# Patient Record
Sex: FEMALE | Race: WHITE | NOT HISPANIC OR LATINO | URBAN - METROPOLITAN AREA
[De-identification: names, ages, dates, MRNs, and addresses within clinical notes are randomized per-mention and may not be internally consistent; named-entity substitution may affect disease eponyms.]

---

## 2019-02-20 ENCOUNTER — INPATIENT (INPATIENT)
Facility: HOSPITAL | Age: 84
LOS: 1 days | Discharge: HOME | End: 2019-02-22
Attending: HOSPITALIST | Admitting: HOSPITALIST

## 2019-02-20 VITALS
SYSTOLIC BLOOD PRESSURE: 174 MMHG | HEART RATE: 87 BPM | RESPIRATION RATE: 18 BRPM | TEMPERATURE: 98 F | OXYGEN SATURATION: 96 % | DIASTOLIC BLOOD PRESSURE: 81 MMHG

## 2019-02-20 DIAGNOSIS — E78.5 HYPERLIPIDEMIA, UNSPECIFIED: ICD-10-CM

## 2019-02-20 DIAGNOSIS — J90 PLEURAL EFFUSION, NOT ELSEWHERE CLASSIFIED: ICD-10-CM

## 2019-02-20 DIAGNOSIS — I10 ESSENTIAL (PRIMARY) HYPERTENSION: ICD-10-CM

## 2019-02-20 DIAGNOSIS — R91.8 OTHER NONSPECIFIC ABNORMAL FINDING OF LUNG FIELD: ICD-10-CM

## 2019-02-20 DIAGNOSIS — R63.4 ABNORMAL WEIGHT LOSS: ICD-10-CM

## 2019-02-20 LAB
ALBUMIN SERPL ELPH-MCNC: 4 G/DL — SIGNIFICANT CHANGE UP (ref 3.5–5.2)
ALP SERPL-CCNC: 95 U/L — SIGNIFICANT CHANGE UP (ref 30–115)
ALT FLD-CCNC: 20 U/L — SIGNIFICANT CHANGE UP (ref 0–41)
ANION GAP SERPL CALC-SCNC: 13 MMOL/L — SIGNIFICANT CHANGE UP (ref 7–14)
APTT BLD: 30.3 SEC — SIGNIFICANT CHANGE UP (ref 27–39.2)
AST SERPL-CCNC: 15 U/L — SIGNIFICANT CHANGE UP (ref 0–41)
BASOPHILS # BLD AUTO: 0.06 K/UL — SIGNIFICANT CHANGE UP (ref 0–0.2)
BASOPHILS NFR BLD AUTO: 0.6 % — SIGNIFICANT CHANGE UP (ref 0–1)
BILIRUB SERPL-MCNC: 0.5 MG/DL — SIGNIFICANT CHANGE UP (ref 0.2–1.2)
BUN SERPL-MCNC: 13 MG/DL — SIGNIFICANT CHANGE UP (ref 10–20)
CALCIUM SERPL-MCNC: 9.7 MG/DL — SIGNIFICANT CHANGE UP (ref 8.5–10.1)
CHLORIDE SERPL-SCNC: 104 MMOL/L — SIGNIFICANT CHANGE UP (ref 98–110)
CO2 SERPL-SCNC: 25 MMOL/L — SIGNIFICANT CHANGE UP (ref 17–32)
CREAT SERPL-MCNC: 0.8 MG/DL — SIGNIFICANT CHANGE UP (ref 0.7–1.5)
EOSINOPHIL # BLD AUTO: 0.35 K/UL — SIGNIFICANT CHANGE UP (ref 0–0.7)
EOSINOPHIL NFR BLD AUTO: 3.6 % — SIGNIFICANT CHANGE UP (ref 0–8)
GLUCOSE SERPL-MCNC: 127 MG/DL — HIGH (ref 70–99)
HCT VFR BLD CALC: 39.5 % — SIGNIFICANT CHANGE UP (ref 37–47)
HGB BLD-MCNC: 13.2 G/DL — SIGNIFICANT CHANGE UP (ref 12–16)
IMM GRANULOCYTES NFR BLD AUTO: 0.3 % — SIGNIFICANT CHANGE UP (ref 0.1–0.3)
INR BLD: 1.05 RATIO — SIGNIFICANT CHANGE UP (ref 0.65–1.3)
LYMPHOCYTES # BLD AUTO: 2.35 K/UL — SIGNIFICANT CHANGE UP (ref 1.2–3.4)
LYMPHOCYTES # BLD AUTO: 24.2 % — SIGNIFICANT CHANGE UP (ref 20.5–51.1)
MAGNESIUM SERPL-MCNC: 2 MG/DL — SIGNIFICANT CHANGE UP (ref 1.8–2.4)
MCHC RBC-ENTMCNC: 27.8 PG — SIGNIFICANT CHANGE UP (ref 27–31)
MCHC RBC-ENTMCNC: 33.4 G/DL — SIGNIFICANT CHANGE UP (ref 32–37)
MCV RBC AUTO: 83.2 FL — SIGNIFICANT CHANGE UP (ref 81–99)
MONOCYTES # BLD AUTO: 0.69 K/UL — HIGH (ref 0.1–0.6)
MONOCYTES NFR BLD AUTO: 7.1 % — SIGNIFICANT CHANGE UP (ref 1.7–9.3)
NEUTROPHILS # BLD AUTO: 6.22 K/UL — SIGNIFICANT CHANGE UP (ref 1.4–6.5)
NEUTROPHILS NFR BLD AUTO: 64.2 % — SIGNIFICANT CHANGE UP (ref 42.2–75.2)
NRBC # BLD: 0 /100 WBCS — SIGNIFICANT CHANGE UP (ref 0–0)
NT-PROBNP SERPL-SCNC: 136 PG/ML — SIGNIFICANT CHANGE UP (ref 0–300)
PHOSPHATE SERPL-MCNC: 3.2 MG/DL — SIGNIFICANT CHANGE UP (ref 2.1–4.9)
PLATELET # BLD AUTO: 297 K/UL — SIGNIFICANT CHANGE UP (ref 130–400)
POTASSIUM SERPL-MCNC: 3.9 MMOL/L — SIGNIFICANT CHANGE UP (ref 3.5–5)
POTASSIUM SERPL-SCNC: 3.9 MMOL/L — SIGNIFICANT CHANGE UP (ref 3.5–5)
PROT SERPL-MCNC: 6.6 G/DL — SIGNIFICANT CHANGE UP (ref 6–8)
PROTHROM AB SERPL-ACNC: 12.1 SEC — SIGNIFICANT CHANGE UP (ref 9.95–12.87)
RBC # BLD: 4.75 M/UL — SIGNIFICANT CHANGE UP (ref 4.2–5.4)
RBC # FLD: 12.9 % — SIGNIFICANT CHANGE UP (ref 11.5–14.5)
SODIUM SERPL-SCNC: 142 MMOL/L — SIGNIFICANT CHANGE UP (ref 135–146)
TROPONIN T SERPL-MCNC: <0.01 NG/ML — SIGNIFICANT CHANGE UP
WBC # BLD: 9.7 K/UL — SIGNIFICANT CHANGE UP (ref 4.8–10.8)
WBC # FLD AUTO: 9.7 K/UL — SIGNIFICANT CHANGE UP (ref 4.8–10.8)

## 2019-02-20 RX ORDER — CEFEPIME 1 G/1
2000 INJECTION, POWDER, FOR SOLUTION INTRAMUSCULAR; INTRAVENOUS ONCE
Qty: 0 | Refills: 0 | Status: COMPLETED | OUTPATIENT
Start: 2019-02-20 | End: 2019-02-20

## 2019-02-20 RX ORDER — ACETAMINOPHEN 500 MG
975 TABLET ORAL ONCE
Qty: 0 | Refills: 0 | Status: COMPLETED | OUTPATIENT
Start: 2019-02-20 | End: 2019-02-20

## 2019-02-20 RX ORDER — VANCOMYCIN HCL 1 G
1000 VIAL (EA) INTRAVENOUS ONCE
Qty: 0 | Refills: 0 | Status: COMPLETED | OUTPATIENT
Start: 2019-02-20 | End: 2019-02-20

## 2019-02-20 RX ORDER — AZITHROMYCIN 500 MG/1
500 TABLET, FILM COATED ORAL ONCE
Qty: 0 | Refills: 0 | Status: COMPLETED | OUTPATIENT
Start: 2019-02-20 | End: 2019-02-20

## 2019-02-20 RX ADMIN — AZITHROMYCIN 255 MILLIGRAM(S): 500 TABLET, FILM COATED ORAL at 18:23

## 2019-02-20 RX ADMIN — Medication 975 MILLIGRAM(S): at 17:34

## 2019-02-20 RX ADMIN — Medication 250 MILLIGRAM(S): at 21:34

## 2019-02-20 RX ADMIN — CEFEPIME 100 MILLIGRAM(S): 1 INJECTION, POWDER, FOR SOLUTION INTRAMUSCULAR; INTRAVENOUS at 19:21

## 2019-02-20 NOTE — ED PROVIDER NOTE - CLINICAL SUMMARY MEDICAL DECISION MAKING FREE TEXT BOX
pt here for 1 month cough, progressive fatigue and abnormal breath sounds. pt cxr showing large R pleural effusion- initially c/f empyema given cough hx and tx w/ abx. CTA showing no pe, effusion more likely releated to malignancy. labs negative, including trop.

## 2019-02-20 NOTE — ED PROVIDER NOTE - OBJECTIVE STATEMENT
84 yo f hx htn, hld here for progressive fatigue, sob, and cough. pt endorsing ~1 month of cough and progressive generalized fatigue. pt went to see pcp who heard decreased breath sounds to RLL and was c/f pna vs pe vs pericard effusion.   no fever. pt endorsing some sternal chest pain rad to back which was worse with cough. no smoking hx.

## 2019-02-20 NOTE — ED PROVIDER NOTE - NS_EDPROVIDERDISPOUSERTYPE_ED_A_ED
I call Brayan back, she received some documentation from her insurance company asking if her tx was the result of an accident. I tell her that it's not unusual for insurance to send these out to ensure they aren't paying for a WC or PI case. She understands.    Attending Attestation (For Attendings USE Only)...

## 2019-02-20 NOTE — ED PROVIDER NOTE - NS ED ROS FT
Constitutional: no fever or rigors  Eyes: no eye redness, acute visual change  ENMT: no ear pain, no throat pain  Card: pos chest pain, no palpitations  Pulm: pos cough, pos shortness of breath  GI: no abdominal pain, nausea or vomiting  : no dysuria or hematuria  MSK: no limitation in range of motion, no neck pain  Skin: no bruises, no abrasion  Neuro: no numbness, no weakness  Heme/Onc: no easy bruising, no bleeding tendency   Allergic: no hives, no throat swelling

## 2019-02-21 DIAGNOSIS — Z90.710 ACQUIRED ABSENCE OF BOTH CERVIX AND UTERUS: Chronic | ICD-10-CM

## 2019-02-21 LAB
APTT BLD: 31.1 SEC — SIGNIFICANT CHANGE UP (ref 27–39.2)
B PERT IGG+IGM PNL SER: ABNORMAL
BASOPHILS # BLD AUTO: 0.06 K/UL — SIGNIFICANT CHANGE UP (ref 0–0.2)
BASOPHILS NFR BLD AUTO: 0.7 % — SIGNIFICANT CHANGE UP (ref 0–1)
COLOR FLD: SIGNIFICANT CHANGE UP
EOSINOPHIL # BLD AUTO: 0.42 K/UL — SIGNIFICANT CHANGE UP (ref 0–0.7)
EOSINOPHIL # FLD: 3 % — SIGNIFICANT CHANGE UP
EOSINOPHIL NFR BLD AUTO: 4.8 % — SIGNIFICANT CHANGE UP (ref 0–8)
FLUID INTAKE SUBSTANCE CLASS: SIGNIFICANT CHANGE UP
FLUID SEGMENTED GRANULOCYTES: 23 % — SIGNIFICANT CHANGE UP
GRAM STN FLD: SIGNIFICANT CHANGE UP
HCT VFR BLD CALC: 40.8 % — SIGNIFICANT CHANGE UP (ref 37–47)
HGB BLD-MCNC: 13.3 G/DL — SIGNIFICANT CHANGE UP (ref 12–16)
IMM GRANULOCYTES NFR BLD AUTO: 0.3 % — SIGNIFICANT CHANGE UP (ref 0.1–0.3)
INR BLD: 1.01 RATIO — SIGNIFICANT CHANGE UP (ref 0.65–1.3)
LDH SERPL L TO P-CCNC: 209 — SIGNIFICANT CHANGE UP (ref 50–242)
LYMPHOCYTES # BLD AUTO: 2.19 K/UL — SIGNIFICANT CHANGE UP (ref 1.2–3.4)
LYMPHOCYTES # BLD AUTO: 24.9 % — SIGNIFICANT CHANGE UP (ref 20.5–51.1)
LYMPHOCYTES # FLD: 39 — SIGNIFICANT CHANGE UP
MCHC RBC-ENTMCNC: 27.3 PG — SIGNIFICANT CHANGE UP (ref 27–31)
MCHC RBC-ENTMCNC: 32.6 G/DL — SIGNIFICANT CHANGE UP (ref 32–37)
MCV RBC AUTO: 83.6 FL — SIGNIFICANT CHANGE UP (ref 81–99)
MESOTHL CELL # FLD: 29 % — SIGNIFICANT CHANGE UP
MONOCYTES # BLD AUTO: 0.53 K/UL — SIGNIFICANT CHANGE UP (ref 0.1–0.6)
MONOCYTES NFR BLD AUTO: 6 % — SIGNIFICANT CHANGE UP (ref 1.7–9.3)
MONOS+MACROS # FLD: 6 % — SIGNIFICANT CHANGE UP
NEUTROPHILS # BLD AUTO: 5.57 K/UL — SIGNIFICANT CHANGE UP (ref 1.4–6.5)
NEUTROPHILS NFR BLD AUTO: 63.3 % — SIGNIFICANT CHANGE UP (ref 42.2–75.2)
NIGHT BLUE STAIN TISS: SIGNIFICANT CHANGE UP
NRBC # BLD: 0 /100 WBCS — SIGNIFICANT CHANGE UP (ref 0–0)
PLATELET # BLD AUTO: 306 K/UL — SIGNIFICANT CHANGE UP (ref 130–400)
PROT SERPL-MCNC: 6.4 G/DL — SIGNIFICANT CHANGE UP (ref 6–8)
PROTHROM AB SERPL-ACNC: 11.6 SEC — SIGNIFICANT CHANGE UP (ref 9.95–12.87)
RBC # BLD: 4.88 M/UL — SIGNIFICANT CHANGE UP (ref 4.2–5.4)
RBC # FLD: 13.1 % — SIGNIFICANT CHANGE UP (ref 11.5–14.5)
RCV VOL RI: 4000 /UL — HIGH (ref 0–5)
SPECIMEN SOURCE: SIGNIFICANT CHANGE UP
SPECIMEN SOURCE: SIGNIFICANT CHANGE UP
TOTAL NUCLEATED CELL COUNT, BODY FLUID: 1869 /UL — HIGH (ref 0–5)
TUBE TYPE: SIGNIFICANT CHANGE UP
WBC # BLD: 8.8 K/UL — SIGNIFICANT CHANGE UP (ref 4.8–10.8)
WBC # FLD AUTO: 8.8 K/UL — SIGNIFICANT CHANGE UP (ref 4.8–10.8)

## 2019-02-21 RX ORDER — ENOXAPARIN SODIUM 100 MG/ML
40 INJECTION SUBCUTANEOUS DAILY
Qty: 0 | Refills: 0 | Status: DISCONTINUED | OUTPATIENT
Start: 2019-02-21 | End: 2019-02-22

## 2019-02-21 RX ORDER — ASPIRIN/CALCIUM CARB/MAGNESIUM 324 MG
81 TABLET ORAL DAILY
Qty: 0 | Refills: 0 | Status: DISCONTINUED | OUTPATIENT
Start: 2019-02-21 | End: 2019-02-22

## 2019-02-21 RX ORDER — CHLORHEXIDINE GLUCONATE 213 G/1000ML
1 SOLUTION TOPICAL
Qty: 0 | Refills: 0 | Status: DISCONTINUED | OUTPATIENT
Start: 2019-02-21 | End: 2019-02-22

## 2019-02-21 RX ORDER — SIMVASTATIN 20 MG/1
10 TABLET, FILM COATED ORAL AT BEDTIME
Qty: 0 | Refills: 0 | Status: DISCONTINUED | OUTPATIENT
Start: 2019-02-21 | End: 2019-02-22

## 2019-02-21 RX ORDER — MORPHINE SULFATE 50 MG/1
2 CAPSULE, EXTENDED RELEASE ORAL EVERY 4 HOURS
Qty: 0 | Refills: 0 | Status: DISCONTINUED | OUTPATIENT
Start: 2019-02-21 | End: 2019-02-22

## 2019-02-21 RX ORDER — ENOXAPARIN SODIUM 100 MG/ML
40 INJECTION SUBCUTANEOUS DAILY
Qty: 0 | Refills: 0 | Status: DISCONTINUED | OUTPATIENT
Start: 2019-02-21 | End: 2019-02-21

## 2019-02-21 RX ORDER — LOSARTAN POTASSIUM 100 MG/1
100 TABLET, FILM COATED ORAL DAILY
Qty: 0 | Refills: 0 | Status: DISCONTINUED | OUTPATIENT
Start: 2019-02-21 | End: 2019-02-22

## 2019-02-21 RX ORDER — AMLODIPINE BESYLATE 2.5 MG/1
5 TABLET ORAL DAILY
Qty: 0 | Refills: 0 | Status: DISCONTINUED | OUTPATIENT
Start: 2019-02-21 | End: 2019-02-22

## 2019-02-21 RX ADMIN — ENOXAPARIN SODIUM 40 MILLIGRAM(S): 100 INJECTION SUBCUTANEOUS at 18:16

## 2019-02-21 RX ADMIN — Medication 81 MILLIGRAM(S): at 18:16

## 2019-02-21 RX ADMIN — AMLODIPINE BESYLATE 5 MILLIGRAM(S): 2.5 TABLET ORAL at 05:38

## 2019-02-21 RX ADMIN — LOSARTAN POTASSIUM 100 MILLIGRAM(S): 100 TABLET, FILM COATED ORAL at 05:38

## 2019-02-21 RX ADMIN — SIMVASTATIN 10 MILLIGRAM(S): 20 TABLET, FILM COATED ORAL at 22:31

## 2019-02-21 RX ADMIN — MORPHINE SULFATE 2 MILLIGRAM(S): 50 CAPSULE, EXTENDED RELEASE ORAL at 01:26

## 2019-02-21 NOTE — CONSULT NOTE ADULT - SUBJECTIVE AND OBJECTIVE BOX
Patient is a 83y old  Female who presents with a chief complaint of Loculated pleural effusion, SOB, fatigue (21 Feb 2019 01:17)      HPI:  83F, fully functional, PMHx of HTN and DLD sent in by PCP for decreased R sided breath sounds and generalized weakness. At baseline patient walks approximately 2 hours a day with her dog. Beginning about 1 month ago she began developing a dry cough, generalized weakness and worsening ABRAHAM. Additionally, she is experiencing progressive back and chest pain. The pain is described as "nerve like", moves with positional change and cough, and has been constant for weeks. She also c/o 3 kg weight loss in 1 month, as well as night sweats (waking up twice a night covered in sweat). Yesterday she was unable to walk her dog at all due to her weakness and SOB, so she came to ED.    In ED, afebrile, VSS, labs wnl, CT PE showed loculated effusions with likely 3cm malignant mass. (21 Feb 2019 01:17)      PAST MEDICAL & SURGICAL HISTORY:  HTN (hypertension)  High cholesterol  H/O abdominal hysterectomy      SOCIAL HX:   Smoking                         ETOH                            Other    FAMILY HISTORY:  Hypertension (Mother)  .  No cardiovascular or pulmonary family history     Review of System:  See HPI    Allergies    Allergy Status Unknown    Intolerances          PHYSICAL EXAM  Vital Signs Last 24 Hrs  T(C): 37.1 (21 Feb 2019 07:42), Max: 37.1 (21 Feb 2019 07:42)  T(F): 98.7 (21 Feb 2019 07:42), Max: 98.7 (21 Feb 2019 07:42)  HR: 75 (21 Feb 2019 07:42) (75 - 87)  BP: 152/66 (21 Feb 2019 07:42) (152/66 - 199/86)  BP(mean): --  RR: 18 (21 Feb 2019 07:42) (18 - 18)  SpO2: 95% (21 Feb 2019 07:42) (95% - 98%)    General: In NAD  HEENT: FERNANDO             Lymphatic system: No cervical LN     Lungs: James BS  Cardiovascular: Regular  Gastrointestinal: Soft.  + BS   Musculoskeletal: No Clubbing.  Full range of motion.. Moves all extremities  Skin: Warm.  Intact  Neurological: No motor or sensory deficit       LABS:                          13.3   8.80  )-----------( 306      ( 21 Feb 2019 08:18 )             40.8                                               02-20    142  |  104  |  13  ----------------------------<  127<H>  3.9   |  25  |  0.8    Ca    9.7      20 Feb 2019 16:58  Phos  3.2     02-20  Mg     2.0     02-20    TPro  6.6  /  Alb  4.0  /  TBili  0.5  /  DBili  x   /  AST  15  /  ALT  20  /  AlkPhos  95  02-20      PT/INR - ( 21 Feb 2019 08:18 )   PT: 11.60 sec;   INR: 1.01 ratio         PTT - ( 21 Feb 2019 08:18 )  PTT:31.1 sec                                           CARDIAC MARKERS ( 20 Feb 2019 16:58 )  x     / <0.01 ng/mL / x     / x     / x                                                LIVER FUNCTIONS - ( 20 Feb 2019 16:58 )  Alb: 4.0 g/dL / Pro: 6.6 g/dL / ALK PHOS: 95 U/L / ALT: 20 U/L / AST: 15 U/L / GGT: x                                                < from: Xray Chest 2 Views PA/Lat (02.20.19 @ 16:25) >  Impression:      Right upper lobe soft tissue density/mass and large right pleural   effusion, better evaluated on reference CT chest.    < end of copied text >        < from: CT Angio Chest w/ IV Cont (02.20.19 @ 20:43) >  IMPRESSION:    Large right loculated pleural effusion with estimated volume of at least   900 cc.    No acute pulmonary embolus.    1 cm right upper lobe fissural nodule. A short interval follow-up chest   CT is recommended in 2-3 months.    Additional Findings/Recommendations After Attending Radiologist Review:    Findings consistent with neoplasm. There is right apical 3.1 x 3.0 cm   mass with irregular right apical pleural thickening (series 7, image 44),   and multiple satellite pleural nodules including a more caudad 2.7 cm   para-mediastinal nodule (series 7, image 175). Tissue sampling   recommended.    Attending impression discussed with JESUS CHARLTON on 2/20/2019 9:52   PM with readback.    < end of copied text >                                                  Serum Pro-Brain Natriuretic Peptide: 136 pg/mL (02.20.19 @ 17:01)          MEDICATIONS  (STANDING):  amLODIPine   Tablet 5 milliGRAM(s) Oral daily  aspirin  chewable 81 milliGRAM(s) Oral daily  chlorhexidine 4% Liquid 1 Application(s) Topical <User Schedule>  enoxaparin Injectable 40 milliGRAM(s) SubCutaneous daily  losartan 100 milliGRAM(s) Oral daily  simvastatin 10 milliGRAM(s) Oral at bedtime    MEDICATIONS  (PRN):  morphine  - Injectable 2 milliGRAM(s) IV Push every 4 hours PRN Severe Pain (7 - 10) Patient is a 83y old  Female who presents with a chief complaint of Loculated pleural effusion, SOB, fatigue (21 Feb 2019 01:17)      HPI:  83F, fully functional, PMHx of HTN and DLD sent in by PCP for decreased R sided breath sounds and generalized weakness. At baseline patient walks approximately 2 hours a day with her dog. Beginning about 1 month ago she began developing a dry cough, generalized weakness and worsening ABRAHAM. Additionally, she is experiencing progressive back and chest pain. The pain is described as "nerve like", moves with positional change and cough, and has been constant for weeks. She also c/o 3 kg weight loss in 1 month, as well as night sweats (waking up twice a night covered in sweat). Yesterday she was unable to walk her dog at all due to her weakness and SOB, so she came to ED.    In ED, afebrile, VSS, labs wnl, CT PE showed loculated effusions with likely 3cm malignant mass. (21 Feb 2019 01:17)      PAST MEDICAL & SURGICAL HISTORY:  HTN (hypertension)  High cholesterol  H/O abdominal hysterectomy      SOCIAL HX:   Smoking   no                      ETOH      no              FAMILY HISTORY:  Hypertension (Mother)  .  No cardiovascular or pulmonary family history     Review of System:  See HPI    Allergies    Allergy Status Unknown    Intolerances    PHYSICAL EXAM  Vital Signs Last 24 Hrs  T(C): 37.1 (21 Feb 2019 07:42), Max: 37.1 (21 Feb 2019 07:42)  T(F): 98.7 (21 Feb 2019 07:42), Max: 98.7 (21 Feb 2019 07:42)  HR: 75 (21 Feb 2019 07:42) (75 - 87)  BP: 152/66 (21 Feb 2019 07:42) (152/66 - 199/86)  RR: 18 (21 Feb 2019 07:42) (18 - 18)  SpO2: 95% (21 Feb 2019 07:42) (95% - 98%) RA    General: In NAD  HEENT: FERNANDO             Lymphatic system: No cervical LN     Lungs: James BS reduced on right no crackles or wheeze  Cardiovascular: Regular  Gastrointestinal: Soft.  + BS   Musculoskeletal: No Clubbing.  Full range of motion.. Moves all extremities no edema  Skin: Warm.  Intact  Neurological: No motor or sensory deficit       LABS:                          13.3   8.80  )-----------( 306      ( 21 Feb 2019 08:18 )             40.8                                               02-20    142  |  104  |  13  ----------------------------<  127<H>  3.9   |  25  |  0.8    Ca    9.7      20 Feb 2019 16:58  Phos  3.2     02-20  Mg     2.0     02-20    TPro  6.6  /  Alb  4.0  /  TBili  0.5  /  DBili  x   /  AST  15  /  ALT  20  /  AlkPhos  95  02-20      PT/INR - ( 21 Feb 2019 08:18 )   PT: 11.60 sec;   INR: 1.01 ratio         PTT - ( 21 Feb 2019 08:18 )  PTT:31.1 sec                                           CARDIAC MARKERS ( 20 Feb 2019 16:58 )  x     / <0.01 ng/mL / x     / x     / x                                                LIVER FUNCTIONS - ( 20 Feb 2019 16:58 )  Alb: 4.0 g/dL / Pro: 6.6 g/dL / ALK PHOS: 95 U/L / ALT: 20 U/L / AST: 15 U/L / GGT: x                                                < from: Xray Chest 2 Views PA/Lat (02.20.19 @ 16:25) >  Impression:      Right upper lobe soft tissue density/mass and large right pleural   effusion, better evaluated on reference CT chest.    < end of copied text >        < from: CT Angio Chest w/ IV Cont (02.20.19 @ 20:43) >  IMPRESSION:    Large right loculated pleural effusion with estimated volume of at least   900 cc.    No acute pulmonary embolus.    1 cm right upper lobe fissural nodule. A short interval follow-up chest   CT is recommended in 2-3 months.    Additional Findings/Recommendations After Attending Radiologist Review:    Findings consistent with neoplasm. There is right apical 3.1 x 3.0 cm   mass with irregular right apical pleural thickening (series 7, image 44),   and multiple satellite pleural nodules including a more caudad 2.7 cm   para-mediastinal nodule (series 7, image 175). Tissue sampling   recommended.    Attending impression discussed with JESUS CHARLTON on 2/20/2019 9:52   PM with readback.    < end of copied text >                                                  Serum Pro-Brain Natriuretic Peptide: 136 pg/mL (02.20.19 @ 17:01)          MEDICATIONS  (STANDING):  amLODIPine   Tablet 5 milliGRAM(s) Oral daily  aspirin  chewable 81 milliGRAM(s) Oral daily  chlorhexidine 4% Liquid 1 Application(s) Topical <User Schedule>  enoxaparin Injectable 40 milliGRAM(s) SubCutaneous daily  losartan 100 milliGRAM(s) Oral daily  simvastatin 10 milliGRAM(s) Oral at bedtime    MEDICATIONS  (PRN):  morphine  - Injectable 2 milliGRAM(s) IV Push every 4 hours PRN Severe Pain (7 - 10) Patient is a 83y old  Female who presents with a chief complaint of Loculated pleural effusion, SOB, fatigue (21 Feb 2019 01:17)      HPI:  83F, fully functional, PMHx of HTN and DLD sent in by PCP for decreased R sided breath sounds and generalized weakness. At baseline patient walks approximately 2 hours a day with her dog. Beginning about 1 month ago she began developing a dry cough, generalized weakness and worsening ABRAHAM. Additionally, she is experiencing progressive back and chest pain. The pain is described as "nerve like", moves with positional change and cough, and has been constant for weeks. She also c/o 3 kg weight loss in 1 month, as well as night sweats (waking up twice a night covered in sweat). Yesterday she was unable to walk her dog at all due to her weakness and SOB, so she came to ED.    In ED, afebrile, VSS, labs wnl, CT PE showed R sided effusion with RUL mass. (21 Feb 2019 01:17) called to evaluate, reports weight loss, non smoker      PAST MEDICAL & SURGICAL HISTORY:  HTN (hypertension)  High cholesterol  H/O abdominal hysterectomy      SOCIAL HX:   Smoking   no                      ETOH      no              FAMILY HISTORY:  Hypertension (Mother)  .  No cardiovascular or pulmonary family history     Review of System:  See HPI    Allergies    Allergy Status Unknown    Intolerances    PHYSICAL EXAM  Vital Signs Last 24 Hrs  T(C): 37.1 (21 Feb 2019 07:42), Max: 37.1 (21 Feb 2019 07:42)  T(F): 98.7 (21 Feb 2019 07:42), Max: 98.7 (21 Feb 2019 07:42)  HR: 75 (21 Feb 2019 07:42) (75 - 87)  BP: 152/66 (21 Feb 2019 07:42) (152/66 - 199/86)  RR: 18 (21 Feb 2019 07:42) (18 - 18)  SpO2: 95% (21 Feb 2019 07:42) (95% - 98%) RA    General: In NAD  HEENT: FERNANDO             Lymphatic system: No cervical LN     Lungs: James BS reduced on right no crackles or wheeze  Cardiovascular: Regular  Gastrointestinal: Soft.  + BS   Musculoskeletal: No Clubbing.  Full range of motion.. Moves all extremities no edema  Skin: Warm.  Intact  Neurological: No motor or sensory deficit       LABS:                          13.3   8.80  )-----------( 306      ( 21 Feb 2019 08:18 )             40.8                                               02-20    142  |  104  |  13  ----------------------------<  127<H>  3.9   |  25  |  0.8    Ca    9.7      20 Feb 2019 16:58  Phos  3.2     02-20  Mg     2.0     02-20    TPro  6.6  /  Alb  4.0  /  TBili  0.5  /  DBili  x   /  AST  15  /  ALT  20  /  AlkPhos  95  02-20      PT/INR - ( 21 Feb 2019 08:18 )   PT: 11.60 sec;   INR: 1.01 ratio         PTT - ( 21 Feb 2019 08:18 )  PTT:31.1 sec                                           CARDIAC MARKERS ( 20 Feb 2019 16:58 )  x     / <0.01 ng/mL / x     / x     / x                                                LIVER FUNCTIONS - ( 20 Feb 2019 16:58 )  Alb: 4.0 g/dL / Pro: 6.6 g/dL / ALK PHOS: 95 U/L / ALT: 20 U/L / AST: 15 U/L / GGT: x                                                < from: Xray Chest 2 Views PA/Lat (02.20.19 @ 16:25) >  Impression:      Right upper lobe soft tissue density/mass and large right pleural   effusion, better evaluated on reference CT chest.    < end of copied text >        < from: CT Angio Chest w/ IV Cont (02.20.19 @ 20:43) >  IMPRESSION:    Large right loculated pleural effusion with estimated volume of at least   900 cc.    No acute pulmonary embolus.    1 cm right upper lobe fissural nodule. A short interval follow-up chest   CT is recommended in 2-3 months.    Additional Findings/Recommendations After Attending Radiologist Review:    Findings consistent with neoplasm. There is right apical 3.1 x 3.0 cm   mass with irregular right apical pleural thickening (series 7, image 44),   and multiple satellite pleural nodules including a more caudad 2.7 cm   para-mediastinal nodule (series 7, image 175). Tissue sampling   recommended.    Attending impression discussed with JESUS CHARLTON on 2/20/2019 9:52   PM with readback.    < end of copied text >                                                  Serum Pro-Brain Natriuretic Peptide: 136 pg/mL (02.20.19 @ 17:01)          MEDICATIONS  (STANDING):  amLODIPine   Tablet 5 milliGRAM(s) Oral daily  aspirin  chewable 81 milliGRAM(s) Oral daily  chlorhexidine 4% Liquid 1 Application(s) Topical <User Schedule>  enoxaparin Injectable 40 milliGRAM(s) SubCutaneous daily  losartan 100 milliGRAM(s) Oral daily  simvastatin 10 milliGRAM(s) Oral at bedtime    MEDICATIONS  (PRN):  morphine  - Injectable 2 milliGRAM(s) IV Push every 4 hours PRN Severe Pain (7 - 10)

## 2019-02-21 NOTE — H&P ADULT - NSHPLABSRESULTS_GEN_ALL_CORE
13.2   9.70  )-----------( 297      ( 20 Feb 2019 16:58 )             39.5   02-20    142  |  104  |  13  ----------------------------<  127<H>  3.9   |  25  |  0.8    Ca    9.7      20 Feb 2019 16:58  Phos  3.2     02-20  Mg     2.0     02-20    TPro  6.6  /  Alb  4.0  /  TBili  0.5  /  DBili  x   /  AST  15  /  ALT  20  /  AlkPhos  95  02-20  CARDIAC MARKERS ( 20 Feb 2019 16:58 )  x     / <0.01 ng/mL / x     / x     / x 13.2   9.70  )-----------( 297      ( 20 Feb 2019 16:58 )             39.5   02-20    142  |  104  |  13  ----------------------------<  127<H>  3.9   |  25  |  0.8    Ca    9.7      20 Feb 2019 16:58  Phos  3.2     02-20  Mg     2.0     02-20    TPro  6.6  /  Alb  4.0  /  TBili  0.5  /  DBili  x   /  AST  15  /  ALT  20  /  AlkPhos  95  02-20  CARDIAC MARKERS ( 20 Feb 2019 16:58 )  x     / <0.01 ng/mL / x     / x     / x    < from: CT Angio Chest w/ IV Cont (02.20.19 @ 20:43) >    Large right loculated pleural effusion with estimated volume of at least   900 cc.    No acute pulmonary embolus.    1 cm right upper lobe fissural nodule. A short interval follow-up chest   CT is recommended in 2-3 months.    Additional Findings/Recommendations After Attending Radiologist Review:    Findings consistent with neoplasm. There is right apical 3.1 x 3.0 cm   mass with irregular right apical pleural thickening (series 7, image 44),   and multiple satellite pleural nodules including a more caudad 2.7 cm   para-mediastinal nodule (series 7, image 175). Tissue sampling   recommended.

## 2019-02-21 NOTE — H&P ADULT - ASSESSMENT
83F, fully functional, PMHx of HTN and DLD sent in by PCP for decreased R sided breath sounds and generalized weakness.     SOB and generalized weakness due to loculated pleural effusion with 3cm R lung mass, likely malignant:  - Pulm consult for thoracentesis  - Pain control  - Lovenox scheduled for 1800    HTN: Possibly related to pain, received morphine  - Recheck VS, will give labetalol if still elevated  - Resume losartan, amlodipine    DLD:  - Simvastatin, ASA 81    DVT ppx: lovenox  Diet: DASH  Dispo: Lives with son, walked 2 hours a day 2 months ago

## 2019-02-21 NOTE — CONSULT NOTE ADULT - ASSESSMENT
IMPRESSION:    Right apical lung mass with multiple satellite nodules  Large right pleural effusion likely malignant    PLAN:    ·	Will do diag/therapuetic thoracentesis  ·	DVT ppx IMPRESSION:    Right apical lung mass with multiple satellite nodules  Large right pleural effusion likely malignant    PLAN:    ·	Will do diag/therapuetic thoracentesis  ·	Will discuss bronch with EBUS of enlarged paratracheal LN  ·	DVT ppx IMPRESSION:    Right apical lung mass with multiple satellite nodules  Large right pleural effusion likely malignant    PLAN:    ·	Will do diag/therapuetic thoracentesis  ·	Will discuss bronch with EBUS of enlarged paratracheal LN vs IR bx if fluid does not yield diagnosis  ·	Can be discharged from pulmonary standpoint  ·	Please make appt to see dr francois as outpatient  ·	DVT ppx IMPRESSION:    Right apical lung mass with multiple satellite nodules  Large right pleural effusion likely malignant    PLAN:    ·	Will do diag/therapuetic thoracentesis  ·	Can be discharged from pulmonary standpoint  ·	Please make appt to see dr francois as outpatient  ·	DVT ppx  ·	spoke at length with son well aware of possibility of malignancy

## 2019-02-21 NOTE — H&P ADULT - HISTORY OF PRESENT ILLNESS
83F, fully functional, PMHx of HTN and DLD sent in by PCP for concern of pleural/pericardial effusion and PE. At baseline patient walks approximately 2 hours a day with her dog. Beginning about 1 month ago she began developing a dry cough, generalized weakness and worsening ABRAHAM. Additionally, she is experiencing progressive back and chest pain. The pain is described as "nerve like", moves with positional change and cough, and has been constant for weeks. She also c/o 3 kg weight loss in 1 month, as well as night sweats (waking up twice a night covered in sweat). Yesterday she was unable to walk her dog at all due to her weakness and SOB, so she came to ED.    In ED, afebrile, VSS, labs wnl, CT PE showed loculated effusions with likely 3cm malignant mass. 83F, fully functional, PMHx of HTN and DLD sent in by PCP for decreased R sided breath sounds and generalized weakness. At baseline patient walks approximately 2 hours a day with her dog. Beginning about 1 month ago she began developing a dry cough, generalized weakness and worsening ABRAHAM. Additionally, she is experiencing progressive back and chest pain. The pain is described as "nerve like", moves with positional change and cough, and has been constant for weeks. She also c/o 3 kg weight loss in 1 month, as well as night sweats (waking up twice a night covered in sweat). Yesterday she was unable to walk her dog at all due to her weakness and SOB, so she came to ED.    In ED, afebrile, VSS, labs wnl, CT PE showed loculated effusions with likely 3cm malignant mass.

## 2019-02-21 NOTE — H&P ADULT - NSHPPHYSICALEXAM_GEN_ALL_CORE
General: NAD  HEENT: NCAT, EOMI  Pulm: Decreased breath sounds RLL  CVS: RRR  GI: Soft, NT, ND  : No suprapubic tenderness  Extremities: No edema  Neuro: AAOx4, no FND

## 2019-02-21 NOTE — H&P ADULT - ATTENDING COMMENTS
Patient seen and examined independently. I agree with the resident's note, physical exam, and plan except as below.  Vital Signs Last 24 Hrs  T(C): 37.1 (21 Feb 2019 07:42), Max: 37.1 (21 Feb 2019 07:42)  T(F): 98.7 (21 Feb 2019 07:42), Max: 98.7 (21 Feb 2019 07:42)  HR: 75 (21 Feb 2019 07:42) (75 - 87)  BP: 152/66 (21 Feb 2019 07:42) (152/66 - 199/86)  BP(mean): --  RR: 18 (21 Feb 2019 07:42) (18 - 18)  SpO2: 95% (21 Feb 2019 07:42) (95% - 98%)  PE  nad  [id number]  ctabl  right post thoracentesis site intact  no cce    #SOB due to loculated Right pleural effusion - sp thoracentesis today - follow up fluid analysis and cytology   #right apical lung mass - likely malignant - follow up fluid cytology - may need EBUS - can be done as outpt  outpt Onc - Dr cross -requested by PMD - spoke over phone - Dr Fernanda Phelps  #per PMD - cardiomyopathy on echo - check echo   discussed with pt and son who translated Vietnamese - would like to be dc asap and follow up as outpt for further workup   anticipate dc tomorrow if stable   pulm follow up

## 2019-02-21 NOTE — PROCEDURE NOTE - NSPROCDETAILS_GEN_ALL_CORE
connection to evacuated glass bottle/connection to syringe/catheter inserted over needle/location identified, draped/prepped, sterile technique used, needle inserted/introduced

## 2019-02-22 ENCOUNTER — TRANSCRIPTION ENCOUNTER (OUTPATIENT)
Age: 84
End: 2019-02-22

## 2019-02-22 VITALS
TEMPERATURE: 99 F | SYSTOLIC BLOOD PRESSURE: 145 MMHG | DIASTOLIC BLOOD PRESSURE: 63 MMHG | HEART RATE: 83 BPM | RESPIRATION RATE: 18 BRPM

## 2019-02-22 LAB
ANION GAP SERPL CALC-SCNC: 17 MMOL/L — HIGH (ref 7–14)
BASOPHILS # BLD AUTO: 0.05 K/UL — SIGNIFICANT CHANGE UP (ref 0–0.2)
BASOPHILS NFR BLD AUTO: 0.7 % — SIGNIFICANT CHANGE UP (ref 0–1)
BUN SERPL-MCNC: 16 MG/DL — SIGNIFICANT CHANGE UP (ref 10–20)
CALCIUM SERPL-MCNC: 9.3 MG/DL — SIGNIFICANT CHANGE UP (ref 8.5–10.1)
CHLORIDE SERPL-SCNC: 103 MMOL/L — SIGNIFICANT CHANGE UP (ref 98–110)
CO2 SERPL-SCNC: 22 MMOL/L — SIGNIFICANT CHANGE UP (ref 17–32)
CREAT SERPL-MCNC: 1 MG/DL — SIGNIFICANT CHANGE UP (ref 0.7–1.5)
EOSINOPHIL # BLD AUTO: 0.33 K/UL — SIGNIFICANT CHANGE UP (ref 0–0.7)
EOSINOPHIL NFR BLD AUTO: 4.3 % — SIGNIFICANT CHANGE UP (ref 0–8)
GLUCOSE SERPL-MCNC: 154 MG/DL — HIGH (ref 70–99)
HCT VFR BLD CALC: 37.2 % — SIGNIFICANT CHANGE UP (ref 37–47)
HGB BLD-MCNC: 12.1 G/DL — SIGNIFICANT CHANGE UP (ref 12–16)
IMM GRANULOCYTES NFR BLD AUTO: 0.3 % — SIGNIFICANT CHANGE UP (ref 0.1–0.3)
LYMPHOCYTES # BLD AUTO: 1.8 K/UL — SIGNIFICANT CHANGE UP (ref 1.2–3.4)
LYMPHOCYTES # BLD AUTO: 23.6 % — SIGNIFICANT CHANGE UP (ref 20.5–51.1)
MCHC RBC-ENTMCNC: 27.5 PG — SIGNIFICANT CHANGE UP (ref 27–31)
MCHC RBC-ENTMCNC: 32.5 G/DL — SIGNIFICANT CHANGE UP (ref 32–37)
MCV RBC AUTO: 84.5 FL — SIGNIFICANT CHANGE UP (ref 81–99)
MONOCYTES # BLD AUTO: 0.41 K/UL — SIGNIFICANT CHANGE UP (ref 0.1–0.6)
MONOCYTES NFR BLD AUTO: 5.4 % — SIGNIFICANT CHANGE UP (ref 1.7–9.3)
NEUTROPHILS # BLD AUTO: 5.02 K/UL — SIGNIFICANT CHANGE UP (ref 1.4–6.5)
NEUTROPHILS NFR BLD AUTO: 65.7 % — SIGNIFICANT CHANGE UP (ref 42.2–75.2)
NRBC # BLD: 0 /100 WBCS — SIGNIFICANT CHANGE UP (ref 0–0)
PLATELET # BLD AUTO: 287 K/UL — SIGNIFICANT CHANGE UP (ref 130–400)
POTASSIUM SERPL-MCNC: 4.2 MMOL/L — SIGNIFICANT CHANGE UP (ref 3.5–5)
POTASSIUM SERPL-SCNC: 4.2 MMOL/L — SIGNIFICANT CHANGE UP (ref 3.5–5)
RBC # BLD: 4.4 M/UL — SIGNIFICANT CHANGE UP (ref 4.2–5.4)
RBC # FLD: 13 % — SIGNIFICANT CHANGE UP (ref 11.5–14.5)
SODIUM SERPL-SCNC: 142 MMOL/L — SIGNIFICANT CHANGE UP (ref 135–146)
WBC # BLD: 7.63 K/UL — SIGNIFICANT CHANGE UP (ref 4.8–10.8)
WBC # FLD AUTO: 7.63 K/UL — SIGNIFICANT CHANGE UP (ref 4.8–10.8)

## 2019-02-22 RX ADMIN — Medication 81 MILLIGRAM(S): at 12:17

## 2019-02-22 RX ADMIN — AMLODIPINE BESYLATE 5 MILLIGRAM(S): 2.5 TABLET ORAL at 05:19

## 2019-02-22 RX ADMIN — ENOXAPARIN SODIUM 40 MILLIGRAM(S): 100 INJECTION SUBCUTANEOUS at 12:18

## 2019-02-22 RX ADMIN — LOSARTAN POTASSIUM 100 MILLIGRAM(S): 100 TABLET, FILM COATED ORAL at 05:19

## 2019-02-22 NOTE — DISCHARGE NOTE ADULT - CARE PLAN
Principal Discharge DX:	Lung mass  Goal:	biopsy and pulm follow up  Assessment and plan of treatment:	You were found to have suspicious mass in right lung. Please follow up with pulmonary and have biopsy.  Secondary Diagnosis:	Pleural effusion  Goal:	prevent recurrence  Assessment and plan of treatment:	Fluid in lung may have been cause of symptoms on presentation. It was drained out and sent for testing. Please follow up with pulmonologist after discharge.

## 2019-02-22 NOTE — DISCHARGE NOTE ADULT - PATIENT PORTAL LINK FT
You can access the [facility] Patient Portal, offered by [facility], by registering with the following website: [email or web address]

## 2019-02-22 NOTE — DISCHARGE NOTE ADULT - CARE PROVIDER_API CALL
Tamir Lanza (MD)  Critical Care Medicine; Geriatric Medicine; Internal Medicine; Pulmonary Disease  [address] [place] [address], [address] [address]  Phone: [phone]  Fax: [phone]  Follow Up Time:     Brenda Phelps (DO)  Internal Medicine  [address] [id number]  Phone: [phone]  Fax: [phone]  Follow Up Time:

## 2019-02-22 NOTE — DISCHARGE NOTE ADULT - MEDICATION SUMMARY - MEDICATIONS TO TAKE
I will START or STAY ON the medications listed below when I get home from the hospital:    aspirin 81 mg oral tablet  -- 1 tab(s) by mouth once a day  -- Indication: For cad prevention    losartan 100 mg oral tablet  -- 1 tab(s) by mouth once a day  -- Indication: For blood pressure    simvastatin 10 mg oral tablet  -- 1 tab(s) by mouth once a day (at bedtime)  -- Indication: For cholesterol    amLODIPine 5 mg oral tablet  -- 1 tab(s) by mouth once a day  -- Indication: For blood pressure

## 2019-02-22 NOTE — PROGRESS NOTE ADULT - ASSESSMENT
IMPRESSION:    Right apical lung mass with multiple satellite nodules  Large right pleural effusion likely malignant    PLAN:          ·	F/UP CYTO  ·	Can be discharged from pulmonary standpoint  ·	outpatient F/UP  ·	DVT ppx  ·	spoke at length with son well aware of possibility of malignancy

## 2019-02-22 NOTE — DISCHARGE NOTE ADULT - PLAN OF CARE
biopsy and pulm follow up You were found to have suspicious mass in right lung. Please follow up with pulmonary and have biopsy. prevent recurrence Fluid in lung may have been cause of symptoms on presentation. It was drained out and sent for testing. Please follow up with pulmonologist after discharge.

## 2019-02-22 NOTE — DISCHARGE NOTE ADULT - HOSPITAL COURSE
83F, fully functional, PMHx of HTN and DLD sent in by PCP for decreased R sided breath sounds and generalized weakness. 1 month of dry cough and worsening ABRAHAM. She also c/o 3 kg weight loss in 1 month, as well as night sweats (waking up twice a night covered in sweat). 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 Pulmonary was consulted, performed diagnostic/therapeutic thoracentesis. She will be discharged with instruction to follow up with pulm for biopsy. 83F, fully functional, PMHx of HTN and DLD sent in by PCP for decreased R sided breath sounds and generalized weakness. 1 month of dry cough and worsening ABRAHAM. She also c/o 3 kg weight loss in 1 month, as well as night sweats (waking up twice a night covered in sweat). 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 Pulmonary was consulted, performed diagnostic/therapeutic thoracentesis. She will be discharged with instruction to follow up with pulm for biopsy.    Attending NOte:     #right apical lung mass - likely malignant  #SOB due to loculated Right pleural effusion - sp thoracentesis - follow up fluid analysis and cytology . cleared for D/c from pulm standpoint. F/u Pulm within a week for further w/u. may need EBUS - can be done as outpt  outpt Onc - Dr tay khan.    D/c to home.

## 2019-02-22 NOTE — DISCHARGE NOTE ADULT - CARE PROVIDERS DIRECT ADDRESSES
[email or web address]

## 2019-02-22 NOTE — CHART NOTE - NSCHARTNOTEFT_GEN_A_CORE
<<<RESIDENT DISCHARGE NOTE>>>     JOANN SCHULTZ  MRN-[id number]    VITAL SIGNS:  T(F): 98.8 (02-22-19 @ 12:10), Max: 98.8 (02-22-19 @ 12:10)  HR: 83 (02-22-19 @ 12:10)  BP: 145/63 (02-22-19 @ 12:10)  SpO2: 97% (02-21-19 @ 16:35)  Weight (kg): 75.4 (02-21-19 @ 17:20)  BMI (kg/m2): 32.5 (02-21-19 @ 17:20)    PHYSICAL EXAMINATION:  General: NAD  Head & Neck: NCAT  Pulmonary: BS slightly decreased on R side  Cardiovascular: rrr, no murmurs  Gastrointestinal/Abdomen & Pelvis: soft, nt/nd  Neurologic/Motor: nonfocal    TEST RESULTS:                        12.1   7.63  )-----------( 287      ( 22 Feb 2019 09:05 )             37.2       02-22    142  |  103  |  16  ----------------------------<  154<H>  4.2   |  22  |  1.0    Ca    9.3      22 Feb 2019 09:05  Phos  3.2     02-20  Mg     2.0     02-20    TPro  6.4  /  Alb  x   /  TBili  x   /  DBili  x   /  AST  x   /  ALT  x   /  AlkPhos  x   02-21      FINAL DISCHARGE INTERVIEW:  Resident(s) Present: (Name:_____Abby________)    DISCHARGE MEDICATION RECONCILIATION  reviewed with Attending (Name:Monique________)    DISPOSITION:   [ x ] Home,    [  ] Home with Visiting Nursing Services,   [    ]  SNF/ NH,    [   ] Acute Rehab (4A),   [   ] Other (Specify:_________)

## 2019-02-23 LAB
ALBUMIN FLD-MCNC: 3 G/DL — SIGNIFICANT CHANGE UP
GLUCOSE FLD-MCNC: 104 MG/DL — SIGNIFICANT CHANGE UP
LDH SERPL L TO P-CCNC: 302 U/L — SIGNIFICANT CHANGE UP
PROT FLD-MCNC: 4.6 G/DL — SIGNIFICANT CHANGE UP

## 2019-02-26 LAB
CULTURE RESULTS: SIGNIFICANT CHANGE UP
SPECIMEN SOURCE: SIGNIFICANT CHANGE UP

## 2019-02-27 PROBLEM — E78.00 PURE HYPERCHOLESTEROLEMIA, UNSPECIFIED: Chronic | Status: ACTIVE | Noted: 2019-02-20

## 2019-02-27 PROBLEM — I10 ESSENTIAL (PRIMARY) HYPERTENSION: Chronic | Status: ACTIVE | Noted: 2019-02-20

## 2019-03-01 ENCOUNTER — OUTPATIENT (OUTPATIENT)
Dept: OUTPATIENT SERVICES | Facility: HOSPITAL | Age: 84
LOS: 1 days | End: 2019-03-01
Payer: MEDICAID

## 2019-03-01 DIAGNOSIS — Z90.710 ACQUIRED ABSENCE OF BOTH CERVIX AND UTERUS: Chronic | ICD-10-CM

## 2019-03-01 PROCEDURE — G9001: CPT

## 2019-03-15 ENCOUNTER — EMERGENCY (EMERGENCY)
Facility: HOSPITAL | Age: 84
LOS: 0 days | Discharge: HOME | End: 2019-03-15
Attending: EMERGENCY MEDICINE | Admitting: EMERGENCY MEDICINE

## 2019-03-15 ENCOUNTER — RESULT REVIEW (OUTPATIENT)
Age: 84
End: 2019-03-15

## 2019-03-15 ENCOUNTER — APPOINTMENT (OUTPATIENT)
Dept: PULMONOLOGY | Facility: CLINIC | Age: 84
End: 2019-03-15

## 2019-03-15 ENCOUNTER — OUTPATIENT (OUTPATIENT)
Dept: OUTPATIENT SERVICES | Facility: HOSPITAL | Age: 84
LOS: 1 days | Discharge: HOME | End: 2019-03-15

## 2019-03-15 VITALS
SYSTOLIC BLOOD PRESSURE: 134 MMHG | HEART RATE: 82 BPM | DIASTOLIC BLOOD PRESSURE: 70 MMHG | OXYGEN SATURATION: 97 % | RESPIRATION RATE: 20 BRPM

## 2019-03-15 VITALS
OXYGEN SATURATION: 96 % | HEIGHT: 62 IN | DIASTOLIC BLOOD PRESSURE: 54 MMHG | BODY MASS INDEX: 30.73 KG/M2 | SYSTOLIC BLOOD PRESSURE: 147 MMHG | WEIGHT: 167 LBS | TEMPERATURE: 98.2 F | HEART RATE: 80 BPM

## 2019-03-15 VITALS
DIASTOLIC BLOOD PRESSURE: 69 MMHG | TEMPERATURE: 98 F | RESPIRATION RATE: 18 BRPM | SYSTOLIC BLOOD PRESSURE: 140 MMHG | HEART RATE: 79 BPM | OXYGEN SATURATION: 98 %

## 2019-03-15 DIAGNOSIS — Z79.899 OTHER LONG TERM (CURRENT) DRUG THERAPY: ICD-10-CM

## 2019-03-15 DIAGNOSIS — E78.00 PURE HYPERCHOLESTEROLEMIA, UNSPECIFIED: ICD-10-CM

## 2019-03-15 DIAGNOSIS — R06.02 SHORTNESS OF BREATH: ICD-10-CM

## 2019-03-15 DIAGNOSIS — Z90.710 ACQUIRED ABSENCE OF BOTH CERVIX AND UTERUS: Chronic | ICD-10-CM

## 2019-03-15 DIAGNOSIS — J90 PLEURAL EFFUSION, NOT ELSEWHERE CLASSIFIED: ICD-10-CM

## 2019-03-15 DIAGNOSIS — I10 ESSENTIAL (PRIMARY) HYPERTENSION: ICD-10-CM

## 2019-03-15 DIAGNOSIS — Z79.811 LONG TERM (CURRENT) USE OF AROMATASE INHIBITORS: ICD-10-CM

## 2019-03-15 DIAGNOSIS — Z79.2 LONG TERM (CURRENT) USE OF ANTIBIOTICS: ICD-10-CM

## 2019-03-15 DIAGNOSIS — Z79.82 LONG TERM (CURRENT) USE OF ASPIRIN: ICD-10-CM

## 2019-03-15 DIAGNOSIS — E78.5 HYPERLIPIDEMIA, UNSPECIFIED: ICD-10-CM

## 2019-03-15 LAB
ALBUMIN SERPL ELPH-MCNC: 4.1 G/DL — SIGNIFICANT CHANGE UP (ref 3.5–5.2)
ALP SERPL-CCNC: 96 U/L — SIGNIFICANT CHANGE UP (ref 30–115)
ALT FLD-CCNC: 16 U/L — SIGNIFICANT CHANGE UP (ref 0–41)
ANION GAP SERPL CALC-SCNC: 16 MMOL/L — HIGH (ref 7–14)
APPEARANCE UR: CLEAR — SIGNIFICANT CHANGE UP
APTT BLD: SIGNIFICANT CHANGE UP SEC (ref 27–39.2)
AST SERPL-CCNC: 23 U/L — SIGNIFICANT CHANGE UP (ref 0–41)
B PERT IGG+IGM PNL SER: ABNORMAL
BASE EXCESS BLDV CALC-SCNC: -0.6 MMOL/L — SIGNIFICANT CHANGE UP (ref -2–2)
BASOPHILS # BLD AUTO: 0.07 K/UL — SIGNIFICANT CHANGE UP (ref 0–0.2)
BASOPHILS NFR BLD AUTO: 0.8 % — SIGNIFICANT CHANGE UP (ref 0–1)
BILIRUB SERPL-MCNC: 0.4 MG/DL — SIGNIFICANT CHANGE UP (ref 0.2–1.2)
BILIRUB UR-MCNC: NEGATIVE — SIGNIFICANT CHANGE UP
BLD GP AB SCN SERPL QL: SIGNIFICANT CHANGE UP
BUN SERPL-MCNC: 13 MG/DL — SIGNIFICANT CHANGE UP (ref 10–20)
CA-I SERPL-SCNC: 1.25 MMOL/L — SIGNIFICANT CHANGE UP (ref 1.12–1.3)
CALCIUM SERPL-MCNC: 9.4 MG/DL — SIGNIFICANT CHANGE UP (ref 8.5–10.1)
CHLORIDE SERPL-SCNC: 107 MMOL/L — SIGNIFICANT CHANGE UP (ref 98–110)
CO2 SERPL-SCNC: 19 MMOL/L — SIGNIFICANT CHANGE UP (ref 17–32)
COLOR FLD: YELLOW — SIGNIFICANT CHANGE UP
COLOR SPEC: YELLOW — SIGNIFICANT CHANGE UP
CREAT SERPL-MCNC: 0.8 MG/DL — SIGNIFICANT CHANGE UP (ref 0.7–1.5)
DIFF PNL FLD: NEGATIVE — SIGNIFICANT CHANGE UP
EOSINOPHIL # BLD AUTO: 0.48 K/UL — SIGNIFICANT CHANGE UP (ref 0–0.7)
EOSINOPHIL NFR BLD AUTO: 5.2 % — SIGNIFICANT CHANGE UP (ref 0–8)
FLUID INTAKE SUBSTANCE CLASS: SIGNIFICANT CHANGE UP
FLUID SEGMENTED GRANULOCYTES: 20 % — SIGNIFICANT CHANGE UP
GAS PNL BLDV: 141 MMOL/L — SIGNIFICANT CHANGE UP (ref 136–145)
GAS PNL BLDV: SIGNIFICANT CHANGE UP
GLUCOSE SERPL-MCNC: 100 MG/DL — HIGH (ref 70–99)
GLUCOSE UR QL: NEGATIVE MG/DL — SIGNIFICANT CHANGE UP
GRAM STN FLD: SIGNIFICANT CHANGE UP
HCO3 BLDV-SCNC: 24 MMOL/L — SIGNIFICANT CHANGE UP (ref 22–29)
HCT VFR BLD CALC: 39.4 % — SIGNIFICANT CHANGE UP (ref 37–47)
HCT VFR BLDA CALC: 52.5 % — HIGH (ref 34–44)
HGB BLD CALC-MCNC: 17.1 G/DL — SIGNIFICANT CHANGE UP (ref 14–18)
HGB BLD-MCNC: 13 G/DL — SIGNIFICANT CHANGE UP (ref 12–16)
IMM GRANULOCYTES NFR BLD AUTO: 0.2 % — SIGNIFICANT CHANGE UP (ref 0.1–0.3)
INR BLD: SIGNIFICANT CHANGE UP RATIO (ref 0.65–1.3)
KETONES UR-MCNC: NEGATIVE — SIGNIFICANT CHANGE UP
LACTATE BLDV-MCNC: 1.3 MMOL/L — SIGNIFICANT CHANGE UP (ref 0.5–1.6)
LACTATE SERPL-SCNC: 1.2 MMOL/L — SIGNIFICANT CHANGE UP (ref 0.5–2.2)
LEUKOCYTE ESTERASE UR-ACNC: NEGATIVE — SIGNIFICANT CHANGE UP
LYMPHOCYTES # BLD AUTO: 2.14 K/UL — SIGNIFICANT CHANGE UP (ref 1.2–3.4)
LYMPHOCYTES # BLD AUTO: 23.1 % — SIGNIFICANT CHANGE UP (ref 20.5–51.1)
LYMPHOCYTES # FLD: 10 — SIGNIFICANT CHANGE UP
MCHC RBC-ENTMCNC: 27.1 PG — SIGNIFICANT CHANGE UP (ref 27–31)
MCHC RBC-ENTMCNC: 33 G/DL — SIGNIFICANT CHANGE UP (ref 32–37)
MCV RBC AUTO: 82.1 FL — SIGNIFICANT CHANGE UP (ref 81–99)
MESOTHL CELL # FLD: 30 % — SIGNIFICANT CHANGE UP
MONOCYTES # BLD AUTO: 0.68 K/UL — HIGH (ref 0.1–0.6)
MONOCYTES NFR BLD AUTO: 7.4 % — SIGNIFICANT CHANGE UP (ref 1.7–9.3)
MONOS+MACROS # FLD: 40 % — SIGNIFICANT CHANGE UP
NEUTROPHILS # BLD AUTO: 5.86 K/UL — SIGNIFICANT CHANGE UP (ref 1.4–6.5)
NEUTROPHILS NFR BLD AUTO: 63.3 % — SIGNIFICANT CHANGE UP (ref 42.2–75.2)
NITRITE UR-MCNC: NEGATIVE — SIGNIFICANT CHANGE UP
NRBC # BLD: 0 /100 WBCS — SIGNIFICANT CHANGE UP (ref 0–0)
PCO2 BLDV: 41 MMHG — SIGNIFICANT CHANGE UP (ref 41–51)
PH BLDV: 7.38 — SIGNIFICANT CHANGE UP (ref 7.26–7.43)
PH UR: 6.5 — SIGNIFICANT CHANGE UP (ref 5–8)
PLATELET # BLD AUTO: 302 K/UL — SIGNIFICANT CHANGE UP (ref 130–400)
PO2 BLDV: 34 MMHG — SIGNIFICANT CHANGE UP (ref 20–40)
POTASSIUM BLDV-SCNC: 3.7 MMOL/L — SIGNIFICANT CHANGE UP (ref 3.3–5.6)
POTASSIUM SERPL-MCNC: 4.1 MMOL/L — SIGNIFICANT CHANGE UP (ref 3.5–5)
POTASSIUM SERPL-SCNC: 4.1 MMOL/L — SIGNIFICANT CHANGE UP (ref 3.5–5)
PROT SERPL-MCNC: 6.6 G/DL — SIGNIFICANT CHANGE UP (ref 6–8)
PROT UR-MCNC: NEGATIVE MG/DL — SIGNIFICANT CHANGE UP
PROTHROM AB SERPL-ACNC: SIGNIFICANT CHANGE UP SEC (ref 9.95–12.87)
RBC # BLD: 4.8 M/UL — SIGNIFICANT CHANGE UP (ref 4.2–5.4)
RBC # FLD: 13.1 % — SIGNIFICANT CHANGE UP (ref 11.5–14.5)
RCV VOL RI: 5000 /UL — HIGH (ref 0–5)
SAO2 % BLDV: 66 % — SIGNIFICANT CHANGE UP
SODIUM SERPL-SCNC: 142 MMOL/L — SIGNIFICANT CHANGE UP (ref 135–146)
SP GR SPEC: 1.01 — SIGNIFICANT CHANGE UP (ref 1.01–1.03)
SPECIMEN SOURCE: SIGNIFICANT CHANGE UP
TOTAL NUCLEATED CELL COUNT, BODY FLUID: 972 /UL — HIGH (ref 0–5)
TUBE TYPE: SIGNIFICANT CHANGE UP
TYPE + AB SCN PNL BLD: SIGNIFICANT CHANGE UP
UROBILINOGEN FLD QL: 0.2 MG/DL — SIGNIFICANT CHANGE UP (ref 0.2–0.2)
WBC # BLD: 9.25 K/UL — SIGNIFICANT CHANGE UP (ref 4.8–10.8)
WBC # FLD AUTO: 9.25 K/UL — SIGNIFICANT CHANGE UP (ref 4.8–10.8)

## 2019-03-15 RX ORDER — SODIUM CHLORIDE 9 MG/ML
1000 INJECTION, SOLUTION INTRAVENOUS
Qty: 0 | Refills: 0 | Status: DISCONTINUED | OUTPATIENT
Start: 2019-03-15 | End: 2019-03-15

## 2019-03-15 RX ADMIN — SODIUM CHLORIDE 100 MILLILITER(S): 9 INJECTION, SOLUTION INTRAVENOUS at 12:24

## 2019-03-15 NOTE — ED PROVIDER NOTE - CARE PROVIDERS DIRECT ADDRESSES
[email or web address]

## 2019-03-15 NOTE — ED PROVIDER NOTE - NS ED ROS FT
Review of Systems:  	•	CONSTITUTIONAL - no fever, no diaphoresis, no weight change  	•	SKIN - no rash  	•	HEMATOLOGIC - no bleeding, no bruising  	•	EYES - no eye pain, no blurred vision  	•	ENT - no change in hearing, no pain  	•	RESPIRATORY - + shortness of breath, no cough  	•	CARDIAC - R chest pain, no palpitations  	•	GI - no abd pain, no nausea, no vomiting, no diarrhea, no constipation, no bleeding  	•	 - no dysuria, no hematuria, no flank pain, no urinary retention  	•	MUSCULOSKELETAL - no joint paint, no swelling, no redness  	•	NEUROLOGIC - no weakness, no headache, no paresthesias, no dizziness  All other systems negative, unless specified in HPI

## 2019-03-15 NOTE — ED PROVIDER NOTE - CLINICAL SUMMARY MEDICAL DECISION MAKING FREE TEXT BOX
Pt with recurrence of right pleural effusion, likely malignant, sent by pulmonologist dr. francois for admission, thoracentesis and biospy.  pt with improved oxygenation with supplemental o2 Pt with recurrence of right pleural effusion, likely malignant, sent by pulmonologist dr. francois for admission, thoracentesis and biospy.  pt with improved oxygenation with supplemental o2.  pt initially admitted, but rolled back and pulm performed thoracentesis in ED and post-tap cxr improved and oxygenation improved.  pt dc with son.  pt to f/u with dr. francois

## 2019-03-15 NOTE — PROCEDURE NOTE - NSPROCDETAILS_GEN_ALL_CORE
location identified, draped/prepped, sterile technique used, needle inserted/introduced/connection to syringe/catheter inserted over needle/connection to evacuated glass bottle

## 2019-03-15 NOTE — PROCEDURE NOTE - NSINFORMCONSENT_GEN_A_CORE
dat escamillaor/Benefits, risks, and possible complications of procedure explained to patient/caregiver who verbalized understanding and gave written consent.

## 2019-03-15 NOTE — ED PROVIDER NOTE - PHYSICAL EXAMINATION
VITAL SIGNS: I have reviewed nursing notes and confirm.  CONSTITUTIONAL: Well-developed; well-nourished; in no acute distress.  SKIN: Skin exam is warm and dry, no acute rash.  HEAD: Normocephalic; atraumatic.  EYES: PERRL, EOM intact; conjunctiva and sclera clear.  ENT: No nasal discharge; airway clear. TMs clear.  NECK: Supple; non tender.  CARD: S1, S2 normal; no murmurs, gallops, or rubs. Regular rate and rhythm.  RESP: dec bs at right base  ABD: Normal bowel sounds; soft; non-distended; non-tender; obese  EXT: Normal ROM. No clubbing, cyanosis or edema.

## 2019-03-15 NOTE — ED PROVIDER NOTE - OBJECTIVE STATEMENT
82 yo f with pmh of htn and hld, sent in by Dr. Francois for recurrence of right pleural effusion.  as per son, dr. francois saw pt for the first time as f/u after pt was admitted last month for new onset of right pleural effusion.  pt had thoracentesis done during admission and was concerned about malignancy.  son says dr. francois told him that the fluid was inconclusive so wants pt to go back to hospital to have effusion drained and evaluated again for cancer and for a biopsy.  as per son, pt has been c/o worsening sob and right sided chest pain.  no abd pain, no n/v/d.  mildly dec appetite

## 2019-03-15 NOTE — ED PROVIDER NOTE - NSFOLLOWUPINSTRUCTIONS_ED_ALL_ED_FT
Pleural Effusion    WHAT YOU NEED TO KNOW:    Pleural effusion is fluid buildup in the space between the layers of the pleura. The pleura is a thin piece of tissue with 2 layers. One layer rests directly on the lungs. The other rests on the chest wall. There is normally a small amount of fluid between these layers. This fluid helps your lungs move easily when you breathe.The Lungs         DISCHARGE INSTRUCTIONS:    Call your doctor if:     You feel faint, or you cannot think clearly.      Your lips or fingernails turn blue.      You find it very hard to breathe.      You have a fever.       Your breathing problems do not go away or get worse.      Your pain does not go away or gets worse.      You cough up yellow, green, gray, or bloody mucus.      You have questions or concerns about your condition or care.    Medicines: You may need any of the following:     Diuretics may help decrease extra fluid caused by heart failure or other problems.      Antibiotics help prevent or treat an infection caused by bacteria.      NSAIDs help decrease swelling and pain or fever. This medicine is available with or without a doctor's order. NSAIDs can cause stomach bleeding or kidney problems in certain people. If you take blood thinner medicine, always ask your healthcare provider if NSAIDs are safe for you. Always read the medicine label and follow directions.      Prescription pain medicine may be given. Ask your healthcare provider how to take this medicine safely. Some prescription pain medicines contain acetaminophen. Do not take other medicines that contain acetaminophen without talking to your healthcare provider. Too much acetaminophen may cause liver damage. Prescription pain medicine may cause constipation. Ask your healthcare provider how to prevent or treat constipation.       Steroids or other types of medicines may be given to decrease swelling.       Take your medicine as directed. Contact your healthcare provider if you think your medicine is not helping or if you have side effects. Tell him or her if you are allergic to any medicine. Keep a list of the medicines, vitamins, and herbs you take. Include the amounts, and when and why you take them. Bring the list or the pill bottles to follow-up visits. Carry your medicine list with you in case of an emergency.    Follow up with your healthcare provider as directed: Write down your questions so you remember to ask them during your visits.    Self-care:     Use pressure to decrease pain. Hold a pillow against your chest when you cough or take a deep breath.      Do not smoke, and do not allow others to smoke around you. If you smoke, it is never too late to quit. Smoking increases your risk for lung infections such as pneumonia. Smoking also makes it harder for you to get better after having a lung problem. Ask your healthcare provider for information if you need help quitting.      Drink liquids as directed and rest as needed. Liquids help to keep your air passages moist and better able to get rid of germs and other irritants. Ask your healthcare provider how much liquid to drink each day and which liquids are best for you. You may feel like resting more. Slowly start to do more each day. Rest when you feel it is needed.    Thoracentesis, Care After  Refer to this sheet in the next few weeks. These instructions provide you with information about caring for yourself after your procedure. Your health care provider may also give you more specific instructions. Your treatment has been planned according to current medical practices, but problems sometimes occur. Call your health care provider if you have any problems or questions after your procedure.    What can I expect after the procedure?  After your procedure, it is common to have pain at the puncture site.    Follow these instructions at home:  Take medicines only as directed by your health care provider.  You may return to your normal diet and normal activities as directed by your health care provider.  Drink enough fluid to keep your urine clear or pale yellow.  Do not take baths, swim, or use a hot tub until your health care provider approves.  Follow your health care provider's instructions about:    Puncture site care.  Bandage (dressing) changes and removal.    Check your puncture site every day for signs of infection. Watch for:    Redness, swelling, or pain.  Fluid, blood, or pus.    Keep all follow-up visits as directed by your health care provider. This is important.  Contact a health care provider if:  You have redness, swelling, or pain at your puncture site.  You have fluid, blood, or pus coming from your puncture site.  You have a fever.  You have chills.  You have nausea or vomiting.  You have trouble breathing.  You develop a worsening cough.  Get help right away if:  You have extreme shortness of breath.  You develop chest pain.  You faint or feel light-headed.  This information is not intended to replace advice given to you by your health care provider. Make sure you discuss any questions you have with your health care provider.

## 2019-03-15 NOTE — ED PROVIDER NOTE - PROGRESS NOTE DETAILS
pt initially admitted to medicine for pleural effusion and thoracentesis and possible biopsy.  pulmonology with dr. francois determined pt can be tapped by pulm in ED and likely be discharged from ED.  requested admission by cancelled.  admission "rolled back".  waiting for pulm.  dat trinh [phone] informed and will return to bedside

## 2019-03-15 NOTE — ED ADULT NURSE NOTE - OBJECTIVE STATEMENT
Came in from pulmonologist office. Patient states "has SOB, pain in right side of back and arm" Patient has dry cough nonproductive. Denies n/v/f/d. Recently here for right sided pleural effusion and drainage

## 2019-03-15 NOTE — ED PROVIDER NOTE - CARE PROVIDER_API CALL
Tamir Lanza)  Critical Care Medicine; Geriatric Medicine; Internal Medicine; Pulmonary Disease  [address] [place] [address]  Phone: [phone]  Fax: [phone]  Follow Up Time: 1-3 Days

## 2019-03-16 LAB
ALBUMIN FLD-MCNC: 2.8 G/DL — SIGNIFICANT CHANGE UP
LDH SERPL L TO P-CCNC: 263 U/L — SIGNIFICANT CHANGE UP
NIGHT BLUE STAIN TISS: SIGNIFICANT CHANGE UP
PROT FLD-MCNC: 4.6 G/DL — SIGNIFICANT CHANGE UP
SPECIMEN SOURCE: SIGNIFICANT CHANGE UP

## 2019-03-18 DIAGNOSIS — Z02.9 ENCOUNTER FOR ADMINISTRATIVE EXAMINATIONS, UNSPECIFIED: ICD-10-CM

## 2019-03-20 LAB
CULTURE RESULTS: SIGNIFICANT CHANGE UP
SPECIMEN SOURCE: SIGNIFICANT CHANGE UP

## 2019-03-22 ENCOUNTER — RESULT REVIEW (OUTPATIENT)
Age: 84
End: 2019-03-22

## 2019-03-22 ENCOUNTER — INPATIENT (INPATIENT)
Facility: HOSPITAL | Age: 84
LOS: 5 days | Discharge: HOME | End: 2019-03-28
Attending: HOSPITALIST | Admitting: HOSPITALIST

## 2019-03-22 ENCOUNTER — OUTPATIENT (OUTPATIENT)
Dept: OUTPATIENT SERVICES | Facility: HOSPITAL | Age: 84
LOS: 1 days | Discharge: HOME | End: 2019-03-22

## 2019-03-22 ENCOUNTER — APPOINTMENT (OUTPATIENT)
Dept: PULMONOLOGY | Facility: CLINIC | Age: 84
End: 2019-03-22

## 2019-03-22 VITALS
HEART RATE: 90 BPM | SYSTOLIC BLOOD PRESSURE: 145 MMHG | BODY MASS INDEX: 30.36 KG/M2 | WEIGHT: 165 LBS | DIASTOLIC BLOOD PRESSURE: 78 MMHG | HEIGHT: 62 IN

## 2019-03-22 VITALS
SYSTOLIC BLOOD PRESSURE: 155 MMHG | TEMPERATURE: 97 F | HEART RATE: 79 BPM | DIASTOLIC BLOOD PRESSURE: 69 MMHG | RESPIRATION RATE: 18 BRPM | OXYGEN SATURATION: 95 %

## 2019-03-22 DIAGNOSIS — Z90.710 ACQUIRED ABSENCE OF BOTH CERVIX AND UTERUS: Chronic | ICD-10-CM

## 2019-03-22 LAB
ALBUMIN SERPL ELPH-MCNC: 3.6 G/DL — SIGNIFICANT CHANGE UP (ref 3.5–5.2)
ALP SERPL-CCNC: 93 U/L — SIGNIFICANT CHANGE UP (ref 30–115)
ALT FLD-CCNC: 17 U/L — SIGNIFICANT CHANGE UP (ref 0–41)
ANION GAP SERPL CALC-SCNC: 13 MMOL/L — SIGNIFICANT CHANGE UP (ref 7–14)
APPEARANCE UR: ABNORMAL
APTT BLD: 30.8 SEC — SIGNIFICANT CHANGE UP (ref 27–39.2)
AST SERPL-CCNC: 17 U/L — SIGNIFICANT CHANGE UP (ref 0–41)
B PERT IGG+IGM PNL SER: ABNORMAL
BASE EXCESS BLDV CALC-SCNC: 1.3 MMOL/L — SIGNIFICANT CHANGE UP (ref -2–2)
BASOPHILS # BLD AUTO: 0.07 K/UL — SIGNIFICANT CHANGE UP (ref 0–0.2)
BASOPHILS NFR BLD AUTO: 0.8 % — SIGNIFICANT CHANGE UP (ref 0–1)
BILIRUB SERPL-MCNC: 0.4 MG/DL — SIGNIFICANT CHANGE UP (ref 0.2–1.2)
BILIRUB UR-MCNC: NEGATIVE — SIGNIFICANT CHANGE UP
BLD GP AB SCN SERPL QL: SIGNIFICANT CHANGE UP
BUN SERPL-MCNC: 14 MG/DL — SIGNIFICANT CHANGE UP (ref 10–20)
CA-I SERPL-SCNC: 1.21 MMOL/L — SIGNIFICANT CHANGE UP (ref 1.12–1.3)
CALCIUM SERPL-MCNC: 9.3 MG/DL — SIGNIFICANT CHANGE UP (ref 8.5–10.1)
CHLORIDE SERPL-SCNC: 104 MMOL/L — SIGNIFICANT CHANGE UP (ref 98–110)
CO2 SERPL-SCNC: 21 MMOL/L — SIGNIFICANT CHANGE UP (ref 17–32)
COLOR FLD: YELLOW — SIGNIFICANT CHANGE UP
COLOR SPEC: YELLOW — SIGNIFICANT CHANGE UP
CREAT SERPL-MCNC: 0.8 MG/DL — SIGNIFICANT CHANGE UP (ref 0.7–1.5)
DIFF PNL FLD: NEGATIVE — SIGNIFICANT CHANGE UP
EOSINOPHIL # BLD AUTO: 0.41 K/UL — SIGNIFICANT CHANGE UP (ref 0–0.7)
EOSINOPHIL NFR BLD AUTO: 4.5 % — SIGNIFICANT CHANGE UP (ref 0–8)
EPI CELLS # UR: ABNORMAL /HPF
FLUID INTAKE SUBSTANCE CLASS: SIGNIFICANT CHANGE UP
FLUID SEGMENTED GRANULOCYTES: 58 % — SIGNIFICANT CHANGE UP
GAS PNL BLDV: 140 MMOL/L — SIGNIFICANT CHANGE UP (ref 136–145)
GAS PNL BLDV: SIGNIFICANT CHANGE UP
GLUCOSE SERPL-MCNC: 103 MG/DL — HIGH (ref 70–99)
GLUCOSE UR QL: NEGATIVE MG/DL — SIGNIFICANT CHANGE UP
HCO3 BLDV-SCNC: 26 MMOL/L — SIGNIFICANT CHANGE UP (ref 22–29)
HCT VFR BLD CALC: 36.6 % — LOW (ref 37–47)
HCT VFR BLDA CALC: 39.7 % — SIGNIFICANT CHANGE UP (ref 34–44)
HGB BLD CALC-MCNC: 13 G/DL — LOW (ref 14–18)
HGB BLD-MCNC: 12.1 G/DL — SIGNIFICANT CHANGE UP (ref 12–16)
IMM GRANULOCYTES NFR BLD AUTO: 0.3 % — SIGNIFICANT CHANGE UP (ref 0.1–0.3)
INR BLD: 1.04 RATIO — SIGNIFICANT CHANGE UP (ref 0.65–1.3)
KETONES UR-MCNC: NEGATIVE — SIGNIFICANT CHANGE UP
LACTATE BLDV-MCNC: 0.9 MMOL/L — SIGNIFICANT CHANGE UP (ref 0.5–1.6)
LEUKOCYTE ESTERASE UR-ACNC: NEGATIVE — SIGNIFICANT CHANGE UP
LYMPHOCYTES # BLD AUTO: 1.99 K/UL — SIGNIFICANT CHANGE UP (ref 1.2–3.4)
LYMPHOCYTES # BLD AUTO: 21.9 % — SIGNIFICANT CHANGE UP (ref 20.5–51.1)
LYMPHOCYTES # FLD: 39 — SIGNIFICANT CHANGE UP
MCHC RBC-ENTMCNC: 26.9 PG — LOW (ref 27–31)
MCHC RBC-ENTMCNC: 33.1 G/DL — SIGNIFICANT CHANGE UP (ref 32–37)
MCV RBC AUTO: 81.5 FL — SIGNIFICANT CHANGE UP (ref 81–99)
MONOCYTES # BLD AUTO: 0.59 K/UL — SIGNIFICANT CHANGE UP (ref 0.1–0.6)
MONOCYTES NFR BLD AUTO: 6.5 % — SIGNIFICANT CHANGE UP (ref 1.7–9.3)
MONOS+MACROS # FLD: 3 % — SIGNIFICANT CHANGE UP
NEUTROPHILS # BLD AUTO: 6.01 K/UL — SIGNIFICANT CHANGE UP (ref 1.4–6.5)
NEUTROPHILS NFR BLD AUTO: 66 % — SIGNIFICANT CHANGE UP (ref 42.2–75.2)
NITRITE UR-MCNC: NEGATIVE — SIGNIFICANT CHANGE UP
NRBC # BLD: 0 /100 WBCS — SIGNIFICANT CHANGE UP (ref 0–0)
NT-PROBNP SERPL-SCNC: 141 PG/ML — SIGNIFICANT CHANGE UP (ref 0–300)
PCO2 BLDV: 42 MMHG — SIGNIFICANT CHANGE UP (ref 41–51)
PH BLDV: 7.4 — SIGNIFICANT CHANGE UP (ref 7.26–7.43)
PH UR: 7 — SIGNIFICANT CHANGE UP (ref 5–8)
PLATELET # BLD AUTO: 316 K/UL — SIGNIFICANT CHANGE UP (ref 130–400)
PO2 BLDV: 22 MMHG — SIGNIFICANT CHANGE UP (ref 20–40)
POTASSIUM BLDV-SCNC: 3.5 MMOL/L — SIGNIFICANT CHANGE UP (ref 3.3–5.6)
POTASSIUM SERPL-MCNC: 4 MMOL/L — SIGNIFICANT CHANGE UP (ref 3.5–5)
POTASSIUM SERPL-SCNC: 4 MMOL/L — SIGNIFICANT CHANGE UP (ref 3.5–5)
PROT SERPL-MCNC: 6.3 G/DL — SIGNIFICANT CHANGE UP (ref 6–8)
PROT UR-MCNC: NEGATIVE MG/DL — SIGNIFICANT CHANGE UP
PROTHROM AB SERPL-ACNC: 11.9 SEC — SIGNIFICANT CHANGE UP (ref 9.95–12.87)
RBC # BLD: 4.49 M/UL — SIGNIFICANT CHANGE UP (ref 4.2–5.4)
RBC # FLD: 12.8 % — SIGNIFICANT CHANGE UP (ref 11.5–14.5)
RCV VOL RI: 9000 /UL — HIGH (ref 0–5)
SAO2 % BLDV: 37 % — SIGNIFICANT CHANGE UP
SODIUM SERPL-SCNC: 138 MMOL/L — SIGNIFICANT CHANGE UP (ref 135–146)
SP GR SPEC: 1.01 — SIGNIFICANT CHANGE UP (ref 1.01–1.03)
TOTAL NUCLEATED CELL COUNT, BODY FLUID: 901 /UL — HIGH (ref 0–5)
TROPONIN T SERPL-MCNC: <0.01 NG/ML — SIGNIFICANT CHANGE UP
TUBE TYPE: SIGNIFICANT CHANGE UP
TYPE + AB SCN PNL BLD: SIGNIFICANT CHANGE UP
UROBILINOGEN FLD QL: 0.2 MG/DL — SIGNIFICANT CHANGE UP (ref 0.2–0.2)
WBC # BLD: 9.1 K/UL — SIGNIFICANT CHANGE UP (ref 4.8–10.8)
WBC # FLD AUTO: 9.1 K/UL — SIGNIFICANT CHANGE UP (ref 4.8–10.8)

## 2019-03-22 RX ORDER — MORPHINE SULFATE 50 MG/1
2 CAPSULE, EXTENDED RELEASE ORAL ONCE
Qty: 0 | Refills: 0 | Status: DISCONTINUED | OUTPATIENT
Start: 2019-03-22 | End: 2019-03-22

## 2019-03-22 RX ORDER — CEFEPIME 1 G/1
2000 INJECTION, POWDER, FOR SOLUTION INTRAMUSCULAR; INTRAVENOUS ONCE
Qty: 0 | Refills: 0 | Status: DISCONTINUED | OUTPATIENT
Start: 2019-03-22 | End: 2019-03-22

## 2019-03-22 RX ORDER — LOSARTAN POTASSIUM 100 MG/1
100 TABLET, FILM COATED ORAL DAILY
Qty: 0 | Refills: 0 | Status: DISCONTINUED | OUTPATIENT
Start: 2019-03-22 | End: 2019-03-23

## 2019-03-22 RX ORDER — AMLODIPINE BESYLATE 2.5 MG/1
5 TABLET ORAL DAILY
Qty: 0 | Refills: 0 | Status: DISCONTINUED | OUTPATIENT
Start: 2019-03-22 | End: 2019-03-28

## 2019-03-22 RX ORDER — ASPIRIN/CALCIUM CARB/MAGNESIUM 324 MG
81 TABLET ORAL DAILY
Qty: 0 | Refills: 0 | Status: DISCONTINUED | OUTPATIENT
Start: 2019-03-22 | End: 2019-03-23

## 2019-03-22 RX ORDER — MORPHINE SULFATE 50 MG/1
4 CAPSULE, EXTENDED RELEASE ORAL ONCE
Qty: 0 | Refills: 0 | Status: DISCONTINUED | OUTPATIENT
Start: 2019-03-22 | End: 2019-03-22

## 2019-03-22 RX ORDER — ENOXAPARIN SODIUM 100 MG/ML
40 INJECTION SUBCUTANEOUS EVERY 24 HOURS
Qty: 0 | Refills: 0 | Status: DISCONTINUED | OUTPATIENT
Start: 2019-03-22 | End: 2019-03-24

## 2019-03-22 RX ORDER — SIMVASTATIN 20 MG/1
10 TABLET, FILM COATED ORAL AT BEDTIME
Qty: 0 | Refills: 0 | Status: DISCONTINUED | OUTPATIENT
Start: 2019-03-22 | End: 2019-03-28

## 2019-03-22 RX ADMIN — MORPHINE SULFATE 2 MILLIGRAM(S): 50 CAPSULE, EXTENDED RELEASE ORAL at 16:35

## 2019-03-22 RX ADMIN — MORPHINE SULFATE 4 MILLIGRAM(S): 50 CAPSULE, EXTENDED RELEASE ORAL at 15:21

## 2019-03-22 RX ADMIN — Medication 81 MILLIGRAM(S): at 21:50

## 2019-03-22 RX ADMIN — AMLODIPINE BESYLATE 5 MILLIGRAM(S): 2.5 TABLET ORAL at 21:50

## 2019-03-22 NOTE — PROCEDURE NOTE - NSPROCDETAILS_GEN_ALL_CORE
connection to evacuated glass bottle/location identified, draped/prepped, sterile technique used, needle inserted/introduced/ultrasound assessment of effusion (localization)/ultrasound assessment of effusion (size)/catheter inserted over needle/Seldinger technique

## 2019-03-22 NOTE — ED PROVIDER NOTE - NS ED ROS FT
Constitutional:  No behavior changes, fevers/chills.    Head: No injury, headache, LOC, dizziness/LH.    Eyes:  No visual changes, eye pain, redness, or discharge; no injury; no foreign body sensation.    ENMT:  No ear pain or discharge, hearing problems; no pain or injuries to the nose, ears, mouth, or throat.    Neck:  No pain, stiffness, injury; no loss of range of motion.    Cardiac: No chest pain, palpitations, diaphoresis.    Chest/respiratory: No chest tightness, or trouble breathing; no injuries. (+) SOB. (+) cough. (+) wheezing.     GI: No nausea, vomiting, diarrhea or abdominal pain; no injuries. No changes in PO intake. No melena/brbpr.    :  No dysuria, hematuria, urgency, or injuries. No changes in urine output. No flanl     MS: No pain, injury, numbness or tingling; no loss of range of motion. No edema. No calf pain/swelling/erythema. (+) weakness.     Back:  No injury. (+) back pain.     Skin:  No rashes or color changes; no lacerations or abrasions.  Social: No sick contacts, recent travel or rash. Constitutional:  No behavior changes, fevers/chills.    Head: No injury, headache, LOC, dizziness/LH.    Eyes:  No visual changes, eye pain, redness, or discharge; no injury; no foreign body sensation.    ENMT:  No ear pain or discharge, hearing problems; no pain or injuries to the nose, ears, mouth, or throat.    Neck:  No pain, stiffness, injury; no loss of range of motion.    Cardiac: No chest pain, palpitations, diaphoresis.    Chest/respiratory: No chest tightness,no injuries. (+) SOB. (+) cough.     GI: No nausea, vomiting, diarrhea or abdominal pain; no injuries. No changes in PO intake. No melena/brbpr.    :  No dysuria, hematuria, urgency, or injuries. No changes in urine output. No flanl     MS: No pain, injury, numbness or tingling; no loss of range of motion. No edema. No calf pain/swelling/erythema. (+) weakness.     Back:  No injury..    Skin:  No rashes or color changes; no lacerations or abrasions.  Social: No sick contacts, recent travel or rash.

## 2019-03-22 NOTE — ED PROVIDER NOTE - CLINICAL SUMMARY MEDICAL DECISION MAKING FREE TEXT BOX
pt and family aware of all labs and imaging, covered with abx due to cough and opacification, medical admitting team aware of pt and admisson as discussed and preferred by pt and family.

## 2019-03-22 NOTE — H&P ADULT - ASSESSMENT
84 y/o Female with PMH of right sided pleural effusions, HTN, and high cholesterol , sent in by pulmonologist Dr. Dickinson for further workup and is found to have a large right sided Pleural Effusion.     1) SOB secondary to large right sided pleural effusion.   Patient had thora performed at bedside in ED   920 cc removed   Follow with Post procedure CXR   Patient not septic does not look infectious   Hold off antibiotics for now   Follow with Rpt CXR in am   Pulmonary following will give further recommendations in am for possible Denver Cath   Pain control with Morphine IV PRN     2) Hypertension   Continue with Losartan and Amlodipine     3) DVT Prophylaxis   Lovenox 40 daily     4) Disposition   Patient to be discharged once solution for Recurrent  pleural effusion found.

## 2019-03-22 NOTE — H&P ADULT - NSHPPHYSICALEXAM_GEN_ALL_CORE
PHYSICAL EXAM:  GENERAL: NAD speaking in full sentences.   HEAD:  Atraumatic, Normocephalic  EYES: EOMI, PERRLA, conjunctiva and sclera clear  NECK: Supple, No JVD  CHEST/LUNG: decreased air entry on right side. positive expiratory wheeze on left.   HEART: Regular rate and rhythm; No murmurs;   ABDOMEN: Soft, Nontender, Nondistended; Bowel sounds present; No guarding  EXTREMITIES:  2+ Peripheral Pulses, No cyanosis or edema  PSYCH: AAOx3  NEUROLOGY: non-focal

## 2019-03-22 NOTE — CONSULT NOTE ADULT - ASSESSMENT
IMPRESSION:    Right apical lung mass with multiple satellite nodules  Recurrent right side exudative pleural effusion    PLAN:    ·	Follow up pleural fluid analysis   ·	Will arrange for Denver catheter next week most likely Monday then she will need either IR guided biopsy versus Bronch with EBUS FNA  ·	DVT ppx  ·	Oxygen to keep pulse ox >92%  ·	F/u cxr after thora

## 2019-03-22 NOTE — ED PROVIDER NOTE - PHYSICAL EXAMINATION
Vital Signs: I have reviewed the initial vital signs.  Constitutional: WDWN in nad. Sitting on stretcher speaking full sentences.   Integumentary: No rash.  ENT: MMM  NECK: Supple, non-tender, no meningeal signs.  Cardiovascular: RRR, radial pulses 2/4 b/l. No JVD.  Respiratory: (+) decreased breath sounds to right lower lung base with expiratory wheezing. No crackles, poor air exchange, poor resp effort and excursion, no accessory muscle use, no stridor.  Gastrointestinal: BS present throughout all 4 quadrants, soft, nd, nt no rebound tenderness or guarding, no cvat.  Musculoskeletal: FROM, no edema, no calf pain/swelling/erythema.  Neurologic: AAOx3, motor 5/5 and sensation intact throughout upper and lower ext, CN II-XII intact, No facial droop or slurring of speech. No focal deficits. Vital Signs: I have reviewed the initial vital signs.  Constitutional: Female sitting on stretcher speaking full sentences.   Integumentary: No rash.  ENT: MMM  NECK: Supple, non-tender, no meningeal signs.  Cardiovascular: RRR, radial pulses 2/4 b/l. No JVD.  Respiratory: (+) decreased breath sounds to right lower lung base with expiratory wheezing. No crackles, poor air exchange, poor resp effort and excursion, no accessory muscle use, no stridor.  Gastrointestinal: BS present throughout all 4 quadrants, soft, nd, nt no rebound tenderness or guarding, no cvat.  Musculoskeletal: FROM, no edema, no calf pain/swelling/erythema.  Neurologic: AAOx3, motor 5/5 and sensation intact throughout upper and lower ext, CN II-XII intact, No facial droop or slurring of speech. No focal deficits.

## 2019-03-22 NOTE — REVIEW OF SYSTEMS
[Fatigue] : fatigue [Poor Appetite] : poor appetite [Recent Wt Loss (___ Lbs)] : recent [unfilled] ~Ulb weight loss [Cough] : cough [Dyspnea] : dyspnea [Chest Tightness] : chest tightness [Pleuritic Pain] : pleuritic pain [Hypertension] : ~T hypertension [Fever] : no fever [Chills] : no chills [Dry Eyes] : no dryness of the eyes [Sputum] : not coughing up ~M sputum [Hemoptysis] : no hemoptysis [Hypotension] : no hypotension [Orthopnea] : no orthopnea [Hay Fever] : no hay fever [Heartburn] : no heartburn [Reflux] : no reflux [Indigestion] : no indigestion [Dysphagia] : no dysphagia [Nocturia] : no nocturia [Frequency] : no change in urinary frequency [Trauma] : no ~T physical trauma [Fracture] : no fracture [Anemia] : no anemia [Blood Transfusion] : no blood transfusion [Headache] : no headache [Dizziness] : no dizziness [Syncope] : no fainting [Focal Weakness] : no focal weakness

## 2019-03-22 NOTE — REASON FOR VISIT
[Follow-Up] : a follow-up visit [Abnormal CT Scan] : abnormal CT Scan [Cough] : cough [Family Member] : family member

## 2019-03-22 NOTE — ED ADULT NURSE NOTE - OBJECTIVE STATEMENT
AS PER PT'S DAUGHTER, PT FEELING SOB SINCE FEBRUARY. PT HAS BEEN TO ED MULTIPLE TIMES FOR SAME COMPLAINT. PT C/O OF SOB AND COUGH. 3L NC APPLIED.

## 2019-03-22 NOTE — CONSULT NOTE ADULT - SUBJECTIVE AND OBJECTIVE BOX
Patient is a 83y old  Female who presents with a chief complaint of SOB (22 Mar 2019 16:34)      HPI:  82 y/o Female with PMH of right sided pleural effusions, HTN, and high cholesterol , sent in by pulmonologist Dr. Dickinson for admission for further evaluation. Pt was admitted on 2019 for a right loculated pleural effusion approx. 900 CCs, no PE and right apical 3.1x3.0 cm mass with pleural nodules. Pt was d/c on  with outpatient follow up with PMD, pulmonary Dr. Dickinson and hemonc Dr. Robertson. Pt returned to ED on 3/15/2019 for right pleural effusion, had a thoracentesis in ED and was d/c after discussion with pulmonary Dr. Cruz. Pt saw Dr. Dickinson today for persistent SOB, dry cough, back pain where she has had the pleural effusion, weakness and night sweats. Pt was noted to have a malignancy in prior visits so was sent in for admission for a biopsy. Daughter in law also notes pt to have lost 8 pounds since the summer (4 pounds in the past 2 months). No CP. Pt did not eat today since her appointment was early this morning. (22 Mar 2019 16:34)    Recurrent right sided pleural effusion, this is the 3rd tap within 1 month. Has relief of symptoms when fluid is removed. Presents now with gradual worsening shortness of breath on exertion and right side pleuritic chest pain rad to back, sharp pain.    S/p thora 920 seroous fluid removed in ED.      PAST MEDICAL & SURGICAL HISTORY:  HTN (hypertension)  High cholesterol  H/O abdominal hysterectomy      SOCIAL HX:   Smoking no                        ETOH   no                   FAMILY HISTORY:  Hypertension (Mother)  .  No cardiovascular or pulmonary family history     Review of System:  See HPI    Allergies    No Known Allergies    Intolerances    PHYSICAL EXAM  Vital Signs Last 24 Hrs  T(C): 36.3 (22 Mar 2019 10:33), Max: 36.3 (22 Mar 2019 10:33)  T(F): 97.3 (22 Mar 2019 10:33), Max: 97.3 (22 Mar 2019 10:33)  HR: 79 (22 Mar 2019 10:33) (79 - 79)  BP: 155/69 (22 Mar 2019 10:33) (155/69 - 155/69)  RR: 18 (22 Mar 2019 10:33) (18 - 18)  SpO2: 95% (22 Mar 2019 10:33) (95% - 95%)    General: In NAD  HEENT: FERNANDO             Lymphatic system: No cervical LN     Lungs: James BS reduced breath sounds on right  Cardiovascular: Regular  Gastrointestinal: Soft.  + BS   Musculoskeletal: No Clubbing.  Full range of motion.. Moves all extremities no edema  Skin: Warm.  Intact  Neurological: No motor or sensory deficit       LABS:                          12.1   9.10  )-----------( 316      ( 22 Mar 2019 12:18 )             36.6                                               03-    138  |  104  |  14  ----------------------------<  103<H>  4.0   |  21  |  0.8    Ca    9.3      22 Mar 2019 12:18    TPro  6.3  /  Alb  3.6  /  TBili  0.4  /  DBili  x   /  AST  17  /  ALT  17  /  AlkPhos  93  -      PT/INR - ( 22 Mar 2019 12:18 )   PT: 11.90 sec;   INR: 1.04 ratio         PTT - ( 22 Mar 2019 12:18 )  PTT:30.8 sec                                       Urinalysis Basic - ( 22 Mar 2019 12:18 )    Color: Yellow / Appearance: Cloudy / S.010 / pH: x  Gluc: x / Ketone: Negative  / Bili: Negative / Urobili: 0.2 mg/dL   Blood: x / Protein: Negative mg/dL / Nitrite: Negative   Leuk Esterase: Negative / RBC: x / WBC x   Sq Epi: x / Non Sq Epi: Few /HPF / Bacteria: x        CARDIAC MARKERS ( 22 Mar 2019 12:18 )  x     / <0.01 ng/mL / x     / x     / x                                                LIVER FUNCTIONS - ( 22 Mar 2019 12:18 )  Alb: 3.6 g/dL / Pro: 6.3 g/dL / ALK PHOS: 93 U/L / ALT: 17 U/L / AST: 17 U/L / GGT: x                                                                                                MEDICATIONS  (STANDING):  amLODIPine   Tablet 5 milliGRAM(s) Oral daily  aspirin  chewable 81 milliGRAM(s) Oral daily  cefepime   IVPB 2000 milliGRAM(s) IV Intermittent once  enoxaparin Injectable 40 milliGRAM(s) SubCutaneous every 24 hours  losartan 100 milliGRAM(s) Oral daily  simvastatin 10 milliGRAM(s) Oral at bedtime    MEDICATIONS  (PRN):

## 2019-03-22 NOTE — ED PROVIDER NOTE - PROGRESS NOTE DETAILS
A/P: Will get labs, EKG, CXR and reassess. pt and family aware of all labs and imaging, due to cough was given abx, unable to assess for underlying PNA on cxr, pt did have recent hospitalization, pleural effusion aganted on cxr, no in resp distress, current pulse ox 97 on RA, no tachycardia, no difficulty speaking, medical admitting team including MAR aware of pt and admisson.

## 2019-03-22 NOTE — DISCUSSION/SUMMARY
[FreeTextEntry1] :  83 y o F  with PMH of HTN , dld , and recently diagnosed Right lung mass and pleural effusion s/p thoracentesis  x 2 presented today for follow up. Pt was last seen in clinic earlier this month and had dimished breath sounds on right side so was referred to ER for further evaluation. Pt had xray in ER that showed reaccumulation of right sided efusion. Pt had repeat thoracentesis , with removal of 1.5 l of pleural fluids , initial fluid studies are comaptible with exudate but cytology is still pending . \par \par \par # RIGHT LUNG MASS WITH RECURRENT PLEURAL EFFUSIONS ON RIGHT SIDE LIKELY MALIGNANT BUT CYTOLOGY AND CELL BLOCK FROM LAST THORACENTESIS :\par \par - spoke with pathologist , states that multiple atypical cells suspicious of malignancy.\par - pt had worsening of symptoms since last thora , with dec air entry on right and dullness to percussion , likely reaccumulation of fluid .\par - will refer pt to ER again with repeat thora and possible [facility] cath , pt will need EBUS as well. \par - SIGN OUT GIVEN TO TAMI ANDERSON IN ER .\par - PT WILL BE SENT TO ER IN WHEELCHAIR.

## 2019-03-22 NOTE — END OF VISIT
[] : Resident [FreeTextEntry3] : concur with the above assessment and plan-s/p tap x2-susp for malig cells-needs tap-place [facility] cath////onc eval////ir re rul density////pt sent to ed [Time Spent: ___ minutes] : I have spent [unfilled] minutes of face to face time with the patient

## 2019-03-22 NOTE — PHYSICAL EXAM
[Normal Appearance] : normal appearance [Normal Conjunctiva] : the conjunctiva exhibited no abnormalities [Normal Oropharynx] : normal oropharynx [Neck Appearance] : the appearance of the neck was normal [Neck Cervical Mass (___cm)] : no neck mass was observed [Heart Rate And Rhythm] : heart rate and rhythm were normal [Murmurs] : no murmurs present [Bowel Sounds] : normal bowel sounds [Abdomen Soft] : soft [Abnormal Walk] : normal gait [Nail Clubbing] : no clubbing of the fingernails [Cyanosis, Localized] : no localized cyanosis [Skin Color & Pigmentation] : normal skin color and pigmentation [Cranial Nerves] : cranial nerves 2-12 were intact [Erythema] : no erythema of the pharynx [FreeTextEntry1] : no tenderness to palpation

## 2019-03-22 NOTE — HISTORY OF PRESENT ILLNESS
[Difficulty Breathing During Exertion] : worsened dyspnea on exertion [Feelings Of Weakness On Exertion] : worsened exercise intolerance [Cough] : worsened coughing [Wheezing] : denies wheezing [Regional Soft Tissue Swelling Both Lower Extremities] : denies lower extremity edema [Chest Pain Or Discomfort] : denies chest pain [Fever] : denies fever [3  -  Moderate] : 3, moderate [Date: ___] : was performed [unfilled] [Never] : was never a smoker [None] : None [Adherent] : the patient is adherent with ~his/her~ medication regimen [Snoring] : Snoring [Oxygen] : the patient uses no supplemental oxygen [FreeTextEntry9] : c/o worsening sob and cough [de-identified] : Right lung mass, rt pleural effusion [FreeTextEntry1] : 83 y o F  with PMH of HTN , dld , and recently diagnosed Right lung mass and pleural effusion s/p thoracentesis  x 2 presented today for follow up. Pt was last seen in clinic earlier this month and had dimished breath sounds on right side so was referred to ER for further evaluation. Pt had xray in ER that showed reaccumulation of right sided efusion. Pt had repeat thoracentesis , with removal of 1.5 l of pleural fluids , initial fluid studies are comaptible with exudate but cytology is still pending . As per son , pt's symptoms have been getting worse since with SOB getting worse to a point that pt is unable to sleep . Pt also had dry cough that is also getting worse comes as bouts and is not responding to OTC regimens.Son denies any fever , OR CHILLS. \par \par

## 2019-03-22 NOTE — ED PROVIDER NOTE - CARE PLAN
Principal Discharge DX:	Pleural effusion  Secondary Diagnosis:	Opacity noted on imaging study  Secondary Diagnosis:	SOB (shortness of breath)  Secondary Diagnosis:	Cough

## 2019-03-22 NOTE — H&P ADULT - NSHPLABSRESULTS_GEN_ALL_CORE
12.1   9.10  )-----------( 316      ( 22 Mar 2019 12:18 )             36.6         138  |  104  |  14  ----------------------------<  103<H>  4.0   |  21  |  0.8    Ca    9.3      22 Mar 2019 12:18    TPro  6.3  /  Alb  3.6  /  TBili  0.4  /  DBili  x   /  AST  17  /  ALT  17  /  AlkPhos  93      Urinalysis Basic - ( 22 Mar 2019 12:18 )    Color: Yellow / Appearance: Cloudy / S.010 / pH: x  Gluc: x / Ketone: Negative  / Bili: Negative / Urobili: 0.2 mg/dL   Blood: x / Protein: Negative mg/dL / Nitrite: Negative   Leuk Esterase: Negative / RBC: x / WBC x   Sq Epi: x / Non Sq Epi: Few /HPF / Bacteria: x        CXR     Impression:      Interval development of large right-sided pleural effusion with   underlying opacification.

## 2019-03-22 NOTE — PROCEDURE NOTE - NSPOSTCAREGUIDE_GEN_A_CORE
Keep the cast/splint/dressing clean and dry/Instructed patient/caregiver regarding signs and symptoms of infection/Verbal/written post procedure instructions were given to patient/caregiver/Instructed patient/caregiver to follow-up with primary care physician

## 2019-03-22 NOTE — ED ADULT NURSE NOTE - NSIMPLEMENTINTERV_GEN_ALL_ED
Implemented All Fall with Harm Risk Interventions:  [place] to call system. Call bell, personal items and telephone within reach. Instruct patient to call for assistance. Room bathroom lighting operational. Non-slip footwear when patient is off stretcher. Physically safe environment: no spills, clutter or unnecessary equipment. Stretcher in lowest position, wheels locked, appropriate side rails in place. Provide visual cue, wrist band, yellow gown, etc. Monitor gait and stability. Monitor for mental status changes and reorient to person, place, and time. Review medications for side effects contributing to fall risk. Reinforce activity limits and safety measures with patient and family. Provide visual clues: red socks.

## 2019-03-22 NOTE — ED PROVIDER NOTE - OBJECTIVE STATEMENT
82 y/o F with PMH of right sided pleural effusions, HTN, on aspirin and high cholesterol , sent in by pulmonologist Dr. Dickinson for admission for biopsy. Pt was admitted on 2/20/2019 for a right loculated pleural effusion approx. 900 CCs, no PE and right apical 3.1x3.0 cm mass with pleural nodules. Pt was d/c on 2/22 with outpatient follow up with PMD, pulmonary Dr. Dickinson and hemong Dr. Robertson. Pt returned to ED on 3/15/2019 for a recurrence of right pleural effusion, had a thoracentesis in ED and was d/c after discussion with pulmonary Dr. Cruz. Pt saw Dr. Dickinson today for persistent SOB, dry cough, back pain where she has had the pleural effusion, weakness and night sweats. Pt was noted to have a malignancy in prior visits so was sent in for admission for a biopsy. Daughter in law also notes pt to have lost 8 pounds since the summer (4 pounds in the past 2 months). No CP. Pt did not eat today since her appointment was early this morning. Pt has never smoked. 82 y/o F with PMH of right sided pleural effusions, HTN, on aspirin and high cholesterol , sent in by pulmonologist Dr. Dickinson for admission for biopsy. Pt was admitted on 2/20/2019 for a right loculated pleural effusion approx. 900 CCs, no PE and right apical 3.1x3.0 cm mass with pleural nodules. Pt was d/c on 2/22 with outpatient follow up with PMD, pulmonary Dr. Dickinson and hemonc Dr. Robertson. Pt returned to ED on 3/15/2019 for a recurrence of right pleural effusion, had a thoracentesis in ED and was d/c after discussion with pulmonary Dr. Cruz. Pt saw Dr. Dickinson today for persistent SOB, dry cough, back pain where she has had the pleural effusion, weakness and night sweats. Pt was noted to have a malignancy in prior visits so was sent in for admission for a biopsy. Daughter in law also notes pt to have lost 8 pounds since the summer (4 pounds in the past 2 months). No CP. Pt did not eat today since her appointment was early this morning. Pt has never smoked. 82 y/o F with PMH of right sided pleural effusions, HTN, on aspirin and high cholesterol , sent in by pulmonologist Dr. Dickinson for admission for further evaluation. Pt was admitted on 2/20/2019 for a right loculated pleural effusion approx. 900 CCs, no PE and right apical 3.1x3.0 cm mass with pleural nodules. Pt was d/c on 2/22 with outpatient follow up with PMD, pulmonary Dr. Dickinson and hemonc Dr. Robertson. Pt returned to ED on 3/15/2019 for right pleural effusion, had a thoracentesis in ED and was d/c after discussion with pulmonary Dr. Cruz. Pt saw Dr. Dickinson today for persistent SOB, dry cough, back pain where she has had the pleural effusion, weakness and night sweats. Pt was noted to have a malignancy in prior visits so was sent in for admission for a biopsy. Daughter in law also notes pt to have lost 8 pounds since the summer (4 pounds in the past 2 months). No CP. Pt did not eat today since her appointment was early this morning. Pt has never smoked.

## 2019-03-22 NOTE — H&P ADULT - HISTORY OF PRESENT ILLNESS
82 y/o Female with PMH of right sided pleural effusions, HTN, and high cholesterol , sent in by pulmonologist Dr. Dickinson for admission for further evaluation. Pt was admitted on 2/20/2019 for a right loculated pleural effusion approx. 900 CCs, no PE and right apical 3.1x3.0 cm mass with pleural nodules. Pt was d/c on 2/22 with outpatient follow up with PMD, pulmonary Dr. Dickinson and hemonc Dr. Robertson. Pt returned to ED on 3/15/2019 for right pleural effusion, had a thoracentesis in ED and was d/c after discussion with pulmonary Dr. Cruz. Pt saw Dr. Dickinson today for persistent SOB, dry cough, back pain where she has had the pleural effusion, weakness and night sweats. Pt was noted to have a malignancy in prior visits so was sent in for admission for a biopsy. Daughter in law also notes pt to have lost 8 pounds since the summer (4 pounds in the past 2 months). No CP. Pt did not eat today since her appointment was early this morning.

## 2019-03-22 NOTE — H&P ADULT - ATTENDING COMMENTS
Patient was evaluated and examined by bedside, independently, c/o right sided back pain( behind scapula) , persistent dry cough, dyspnea.  All labs, radiology studies, VS was reviewed  Vital Signs Last 24 Hrs  T(C): 35.8 (23 Mar 2019 12:48), Max: 36.4 (22 Mar 2019 21:00)  T(F): 96.4 (23 Mar 2019 12:48), Max: 97.6 (22 Mar 2019 21:00)  HR: 75 (23 Mar 2019 12:48) (70 - 78)  BP: 130/61 (23 Mar 2019 12:48) (108/52 - 130/61)  BP(mean): --  RR: 16 (23 Mar 2019 12:48) (16 - 18)  SpO2: 92% (23 Mar 2019 11:13) (92% - 96%)  GENERAL: NAD, AAOX3, patient is laying comfortably in bed  HEENT: AT, NC, PERRLA, SUPPLE, NO JVD, NO CB  LUNG: decreased breath sounds B/L  CVS: normal S1, S2, RRR, NO M/G/R  ABDOMEN: soft, bowel sounds present, normoactive in all 4 quadrants, non-tender, non-distended  EXT: no E/C/C, positive PP b/l extremities  NEURO: no acute focal neurological deficits  SKIN: no rash, no ecchymosis    I agree with medical plan outlined by Medical resident as stated above.  -recurrent right lung effusion with right lung mass- recently completed right lung thoracocentesis did not isolate any malignant cells , will need to have lung mass biopsy.   -f/up pulmonary , IR , heme onc  -oxygen tx.   -cough suppres. tx.  - pain meds.   -will need whole body pet scan     GI and DVT proph.

## 2019-03-23 LAB
ALBUMIN SERPL ELPH-MCNC: 3.3 G/DL — LOW (ref 3.5–5.2)
ALP SERPL-CCNC: 89 U/L — SIGNIFICANT CHANGE UP (ref 30–115)
ALT FLD-CCNC: 19 U/L — SIGNIFICANT CHANGE UP (ref 0–41)
ANION GAP SERPL CALC-SCNC: 14 MMOL/L — SIGNIFICANT CHANGE UP (ref 7–14)
AST SERPL-CCNC: 16 U/L — SIGNIFICANT CHANGE UP (ref 0–41)
BASOPHILS # BLD AUTO: 0.03 K/UL — SIGNIFICANT CHANGE UP (ref 0–0.2)
BASOPHILS NFR BLD AUTO: 0.3 % — SIGNIFICANT CHANGE UP (ref 0–1)
BILIRUB SERPL-MCNC: 0.4 MG/DL — SIGNIFICANT CHANGE UP (ref 0.2–1.2)
BUN SERPL-MCNC: 23 MG/DL — HIGH (ref 10–20)
CALCIUM SERPL-MCNC: 8.9 MG/DL — SIGNIFICANT CHANGE UP (ref 8.5–10.1)
CHLORIDE SERPL-SCNC: 105 MMOL/L — SIGNIFICANT CHANGE UP (ref 98–110)
CO2 SERPL-SCNC: 21 MMOL/L — SIGNIFICANT CHANGE UP (ref 17–32)
CREAT SERPL-MCNC: 1.7 MG/DL — HIGH (ref 0.7–1.5)
CULTURE RESULTS: SIGNIFICANT CHANGE UP
EOSINOPHIL # BLD AUTO: 0.24 K/UL — SIGNIFICANT CHANGE UP (ref 0–0.7)
EOSINOPHIL NFR BLD AUTO: 2.7 % — SIGNIFICANT CHANGE UP (ref 0–8)
GLUCOSE FLD-MCNC: 106 MG/DL — SIGNIFICANT CHANGE UP
GLUCOSE SERPL-MCNC: 91 MG/DL — SIGNIFICANT CHANGE UP (ref 70–99)
GRAM STN FLD: SIGNIFICANT CHANGE UP
HCT VFR BLD CALC: 34.6 % — LOW (ref 37–47)
HGB BLD-MCNC: 11.4 G/DL — LOW (ref 12–16)
IMM GRANULOCYTES NFR BLD AUTO: 0.3 % — SIGNIFICANT CHANGE UP (ref 0.1–0.3)
LDH SERPL L TO P-CCNC: 275 U/L — SIGNIFICANT CHANGE UP
LYMPHOCYTES # BLD AUTO: 1.5 K/UL — SIGNIFICANT CHANGE UP (ref 1.2–3.4)
LYMPHOCYTES # BLD AUTO: 16.8 % — LOW (ref 20.5–51.1)
MCHC RBC-ENTMCNC: 27.2 PG — SIGNIFICANT CHANGE UP (ref 27–31)
MCHC RBC-ENTMCNC: 32.9 G/DL — SIGNIFICANT CHANGE UP (ref 32–37)
MCV RBC AUTO: 82.6 FL — SIGNIFICANT CHANGE UP (ref 81–99)
MONOCYTES # BLD AUTO: 0.76 K/UL — HIGH (ref 0.1–0.6)
MONOCYTES NFR BLD AUTO: 8.5 % — SIGNIFICANT CHANGE UP (ref 1.7–9.3)
NEUTROPHILS # BLD AUTO: 6.36 K/UL — SIGNIFICANT CHANGE UP (ref 1.4–6.5)
NEUTROPHILS NFR BLD AUTO: 71.4 % — SIGNIFICANT CHANGE UP (ref 42.2–75.2)
NIGHT BLUE STAIN TISS: SIGNIFICANT CHANGE UP
NRBC # BLD: 0 /100 WBCS — SIGNIFICANT CHANGE UP (ref 0–0)
PH FLD: 7.6 — SIGNIFICANT CHANGE UP
PLATELET # BLD AUTO: 278 K/UL — SIGNIFICANT CHANGE UP (ref 130–400)
POTASSIUM SERPL-MCNC: 4.3 MMOL/L — SIGNIFICANT CHANGE UP (ref 3.5–5)
POTASSIUM SERPL-SCNC: 4.3 MMOL/L — SIGNIFICANT CHANGE UP (ref 3.5–5)
PROT FLD-MCNC: 4.5 G/DL — SIGNIFICANT CHANGE UP
PROT SERPL-MCNC: 5.4 G/DL — LOW (ref 6–8)
RBC # BLD: 4.19 M/UL — LOW (ref 4.2–5.4)
RBC # FLD: 12.7 % — SIGNIFICANT CHANGE UP (ref 11.5–14.5)
SODIUM SERPL-SCNC: 140 MMOL/L — SIGNIFICANT CHANGE UP (ref 135–146)
SPECIMEN SOURCE: SIGNIFICANT CHANGE UP
WBC # BLD: 8.92 K/UL — SIGNIFICANT CHANGE UP (ref 4.8–10.8)
WBC # FLD AUTO: 8.92 K/UL — SIGNIFICANT CHANGE UP (ref 4.8–10.8)

## 2019-03-23 RX ORDER — TRAMADOL HYDROCHLORIDE 50 MG/1
50 TABLET ORAL EVERY 6 HOURS
Qty: 0 | Refills: 0 | Status: DISCONTINUED | OUTPATIENT
Start: 2019-03-23 | End: 2019-03-28

## 2019-03-23 RX ORDER — SODIUM CHLORIDE 9 MG/ML
1000 INJECTION INTRAMUSCULAR; INTRAVENOUS; SUBCUTANEOUS
Qty: 0 | Refills: 0 | Status: DISCONTINUED | OUTPATIENT
Start: 2019-03-23 | End: 2019-03-26

## 2019-03-23 RX ADMIN — Medication 81 MILLIGRAM(S): at 12:23

## 2019-03-23 RX ADMIN — AMLODIPINE BESYLATE 5 MILLIGRAM(S): 2.5 TABLET ORAL at 05:33

## 2019-03-23 RX ADMIN — SIMVASTATIN 10 MILLIGRAM(S): 20 TABLET, FILM COATED ORAL at 22:17

## 2019-03-23 RX ADMIN — SODIUM CHLORIDE 75 MILLILITER(S): 9 INJECTION INTRAMUSCULAR; INTRAVENOUS; SUBCUTANEOUS at 17:44

## 2019-03-23 RX ADMIN — LOSARTAN POTASSIUM 100 MILLIGRAM(S): 100 TABLET, FILM COATED ORAL at 05:32

## 2019-03-23 NOTE — PROGRESS NOTE ADULT - ASSESSMENT
82 y/o Female with PMH of right sided pleural effusions, HTN, and high cholesterol , sent in by pulmonologist Dr. Dickinson for further workup and is found to have a large right sided Pleural Effusion.     # SOB secondary to large right sided pleural effusion:   - Patient had thora performed at bedside in ED , 920 cc removed   - Pulmonary following plans for on Monday  [facility]   - Pain control with Morphine IV PRN and Oxygen as needed for SaO2>92     # Hypertension : controlled , Continue with Losartan and Amlodipine   # DLD: c/w statin , ASA is home med    DVT Prophylaxis : Lovenox 40 daily   DASH Diet   Disposition: remains acute

## 2019-03-23 NOTE — PROGRESS NOTE ADULT - SUBJECTIVE AND OBJECTIVE BOX
SUBJECTIVE:    Patient is a 83y old Female who presents with a chief complaint of SOB (22 Mar 2019 17:00)  Currently admitted to medicine with the primary diagnosis of Pleural effusion  Today is hospital day 1d. This morning she is resting comfortably in bed and reports no new issues or overnight events.   [place] speaking, RN at bedside translating, complain of mild pain of rt shoulder.      PAST MEDICAL & SURGICAL HISTORY  HTN (hypertension)  High cholesterol  H/O abdominal hysterectomy    SOCIAL HISTORY:  Negative for smoking/alcohol/drug use.     ALLERGIES:  No Known Allergies    MEDICATIONS:  STANDING MEDICATIONS  amLODIPine   Tablet 5 milliGRAM(s) Oral daily  aspirin  chewable 81 milliGRAM(s) Oral daily  enoxaparin Injectable 40 milliGRAM(s) SubCutaneous every 24 hours  losartan 100 milliGRAM(s) Oral daily  simvastatin 10 milliGRAM(s) Oral at bedtime    PRN MEDICATIONS    VITALS:   T(F): 97.3  HR: 70  BP: 129/61  RR: 18  SpO2: 92%    LABS:                        11.4   8.92  )-----------( 278      ( 23 Mar 2019 06:04 )             34.6         140  |  105  |  23<H>  ----------------------------<  91  4.3   |  21  |  1.7<H>    Ca    8.9      23 Mar 2019 06:04    TPro  5.4<L>  /  Alb  3.3<L>  /  TBili  0.4  /  DBili  x   /  AST  16  /  ALT  19  /  AlkPhos  89      PT/INR - ( 22 Mar 2019 12:18 )   PT: 11.90 sec;   INR: 1.04 ratio         PTT - ( 22 Mar 2019 12:18 )  PTT:30.8 sec  Urinalysis Basic - ( 22 Mar 2019 12:18 )    Color: Yellow / Appearance: Cloudy / S.010 / pH: x  Gluc: x / Ketone: Negative  / Bili: Negative / Urobili: 0.2 mg/dL   Blood: x / Protein: Negative mg/dL / Nitrite: Negative   Leuk Esterase: Negative / RBC: x / WBC x   Sq Epi: x / Non Sq Epi: Few /HPF / Bacteria: x        Troponin T, Serum: <0.01 ng/mL (19 @ 12:18)      Culture - Body Fluid with Gram Stain (collected 22 Mar 2019 16:22)  Source: .Body Fluid Pleural Fluid  Gram Stain (23 Mar 2019 05:53):    polymorphonuclear leukocytes seen    No organisms seen    by cytocentrifuge      CARDIAC MARKERS ( 22 Mar 2019 12:18 )  x     / <0.01 ng/mL / x     / x     / x          RADIOLOGY:    < from: Xray Chest 2 Views PA/Lat (19 @ 14:27) >  Impression:      Interval development of large right-sided pleural effusion with   underlying opacification.    < end of copied text >      PHYSICAL EXAM:  GEN: No acute distress  LUNGS: decreased breath sounds bilaterally   HEART: S1/S2 present. RRR.   ABD: Soft, non-tender, non-distended. Bowel sounds present  EXT: No ll edema, intact pulsations   NEURO: AAOX3

## 2019-03-24 LAB
ANION GAP SERPL CALC-SCNC: 14 MMOL/L — SIGNIFICANT CHANGE UP (ref 7–14)
BUN SERPL-MCNC: 33 MG/DL — HIGH (ref 10–20)
CALCIUM SERPL-MCNC: 9.4 MG/DL — SIGNIFICANT CHANGE UP (ref 8.5–10.1)
CHLORIDE SERPL-SCNC: 103 MMOL/L — SIGNIFICANT CHANGE UP (ref 98–110)
CK SERPL-CCNC: 43 U/L — SIGNIFICANT CHANGE UP (ref 0–225)
CO2 SERPL-SCNC: 19 MMOL/L — SIGNIFICANT CHANGE UP (ref 17–32)
CREAT SERPL-MCNC: 3 MG/DL — HIGH (ref 0.7–1.5)
GLUCOSE SERPL-MCNC: 154 MG/DL — HIGH (ref 70–99)
HCT VFR BLD CALC: 38.5 % — SIGNIFICANT CHANGE UP (ref 37–47)
HGB BLD-MCNC: 12.6 G/DL — SIGNIFICANT CHANGE UP (ref 12–16)
MCHC RBC-ENTMCNC: 27.2 PG — SIGNIFICANT CHANGE UP (ref 27–31)
MCHC RBC-ENTMCNC: 32.7 G/DL — SIGNIFICANT CHANGE UP (ref 32–37)
MCV RBC AUTO: 83.2 FL — SIGNIFICANT CHANGE UP (ref 81–99)
NRBC # BLD: 0 /100 WBCS — SIGNIFICANT CHANGE UP (ref 0–0)
PLATELET # BLD AUTO: 329 K/UL — SIGNIFICANT CHANGE UP (ref 130–400)
POTASSIUM SERPL-MCNC: 4.6 MMOL/L — SIGNIFICANT CHANGE UP (ref 3.5–5)
POTASSIUM SERPL-SCNC: 4.6 MMOL/L — SIGNIFICANT CHANGE UP (ref 3.5–5)
RBC # BLD: 4.63 M/UL — SIGNIFICANT CHANGE UP (ref 4.2–5.4)
RBC # FLD: 12.8 % — SIGNIFICANT CHANGE UP (ref 11.5–14.5)
SODIUM SERPL-SCNC: 136 MMOL/L — SIGNIFICANT CHANGE UP (ref 135–146)
WBC # BLD: 10.49 K/UL — SIGNIFICANT CHANGE UP (ref 4.8–10.8)
WBC # FLD AUTO: 10.49 K/UL — SIGNIFICANT CHANGE UP (ref 4.8–10.8)

## 2019-03-24 RX ORDER — HEPARIN SODIUM 5000 [USP'U]/ML
5000 INJECTION INTRAVENOUS; SUBCUTANEOUS EVERY 12 HOURS
Qty: 0 | Refills: 0 | Status: DISCONTINUED | OUTPATIENT
Start: 2019-03-24 | End: 2019-03-28

## 2019-03-24 RX ADMIN — TRAMADOL HYDROCHLORIDE 50 MILLIGRAM(S): 50 TABLET ORAL at 04:50

## 2019-03-24 RX ADMIN — SIMVASTATIN 10 MILLIGRAM(S): 20 TABLET, FILM COATED ORAL at 21:26

## 2019-03-24 RX ADMIN — AMLODIPINE BESYLATE 5 MILLIGRAM(S): 2.5 TABLET ORAL at 06:01

## 2019-03-24 RX ADMIN — TRAMADOL HYDROCHLORIDE 50 MILLIGRAM(S): 50 TABLET ORAL at 05:20

## 2019-03-24 RX ADMIN — HEPARIN SODIUM 5000 UNIT(S): 5000 INJECTION INTRAVENOUS; SUBCUTANEOUS at 18:58

## 2019-03-24 NOTE — PROGRESS NOTE ADULT - SUBJECTIVE AND OBJECTIVE BOX
JOANN SCHULTZ  [facility] T2-3A 025 B ([facility] T2-3A)            Patient was evaluated and examined  by bedside, c/o right sided neck/shoulder/subscapular area pain, less cough today, no fever          REVIEW OF SYSTEMS:  please see pertinent positives mentioned above, all other 12 ROS negative      T(C): , Max: 37 (03-23-19 @ 20:35)  HR: 72 (03-24-19 @ 06:34)  BP: 136/65 (03-24-19 @ 06:34)  RR: 18 (03-24-19 @ 06:34)  SpO2: --  CAPILLARY BLOOD GLUCOSE          PHYSICAL EXAM:  General: NAD, AAOX3, patient is laying comfortably in bed  HEENT: AT, NC, Supple, NO JVD, NO CB  Lungs: decreased breath sounds B/L, no wheezing, no rhonchi  CVS: normal S1, S2, RRR, NO M/G/R  Abdomen: soft, bowel sounds present, non-tender, non-distended  Extremities: no edema, no clubbing, no cyanosis, positive peripheral pulses b/l  Neuro: no acute focal neurological deficits  Skin: no rush, no ecchymosis      LAB  CBC  Date: 03-24-19 @ 08:47  Mean cell Hizlywccrr26.2  Mean cell Hemoglobin Conc32.7  Mean cell Volum 83.2  Platelet count-Automate 329  RBC Count 4.63  Red Cell Distrib Width12.8  WBC Count10.49  % Albumin, Urine--  Hematocrit 38.5  Hemoglobin 12.6  CBC  Date: 03-23-19 @ 06:04  Mean cell Addkxdldrr66.2  Mean cell Hemoglobin Conc32.9  Mean cell Volum 82.6  Platelet count-Automate 278  RBC Count 4.19  Red Cell Distrib Width12.7  WBC Count8.92  % Albumin, Urine--  Hematocrit 34.6  Hemoglobin 11.4  CBC  Date: 03-22-19 @ 12:18  Mean cell Uyoqtgtxtn37.9  Mean cell Hemoglobin Conc33.1  Mean cell Volum 81.5  Platelet count-Automate 316  RBC Count 4.49  Red Cell Distrib Width12.8  WBC Count9.10  % Albumin, Urine--  Hematocrit 36.6  Hemoglobin 12.1    BMP  03-24-19 @ 08:47  Blood Gas Arterial-Calcium,Ionized--  Blood Urea Nitrogen, Serum 33 mg/dL<H> [10 - 20]  Carbon Dioxide, Serum19 mmol/L [17 - 32]  Chloride, Bnabi981 mmol/L [98 - 110]  Creatinie, Serum3.0 mg/dL<H> [0.7 - 1.5]  Glucose, Qwfap541 mg/dL<H> [70 - 99]  Potassium, Serum4.6 mmol/L [3.5 - 5.0]  Sodium, Serum 136 mmol/L [135 - 146]  BMP  03-23-19 @ 06:04  Blood Gas Arterial-Calcium,Ionized--  Blood Urea Nitrogen, Serum 23 mg/dL<H> [10 - 20]  Carbon Dioxide, Serum21 mmol/L [17 - 32]  Chloride, Rkpuf734 mmol/L [98 - 110]  Creatinie, Serum1.7 mg/dL<H> [0.7 - 1.5]  Glucose, Serum91 mg/dL [70 - 99]  Potassium, Serum4.3 mmol/L [3.5 - 5.0]  Sodium, Serum 140 mmol/L [135 - 146]  BMP  03-22-19 @ 12:18  Blood Gas Arterial-Calcium,Ionized--  Blood Urea Nitrogen, Serum 14 mg/dL [10 - 20]  Carbon Dioxide, Serum21 mmol/L [17 - 32]  Chloride, Tknon336 mmol/L [98 - 110]  Creatinie, Serum0.8 mg/dL [0.7 - 1.5]  Glucose, Yfoak315 mg/dL<H> [70 - 99]  Potassium, Serum4.0 mmol/L [3.5 - 5.0]  Sodium, Serum 138 mmol/L [135 - 146]        PT/INR - ( 22 Mar 2019 12:18 )   PT: 11.90 sec;   INR: 1.04 ratio         PTT - ( 22 Mar 2019 12:18 )  PTT:30.8 sec      Microbiology:    Culture - Body Fluid with Gram Stain (collected 03-22-19 @ 16:22)  Source: .Body Fluid Pleural Fluid  Gram Stain (03-23-19 @ 05:53):    polymorphonuclear leukocytes seen    No organisms seen    by cytocentrifuge  Preliminary Report (03-23-19 @ 20:04):    No growth    Culture - Acid Fast - Body Fluid w/Smear (collected 03-22-19 @ 16:22)  Source: .Body Fluid Pleural Fluid    Culture - Body Fluid with Gram Stain (collected 03-15-19 @ 16:23)  Source: .Body Fluid Pleural Fluid  Gram Stain (03-15-19 @ 23:47):    polymorphonuclear leukocytes seen    No organisms seen by cytocentrifuge  Final Report (03-20-19 @ 19:39):    No growth at 5 days    Culture - Acid Fast - Body Fluid w/Smear (collected 03-15-19 @ 16:23)  Source: .Body Fluid Pleural Fluid  Preliminary Report (03-23-19 @ 15:03):    No growth at 1 week.        Medications:  amLODIPine   Tablet 5 milliGRAM(s) Oral daily  guaiFENesin/dextromethorphan  Syrup 10 milliLiter(s) Oral every 6 hours  heparin  Injectable 5000 Unit(s) SubCutaneous every 12 hours  simvastatin 10 milliGRAM(s) Oral at bedtime  sodium chloride 0.9%. 1000 milliLiter(s) IV Continuous <Continuous>  traMADol 50 milliGRAM(s) Oral every 6 hours PRN        Assessment and Plan:  82 y/o Female with PMH of right sided pleural effusions, HTN, and high cholesterol , sent in by pulmonologist Dr. Dickinson for further workup and is found to have a large right sided Pleural Effusion.     # SOB secondary to large right sided pleural effusion/ most likely due to malignant event  - right lung apical mass  - Patient had right sided thoracocentesis  performed at bedside in ED , 920 cc removed   - Pulmonary following plans for on Monday  [place] [facility]   -patient will need right lung mass/nodule diagnostic  biopsy  - Pain control with Morphine IV PRN and Oxygen as needed for SaO2>92    # Right shoulder pain, subscapular pain -obtain right shoulder X ray     # ALEJANDRO - obtain renal US, urine lytes, cr. prot, , CK level, UPEP,SPEP, serum immunofixation  -IVF  -Consult Nephrology specialist  -strict I and O monitoring    # Hypertension : controlled , Continue with Losartan and Amlodipine     # DLD: c/w statin     DVT Prophylaxis : Heparin subc. injections   DASH Diet   Disposition: remains acute     #Progress Note Handoff: Pending Consults___nephrology______,Tests__US renal/ drain placement due to recurrent right lung pl. effusion______,Test Results__BMP_____,Other;  Family discussion: pt. by bedside Disposition: Home_xx__/SNF___/Other________/to be determined

## 2019-03-25 ENCOUNTER — RESULT REVIEW (OUTPATIENT)
Age: 84
End: 2019-03-25

## 2019-03-25 DIAGNOSIS — Z71.89 OTHER SPECIFIED COUNSELING: ICD-10-CM

## 2019-03-25 DIAGNOSIS — Z02.9 ENCOUNTER FOR ADMINISTRATIVE EXAMINATIONS, UNSPECIFIED: ICD-10-CM

## 2019-03-25 LAB
ANION GAP SERPL CALC-SCNC: 13 MMOL/L — SIGNIFICANT CHANGE UP (ref 7–14)
BUN SERPL-MCNC: 32 MG/DL — HIGH (ref 10–20)
CALCIUM SERPL-MCNC: 8.8 MG/DL — SIGNIFICANT CHANGE UP (ref 8.5–10.1)
CHLORIDE SERPL-SCNC: 108 MMOL/L — SIGNIFICANT CHANGE UP (ref 98–110)
CO2 SERPL-SCNC: 19 MMOL/L — SIGNIFICANT CHANGE UP (ref 17–32)
CREAT SERPL-MCNC: 2.5 MG/DL — HIGH (ref 0.7–1.5)
GLUCOSE SERPL-MCNC: 114 MG/DL — HIGH (ref 70–99)
HCT VFR BLD CALC: 36.3 % — LOW (ref 37–47)
HGB BLD-MCNC: 11.7 G/DL — LOW (ref 12–16)
MCHC RBC-ENTMCNC: 26.8 PG — LOW (ref 27–31)
MCHC RBC-ENTMCNC: 32.2 G/DL — SIGNIFICANT CHANGE UP (ref 32–37)
MCV RBC AUTO: 83.3 FL — SIGNIFICANT CHANGE UP (ref 81–99)
NRBC # BLD: 0 /100 WBCS — SIGNIFICANT CHANGE UP (ref 0–0)
PLATELET # BLD AUTO: 307 K/UL — SIGNIFICANT CHANGE UP (ref 130–400)
POTASSIUM SERPL-MCNC: 4.7 MMOL/L — SIGNIFICANT CHANGE UP (ref 3.5–5)
POTASSIUM SERPL-SCNC: 4.7 MMOL/L — SIGNIFICANT CHANGE UP (ref 3.5–5)
PROT SERPL-MCNC: 6.3 G/DL — SIGNIFICANT CHANGE UP (ref 6–8.3)
PROT SERPL-MCNC: 6.3 G/DL — SIGNIFICANT CHANGE UP (ref 6–8.3)
RBC # BLD: 4.36 M/UL — SIGNIFICANT CHANGE UP (ref 4.2–5.4)
RBC # FLD: 12.9 % — SIGNIFICANT CHANGE UP (ref 11.5–14.5)
SODIUM SERPL-SCNC: 140 MMOL/L — SIGNIFICANT CHANGE UP (ref 135–146)
WBC # BLD: 8.86 K/UL — SIGNIFICANT CHANGE UP (ref 4.8–10.8)
WBC # FLD AUTO: 8.86 K/UL — SIGNIFICANT CHANGE UP (ref 4.8–10.8)

## 2019-03-25 RX ORDER — MORPHINE SULFATE 50 MG/1
2 CAPSULE, EXTENDED RELEASE ORAL ONCE
Qty: 0 | Refills: 0 | Status: DISCONTINUED | OUTPATIENT
Start: 2019-03-25 | End: 2019-03-25

## 2019-03-25 RX ORDER — LIDOCAINE 4 G/100G
1 CREAM TOPICAL DAILY
Qty: 0 | Refills: 0 | Status: DISCONTINUED | OUTPATIENT
Start: 2019-03-25 | End: 2019-03-28

## 2019-03-25 RX ORDER — MORPHINE SULFATE 50 MG/1
1 CAPSULE, EXTENDED RELEASE ORAL EVERY 4 HOURS
Qty: 0 | Refills: 0 | Status: DISCONTINUED | OUTPATIENT
Start: 2019-03-25 | End: 2019-03-28

## 2019-03-25 RX ORDER — CHLORHEXIDINE GLUCONATE 213 G/1000ML
1 SOLUTION TOPICAL
Qty: 0 | Refills: 0 | Status: DISCONTINUED | OUTPATIENT
Start: 2019-03-25 | End: 2019-03-28

## 2019-03-25 RX ORDER — PANTOPRAZOLE SODIUM 20 MG/1
40 TABLET, DELAYED RELEASE ORAL
Qty: 0 | Refills: 0 | Status: DISCONTINUED | OUTPATIENT
Start: 2019-03-25 | End: 2019-03-28

## 2019-03-25 RX ADMIN — TRAMADOL HYDROCHLORIDE 50 MILLIGRAM(S): 50 TABLET ORAL at 00:40

## 2019-03-25 RX ADMIN — SIMVASTATIN 10 MILLIGRAM(S): 20 TABLET, FILM COATED ORAL at 23:06

## 2019-03-25 RX ADMIN — HEPARIN SODIUM 5000 UNIT(S): 5000 INJECTION INTRAVENOUS; SUBCUTANEOUS at 05:45

## 2019-03-25 RX ADMIN — HEPARIN SODIUM 5000 UNIT(S): 5000 INJECTION INTRAVENOUS; SUBCUTANEOUS at 18:17

## 2019-03-25 RX ADMIN — MORPHINE SULFATE 1 MILLIGRAM(S): 50 CAPSULE, EXTENDED RELEASE ORAL at 11:58

## 2019-03-25 RX ADMIN — MORPHINE SULFATE 1 MILLIGRAM(S): 50 CAPSULE, EXTENDED RELEASE ORAL at 12:28

## 2019-03-25 RX ADMIN — MORPHINE SULFATE 2 MILLIGRAM(S): 50 CAPSULE, EXTENDED RELEASE ORAL at 15:34

## 2019-03-25 RX ADMIN — LIDOCAINE 1 PATCH: 4 CREAM TOPICAL at 23:26

## 2019-03-25 RX ADMIN — TRAMADOL HYDROCHLORIDE 50 MILLIGRAM(S): 50 TABLET ORAL at 00:06

## 2019-03-25 RX ADMIN — SODIUM CHLORIDE 75 MILLILITER(S): 9 INJECTION INTRAMUSCULAR; INTRAVENOUS; SUBCUTANEOUS at 05:47

## 2019-03-25 RX ADMIN — TRAMADOL HYDROCHLORIDE 50 MILLIGRAM(S): 50 TABLET ORAL at 23:27

## 2019-03-25 RX ADMIN — AMLODIPINE BESYLATE 5 MILLIGRAM(S): 2.5 TABLET ORAL at 05:45

## 2019-03-25 NOTE — CONSULT NOTE ADULT - SUBJECTIVE AND OBJECTIVE BOX
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 History taken from H&P on 3/22/19.     PAST MEDICAL & SURGICAL HISTORY:  HTN (hypertension)  High cholesterol  H/O abdominal hysterectomy    Allergies:  No Known Allergies    Home Medications:  amLODIPine 5 mg oral tablet: 1 tab(s) orally once a day (2019 01:50)  aspirin 81 mg oral tablet: 1 tab(s) orally once a day (2019 01:51)  losartan 100 mg oral tablet: 1 tab(s) orally once a day (2019 01:50)  simvastatin 10 mg oral tablet: 1 tab(s) orally once a day (at bedtime) (2019 01:50)    Home Medications Reviewed  Hospital Medications:   MEDICATIONS  (STANDING):  amLODIPine   Tablet 5 milliGRAM(s) Oral daily  guaiFENesin/dextromethorphan  Syrup 10 milliLiter(s) Oral every 6 hours  heparin  Injectable 5000 Unit(s) SubCutaneous every 12 hours  simvastatin 10 milliGRAM(s) Oral at bedtime  sodium chloride 0.9%. 1000 milliLiter(s) (75 mL/Hr) IV Continuous <Continuous>      SOCIAL HISTORY:  Denies ETOH,Smoking,   FAMILY HISTORY:  Hypertension (Mother)        REVIEW OF SYSTEMS:    All other review of systems is negative unless indicated above.    VITALS:  T(F): 97.4 (19 @ 05:51), Max: 98.3 (19 @ 20:30)  HR: 68 (19 @ 05:51)  BP: 150/69 (19 @ 05:51)  RR: 18 (19 @ 05:51)  SpO2: 95% (19 @ 09:25)      Weight (kg): 75.7 ( @ 09:25)    I&O's Detail    Creatine Kinase, Serum: 43 U/L (19 @ 17:49)      PHYSICAL EXAM:  Constitutional: NAD  HEENT: anicteric sclera, oropharynx clear, MMM  Neck: No JVD  Respiratory: CTAB, no wheezes, rales or rhonchi  Cardiovascular: S1, S2, RRR  Gastrointestinal: BS+, soft, NT/ND  Extremities: No cyanosis or clubbing. No peripheral edema  Skin: No rashes      LABS:        140  |  108  |  32<H>  ----------------------------<  114<H>  4.7   |  19  |  2.5<H>    Ca    8.8      25 Mar 2019 08:29          03    136  |  103  |  33<H>  ----------------------------<  154<H>  4.6   |  19  |  3.0<H>    Ca    9.4      24 Mar 2019 08:47      Creatinine Trend: 2.5 <--, 3.0 <--, 1.7 <--, 0.8 <--, 0.8 <--                        11.7   8.86  )-----------( 307      ( 25 Mar 2019 08:29 )             36.3     Urine Studies:  Urinalysis Basic - ( 22 Mar 2019 12:18 )    Color: Yellow / Appearance: Cloudy / S.010 / pH:   Gluc:  / Ketone: Negative  / Bili: Negative / Urobili: 0.2 mg/dL   Blood:  / Protein: Negative mg/dL / Nitrite: Negative   Leuk Esterase: Negative / RBC:  / WBC    Sq Epi:  / Non Sq Epi: Few /HPF / Bacteria:       Pending urine electrolytes, urine CX          RADIOLOGY & ADDITIONAL STUDIES:        < from: US Renal (19 @ 12:53) >    EXAM:  US KIDNEY(S)            PROCEDURE DATE:  2019            INTERPRETATION:  Clinical History / Reason for exam: Acute kidney injury    Retroperitoneal sonogram.    Comparison: none    Findings:    The kidneys demonstrate normal cortical echogenicity without   hydronephrosis, stone or solid mass. The right kidney measures 11.1 cm in   length. The left kidney measures 10.8 cm in length. Vascular flow is   demonstrated to bilateral renal galen. There is a right renal lower pole   1.4 cm cyst.    The urinary bladder is empty.    Impression:    No hydronephrosis or stone in either kidney.    1.4 cm right renal lower pole cyst.                  HARINDER SALCEDO M.D., ATTENDING RADIOLOGIST  This document has been electronically signed. 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 History taken from H&P on 3/22/19.     PAST MEDICAL & SURGICAL HISTORY:  HTN (hypertension)  High cholesterol  H/O abdominal hysterectomy    Allergies:  No Known Allergies    Home Medications:  amLODIPine 5 mg oral tablet: 1 tab(s) orally once a day (2019 01:50)  aspirin 81 mg oral tablet: 1 tab(s) orally once a day (2019 01:51)  losartan 100 mg oral tablet: 1 tab(s) orally once a day (2019 01:50)  simvastatin 10 mg oral tablet: 1 tab(s) orally once a day (at bedtime) (2019 01:50)    Home Medications Reviewed  Hospital Medications:   MEDICATIONS  (STANDING):  amLODIPine   Tablet 5 milliGRAM(s) Oral daily  guaiFENesin/dextromethorphan  Syrup 10 milliLiter(s) Oral every 6 hours  heparin  Injectable 5000 Unit(s) SubCutaneous every 12 hours  simvastatin 10 milliGRAM(s) Oral at bedtime  sodium chloride 0.9%. 1000 milliLiter(s) (75 mL/Hr) IV Continuous <Continuous>      SOCIAL HISTORY:  Denies ETOH,Smoking,   FAMILY HISTORY:  Hypertension (Mother)        REVIEW OF SYSTEMS:    All other review of systems is negative unless indicated above.    VITALS:  T(F): 97.4 (19 @ 05:51), Max: 98.3 (19 @ 20:30)  HR: 68 (19 @ 05:51)  BP: 150/69 (19 @ 05:51)  RR: 18 (19 @ 05:51)  SpO2: 95% (19 @ 09:25)      Weight (kg): 75.7 ( @ 09:25)    I&O's Detail    Creatine Kinase, Serum: 43 U/L (19 @ 17:49)      PHYSICAL EXAM:  Constitutional: NAD  HEENT: anicteric sclera, oropharynx clear, MMM  Neck: No JVD  Respiratory: CTAB, no wheezes, rales or rhonchi  Cardiovascular: S1, S2, RRR  Gastrointestinal: BS+, soft, NT/ND  Extremities: No cyanosis or clubbing. No peripheral edema  Skin: No rashes      LABS:        140  |  108  |  32<H>  ----------------------------<  114<H>  4.7   |  19  |  2.5<H>    Ca    8.8      25 Mar 2019 08:29          03    136  |  103  |  33<H>  ----------------------------<  154<H>  4.6   |  19  |  3.0<H>    Ca    9.4      24 Mar 2019 08:47      Creatinine Trend: 2.5 <--, 3.0 <--, 1.7 <--, 0.8 <--, 0.8 <--                        11.7   8.86  )-----------( 307      ( 25 Mar 2019 08:29 )             36.3     Urine Studies:  Urinalysis Basic - ( 22 Mar 2019 12:18 )    Color: Yellow / Appearance: Cloudy / S.010 / pH:   Gluc:  / Ketone: Negative  / Bili: Negative / Urobili: 0.2 mg/dL   Blood:  / Protein: Negative mg/dL / Nitrite: Negative   Leuk Esterase: Negative / RBC:  / WBC    Sq Epi:  / Non Sq Epi: Few /HPF / Bacteria:       Pending urine electrolytes, urine CX          RADIOLOGY & ADDITIONAL STUDIES:        < from: US Renal (19 @ 12:53) >    EXAM:  US KIDNEY(S)            PROCEDURE DATE:  2019            INTERPRETATION:  Clinical History / Reason for exam: Acute kidney injury    Retroperitoneal sonogram.    Comparison: none    Findings:    The kidneys demonstrate normal cortical echogenicity without   hydronephrosis, stone or solid mass. The right kidney measures 11.1 cm in   length. The left kidney measures 10.8 cm in length. Vascular flow is   demonstrated to bilateral renal galen. There is a right renal lower pole   1.4 cm cyst.    The urinary bladder is empty.    Impression:    No hydronephrosis or stone in either kidney.    1.4 cm right renal lower pole cyst.                  HARINDER SALCEDO M.D., ATTENDING RADIOLOGIST  This document has been electronically signed. Mar 25 2019  9:15AM                < end of copied text >    Blood Gas Profile - Venous (19 @ 12:44)    pH, Venous: 7.40    pCO2, Venous: 42 mmHg    pO2, Venous: 22 mmHg    HCO3, Venous: 26 mmoL/L    Base Excess, Venous: 1.3 mmoL/L    Oxygen Saturation, Venous: 37 %

## 2019-03-25 NOTE — CONSULT NOTE ADULT - ASSESSMENT
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 Nephrology was consulted for ALEJANDRO.    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 Nephrology was consulted for ALEJANDRO.    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  likely due to prerenal-volume depletion, r/o ischemic ATN  - Baseline from 2/2019: Cr 0.8; on admission from 3/22: Cr 0.8  - Cr trending down today:  2.5 <- 3.0 <-1.7 <-0.8  - Continue IVF, suggest maintenance fluids 1/2NS + bicarb,  monitor urine output  - Hold Losartan, restart when cr is stable  - C/w amlodipine, /69 today, 1 episode of low /52 on 3/22, optimize BP control  - Renal dose tramadol and morphine  - Renal US showed normal cortex echogenicity, negative for hydronephrosis/stones/mass  - Pending urine lytes, urine cx  - Will discuss plan with attending Patient is a 83y Female with PMH HTN, HLD, 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 Nephrology was consulted for ALEJANDRO.    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 Nephrology was consulted for ALEJANDRO.    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  likely due to prerenal-volume depletion, r/o ischemic ATN  - Baseline from 2/2019: Cr 0.8; on admission from 3/22: Cr 0.8  - Cr trending down today:  2.5 <- 3.0 <-1.7 <-0.8  - Continue IVF, monitor urine output  - Repeat UA  - Hold Losartan, restart when cr is stable  - C/w amlodipine, /69 today, 1 episode of low /52 on 3/22, optimize BP control  - Renal dose tramadol and morphine  - Renal US showed normal cortex echogenicity, negative for hydronephrosis/stones/mass  - Pending urine lytes, urine osm, urine cx  - Will discuss plan with attending

## 2019-03-25 NOTE — CONSULT NOTE ADULT - ATTENDING COMMENTS
PT seen and examined above note reviewed  sudden rise in CR on 3/21 no obvious cause, no overt hypotension, contrast or other renal toxins  currently improving cont IVf  repeat UA

## 2019-03-25 NOTE — CHART NOTE - NSCHARTNOTEFT_GEN_A_CORE
DATE OF PROCEDURE: 3/25/19    PREOPERATIVE DIAGNOSIS:  pleural effusion    POSTOPERATIVE DIAGNOSIS: pleural effusion    PROCEDURE PERFORMED:  right side pleurx catheter placement    SURGEON: dr estuardo boo    ANESTHESIA:  local 1% lidocaine    COMPLICATIONS:  None    CONSENT: after obtaining the risk and benefit the consent was obtained from the son Ayan.    DESCRIPTION OF PROCEDURE:   The patient was placed in the left lateral decubitus with the right side facing up. The chest and upper abdomen were prepped and draped in the usual sterile fashion. Lidocaine 1% was used to infiltrate the area that was localized by an ultrasound along the anterior axillary line and the pleural space was accessed by Seldinger technique, incision was made around the guidewire.  Another incision was performed 5cm below the guidewire after infiltrating with 1 % lidocaine, and then the PleurX catheter was tunneled through this incision to exit from the incision around the guidewire.  A sheath introducer was then passed over the wire and then the PleurX catheter was placed through the sheath introducer. There was good return of fluid. The two small incisions was closed with Monocryl stitch. 400cc was drained serous fluid and sent for cytology.  The catheter was capped off, and sterile dressings were applied. The patient tolerated the procedure well without any complications. Chest Xray was ordered.

## 2019-03-25 NOTE — PROGRESS NOTE ADULT - SUBJECTIVE AND OBJECTIVE BOX
JOANN SCHULTZ  [facility] T2-3A 025 B ([facility] T2-3A)            Patient was evaluated and examined  by bedside, c/o dyspnea, right shoulder and right subscapular region pain        REVIEW OF SYSTEMS:  please see pertinent positives mentioned above, all other 12 ROS negative      T(C): , Max: 36.8 (03-24-19 @ 20:30)  HR: 73 (03-25-19 @ 12:20)  BP: 147/66 (03-25-19 @ 12:20)  RR: 18 (03-25-19 @ 12:20)  SpO2: 95% (03-25-19 @ 09:25)  CAPILLARY BLOOD GLUCOSE          PHYSICAL EXAM:  General: NAD, AAOX3, patient is laying comfortably in bed  HEENT: AT, NC, Supple, NO JVD, NO CB  Lungs: decreased breath sounds B/L, no wheezing, no rhonchi  CVS: normal S1, S2, RRR, NO M/G/R  Abdomen: soft, bowel sounds present, non-tender, non-distended  Extremities: no edema, no clubbing, no cyanosis, positive peripheral pulses b/l  Neuro: no acute focal neurological deficits  Skin: no rush, no ecchymosis        LAB  CBC  Date: 03-25-19 @ 08:29  Mean cell Efiantwtor99.8  Mean cell Hemoglobin Conc32.2  Mean cell Volum 83.3  Platelet count-Automate 307  RBC Count 4.36  Red Cell Distrib Width12.9  WBC Count8.86  % Albumin, Urine--  Hematocrit 36.3  Hemoglobin 11.7  CBC  Date: 03-24-19 @ 08:47  Mean cell Kyidazhfet74.2  Mean cell Hemoglobin Conc32.7  Mean cell Volum 83.2  Platelet count-Automate 329  RBC Count 4.63  Red Cell Distrib Width12.8  WBC Count10.49  % Albumin, Urine--  Hematocrit 38.5  Hemoglobin 12.6  CBC  Date: 03-23-19 @ 06:04  Mean cell Ojcrhfgbxe77.2  Mean cell Hemoglobin Conc32.9  Mean cell Volum 82.6  Platelet count-Automate 278  RBC Count 4.19  Red Cell Distrib Width12.7  WBC Count8.92  % Albumin, Urine--  Hematocrit 34.6  Hemoglobin 11.4  CBC  Date: 03-22-19 @ 12:18  Mean cell Abgecfhwnf17.9  Mean cell Hemoglobin Conc33.1  Mean cell Volum 81.5  Platelet count-Automate 316  RBC Count 4.49  Red Cell Distrib Width12.8  WBC Count9.10  % Albumin, Urine--  Hematocrit 36.6  Hemoglobin 12.1    [facility]  03-25-19 @ 08:29  Blood Gas Arterial-Calcium,Ionized--  Blood Urea Nitrogen, Serum 32 mg/dL<H> [10 - 20]  Carbon Dioxide, Serum19 mmol/L [17 - 32]  Chloride, Dqmmw279 mmol/L [98 - 110]  Creatinie, Serum2.5 mg/dL<H> [0.7 - 1.5]  Glucose, Hmvvl841 mg/dL<H> [70 - 99]  Potassium, Serum4.7 mmol/L [3.5 - 5.0] [Slighty Hemolyzed use with Caution]  Sodium, Serum 140 mmol/L [135 - 146]  [facility]  03-24-19 @ 08:47  Blood Gas Arterial-Calcium,Ionized--  Blood Urea Nitrogen, Serum 33 mg/dL<H> [10 - 20]  Carbon Dioxide, Serum19 mmol/L [17 - 32]  Chloride, Jurrj894 mmol/L [98 - 110]  Creatinie, Serum3.0 mg/dL<H> [0.7 - 1.5]  Glucose, Wvhaz396 mg/dL<H> [70 - 99]  Potassium, Serum4.6 mmol/L [3.5 - 5.0]  Sodium, Serum 136 mmol/L [135 - 146]  [facility]  03-23-19 @ 06:04  Blood Gas Arterial-Calcium,Ionized--  Blood Urea Nitrogen, Serum 23 mg/dL<H> [10 - 20]  Carbon Dioxide, Serum21 mmol/L [17 - 32]  Chloride, Bhfgz585 mmol/L [98 - 110]  Creatinie, Serum1.7 mg/dL<H> [0.7 - 1.5]  Glucose, Serum91 mg/dL [70 - 99]  Potassium, Serum4.3 mmol/L [3.5 - 5.0]  Sodium, Serum 140 mmol/L [135 - 146]  [facility]  03-22-19 @ 12:18  Blood Gas Arterial-Calcium,Ionized--  Blood Urea Nitrogen, Serum 14 mg/dL [10 - 20]  Carbon Dioxide, Serum21 mmol/L [17 - 32]  Chloride, Xvarh970 mmol/L [98 - 110]  Creatinie, Serum0.8 mg/dL [0.7 - 1.5]  Glucose, Gsvvv256 mg/dL<H> [70 - 99]  Potassium, Serum4.0 mmol/L [3.5 - 5.0]  Sodium, Serum 138 mmol/L [135 - 146]              Microbiology:    Culture - Fungal, Body Fluid (collected 03-22-19 @ 16:22)  Source: .Body Fluid Pleural Fluid  Preliminary Report (03-25-19 @ 11:08):    Testing in progress    Culture - Body Fluid with Gram Stain (collected 03-22-19 @ 16:22)  Source: .Body Fluid Pleural Fluid  Gram Stain (03-23-19 @ 05:53):    polymorphonuclear leukocytes seen    No organisms seen    by cytocentrifuge  Preliminary Report (03-23-19 @ 20:04):    No growth    Culture - Acid Fast - Body Fluid w/Smear (collected 03-22-19 @ 16:22)  Source: .Body Fluid Pleural Fluid    Culture - Body Fluid with Gram Stain (collected 03-15-19 @ 16:23)  Source: .Body Fluid Pleural Fluid  Gram Stain (03-15-19 @ 23:47):    polymorphonuclear leukocytes seen    No organisms seen by cytocentrifuge  Final Report (03-20-19 @ 19:39):    No growth at 5 days    Culture - Acid Fast - Body Fluid w/Smear (collected 03-15-19 @ 16:23)  Source: .Body Fluid Pleural Fluid  Preliminary Report (03-23-19 @ 15:03):    No growth at 1 week.          Medications:  amLODIPine   Tablet 5 milliGRAM(s) Oral daily  chlorhexidine 4% Liquid 1 Application(s) Topical <User Schedule>  guaiFENesin/dextromethorphan  Syrup 10 milliLiter(s) Oral every 6 hours  heparin  Injectable 5000 Unit(s) SubCutaneous every 12 hours  morphine  - Injectable 1 milliGRAM(s) IV Push every 4 hours PRN  simvastatin 10 milliGRAM(s) Oral at bedtime  sodium chloride 0.9%. 1000 milliLiter(s) IV Continuous <Continuous>  traMADol 50 milliGRAM(s) Oral every 6 hours PRN        Assessment and Plan:  84 y/o Female with PMH of right sided pleural effusions, HTN, and high cholesterol , sent in by pulmonologist Dr. Dickinson for further workup and is found to have a large right sided Pleural Effusion.     # SOB secondary to large right sided pleural effusion/ most likely due to malignant event  - right lung apical mass  - Patient had right sided thoracocentesis  performed at bedside in ED , 920 cc removed   - Pulmonary following plans for  [place] Cath   -patient will need right lung mass/nodule diagnostic  biopsy  - Pain control with Morphine IV PRN and Oxygen as needed for SaO2>92    # Right shoulder pain, subscapular pain -obtained right shoulder X ray - no acute fractures    # ALEJANDRO -  renal US- no acute obstructive uropathy  -renal function slightly better today  , urine lytes, cr. prot, ,   CK level- normal   UPEP,SPEP, serum immunofixation  -IVF  -f/up Nephrology specialist  -strict I and O monitoring    # Hypertension : controlled , Continue with Losartan and Amlodipine     # DLD: c/w statin     DVT Prophylaxis : Heparin subc. injections   DASH Diet   Disposition: remains acute     #Progress Note Handoff: Pending Consults___pulmonary______,Tests_  drain placement due to recurrent right lung pl. effusion______,Test Results__BMP_____,Other;  Family discussion: pt. by bedside Disposition: Home_xx__/SNF___/Other________/to be determined

## 2019-03-25 NOTE — PROGRESS NOTE ADULT - SUBJECTIVE AND OBJECTIVE BOX
JOANN SCHULTZ 83y Female  MRN#: [id number]   CODE STATUS:________      SUBJECTIVE  Patient is a 83y old Female who presents with a chief complaint of SOB (25 Mar 2019 13:49)  Currently admitted to medicine with the primary diagnosis of Pleural effusion  Hospital course has been complicated by _______.   Today is hospital day 3d, and this morning she is _________ and reports ________ overnight events.     Present Today:           [place] Catheter ()No/ ()Yes? Indication:          Central Line ()No/ ()Yes? Indication:          IV Fluids ()No/ ()Yes? Type:  Rate:  Indication:      OBJECTIVE  PAST MEDICAL & SURGICAL HISTORY  HTN (hypertension)  High cholesterol  H/O abdominal hysterectomy    ALLERGIES:  No Known Allergies    MEDICATIONS:  STANDING MEDICATIONS  amLODIPine   Tablet 5 milliGRAM(s) Oral daily  chlorhexidine 4% Liquid 1 Application(s) Topical <User Schedule>  guaiFENesin/dextromethorphan  Syrup 10 milliLiter(s) Oral every 6 hours  heparin  Injectable 5000 Unit(s) SubCutaneous every 12 hours  simvastatin 10 milliGRAM(s) Oral at bedtime  sodium chloride 0.9%. 1000 milliLiter(s) IV Continuous <Continuous>    PRN MEDICATIONS  morphine  - Injectable 1 milliGRAM(s) IV Push every 4 hours PRN  traMADol 50 milliGRAM(s) Oral every 6 hours PRN      VITAL SIGNS: Last 24 Hours  T(C): 35.7 (25 Mar 2019 12:20), Max: 36.8 (24 Mar 2019 20:30)  T(F): 96.2 (25 Mar 2019 12:20), Max: 98.3 (24 Mar 2019 20:30)  HR: 73 (25 Mar 2019 12:20) (62 - 73)  BP: 147/66 (25 Mar 2019 12:20) (140/67 - 150/69)  BP(mean): --  RR: 18 (25 Mar 2019 12:20) (18 - 18)  SpO2: 95% (25 Mar 2019 09:25) (95% - 95%)    LABS:                        11.7   8.86  )-----------( 307      ( 25 Mar 2019 08:29 )             36.3     03-25    140  |  108  |  32<H>  ----------------------------<  114<H>  4.7   |  19  |  2.5<H>    Ca    8.8      25 Mar 2019 08:29            Creatine Kinase, Serum: 43 U/L (03-24-19 @ 17:49)      Culture - Fungal, Body Fluid (collected 22 Mar 2019 16:22)  Source: .Body Fluid Pleural Fluid  Preliminary Report (25 Mar 2019 11:08):    Testing in progress    Culture - Body Fluid with Gram Stain (collected 22 Mar 2019 16:22)  Source: .Body Fluid Pleural Fluid  Gram Stain (23 Mar 2019 05:53):    polymorphonuclear leukocytes seen    No organisms seen    by cytocentrifuge  Preliminary Report (23 Mar 2019 20:04):    No growth    Culture - Acid Fast - Body Fluid w/Smear (collected 22 Mar 2019 16:22)  Source: .Body Fluid Pleural Fluid      CARDIAC MARKERS ( 24 Mar 2019 17:49 )  x     / x     / 43 U/L / x     / x          RADIOLOGY:      PHYSICAL EXAM:    GENERAL: NAD, well-developed, AAOx3  HEENT:  Atraumatic, Normocephalic. EOMI, PERRLA, conjunctiva and sclera clear, No JVD  PULMONARY: Clear to auscultation bilaterally; No wheeze  CARDIOVASCULAR: Regular rate and rhythm; No murmurs, rubs, or gallops  GASTROINTESTINAL: Soft, Nontender, Nondistended; Bowel sounds present  MUSCULOSKELETAL:  2+ Peripheral Pulses, No clubbing, cyanosis, or edema  NEUROLOGY: non-focal  SKIN: No rashes or lesions      ADMISSION SUMMARY  Patient is a 83y old Female who presents with a chief complaint of SOB (25 Mar 2019 13:49)  Currently admitted to medicine with the primary diagnosis of Pleural effusion  Hospital course has been complicated by _______.       ASSESSMENT & PLAN    1. PLEURAL EFFUSION      2.    3. HTN (hypertension)  High cholesterol        Present today:  ( ) Congestive Heart Failure, Yes? ( )Acute / ( )Acute on Chronic / ( )Chronic  :  ( )Systolic / ( )Diastolic               Plan:  ( ) Complicated Pneumonia, Type?  ( )Parapneumonic effusion / ( )Abscess / ( ) Multilobar / ( )Other               Plan:  ( ) Morbid Obesity, Yes? BMI:               Plan:  ( ) Functional Quadriplegia               Plan:  ( ) Encephalopathy               Plan:    ( ) Discussion with patient and/or family regarding goals of care  ( ) Discussed Case and Plan with Medical Attending, Name: JOANN SCHULTZ 83y Female  MRN#: [id number]   CODE STATUS:___Full_____      SUBJECTIVE  Patient is a 83y old Female who presents with a chief complaint of SOB (25 Mar 2019 13:49)  Currently admitted to medicine with the primary diagnosis of Pleural effusion  Hospital course has been complicated by R shoulder pain, ALEJANDRO.  Scottish SPEAKING (PCA and brother translating at bedside)  Today is hospital day 3d, and this morning she is still feeling somewhat SOB and having R shoulder pain. Otherwise no events overnight.    Present Today:           IV Fluids ()No/ (x)Yes? Type: NS  Rate: 75 Indication: ?dehydration, ALEJANDRO      OBJECTIVE  PAST MEDICAL & SURGICAL HISTORY  HTN (hypertension)  High cholesterol  H/O abdominal hysterectomy    ALLERGIES:  No Known Allergies    MEDICATIONS:  STANDING MEDICATIONS  amLODIPine   Tablet 5 milliGRAM(s) Oral daily  chlorhexidine 4% Liquid 1 Application(s) Topical <User Schedule>  guaiFENesin/dextromethorphan  Syrup 10 milliLiter(s) Oral every 6 hours  heparin  Injectable 5000 Unit(s) SubCutaneous every 12 hours  simvastatin 10 milliGRAM(s) Oral at bedtime  sodium chloride 0.9%. 1000 milliLiter(s) IV Continuous <Continuous>    PRN MEDICATIONS  morphine  - Injectable 1 milliGRAM(s) IV Push every 4 hours PRN  traMADol 50 milliGRAM(s) Oral every 6 hours PRN      VITAL SIGNS: Last 24 Hours  T(C): 35.7 (25 Mar 2019 12:20), Max: 36.8 (24 Mar 2019 20:30)  T(F): 96.2 (25 Mar 2019 12:20), Max: 98.3 (24 Mar 2019 20:30)  HR: 73 (25 Mar 2019 12:20) (62 - 73)  BP: 147/66 (25 Mar 2019 12:20) (140/67 - 150/69)  BP(mean): --  RR: 18 (25 Mar 2019 12:20) (18 - 18)  SpO2: 95% (25 Mar 2019 09:25) (95% - 95%)    LABS:                        11.7   8.86  )-----------( 307      ( 25 Mar 2019 08:29 )             36.3     03-25    140  |  108  |  32<H>  ----------------------------<  114<H>  4.7   |  19  |  2.5<H>    Ca    8.8      25 Mar 2019 08:29            Creatine Kinase, Serum: 43 U/L (03-24-19 @ 17:49)      Culture - Fungal, Body Fluid (collected 22 Mar 2019 16:22)  Source: .Body Fluid Pleural Fluid  Preliminary Report (25 Mar 2019 11:08):    Testing in progress    Culture - Body Fluid with Gram Stain (collected 22 Mar 2019 16:22)  Source: .Body Fluid Pleural Fluid  Gram Stain (23 Mar 2019 05:53):    polymorphonuclear leukocytes seen    No organisms seen    by cytocentrifuge  Preliminary Report (23 Mar 2019 20:04):    No growth    Culture - Acid Fast - Body Fluid w/Smear (collected 22 Mar 2019 16:22)  Source: .Body Fluid Pleural Fluid      CARDIAC MARKERS ( 24 Mar 2019 17:49 )  x     / x     / 43 U/L / x     / x          RADIOLOGY:  < from: CT Angio Chest w/ IV Cont (02.20.19 @ 20:43) >  IMPRESSION:  Large right loculated pleural effusion with estimated volume of at least 900 cc.  No acute pulmonary embolus.  1 cm right upper lobe fissural nodule. A short interval follow-up chest CT is recommended in 2-3 months.    Additional Findings/Recommendations After Attending Radiologist Review:  Findings consistent with neoplasm. There is right apical 3.1 x 3.0 cm mass with irregular right apical pleural thickening (series 7, image 44), and multiple satellite pleural nodules including a more caudad 2.7 cm para-mediastinal nodule (series 7, image 175). Tissue sampling recommended.    < end of copied text >      PHYSICAL EXAM:    GENERAL: NAD, well-developed Nigerian lady lying in bed on her L side, mildly uncomfortable on 3L NC, AAOx3  HEENT:  Atraumatic, Normocephalic. EOMI, PERRLA, conjunctiva clear and sclera white, No JVD  PULMONARY: decreased breath sounds in R base but otherwise clear; No wheeze  CARDIOVASCULAR: Regular rate and rhythm; No murmurs  GASTROINTESTINAL: Soft, Nontender, Nondistended; Bowel sounds present  EXTREMITY:  2+ Peripheral Pulses, No edema  NEUROLOGY: non-focal  SKIN: No rashes      ADMISSION SUMMARY  Patient is a 83y old Female who presents with a chief complaint of SOB (25 Mar 2019 13:49)  Currently admitted to medicine with the primary diagnosis of Pleural effusion  Hospital course has been complicated by ALEJANDRO, R shoulder pain    84 y/o Female with PMH of right sided pleural effusions, HTN, and high cholesterol , sent in by pulmonologist Dr. Dickinson for admission for further evaluation. Pt was admitted on 2/20/2019 for a right loculated pleural effusion approx. 900 CCs drained, no PE and right apical 3.1x3.0 cm mass with pleural nodules. Pt was d/c on 2/22 with outpatient follow up with PMD, pulmonary Dr. Dickinson and hemonc Dr. Robertson. Pt returned to ED on 3/15/2019 for right pleural effusion, had a thoracentesis in ED and was d/c after discussion with pulmonary Dr. Cruz. Pt saw Dr. Dickinson today for persistent SOB, dry cough, back pain where she has had the pleural effusion, weakness and night sweats. Pt was noted to have a malignancy in prior visits so was sent in for admission for a biopsy. Daughter in law also notes pt to have lost 8 pounds since the summer (4 pounds in the past 2 months). No CP.     Since then patient had 900CC drained again and will go for [place] catheter 3/25. Patient will likely need an EBUS vs IR biopsy of the lung mass for assessment of underlying malignancy.       ASSESSMENT & PLAN    # Right sided pleural effusion, recurrent  - Thoracentesis in  cc removed   - Pulmonary recs appreciated  - [place] Cath TODAY  - possible EBUS vs IR (as per pulm follow-up)  - c/w Pain control with Morphine IV PRN and Oxygen as needed for SaO2>92     # ALEJANDRO   - Cr inc to 3 > 2.5 w/ fluids  - Renal US neg  - pending urine lytes/studies, UPEP  - f/u SPEP, serum immunofixation results  - c/w gentle IVF NS 75  - Nephro recs appreciated - consider 1/2 NS + bicarb?  - strict I and O monitoring    # Right shoulder pain, subscapular pain likely referred from effusion  - right shoulder X ray neg    # Hypertension : controlled   - c/w Amlodipine , hold losartan  # DLD  - c/w statin , ASA is home med    #MISC  - DVT Prophylaxis : Lovenox 40 daily   - GI ppx: PPI  - DASH Diet , gentle ambulation given SOB   - from home, full code  - Son Ayan  [phone]  - Disposition: TBD    (x) Discussion with patient and/or family regarding goals of care  (x) Discussed Case and Plan with Medical Attending, Name: Liliane

## 2019-03-26 ENCOUNTER — TRANSCRIPTION ENCOUNTER (OUTPATIENT)
Age: 84
End: 2019-03-26

## 2019-03-26 LAB
ALBUMIN SERPL ELPH-MCNC: 3.5 G/DL — SIGNIFICANT CHANGE UP (ref 3.5–5.2)
ALP SERPL-CCNC: 98 U/L — SIGNIFICANT CHANGE UP (ref 30–115)
ALT FLD-CCNC: 11 U/L — SIGNIFICANT CHANGE UP (ref 0–41)
ANION GAP SERPL CALC-SCNC: 17 MMOL/L — HIGH (ref 7–14)
AST SERPL-CCNC: 11 U/L — SIGNIFICANT CHANGE UP (ref 0–41)
BASOPHILS # BLD AUTO: 0.07 K/UL — SIGNIFICANT CHANGE UP (ref 0–0.2)
BASOPHILS NFR BLD AUTO: 0.8 % — SIGNIFICANT CHANGE UP (ref 0–1)
BILIRUB SERPL-MCNC: 0.4 MG/DL — SIGNIFICANT CHANGE UP (ref 0.2–1.2)
BUN SERPL-MCNC: 24 MG/DL — HIGH (ref 10–20)
CALCIUM SERPL-MCNC: 9.4 MG/DL — SIGNIFICANT CHANGE UP (ref 8.5–10.1)
CHLORIDE SERPL-SCNC: 108 MMOL/L — SIGNIFICANT CHANGE UP (ref 98–110)
CO2 SERPL-SCNC: 18 MMOL/L — SIGNIFICANT CHANGE UP (ref 17–32)
CREAT SERPL-MCNC: 1.8 MG/DL — HIGH (ref 0.7–1.5)
EOSINOPHIL # BLD AUTO: 0.45 K/UL — SIGNIFICANT CHANGE UP (ref 0–0.7)
EOSINOPHIL NFR BLD AUTO: 5.1 % — SIGNIFICANT CHANGE UP (ref 0–8)
GLUCOSE SERPL-MCNC: 110 MG/DL — HIGH (ref 70–99)
HCT VFR BLD CALC: 37.8 % — SIGNIFICANT CHANGE UP (ref 37–47)
HGB BLD-MCNC: 12.6 G/DL — SIGNIFICANT CHANGE UP (ref 12–16)
IMM GRANULOCYTES NFR BLD AUTO: 0.3 % — SIGNIFICANT CHANGE UP (ref 0.1–0.3)
LYMPHOCYTES # BLD AUTO: 1.39 K/UL — SIGNIFICANT CHANGE UP (ref 1.2–3.4)
LYMPHOCYTES # BLD AUTO: 15.7 % — LOW (ref 20.5–51.1)
MAGNESIUM SERPL-MCNC: 1.8 MG/DL — SIGNIFICANT CHANGE UP (ref 1.8–2.4)
MCHC RBC-ENTMCNC: 27.1 PG — SIGNIFICANT CHANGE UP (ref 27–31)
MCHC RBC-ENTMCNC: 33.3 G/DL — SIGNIFICANT CHANGE UP (ref 32–37)
MCV RBC AUTO: 81.3 FL — SIGNIFICANT CHANGE UP (ref 81–99)
MONOCYTES # BLD AUTO: 0.56 K/UL — SIGNIFICANT CHANGE UP (ref 0.1–0.6)
MONOCYTES NFR BLD AUTO: 6.3 % — SIGNIFICANT CHANGE UP (ref 1.7–9.3)
NEUTROPHILS # BLD AUTO: 6.38 K/UL — SIGNIFICANT CHANGE UP (ref 1.4–6.5)
NEUTROPHILS NFR BLD AUTO: 71.8 % — SIGNIFICANT CHANGE UP (ref 42.2–75.2)
NON-GYNECOLOGICAL CYTOLOGY STUDY: SIGNIFICANT CHANGE UP
NRBC # BLD: 0 /100 WBCS — SIGNIFICANT CHANGE UP (ref 0–0)
PLATELET # BLD AUTO: 339 K/UL — SIGNIFICANT CHANGE UP (ref 130–400)
POTASSIUM SERPL-MCNC: 4.1 MMOL/L — SIGNIFICANT CHANGE UP (ref 3.5–5)
POTASSIUM SERPL-SCNC: 4.1 MMOL/L — SIGNIFICANT CHANGE UP (ref 3.5–5)
PROT SERPL-MCNC: 6.2 G/DL — SIGNIFICANT CHANGE UP (ref 6–8)
RBC # BLD: 4.65 M/UL — SIGNIFICANT CHANGE UP (ref 4.2–5.4)
RBC # FLD: 12.8 % — SIGNIFICANT CHANGE UP (ref 11.5–14.5)
SODIUM SERPL-SCNC: 143 MMOL/L — SIGNIFICANT CHANGE UP (ref 135–146)
WBC # BLD: 8.88 K/UL — SIGNIFICANT CHANGE UP (ref 4.8–10.8)
WBC # FLD AUTO: 8.88 K/UL — SIGNIFICANT CHANGE UP (ref 4.8–10.8)

## 2019-03-26 RX ORDER — MEGESTROL ACETATE 40 MG/ML
400 SUSPENSION ORAL DAILY
Qty: 0 | Refills: 0 | Status: DISCONTINUED | OUTPATIENT
Start: 2019-03-26 | End: 2019-03-28

## 2019-03-26 RX ORDER — MEGESTROL ACETATE 40 MG/ML
400 SUSPENSION ORAL DAILY
Qty: 0 | Refills: 0 | Status: DISCONTINUED | OUTPATIENT
Start: 2019-03-26 | End: 2019-03-26

## 2019-03-26 RX ADMIN — MORPHINE SULFATE 1 MILLIGRAM(S): 50 CAPSULE, EXTENDED RELEASE ORAL at 10:18

## 2019-03-26 RX ADMIN — LIDOCAINE 1 PATCH: 4 CREAM TOPICAL at 19:00

## 2019-03-26 RX ADMIN — MEGESTROL ACETATE 400 MILLIGRAM(S): 40 SUSPENSION ORAL at 12:04

## 2019-03-26 RX ADMIN — MORPHINE SULFATE 1 MILLIGRAM(S): 50 CAPSULE, EXTENDED RELEASE ORAL at 09:48

## 2019-03-26 RX ADMIN — PANTOPRAZOLE SODIUM 40 MILLIGRAM(S): 20 TABLET, DELAYED RELEASE ORAL at 05:54

## 2019-03-26 RX ADMIN — HEPARIN SODIUM 5000 UNIT(S): 5000 INJECTION INTRAVENOUS; SUBCUTANEOUS at 05:54

## 2019-03-26 RX ADMIN — CHLORHEXIDINE GLUCONATE 1 APPLICATION(S): 213 SOLUTION TOPICAL at 05:54

## 2019-03-26 RX ADMIN — LIDOCAINE 1 PATCH: 4 CREAM TOPICAL at 23:30

## 2019-03-26 RX ADMIN — TRAMADOL HYDROCHLORIDE 50 MILLIGRAM(S): 50 TABLET ORAL at 09:18

## 2019-03-26 RX ADMIN — TRAMADOL HYDROCHLORIDE 50 MILLIGRAM(S): 50 TABLET ORAL at 08:48

## 2019-03-26 RX ADMIN — HEPARIN SODIUM 5000 UNIT(S): 5000 INJECTION INTRAVENOUS; SUBCUTANEOUS at 17:05

## 2019-03-26 RX ADMIN — LIDOCAINE 1 PATCH: 4 CREAM TOPICAL at 06:01

## 2019-03-26 RX ADMIN — AMLODIPINE BESYLATE 5 MILLIGRAM(S): 2.5 TABLET ORAL at 05:54

## 2019-03-26 RX ADMIN — LIDOCAINE 1 PATCH: 4 CREAM TOPICAL at 11:32

## 2019-03-26 RX ADMIN — MORPHINE SULFATE 1 MILLIGRAM(S): 50 CAPSULE, EXTENDED RELEASE ORAL at 05:57

## 2019-03-26 RX ADMIN — TRAMADOL HYDROCHLORIDE 50 MILLIGRAM(S): 50 TABLET ORAL at 22:11

## 2019-03-26 RX ADMIN — SODIUM CHLORIDE 75 MILLILITER(S): 9 INJECTION INTRAMUSCULAR; INTRAVENOUS; SUBCUTANEOUS at 05:54

## 2019-03-26 RX ADMIN — SIMVASTATIN 10 MILLIGRAM(S): 20 TABLET, FILM COATED ORAL at 22:12

## 2019-03-26 RX ADMIN — TRAMADOL HYDROCHLORIDE 50 MILLIGRAM(S): 50 TABLET ORAL at 00:00

## 2019-03-26 RX ADMIN — TRAMADOL HYDROCHLORIDE 50 MILLIGRAM(S): 50 TABLET ORAL at 23:00

## 2019-03-26 RX ADMIN — MORPHINE SULFATE 1 MILLIGRAM(S): 50 CAPSULE, EXTENDED RELEASE ORAL at 06:30

## 2019-03-26 NOTE — PROGRESS NOTE ADULT - ASSESSMENT
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 Nephrology was consulted for ALEJANDRO.    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  likely due to prerenal-volume depletion, r/o ischemic ATN  - Baseline from 2/2019: Cr 0.8; on admission from 3/22: Cr 0.8  - Cr trend:  2.5 <- 3.0 <-1.7 <-0.8  - pending cr levels today  - IVF was discontinued this morning, continue to monitor urine output  - Hold Losartan, restart when cr is stable  - Renal US showed normal cortex echogenicity, negative for hydronephrosis/stones/mass  - Pending urine lytes, urine osm, urine cx  - Will discuss plan with attending Patient is a 83y Female with PMH HTN, HLD, 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 Nephrology was consulted for ALEJANDRO.    ALEJANDRO   likely due to prerenal-volume depletion, r/o ischemic ATN  - Baseline from 2/2019: Cr 0.8; on admission from 3/22: Cr 0.8  - Cr better  - pending cr levels today  - IVF was discontinued this morning, continue to monitor urine output/ encourage po intake   - Hold Losartan, restart when cr is stable in AM   - Renal US showed normal cortex echogenicity, negative for hydronephrosis/stones/mass

## 2019-03-26 NOTE — PROGRESS NOTE ADULT - SUBJECTIVE AND OBJECTIVE BOX
Nephrology progress note    Patient is seen and examined, events over the last 24 h noted .    Allergies:  No Known Allergies    Hospital Medications:   MEDICATIONS  (STANDING):  amLODIPine   Tablet 5 milliGRAM(s) Oral daily  chlorhexidine 4% Liquid 1 Application(s) Topical <User Schedule>  guaiFENesin/dextromethorphan  Syrup 10 milliLiter(s) Oral every 6 hours  heparin  Injectable 5000 Unit(s) SubCutaneous every 12 hours  lidocaine   Patch 1 Patch Transdermal daily  megestrol 400 milliGRAM(s) Oral daily  pantoprazole    Tablet 40 milliGRAM(s) Oral before breakfast  simvastatin 10 milliGRAM(s) Oral at bedtime        VITALS:  T(F): 97.6 (19 @ 05:24), Max: 98.5 (19 @ 14:36)  HR: 70 (19 @ 05:24)  BP: 167/74 (19 @ 05:24)  RR: 18 (19 @ 05:24)  SpO2: 96% (19 @ 08:51)  Wt(kg): --     @ 07:01  -   @ 10:51  --------------------------------------------------------  IN: 0 mL / OUT: 900 mL / NET: -900 mL      Height (cm): 153 ( @ 14:36)  Weight (kg): 74 ( @ 14:36)  BMI (kg/m2): 31.6 ( @ 14:36)  BSA (m2): 1.72 ( @ 14:36)    PHYSICAL EXAM:  Constitutional: NAD  HEENT: anicteric sclera, oropharynx clear, MMM  Neck: No JVD  Respiratory: CTAB, no wheezes, rales or rhonchi  Cardiovascular: S1, S2, RRR  Gastrointestinal: BS+, soft, NT/ND  Extremities: No cyanosis or clubbing. No peripheral edema  :  No thomas.   Skin: No rashes    LABS:      140  |  108  |  32<H>  ----------------------------<  114<H>  4.7   |  19  |  2.5<H>    Ca    8.8      25 Mar 2019 08:29    TPro  6.3  /  Alb      /  TBili      /  DBili      /  AST      /  ALT      /  AlkPhos                                12.6   8.88  )-----------( 339      ( 26 Mar 2019 09:00 )             37.8       Urine Studies:  Urinalysis Basic - ( 22 Mar 2019 12:18 )    Color: Yellow / Appearance: Cloudy / S.010 / pH:   Gluc:  / Ketone: Negative  / Bili: Negative / Urobili: 0.2 mg/dL   Blood:  / Protein: Negative mg/dL / Nitrite: Negative   Leuk Esterase: Negative / RBC:  / WBC    Sq Epi:  / Non Sq Epi: Few /HPF / Bacteria:         RADIOLOGY & ADDITIONAL STUDIES: Nephrology progress note    Patient is seen and examined, events over the last 24 h noted .  no complaints     Allergies:  No Known Allergies    Hospital Medications:   MEDICATIONS  (STANDING):  amLODIPine   Tablet 5 milliGRAM(s) Oral daily  chlorhexidine 4% Liquid 1 Application(s) Topical <User Schedule>  guaiFENesin/dextromethorphan  Syrup 10 milliLiter(s) Oral every 6 hours  heparin  Injectable 5000 Unit(s) SubCutaneous every 12 hours  lidocaine   Patch 1 Patch Transdermal daily  megestrol 400 milliGRAM(s) Oral daily  pantoprazole    Tablet 40 milliGRAM(s) Oral before breakfast  simvastatin 10 milliGRAM(s) Oral at bedtime        VITALS:  T(F): 97.6 (19 @ 05:24), Max: 98.5 (19 @ 14:36)  HR: 70 (19 @ 05:24)  BP: 167/74 (19 @ 05:24)  RR: 18 (19 @ 05:24)  SpO2: 96% (19 @ 08:51)  Wt(kg): --     @ 07:01  -   @ 10:51  --------------------------------------------------------  IN: 0 mL / OUT: 900 mL / NET: -900 mL      Height (cm): 153 ( @ 14:36)  Weight (kg): 74 ( @ 14:36)  BMI (kg/m2): 31.6 ( @ 14:36)  BSA (m2): 1.72 ( @ 14:36)    PHYSICAL EXAM:  Constitutional: NAD  HEENT: anicteric sclera, oropharynx clear, MMM  Neck: No JVD  Respiratory: CTAB, no wheezes, rales or rhonchi  Cardiovascular: S1, S2, RRR  Gastrointestinal: BS+, soft, NT/ND  Extremities: No cyanosis or clubbing. No peripheral edema  :  No thomas.   Skin: No rashes    LABS:      143  |  108  |  24<H>  ----------------------------<  110<H>  4.1   |  18  |  1.8<H>    Ca    9.4      26 Mar 2019 09:00  Mg     1.8         TPro  6.2  /  Alb  3.5  /  TBili  0.4  /  DBili  x   /  AST  11  /  ALT  11  /  AlkPhos  98      140  |  108  |  32<H>  ----------------------------<  114<H>  4.7   |  19  |  2.5<H>    Ca    8.8      25 Mar 2019 08:29    TPro  6.3  /  Alb      /  TBili      /  DBili      /  AST      /  ALT      /  AlkPhos                                12.6   8.88  )-----------( 339      ( 26 Mar 2019 09:00 )             37.8       Urine Studies:  Urinalysis Basic - ( 22 Mar 2019 12:18 )    Color: Yellow / Appearance: Cloudy / S.010 / pH:   Gluc:  / Ketone: Negative  / Bili: Negative / Urobili: 0.2 mg/dL   Blood:  / Protein: Negative mg/dL / Nitrite: Negative   Leuk Esterase: Negative / RBC:  / WBC    Sq Epi:  / Non Sq Epi: Few /HPF / Bacteria:         RADIOLOGY & ADDITIONAL STUDIES:

## 2019-03-26 NOTE — PROGRESS NOTE ADULT - SUBJECTIVE AND OBJECTIVE BOX
JOANN SCHULTZ  [facility] 025 B ([facility])            Patient was evaluated and examined  by bedside, c/o persistent right sided chest pain, today - right drain- 900ml out. CXR- today, decreased effusion size  patient c/o sensation that bolus stacked  in her throat, unable to swallow solid food ,feels thirsty all the time, has poor appetite, no dyspnea at rest.           REVIEW OF SYSTEMS:  please see pertinent positives mentioned above, all other 12 ROS negative      T(C): , Max: 36.9 (03-25-19 @ 14:36)  HR: 70 (03-26-19 @ 05:24)  BP: 167/74 (03-26-19 @ 05:24)  RR: 18 (03-26-19 @ 05:24)  SpO2: 96% (03-26-19 @ 08:51)  CAPILLARY BLOOD GLUCOSE          PHYSICAL EXAM:  General: NAD, AAOX3, patient is laying comfortably in bed  HEENT: AT, NC, Supple, NO JVD, NO CB  Lungs: decreased breath sounds B/L, crackles present over right lung area, no wheezing, no rhonchi  CVS: normal S1, S2, RRR, NO M/G/R  Abdomen: soft, bowel sounds present, non-tender, non-distended  Extremities: no edema, no clubbing, no cyanosis, positive peripheral pulses b/l  Neuro: no acute focal neurological deficits  Skin: no rush, no ecchymosis      LAB  CBC  Date: 03-26-19 @ 09:00  Mean cell Lmcbsgvbcx28.1  Mean cell Hemoglobin Conc33.3  Mean cell Volum 81.3  Platelet count-Automate 339  RBC Count 4.65  Red Cell Distrib Width12.8  WBC Count8.88  % Albumin, Urine--  Hematocrit 37.8  Hemoglobin 12.6  CBC  Date: 03-25-19 @ 08:29  Mean cell Kjqdlctxua79.8  Mean cell Hemoglobin Conc32.2  Mean cell Volum 83.3  Platelet count-Automate 307  RBC Count 4.36  Red Cell Distrib Width12.9  WBC Count8.86  % Albumin, Urine--  Hematocrit 36.3  Hemoglobin 11.7  CBC  Date: 03-24-19 @ 08:47  Mean cell Bxpmrdsrlo13.2  Mean cell Hemoglobin Conc32.7  Mean cell Volum 83.2  Platelet count-Automate 329  RBC Count 4.63  Red Cell Distrib Width12.8  WBC Count10.49  % Albumin, Urine--  Hematocrit 38.5  Hemoglobin 12.6  CBC  Date: 03-23-19 @ 06:04  Mean cell Mqlzplblgi25.2  Mean cell Hemoglobin Conc32.9  Mean cell Volum 82.6  Platelet count-Automate 278  RBC Count 4.19  Red Cell Distrib Width12.7  WBC Count8.92  % Albumin, Urine--  Hematocrit 34.6  Hemoglobin 11.4  CBC  Date: 03-22-19 @ 12:18  Mean cell Txoangcekx86.9  Mean cell Hemoglobin Conc33.1  Mean cell Volum 81.5  Platelet count-Automate 316  RBC Count 4.49  Red Cell Distrib Width12.8  WBC Count9.10  % Albumin, Urine--  Hematocrit 36.6  Hemoglobin 12.1    [facility]  03-26-19 @ 09:00  Blood Gas Arterial-Calcium,Ionized--  Blood Urea Nitrogen, Serum 24 mg/dL<H> [10 - 20]  Carbon Dioxide, Serum18 mmol/L [17 - 32]  Chloride, Pyndu835 mmol/L [98 - 110]  Creatinie, Serum1.8 mg/dL<H> [0.7 - 1.5]  Glucose, Mjzpw429 mg/dL<H> [70 - 99]  Potassium, Serum4.1 mmol/L [3.5 - 5.0]  Sodium, Serum 143 mmol/L [135 - 146]  [facility]  03-25-19 @ 08:29  Blood Gas Arterial-Calcium,Ionized--  Blood Urea Nitrogen, Serum 32 mg/dL<H> [10 - 20]  Carbon Dioxide, Serum19 mmol/L [17 - 32]  Chloride, Nkpbh996 mmol/L [98 - 110]  Creatinie, Serum2.5 mg/dL<H> [0.7 - 1.5]  Glucose, Ppxba048 mg/dL<H> [70 - 99]  Potassium, Serum4.7 mmol/L [3.5 - 5.0] [Slighty Hemolyzed use with Caution]  Sodium, Serum 140 mmol/L [135 - 146]  [facility]  03-24-19 @ 08:47  Blood Gas Arterial-Calcium,Ionized--  Blood Urea Nitrogen, Serum 33 mg/dL<H> [10 - 20]  Carbon Dioxide, Serum19 mmol/L [17 - 32]  Chloride, Mebkv105 mmol/L [98 - 110]  Creatinie, Serum3.0 mg/dL<H> [0.7 - 1.5]  Glucose, Vqkzl466 mg/dL<H> [70 - 99]  Potassium, Serum4.6 mmol/L [3.5 - 5.0]  Sodium, Serum 136 mmol/L [135 - 146]  BMP  03-23-19 @ 06:04  Blood Gas Arterial-Calcium,Ionized--  Blood Urea Nitrogen, Serum 23 mg/dL<H> [10 - 20]  Carbon Dioxide, Serum21 mmol/L [17 - 32]  Chloride, Ihopm938 mmol/L [98 - 110]  Creatinie, Serum1.7 mg/dL<H> [0.7 - 1.5]  Glucose, Serum91 mg/dL [70 - 99]  Potassium, Serum4.3 mmol/L [3.5 - 5.0]  Sodium, Serum 140 mmol/L [135 - 146]  BMP  03-22-19 @ 12:18  Blood Gas Arterial-Calcium,Ionized--  Blood Urea Nitrogen, Serum 14 mg/dL [10 - 20]  Carbon Dioxide, Serum21 mmol/L [17 - 32]  Chloride, Swppp368 mmol/L [98 - 110]  Creatinie, Serum0.8 mg/dL [0.7 - 1.5]  Glucose, Vhvbl815 mg/dL<H> [70 - 99]  Potassium, Serum4.0 mmol/L [3.5 - 5.0]  Sodium, Serum 138 mmol/L [135 - 146]              Microbiology:    Culture - Fungal, Body Fluid (collected 03-22-19 @ 16:22)  Source: .Body Fluid Pleural Fluid  Preliminary Report (03-25-19 @ 11:08):    Testing in progress    Culture - Body Fluid with Gram Stain (collected 03-22-19 @ 16:22)  Source: .Body Fluid Pleural Fluid  Gram Stain (03-23-19 @ 05:53):    polymorphonuclear leukocytes seen    No organisms seen    by cytocentrifuge  Preliminary Report (03-23-19 @ 20:04):    No growth    Culture - Acid Fast - Body Fluid w/Smear (collected 03-22-19 @ 16:22)  Source: .Body Fluid Pleural Fluid    Culture - Body Fluid with Gram Stain (collected 03-15-19 @ 16:23)  Source: .Body Fluid Pleural Fluid  Gram Stain (03-15-19 @ 23:47):    polymorphonuclear leukocytes seen    No organisms seen by cytocentrifuge  Final Report (03-20-19 @ 19:39):    No growth at 5 days    Culture - Acid Fast - Body Fluid w/Smear (collected 03-15-19 @ 16:23)  Source: .Body Fluid Pleural Fluid  Preliminary Report (03-23-19 @ 15:03):    No growth at 1 week.        Medications:  aluminum hydroxide/magnesium hydroxide/simethicone Suspension 30 milliLiter(s) Oral every 4 hours PRN  amLODIPine   Tablet 5 milliGRAM(s) Oral daily  chlorhexidine 4% Liquid 1 Application(s) Topical <User Schedule>  guaiFENesin/dextromethorphan  Syrup 10 milliLiter(s) Oral every 6 hours  heparin  Injectable 5000 Unit(s) SubCutaneous every 12 hours  lidocaine   Patch 1 Patch Transdermal daily  megestrol 400 milliGRAM(s) Oral daily  morphine  - Injectable 1 milliGRAM(s) IV Push every 4 hours PRN  pantoprazole    Tablet 40 milliGRAM(s) Oral before breakfast  simvastatin 10 milliGRAM(s) Oral at bedtime  traMADol 50 milliGRAM(s) Oral every 6 hours PRN        Assessment and Plan:  82 y/o Female with PMH of right sided pleural effusions, HTN, and high cholesterol , sent in by pulmonologist Dr. Dickinson for further workup and is found to have a large right sided Pleural Effusion.     # SOB secondary to large right sided pleural effusion/ most likely due to malignant event  - right lung apical mass  - Patient had right sided thoracocentesis  performed at bedside in ED , 920 cc removed   - as per Pulmonary - patient had  Denver Cath placed to right lung effusion area on 3/25/19- today 900ml was drained  -patient will need right lung mass/nodule diagnostic  biopsy  - Pain control with Morphine IV PRN and Oxygen as needed for SaO2>92    # Right shoulder pain, subscapular pain -obtained right shoulder X ray - no acute fractures    # ALEJANDRO -  renal US- no acute obstructive uropathy  -renal function slightly better today  , urine lytes, cr. prot, ,   CK level- normal   UPEP,SPEP, serum immunofixation  -strict I and O monitoring    # Dysphagia to solid food, with poor appetite  - changed to humidified oxygen tx  -added protonix, maalox tx.  -added on daily megace- to stimulate appetite     # Hypertension : controlled , Continue with Losartan and Amlodipine     # DLD: c/w statin     DVT Prophylaxis : Heparin subc. injections   DASH Diet   Disposition: remains acute     #Progress Note Handoff: Pending Consults___pulmonary______,Tests_  diagnostic lung mass biopsy_____,Test Results__BMP_____,Other;  Family discussion: pt. by bedside Disposition: Home_xx__/SNF___/Other________/to be determined

## 2019-03-26 NOTE — DISCHARGE NOTE NURSING/CASE MANAGEMENT/SOCIAL WORK - NSDCDPATPORTLINK_GEN_ALL_CORE
You can access the [facility] Patient Portal, offered by [facility], by registering with the following website: [email or web address] [email or web address]

## 2019-03-26 NOTE — CHART NOTE - NSCHARTNOTEFT_GEN_A_CORE
I drained 800cc serosanguinous fluid from pleurx catheter. Complained of pain during fluid removal. Will check CXR.

## 2019-03-26 NOTE — PROGRESS NOTE ADULT - SUBJECTIVE AND OBJECTIVE BOX
JOANN SCHULTZ 83y Female  MRN#: [id number]   CODE STATUS:________      SUBJECTIVE  Patient is a 83y old Female who presents with a chief complaint of SOB (25 Mar 2019 13:49)  Currently admitted to medicine with the primary diagnosis of Pleural effusion  Hospital course has been complicated by _______.   Today is hospital day 4d, and this morning she is _________ and reports ________ overnight events.     Present Today:           [place] Catheter ()No/ ()Yes? Indication:          Central Line ()No/ ()Yes? Indication:          IV Fluids ()No/ ()Yes? Type:  Rate:  Indication:      OBJECTIVE  PAST MEDICAL & SURGICAL HISTORY  HTN (hypertension)  High cholesterol  H/O abdominal hysterectomy    ALLERGIES:  No Known Allergies    MEDICATIONS:  STANDING MEDICATIONS  amLODIPine   Tablet 5 milliGRAM(s) Oral daily  chlorhexidine 4% Liquid 1 Application(s) Topical <User Schedule>  guaiFENesin/dextromethorphan  Syrup 10 milliLiter(s) Oral every 6 hours  heparin  Injectable 5000 Unit(s) SubCutaneous every 12 hours  lidocaine   Patch 1 Patch Transdermal daily  megestrol 400 milliGRAM(s) Oral daily  pantoprazole    Tablet 40 milliGRAM(s) Oral before breakfast  simvastatin 10 milliGRAM(s) Oral at bedtime    PRN MEDICATIONS  aluminum hydroxide/magnesium hydroxide/simethicone Suspension 30 milliLiter(s) Oral every 4 hours PRN  morphine  - Injectable 1 milliGRAM(s) IV Push every 4 hours PRN  traMADol 50 milliGRAM(s) Oral every 6 hours PRN      VITAL SIGNS: Last 24 Hours  T(C): 36.4 (26 Mar 2019 05:24), Max: 36.9 (25 Mar 2019 14:36)  T(F): 97.6 (26 Mar 2019 05:24), Max: 98.5 (25 Mar 2019 14:36)  HR: 70 (26 Mar 2019 05:24) (70 - 84)  BP: 167/74 (26 Mar 2019 05:24) (120/82 - 167/74)  BP(mean): --  RR: 18 (26 Mar 2019 05:24) (18 - 18)  SpO2: 96% (26 Mar 2019 08:51) (95% - 96%)    LABS:                        12.6   8.88  )-----------( 339      ( 26 Mar 2019 09:00 )             37.8     03-25    140  |  108  |  32<H>  ----------------------------<  114<H>  4.7   |  19  |  2.5<H>    Ca    8.8      25 Mar 2019 08:29    TPro  6.3  /  Alb  x   /  TBili  x   /  DBili  x   /  AST  x   /  ALT  x   /  AlkPhos  x   03-24              CARDIAC MARKERS ( 24 Mar 2019 17:49 )  x     / x     / 43 U/L / x     / x          RADIOLOGY:      PHYSICAL EXAM:    GENERAL: NAD, well-developed, AAOx3  HEENT:  Atraumatic, Normocephalic. EOMI, PERRLA, conjunctiva and sclera clear, No JVD  PULMONARY: Clear to auscultation bilaterally; No wheeze  CARDIOVASCULAR: Regular rate and rhythm; No murmurs, rubs, or gallops  GASTROINTESTINAL: Soft, Nontender, Nondistended; Bowel sounds present  MUSCULOSKELETAL:  2+ Peripheral Pulses, No clubbing, cyanosis, or edema  NEUROLOGY: non-focal  SKIN: No rashes or lesions      ADMISSION SUMMARY  Patient is a 83y old Female who presents with a chief complaint of SOB (25 Mar 2019 13:49)  Currently admitted to medicine with the primary diagnosis of Pleural effusion  Hospital course has been complicated by _______.       ASSESSMENT & PLAN    1. PLEURAL EFFUSION      2.    3. HTN (hypertension)  High cholesterol        Present today:  ( ) Congestive Heart Failure, Yes? ( )Acute / ( )Acute on Chronic / ( )Chronic  :  ( )Systolic / ( )Diastolic               Plan:  ( ) Complicated Pneumonia, Type?  ( )Parapneumonic effusion / ( )Abscess / ( ) Multilobar / ( )Other               Plan:  ( ) Morbid Obesity, Yes? BMI:               Plan:  ( ) Functional Quadriplegia               Plan:  ( ) Encephalopathy               Plan:    ( ) Discussion with patient and/or family regarding goals of care  ( ) Discussed Case and Plan with Medical Attending, Name: JOANN SCHULTZ 83y Female  MRN#: [id number]   CODE STATUS:___Full_____      SUBJECTIVE  Patient is a 83y old Female who presents with a chief complaint of SOB (25 Mar 2019 13:49)  Currently admitted to medicine with the primary diagnosis of Pleural effusion  Hospital course has been complicated by R shoulder pain, ALEJANDRO.  [place] SPEAKING (seen with Dr. Momin)  Today is hospital day 4d, and this morning she is having R shoulder/lower back pain over catheter site. Otherwise no events overnight.  Denver placed yesterday with 400cc at that time and an additional 800cc drained this AM.      OBJECTIVE  PAST MEDICAL & SURGICAL HISTORY  HTN (hypertension)  High cholesterol  H/O abdominal hysterectomy    ALLERGIES:  No Known Allergies    MEDICATIONS:  STANDING MEDICATIONS  amLODIPine   Tablet 5 milliGRAM(s) Oral daily  chlorhexidine 4% Liquid 1 Application(s) Topical <User Schedule>  guaiFENesin/dextromethorphan  Syrup 10 milliLiter(s) Oral every 6 hours  heparin  Injectable 5000 Unit(s) SubCutaneous every 12 hours  lidocaine   Patch 1 Patch Transdermal daily  megestrol 400 milliGRAM(s) Oral daily  pantoprazole    Tablet 40 milliGRAM(s) Oral before breakfast  simvastatin 10 milliGRAM(s) Oral at bedtime    PRN MEDICATIONS  aluminum hydroxide/magnesium hydroxide/simethicone Suspension 30 milliLiter(s) Oral every 4 hours PRN  morphine  - Injectable 1 milliGRAM(s) IV Push every 4 hours PRN  traMADol 50 milliGRAM(s) Oral every 6 hours PRN      VITAL SIGNS: Last 24 Hours  T(C): 36.4 (26 Mar 2019 05:24), Max: 36.9 (25 Mar 2019 14:36)  T(F): 97.6 (26 Mar 2019 05:24), Max: 98.5 (25 Mar 2019 14:36)  HR: 70 (26 Mar 2019 05:24) (70 - 84)  BP: 167/74 (26 Mar 2019 05:24) (120/82 - 167/74)  BP(mean): --  RR: 18 (26 Mar 2019 05:24) (18 - 18)  SpO2: 96% (26 Mar 2019 08:51) (95% - 96%)    LABS:                        12.6   8.88  )-----------( 339      ( 26 Mar 2019 09:00 )             37.8     03-25    140  |  108  |  32<H>  ----------------------------<  114<H>  4.7   |  19  |  2.5<H>    Ca    8.8      25 Mar 2019 08:29    TPro  6.3  /  Alb  x   /  TBili  x   /  DBili  x   /  AST  x   /  ALT  x   /  AlkPhos  x   03-24              CARDIAC MARKERS ( 24 Mar 2019 17:49 )  x     / x     / 43 U/L / x     / x          RADIOLOGY:      PHYSICAL EXAM:    GENERAL: NAD, well-developed [place] lady lying in bed on her L side, mildly uncomfortable on 3L NC, AAOx3  HEENT:  Atraumatic, Normocephalic. EOMI, PERRLA, conjunctiva clear and sclera white, No JVD  PULMONARY: decreased breath sounds in R base but otherwise clear; No wheeze  CARDIOVASCULAR: Regular rate and rhythm; No murmurs  GASTROINTESTINAL: Soft, Nontender, Nondistended; Bowel sounds present  EXTREMITY:  2+ Peripheral Pulses, No edema  NEUROLOGY: non-focal  SKIN: No rashes      ADMISSION SUMMARY  Patient is a 83y old Female who presents with a chief complaint of SOB (25 Mar 2019 13:49)  Currently admitted to medicine with the primary diagnosis of Pleural effusion  Hospital course has been complicated by ALEJANDRO, R shoulder pain    82 y/o Female with PMH of right sided pleural effusions, HTN, and high cholesterol , sent in by pulmonologist Dr. Dickinson for admission for further evaluation. Pt was admitted on 2/20/2019 for a right loculated pleural effusion approx. 900 CCs drained, no PE and right apical 3.1x3.0 cm mass with pleural nodules. Pt was d/c on 2/22 with outpatient follow up with PMD, pulmonary Dr. Dickinson and hemonc Dr. Robertson. Pt returned to ED on 3/15/2019 for right pleural effusion, had a thoracentesis in ED and was d/c after discussion with pulmonary Dr. Cruz. Pt saw Dr. Dickinson today for persistent SOB, dry cough, back pain where she has had the pleural effusion, weakness and night sweats. Pt was noted to have a malignancy in prior visits so was sent in for admission for a biopsy. Daughter in law also notes pt to have lost 8 pounds since the summer (4 pounds in the past 2 months). No CP.     Since then patient had 900CC drained again and will go for Denver catheter 3/25. Patient will likely need an EBUS vs IR biopsy of the lung mass for assessment of underlying malignancy.       ASSESSMENT & PLAN    # Right sided pleural effusion, recurrent  - Thoracentesis in  cc removed initially   - [place] Cath 3/25, 800cc this AM  - f/u fluid studies  - Pulm - no EBUS/IR biopsy at this time, out-pt PET first, drain cath 500cc every other day  - c/w Pain control with Morphine IV PRN and Oxygen as needed for SaO2>92     # ALEJANDRO, improving   - Cr improved 3 > 2.8 > 1.8 w/ fluids  - Renal US neg  - pending urine lytes/studies, UPEP  - f/u SPEP, serum immunofixation results  - c/w gentle IVF NS 75  - Nephro recs appreciated - consider 1/2 NS + bicarb?  - strict I and O monitoring    # Right shoulder pain, subscapular pain likely referred from effusion  - right shoulder X ray neg    # Hypertension : controlled   - c/w Amlodipine , hold losartan  # DLD  - c/w statin , ASA is home med    #MISC  - DVT Prophylaxis : Lovenox 40 daily   - GI ppx: PPI  - DASH Diet , gentle ambulation given SOB   - from home, full code  - Andrea Botello  [phone]  - Disposition: likely home in 24-48hr    (x) Discussion with patient and/or family regarding goals of care  (x) Discussed Case and Plan with Medical Attending, Name: Liliane JOANN SCHULTZ 83y Female  MRN#: [id number]   CODE STATUS:___Full_____      SUBJECTIVE  Patient is a 83y old Female who presents with a chief complaint of SOB (25 Mar 2019 13:49)  Currently admitted to medicine with the primary diagnosis of Pleural effusion  Hospital course has been complicated by R shoulder pain, ALEJANDRO.  [place] SPEAKING (seen with Dr. Momin)  Today is hospital day 4d, and this morning she is having R shoulder/lower back pain over catheter site. Otherwise no events overnight.  Denver placed yesterday with 400cc at that time and an additional 800cc drained this AM.      OBJECTIVE  PAST MEDICAL & SURGICAL HISTORY  HTN (hypertension)  High cholesterol  H/O abdominal hysterectomy    ALLERGIES:  No Known Allergies    MEDICATIONS:  STANDING MEDICATIONS  amLODIPine   Tablet 5 milliGRAM(s) Oral daily  chlorhexidine 4% Liquid 1 Application(s) Topical <User Schedule>  guaiFENesin/dextromethorphan  Syrup 10 milliLiter(s) Oral every 6 hours  heparin  Injectable 5000 Unit(s) SubCutaneous every 12 hours  lidocaine   Patch 1 Patch Transdermal daily  megestrol 400 milliGRAM(s) Oral daily  pantoprazole    Tablet 40 milliGRAM(s) Oral before breakfast  simvastatin 10 milliGRAM(s) Oral at bedtime    PRN MEDICATIONS  aluminum hydroxide/magnesium hydroxide/simethicone Suspension 30 milliLiter(s) Oral every 4 hours PRN  morphine  - Injectable 1 milliGRAM(s) IV Push every 4 hours PRN  traMADol 50 milliGRAM(s) Oral every 6 hours PRN      VITAL SIGNS: Last 24 Hours  T(C): 36.4 (26 Mar 2019 05:24), Max: 36.9 (25 Mar 2019 14:36)  T(F): 97.6 (26 Mar 2019 05:24), Max: 98.5 (25 Mar 2019 14:36)  HR: 70 (26 Mar 2019 05:24) (70 - 84)  BP: 167/74 (26 Mar 2019 05:24) (120/82 - 167/74)  BP(mean): --  RR: 18 (26 Mar 2019 05:24) (18 - 18)  SpO2: 96% (26 Mar 2019 08:51) (95% - 96%)    LABS:                        12.6   8.88  )-----------( 339      ( 26 Mar 2019 09:00 )             37.8     03-25    140  |  108  |  32<H>  ----------------------------<  114<H>  4.7   |  19  |  2.5<H>    Ca    8.8      25 Mar 2019 08:29    TPro  6.3  /  Alb  x   /  TBili  x   /  DBili  x   /  AST  x   /  ALT  x   /  AlkPhos  x   03-24              CARDIAC MARKERS ( 24 Mar 2019 17:49 )  x     / x     / 43 U/L / x     / x          RADIOLOGY:      PHYSICAL EXAM:    GENERAL: NAD, well-developed [place] lady lying in bed on her L side, mildly uncomfortable on 3L NC, AAOx3  HEENT:  Atraumatic, Normocephalic. EOMI, PERRLA, conjunctiva clear and sclera white, No JVD  PULMONARY: decreased breath sounds in R base but otherwise clear; No wheeze  CARDIOVASCULAR: Regular rate and rhythm; No murmurs  GASTROINTESTINAL: Soft, Nontender, Nondistended; Bowel sounds present  EXTREMITY:  2+ Peripheral Pulses, No edema  NEUROLOGY: non-focal  SKIN: No rashes      ADMISSION SUMMARY  Patient is a 83y old Female who presents with a chief complaint of SOB (25 Mar 2019 13:49)  Currently admitted to medicine with the primary diagnosis of Pleural effusion  Hospital course has been complicated by ALEJANDRO, R shoulder pain    84 y/o Female with PMH of right sided pleural effusions, HTN, and high cholesterol , sent in by pulmonologist Dr. Dickinson for admission for further evaluation. Pt was admitted on 2/20/2019 for a right loculated pleural effusion approx. 900 CCs drained, no PE and right apical 3.1x3.0 cm mass with pleural nodules. Pt was d/c on 2/22 with outpatient follow up with PMD, pulmonary Dr. Dickinson and hemonc Dr. Robertson. Pt returned to ED on 3/15/2019 for right pleural effusion, had a thoracentesis in ED and was d/c after discussion with pulmonary Dr. Cruz. Pt saw Dr. Dickinson today for persistent SOB, dry cough, back pain where she has had the pleural effusion, weakness and night sweats. Pt was noted to have a malignancy in prior visits so was sent in for admission for a biopsy. Daughter in law also notes pt to have lost 8 pounds since the summer (4 pounds in the past 2 months). No CP.     Since then patient had 900CC drained again and will go for Denver catheter 3/25. Patient will likely need an EBUS vs IR biopsy of the lung mass for assessment of underlying malignancy.       ASSESSMENT & PLAN    # Right sided pleural effusion, recurrent  - Thoracentesis in  cc removed initially   - [place] Cath 3/25, 800cc this AM  - f/u fluid studies  - Pulm - no EBUS/IR biopsy at this time, out-pt PET first, drain cath 500cc every other day  - c/w Pain control with Morphine IV PRN and Oxygen as needed for SaO2>92     # ALEJANDRO, improving   - Cr improved 3 > 2.8 > 1.8 w/ fluids  - Renal US neg  - pending urine lytes/studies, UPEP  - f/u SPEP, serum immunofixation results  - d/c IVF for now  - Nephro recs appreciated - consider 1/2 NS + bicarb?  - strict I and O monitoring    # Right shoulder pain, subscapular pain likely referred from effusion  - right shoulder X ray neg    # Hypertension : controlled   - c/w Amlodipine , hold losartan  # DLD  - c/w statin , ASA is home med    #MISC  - DVT Prophylaxis : Lovenox 40 daily   - GI ppx: PPI  - DASH Diet , gentle ambulation given SOB   - from home, full code  - Andrea Botello  [phone]  - Disposition: likely home in 24-48hr    (x) Discussion with patient and/or family regarding goals of care  (x) Discussed Case and Plan with Medical Attending, Name: Liliane

## 2019-03-26 NOTE — CONSULT NOTE ADULT - ASSESSMENT

## 2019-03-26 NOTE — CONSULT NOTE ADULT - SUBJECTIVE AND OBJECTIVE BOX
HPI:  82 y/o Female with PMH of right sided pleural effusions, HTN, and high cholesterol , sent in by pulmonologist Dr. Dickinson for admission for further evaluation. Pt was admitted on 2/20/2019 for a right loculated pleural effusion approx. 900 CCs, no PE and right apical 3.1x3.0 cm mass with pleural nodules. Pt was d/c on 2/22 with outpatient follow up with PMD, pulmonary Dr. Dickinson and hemonc Dr. Robertson. Pt returned to ED on 3/15/2019 for right pleural effusion, had a thoracentesis in ED and was d/c after discussion with pulmonary Dr. Cruz. Pt saw Dr. Dickinson today for persistent SOB, dry cough, back pain where she has had the pleural effusion, weakness and night sweats. Pt was noted to have a malignancy in prior visits so was sent in for admission for a biopsy. Daughter in law also notes pt to have lost 8 pounds since the summer (4 pounds in the past 2 months). No CP. Pt did not eat today since her appointment was early this morning. (22 Mar 2019 16:34). + lung mass / biopsy pending      PAST MEDICAL & SURGICAL HISTORY:  HTN (hypertension)  High cholesterol  H/O abdominal hysterectomy      Hospital Course:    TODAY'S SUBJECTIVE & REVIEW OF SYMPTOMS:      MEDICATIONS  (STANDING):  amLODIPine   Tablet 5 milliGRAM(s) Oral daily  chlorhexidine 4% Liquid 1 Application(s) Topical <User Schedule>  guaiFENesin/dextromethorphan  Syrup 10 milliLiter(s) Oral every 6 hours  heparin  Injectable 5000 Unit(s) SubCutaneous every 12 hours  lidocaine   Patch 1 Patch Transdermal daily  megestrol Suspension 400 milliGRAM(s) Oral daily  pantoprazole    Tablet 40 milliGRAM(s) Oral before breakfast  simvastatin 10 milliGRAM(s) Oral at bedtime    MEDICATIONS  (PRN):  aluminum hydroxide/magnesium hydroxide/simethicone Suspension 30 milliLiter(s) Oral every 4 hours PRN Dyspepsia  morphine  - Injectable 1 milliGRAM(s) IV Push every 4 hours PRN Severe Pain (7 - 10)  traMADol 50 milliGRAM(s) Oral every 6 hours PRN Moderate Pain (4 - 6)      FAMILY HISTORY:  Hypertension (Mother)      Allergies    No Known Allergies    Intolerances        SOCIAL HISTORY:    [  ] Etoh  [  ] Smoking  [  ] Substance abuse     Home Environment:  [  ] Home Alone  [x  ] Lives with Family  [  ] Home Health Aid    Dwelling:  [  ] Apartment  [x  ] Private House  [  ] Adult Home  [  ] Skilled Nursing Facility      [  ] Short Term  [  ] Long Term  [  ] Stairs       Elevator [  ]    FUNCTIONAL STATUS PTA: (Check all that apply)  Ambulation: [ x  ]Independent    [  ] Dependent     [  ] Non-Ambulatory  Assistive Device: [  ] SA Cane  [  ]  Q Cane  [  ] Walker  [  ]  Wheelchair  ADL : [  ] Independent  [  ]  Dependent       Vital Signs Last 24 Hrs  T(C): 36.6 (26 Mar 2019 12:15), Max: 36.9 (25 Mar 2019 22:00)  T(F): 97.9 (26 Mar 2019 12:15), Max: 98.4 (25 Mar 2019 22:00)  HR: 73 (26 Mar 2019 12:15) (70 - 74)  BP: 136/63 (26 Mar 2019 12:15) (120/82 - 167/74)  BP(mean): --  RR: 18 (26 Mar 2019 12:15) (18 - 18)  SpO2: 96% (26 Mar 2019 08:51) (96% - 96%)      PHYSICAL EXAM: Alert & awake  GENERAL: NAD, well-groomed, well-developed  HEAD:  Atraumatic, Normocephalic  CHEST/LUNG: Clear   HEART: S1S2+  ABDOMEN: Soft, Nontender  EXTREMITIES:  no calf tenderness    NERVOUS SYSTEM:  Cranial Nerves 2-12 intact [  ] Abnormal  [  ]  ROM: WFL all extremities [x  ]  Abnormal [  ]  Motor Strength: WFL all extremities  [ x ]  Abnormal [  ]  Sensation: intact to light touch [  ] Abnormal [  ]  Reflexes: Symmetric [  ]  Abnormal [  ]    FUNCTIONAL STATUS:  Bed Mobility: Independent [  ]  Supervision [  ]  Needs Assistance [ x ]  N/A [  ]  Transfers: Independent [  ]  Supervision [  ]  Needs Assistance [x  ]  N/A [  ]   Ambulation: Independent [  ]  Supervision [  ]  Needs Assistance [  ]  N/A [  ]  ADL: Independent [  ] Requires Assistance [  ] N/A [  ]      LABS:                        12.6   8.88  )-----------( 339      ( 26 Mar 2019 09:00 )             37.8     03-26    143  |  108  |  24<H>  ----------------------------<  110<H>  4.1   |  18  |  1.8<H>    Ca    9.4      26 Mar 2019 09:00  Mg     1.8     03-26    TPro  6.2  /  Alb  3.5  /  TBili  0.4  /  DBili  x   /  AST  11  /  ALT  11  /  AlkPhos  98  03-26          RADIOLOGY & ADDITIONAL STUDIES:    Assesment:

## 2019-03-27 LAB
% ALBUMIN: 52.4 % — SIGNIFICANT CHANGE UP
% ALPHA 1: 8 % — SIGNIFICANT CHANGE UP
% ALPHA 2: 13.3 % — SIGNIFICANT CHANGE UP
% BETA: 13.5 % — SIGNIFICANT CHANGE UP
% GAMMA: 12.8 % — SIGNIFICANT CHANGE UP
ALBUMIN SERPL ELPH-MCNC: 3.3 G/DL — LOW (ref 3.6–5.5)
ALBUMIN/GLOB SERPL ELPH: 1.1 RATIO — SIGNIFICANT CHANGE UP
ALPHA1 GLOB SERPL ELPH-MCNC: 0.5 G/DL — HIGH (ref 0.1–0.4)
ALPHA2 GLOB SERPL ELPH-MCNC: 0.8 G/DL — SIGNIFICANT CHANGE UP (ref 0.5–1)
ANION GAP SERPL CALC-SCNC: 13 MMOL/L — SIGNIFICANT CHANGE UP (ref 7–14)
B-GLOBULIN SERPL ELPH-MCNC: 0.9 G/DL — SIGNIFICANT CHANGE UP (ref 0.5–1)
BUN SERPL-MCNC: 21 MG/DL — HIGH (ref 10–20)
CALCIUM SERPL-MCNC: 8.8 MG/DL — SIGNIFICANT CHANGE UP (ref 8.5–10.1)
CHLORIDE SERPL-SCNC: 107 MMOL/L — SIGNIFICANT CHANGE UP (ref 98–110)
CO2 SERPL-SCNC: 18 MMOL/L — SIGNIFICANT CHANGE UP (ref 17–32)
CREAT SERPL-MCNC: 1.5 MG/DL — SIGNIFICANT CHANGE UP (ref 0.7–1.5)
CULTURE RESULTS: SIGNIFICANT CHANGE UP
GAMMA GLOBULIN: 0.8 G/DL — SIGNIFICANT CHANGE UP (ref 0.6–1.6)
GLUCOSE SERPL-MCNC: 119 MG/DL — HIGH (ref 70–99)
INTERPRETATION SERPL IFE-IMP: SIGNIFICANT CHANGE UP
POTASSIUM SERPL-MCNC: 3.6 MMOL/L — SIGNIFICANT CHANGE UP (ref 3.5–5)
POTASSIUM SERPL-SCNC: 3.6 MMOL/L — SIGNIFICANT CHANGE UP (ref 3.5–5)
PROT PATTERN SERPL ELPH-IMP: SIGNIFICANT CHANGE UP
SODIUM SERPL-SCNC: 138 MMOL/L — SIGNIFICANT CHANGE UP (ref 135–146)
SPECIMEN SOURCE: SIGNIFICANT CHANGE UP

## 2019-03-27 RX ADMIN — LIDOCAINE 1 PATCH: 4 CREAM TOPICAL at 11:39

## 2019-03-27 RX ADMIN — HEPARIN SODIUM 5000 UNIT(S): 5000 INJECTION INTRAVENOUS; SUBCUTANEOUS at 05:49

## 2019-03-27 RX ADMIN — AMLODIPINE BESYLATE 5 MILLIGRAM(S): 2.5 TABLET ORAL at 05:49

## 2019-03-27 RX ADMIN — MEGESTROL ACETATE 400 MILLIGRAM(S): 40 SUSPENSION ORAL at 11:35

## 2019-03-27 RX ADMIN — LIDOCAINE 1 PATCH: 4 CREAM TOPICAL at 19:00

## 2019-03-27 RX ADMIN — CHLORHEXIDINE GLUCONATE 1 APPLICATION(S): 213 SOLUTION TOPICAL at 05:49

## 2019-03-27 RX ADMIN — SIMVASTATIN 10 MILLIGRAM(S): 20 TABLET, FILM COATED ORAL at 21:33

## 2019-03-27 RX ADMIN — PANTOPRAZOLE SODIUM 40 MILLIGRAM(S): 20 TABLET, DELAYED RELEASE ORAL at 05:49

## 2019-03-27 RX ADMIN — LIDOCAINE 1 PATCH: 4 CREAM TOPICAL at 23:39

## 2019-03-27 RX ADMIN — HEPARIN SODIUM 5000 UNIT(S): 5000 INJECTION INTRAVENOUS; SUBCUTANEOUS at 17:12

## 2019-03-27 NOTE — PHYSICAL THERAPY INITIAL EVALUATION ADULT - PERTINENT HX OF CURRENT PROBLEM, REHAB EVAL
84 y/o Female with PMH of right sided pleural effusions, HTN, and high cholesterol , sent in by pulmonologist Dr. Dickinson for admission for further evaluation.

## 2019-03-27 NOTE — PHYSICAL THERAPY INITIAL EVALUATION ADULT - GENERAL OBSERVATIONS, REHAB EVAL
Pt encountered sitting in chair in NAD, + O2 2 lpm via NC, reported R UE/chest region pain, however agreeable to PT.

## 2019-03-27 NOTE — PHYSICAL THERAPY INITIAL EVALUATION ADULT - CRITERIA FOR SKILLED THERAPEUTIC INTERVENTIONS
impairments found/functional limitations in following categories/risk reduction/prevention/rehab potential/therapy frequency/predicted duration of therapy intervention/anticipated equipment needs at discharge/RW

## 2019-03-27 NOTE — PROGRESS NOTE ADULT - SUBJECTIVE AND OBJECTIVE BOX
JOANN SCHULTZ 83y Female  MRN#: [id number]   CODE STATUS:___Full_____      SUBJECTIVE  Patient is a 83y old Female who presents with a chief complaint of SOB (25 Mar 2019 13:49)  Currently admitted to medicine with the primary diagnosis of Pleural effusion  Hospital course has been complicated by R shoulder pain, ALEJANDRO.  Citizen of [place] SPEAKING   Today is hospital day 5d, and this morning   [place] drained 1200cc in last 48hr.       OBJECTIVE  PAST MEDICAL & SURGICAL HISTORY  HTN (hypertension)  High cholesterol  H/O abdominal hysterectomy    ALLERGIES:  No Known Allergies    MEDICATIONS:  STANDING MEDICATIONS  amLODIPine   Tablet 5 milliGRAM(s) Oral daily  chlorhexidine 4% Liquid 1 Application(s) Topical <User Schedule>  guaiFENesin/dextromethorphan  Syrup 10 milliLiter(s) Oral every 6 hours  heparin  Injectable 5000 Unit(s) SubCutaneous every 12 hours  lidocaine   Patch 1 Patch Transdermal daily  megestrol Suspension 400 milliGRAM(s) Oral daily  pantoprazole    Tablet 40 milliGRAM(s) Oral before breakfast  simvastatin 10 milliGRAM(s) Oral at bedtime    PRN MEDICATIONS  aluminum hydroxide/magnesium hydroxide/simethicone Suspension 30 milliLiter(s) Oral every 4 hours PRN  morphine  - Injectable 1 milliGRAM(s) IV Push every 4 hours PRN  traMADol 50 milliGRAM(s) Oral every 6 hours PRN      VITAL SIGNS: Last 24 Hours  T(C): 35.7 (27 Mar 2019 04:38), Max: 36.6 (26 Mar 2019 12:15)  T(F): 96.2 (27 Mar 2019 04:38), Max: 97.9 (26 Mar 2019 12:15)  HR: 68 (27 Mar 2019 04:38) (67 - 73)  BP: 140/65 (27 Mar 2019 04:38) (136/63 - 158/67)  BP(mean): --  RR: 18 (27 Mar 2019 04:38) (18 - 18)  SpO2: 95% (27 Mar 2019 05:00) (93% - 96%)    LABS:                        12.6   8.88  )-----------( 339      ( 26 Mar 2019 09:00 )             37.8     03-26    143  |  108  |  24<H>  ----------------------------<  110<H>  4.1   |  18  |  1.8<H>    Ca    9.4      26 Mar 2019 09:00  Mg     1.8     03-26    TPro  6.2  /  Alb  3.5  /  TBili  0.4  /  DBili  x   /  AST  11  /  ALT  11  /  AlkPhos  98  03-26                  RADIOLOGY:      PHYSICAL EXAM:    GENERAL: NAD, well-developed [place] lady lying in bed on her L side, mildly uncomfortable on 3L NC, AAOx3  HEENT:  Atraumatic, Normocephalic. EOMI, PERRLA, conjunctiva clear and sclera white, No JVD  PULMONARY: decreased breath sounds in R base but otherwise clear; No wheeze  CARDIOVASCULAR: Regular rate and rhythm; No murmurs  GASTROINTESTINAL: Soft, Nontender, Nondistended; Bowel sounds present  EXTREMITY:  2+ Peripheral Pulses, No edema  NEUROLOGY: non-focal  SKIN: No rashes      ADMISSION SUMMARY  Patient is a 83y old Female who presents with a chief complaint of SOB (25 Mar 2019 13:49)  Currently admitted to medicine with the primary diagnosis of Pleural effusion  Hospital course has been complicated by ALEJANDRO, R shoulder pain    82 y/o Female with PMH of right sided pleural effusions, HTN, and high cholesterol , sent in by pulmonologist Dr. Dickinson for admission for further evaluation. Pt was admitted on 2/20/2019 for a right loculated pleural effusion approx. 900 CCs drained, no PE and right apical 3.1x3.0 cm mass with pleural nodules. Pt was d/c on 2/22 with outpatient follow up with PMD, pulmonary Dr. Dickinson and hemonc Dr. Robertson. Pt returned to ED on 3/15/2019 for right pleural effusion, had a thoracentesis in ED and was d/c after discussion with pulmonary Dr. Cruz. Pt saw Dr. Dickinson today for persistent SOB, dry cough, back pain where she has had the pleural effusion, weakness and night sweats. Pt was noted to have a malignancy in prior visits so was sent in for admission for a biopsy. Daughter in law also notes pt to have lost 8 pounds since the summer (4 pounds in the past 2 months). No CP.     Since then patient had 900CC drained again and will go for [place] catheter 3/25. Patient will likely need an EBUS vs IR biopsy now OUTPATIENT of the lung mass for assessment of underlying malignancy.     ASSESSMENT & PLAN    # Right sided pleural effusion, recurrent  - Thoracentesis in  cc removed initially   - [place] Cath placed 3/25  - f/u fluid studies  - Pulm - no EBUS/IR biopsy at this time, out-pt PET first, drain cath 500cc every other day  - c/w Pain control with Morphine IV PRN and Oxygen as needed for SaO2>92     # ALEJANDRO, improving   - Cr improved 3 > 2.8 > 1.8 w/ fluids  - Renal US neg  - pending urine lytes/studies, UPEP  - f/u SPEP, serum immunofixation results  - d/c IVF for now  - Nephro recs appreciated - consider restarting losartan?  - strict I and O monitoring    # Right shoulder pain, subscapular pain likely referred from effusion  - right shoulder X ray neg    # Hypertension : controlled   - c/w Amlodipine , hold losartan  # DLD  - c/w statin , ASA is home med    #MISC  - DVT Prophylaxis : Lovenox 40 daily   - GI ppx: PPI  - DASH Diet , gentle ambulation given SOB   - from home, full code  - Son Ayan  [phone]  - Disposition: when ready    ( ) Discussion with patient and/or family regarding goals of care  ( ) Discussed Case and Plan with Medical Attending, Name: JOANN SCHULTZ 83y Female  MRN#: [id number]   CODE STATUS:___Full_____      SUBJECTIVE  Patient is a 83y old Female who presents with a chief complaint of SOB (25 Mar 2019 13:49)  Currently admitted to medicine with the primary diagnosis of Pleural effusion  Hospital course has been complicated by R shoulder pain, ALEJANDRO.  [place] SPEAKING   Today is hospital day 5d, and this morning called son Ayan to translate as per pt request. Endorsed that pt feeling much better and closer to baseline, says appetite improved and feels like she has a little more strength. Still has a "pulling" R shoulder pain but less than before. Breathing feels improved.   [place] drained 1200cc in last 48hr.       OBJECTIVE  PAST MEDICAL & SURGICAL HISTORY  HTN (hypertension)  High cholesterol  H/O abdominal hysterectomy    ALLERGIES:  No Known Allergies    MEDICATIONS:  STANDING MEDICATIONS  amLODIPine   Tablet 5 milliGRAM(s) Oral daily  chlorhexidine 4% Liquid 1 Application(s) Topical <User Schedule>  guaiFENesin/dextromethorphan  Syrup 10 milliLiter(s) Oral every 6 hours  heparin  Injectable 5000 Unit(s) SubCutaneous every 12 hours  lidocaine   Patch 1 Patch Transdermal daily  megestrol Suspension 400 milliGRAM(s) Oral daily  pantoprazole    Tablet 40 milliGRAM(s) Oral before breakfast  simvastatin 10 milliGRAM(s) Oral at bedtime    PRN MEDICATIONS  aluminum hydroxide/magnesium hydroxide/simethicone Suspension 30 milliLiter(s) Oral every 4 hours PRN  morphine  - Injectable 1 milliGRAM(s) IV Push every 4 hours PRN  traMADol 50 milliGRAM(s) Oral every 6 hours PRN      VITAL SIGNS: Last 24 Hours  T(C): 35.7 (27 Mar 2019 04:38), Max: 36.6 (26 Mar 2019 12:15)  T(F): 96.2 (27 Mar 2019 04:38), Max: 97.9 (26 Mar 2019 12:15)  HR: 68 (27 Mar 2019 04:38) (67 - 73)  BP: 140/65 (27 Mar 2019 04:38) (136/63 - 158/67)  BP(mean): --  RR: 18 (27 Mar 2019 04:38) (18 - 18)  SpO2: 95% (27 Mar 2019 05:00) (93% - 96%)    LABS:                        12.6   8.88  )-----------( 339      ( 26 Mar 2019 09:00 )             37.8     03-26    143  |  108  |  24<H>  ----------------------------<  110<H>  4.1   |  18  |  1.8<H>    Ca    9.4      26 Mar 2019 09:00  Mg     1.8     03-26    TPro  6.2  /  Alb  3.5  /  TBili  0.4  /  DBili  x   /  AST  11  /  ALT  11  /  AlkPhos  98  03-26                  RADIOLOGY:      PHYSICAL EXAM:    GENERAL: NAD, well-developed [place] lady sitting up eating breakfast, on 3L NC, AAOx3  HEENT:  Atraumatic, Normocephalic. EOMI, PERRLA  PULMONARY: decreased breath sounds in R base but otherwise clear; No wheeze  CARDIOVASCULAR: Regular rate and rhythm; No murmurs  GASTROINTESTINAL: Soft, Nontender, Nondistended; Bowel sounds present  EXTREMITY:  2+ Peripheral Pulses, No edema  NEUROLOGY: non-focal  SKIN: No rashes      ADMISSION SUMMARY  Patient is a 83y old Female who presents with a chief complaint of SOB (25 Mar 2019 13:49)  Currently admitted to medicine with the primary diagnosis of Pleural effusion  Hospital course has been complicated by ALEJANDRO, R shoulder pain    82 y/o Female with PMH of right sided pleural effusions, HTN, and high cholesterol , sent in by pulmonologist Dr. Dickinson for admission for further evaluation. Pt was admitted on 2/20/2019 for a right loculated pleural effusion approx. 900 CCs drained, no PE and right apical 3.1x3.0 cm mass with pleural nodules. Pt was d/c on 2/22 with outpatient follow up with PMD, pulmonary Dr. Dickinson and hemonc Dr. Robertson. Pt returned to ED on 3/15/2019 for right pleural effusion, had a thoracentesis in ED and was d/c after discussion with pulmonary Dr. Cruz. Pt saw Dr. Dickinson today for persistent SOB, dry cough, back pain where she has had the pleural effusion, weakness and night sweats. Pt was noted to have a malignancy in prior visits so was sent in for admission for a biopsy. Daughter in law also notes pt to have lost 8 pounds since the summer (4 pounds in the past 2 months). No CP.     Since then patient had 900CC drained again and will go for [place] catheter 3/25. Patient will likely need an EBUS vs IR biopsy now OUTPATIENT of the lung mass for assessment of underlying malignancy.     ASSESSMENT & PLAN    # Right sided pleural effusion, recurrent  - Thoracentesis in  cc removed initially   - [place] Cath placed 3/25  - fluid studies EXUDATIVE (prt ratio 0.7, /209)  - CYTOLOGY POS MALIG cells   - Pulm - no EBUS/IR biopsy at this time, out-pt PET first, drain cath 500cc every other day  - c/w Pain control with Morphine IV PRN and Oxygen as needed for SaO2>92     # ALEJANDRO, improving   - Cr improved 3 > 2.8 > 1.8 w/ fluids  - Renal US neg  - pending urine lytes/studies, UPEP  - f/u SPEP, serum immunofixation results  - d/c IVF for now  - Nephro recs appreciated - consider restarting losartan?  - strict I and O monitoring    # Right shoulder pain, subscapular pain likely referred from effusion  - right shoulder X ray neg    # Hypertension : controlled   - c/w Amlodipine , hold losartan  # DLD  - c/w statin , ASA is home med    #MISC  - DVT Prophylaxis : Lovenox 40 daily   - GI ppx: PPI  - DASH Diet , gentle ambulation given SOB   - from home, full code  - Son Ayan  [phone]  - Disposition: when ready    (x) Discussion with patient and/or family regarding goals of care  (x) Discussed Case and Plan with Medical Attending, Name: Sebastian

## 2019-03-27 NOTE — PROGRESS NOTE ADULT - ATTENDING COMMENTS
84 y/o Female with PMH of right sided pleural effusions, HTN, and high cholesterol , sent in by pulmonologist Dr. Dickinson for admission for further evaluation. Pt was admitted on 2/20/2019 for a right loculated pleural effusion approx. 900 CCs drained, no PE and right apical 3.1x3.0 cm mass with pleural nodules. Pt was d/c on 2/22 with outpatient follow up with PMD, pulmonary Dr. Dickinson and hemonc Dr. Robertson. Pt returned to ED on 3/15/2019 for right pleural effusion, had a thoracentesis in ED and was d/c after discussion with pulmonary Dr. Cruz. Pt saw Dr. Dickinson today for persistent SOB, dry cough, back pain where she has had the pleural effusion, weakness and night sweats. Pt was noted to have a malignancy in prior visits so was sent in for admission for a biopsy. Daughter in law also notes pt to have lost 8 pounds since the summer (4 pounds in the past 2 months). No CP.     Since then patient had 900CC drained again - got [place] catheter 3/25. Patient will likely need an EBUS vs IR biopsy now OUTPATIENT of the lung mass for assessment of underlying malignancy.     ASSESSMENT & PLAN    #Apical Lung Mass - likely a malignancy   # Right sided pleural effusion, - malignant effusion  -recurrent  - [place] Cath placed 3/25  - fluid studies EXUDATIVE (prt ratio 0.7, /209)  - CYTOLOGY POS MALIG cells   - Pulm - no EBUS/IR biopsy at this time, out-pt PET first, drain cath 500cc every other day  - c/w Pain control with Morphine IV PRN and Oxygen as needed for SaO2>92   Complained of ABRAHAM today - CXR looks filled up. will drain tomorrow.   also c/o shoulder pain 2/2 the apical mass. pain meds PRN.     # ALEJANDRO, improving   - Cr improved 3 > 2.8 > 1.8 w/ fluids  - Renal US neg  - pending urine lytes/studies, UPEP  - f/u SPEP, serum immunofixation results  - d/c IVF for now  - Nephro recs appreciated - consider restarting losartan?  - strict I and O monitoring    # Right shoulder pain, subscapular pain likely referred from effusion  - right shoulder X ray neg    # Hypertension : controlled   - c/w Amlodipine , hold losartan  # DLD  - c/w statin , ASA is home med    #MISC  - DVT Prophylaxis : Lovenox 40 daily   - GI ppx: PPI  - DASH Diet , gentle ambulation given SOB   - from home, full code  - Andrea Botello  [phone]  - Disposition: in 24-48 hours. home w/ [facility] after PT eval since the SNF option might delay the PET scan.
I was Physically Present for the key portions of the evaluation   I agree with the above History  , Physical examination Assessment and plan   I have Reviewed , Modified or appended where appropriate.  Please check A and P as above   1- ALEJANDRO seems prerenal improving  Off IVF now   encourage po intake  can resume ARB in AM   avoid nephrotoxins/ hypotension   will sign off recall PRN

## 2019-03-28 ENCOUNTER — TRANSCRIPTION ENCOUNTER (OUTPATIENT)
Age: 84
End: 2019-03-28

## 2019-03-28 VITALS — OXYGEN SATURATION: 94 %

## 2019-03-28 LAB
-  AMPICILLIN: SIGNIFICANT CHANGE UP
-  CIPROFLOXACIN: SIGNIFICANT CHANGE UP
-  LEVOFLOXACIN: SIGNIFICANT CHANGE UP
-  NITROFURANTOIN: SIGNIFICANT CHANGE UP
-  TETRACYCLINE: SIGNIFICANT CHANGE UP
-  VANCOMYCIN: SIGNIFICANT CHANGE UP
ANION GAP SERPL CALC-SCNC: 14 MMOL/L — SIGNIFICANT CHANGE UP (ref 7–14)
BUN SERPL-MCNC: 23 MG/DL — HIGH (ref 10–20)
CALCIUM SERPL-MCNC: 8.9 MG/DL — SIGNIFICANT CHANGE UP (ref 8.5–10.1)
CHLORIDE SERPL-SCNC: 105 MMOL/L — SIGNIFICANT CHANGE UP (ref 98–110)
CO2 SERPL-SCNC: 18 MMOL/L — SIGNIFICANT CHANGE UP (ref 17–32)
CREAT SERPL-MCNC: 1.3 MG/DL — SIGNIFICANT CHANGE UP (ref 0.7–1.5)
CULTURE RESULTS: SIGNIFICANT CHANGE UP
GLUCOSE SERPL-MCNC: 169 MG/DL — HIGH (ref 70–99)
MAGNESIUM SERPL-MCNC: 1.5 MG/DL — LOW (ref 1.8–2.4)
METHOD TYPE: SIGNIFICANT CHANGE UP
ORGANISM # SPEC MICROSCOPIC CNT: SIGNIFICANT CHANGE UP
ORGANISM # SPEC MICROSCOPIC CNT: SIGNIFICANT CHANGE UP
POTASSIUM SERPL-MCNC: 3.4 MMOL/L — LOW (ref 3.5–5)
POTASSIUM SERPL-SCNC: 3.4 MMOL/L — LOW (ref 3.5–5)
SODIUM SERPL-SCNC: 137 MMOL/L — SIGNIFICANT CHANGE UP (ref 135–146)
SPECIMEN SOURCE: SIGNIFICANT CHANGE UP

## 2019-03-28 RX ORDER — POTASSIUM CHLORIDE 20 MEQ
20 PACKET (EA) ORAL ONCE
Qty: 0 | Refills: 0 | Status: COMPLETED | OUTPATIENT
Start: 2019-03-28 | End: 2019-03-28

## 2019-03-28 RX ORDER — MEGESTROL ACETATE 40 MG/ML
10 SUSPENSION ORAL
Qty: 300 | Refills: 0 | OUTPATIENT
Start: 2019-03-28

## 2019-03-28 RX ORDER — MAGNESIUM SULFATE 500 MG/ML
2 VIAL (ML) INJECTION
Qty: 0 | Refills: 0 | Status: COMPLETED | OUTPATIENT
Start: 2019-03-28 | End: 2019-03-28

## 2019-03-28 RX ORDER — TRAMADOL HYDROCHLORIDE 50 MG/1
1 TABLET ORAL
Qty: 9 | Refills: 0
Start: 2019-03-28 | End: 2019-03-30

## 2019-03-28 RX ORDER — LOSARTAN POTASSIUM 100 MG/1
100 TABLET, FILM COATED ORAL DAILY
Qty: 0 | Refills: 0 | Status: DISCONTINUED | OUTPATIENT
Start: 2019-03-28 | End: 2019-03-28

## 2019-03-28 RX ORDER — LOSARTAN POTASSIUM 100 MG/1
1 TABLET, FILM COATED ORAL
Qty: 0 | Refills: 0 | COMMUNITY

## 2019-03-28 RX ADMIN — Medication 50 GRAM(S): at 12:46

## 2019-03-28 RX ADMIN — HEPARIN SODIUM 5000 UNIT(S): 5000 INJECTION INTRAVENOUS; SUBCUTANEOUS at 17:37

## 2019-03-28 RX ADMIN — TRAMADOL HYDROCHLORIDE 50 MILLIGRAM(S): 50 TABLET ORAL at 01:07

## 2019-03-28 RX ADMIN — Medication 50 GRAM(S): at 13:42

## 2019-03-28 RX ADMIN — LIDOCAINE 1 PATCH: 4 CREAM TOPICAL at 11:16

## 2019-03-28 RX ADMIN — AMLODIPINE BESYLATE 5 MILLIGRAM(S): 2.5 TABLET ORAL at 06:05

## 2019-03-28 RX ADMIN — Medication 20 MILLIEQUIVALENT(S): at 12:46

## 2019-03-28 RX ADMIN — TRAMADOL HYDROCHLORIDE 50 MILLIGRAM(S): 50 TABLET ORAL at 01:37

## 2019-03-28 RX ADMIN — LIDOCAINE 1 PATCH: 4 CREAM TOPICAL at 18:36

## 2019-03-28 RX ADMIN — MEGESTROL ACETATE 400 MILLIGRAM(S): 40 SUSPENSION ORAL at 11:16

## 2019-03-28 RX ADMIN — CHLORHEXIDINE GLUCONATE 1 APPLICATION(S): 213 SOLUTION TOPICAL at 06:05

## 2019-03-28 RX ADMIN — PANTOPRAZOLE SODIUM 40 MILLIGRAM(S): 20 TABLET, DELAYED RELEASE ORAL at 06:05

## 2019-03-28 RX ADMIN — HEPARIN SODIUM 5000 UNIT(S): 5000 INJECTION INTRAVENOUS; SUBCUTANEOUS at 06:05

## 2019-03-28 NOTE — DISCHARGE NOTE PROVIDER - CARE PROVIDER_API CALL
Mahamed Patel)  Hematology; Internal Medicine; Medical Oncology  [address] [address]  Phone: [phone]  Fax: [phone]  Follow Up Time:     Tamir Lanza)  Critical Care Medicine; Geriatric Medicine; Internal Medicine; Pulmonary Disease  [address] [facility], [facility] [address]  [place] [address]  Phone: [phone]  Fax: [phone]  Follow Up Time:     Brenda Phelps (DO)  Internal Medicine  [address] [place] [address]  Phone: [phone]  Fax: [phone]  Follow Up Time:

## 2019-03-28 NOTE — DISCHARGE NOTE PROVIDER - NSDCFUADDINST_GEN_ALL_CORE_FT
Please get PET scan  Follow up with Heme/Onc (cancer) doctor  Follow up with a Pulmonology (lung) doctor  Please see Dr. Phelps and bring your discharge papers with you so she can review. Please get PET scan (can try [facility], [phone]) - if have difficulty, can call Cancer doctor below and see if he has any recommendations.   Follow up with Heme/Onc (cancer) Dr. Patel on April 8th at 12:15pm at [facility] at [address], [phone].

## 2019-03-28 NOTE — PROGRESS NOTE ADULT - PROVIDER SPECIALTY LIST ADULT
Internal Medicine
Nephrology
Internal Medicine

## 2019-03-28 NOTE — DISCHARGE NOTE PROVIDER - HOSPITAL COURSE
82 y/o Female with PMH of right sided pleural effusions, HTN, and high cholesterol , sent in by pulmonologist Dr. Dickinson for admission for further evaluation. Pt was admitted on 2/20/2019 for a right loculated pleural effusion approx. 900 CCs drained, no PE and right apical 3.1x3.0 cm mass with pleural nodules. Pt was d/c on 2/22 with outpatient follow up with PMD, pulmonary Dr. Dickinson and hemonc Dr. Robertson. Pt returned to ED on 3/15/2019 for right pleural effusion, had a thoracentesis in ED and was d/c after discussion with pulmonary Dr. Cruz. Pt saw Dr. Dickinson today for persistent SOB, dry cough, back pain where she has had the pleural effusion, weakness and night sweats. Pt was noted to have a malignancy in prior visits so was sent in for admission for a biopsy. Daughter in law also notes pt to have lost 8 pounds since the summer (4 pounds in the past 2 months). No CP.         Since then patient had 900CC drained again and will go for Denver catheter 3/25. Patient will likely need an EBUS vs IR biopsy now OUTPATIENT of the lung mass for assessment of underlying malignancy. 84 y/o Female with PMH of right sided pleural effusions, HTN, and high cholesterol , sent in by pulmonologist Dr. Dickinson for admission for further evaluation. Pt was admitted on 2/20/2019 for a right loculated pleural effusion approx. 900 CCs drained, no PE and right apical 3.1x3.0 cm mass with pleural nodules. Pt was d/c on 2/22 with outpatient follow up with PMD, pulmonary Dr. Dickinson and hemonc Dr. Robertson. Pt returned to ED on 3/15/2019 for right pleural effusion, had a thoracentesis in ED and was d/c after discussion with pulmonary Dr. Cruz. Pt saw Dr. Dickinson today for persistent SOB, dry cough, back pain where she has had the pleural effusion, weakness and night sweats. Pt was noted to have a malignancy in prior visits so was sent in for admission for a biopsy. Daughter in law also notes pt to have lost 8 pounds since the summer (4 pounds in the past 2 months). No CP.         Since then patient had 900CC drained again and will go for Denver catheter 3/25. Cytology shows malignant cells, adenocarcinoma. Patient will follow up with PET and Heme/Onc. 84 y/o Female with PMH of right sided pleural effusions, HTN, and high cholesterol , sent in by pulmonologist Dr. Dickinson for admission for further evaluation. Pt was admitted on 2/20/2019 for a right loculated pleural effusion approx. 900 CCs drained, no PE and right apical 3.1x3.0 cm mass with pleural nodules. Pt was d/c on 2/22 with outpatient follow up with PMD, pulmonary Dr. Dickinson and hemonc Dr. Robertson. Pt returned to ED on 3/15/2019 for right pleural effusion, had a thoracentesis in ED and was d/c after discussion with pulmonary Dr. Cruz. Pt saw Dr. Dickinson today for persistent SOB, dry cough, back pain where she has had the pleural effusion, weakness and night sweats. Pt was noted to have a malignancy in prior visits so was sent in for admission for a biopsy. Daughter in law also notes pt to have lost 8 pounds since the summer (4 pounds in the past 2 months). No CP.         Since then patient had 900CC drained again and will go for Denver catheter 3/25. Cytology shows malignant cells, adenocarcinoma. Patient will follow up with PET and Heme/Onc.         Attending NOte:         #Apical Lung Mass - likely a malignancy     # Right sided pleural effusion, - malignant effusion    -recurrent    - Denver Cath placed 3/25    - fluid studies EXUDATIVE (prt ratio 0.7, /209)    - CYTOLOGY POS MALIG cells - Lung AdenoCA    - Pulm - no EBUS/IR biopsy at this time, out-pt PET first, drain cath 500cc every other day. Now that we see adenocarcinoma cells in pleural fluid we may be able to avoid the biopsy.    Patient will f/u with Dr. Robertson at [facility] for further testings.     - c/w Pain control with Morphine  PRN and Oxygen as needed for SaO2>92     Whenever she complaints of ABRAHAM - she may get the fluid drained from [place].     also c/o shoulder pain 2/2 the apical mass. pain meds PRN.         # ALEJANDRO, improving     - Cr improved 3 > 2.8 > 1.8 w/ fluids    - Renal US neg    - SPEP, UPEP also sent.         # Right shoulder pain, subscapular pain likely referred from effusion    - right shoulder X ray neg        # Hypertension : controlled     - c/w Amlodipine , hold losartan    # DLD    - c/w statin , [facility]SA is home med        #d/c home w/ [facility] since the SNF option might delay the PET scan.

## 2019-03-28 NOTE — DISCHARGE NOTE PROVIDER - CARE PROVIDERS DIRECT ADDRESSES
[email or web address]

## 2019-03-28 NOTE — CONSULT NOTE ADULT - ASSESSMENT
82 y/o Female with PMH of right sided pleural effusions, HTN, and high cholesterol , sent in by pulmonologist Dr. Dickinson for admission for further evaluation. Pt was admitted on 2/20/2019 for a right loculated pleural effusion approx. 900 CCs, no PE and right apical 3.1x3.0 cm mass with pleural nodules. Pt was d/c on 2/22 with outpatient follow up with PMD, pulmonary Dr. Dickinson and hemonc Dr. Robertson. Pt returned to ED on 3/15/2019 for right pleural effusion, had a thoracentesis in ED and was d/c after discussion with pulmonary Dr. Cruz. Pt saw Dr. Dickinson today for persistent SOB, dry cough, back pain where she has had the pleural effusion, weakness and night sweats. Pt was noted to have a malignancy in prior visits so was sent in for admission for a biopsy. Daughter in law also notes pt to have lost 8 pounds since the summer (4 pounds in the past 2 months).  In ED, patient underwent diagnostic and therapeutic thoracentesis of 920 cc.  Cytology positive for malignant cells, favoring metastatic adenocarcinoma.  R-sided pleurx catheter was placed on 3/25/2019.  Patient is planned for discharge. 84 y/o Female with PMH of right sided pleural effusions, HTN, and high cholesterol , sent in by pulmonologist Dr. Dickinson for admission for further evaluation. Pt was admitted on 2/20/2019 for a right loculated pleural effusion approx. 900 CCs, no PE and right apical 3.1x3.0 cm mass with pleural nodules. Pt was d/c on 2/22 with outpatient follow up with PMD, pulmonary Dr. Dickinson and hemon Dr. Robertson. Pt returned to ED on 3/15/2019 for right pleural effusion, had a thoracentesis in ED and was d/c after discussion with pulmonary Dr. Cruz. Pt saw Dr. Dickinson today for persistent SOB, dry cough, back pain where she has had the pleural effusion, weakness and night sweats. Pt was noted to have a malignancy in prior visits so was sent in for admission for a biopsy. Daughter in law also notes pt to have lost 8 pounds since the summer (4 pounds in the past 2 months).  In ED, patient underwent diagnostic and therapeutic thoracentesis of 920 cc.  Cytology positive for malignant cells, favoring metastatic adenocarcinoma.  R-sided pleurx catheter was placed on 3/25/2019.  Patient is planned for discharge.    1. 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 Patel at [facility] ([address] [place] [address], [phone]) on 4/8/2019 at 12:15 PM.  Patient wishes to defer all treatment-related decisions to her son Ayan ([phone]). 84 y/o Female with PMH of right sided pleural effusions, HTN, and high cholesterol , sent in by pulmonologist Dr. Dickinson for admission for further evaluation. Pt was admitted on 2/20/2019 for a right loculated pleural effusion approx. 900 CCs, no PE and right apical 3.1x3.0 cm mass with pleural nodules. Pt was d/c on 2/22 with outpatient follow up with PMD, pulmonary Dr. Dickinson and hemonc Dr. Robertson. Pt returned to ED on 3/15/2019 for right pleural effusion, had a thoracentesis in ED and was d/c after discussion with pulmonary Dr. Cruz. Pt saw Dr. Dickinson today for persistent SOB, dry cough, back pain where she has had the pleural effusion, weakness and night sweats. Pt was noted to have a malignancy in prior visits so was sent in for admission for a biopsy. Daughter in law also notes pt to have lost 8 pounds since the summer (4 pounds in the past 2 months).  In ED, patient underwent diagnostic and therapeutic thoracentesis of 920 cc.  Cytology positive for malignant cells, favoring metastatic adenocarcinoma.  R-sided pleurx catheter was placed on 3/25/2019.  Patient is planned for discharge.    1. 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 Patel at [facility] ([address] [place] [address], [phone]) on 4/8/2019 at 12:15 PM.  Patient wishes to defer all treatment-related decisions to her son Ayan ([phone]).  Will need outpatient contrast MRI brain and PET scan/contrast CT abdomen/pelvis.  PDL1, KRAS, ALK, ROS, EGFR testing to be added to pleural fluid (to arrange with pathologist). 82 y/o Female with PMH of right sided pleural effusions, HTN, and high cholesterol , sent in by pulmonologist Dr. Dickinson for admission for further evaluation. Pt was admitted on 2/20/2019 for a right loculated pleural effusion approx. 900 CCs, no PE and right apical 3.1x3.0 cm mass with pleural nodules. Pt was d/c on 2/22 with outpatient follow up with PMD, pulmonary Dr. Dickinson and hemon Dr. Robertson. Pt returned to ED on 3/15/2019 for right pleural effusion, had a thoracentesis in ED and was d/c after discussion with pulmonary Dr. Cruz. Pt saw Dr. Dickinson today for persistent SOB, dry cough, back pain where she has had the pleural effusion, weakness and night sweats. Pt was noted to have a malignancy in prior visits so was sent in for admission for a biopsy. Daughter in law also notes pt to have lost 8 pounds since the summer (4 pounds in the past 2 months).  In ED, patient underwent diagnostic and therapeutic thoracentesis of 920 cc.  Cytology positive for malignant cells, favoring metastatic adenocarcinoma.  R-sided pleurx catheter was placed on 3/25/2019.  Patient is planned for discharge.    1. 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 Patel at [facility] ([address] [place] [address], [phone]) on 4/8/2019 at 12:15 PM.  Patient wishes to defer all treatment-related decisions to her son Ayan ([phone]).  Will need outpatient contrast MRI brain and PET scan/contrast\s.  PDL1, KRAS, ALK, ROS, EGFR testing to be added to pleural fluid (to arrange with pathologist).

## 2019-03-28 NOTE — DISCHARGE NOTE PROVIDER - PROVIDER TOKENS
PROVIDER:[TOKEN:[[id number]]],PROVIDER:[TOKEN:[[id number]]],PROVIDER:[TOKEN:[[id number]]]

## 2019-03-28 NOTE — CONSULT NOTE ADULT - SUBJECTIVE AND OBJECTIVE BOX
Patient is a 83y old  Female who presents with a chief complaint of SOB (27 Mar 2019 07:25)      HPI:  82 y/o Female with PMH of right sided pleural effusions, HTN, and high cholesterol , sent in by pulmonologist Dr. Dickinson for admission for further evaluation. Pt was admitted on 2/20/2019 for a right loculated pleural effusion approx. 900 CCs, no PE and right apical 3.1x3.0 cm mass with pleural nodules. Pt was d/c on 2/22 with outpatient follow up with PMD, pulmonary Dr. Dickinson and hemonc Dr. Robertson. Pt returned to ED on 3/15/2019 for right pleural effusion, had a thoracentesis in ED and was d/c after discussion with pulmonary Dr. Cruz. Pt saw Dr. Dickinson today for persistent SOB, dry cough, back pain where she has had the pleural effusion, weakness and night sweats. Pt was noted to have a malignancy in prior visits so was sent in for admission for a biopsy. Daughter in law also notes pt to have lost 8 pounds since the summer (4 pounds in the past 2 months).    In ED, patient underwent diagnostic and therapeutic thoracentesis of 920 cc.  Cytology positive for malignant cells, favoring metastatic adenocarcinoma.  R-sided pleurx catheter was placed on 3/25/2019.  Patient is planned for discharge.       ROS:  Negative except for:    PAST MEDICAL & SURGICAL HISTORY:  HTN (hypertension)  High cholesterol  H/O abdominal hysterectomy      SOCIAL HISTORY: Negative x 3    FAMILY HISTORY:  Hypertension (Mother)      MEDICATIONS  (STANDING):  amLODIPine   Tablet 5 milliGRAM(s) Oral daily  chlorhexidine 4% Liquid 1 Application(s) Topical <User Schedule>  guaiFENesin/dextromethorphan  Syrup 10 milliLiter(s) Oral every 6 hours  heparin  Injectable 5000 Unit(s) SubCutaneous every 12 hours  lidocaine   Patch 1 Patch Transdermal daily  megestrol Suspension 400 milliGRAM(s) Oral daily  pantoprazole    Tablet 40 milliGRAM(s) Oral before breakfast  simvastatin 10 milliGRAM(s) Oral at bedtime    MEDICATIONS  (PRN):  aluminum hydroxide/magnesium hydroxide/simethicone Suspension 30 milliLiter(s) Oral every 4 hours PRN Dyspepsia  morphine  - Injectable 1 milliGRAM(s) IV Push every 4 hours PRN Severe Pain (7 - 10)  traMADol 50 milliGRAM(s) Oral every 6 hours PRN Moderate Pain (4 - 6)      Allergies    No Known Allergies    Intolerances        Vital Signs Last 24 Hrs  T(C): 36.2 (28 Mar 2019 05:00), Max: 37.6 (27 Mar 2019 12:36)  T(F): 97.1 (28 Mar 2019 05:00), Max: 99.6 (27 Mar 2019 12:36)  HR: 76 (28 Mar 2019 05:00) (76 - 84)  BP: 145/67 (28 Mar 2019 05:00) (132/63 - 145/67)  BP(mean): --  RR: 19 (28 Mar 2019 05:00) (16 - 19)  SpO2: 95% (28 Mar 2019 08:18) (93% - 95%)    PHYSICAL EXAM  General: adult in NAD  HEENT: clear oropharynx, anicteric sclera, pink conjunctiva  Neck: supple  CV: normal S1/S2 with no murmur rubs or gallops  Lungs: positive air movement b/l ant lungs,clear to auscultation, no wheezes, no rales  Abdomen: soft non-tender non-distended, no hepatosplenomegaly  Ext: no clubbing cyanosis or edema  Skin: no rashes and no petechiae  Neuro: alert and oriented X 4, no focal deficits      LABS:    Serial CBC's  03-26 @ 09:00  Hct-37.8 / Hgb-12.6 / Plat-339 / RBC-4.65 / WBC-8.88  Serial CBC's  03-25 @ 08:29  Hct-36.3 / Hgb-11.7 / Plat-307 / RBC-4.36 / WBC-8.86      03-27    138  |  107  |  21<H>  ----------------------------<  119<H>  3.6   |  18  |  1.5    Ca    8.8      27 Mar 2019 09:12    Serum Protein Electrophoresis Interp: Normal Electrophoresis Pattern (03-24 @ 17:49)  Immunofixation, Serum:   No Monoclonal Band Identified    Reference Range: None Detected (03-24 @ 17:49) Patient is a 83y old  Female who presents with a chief complaint of SOB (27 Mar 2019 07:25)      HPI:  84 y/o Female with PMH of right sided pleural effusions, HTN, and high cholesterol , sent in by pulmonologist Dr. Dickinson for admission for further evaluation. Pt was admitted on 2/20/2019 for a right loculated pleural effusion approx. 900 CCs, no PE and right apical 3.1x3.0 cm mass with pleural nodules. Pt was d/c on 2/22 with outpatient follow up with PMD, pulmonary Dr. Dickinson and hemonc Dr. Robertson. Pt returned to ED on 3/15/2019 for right pleural effusion, had a thoracentesis in ED and was d/c after discussion with pulmonary Dr. Cruz. Pt saw Dr. Dickinson today for persistent SOB, dry cough, back pain where she has had the pleural effusion, weakness and night sweats. Pt was noted to have a malignancy in prior visits so was sent in for admission for a biopsy. Daughter in law also notes pt to have lost 8 pounds since the summer (4 pounds in the past 2 months).    In ED, patient underwent diagnostic and therapeutic thoracentesis of 920 cc.  Cytology positive for malignant cells, favoring metastatic adenocarcinoma.  R-sided pleurx catheter was placed on 3/25/2019.  Patient is planned for discharge.    Used [place]  [id number] to discuss plan of care with patient.  Patient indicates that she wishes to defer all decision making to her son Ayan.       ROS:  Negative except for: Dyspnea on exertion, R shoulder pain    PAST MEDICAL & SURGICAL HISTORY:  HTN (hypertension)  High cholesterol  H/O abdominal hysterectomy      SOCIAL HISTORY: Negative x 3    FAMILY HISTORY:  Hypertension (Mother)      MEDICATIONS  (STANDING):  amLODIPine   Tablet 5 milliGRAM(s) Oral daily  chlorhexidine 4% Liquid 1 Application(s) Topical <User Schedule>  guaiFENesin/dextromethorphan  Syrup 10 milliLiter(s) Oral every 6 hours  heparin  Injectable 5000 Unit(s) SubCutaneous every 12 hours  lidocaine   Patch 1 Patch Transdermal daily  megestrol Suspension 400 milliGRAM(s) Oral daily  pantoprazole    Tablet 40 milliGRAM(s) Oral before breakfast  simvastatin 10 milliGRAM(s) Oral at bedtime    MEDICATIONS  (PRN):  aluminum hydroxide/magnesium hydroxide/simethicone Suspension 30 milliLiter(s) Oral every 4 hours PRN Dyspepsia  morphine  - Injectable 1 milliGRAM(s) IV Push every 4 hours PRN Severe Pain (7 - 10)  traMADol 50 milliGRAM(s) Oral every 6 hours PRN Moderate Pain (4 - 6)      Allergies    No Known Allergies    Intolerances        Vital Signs Last 24 Hrs  T(C): 36.2 (28 Mar 2019 05:00), Max: 37.6 (27 Mar 2019 12:36)  T(F): 97.1 (28 Mar 2019 05:00), Max: 99.6 (27 Mar 2019 12:36)  HR: 76 (28 Mar 2019 05:00) (76 - 84)  BP: 145/67 (28 Mar 2019 05:00) (132/63 - 145/67)  BP(mean): --  RR: 19 (28 Mar 2019 05:00) (16 - 19)  SpO2: 95% (28 Mar 2019 08:18) (93% - 95%)    PHYSICAL EXAM  General: adult in NAD, sitting comfortably in chair  HEENT: NC/AT  Neck: supple  CV: +S1, +S2  Lungs: decreased air entry R lungs, R-sided catheter in place, covered with clean dressing  Ext: no edema  Skin: no rashes and no petechiae  Neuro: alert and oriented X 4, no focal deficits      LABS:    Serial CBC's  03-26 @ 09:00  Hct-37.8 / Hgb-12.6 / Plat-339 / RBC-4.65 / WBC-8.88  Serial CBC's  03-25 @ 08:29  Hct-36.3 / Hgb-11.7 / Plat-307 / RBC-4.36 / WBC-8.86      03-27    138  |  107  |  21<H>  ----------------------------<  119<H>  3.6   |  18  |  1.5    Ca    8.8      27 Mar 2019 09:12    Serum Protein Electrophoresis Interp: Normal Electrophoresis Pattern (03-24 @ 17:49)  Immunofixation, Serum:   No Monoclonal Band Identified    Reference Range: None Detected (03-24 @ 17:49)

## 2019-03-28 NOTE — CONSULT NOTE ADULT - ATTENDING COMMENTS
She was seen and examined. I had a long discussion with her and than spoke to her son Ayan over the phone.    In [place]    She lives with her son. ECOG is about 1-2. She does appear on the frail side.    I explained based on pathology she has Stage IV adenocarcinoma of the lung given she had malignant effusion. She now has a Pleurx and spoke to son how to manage it and he is eager to learn from the visiting nurse.    I went over the treatment option and she is a non smoker. We will need PD-l1, EGFR/ALK/ROS and BRAF, if not possible on pleural fluid may get EGFR from blood but if negative may need a biopsy.    We ill get MR brain and PET/CT as outpatient.    They are aware her lung cancer is not curable. But treatable.  Final treatment plan once all testing is available.    Follow up arranged. She is going home later today.

## 2019-03-28 NOTE — DISCHARGE NOTE PROVIDER - NSDCCPCAREPLAN_GEN_ALL_CORE_FT
PRINCIPAL DISCHARGE DIAGNOSIS  Diagnosis: Pleural effusion  Assessment and Plan of Treatment: You have a pleural effusion (fluid) in the Right lung.   - A plastic catheter was placed to help drain fluid out to help with breathing. It will be drained every other day, about 500cc of fluid by visiting nurse services.  - Please keep dressing clean and dry when not in use, you can clean yourself gently in shower with it in place. Can tape plastic bag over tube when showering.   - Lab work showed cells that were cancerous in nature on exam, this needs to be followed with Hematology/Oncology.  - You should also follow up with a pulmonary doctor upon discharge.  - You will be given a script for a PET scan.      SECONDARY DISCHARGE DIAGNOSES  Diagnosis: Hypertension  Assessment and Plan of Treatment: You were on losartan and amlodipine. Please hold your losartan as your blood pressure has been well controlled here. Follow up with your primary provider upon discharge for monitoring of your BP.    Diagnosis: Opacity noted on imaging study  Assessment and Plan of Treatment: The mass in your lung is likely cancerous - you will need some form of workup. Please complete PET scan and then follow up with results to Heme/Onc and Pulmonary doctors. PRINCIPAL DISCHARGE DIAGNOSIS  Diagnosis: Pleural effusion  Assessment and Plan of Treatment: You have a pleural effusion (fluid) in the Right lung.   - A plastic catheter was placed to help drain fluid out to help with breathing. It will be drained every other day, about 500cc of fluid by visiting nurse services.  - Please keep dressing clean and dry when not in use, you can clean yourself gently in shower with it in place. Can tape plastic bag over tube when showering.   - Follow up with Dr. Patel (Cancer doctor) on April 8th at 12:15pm at [facility] [address] ([phone])  - PET scan at [facility] [address] [place] [address] ([phone])      SECONDARY DISCHARGE DIAGNOSES  Diagnosis: Hypertension  Assessment and Plan of Treatment: You were on losartan and amlodipine. Please hold your losartan as your blood pressure has been well controlled here. Follow up with your primary provider upon discharge for monitoring of your BP.    Diagnosis: Opacity noted on imaging study  Assessment and Plan of Treatment: The mass in your lung is likely cancerous - you will need some form of workup. Please complete PET scan and then follow up with results to Heme/Onc and Pulmonary doctors.

## 2019-03-28 NOTE — PROGRESS NOTE ADULT - SUBJECTIVE AND OBJECTIVE BOX
JOANN SCHULTZ 83y Female  MRN#: [id number]   CODE STATUS:___Full_____      SUBJECTIVE  Patient is a 83y old Female who presents with a chief complaint of SOB (25 Mar 2019 13:49)  Currently admitted to medicine with the primary diagnosis of Pleural effusion  Hospital course has been complicated by R shoulder pain, ALEJANDRO.  Thai SPEAKING   Today is hospital day 6d, and this morning pt is stable, able to ambulate off O2.   [place] ot be drained today. Possible dispo today after Heme/Onc sees.      OBJECTIVE  PAST MEDICAL & SURGICAL HISTORY  HTN (hypertension)  High cholesterol  H/O abdominal hysterectomy    ALLERGIES:  No Known Allergies    MEDICATIONS:  STANDING MEDICATIONS  amLODIPine   Tablet 5 milliGRAM(s) Oral daily  chlorhexidine 4% Liquid 1 Application(s) Topical <User Schedule>  heparin  Injectable 5000 Unit(s) SubCutaneous every 12 hours  lidocaine   Patch 1 Patch Transdermal daily  magnesium sulfate  IVPB 2 Gram(s) IV Intermittent every 1 hour  megestrol Suspension 400 milliGRAM(s) Oral daily  pantoprazole    Tablet 40 milliGRAM(s) Oral before breakfast  potassium chloride    Tablet ER 20 milliEquivalent(s) Oral once  simvastatin 10 milliGRAM(s) Oral at bedtime    PRN MEDICATIONS  aluminum hydroxide/magnesium hydroxide/simethicone Suspension 30 milliLiter(s) Oral every 4 hours PRN  morphine  - Injectable 1 milliGRAM(s) IV Push every 4 hours PRN  traMADol 50 milliGRAM(s) Oral every 6 hours PRN      VITAL SIGNS: Last 24 Hours  T(C): 36.2 (28 Mar 2019 05:00), Max: 37.6 (27 Mar 2019 12:36)  T(F): 97.1 (28 Mar 2019 05:00), Max: 99.6 (27 Mar 2019 12:36)  HR: 76 (28 Mar 2019 05:00) (76 - 84)  BP: 145/67 (28 Mar 2019 05:00) (132/63 - 145/67)  BP(mean): --  RR: 19 (28 Mar 2019 05:00) (16 - 19)  SpO2: 95% (28 Mar 2019 08:18) (93% - 95%)    LABS:    03-28    137  |  105  |  23<H>  ----------------------------<  169<H>  3.4<L>   |  18  |  1.3    Ca    8.9      28 Mar 2019 08:57  Mg     1.5     03-28        RADIOLOGY:      PHYSICAL EXAM:    GENERAL: NAD, well-developed Algerian lady AAOX3, off NC  HEENT:  Atraumatic, Normocephalic. EOMI, PERRLA  PULMONARY: lungs clear to auscultation but R base decreased, No wheeze/crackles  CARDIOVASCULAR: Regular rate and rhythm; No murmurs  GASTROINTESTINAL: Soft, Nontender, Nondistended; Bowel sounds present  EXTREMITY:  2+ Peripheral Pulses, No edema      ADMISSION SUMMARY  Patient is a 83y old Female who presents with a chief complaint of SOB (25 Mar 2019 13:49)  Currently admitted to medicine with the primary diagnosis of Pleural effusion  Hospital course has been complicated by ALEJANDRO, R shoulder pain    84 y/o Female with PMH of right sided pleural effusions, HTN, and high cholesterol , sent in by pulmonologist Dr. Dickinson for admission for further evaluation. Pt was admitted on 2/20/2019 for a right loculated pleural effusion approx. 900 CCs drained, no PE and right apical 3.1x3.0 cm mass with pleural nodules. Pt was d/c on 2/22 with outpatient follow up with PMD, pulmonary Dr. Dickinson and hemonc Dr. Robertson. Pt returned to ED on 3/15/2019 for right pleural effusion, had a thoracentesis in ED and was d/c after discussion with pulmonary Dr. Cruz. Pt saw Dr. Dickinson today for persistent SOB, dry cough, back pain where she has had the pleural effusion, weakness and night sweats. Pt was noted to have a malignancy in prior visits so was sent in for admission for a biopsy. Daughter in law also notes pt to have lost 8 pounds since the summer (4 pounds in the past 2 months). No CP.     Since then patient had 900CC drained again and will go for [place] catheter 3/25. Patient will likely need an EBUS vs IR biopsy now OUTPATIENT of the lung mass for assessment of underlying malignancy.     ASSESSMENT & PLAN    # Right sided pleural effusion, recurrent  - Thoracentesis in  cc removed initially   - [place] Cath placed 3/25  - fluid studies EXUDATIVE (prt ratio 0.7, /209)  - CYTOLOGY POS MALIG cells   - Pulm - no EBUS/IR biopsy at this time, out-pt PET , heme/onc, drain [place] 500cc q48hr  - DRAIN DEN[place] TODAY  - f/u Heme/onc recs  - c/w Pain control with Morphine IV PRN and Oxygen as needed for SaO2>92     # ALEJANDRO, resolved  - Cr improved 3 > 2.8 > 1.8 > 1.3 w/ fluids  - Renal US neg  - pending urine lytes/studies, UPEP  - f/u SPEP, serum immunofixation results  - Nephro recs appreciated  - strict I and O monitoring    # Right shoulder pain, subscapular pain likely referred from effusion  - right shoulder X ray neg    # Hypertension : controlled   - c/w Amlodipine , hold losartan  # DLD  - c/w statin , ASA is home med    #MISC  - DVT Prophylaxis : Lovenox 40 daily   - GI ppx: PPI  - DASH Diet , gentle ambulation given SOB   - from home, full code  - Son Ayan  [phone]  - Disposition: when ready    (x) Discussion with patient and/or family regarding goals of care  (x) Discussed Case and Plan with Medical Attending, Name: Sebastian

## 2019-03-29 ENCOUNTER — INBOUND DOCUMENT (OUTPATIENT)
Age: 84
End: 2019-03-29

## 2019-04-03 DIAGNOSIS — N17.9 ACUTE KIDNEY FAILURE, UNSPECIFIED: ICD-10-CM

## 2019-04-03 DIAGNOSIS — J91.0 MALIGNANT PLEURAL EFFUSION: ICD-10-CM

## 2019-04-03 DIAGNOSIS — I12.9 HYPERTENSIVE CHRONIC KIDNEY DISEASE WITH STAGE 1 THROUGH STAGE 4 CHRONIC KIDNEY DISEASE, OR UNSPECIFIED CHRONIC KIDNEY DISEASE: ICD-10-CM

## 2019-04-03 DIAGNOSIS — Z79.82 LONG TERM (CURRENT) USE OF ASPIRIN: ICD-10-CM

## 2019-04-03 DIAGNOSIS — C34.91 MALIGNANT NEOPLASM OF UNSPECIFIED PART OF RIGHT BRONCHUS OR LUNG: ICD-10-CM

## 2019-04-03 DIAGNOSIS — Z79.899 OTHER LONG TERM (CURRENT) DRUG THERAPY: ICD-10-CM

## 2019-04-03 DIAGNOSIS — R13.10 DYSPHAGIA, UNSPECIFIED: ICD-10-CM

## 2019-04-03 DIAGNOSIS — R91.8 OTHER NONSPECIFIC ABNORMAL FINDING OF LUNG FIELD: ICD-10-CM

## 2019-04-03 DIAGNOSIS — N18.2 CHRONIC KIDNEY DISEASE, STAGE 2 (MILD): ICD-10-CM

## 2019-04-03 DIAGNOSIS — Z90.710 ACQUIRED ABSENCE OF BOTH CERVIX AND UTERUS: ICD-10-CM

## 2019-04-03 DIAGNOSIS — J90 PLEURAL EFFUSION, NOT ELSEWHERE CLASSIFIED: ICD-10-CM

## 2019-04-03 DIAGNOSIS — E78.00 PURE HYPERCHOLESTEROLEMIA, UNSPECIFIED: ICD-10-CM

## 2019-04-08 ENCOUNTER — LABORATORY RESULT (OUTPATIENT)
Age: 84
End: 2019-04-08

## 2019-04-08 ENCOUNTER — RX RENEWAL (OUTPATIENT)
Age: 84
End: 2019-04-08

## 2019-04-08 ENCOUNTER — APPOINTMENT (OUTPATIENT)
Dept: HEMATOLOGY ONCOLOGY | Facility: CLINIC | Age: 84
End: 2019-04-08

## 2019-04-08 VITALS
SYSTOLIC BLOOD PRESSURE: 159 MMHG | DIASTOLIC BLOOD PRESSURE: 82 MMHG | WEIGHT: 159 LBS | HEART RATE: 70 BPM | RESPIRATION RATE: 16 BRPM | TEMPERATURE: 97.2 F | BODY MASS INDEX: 29.26 KG/M2 | HEIGHT: 62 IN

## 2019-04-08 DIAGNOSIS — Z78.9 OTHER SPECIFIED HEALTH STATUS: ICD-10-CM

## 2019-04-08 DIAGNOSIS — Z86.018 PERSONAL HISTORY OF OTHER BENIGN NEOPLASM: ICD-10-CM

## 2019-04-08 DIAGNOSIS — Z86.39 PERSONAL HISTORY OF OTHER ENDOCRINE, NUTRITIONAL AND METABOLIC DISEASE: ICD-10-CM

## 2019-04-08 RX ORDER — OXYCODONE HYDROCHLORIDE 5 MG/1
5 CAPSULE ORAL
Qty: 60 | Refills: 0 | Status: DISCONTINUED | COMMUNITY
Start: 2019-04-08 | End: 2019-04-08

## 2019-04-08 RX ORDER — OXYCODONE 5 MG/1
5 TABLET ORAL
Qty: 180 | Refills: 0 | Status: DISCONTINUED | COMMUNITY
Start: 2019-04-08 | End: 2019-04-08

## 2019-04-09 LAB
ALBUMIN SERPL ELPH-MCNC: 3.6 G/DL
ALP BLD-CCNC: 81 U/L
ALT SERPL-CCNC: 18 U/L
ANION GAP SERPL CALC-SCNC: 15 MMOL/L
AST SERPL-CCNC: 12 U/L
BILIRUB SERPL-MCNC: 0.3 MG/DL
BUN SERPL-MCNC: 16 MG/DL
CALCIUM SERPL-MCNC: 9.3 MG/DL
CANCER AG125 SERPL-ACNC: 8 U/ML
CANCER AG19-9 SERPL-ACNC: 16 U/ML
CEA SERPL-MCNC: 1.8 NG/ML
CHLORIDE SERPL-SCNC: 107 MMOL/L
CO2 SERPL-SCNC: 20 MMOL/L
CREAT SERPL-MCNC: 0.9 MG/DL
GLUCOSE SERPL-MCNC: 98 MG/DL
HCT VFR BLD CALC: 37.5 %
HGB BLD-MCNC: 12.3 G/DL
MCHC RBC-ENTMCNC: 26.6 PG
MCHC RBC-ENTMCNC: 32.8 G/DL
MCV RBC AUTO: 81.2 FL
PLATELET # BLD AUTO: 354 K/UL
PMV BLD: 9.2 FL
POTASSIUM SERPL-SCNC: 4.4 MMOL/L
PROT SERPL-MCNC: 6.1 G/DL
RBC # BLD: 4.62 M/UL
RBC # FLD: 13.3 %
SODIUM SERPL-SCNC: 142 MMOL/L
WBC # FLD AUTO: 11.01 K/UL

## 2019-04-12 NOTE — REVIEW OF SYSTEMS
[Fatigue] : fatigue [Recent Change In Weight] : ~T recent weight change [SOB on Exertion] : shortness of breath during exertion [Constipation] : constipation [Negative] : Allergic/Immunologic

## 2019-04-12 NOTE — PHYSICAL EXAM
[Ambulatory and capable of all self care but unable to carry out any work activities] : Status 2- Ambulatory and capable of all self care but unable to carry out any work activities. Up and about more than 50% of waking hours [Normal] : affect appropriate [de-identified] : She has absent  breath sounds in right lung filed on posterior exam especially near the Pleurx

## 2019-04-12 NOTE — ASSESSMENT
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 She has never smoked and would like to avoid cytotoxic chemotherapy in her if possible, she did looks a bit on the frail side.  She is Stage IV.\par -recommended we obtain a PET/CT and MR brain  to complete staging and to make sure these findigs in the lung are not metastatic \par -will arrange for biopsy, I spoke to IR and they  can FNA the right apical mass if PET/CT  does not show any other sites\par -will check  EGFR/ALK/ROS/BRAF PD-l1 if lung is confirmed and will discuss treatment options once above is available\par -oxycodone was provided for pain and bowel regiment was also discussed\par -she is an ASA class II. She is a low to moderate risk patient for a low risk procedure\par -she is on Asprin and may need to hold it prior IR will let them know\par -She will RTC post Biopsy and Scans [Palliative] : Goals of care discussed with patient: Palliative

## 2019-04-12 NOTE — HISTORY OF PRESENT ILLNESS
[de-identified] : \par This is a 83 year old female who I initially met in [facility] on 3/28/19. She was initially admitted on 2/20/2019 for a right loculated pleural effusion\par approx. 900 CCs, no PE and right apical 3.1x3.0 cm mass with pleural nodules on CTA of chest.  . Pt returned to ED on 3/15/2019 for right pleural\par effusion, had a thoracentesis in ED and was sent home. She than came back for SOB  due to recurrent effusion and a Pleurx was placed. Patholgoy showed adenocarcinoma. \par \par She ha lost a few pounds about  4. She never smoked. She does have weakness.  From her Pleurx she  has 500 cc removed every other day. She has pain after.\par \par Work up:\par 2/20/19 CTA chest:  Large right pleural effusion, maximal axial width of 7 cm correlating with an estimated volume of 900 cc. Associated \par near complete compressive atelectasis of the right lower lobe. Probable loculations of the effusion seen at the apex and at the right heart \par border (for example series 5 images 176, 52; series 9 image 69). 1 cm right upper lobe fissural nodule (series 5 image 139). Patent central \par airways. There is right apical 3.1 x 3.0 cm mass with irregular right apical pleural thickening (series 7, image 44), \par and multiple satellite pleural nodules including a more caudad 2.7 cm para-mediastinal nodule (series 7, image 175). Tissue sampling \par recommended.\par \par Path: \par Pleural fluid: 3/25:  Cell block material is hypocellular and shows a single minutecluster of malignant cells.Immunohistochemistry studies were performed at [facility] and the\par results are noncontributory\par 3/22: Addendum\par Cell block material shows macrophages (positive ),mesothelial cells (positive calretinin) and a single very minute cluster of atypical suspicious cells is negative for Fidel-Ep4.Material is insufficient for further diagnsotic studies (ie,mmunohistochemistry).\par 3/15/19: Pleural fluid: Addendum\par Immunohistochemical studies were performed on the cell block at\par [facility], [address] [place] [id number] (see NOTE). The results\par are as follows:\par \par CK7-                      Rare mesothelial cells positive\par CK20-                    Negative\par CK 5/6-                  Rare mesothelial cells positive\par Calretinin-             Scattered mesothelial cells positive\par CD68-                   Numerous histiocytes positive\par BerEP-4-               Negative\par Napsin-A-             Negative\par TTF-1-                  Negative\par \par These results are non-specific, suggestive of mesothelial\par hyperplasia. The few markedly atypical cells seen in the smear\par are not evident in the cell block.\par \par The atypical cells seen in the current smear are not seen in the\par prior pleural fluid Thinprep smear or cell block (56-ZW-).\par \par \par Few marked atypical cells, suspicious for mesothelioma versus\par adenocarcinoma,\par in a background of numerous mesothelial cells, histiocytes and\par small lymphocytes.\par \par \par 3/25/19: Final DiagnosisPOSITIVE FOR MALIGNANT CELLS.Favor metastatic adenocarcinoma. Pending cell block.\par \par  I spke to Dr. Allison  and there are only a few cells thus furhter studies such as IHC, molecular PD-l1 is not possible\par

## 2019-04-12 NOTE — REASON FOR VISIT
[Initial Consultation] : an initial consultation [Patient Declined  Services] : - None: Patient declined  services [FreeTextEntry2] : Stage IV lung cancer  [FreeTextEntry3] : I speak fluent Barbadian

## 2019-04-12 NOTE — CONSULT LETTER
[Dear  ___] : Dear  [unfilled], [Courtesy Letter:] : I had the pleasure of seeing your patient, [unfilled], in my office today. [Please see my note below.] : Please see my note below. [Consult Closing:] : Thank you very much for allowing me to participate in the care of this patient.  If you have any questions, please do not hesitate to contact me. [Sincerely,] : Sincerely, [FreeTextEntry3] : Bill

## 2019-04-13 LAB
CULTURE RESULTS: SIGNIFICANT CHANGE UP
SPECIMEN SOURCE: SIGNIFICANT CHANGE UP

## 2019-04-15 ENCOUNTER — FORM ENCOUNTER (OUTPATIENT)
Age: 84
End: 2019-04-15

## 2019-04-16 ENCOUNTER — RX RENEWAL (OUTPATIENT)
Age: 84
End: 2019-04-16

## 2019-04-16 ENCOUNTER — FORM ENCOUNTER (OUTPATIENT)
Age: 84
End: 2019-04-16

## 2019-04-16 ENCOUNTER — OUTPATIENT (OUTPATIENT)
Dept: OUTPATIENT SERVICES | Facility: HOSPITAL | Age: 84
LOS: 1 days | Discharge: HOME | End: 2019-04-16
Payer: MEDICAID

## 2019-04-16 DIAGNOSIS — C34.11 MALIGNANT NEOPLASM OF UPPER LOBE, RIGHT BRONCHUS OR LUNG: ICD-10-CM

## 2019-04-16 DIAGNOSIS — Z90.710 ACQUIRED ABSENCE OF BOTH CERVIX AND UTERUS: Chronic | ICD-10-CM

## 2019-04-16 DIAGNOSIS — J91.0 MALIGNANT PLEURAL EFFUSION: ICD-10-CM

## 2019-04-16 PROCEDURE — 70553 MRI BRAIN STEM W/O & W/DYE: CPT | Mod: 26

## 2019-04-17 ENCOUNTER — OUTPATIENT (OUTPATIENT)
Dept: OUTPATIENT SERVICES | Facility: HOSPITAL | Age: 84
LOS: 1 days | Discharge: HOME | End: 2019-04-17
Payer: MEDICAID

## 2019-04-17 DIAGNOSIS — C34.11 MALIGNANT NEOPLASM OF UPPER LOBE, RIGHT BRONCHUS OR LUNG: ICD-10-CM

## 2019-04-17 DIAGNOSIS — Z90.710 ACQUIRED ABSENCE OF BOTH CERVIX AND UTERUS: Chronic | ICD-10-CM

## 2019-04-17 DIAGNOSIS — J91.0 MALIGNANT PLEURAL EFFUSION: ICD-10-CM

## 2019-04-17 LAB — GLUCOSE BLDC GLUCOMTR-MCNC: 98 MG/DL — SIGNIFICANT CHANGE UP (ref 70–99)

## 2019-04-17 PROCEDURE — 78815 PET IMAGE W/CT SKULL-THIGH: CPT | Mod: 26,PI

## 2019-04-20 LAB
CULTURE RESULTS: SIGNIFICANT CHANGE UP
SPECIMEN SOURCE: SIGNIFICANT CHANGE UP

## 2019-04-24 ENCOUNTER — FORM ENCOUNTER (OUTPATIENT)
Age: 84
End: 2019-04-24

## 2019-04-25 ENCOUNTER — OUTPATIENT (OUTPATIENT)
Dept: OUTPATIENT SERVICES | Facility: HOSPITAL | Age: 84
LOS: 1 days | Discharge: HOME | End: 2019-04-25
Payer: MEDICAID

## 2019-04-25 ENCOUNTER — RESULT REVIEW (OUTPATIENT)
Age: 84
End: 2019-04-25

## 2019-04-25 DIAGNOSIS — Z90.710 ACQUIRED ABSENCE OF BOTH CERVIX AND UTERUS: Chronic | ICD-10-CM

## 2019-04-25 PROCEDURE — 88363 XM ARCHIVE TISSUE MOLEC ANAL: CPT | Mod: 26

## 2019-04-25 PROCEDURE — 88305 TISSUE EXAM BY PATHOLOGIST: CPT | Mod: 26

## 2019-04-25 PROCEDURE — 32400 NEEDLE BIOPSY CHEST LINING: CPT | Mod: RT

## 2019-04-25 PROCEDURE — 88344 IMHCHEM/IMCYTCHM EA MLT ANTB: CPT | Mod: 26,59

## 2019-04-25 PROCEDURE — 99152 MOD SED SAME PHYS/QHP 5/>YRS: CPT

## 2019-04-25 PROCEDURE — 77012 CT SCAN FOR NEEDLE BIOPSY: CPT | Mod: 26

## 2019-04-25 PROCEDURE — 88333 PATH CONSLTJ SURG CYTO XM 1: CPT | Mod: 26

## 2019-04-25 PROCEDURE — 88341 IMHCHEM/IMCYTCHM EA ADD ANTB: CPT | Mod: 26,59

## 2019-04-25 PROCEDURE — 88342 IMHCHEM/IMCYTCHM 1ST ANTB: CPT | Mod: 26,59

## 2019-04-25 PROCEDURE — 88360 TUMOR IMMUNOHISTOCHEM/MANUAL: CPT | Mod: 26

## 2019-04-25 NOTE — PROGRESS NOTE ADULT - SUBJECTIVE AND OBJECTIVE BOX
PREOPERATIVE DAY OF PROCEDURE EVALUATION:     I have personally seen and examined this patient. I agree with the history and physical which I have reviewed and noted any changes below:     Plan is for CT guided right lung mass biopsy with intravenous conscious sedation    Procedure/ risks/ benefits/ goals/ alternatives were explained. All questions answered. Informed content obtained from patient. Consent placed in chart.

## 2019-04-25 NOTE — PROGRESS NOTE ADULT - SUBJECTIVE AND OBJECTIVE BOX
INTERVENTIONAL RADIOLOGY BRIEF-OPERATIVE NOTE    Procedure: CT guided biopsy right pleural based nodularity    Pre-Op Diagnosis: Lung Cancer    Post-Op Diagnosis: Same    Attending: Elliot Landau MD  Resident: None    Anesthesia (type):  [ ] General Anesthesia  [x] Sedation  [ ] Spinal Anesthesia  [x] Local/Regional    Contrast: 0     Estimated Blood Loss: < 5 cc    Condition:   [ ] Critical  [ ] Serious  [x] Fair   [ ] Good    Findings/Follow up Plan of Care: CT guided biopsy right upper lobe pleural nodularity with 20 gauge needle biopsy. Patient tolerated procedure well. Multiple specimens obtained. Post procedure CT demonstrated no abnormality.    Specimens Removed: Surgical Pathology    Implants: None    Complications: None    Disposition: Recovery room pending D/C      Please call Interventional Radiology [phone] with any questions, concerns, or issues.

## 2019-04-29 LAB — NON-GYNECOLOGICAL CYTOLOGY STUDY: SIGNIFICANT CHANGE UP

## 2019-05-02 DIAGNOSIS — I10 ESSENTIAL (PRIMARY) HYPERTENSION: ICD-10-CM

## 2019-05-02 DIAGNOSIS — C34.11 MALIGNANT NEOPLASM OF UPPER LOBE, RIGHT BRONCHUS OR LUNG: ICD-10-CM

## 2019-05-02 DIAGNOSIS — J91.0 MALIGNANT PLEURAL EFFUSION: ICD-10-CM

## 2019-05-02 DIAGNOSIS — R91.1 SOLITARY PULMONARY NODULE: ICD-10-CM

## 2019-05-02 DIAGNOSIS — E78.00 PURE HYPERCHOLESTEROLEMIA, UNSPECIFIED: ICD-10-CM

## 2019-05-04 LAB
CULTURE RESULTS: SIGNIFICANT CHANGE UP
SPECIMEN SOURCE: SIGNIFICANT CHANGE UP

## 2019-05-11 LAB
CULTURE RESULTS: SIGNIFICANT CHANGE UP
SPECIMEN SOURCE: SIGNIFICANT CHANGE UP

## 2019-05-14 ENCOUNTER — APPOINTMENT (OUTPATIENT)
Dept: HEMATOLOGY ONCOLOGY | Facility: CLINIC | Age: 84
End: 2019-05-14

## 2019-05-17 ENCOUNTER — LABORATORY RESULT (OUTPATIENT)
Age: 84
End: 2019-05-17

## 2019-05-17 ENCOUNTER — RX RENEWAL (OUTPATIENT)
Age: 84
End: 2019-05-17

## 2019-05-17 ENCOUNTER — APPOINTMENT (OUTPATIENT)
Dept: HEMATOLOGY ONCOLOGY | Facility: CLINIC | Age: 84
End: 2019-05-17

## 2019-05-17 ENCOUNTER — APPOINTMENT (OUTPATIENT)
Dept: INFUSION THERAPY | Facility: CLINIC | Age: 84
End: 2019-05-17

## 2019-05-17 LAB
ALBUMIN SERPL ELPH-MCNC: 3.6 G/DL
ALP BLD-CCNC: 84 U/L
ALT SERPL-CCNC: 9 U/L
ANION GAP SERPL CALC-SCNC: 15 MMOL/L
AST SERPL-CCNC: 12 U/L
BILIRUB SERPL-MCNC: 0.4 MG/DL
BUN SERPL-MCNC: 21 MG/DL
CALCIUM SERPL-MCNC: 9.6 MG/DL
CHLORIDE SERPL-SCNC: 97 MMOL/L
CO2 SERPL-SCNC: 23 MMOL/L
CREAT SERPL-MCNC: 0.9 MG/DL
GLUCOSE SERPL-MCNC: 134 MG/DL
HCT VFR BLD CALC: 35.4 %
HGB BLD-MCNC: 11.7 G/DL
MCHC RBC-ENTMCNC: 25.8 PG
MCHC RBC-ENTMCNC: 33.1 G/DL
MCV RBC AUTO: 78 FL
PLATELET # BLD AUTO: 287 K/UL
PMV BLD: 10.2 FL
POTASSIUM SERPL-SCNC: 4.4 MMOL/L
PROT SERPL-MCNC: 6.3 G/DL
RBC # BLD: 4.54 M/UL
RBC # FLD: 13.6 %
SODIUM SERPL-SCNC: 135 MMOL/L
WBC # FLD AUTO: 8.87 K/UL

## 2019-05-17 RX ORDER — ONDANSETRON 8 MG/1
8 TABLET, FILM COATED ORAL ONCE
Refills: 0 | Status: COMPLETED | OUTPATIENT
Start: 2019-05-17 | End: 2019-05-17

## 2019-05-17 RX ORDER — PEMBROLIZUMAB 25 MG/ML
200 INJECTION, SOLUTION INTRAVENOUS ONCE
Refills: 0 | Status: COMPLETED | OUTPATIENT
Start: 2019-05-17 | End: 2019-05-17

## 2019-05-17 RX ORDER — MORPHINE SULFATE 50 MG/1
2 CAPSULE, EXTENDED RELEASE ORAL ONCE
Refills: 0 | Status: DISCONTINUED | OUTPATIENT
Start: 2019-05-17 | End: 2019-05-17

## 2019-05-17 RX ADMIN — ONDANSETRON 8 MILLIGRAM(S): 8 TABLET, FILM COATED ORAL at 12:34

## 2019-05-17 RX ADMIN — PEMBROLIZUMAB 216 MILLIGRAM(S): 25 INJECTION, SOLUTION INTRAVENOUS at 12:26

## 2019-05-17 RX ADMIN — PEMBROLIZUMAB 200 MILLIGRAM(S): 25 INJECTION, SOLUTION INTRAVENOUS at 12:57

## 2019-05-17 RX ADMIN — MORPHINE SULFATE 2 MILLIGRAM(S): 50 CAPSULE, EXTENDED RELEASE ORAL at 11:55

## 2019-05-17 RX ADMIN — ONDANSETRON 108 MILLIGRAM(S): 8 TABLET, FILM COATED ORAL at 12:14

## 2019-05-17 RX ADMIN — MORPHINE SULFATE 2 MILLIGRAM(S): 50 CAPSULE, EXTENDED RELEASE ORAL at 12:27

## 2019-05-20 LAB
T3 SERPL-MCNC: 115 NG/DL
T4 SERPL-MCNC: 8 UG/DL
TSH SERPL-ACNC: 0.65 UIU/ML

## 2019-05-29 ENCOUNTER — RX RENEWAL (OUTPATIENT)
Age: 84
End: 2019-05-29

## 2019-05-29 ENCOUNTER — OTHER (OUTPATIENT)
Age: 84
End: 2019-05-29

## 2019-06-05 ENCOUNTER — APPOINTMENT (OUTPATIENT)
Dept: HEMATOLOGY ONCOLOGY | Facility: CLINIC | Age: 84
End: 2019-06-05
Payer: MEDICAID

## 2019-06-05 ENCOUNTER — LABORATORY RESULT (OUTPATIENT)
Age: 84
End: 2019-06-05

## 2019-06-05 VITALS
RESPIRATION RATE: 16 BRPM | HEIGHT: 62 IN | HEART RATE: 102 BPM | TEMPERATURE: 97.4 F | SYSTOLIC BLOOD PRESSURE: 136 MMHG | DIASTOLIC BLOOD PRESSURE: 79 MMHG

## 2019-06-05 LAB
HCT VFR BLD CALC: 35.1 %
HGB BLD-MCNC: 11.5 G/DL
MCHC RBC-ENTMCNC: 25.4 PG
MCHC RBC-ENTMCNC: 32.8 G/DL
MCV RBC AUTO: 77.5 FL
PLATELET # BLD AUTO: 302 K/UL
PMV BLD: 9.5 FL
RBC # BLD: 4.53 M/UL
RBC # FLD: 14.4 %
WBC # FLD AUTO: 7.83 K/UL

## 2019-06-05 PROCEDURE — 99214 OFFICE O/P EST MOD 30 MIN: CPT

## 2019-06-05 RX ORDER — OXYCODONE AND ACETAMINOPHEN 5; 325 MG/1; MG/1
5-325 TABLET ORAL EVERY 8 HOURS
Qty: 60 | Refills: 0 | Status: COMPLETED | COMMUNITY
Start: 2019-04-08 | End: 2019-06-05

## 2019-06-05 RX ORDER — OXYCODONE 5 MG/1
5 TABLET ORAL
Qty: 60 | Refills: 0 | Status: COMPLETED | COMMUNITY
Start: 2019-04-08 | End: 2019-06-05

## 2019-06-05 RX ORDER — TRAMADOL HYDROCHLORIDE 50 MG/1
50 TABLET, COATED ORAL
Refills: 0 | Status: COMPLETED | COMMUNITY
End: 2019-06-05

## 2019-06-05 NOTE — REVIEW OF SYSTEMS
[SOB on Exertion] : shortness of breath during exertion [Fatigue] : fatigue [Recent Change In Weight] : ~T recent weight change [Constipation] : constipation [Negative] : Heme/Lymph

## 2019-06-06 ENCOUNTER — APPOINTMENT (OUTPATIENT)
Dept: INFUSION THERAPY | Facility: CLINIC | Age: 84
End: 2019-06-06

## 2019-06-06 RX ORDER — PEMBROLIZUMAB 25 MG/ML
200 INJECTION, SOLUTION INTRAVENOUS ONCE
Refills: 0 | Status: COMPLETED | OUTPATIENT
Start: 2019-06-06 | End: 2019-06-06

## 2019-06-06 RX ORDER — ONDANSETRON 8 MG/1
8 TABLET, FILM COATED ORAL ONCE
Refills: 0 | Status: COMPLETED | OUTPATIENT
Start: 2019-06-06 | End: 2019-06-06

## 2019-06-06 RX ADMIN — PEMBROLIZUMAB 216 MILLIGRAM(S): 25 INJECTION, SOLUTION INTRAVENOUS at 13:06

## 2019-06-06 RX ADMIN — ONDANSETRON 162 MILLIGRAM(S): 8 TABLET, FILM COATED ORAL at 14:30

## 2019-06-07 LAB
ALBUMIN SERPL ELPH-MCNC: 3.3 G/DL
ALP BLD-CCNC: 102 U/L
ALT SERPL-CCNC: 14 U/L
ANION GAP SERPL CALC-SCNC: 18 MMOL/L
AST SERPL-CCNC: 27 U/L
BILIRUB SERPL-MCNC: 0.5 MG/DL
BUN SERPL-MCNC: 40 MG/DL
CALCIUM SERPL-MCNC: 9.3 MG/DL
CHLORIDE SERPL-SCNC: 88 MMOL/L
CO2 SERPL-SCNC: 23 MMOL/L
CREAT SERPL-MCNC: 1.4 MG/DL
GLUCOSE SERPL-MCNC: 120 MG/DL
POTASSIUM SERPL-SCNC: 5.7 MMOL/L
PROT SERPL-MCNC: 6.4 G/DL
SODIUM SERPL-SCNC: 129 MMOL/L
TSH SERPL-ACNC: 0.77 UIU/ML

## 2019-06-09 NOTE — ASSESSMENT
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

## 2019-06-09 NOTE — PHYSICAL EXAM
[Ambulatory and capable of all self care but unable to carry out any work activities] : Status 2- Ambulatory and capable of all self care but unable to carry out any work activities. Up and about more than 50% of waking hours [Normal] : grossly intact [de-identified] : Has absent breath sound around right  Pleurx.  But more air movement than last time

## 2019-06-09 NOTE — HISTORY OF PRESENT ILLNESS
[Cycle: ___] : Cycle: [unfilled] [Therapy: ___] : Therapy: [unfilled] [FreeTextEntry1] : Started Keytruda on 5/17/19 [de-identified] : \par This is a 83 year old female who I initially met in [facility] on 3/28/19. She was initially admitted on 2/20/2019 for a right loculated pleural effusion\par approx. 900 CCs, no PE and right apical 3.1x3.0 cm mass with pleural nodules on CTA of chest.  . Pt returned to ED on 3/15/2019 for right pleural\par effusion, had a thoracentesis in ED and was sent home. She than came back for SOB  due to recurrent effusion and a Pleurx was placed. Patholgoy showed adenocarcinoma. \par \par She ha lost a few pounds about  4. She never smoked. She does have weakness.  From her Pleurx she  has 500 cc removed every other day. She has pain after.\par \par Work up:\par 2/20/19 CTA chest:  Large right pleural effusion, maximal axial width of 7 cm correlating with an estimated volume of 900 cc. Associated \par near complete compressive atelectasis of the right lower lobe. Probable loculations of the effusion seen at the apex and at the right heart \par border (for example series 5 images 176, 52; series 9 image 69). 1 cm right upper lobe fissural nodule (series 5 image 139). Patent central \par airways. There is right apical 3.1 x 3.0 cm mass with irregular right apical pleural thickening (series 7, image 44), \par and multiple satellite pleural nodules including a more caudad 2.7 cm para-mediastinal nodule (series 7, image 175). Tissue sampling \par recommended.\par \par Path: \par Pleural fluid: 3/25:  Cell block material is hypocellular and shows a single minutecluster of malignant cells.Immunohistochemistry studies were performed at [facility] and the\par results are noncontributory\par 3/22: Addendum\par Cell block material shows macrophages (positive ),mesothelial cells (positive calretinin) and a single very minute cluster of atypical suspicious cells is negative for Fidel-Ep4.Material is insufficient for further diagnsotic studies (ie,mmunohistochemistry).\par 3/15/19: Pleural fluid: Addendum\par Immunohistochemical studies were performed on the cell block at\par [facility], [address] [place] [id number] (see NOTE). The results\par are as follows:\par \par CK7-                      Rare mesothelial cells positive\par CK20-                    Negative\par CK 5/6-                  Rare mesothelial cells positive\par Calretinin-             Scattered mesothelial cells positive\par CD68-                   Numerous histiocytes positive\par BerEP-4-               Negative\par Napsin-A-             Negative\par TTF-1-                  Negative\par \par These results are non-specific, suggestive of mesothelial\par hyperplasia. The few markedly atypical cells seen in the smear\par are not evident in the cell block.\par \par The atypical cells seen in the current smear are not seen in the\par prior pleural fluid Thinprep smear or cell block (08-YK-).\par \par \par Few marked atypical cells, suspicious for mesothelioma versus\par adenocarcinoma,\par in a background of numerous mesothelial cells, histiocytes and\par small lymphocytes.\par \par \par 3/25/19: Final DiagnosisPOSITIVE FOR MALIGNANT CELLS.Favor metastatic adenocarcinoma. Pending cell block.\par \par  I spke to Dr. Allison  and there are only a few cells thus furhter studies such as IHC, molecular PD-l1 is not possible\par  [de-identified] : 6/5/19\par She is here for ofllow up. Since last visit she started Keytruda. She had a PET/CT on April 17 that showed  Extensive FDG avid right-sided pleural mass/soft tissue thickening, \par with max SUV 21.5 within a right apical pleural soft tissue mass.\par \par 2.  Multiple FDG avid mediastinal lymph nodes, with max SUV 16.5 within a \par 2.6 x 2.1 cm subcarinal node.\par \par 3.  FDG avid lytic lesion within the left superior acetabulum (max SUV \par 14.8), suspicious for osseous metastasis.\par \par 4.  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 Used to be  500 ml  u 3 times a week. Takes 60 mg of oral moprphine a day 20 mg 3 times a day but pain is still severe. Has not had a bowel movement in unknown amount of days does not take lexatives although she has lexatives.

## 2019-06-09 NOTE — REASON FOR VISIT
[Initial Consultation] : an initial consultation [Patient Declined  Services] : - None: Patient declined  services [FreeTextEntry3] : I speak fluent Tuvaluan [FreeTextEntry2] : Stage IV lung cancer

## 2019-06-09 NOTE — CONSULT LETTER
[Courtesy Letter:] : I had the pleasure of seeing your patient, [unfilled], in my office today. [Please see my note below.] : Please see my note below. [Dear  ___] : Dear  [unfilled], [Sincerely,] : Sincerely, [Consult Closing:] : Thank you very much for allowing me to participate in the care of this patient.  If you have any questions, please do not hesitate to contact me. [FreeTextEntry3] : Bill

## 2019-06-26 ENCOUNTER — APPOINTMENT (OUTPATIENT)
Dept: INFUSION THERAPY | Facility: CLINIC | Age: 84
End: 2019-06-26
Payer: MEDICAID

## 2019-06-26 ENCOUNTER — LABORATORY RESULT (OUTPATIENT)
Age: 84
End: 2019-06-26

## 2019-06-26 ENCOUNTER — APPOINTMENT (OUTPATIENT)
Dept: HEMATOLOGY ONCOLOGY | Facility: CLINIC | Age: 84
End: 2019-06-26
Payer: MEDICAID

## 2019-06-26 VITALS — HEIGHT: 62 IN | BODY MASS INDEX: 24.48 KG/M2 | WEIGHT: 133 LBS

## 2019-06-26 VITALS
HEIGHT: 62 IN | SYSTOLIC BLOOD PRESSURE: 102 MMHG | HEART RATE: 88 BPM | DIASTOLIC BLOOD PRESSURE: 59 MMHG | TEMPERATURE: 98.4 F | RESPIRATION RATE: 16 BRPM

## 2019-06-26 LAB
ALBUMIN SERPL ELPH-MCNC: 2.9 G/DL
ALP BLD-CCNC: 102 U/L
ALT SERPL-CCNC: 11 U/L
ANION GAP SERPL CALC-SCNC: 15 MMOL/L
AST SERPL-CCNC: 14 U/L
BILIRUB SERPL-MCNC: 0.5 MG/DL
BUN SERPL-MCNC: 26 MG/DL
CALCIUM SERPL-MCNC: 8.7 MG/DL
CHLORIDE SERPL-SCNC: 88 MMOL/L
CO2 SERPL-SCNC: 26 MMOL/L
CREAT SERPL-MCNC: 1.2 MG/DL
GLUCOSE SERPL-MCNC: 123 MG/DL
HCT VFR BLD CALC: 34.1 %
HGB BLD-MCNC: 11.3 G/DL
MCHC RBC-ENTMCNC: 25.2 PG
MCHC RBC-ENTMCNC: 33.1 G/DL
MCV RBC AUTO: 75.9 FL
PLATELET # BLD AUTO: 287 K/UL
PMV BLD: 9.5 FL
POTASSIUM SERPL-SCNC: 4.1 MMOL/L
PROT SERPL-MCNC: 5.6 G/DL
RBC # BLD: 4.49 M/UL
RBC # FLD: 14.6 %
SODIUM SERPL-SCNC: 129 MMOL/L
WBC # FLD AUTO: 7.18 K/UL

## 2019-06-26 PROCEDURE — 99214 OFFICE O/P EST MOD 30 MIN: CPT

## 2019-06-26 RX ORDER — FENTANYL 25 UG/H
25 PATCH, EXTENDED RELEASE TRANSDERMAL
Qty: 10 | Refills: 0 | Status: COMPLETED | COMMUNITY
Start: 2019-06-05 | End: 2019-06-26

## 2019-06-26 RX ORDER — PEMBROLIZUMAB 25 MG/ML
200 INJECTION, SOLUTION INTRAVENOUS ONCE
Refills: 0 | Status: COMPLETED | OUTPATIENT
Start: 2019-06-26 | End: 2019-06-26

## 2019-06-26 RX ADMIN — PEMBROLIZUMAB 216 MILLIGRAM(S): 25 INJECTION, SOLUTION INTRAVENOUS at 12:56

## 2019-06-26 RX ADMIN — PEMBROLIZUMAB 200 MILLIGRAM(S): 25 INJECTION, SOLUTION INTRAVENOUS at 13:30

## 2019-06-27 LAB — TSH SERPL-ACNC: 0.87 UIU/ML

## 2019-06-27 NOTE — REVIEW OF SYSTEMS
[Fatigue] : fatigue [Recent Change In Weight] : ~T recent weight change [SOB on Exertion] : shortness of breath during exertion [Constipation] : constipation [Negative] : Heme/Lymph [FreeTextEntry6] : MOst likely has pain in Pleura

## 2019-06-27 NOTE — ASSESSMENT
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

## 2019-06-27 NOTE — HISTORY OF PRESENT ILLNESS
[Therapy: ___] : Therapy: [unfilled] [Cycle: ___] : Cycle: [unfilled] [de-identified] : \par This is a 83 year old female who I initially met in [facility] on 3/28/19. She was initially admitted on 2/20/2019 for a right loculated pleural effusion\par approx. 900 CCs, no PE and right apical 3.1x3.0 cm mass with pleural nodules on CTA of chest.  . Pt returned to ED on 3/15/2019 for right pleural\par effusion, had a thoracentesis in ED and was sent home. She than came back for SOB  due to recurrent effusion and a Pleurx was placed. Patholgoy showed adenocarcinoma. \par \par She ha lost a few pounds about  4. She never smoked. She does have weakness.  From her Pleurx she  has 500 cc removed every other day. She has pain after.\par \par Work up:\par 2/20/19 CTA chest:  Large right pleural effusion, maximal axial width of 7 cm correlating with an estimated volume of 900 cc. Associated \par near complete compressive atelectasis of the right lower lobe. Probable loculations of the effusion seen at the apex and at the right heart \par border (for example series 5 images 176, 52; series 9 image 69). 1 cm right upper lobe fissural nodule (series 5 image 139). Patent central \par airways. There is right apical 3.1 x 3.0 cm mass with irregular right apical pleural thickening (series 7, image 44), \par and multiple satellite pleural nodules including a more caudad 2.7 cm para-mediastinal nodule (series 7, image 175). Tissue sampling \par recommended.\par \par Path: \par Pleural fluid: 3/25:  Cell block material is hypocellular and shows a single minutecluster of malignant cells.Immunohistochemistry studies were performed at [facility] and the\par results are noncontributory\par 3/22: Addendum\par Cell block material shows macrophages (positive ),mesothelial cells (positive calretinin) and a single very minute cluster of atypical suspicious cells is negative for Fidel-Ep4.Material is insufficient for further diagnsotic studies (ie,mmunohistochemistry).\par 3/15/19: Pleural fluid: Addendum\par Immunohistochemical studies were performed on the cell block at\par [facility], [address] [place] [id number] (see NOTE). The results\par are as follows:\par \par CK7-                      Rare mesothelial cells positive\par CK20-                    Negative\par CK 5/6-                  Rare mesothelial cells positive\par Calretinin-             Scattered mesothelial cells positive\par CD68-                   Numerous histiocytes positive\par BerEP-4-               Negative\par Napsin-A-             Negative\par TTF-1-                  Negative\par \par These results are non-specific, suggestive of mesothelial\par hyperplasia. The few markedly atypical cells seen in the smear\par are not evident in the cell block.\par \par The atypical cells seen in the current smear are not seen in the\par prior pleural fluid Thinprep smear or cell block (31-BX-).\par \par \par Few marked atypical cells, suspicious for mesothelioma versus\par adenocarcinoma,\par in a background of numerous mesothelial cells, histiocytes and\par small lymphocytes.\par \par \par 3/25/19: Final DiagnosisPOSITIVE FOR MALIGNANT CELLS.Favor metastatic adenocarcinoma. Pending cell block.\par \par  I spke to Dr. Allison  and there are only a few cells thus furhter studies such as IHC, molecular PD-l1 is not possible\par  [FreeTextEntry1] : Started Keytruda on 5/17/19 [de-identified] : 6/5/19\par She is here for ofllow up. Since last visit she started Keytruda. She had a PET/CT on April 17 that showed  Extensive FDG avid right-sided pleural mass/soft tissue thickening, \par with max SUV 21.5 within a right apical pleural soft tissue mass.\par \par 2.  Multiple FDG avid mediastinal lymph nodes, with max SUV 16.5 within a \par 2.6 x 2.1 cm subcarinal node.\par \par 3.  FDG avid lytic lesion within the left superior acetabulum (max SUV \par 14.8), suspicious for osseous metastasis.\par \par 4.  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 Used to be  500 ml  u 3 times a week. Takes 60 mg of oral moprphine a day 20 mg 3 times a day but pain is still severe. Has not had a bowel movement in unknown amount of days does not take lexatives although she has lexatives. \par \par 6/26/19 She is here for follow up. She is due for cycle 3 of Keytruda.  She has about 300 mg drained from her pleurax twice a week. Has SOB but saturated 94% on room air and 94% on ambualtion. Did not torate the fentanyl path had nausea.  On Morphine 10 mg BID doing well.  Constipation  is better.

## 2019-06-27 NOTE — PHYSICAL EXAM
[Ambulatory and capable of all self care but unable to carry out any work activities] : Status 2- Ambulatory and capable of all self care but unable to carry out any work activities. Up and about more than 50% of waking hours [Normal] : affect appropriate [de-identified] : Has absent breath sound around right  Pleurx.  About the same as last time [de-identified] : Looks frail

## 2019-06-27 NOTE — REASON FOR VISIT
[Initial Consultation] : an initial consultation [Patient Declined  Services] : - None: Patient declined  services [FreeTextEntry3] : I speak fluent Japanese [FreeTextEntry2] : Stage IV lung cancer

## 2019-06-27 NOTE — CONSULT LETTER
[Dear  ___] : Dear  [unfilled], [Courtesy Letter:] : I had the pleasure of seeing your patient, [unfilled], in my office today. [Consult Closing:] : Thank you very much for allowing me to participate in the care of this patient.  If you have any questions, please do not hesitate to contact me. [Please see my note below.] : Please see my note below. [Sincerely,] : Sincerely, [FreeTextEntry3] : Bill

## 2019-07-05 ENCOUNTER — FORM ENCOUNTER (OUTPATIENT)
Age: 84
End: 2019-07-05

## 2019-07-06 ENCOUNTER — OUTPATIENT (OUTPATIENT)
Dept: OUTPATIENT SERVICES | Facility: HOSPITAL | Age: 84
LOS: 1 days | Discharge: HOME | End: 2019-07-06
Payer: MEDICAID

## 2019-07-06 DIAGNOSIS — Z90.710 ACQUIRED ABSENCE OF BOTH CERVIX AND UTERUS: Chronic | ICD-10-CM

## 2019-07-06 DIAGNOSIS — J94.9 PLEURAL CONDITION, UNSPECIFIED: ICD-10-CM

## 2019-07-06 PROCEDURE — 71260 CT THORAX DX C+: CPT | Mod: 26

## 2019-07-06 PROCEDURE — 74177 CT ABD & PELVIS W/CONTRAST: CPT | Mod: 26

## 2019-07-17 ENCOUNTER — APPOINTMENT (OUTPATIENT)
Dept: INFUSION THERAPY | Facility: CLINIC | Age: 84
End: 2019-07-17
Payer: MEDICAID

## 2019-07-17 ENCOUNTER — LABORATORY RESULT (OUTPATIENT)
Age: 84
End: 2019-07-17

## 2019-07-17 ENCOUNTER — APPOINTMENT (OUTPATIENT)
Dept: HEMATOLOGY ONCOLOGY | Facility: CLINIC | Age: 84
End: 2019-07-17
Payer: MEDICAID

## 2019-07-17 VITALS
BODY MASS INDEX: 23.12 KG/M2 | RESPIRATION RATE: 16 BRPM | SYSTOLIC BLOOD PRESSURE: 115 MMHG | DIASTOLIC BLOOD PRESSURE: 60 MMHG | WEIGHT: 125.66 LBS | HEART RATE: 80 BPM | HEIGHT: 62 IN | OXYGEN SATURATION: 98 % | TEMPERATURE: 97.1 F

## 2019-07-17 LAB
ALBUMIN SERPL ELPH-MCNC: 2.8 G/DL
ALP BLD-CCNC: 95 U/L
ALT SERPL-CCNC: 12 U/L
ANION GAP SERPL CALC-SCNC: 14 MMOL/L
AST SERPL-CCNC: 16 U/L
BILIRUB SERPL-MCNC: 0.5 MG/DL
BUN SERPL-MCNC: 27 MG/DL
CALCIUM SERPL-MCNC: 8.5 MG/DL
CHLORIDE SERPL-SCNC: 95 MMOL/L
CO2 SERPL-SCNC: 25 MMOL/L
CREAT SERPL-MCNC: 1.4 MG/DL
GLUCOSE SERPL-MCNC: 103 MG/DL
HCT VFR BLD CALC: 33.8 %
HGB BLD-MCNC: 11 G/DL
MCHC RBC-ENTMCNC: 24.7 PG
MCHC RBC-ENTMCNC: 32.5 G/DL
MCV RBC AUTO: 76 FL
PLATELET # BLD AUTO: 253 K/UL
PMV BLD: 9.5 FL
POTASSIUM SERPL-SCNC: 2.9 MMOL/L
PROT SERPL-MCNC: 5.7 G/DL
RBC # BLD: 4.45 M/UL
RBC # FLD: 15.7 %
SODIUM SERPL-SCNC: 134 MMOL/L
WBC # FLD AUTO: 7.22 K/UL

## 2019-07-17 PROCEDURE — 99215 OFFICE O/P EST HI 40 MIN: CPT

## 2019-07-17 RX ORDER — SODIUM CHLORIDE 9 MG/ML
500 INJECTION INTRAMUSCULAR; INTRAVENOUS; SUBCUTANEOUS
Refills: 0 | Status: COMPLETED | OUTPATIENT
Start: 2019-07-17 | End: 2019-07-17

## 2019-07-17 RX ORDER — PEMBROLIZUMAB 25 MG/ML
200 INJECTION, SOLUTION INTRAVENOUS ONCE
Refills: 0 | Status: COMPLETED | OUTPATIENT
Start: 2019-07-17 | End: 2019-07-17

## 2019-07-17 RX ADMIN — SODIUM CHLORIDE 500 MILLILITER(S): 9 INJECTION INTRAMUSCULAR; INTRAVENOUS; SUBCUTANEOUS at 15:52

## 2019-07-17 RX ADMIN — PEMBROLIZUMAB 216 MILLIGRAM(S): 25 INJECTION, SOLUTION INTRAVENOUS at 13:42

## 2019-07-17 RX ADMIN — SODIUM CHLORIDE 500 MILLILITER(S): 9 INJECTION INTRAMUSCULAR; INTRAVENOUS; SUBCUTANEOUS at 15:53

## 2019-07-17 RX ADMIN — PEMBROLIZUMAB 200 MILLIGRAM(S): 25 INJECTION, SOLUTION INTRAVENOUS at 14:13

## 2019-07-18 LAB — TSH SERPL-ACNC: 2.47 UIU/ML

## 2019-07-19 NOTE — REVIEW OF SYSTEMS
[Fatigue] : fatigue [Recent Change In Weight] : ~T recent weight change [SOB on Exertion] : shortness of breath during exertion [Constipation] : constipation [Negative] : Allergic/Immunologic [Shortness Of Breath] : shortness of breath [Dysphagia] : dysphagia [FreeTextEntry2] : seated in wheelchair [FreeTextEntry6] : Most likely has pain in Pleura, improving

## 2019-07-19 NOTE — ASSESSMENT
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 \par -CT after cycle 3 with mild improvement/stable findings and less pleural fluid and pain\par - c/w Keytruda every 3 weeks, possibly responding since less fluid coming out of Pleurx, will see about removing if she continues to do well\par - did not tolerate  Fentanyl patch 25 mcg/hr but doing well on morphine only and once at HS now\par - doing well on Lactulose \par - will hold off Zometa  or Xgeva for now\par \par #Dysphagia and decrease  appetite \par -she follows  with [facility] has supplements\par -we don’t want to put her though a work up such as EGD, maybe there is extrinsic compression of esophagus due to adenopathy\par -son will c/w ensures and liquid diet\par -the appetite hopefully should improve, maybe she has depression, will consider  anti depression with appetite stimulant properties in future  Mirtazapine \par \par RTC in 3 weeks, Next scan after cycle 6

## 2019-07-19 NOTE — HISTORY OF PRESENT ILLNESS
[Therapy: ___] : Therapy: [unfilled] [Cycle: ___] : Cycle: [unfilled] [de-identified] : \par This is a 83 year old female who I initially met in [facility] on 3/28/19. She was initially admitted on 2/20/2019 for a right loculated pleural effusion\par approx. 900 CCs, no PE and right apical 3.1x3.0 cm mass with pleural nodules on CTA of chest.  . Pt returned to ED on 3/15/2019 for right pleural\par effusion, had a thoracentesis in ED and was sent home. She than came back for SOB  due to recurrent effusion and a Pleurx was placed. Patholgoy showed adenocarcinoma. \par \par She ha lost a few pounds about  4. She never smoked. She does have weakness.  From her Pleurx she  has 500 cc removed every other day. She has pain after.\par \par Work up:\par 2/20/19 CTA chest:  Large right pleural effusion, maximal axial width of 7 cm correlating with an estimated volume of 900 cc. Associated \par near complete compressive atelectasis of the right lower lobe. Probable loculations of the effusion seen at the apex and at the right heart \par border (for example series 5 images 176, 52; series 9 image 69). 1 cm right upper lobe fissural nodule (series 5 image 139). Patent central \par airways. There is right apical 3.1 x 3.0 cm mass with irregular right apical pleural thickening (series 7, image 44), \par and multiple satellite pleural nodules including a more caudad 2.7 cm para-mediastinal nodule (series 7, image 175). Tissue sampling \par recommended.\par \par Path: \par Pleural fluid: 3/25:  Cell block material is hypocellular and shows a single minutecluster of malignant cells.Immunohistochemistry studies were performed at [facility] and the\par results are noncontributory\par 3/22: Addendum\par Cell block material shows macrophages (positive ),mesothelial cells (positive calretinin) and a single very minute cluster of atypical suspicious cells is negative for Fidel-Ep4.Material is insufficient for further diagnsotic studies (ie,mmunohistochemistry).\par 3/15/19: Pleural fluid: Addendum\par Immunohistochemical studies were performed on the cell block at\par [facility], [address] [place] [id number] (see NOTE). The results\par are as follows:\par \par CK7-                      Rare mesothelial cells positive\par CK20-                    Negative\par CK 5/6-                  Rare mesothelial cells positive\par Calretinin-             Scattered mesothelial cells positive\par CD68-                   Numerous histiocytes positive\par BerEP-4-               Negative\par Napsin-A-             Negative\par TTF-1-                  Negative\par \par These results are non-specific, suggestive of mesothelial\par hyperplasia. The few markedly atypical cells seen in the smear\par are not evident in the cell block.\par \par The atypical cells seen in the current smear are not seen in the\par prior pleural fluid Thinprep smear or cell block (91-JJ-).\par \par \par Few marked atypical cells, suspicious for mesothelioma versus\par adenocarcinoma,\par in a background of numerous mesothelial cells, histiocytes and\par small lymphocytes.\par \par \par 3/25/19: Final DiagnosisPOSITIVE FOR MALIGNANT CELLS.Favor metastatic adenocarcinoma. Pending cell block.\par \par  I spke to Dr. Allison  and there are only a few cells thus furhter studies such as IHC, molecular PD-l1 is not possible\par  [FreeTextEntry1] : Started Keytruda on 5/17/19 [de-identified] : 6/5/19\par She is here for ofllow up. Since last visit she started Keytruda. She had a PET/CT on April 17 that showed  Extensive FDG avid right-sided pleural mass/soft tissue thickening, \par with max SUV 21.5 within a right apical pleural soft tissue mass.\par \par 2.  Multiple FDG avid mediastinal lymph nodes, with max SUV 16.5 within a \par 2.6 x 2.1 cm subcarinal node.\par \par 3.  FDG avid lytic lesion within the left superior acetabulum (max SUV \par 14.8), suspicious for osseous metastasis.\par \par 4.  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 Used to be  500 ml  u 3 times a week. Takes 60 mg of oral moprphine a day 20 mg 3 times a day but pain is still severe. Has not had a bowel movement in unknown amount of days does not take lexatives although she has lexatives. \par \par 6/26/19 She is here for follow up. She is due for cycle 3 of Keytruda.  She has about 300 mg drained from her pleurax twice a week. Has SOB but saturated 94% on room air and 94% on ambualtion. Did not torate the fentanyl path had nausea.  On Morphine 10 mg BID doing well.  Constipation  is better. \par \par 7/17/19\par She is here for follow-up of Stage IV lung cancer with son.  She is due for cycle 3 of Keytruda.  CBC is stable from today.  She only takes the liquid morphine at night now.  She started eating slighlty more and has been feeling slightly better ,as per son.  He states that the pleurex drainage is less, it is checked every Friday + Monday.  On Friday there was about 225cc drained, but yesterday no drainage.  We will arrange for hydration today since she has had poor oral intake lately and small appetite.  Son reports she can tolerate fluids but she has early satiety with food.  Patient also reports feeling short of breath at rest, but her O2 sat is 98% on R/a.  Right sided pain is better. \par CT C/A/P (7/6/19) IMPRESSION:Since April 17, 2019:1. No definite new sites of metastatic disease.2. Right-sided pleural soft tissue mass/thickening, overall appearing decreased since April 17, 2019 with primarily residual right apical tumor. Tumor appears to infiltrate the mediastinum.3. No significant change in mediastinal lymphadenopathy including largest 2.6 x 2.1 cm subcarinal lymph node which remains unchanged. Retrocrural 2.5 x 2.3 cm metastasis or additional site of pleural tumor, unchanged.4. Irregularity of right anterior first rib, probably invaded by tumor, stable in retrospect. Left superior acetabular lucent lesion corresponding to FDG avid bone metastasis.5. Right pelvic indeterminate 7 cm mass; possible retroverted uterus with degenerating fibroid or less likely ovarian mass; previously non-FDG avid and probably benign. Further evaluation with pelvic ultrasound or MRI pelvis with and without IV contrast may be helpful.6. Status post right chest tube placement with decreased small loculated right pleural effusion with fluid within the major fissure. 7. New mild to moderate intrahepatic biliary dilation. Correlation with LFTs recommended. Consider further evaluation with ERCP or MRCP with and without IV contrast.

## 2019-07-19 NOTE — PHYSICAL EXAM
[Ambulatory and capable of all self care but unable to carry out any work activities] : Status 2- Ambulatory and capable of all self care but unable to carry out any work activities. Up and about more than 50% of waking hours [Normal] : affect appropriate [de-identified] : Looks frail [de-identified] : Has decreased breath sound around right Pleurx.  About the same as last time

## 2019-07-19 NOTE — REASON FOR VISIT
[Patient Declined  Services] : - None: Patient declined  services [Follow-Up Visit] : a follow-up [FreeTextEntry2] : Stage IV lung cancer  [FreeTextEntry3] : I speak fluent Indian

## 2019-08-07 ENCOUNTER — LABORATORY RESULT (OUTPATIENT)
Age: 84
End: 2019-08-07

## 2019-08-07 ENCOUNTER — APPOINTMENT (OUTPATIENT)
Dept: HEMATOLOGY ONCOLOGY | Facility: CLINIC | Age: 84
End: 2019-08-07
Payer: MEDICAID

## 2019-08-07 ENCOUNTER — APPOINTMENT (OUTPATIENT)
Dept: INFUSION THERAPY | Facility: CLINIC | Age: 84
End: 2019-08-07
Payer: MEDICAID

## 2019-08-07 VITALS
DIASTOLIC BLOOD PRESSURE: 78 MMHG | RESPIRATION RATE: 16 BRPM | TEMPERATURE: 97.8 F | HEIGHT: 62 IN | HEART RATE: 76 BPM | SYSTOLIC BLOOD PRESSURE: 151 MMHG

## 2019-08-07 LAB
HCT VFR BLD CALC: 32.7 %
HGB BLD-MCNC: 10.8 G/DL
MCHC RBC-ENTMCNC: 25.1 PG
MCHC RBC-ENTMCNC: 33 G/DL
MCV RBC AUTO: 75.9 FL
PLATELET # BLD AUTO: 253 K/UL
PMV BLD: 10.3 FL
RBC # BLD: 4.31 M/UL
RBC # FLD: 17.1 %
WBC # FLD AUTO: 5.92 K/UL

## 2019-08-07 PROCEDURE — 99214 OFFICE O/P EST MOD 30 MIN: CPT

## 2019-08-07 RX ORDER — PEMBROLIZUMAB 25 MG/ML
200 INJECTION, SOLUTION INTRAVENOUS ONCE
Refills: 0 | Status: COMPLETED | OUTPATIENT
Start: 2019-08-07 | End: 2019-08-07

## 2019-08-07 RX ADMIN — PEMBROLIZUMAB 216 MILLIGRAM(S): 25 INJECTION, SOLUTION INTRAVENOUS at 12:19

## 2019-08-07 RX ADMIN — PEMBROLIZUMAB 200 MILLIGRAM(S): 25 INJECTION, SOLUTION INTRAVENOUS at 12:55

## 2019-08-08 LAB
ALBUMIN SERPL ELPH-MCNC: 3.1 G/DL
ALP BLD-CCNC: 72 U/L
ALT SERPL-CCNC: 13 U/L
ANION GAP SERPL CALC-SCNC: 10 MMOL/L
AST SERPL-CCNC: 15 U/L
BILIRUB SERPL-MCNC: 0.3 MG/DL
BUN SERPL-MCNC: 13 MG/DL
CALCIUM SERPL-MCNC: 8.8 MG/DL
CHLORIDE SERPL-SCNC: 105 MMOL/L
CO2 SERPL-SCNC: 24 MMOL/L
CREAT SERPL-MCNC: 0.9 MG/DL
GLUCOSE SERPL-MCNC: 103 MG/DL
POTASSIUM SERPL-SCNC: 2.7 MMOL/L
PROT SERPL-MCNC: 5.7 G/DL
SODIUM SERPL-SCNC: 139 MMOL/L
TSH SERPL-ACNC: 0.24 UIU/ML

## 2019-08-08 NOTE — PHYSICAL EXAM
[Ambulatory and capable of all self care but unable to carry out any work activities] : Status 2- Ambulatory and capable of all self care but unable to carry out any work activities. Up and about more than 50% of waking hours [Normal] : affect appropriate [de-identified] : Looks frail [de-identified] : Has decreased breath sound around right Pleurx.  About the same as last time

## 2019-08-08 NOTE — REVIEW OF SYSTEMS
[Fatigue] : fatigue [Shortness Of Breath] : shortness of breath [SOB on Exertion] : shortness of breath during exertion [Constipation] : constipation [Negative] : Allergic/Immunologic [Recent Change In Weight] : ~T no recent weight change [Dysphagia] : no dysphagia [FreeTextEntry6] : Most likely has pain in Pleura, improving [FreeTextEntry2] : seated in wheelchair

## 2019-08-08 NOTE — HISTORY OF PRESENT ILLNESS
[Therapy: ___] : Therapy: [unfilled] [Cycle: ___] : Cycle: [unfilled] [de-identified] : \par This is a 83 year old female who I initially met in [facility] on 3/28/19. She was initially admitted on 2/20/2019 for a right loculated pleural effusion\par approx. 900 CCs, no PE and right apical 3.1x3.0 cm mass with pleural nodules on CTA of chest.  . Pt returned to ED on 3/15/2019 for right pleural\par effusion, had a thoracentesis in ED and was sent home. She than came back for SOB  due to recurrent effusion and a Pleurx was placed. Patholgoy showed adenocarcinoma. \par \par She ha lost a few pounds about  4. She never smoked. She does have weakness.  From her Pleurx she  has 500 cc removed every other day. She has pain after.\par \par Work up:\par 2/20/19 CTA chest:  Large right pleural effusion, maximal axial width of 7 cm correlating with an estimated volume of 900 cc. Associated \par near complete compressive atelectasis of the right lower lobe. Probable loculations of the effusion seen at the apex and at the right heart \par border (for example series 5 images 176, 52; series 9 image 69). 1 cm right upper lobe fissural nodule (series 5 image 139). Patent central \par airways. There is right apical 3.1 x 3.0 cm mass with irregular right apical pleural thickening (series 7, image 44), \par and multiple satellite pleural nodules including a more caudad 2.7 cm para-mediastinal nodule (series 7, image 175). Tissue sampling \par recommended.\par \par Path: \par Pleural fluid: 3/25:  Cell block material is hypocellular and shows a single minutecluster of malignant cells.Immunohistochemistry studies were performed at [facility] and the\par results are noncontributory\par 3/22: Addendum\par Cell block material shows macrophages (positive ),mesothelial cells (positive calretinin) and a single very minute cluster of atypical suspicious cells is negative for Fidel-Ep4.Material is insufficient for further diagnsotic studies (ie,mmunohistochemistry).\par 3/15/19: Pleural fluid: Addendum\par Immunohistochemical studies were performed on the cell block at\par [facility], [address] [place] [phone] (see NOTE). The results\par are as follows:\par \par CK7-                      Rare mesothelial cells positive\par CK20-                    Negative\par CK 5/6-                  Rare mesothelial cells positive\par Calretinin-             Scattered mesothelial cells positive\par CD68-                   Numerous histiocytes positive\par BerEP-4-               Negative\par Napsin-A-             Negative\par TTF-1-                  Negative\par \par These results are non-specific, suggestive of mesothelial\par hyperplasia. The few markedly atypical cells seen in the smear\par are not evident in the cell block.\par \par The atypical cells seen in the current smear are not seen in the\par prior pleural fluid Thinprep smear or cell block (35-YP-).\par \par \par Few marked atypical cells, suspicious for mesothelioma versus\par adenocarcinoma,\par in a background of numerous mesothelial cells, histiocytes and\par small lymphocytes.\par \par \par 3/25/19: Final DiagnosisPOSITIVE FOR MALIGNANT CELLS.Favor metastatic adenocarcinoma. Pending cell block.\par \par  I spke to Dr. Allison  and there are only a few cells thus furhter studies such as IHC, molecular PD-l1 is not possible\par  [FreeTextEntry1] : Started Keytruda on 5/17/19 [de-identified] : 6/5/19\par She is here for ofllow up. Since last visit she started Keytruda. She had a PET/CT on April 17 that showed  Extensive FDG avid right-sided pleural mass/soft tissue thickening, \par with max SUV 21.5 within a right apical pleural soft tissue mass.\par \par 2.  Multiple FDG avid mediastinal lymph nodes, with max SUV 16.5 within a \par 2.6 x 2.1 cm subcarinal node.\par \par 3.  FDG avid lytic lesion within the left superior acetabulum (max SUV \par 14.8), suspicious for osseous metastasis.\par \par 4.  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 Used to be  500 ml  u 3 times a week. Takes 60 mg of oral moprphine a day 20 mg 3 times a day but pain is still severe. Has not had a bowel movement in unknown amount of days does not take lexatives although she has lexatives. \par \par 6/26/19 She is here for follow up. She is due for cycle 3 of Keytruda.  She has about 300 mg drained from her pleurax twice a week. Has SOB but saturated 94% on room air and 94% on ambualtion. Did not torate the fentanyl path had nausea.  On Morphine 10 mg BID doing well.  Constipation  is better. \par \par 7/17/19\par She is here for follow-up of Stage IV lung cancer with son.  She is due for cycle 3 of Keytruda.  CBC is stable from today.  She only takes the liquid morphine at night now.  She started eating slighlty more and has been feeling slightly better ,as per son.  He states that the pleurex drainage is less, it is checked every Friday + Monday.  On Friday there was about 225cc drained, but yesterday no drainage.  We will arrange for hydration today since she has had poor oral intake lately and small appetite.  Son reports she can tolerate fluids but she has early satiety with food.  Patient also reports feeling short of breath at rest, but her O2 sat is 98% on R/a.  Right sided pain is better. \par CT C/A/P (7/6/19) IMPRESSION:Since April 17, 2019:1. No definite new sites of metastatic disease.2. Right-sided pleural soft tissue mass/thickening, overall appearing decreased since April 17, 2019 with primarily residual right apical tumor. Tumor appears to infiltrate the mediastinum.3. No significant change in mediastinal lymphadenopathy including largest 2.6 x 2.1 cm subcarinal lymph node which remains unchanged. Retrocrural 2.5 x 2.3 cm metastasis or additional site of pleural tumor, unchanged.4. Irregularity of right anterior first rib, probably invaded by tumor, stable in retrospect. Left superior acetabular lucent lesion corresponding to FDG avid bone metastasis.5. Right pelvic indeterminate 7 cm mass; possible retroverted uterus with degenerating fibroid or less likely ovarian mass; previously non-FDG avid and probably benign. Further evaluation with pelvic ultrasound or MRI pelvis with and without IV contrast may be helpful.6. Status post right chest tube placement with decreased small loculated right pleural effusion with fluid within the major fissure. 7. New mild to moderate intrahepatic biliary dilation. Correlation with LFTs recommended. Consider further evaluation with ERCP or MRCP with and without IV contrast.\par \par 8/7/19\par She is here with her son. Overall she is doing much better. She is in wheelchair but now less pain, eating better and Pleurax is only draining about 25 cc twice a week, She does complain of SOB still. I explained this will improve as well but asked her to follow up with Dr. Lanza of pulm.

## 2019-08-08 NOTE — REASON FOR VISIT
[Follow-Up Visit] : a follow-up [Patient Declined  Services] : - None: Patient declined  services [FreeTextEntry2] : Stage IV lung cancer  [FreeTextEntry3] : I speak fluent [place]

## 2019-08-08 NOTE — ASSESSMENT
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 \par -CT after cycle 3 with mild improvement/stable findings and less pleural fluid and pain\par - c/w Keytruda every 3 weeks, responding since less fluid coming out of Pleurx, will have her see Dr. Lanza of Pul to see if we can remove the Pleurx it maybe the cause of her pain\par - she only takes an occasional oxycodone now   if at all\par - doing well on Lactulose \par - will hold off Zometa  or Xgeva for now\par \par #Dysphagia and decrease  appetite \par -this is much better and eating well\par \par #SOB mainly when she tries to sleep\par -It should improve, she asked for oxygen but on last exam she is saturating well  and even when we walked her sat was stable. \par -I asked her to follow up with Dr. Lanza to see if he has any suggestions.\par \par #Hypokalemia\par -I replated her in past\par -if low again will start her on daily doses and will recheck in 3 weeks\par RTC in 3 weeks, Next scan after cycle 6

## 2019-08-28 ENCOUNTER — APPOINTMENT (OUTPATIENT)
Dept: INFUSION THERAPY | Facility: CLINIC | Age: 84
End: 2019-08-28
Payer: MEDICAID

## 2019-08-28 ENCOUNTER — LABORATORY RESULT (OUTPATIENT)
Age: 84
End: 2019-08-28

## 2019-08-28 ENCOUNTER — APPOINTMENT (OUTPATIENT)
Dept: HEMATOLOGY ONCOLOGY | Facility: CLINIC | Age: 84
End: 2019-08-28
Payer: MEDICAID

## 2019-08-28 ENCOUNTER — OUTPATIENT (OUTPATIENT)
Dept: OUTPATIENT SERVICES | Facility: HOSPITAL | Age: 84
LOS: 1 days | Discharge: HOME | End: 2019-08-28

## 2019-08-28 VITALS
OXYGEN SATURATION: 97 % | DIASTOLIC BLOOD PRESSURE: 63 MMHG | HEART RATE: 80 BPM | TEMPERATURE: 98.2 F | SYSTOLIC BLOOD PRESSURE: 132 MMHG

## 2019-08-28 DIAGNOSIS — Z90.710 ACQUIRED ABSENCE OF BOTH CERVIX AND UTERUS: Chronic | ICD-10-CM

## 2019-08-28 DIAGNOSIS — R09.81 NASAL CONGESTION: ICD-10-CM

## 2019-08-28 DIAGNOSIS — J91.0 MALIGNANT PLEURAL EFFUSION: ICD-10-CM

## 2019-08-28 DIAGNOSIS — C34.90 MALIGNANT NEOPLASM OF UNSPECIFIED PART OF UNSPECIFIED BRONCHUS OR LUNG: ICD-10-CM

## 2019-08-28 LAB
HCT VFR BLD CALC: 32.1 %
HGB BLD-MCNC: 10.7 G/DL
IRON SATN MFR SERPL: 18 %
IRON SERPL-MCNC: 49 UG/DL
MCHC RBC-ENTMCNC: 26.3 PG
MCHC RBC-ENTMCNC: 33.3 G/DL
MCV RBC AUTO: 78.9 FL
PLATELET # BLD AUTO: 251 K/UL
PMV BLD: 10.4 FL
RBC # BLD: 4.07 M/UL
RBC # FLD: 19.9 %
TIBC SERPL-MCNC: 272 UG/DL
UIBC SERPL-MCNC: 223 UG/DL
WBC # FLD AUTO: 7.35 K/UL

## 2019-08-28 PROCEDURE — 99214 OFFICE O/P EST MOD 30 MIN: CPT

## 2019-08-28 RX ORDER — PEMBROLIZUMAB 25 MG/ML
200 INJECTION, SOLUTION INTRAVENOUS ONCE
Refills: 0 | Status: COMPLETED | OUTPATIENT
Start: 2019-08-28 | End: 2019-08-28

## 2019-08-28 RX ADMIN — PEMBROLIZUMAB 200 MILLIGRAM(S): 25 INJECTION, SOLUTION INTRAVENOUS at 14:40

## 2019-08-28 RX ADMIN — PEMBROLIZUMAB 216 MILLIGRAM(S): 25 INJECTION, SOLUTION INTRAVENOUS at 13:43

## 2019-08-29 LAB — FERRITIN SERPL-MCNC: 211 NG/ML

## 2019-09-05 NOTE — PHYSICAL EXAM
[Ambulatory and capable of all self care but unable to carry out any work activities] : Status 2- Ambulatory and capable of all self care but unable to carry out any work activities. Up and about more than 50% of waking hours [Normal] : affect appropriate [de-identified] : Looks frail but better now. In wheelchair  [de-identified] : Has decreased breath sound around right Pleurx.  Maybe a bit better

## 2019-09-05 NOTE — REASON FOR VISIT
[Follow-Up Visit] : a follow-up [Patient Declined  Services] : - None: Patient declined  services [FreeTextEntry2] : Stage IV lung cancer  [FreeTextEntry3] : I speak fluent Algerian

## 2019-09-05 NOTE — REVIEW OF SYSTEMS
[Fatigue] : fatigue [Recent Change In Weight] : ~T no recent weight change [Dysphagia] : no dysphagia [Shortness Of Breath] : no shortness of breath [SOB on Exertion] : shortness of breath during exertion [Constipation] : constipation [Negative] : Heme/Lymph [FreeTextEntry2] : seated in wheelchair

## 2019-09-05 NOTE — ASSESSMENT
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 \par -CT after cycle 3 with mild improvement/stable findings and less pleural fluid and pain\par - c/w Keytruda every 3 weeks, responding since less fluid coming out of Pleurx,  to Dr. Lanza of Pulm to see if we can remove the Pleurx, has minimal drainage now I favor removing\par - doing well on Lactulose \par - will hold off Zometa  or Xgeva for now\par -will reorder CT C/A/P since due \par \par #Dysphagia and decrease  appetite \par -this is much better and eating well\par \par #SOB mainly when she tries to sleep and now due to right nasal caongestion\par -It should improve, she asked for oxygen but on last exam she is saturating well  and even when we walked her sat was stable. \par \par #Nasal congestion\par -she will see ENT\par \par #Has some insomnia \par -will try Melatonin 5 mg at bedtime \par \par #Hypokalemia\par -I replated her in past\par -if low again will start her on daily doses and will recheck in 3 weeks\par \par RTC in 3 weeks, scans prior to next cycle ordered [Palliative] : Goals of care discussed with patient: Palliative

## 2019-09-05 NOTE — HISTORY OF PRESENT ILLNESS
[de-identified] : \par This is a 83 year old female who I initially met in [facility] on 3/28/19. She was initially admitted on 2/20/2019 for a right loculated pleural effusion\par approx. 900 CCs, no PE and right apical 3.1x3.0 cm mass with pleural nodules on CTA of chest.  . Pt returned to ED on 3/15/2019 for right pleural\par effusion, had a thoracentesis in ED and was sent home. She than came back for SOB  due to recurrent effusion and a Pleurx was placed. Patholgoy showed adenocarcinoma. \par \par She ha lost a few pounds about  4. She never smoked. She does have weakness.  From her Pleurx she  has 500 cc removed every other day. She has pain after.\par \par Work up:\par 2/20/19 CTA chest:  Large right pleural effusion, maximal axial width of 7 cm correlating with an estimated volume of 900 cc. Associated \par near complete compressive atelectasis of the right lower lobe. Probable loculations of the effusion seen at the apex and at the right heart \par border (for example series 5 images 176, 52; series 9 image 69). 1 cm right upper lobe fissural nodule (series 5 image 139). Patent central \par airways. There is right apical 3.1 x 3.0 cm mass with irregular right apical pleural thickening (series 7, image 44), \par and multiple satellite pleural nodules including a more caudad 2.7 cm para-mediastinal nodule (series 7, image 175). Tissue sampling \par recommended.\par \par Path: \par Pleural fluid: 3/25:  Cell block material is hypocellular and shows a single minutecluster of malignant cells.Immunohistochemistry studies were performed at [facility] and the\par results are noncontributory\par 3/22: Addendum\par Cell block material shows macrophages (positive ),mesothelial cells (positive calretinin) and a single very minute cluster of atypical suspicious cells is negative for Fidel-Ep4.Material is insufficient for further diagnsotic studies (ie,mmunohistochemistry).\par 3/15/19: Pleural fluid: Addendum\par Immunohistochemical studies were performed on the cell block at\par [facility], [address] [place] [id number] (see NOTE). The results\par are as follows:\par \par CK7-                      Rare mesothelial cells positive\par CK20-                    Negative\par CK 5/6-                  Rare mesothelial cells positive\par Calretinin-             Scattered mesothelial cells positive\par CD68-                   Numerous histiocytes positive\par BerEP-4-               Negative\par Napsin-A-             Negative\par TTF-1-                  Negative\par \par These results are non-specific, suggestive of mesothelial\par hyperplasia. The few markedly atypical cells seen in the smear\par are not evident in the cell block.\par \par The atypical cells seen in the current smear are not seen in the\par prior pleural fluid Thinprep smear or cell block (30-DA-).\par \par \par Few marked atypical cells, suspicious for mesothelioma versus\par adenocarcinoma,\par in a background of numerous mesothelial cells, histiocytes and\par small lymphocytes.\par \par \par 3/25/19: Final DiagnosisPOSITIVE FOR MALIGNANT CELLS.Favor metastatic adenocarcinoma. Pending cell block.\par \par  I spke to Dr. Allison  and there are only a few cells thus furhter studies such as IHC, molecular PD-l1 is not possible\par  [Therapy: ___] : Therapy: [unfilled] [Cycle: ___] : Cycle: [unfilled] [FreeTextEntry1] : Started Keytruda on 5/17/19 [de-identified] : 6/5/19\par She is here for ofllow up. Since last visit she started Keytruda. She had a PET/CT on April 17 that showed  Extensive FDG avid right-sided pleural mass/soft tissue thickening, \par with max SUV 21.5 within a right apical pleural soft tissue mass.\par \par 2.  Multiple FDG avid mediastinal lymph nodes, with max SUV 16.5 within a \par 2.6 x 2.1 cm subcarinal node.\par \par 3.  FDG avid lytic lesion within the left superior acetabulum (max SUV \par 14.8), suspicious for osseous metastasis.\par \par 4.  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 Used to be  500 ml  u 3 times a week. Takes 60 mg of oral moprphine a day 20 mg 3 times a day but pain is still severe. Has not had a bowel movement in unknown amount of days does not take lexatives although she has lexatives. \par \par 6/26/19 She is here for follow up. She is due for cycle 3 of Keytruda.  She has about 300 mg drained from her pleurax twice a week. Has SOB but saturated 94% on room air and 94% on ambualtion. Did not torate the fentanyl path had nausea.  On Morphine 10 mg BID doing well.  Constipation  is better. \par \par 7/17/19\par She is here for follow-up of Stage IV lung cancer with son.  She is due for cycle 3 of Keytruda.  CBC is stable from today.  She only takes the liquid morphine at night now.  She started eating slighlty more and has been feeling slightly better ,as per son.  He states that the pleurex drainage is less, it is checked every Friday + Monday.  On Friday there was about 225cc drained, but yesterday no drainage.  We will arrange for hydration today since she has had poor oral intake lately and small appetite.  Son reports she can tolerate fluids but she has early satiety with food.  Patient also reports feeling short of breath at rest, but her O2 sat is 98% on R/a.  Right sided pain is better. \par CT C/A/P (7/6/19) IMPRESSION:Since April 17, 2019:1. No definite new sites of metastatic disease.2. Right-sided pleural soft tissue mass/thickening, overall appearing decreased since April 17, 2019 with primarily residual right apical tumor. Tumor appears to infiltrate the mediastinum.3. No significant change in mediastinal lymphadenopathy including largest 2.6 x 2.1 cm subcarinal lymph node which remains unchanged. Retrocrural 2.5 x 2.3 cm metastasis or additional site of pleural tumor, unchanged.4. Irregularity of right anterior first rib, probably invaded by tumor, stable in retrospect. Left superior acetabular lucent lesion corresponding to FDG avid bone metastasis.5. Right pelvic indeterminate 7 cm mass; possible retroverted uterus with degenerating fibroid or less likely ovarian mass; previously non-FDG avid and probably benign. Further evaluation with pelvic ultrasound or MRI pelvis with and without IV contrast may be helpful.6. Status post right chest tube placement with decreased small loculated right pleural effusion with fluid within the major fissure. 7. New mild to moderate intrahepatic biliary dilation. Correlation with LFTs recommended. Consider further evaluation with ERCP or MRCP with and without IV contrast.\par \par 8/7/19\par She is here with her son. Overall she is doing much better. She is in wheelchair but now less pain, eating better and Pleurax is only draining about 25 cc twice a week, She does complain of SOB still. I explained this will improve as well but asked her to follow up with Dr. Lanza of pul. \par \par 8/28/19\par She is here for follow up. They are to see Dr. Lanza in Early september. There is minimal output from Pleurax.  Pain is much better and does not use narcotics anymore.  Overall better.   She has right sidede nasal congestion and states makes it hard to breath.

## 2019-09-06 ENCOUNTER — APPOINTMENT (OUTPATIENT)
Dept: HEMATOLOGY ONCOLOGY | Facility: CLINIC | Age: 84
End: 2019-09-06

## 2019-09-06 LAB
ALBUMIN SERPL ELPH-MCNC: 3.6 G/DL
ALP BLD-CCNC: 74 U/L
ALT SERPL-CCNC: 16 U/L
ANION GAP SERPL CALC-SCNC: 9 MMOL/L
AST SERPL-CCNC: 15 U/L
BILIRUB SERPL-MCNC: 0.4 MG/DL
BUN SERPL-MCNC: 15 MG/DL
CALCIUM SERPL-MCNC: 9.4 MG/DL
CHLORIDE SERPL-SCNC: 105 MMOL/L
CO2 SERPL-SCNC: 29 MMOL/L
CREAT SERPL-MCNC: 1 MG/DL
GLUCOSE SERPL-MCNC: 104 MG/DL
POTASSIUM SERPL-SCNC: 4.3 MMOL/L
PROT SERPL-MCNC: 6.1 G/DL
SODIUM SERPL-SCNC: 143 MMOL/L

## 2019-09-13 ENCOUNTER — OUTPATIENT (OUTPATIENT)
Dept: OUTPATIENT SERVICES | Facility: HOSPITAL | Age: 84
LOS: 1 days | Discharge: HOME | End: 2019-09-13

## 2019-09-13 ENCOUNTER — APPOINTMENT (OUTPATIENT)
Dept: PULMONOLOGY | Facility: CLINIC | Age: 84
End: 2019-09-13
Payer: MEDICAID

## 2019-09-13 VITALS
OXYGEN SATURATION: 98 % | BODY MASS INDEX: 23.74 KG/M2 | HEART RATE: 86 BPM | SYSTOLIC BLOOD PRESSURE: 154 MMHG | WEIGHT: 129 LBS | HEIGHT: 62 IN | DIASTOLIC BLOOD PRESSURE: 79 MMHG

## 2019-09-13 DIAGNOSIS — Z90.710 ACQUIRED ABSENCE OF BOTH CERVIX AND UTERUS: Chronic | ICD-10-CM

## 2019-09-13 PROCEDURE — 99213 OFFICE O/P EST LOW 20 MIN: CPT | Mod: GC

## 2019-09-13 NOTE — PHYSICAL EXAM
[Normal Appearance] : normal appearance [General Appearance - Well Developed] : well developed [Normal Oropharynx] : normal oropharynx [Neck Appearance] : the appearance of the neck was normal [Thyroid Diffuse Enlargement] : the thyroid was not enlarged [Jugular Venous Distention Increased] : there was no jugular-venous distention [Neck Cervical Mass (___cm)] : no neck mass was observed [Thyroid Nodule] : there were no palpable thyroid nodules [Heart Sounds] : normal S1 and S2 [Heart Rate And Rhythm] : heart rate and rhythm were normal [] : no respiratory distress [Murmurs] : no murmurs present [Bowel Sounds] : normal bowel sounds [Abdomen Soft] : soft [Abdomen Tenderness] : non-tender [Nail Clubbing] : no clubbing of the fingernails [Oriented To Time, Place, And Person] : oriented to person, place, and time [Cyanosis, Localized] : no localized cyanosis [FreeTextEntry1] : R sided Pleurx present , dressing clean dry and intact

## 2019-09-13 NOTE — HISTORY OF PRESENT ILLNESS
[FreeTextEntry1] : 82 yo F with PMH of HTN, malignant pleural effusion and right lung adenocarcinoma (diagnosed in 2019) with mets to bone s/p 6 cycles of Keytruda (started on 05/2019) presents for follow up from Dr. Patel for possible Pleurx removal. The patient had Pleurx placed upon evaluation for neoplasm in March 2019. The patient reports draining 400 cc on 9/9/2019. She also notes that she has nasal congestion which is worse when she goes to sleep at night and has been using Afrin for the last 4 days. \par

## 2019-09-13 NOTE — END OF VISIT
[] : Resident [FreeTextEntry3] : still needs [place] cath -drains approx  400 cc/week [Time Spent: ___ minutes] : I have spent [unfilled] minutes of face to face time with the patient

## 2019-09-13 NOTE — ASSESSMENT
[FreeTextEntry1] : 82 yo F with with HTN and metastatic lung adenocarcinoma s/p 6 cycles of Keytruda presents for follow up for possible Pleurx removal \par \par \par # Metastatic Adenocarcinoma of the lung\par - s/p 6 cycles of Keytruda (started on 5/2019)\par - Follows Dr. Patel\par - f/u repeat CTA chest (referred by Dr. Patel)\par - 400 cc drained on 9/9/2019\par - Refer for Chest XRAY and f/u in 1 month\par \par # Nasal congestion\par - d/c Afrin\par - start Flonase \par - f/u with ENT (patient has appt scheduled this month)\par - f/u in 1 month \par \par

## 2019-09-18 ENCOUNTER — APPOINTMENT (OUTPATIENT)
Dept: INFUSION THERAPY | Facility: CLINIC | Age: 84
End: 2019-09-18
Payer: MEDICAID

## 2019-09-18 ENCOUNTER — LABORATORY RESULT (OUTPATIENT)
Age: 84
End: 2019-09-18

## 2019-09-18 ENCOUNTER — APPOINTMENT (OUTPATIENT)
Dept: HEMATOLOGY ONCOLOGY | Facility: CLINIC | Age: 84
End: 2019-09-18
Payer: MEDICAID

## 2019-09-18 VITALS
HEART RATE: 88 BPM | DIASTOLIC BLOOD PRESSURE: 68 MMHG | HEIGHT: 62 IN | RESPIRATION RATE: 16 BRPM | OXYGEN SATURATION: 98 % | TEMPERATURE: 98.6 F | SYSTOLIC BLOOD PRESSURE: 140 MMHG

## 2019-09-18 PROCEDURE — 99213 OFFICE O/P EST LOW 20 MIN: CPT

## 2019-09-18 RX ORDER — PEMBROLIZUMAB 25 MG/ML
200 INJECTION, SOLUTION INTRAVENOUS ONCE
Refills: 0 | Status: COMPLETED | OUTPATIENT
Start: 2019-09-18 | End: 2019-09-18

## 2019-09-18 RX ADMIN — PEMBROLIZUMAB 216 MILLIGRAM(S): 25 INJECTION, SOLUTION INTRAVENOUS at 16:10

## 2019-09-18 RX ADMIN — PEMBROLIZUMAB 200 MILLIGRAM(S): 25 INJECTION, SOLUTION INTRAVENOUS at 16:40

## 2019-09-19 LAB
ALBUMIN SERPL ELPH-MCNC: 3.7 G/DL
ALP BLD-CCNC: 67 U/L
ALT SERPL-CCNC: 8 U/L
ANION GAP SERPL CALC-SCNC: 11 MMOL/L
AST SERPL-CCNC: 11 U/L
BILIRUB SERPL-MCNC: 0.5 MG/DL
BUN SERPL-MCNC: 18 MG/DL
CALCIUM SERPL-MCNC: 9.3 MG/DL
CHLORIDE SERPL-SCNC: 107 MMOL/L
CO2 SERPL-SCNC: 24 MMOL/L
CREAT SERPL-MCNC: 1 MG/DL
GLUCOSE SERPL-MCNC: 108 MG/DL
HCT VFR BLD CALC: 33.4 %
HGB BLD-MCNC: 10.8 G/DL
MCHC RBC-ENTMCNC: 27 PG
MCHC RBC-ENTMCNC: 32.3 G/DL
MCV RBC AUTO: 83.5 FL
PLATELET # BLD AUTO: 240 K/UL
PMV BLD: 10.6 FL
POTASSIUM SERPL-SCNC: 3.6 MMOL/L
PROT SERPL-MCNC: 6.2 G/DL
RBC # BLD: 4 M/UL
RBC # FLD: 19 %
SODIUM SERPL-SCNC: 142 MMOL/L
TSH SERPL-ACNC: 0.85 UIU/ML
WBC # FLD AUTO: 7.11 K/UL

## 2019-09-20 ENCOUNTER — APPOINTMENT (OUTPATIENT)
Dept: OTOLARYNGOLOGY | Facility: CLINIC | Age: 84
End: 2019-09-20
Payer: MEDICAID

## 2019-09-20 DIAGNOSIS — R51 HEADACHE: ICD-10-CM

## 2019-09-20 DIAGNOSIS — H92.03 OTALGIA, BILATERAL: ICD-10-CM

## 2019-09-20 PROCEDURE — 99204 OFFICE O/P NEW MOD 45 MIN: CPT | Mod: 25

## 2019-09-20 PROCEDURE — 31231 NASAL ENDOSCOPY DX: CPT

## 2019-09-20 NOTE — HISTORY OF PRESENT ILLNESS
[de-identified] : \par This is a 83 year old female who I initially met in [facility] on 3/28/19. She was initially admitted on 2/20/2019 for a right loculated pleural effusion\par approx. 900 CCs, no PE and right apical 3.1x3.0 cm mass with pleural nodules on CTA of chest.  . Pt returned to ED on 3/15/2019 for right pleural\par effusion, had a thoracentesis in ED and was sent home. She than came back for SOB  due to recurrent effusion and a Pleurx was placed. Patholgoy showed adenocarcinoma. \par \par She ha lost a few pounds about  4. She never smoked. She does have weakness.  From her Pleurx she  has 500 cc removed every other day. She has pain after.\par \par Work up:\par 2/20/19 CTA chest:  Large right pleural effusion, maximal axial width of 7 cm correlating with an estimated volume of 900 cc. Associated \par near complete compressive atelectasis of the right lower lobe. Probable loculations of the effusion seen at the apex and at the right heart \par border (for example series 5 images 176, 52; series 9 image 69). 1 cm right upper lobe fissural nodule (series 5 image 139). Patent central \par airways. There is right apical 3.1 x 3.0 cm mass with irregular right apical pleural thickening (series 7, image 44), \par and multiple satellite pleural nodules including a more caudad 2.7 cm para-mediastinal nodule (series 7, image 175). Tissue sampling \par recommended.\par \par Path: \par Pleural fluid: 3/25:  Cell block material is hypocellular and shows a single minutecluster of malignant cells.Immunohistochemistry studies were performed at [facility] and the\par results are noncontributory\par 3/22: Addendum\par Cell block material shows macrophages (positive ),mesothelial cells (positive calretinin) and a single very minute cluster of atypical suspicious cells is negative for Fidel-Ep4.Material is insufficient for further diagnsotic studies (ie,mmunohistochemistry).\par 3/15/19: Pleural fluid: Addendum\par Immunohistochemical studies were performed on the cell block at\par [facility], [address] [place] [id number] (see NOTE). The results\par are as follows:\par \par CK7-                      Rare mesothelial cells positive\par CK20-                    Negative\par CK 5/6-                  Rare mesothelial cells positive\par Calretinin-             Scattered mesothelial cells positive\par CD68-                   Numerous histiocytes positive\par BerEP-4-               Negative\par Napsin-A-             Negative\par TTF-1-                  Negative\par \par These results are non-specific, suggestive of mesothelial\par hyperplasia. The few markedly atypical cells seen in the smear\par are not evident in the cell block.\par \par The atypical cells seen in the current smear are not seen in the\par prior pleural fluid Thinprep smear or cell block (01-FT-).\par \par \par Few marked atypical cells, suspicious for mesothelioma versus\par adenocarcinoma,\par in a background of numerous mesothelial cells, histiocytes and\par small lymphocytes.\par \par \par 3/25/19: Final DiagnosisPOSITIVE FOR MALIGNANT CELLS.Favor metastatic adenocarcinoma. Pending cell block.\par \par  I spke to Dr. Allison  and there are only a few cells thus furhter studies such as IHC, molecular PD-l1 is not possible\par  [Therapy: ___] : Therapy: [unfilled] [Cycle: ___] : Cycle: [unfilled] [FreeTextEntry1] : Started Keytruda on 5/17/19 [de-identified] : 6/5/19\par She is here for ofllow up. Since last visit she started Keytruda. She had a PET/CT on April 17 that showed  Extensive FDG avid right-sided pleural mass/soft tissue thickening, \par with max SUV 21.5 within a right apical pleural soft tissue mass.\par \par 2.  Multiple FDG avid mediastinal lymph nodes, with max SUV 16.5 within a \par 2.6 x 2.1 cm subcarinal node.\par \par 3.  FDG avid lytic lesion within the left superior acetabulum (max SUV \par 14.8), suspicious for osseous metastasis.\par \par 4.  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 Used to be  500 ml  u 3 times a week. Takes 60 mg of oral moprphine a day 20 mg 3 times a day but pain is still severe. Has not had a bowel movement in unknown amount of days does not take lexatives although she has lexatives. \par \par 6/26/19 She is here for follow up. She is due for cycle 3 of Keytruda.  She has about 300 mg drained from her pleurax twice a week. Has SOB but saturated 94% on room air and 94% on ambualtion. Did not torate the fentanyl path had nausea.  On Morphine 10 mg BID doing well.  Constipation  is better. \par \par 7/17/19\par She is here for follow-up of Stage IV lung cancer with son.  She is due for cycle 3 of Keytruda.  CBC is stable from today.  She only takes the liquid morphine at night now.  She started eating slighlty more and has been feeling slightly better ,as per son.  He states that the pleurex drainage is less, it is checked every Friday + Monday.  On Friday there was about 225cc drained, but yesterday no drainage.  We will arrange for hydration today since she has had poor oral intake lately and small appetite.  Son reports she can tolerate fluids but she has early satiety with food.  Patient also reports feeling short of breath at rest, but her O2 sat is 98% on R/a.  Right sided pain is better. \par CT C/A/P (7/6/19) IMPRESSION:Since April 17, 2019:1. No definite new sites of metastatic disease.2. Right-sided pleural soft tissue mass/thickening, overall appearing decreased since April 17, 2019 with primarily residual right apical tumor. Tumor appears to infiltrate the mediastinum.3. No significant change in mediastinal lymphadenopathy including largest 2.6 x 2.1 cm subcarinal lymph node which remains unchanged. Retrocrural 2.5 x 2.3 cm metastasis or additional site of pleural tumor, unchanged.4. Irregularity of right anterior first rib, probably invaded by tumor, stable in retrospect. Left superior acetabular lucent lesion corresponding to FDG avid bone metastasis.5. Right pelvic indeterminate 7 cm mass; possible retroverted uterus with degenerating fibroid or less likely ovarian mass; previously non-FDG avid and probably benign. Further evaluation with pelvic ultrasound or MRI pelvis with and without IV contrast may be helpful.6. Status post right chest tube placement with decreased small loculated right pleural effusion with fluid within the major fissure. 7. New mild to moderate intrahepatic biliary dilation. Correlation with LFTs recommended. Consider further evaluation with ERCP or MRCP with and without IV contrast.\par \par 8/7/19\par She is here with her son. Overall she is doing much better. She is in wheelchair but now less pain, eating better and Pleurax is only draining about 25 cc twice a week, She does complain of SOB still. I explained this will improve as well but asked her to follow up with Dr. Lanza of pul. \par \par 8/28/19\par She is here for follow up. They are to see Dr. Lanza in Early september. There is minimal output from Pleurax.  Pain is much better and does not use narcotics anymore.  Overall better.   She has right sidede nasal congestion and states makes it hard to breath.\par \par 9/18/19\par She is doing well. She is due for cycle 7. Son was not eliana to schedule CTs priro to the visit but she looks better and better every visit and thus most likley still responding. She saw Dr. Lanza who started her on Flonase. She is due to see ENT Dr. Adams.  Appetie is better. No pains. Dr. Lanza is considering to remover the Pleurx possibly in November.

## 2019-09-20 NOTE — HISTORY OF PRESENT ILLNESS
[de-identified] : [place]  id#  [id number]\par 84 year old patient is having nasal obstruction since February 2019, at that she was sick, found to have lung cancer. Symptoms of nasal obstruction worse at night when patient is in laying down. Her symptoms improve when she is upright. It is affecting her sleep  She has a h/o malignant pleural effusion and right lung adenocarcinoma ( diagnosed in 2019) She is using Flonase for a week with minimal improvement.

## 2019-09-20 NOTE — PHYSICAL EXAM
[Ambulatory and capable of all self care but unable to carry out any work activities] : Status 2- Ambulatory and capable of all self care but unable to carry out any work activities. Up and about more than 50% of waking hours [Normal] : affect appropriate [de-identified] :  In wheelchair  as always but is better and better [de-identified] : Has decreased breath sound around right Pleurx.

## 2019-09-20 NOTE — REASON FOR VISIT
[Follow-Up Visit] : a follow-up [Patient Declined  Services] : - None: Patient declined  services [FreeTextEntry2] : Stage IV lung cancer  [FreeTextEntry3] : I speak fluent [place]

## 2019-09-20 NOTE — ASSESSMENT
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 \par -CT after cycle 3 with mild improvement/stable findings and less pleural fluid and pain\par - c/w Keytruda every 3 weeks, responding since less fluid coming out of Pleurx,  t\par - doing well on Lactulose \par - will hold off Zometa  or Xgeva for now\par - CT C/A/P pending\par -to see follow up with Dr. Lanza about removing the Pleurx in November.\par \par #Dysphagia and decrease  appetite \par -this is much better and eating well\par \par #SOB mainly when she tries to sleep and now due to right nasal congestion\par -It should improve, \par \par #Nasal congestion\par -she will see ENT\par -on flonase \par \par #Has some insomnia \par -won  Melatonin 5 mg at bedtime \par \par #Hypokalemia\par -I replated her in past and is doing well on Potasium tabs\par \par RTC in 3 weeks, scans are pending.  [Palliative] : Goals of care discussed with patient: Palliative

## 2019-09-20 NOTE — REVIEW OF SYSTEMS
[Fatigue] : fatigue [Recent Change In Weight] : ~T no recent weight change [Shortness Of Breath] : no shortness of breath [Dysphagia] : no dysphagia [SOB on Exertion] : shortness of breath during exertion [Constipation] : constipation [Negative] : Allergic/Immunologic [FreeTextEntry2] : seated in wheelchair

## 2019-09-20 NOTE — CONSULT LETTER
[Dear  ___] : Dear  [unfilled], [Please see my note below.] : Please see my note below. [Courtesy Letter:] : I had the pleasure of seeing your patient, [unfilled], in my office today. [Sincerely,] : Sincerely, [Consult Closing:] : Thank you very much for allowing me to participate in the care of this patient.  If you have any questions, please do not hesitate to contact me. [FreeTextEntry3] : Bill

## 2019-09-20 NOTE — REASON FOR VISIT
[Initial Evaluation] : an initial evaluation for [FreeTextEntry2] : problems breathing through their nose

## 2019-09-20 NOTE — CONSULT LETTER
[Dear  ___] : Dear ~DEANNA, [Consult Letter:] : I had the pleasure of evaluating your patient, [unfilled]. [Please see my note below.] : Please see my note below. [Consult Closing:] : Thank you very much for allowing me to participate in the care of this patient.  If you have any questions, please do not hesitate to contact me. [Sincerely,] : Sincerely, [FreeTextEntry2] : Mahamed Patel MD [FreeTextEntry3] : Ana Adams MD\par Otolaryngology - Head & Neck Surgery\par

## 2019-09-20 NOTE — ASSESSMENT
[FreeTextEntry1] : - continue Flonase QHS 2 sprays bilaterally\par - recommend using humidifier at night while sleeping\par - f/up in 3 months

## 2019-09-21 ENCOUNTER — FORM ENCOUNTER (OUTPATIENT)
Age: 84
End: 2019-09-21

## 2019-09-22 ENCOUNTER — OUTPATIENT (OUTPATIENT)
Dept: OUTPATIENT SERVICES | Facility: HOSPITAL | Age: 84
LOS: 1 days | Discharge: HOME | End: 2019-09-22
Payer: MEDICAID

## 2019-09-22 DIAGNOSIS — C34.11 MALIGNANT NEOPLASM OF UPPER LOBE, RIGHT BRONCHUS OR LUNG: ICD-10-CM

## 2019-09-22 DIAGNOSIS — Z90.710 ACQUIRED ABSENCE OF BOTH CERVIX AND UTERUS: Chronic | ICD-10-CM

## 2019-09-22 DIAGNOSIS — C34.91 MALIGNANT NEOPLASM OF UNSPECIFIED PART OF RIGHT BRONCHUS OR LUNG: ICD-10-CM

## 2019-09-22 PROCEDURE — 71260 CT THORAX DX C+: CPT | Mod: 26

## 2019-09-22 PROCEDURE — 74177 CT ABD & PELVIS W/CONTRAST: CPT | Mod: 26

## 2019-10-09 ENCOUNTER — APPOINTMENT (OUTPATIENT)
Dept: HEMATOLOGY ONCOLOGY | Facility: CLINIC | Age: 84
End: 2019-10-09
Payer: MEDICAID

## 2019-10-09 ENCOUNTER — LABORATORY RESULT (OUTPATIENT)
Age: 84
End: 2019-10-09

## 2019-10-09 ENCOUNTER — APPOINTMENT (OUTPATIENT)
Dept: INFUSION THERAPY | Facility: CLINIC | Age: 84
End: 2019-10-09
Payer: MEDICAID

## 2019-10-09 PROCEDURE — 99214 OFFICE O/P EST MOD 30 MIN: CPT

## 2019-10-09 RX ORDER — PEMBROLIZUMAB 25 MG/ML
200 INJECTION, SOLUTION INTRAVENOUS ONCE
Refills: 0 | Status: COMPLETED | OUTPATIENT
Start: 2019-10-09 | End: 2019-10-09

## 2019-10-09 RX ADMIN — PEMBROLIZUMAB 216 MILLIGRAM(S): 25 INJECTION, SOLUTION INTRAVENOUS at 12:30

## 2019-10-09 RX ADMIN — PEMBROLIZUMAB 200 MILLIGRAM(S): 25 INJECTION, SOLUTION INTRAVENOUS at 13:00

## 2019-10-10 LAB
ALBUMIN SERPL ELPH-MCNC: 3.8 G/DL
ALP BLD-CCNC: 67 U/L
ALT SERPL-CCNC: 7 U/L
ANION GAP SERPL CALC-SCNC: 11 MMOL/L
AST SERPL-CCNC: 11 U/L
BILIRUB SERPL-MCNC: 0.3 MG/DL
BUN SERPL-MCNC: 18 MG/DL
CALCIUM SERPL-MCNC: 9.3 MG/DL
CHLORIDE SERPL-SCNC: 108 MMOL/L
CO2 SERPL-SCNC: 25 MMOL/L
CREAT SERPL-MCNC: 0.9 MG/DL
GLUCOSE SERPL-MCNC: 102 MG/DL
HCT VFR BLD CALC: 33.8 %
HGB BLD-MCNC: 11.3 G/DL
MCHC RBC-ENTMCNC: 28.3 PG
MCHC RBC-ENTMCNC: 33.4 G/DL
MCV RBC AUTO: 84.5 FL
PLATELET # BLD AUTO: 257 K/UL
PMV BLD: 10.3 FL
POTASSIUM SERPL-SCNC: 3.6 MMOL/L
PROT SERPL-MCNC: 6.1 G/DL
RBC # BLD: 4 M/UL
RBC # FLD: 16.3 %
SODIUM SERPL-SCNC: 144 MMOL/L
TSH SERPL-ACNC: 1.21 UIU/ML
WBC # FLD AUTO: 5.94 K/UL

## 2019-10-15 ENCOUNTER — FORM ENCOUNTER (OUTPATIENT)
Age: 84
End: 2019-10-15

## 2019-10-15 NOTE — REVIEW OF SYSTEMS
[Fatigue] : fatigue [Constipation] : constipation [SOB on Exertion] : shortness of breath during exertion [Negative] : Heme/Lymph [Recent Change In Weight] : ~T no recent weight change [Dysphagia] : no dysphagia [Shortness Of Breath] : no shortness of breath [FreeTextEntry2] : seated in wheelchair

## 2019-10-15 NOTE — REASON FOR VISIT
[Follow-Up Visit] : a follow-up [Patient Declined  Services] : - None: Patient declined  services [FreeTextEntry2] : Stage IV lung cancer  [FreeTextEntry3] : I speak fluent [place]

## 2019-10-15 NOTE — HISTORY OF PRESENT ILLNESS
[Therapy: ___] : Therapy: [unfilled] [Cycle: ___] : Cycle: [unfilled] [de-identified] : \par This is a 83 year old female who I initially met in [facility] on 3/28/19. She was initially admitted on 2/20/2019 for a right loculated pleural effusion\par approx. 900 CCs, no PE and right apical 3.1x3.0 cm mass with pleural nodules on CTA of chest.  . Pt returned to ED on 3/15/2019 for right pleural\par effusion, had a thoracentesis in ED and was sent home. She than came back for SOB  due to recurrent effusion and a Pleurx was placed. Patholgoy showed adenocarcinoma. \par \par She ha lost a few pounds about  4. She never smoked. She does have weakness.  From her Pleurx she  has 500 cc removed every other day. She has pain after.\par \par Work up:\par 2/20/19 CTA chest:  Large right pleural effusion, maximal axial width of 7 cm correlating with an estimated volume of 900 cc. Associated \par near complete compressive atelectasis of the right lower lobe. Probable loculations of the effusion seen at the apex and at the right heart \par border (for example series 5 images 176, 52; series 9 image 69). 1 cm right upper lobe fissural nodule (series 5 image 139). Patent central \par airways. There is right apical 3.1 x 3.0 cm mass with irregular right apical pleural thickening (series 7, image 44), \par and multiple satellite pleural nodules including a more caudad 2.7 cm para-mediastinal nodule (series 7, image 175). Tissue sampling \par recommended.\par \par Path: \par Pleural fluid: 3/25:  Cell block material is hypocellular and shows a single minutecluster of malignant cells.Immunohistochemistry studies were performed at [facility] and the\par results are noncontributory\par 3/22: Addendum\par Cell block material shows macrophages (positive ),mesothelial cells (positive calretinin) and a single very minute cluster of atypical suspicious cells is negative for Fidel-Ep4.Material is insufficient for further diagnsotic studies (ie,mmunohistochemistry).\par 3/15/19: Pleural fluid: Addendum\par Immunohistochemical studies were performed on the cell block at\par [facility], [address] [place] [id number] (see NOTE). The results\par are as follows:\par \par CK7-                      Rare mesothelial cells positive\par CK20-                    Negative\par CK 5/6-                  Rare mesothelial cells positive\par Calretinin-             Scattered mesothelial cells positive\par CD68-                   Numerous histiocytes positive\par BerEP-4-               Negative\par Napsin-A-             Negative\par TTF-1-                  Negative\par \par These results are non-specific, suggestive of mesothelial\par hyperplasia. The few markedly atypical cells seen in the smear\par are not evident in the cell block.\par \par The atypical cells seen in the current smear are not seen in the\par prior pleural fluid Thinprep smear or cell block (48-VG-).\par \par \par Few marked atypical cells, suspicious for mesothelioma versus\par adenocarcinoma,\par in a background of numerous mesothelial cells, histiocytes and\par small lymphocytes.\par \par \par 3/25/19: Final DiagnosisPOSITIVE FOR MALIGNANT CELLS.Favor metastatic adenocarcinoma. Pending cell block.\par \par  I spke to Dr. Allison  and there are only a few cells thus furhter studies such as IHC, molecular PD-l1 is not possible\par  [FreeTextEntry1] : Started Keytruda on 5/17/19 [de-identified] : 6/5/19\par She is here for ofllow up. Since last visit she started Keytruda. She had a PET/CT on April 17 that showed  Extensive FDG avid right-sided pleural mass/soft tissue thickening, \par with max SUV 21.5 within a right apical pleural soft tissue mass.\par \par 2.  Multiple FDG avid mediastinal lymph nodes, with max SUV 16.5 within a \par 2.6 x 2.1 cm subcarinal node.\par \par 3.  FDG avid lytic lesion within the left superior acetabulum (max SUV \par 14.8), suspicious for osseous metastasis.\par \par 4.  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 Used to be  500 ml  u 3 times a week. Takes 60 mg of oral moprphine a day 20 mg 3 times a day but pain is still severe. Has not had a bowel movement in unknown amount of days does not take lexatives although she has lexatives. \par \par 6/26/19 She is here for follow up. She is due for cycle 3 of Keytruda.  She has about 300 mg drained from her pleurax twice a week. Has SOB but saturated 94% on room air and 94% on ambualtion. Did not torate the fentanyl path had nausea.  On Morphine 10 mg BID doing well.  Constipation  is better. \par \par 7/17/19\par She is here for follow-up of Stage IV lung cancer with son.  She is due for cycle 3 of Keytruda.  CBC is stable from today.  She only takes the liquid morphine at night now.  She started eating slighlty more and has been feeling slightly better ,as per son.  He states that the pleurex drainage is less, it is checked every Friday + Monday.  On Friday there was about 225cc drained, but yesterday no drainage.  We will arrange for hydration today since she has had poor oral intake lately and small appetite.  Son reports she can tolerate fluids but she has early satiety with food.  Patient also reports feeling short of breath at rest, but her O2 sat is 98% on R/a.  Right sided pain is better. \par CT C/A/P (7/6/19) IMPRESSION:Since April 17, 2019:1. No definite new sites of metastatic disease.2. Right-sided pleural soft tissue mass/thickening, overall appearing decreased since April 17, 2019 with primarily residual right apical tumor. Tumor appears to infiltrate the mediastinum.3. No significant change in mediastinal lymphadenopathy including largest 2.6 x 2.1 cm subcarinal lymph node which remains unchanged. Retrocrural 2.5 x 2.3 cm metastasis or additional site of pleural tumor, unchanged.4. Irregularity of right anterior first rib, probably invaded by tumor, stable in retrospect. Left superior acetabular lucent lesion corresponding to FDG avid bone metastasis.5. Right pelvic indeterminate 7 cm mass; possible retroverted uterus with degenerating fibroid or less likely ovarian mass; previously non-FDG avid and probably benign. Further evaluation with pelvic ultrasound or MRI pelvis with and without IV contrast may be helpful.6. Status post right chest tube placement with decreased small loculated right pleural effusion with fluid within the major fissure. 7. New mild to moderate intrahepatic biliary dilation. Correlation with LFTs recommended. Consider further evaluation with ERCP or MRCP with and without IV contrast.\par \par 8/7/19\par She is here with her son. Overall she is doing much better. She is in wheelchair but now less pain, eating better and Pleurax is only draining about 25 cc twice a week, She does complain of SOB still. I explained this will improve as well but asked her to follow up with Dr. Lanza of pul. \par \par 8/28/19\par She is here for follow up. They are to see Dr. Lanza in Early september. There is minimal output from Pleurax.  Pain is much better and does not use narcotics anymore.  Overall better.   She has right sidede nasal congestion and states makes it hard to breath.\par \par 9/18/19\par She is doing well. She is due for cycle 7. Son was not eliana to schedule CTs priro to the visit but she looks better and better every visit and thus most likley still responding. She saw Dr. Lanza who started her on Flonase. She is due to see ENT Dr. Adams.  Appetie is better. No pains. Dr. Lanza is considering to remover the Pleurx possibly in November. \par \par 10/09/2019\par Patient is here for a follow up visit. Due for cycle 8 today. CT chest/abd/pelvis 09/2019 showed improvement in disease. Also has seen Dr. Adams and agreed to continue witj flonase as she has hypertrophy of turbinates. \par She is tolerating Ketryuda well. Her only complaint at this time is trouble in falling asleep.

## 2019-10-15 NOTE — ASSESSMENT
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 Lanza about removing the Pleurx in November.\par \par #Dysphagia and decrease  appetite \par -this is much better and eating well\par \par #SOB mainly when she tries to sleep and now due to right nasal congestion\par - Slowly improving on flonase\par \par #Has some insomnia \par -will start  Melatonin 5 mg at bedtime \par \par \par RTC in 3 weeks\par \par Patient seen and examined with Dr. Craig

## 2019-10-15 NOTE — HISTORY OF PRESENT ILLNESS
[Therapy: ___] : Therapy: [unfilled] [Cycle: ___] : Cycle: [unfilled] [de-identified] : \par This is a 83 year old female who I initially met in [facility] on 3/28/19. She was initially admitted on 2/20/2019 for a right loculated pleural effusion\par approx. 900 CCs, no PE and right apical 3.1x3.0 cm mass with pleural nodules on CTA of chest.  . Pt returned to ED on 3/15/2019 for right pleural\par effusion, had a thoracentesis in ED and was sent home. She than came back for SOB  due to recurrent effusion and a Pleurx was placed. Patholgoy showed adenocarcinoma. \par \par She ha lost a few pounds about  4. She never smoked. She does have weakness.  From her Pleurx she  has 500 cc removed every other day. She has pain after.\par \par Work up:\par 2/20/19 CTA chest:  Large right pleural effusion, maximal axial width of 7 cm correlating with an estimated volume of 900 cc. Associated \par near complete compressive atelectasis of the right lower lobe. Probable loculations of the effusion seen at the apex and at the right heart \par border (for example series 5 images 176, 52; series 9 image 69). 1 cm right upper lobe fissural nodule (series 5 image 139). Patent central \par airways. There is right apical 3.1 x 3.0 cm mass with irregular right apical pleural thickening (series 7, image 44), \par and multiple satellite pleural nodules including a more caudad 2.7 cm para-mediastinal nodule (series 7, image 175). Tissue sampling \par recommended.\par \par Path: \par Pleural fluid: 3/25:  Cell block material is hypocellular and shows a single minutecluster of malignant cells.Immunohistochemistry studies were performed at [facility] and the\par results are noncontributory\par 3/22: Addendum\par Cell block material shows macrophages (positive ),mesothelial cells (positive calretinin) and a single very minute cluster of atypical suspicious cells is negative for Fidel-Ep4.Material is insufficient for further diagnsotic studies (ie,mmunohistochemistry).\par 3/15/19: Pleural fluid: Addendum\par Immunohistochemical studies were performed on the cell block at\par [facility], [address] [place] [id number] (see NOTE). The results\par are as follows:\par \par CK7-                      Rare mesothelial cells positive\par CK20-                    Negative\par CK 5/6-                  Rare mesothelial cells positive\par Calretinin-             Scattered mesothelial cells positive\par CD68-                   Numerous histiocytes positive\par BerEP-4-               Negative\par Napsin-A-             Negative\par TTF-1-                  Negative\par \par These results are non-specific, suggestive of mesothelial\par hyperplasia. The few markedly atypical cells seen in the smear\par are not evident in the cell block.\par \par The atypical cells seen in the current smear are not seen in the\par prior pleural fluid Thinprep smear or cell block (40-UM-).\par \par \par Few marked atypical cells, suspicious for mesothelioma versus\par adenocarcinoma,\par in a background of numerous mesothelial cells, histiocytes and\par small lymphocytes.\par \par \par 3/25/19: Final DiagnosisPOSITIVE FOR MALIGNANT CELLS.Favor metastatic adenocarcinoma. Pending cell block.\par \par  I spke to Dr. Allison  and there are only a few cells thus furhter studies such as IHC, molecular PD-l1 is not possible\par  [FreeTextEntry1] : Started Keytruda on 5/17/19 [de-identified] : 6/5/19\par She is here for ofllow up. Since last visit she started Keytruda. She had a PET/CT on April 17 that showed  Extensive FDG avid right-sided pleural mass/soft tissue thickening, \par with max SUV 21.5 within a right apical pleural soft tissue mass.\par \par 2.  Multiple FDG avid mediastinal lymph nodes, with max SUV 16.5 within a \par 2.6 x 2.1 cm subcarinal node.\par \par 3.  FDG avid lytic lesion within the left superior acetabulum (max SUV \par 14.8), suspicious for osseous metastasis.\par \par 4.  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 Used to be  500 ml  u 3 times a week. Takes 60 mg of oral moprphine a day 20 mg 3 times a day but pain is still severe. Has not had a bowel movement in unknown amount of days does not take lexatives although she has lexatives. \par \par 6/26/19 She is here for follow up. She is due for cycle 3 of Keytruda.  She has about 300 mg drained from her pleurax twice a week. Has SOB but saturated 94% on room air and 94% on ambualtion. Did not torate the fentanyl path had nausea.  On Morphine 10 mg BID doing well.  Constipation  is better. \par \par 7/17/19\par She is here for follow-up of Stage IV lung cancer with son.  She is due for cycle 3 of Keytruda.  CBC is stable from today.  She only takes the liquid morphine at night now.  She started eating slighlty more and has been feeling slightly better ,as per son.  He states that the pleurex drainage is less, it is checked every Friday + Monday.  On Friday there was about 225cc drained, but yesterday no drainage.  We will arrange for hydration today since she has had poor oral intake lately and small appetite.  Son reports she can tolerate fluids but she has early satiety with food.  Patient also reports feeling short of breath at rest, but her O2 sat is 98% on R/a.  Right sided pain is better. \par CT C/A/P (7/6/19) IMPRESSION:Since April 17, 2019:1. No definite new sites of metastatic disease.2. Right-sided pleural soft tissue mass/thickening, overall appearing decreased since April 17, 2019 with primarily residual right apical tumor. Tumor appears to infiltrate the mediastinum.3. No significant change in mediastinal lymphadenopathy including largest 2.6 x 2.1 cm subcarinal lymph node which remains unchanged. Retrocrural 2.5 x 2.3 cm metastasis or additional site of pleural tumor, unchanged.4. Irregularity of right anterior first rib, probably invaded by tumor, stable in retrospect. Left superior acetabular lucent lesion corresponding to FDG avid bone metastasis.5. Right pelvic indeterminate 7 cm mass; possible retroverted uterus with degenerating fibroid or less likely ovarian mass; previously non-FDG avid and probably benign. Further evaluation with pelvic ultrasound or MRI pelvis with and without IV contrast may be helpful.6. Status post right chest tube placement with decreased small loculated right pleural effusion with fluid within the major fissure. 7. New mild to moderate intrahepatic biliary dilation. Correlation with LFTs recommended. Consider further evaluation with ERCP or MRCP with and without IV contrast.\par \par 8/7/19\par She is here with her son. Overall she is doing much better. She is in wheelchair but now less pain, eating better and Pleurax is only draining about 25 cc twice a week, She does complain of SOB still. I explained this will improve as well but asked her to follow up with Dr. Lanza of pul. \par \par 8/28/19\par She is here for follow up. They are to see Dr. Lanza in Early september. There is minimal output from Pleurax.  Pain is much better and does not use narcotics anymore.  Overall better.   She has right sidede nasal congestion and states makes it hard to breath.\par \par 9/18/19\par She is doing well. She is due for cycle 7. Son was not eliana to schedule CTs priro to the visit but she looks better and better every visit and thus most likley still responding. She saw Dr. Lanza who started her on Flonase. She is due to see ENT Dr. Adams.  Appetie is better. No pains. Dr. Lanza is considering to remover the Pleurx possibly in November. \par \par 10/09/2019\par Patient is here for a follow up visit. Due for cycle 8 today. CT chest/abd/pelvis 09/2019 showed improvement in disease. Also has seen Dr. Adams and agreed to continue witj flonase as she has hypertrophy of turbinates. \par She is tolerating Ketryuda well. Her only complaint at this time is trouble in falling asleep.

## 2019-10-15 NOTE — PHYSICAL EXAM
[Ambulatory and capable of all self care but unable to carry out any work activities] : Status 2- Ambulatory and capable of all self care but unable to carry out any work activities. Up and about more than 50% of waking hours [Normal] : affect appropriate [de-identified] :  In wheelchair  as always but is better and better [de-identified] : Has decreased breath sound around right Pleurx.

## 2019-10-15 NOTE — PHYSICAL EXAM
[Ambulatory and capable of all self care but unable to carry out any work activities] : Status 2- Ambulatory and capable of all self care but unable to carry out any work activities. Up and about more than 50% of waking hours [Normal] : affect appropriate [de-identified] :  In wheelchair  as always but is better and better [de-identified] : Has decreased breath sound around right Pleurx.

## 2019-10-15 NOTE — REASON FOR VISIT
[Follow-Up Visit] : a follow-up [Patient Declined  Services] : - None: Patient declined  services [FreeTextEntry2] : Stage IV lung cancer  [FreeTextEntry3] : I speak fluent [place] citizen

## 2019-10-16 ENCOUNTER — OUTPATIENT (OUTPATIENT)
Dept: OUTPATIENT SERVICES | Facility: HOSPITAL | Age: 84
LOS: 1 days | Discharge: HOME | End: 2019-10-16
Payer: MEDICAID

## 2019-10-16 DIAGNOSIS — Z90.710 ACQUIRED ABSENCE OF BOTH CERVIX AND UTERUS: Chronic | ICD-10-CM

## 2019-10-16 DIAGNOSIS — R05 COUGH: ICD-10-CM

## 2019-10-16 DIAGNOSIS — C34.91 MALIGNANT NEOPLASM OF UNSPECIFIED PART OF RIGHT BRONCHUS OR LUNG: ICD-10-CM

## 2019-10-16 DIAGNOSIS — R06.02 SHORTNESS OF BREATH: ICD-10-CM

## 2019-10-16 PROCEDURE — 71046 X-RAY EXAM CHEST 2 VIEWS: CPT | Mod: 26

## 2019-10-30 ENCOUNTER — APPOINTMENT (OUTPATIENT)
Dept: INFUSION THERAPY | Facility: CLINIC | Age: 84
End: 2019-10-30
Payer: MEDICAID

## 2019-10-30 ENCOUNTER — LABORATORY RESULT (OUTPATIENT)
Age: 84
End: 2019-10-30

## 2019-10-30 ENCOUNTER — APPOINTMENT (OUTPATIENT)
Dept: HEMATOLOGY ONCOLOGY | Facility: CLINIC | Age: 84
End: 2019-10-30
Payer: MEDICAID

## 2019-10-30 VITALS
RESPIRATION RATE: 16 BRPM | SYSTOLIC BLOOD PRESSURE: 128 MMHG | HEART RATE: 73 BPM | WEIGHT: 136 LBS | HEIGHT: 62 IN | DIASTOLIC BLOOD PRESSURE: 60 MMHG | TEMPERATURE: 97.8 F | BODY MASS INDEX: 25.03 KG/M2

## 2019-10-30 PROCEDURE — 99214 OFFICE O/P EST MOD 30 MIN: CPT

## 2019-10-30 RX ORDER — PEMBROLIZUMAB 25 MG/ML
200 INJECTION, SOLUTION INTRAVENOUS ONCE
Refills: 0 | Status: COMPLETED | OUTPATIENT
Start: 2019-10-30 | End: 2019-10-30

## 2019-10-30 RX ADMIN — PEMBROLIZUMAB 216 MILLIGRAM(S): 25 INJECTION, SOLUTION INTRAVENOUS at 13:27

## 2019-10-30 RX ADMIN — PEMBROLIZUMAB 200 MILLIGRAM(S): 25 INJECTION, SOLUTION INTRAVENOUS at 14:00

## 2019-10-31 ENCOUNTER — OTHER (OUTPATIENT)
Age: 84
End: 2019-10-31

## 2019-10-31 LAB
ALBUMIN SERPL ELPH-MCNC: 3.9 G/DL
ALP BLD-CCNC: 66 U/L
ALT SERPL-CCNC: 18 U/L
ANION GAP SERPL CALC-SCNC: 13 MMOL/L
AST SERPL-CCNC: 13 U/L
BILIRUB SERPL-MCNC: 0.4 MG/DL
BUN SERPL-MCNC: 23 MG/DL
CALCIUM SERPL-MCNC: 9.4 MG/DL
CHLORIDE SERPL-SCNC: 103 MMOL/L
CO2 SERPL-SCNC: 27 MMOL/L
CREAT SERPL-MCNC: 0.9 MG/DL
GLUCOSE SERPL-MCNC: 94 MG/DL
HCT VFR BLD CALC: 33.4 %
HGB BLD-MCNC: 11.1 G/DL
MCHC RBC-ENTMCNC: 27.9 PG
MCHC RBC-ENTMCNC: 33.2 G/DL
MCV RBC AUTO: 83.9 FL
PLATELET # BLD AUTO: 229 K/UL
PMV BLD: 9.6 FL
POTASSIUM SERPL-SCNC: 3.7 MMOL/L
PROT SERPL-MCNC: 6.4 G/DL
RBC # BLD: 3.98 M/UL
RBC # FLD: 13.8 %
SODIUM SERPL-SCNC: 143 MMOL/L
TSH SERPL-ACNC: 0.75 UIU/ML
WBC # FLD AUTO: 6.24 K/UL

## 2019-11-01 NOTE — REASON FOR VISIT
[Follow-Up Visit] : a follow-up [Patient Declined  Services] : - None: Patient declined  services [FreeTextEntry2] : Stage IV lung cancer  [FreeTextEntry3] : I speak fluent [place]

## 2019-11-01 NOTE — REVIEW OF SYSTEMS
[Fatigue] : fatigue [SOB on Exertion] : shortness of breath during exertion [Constipation] : constipation [Negative] : Allergic/Immunologic [Recent Change In Weight] : ~T no recent weight change [Dysphagia] : no dysphagia [Shortness Of Breath] : no shortness of breath [FreeTextEntry2] : seated in wheelchair

## 2019-11-01 NOTE — ASSESSMENT
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 \par - c/w Keytruda every 3 weeks, responding since less fluid coming out of Pleurx. she only drained  150 cc  after not draining it for 9 days\par - to see follow up with Dr. Lanza about removing the Pleurx in November.\par \par #Dysphagia and decrease appetite \par - this is much better and eating well\par - Jevity 1.2 hector ordered to be consumed BiD\par \par #SOB mainly when she tries to sleep and now due to right nasal congestion\par - Slowly improving on Flonase\par \par #Has some insomnia \par - will start  Melatonin 5 mg at bedtime \par \par RTC in 3 weeks, will plan to scan her around sometime in December.

## 2019-11-01 NOTE — PHYSICAL EXAM
[Ambulatory and capable of all self care but unable to carry out any work activities] : Status 2- Ambulatory and capable of all self care but unable to carry out any work activities. Up and about more than 50% of waking hours [Normal] : affect appropriate [de-identified] :  In wheelchair  as always [de-identified] : Has decreased breath sound around right Pleurx.

## 2019-11-01 NOTE — HISTORY OF PRESENT ILLNESS
[Therapy: ___] : Therapy: [unfilled] [Cycle: ___] : Cycle: [unfilled] [de-identified] : \par This is a 83 year old female who I initially met in [facility] on 3/28/19. She was initially admitted on 2/20/2019 for a right loculated pleural effusion\par approx. 900 CCs, no PE and right apical 3.1x3.0 cm mass with pleural nodules on CTA of chest.  . Pt returned to ED on 3/15/2019 for right pleural\par effusion, had a thoracentesis in ED and was sent home. She than came back for SOB  due to recurrent effusion and a Pleurx was placed. Patholgoy showed adenocarcinoma. \par \par She ha lost a few pounds about  4. She never smoked. She does have weakness.  From her Pleurx she  has 500 cc removed every other day. She has pain after.\par \par Work up:\par 2/20/19 CTA chest:  Large right pleural effusion, maximal axial width of 7 cm correlating with an estimated volume of 900 cc. Associated \par near complete compressive atelectasis of the right lower lobe. Probable loculations of the effusion seen at the apex and at the right heart \par border (for example series 5 images 176, 52; series 9 image 69). 1 cm right upper lobe fissural nodule (series 5 image 139). Patent central \par airways. There is right apical 3.1 x 3.0 cm mass with irregular right apical pleural thickening (series 7, image 44), \par and multiple satellite pleural nodules including a more caudad 2.7 cm para-mediastinal nodule (series 7, image 175). Tissue sampling \par recommended.\par \par Path: \par Pleural fluid: 3/25:  Cell block material is hypocellular and shows a single minutecluster of malignant cells.Immunohistochemistry studies were performed at [facility] and the\par results are noncontributory\par 3/22: Addendum\par Cell block material shows macrophages (positive ),mesothelial cells (positive calretinin) and a single very minute cluster of atypical suspicious cells is negative for Fidel-Ep4.Material is insufficient for further diagnsotic studies (ie,mmunohistochemistry).\par 3/15/19: Pleural fluid: Addendum\par Immunohistochemical studies were performed on the cell block at\par [facility], [address] [place] [id number] (see NOTE). The results\par are as follows:\par \par CK7-                      Rare mesothelial cells positive\par CK20-                    Negative\par CK 5/6-                  Rare mesothelial cells positive\par Calretinin-             Scattered mesothelial cells positive\par CD68-                   Numerous histiocytes positive\par BerEP-4-               Negative\par Napsin-A-             Negative\par TTF-1-                  Negative\par \par These results are non-specific, suggestive of mesothelial\par hyperplasia. The few markedly atypical cells seen in the smear\par are not evident in the cell block.\par \par The atypical cells seen in the current smear are not seen in the\par prior pleural fluid Thinprep smear or cell block (69-JQ-).\par \par \par Few marked atypical cells, suspicious for mesothelioma versus\par adenocarcinoma,\par in a background of numerous mesothelial cells, histiocytes and\par small lymphocytes.\par \par \par 3/25/19: Final DiagnosisPOSITIVE FOR MALIGNANT CELLS.Favor metastatic adenocarcinoma. Pending cell block.\par \par  I spke to Dr. Allison  and there are only a few cells thus furhter studies such as IHC, molecular PD-l1 is not possible\par  [FreeTextEntry1] : Started Keytruda on 5/17/19 [de-identified] : 6/5/19\par She is here for ofllow up. Since last visit she started Keytruda. She had a PET/CT on April 17 that showed  Extensive FDG avid right-sided pleural mass/soft tissue thickening, \par with max SUV 21.5 within a right apical pleural soft tissue mass.\par \par 2.  Multiple FDG avid mediastinal lymph nodes, with max SUV 16.5 within a \par 2.6 x 2.1 cm subcarinal node.\par \par 3.  FDG avid lytic lesion within the left superior acetabulum (max SUV \par 14.8), suspicious for osseous metastasis.\par \par 4.  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 Used to be  500 ml  u 3 times a week. Takes 60 mg of oral moprphine a day 20 mg 3 times a day but pain is still severe. Has not had a bowel movement in unknown amount of days does not take lexatives although she has lexatives. \par \par 6/26/19 She is here for follow up. She is due for cycle 3 of Keytruda.  She has about 300 mg drained from her pleurax twice a week. Has SOB but saturated 94% on room air and 94% on ambualtion. Did not torate the fentanyl path had nausea.  On Morphine 10 mg BID doing well.  Constipation  is better. \par \par 7/17/19\par She is here for follow-up of Stage IV lung cancer with son.  She is due for cycle 3 of Keytruda.  CBC is stable from today.  She only takes the liquid morphine at night now.  She started eating slighlty more and has been feeling slightly better ,as per son.  He states that the pleurex drainage is less, it is checked every Friday + Monday.  On Friday there was about 225cc drained, but yesterday no drainage.  We will arrange for hydration today since she has had poor oral intake lately and small appetite.  Son reports she can tolerate fluids but she has early satiety with food.  Patient also reports feeling short of breath at rest, but her O2 sat is 98% on R/a.  Right sided pain is better. \par CT C/A/P (7/6/19) IMPRESSION:Since April 17, 2019:1. No definite new sites of metastatic disease.2. Right-sided pleural soft tissue mass/thickening, overall appearing decreased since April 17, 2019 with primarily residual right apical tumor. Tumor appears to infiltrate the mediastinum.3. No significant change in mediastinal lymphadenopathy including largest 2.6 x 2.1 cm subcarinal lymph node which remains unchanged. Retrocrural 2.5 x 2.3 cm metastasis or additional site of pleural tumor, unchanged.4. Irregularity of right anterior first rib, probably invaded by tumor, stable in retrospect. Left superior acetabular lucent lesion corresponding to FDG avid bone metastasis.5. Right pelvic indeterminate 7 cm mass; possible retroverted uterus with degenerating fibroid or less likely ovarian mass; previously non-FDG avid and probably benign. Further evaluation with pelvic ultrasound or MRI pelvis with and without IV contrast may be helpful.6. Status post right chest tube placement with decreased small loculated right pleural effusion with fluid within the major fissure. 7. New mild to moderate intrahepatic biliary dilation. Correlation with LFTs recommended. Consider further evaluation with ERCP or MRCP with and without IV contrast.\par \par 8/7/19\par She is here with her son. Overall she is doing much better. She is in wheelchair but now less pain, eating better and Pleurax is only draining about 25 cc twice a week, She does complain of SOB still. I explained this will improve as well but asked her to follow up with Dr. Lanza of pul. \par \par 8/28/19\par She is here for follow up. They are to see Dr. Lanza in Early september. There is minimal output from Pleurax.  Pain is much better and does not use narcotics anymore.  Overall better.   She has right sidede nasal congestion and states makes it hard to breath.\par \par 9/18/19\par She is doing well. She is due for cycle 7. Son was not eliana to schedule CTs priro to the visit but she looks better and better every visit and thus most likley still responding. She saw Dr. Lanza who started her on Flonase. She is due to see ENT Dr. Adams.  Appetie is better. No pains. Dr. Lanza is considering to remover the Pleurx possibly in November. \par \par 10/09/2019\par Patient is here for a follow up visit. Due for cycle 8 today. CT chest/abd/pelvis 09/2019 showed improvement in disease. Also has seen Dr. Adams and agreed to continue witj flonase as she has hypertrophy of turbinates. \par She is tolerating Ketryuda well. Her only complaint at this time is trouble in falling asleep. \par \par 10/30/19\par Patient is here for a follow up visit of Stage IV lung cancer with family member.  She is feeling well with no new complaints.   She feels dyspneic when taking a deep breath but her pain has improved.  Most recent CBC is stable. CT C/A/P on September 22nd showed improvement in right apical lung mass and pleural effusion and drained 150cc.  Due for cycle 9 today.

## 2019-11-08 ENCOUNTER — OUTPATIENT (OUTPATIENT)
Dept: OUTPATIENT SERVICES | Facility: HOSPITAL | Age: 84
LOS: 1 days | Discharge: HOME | End: 2019-11-08

## 2019-11-08 ENCOUNTER — APPOINTMENT (OUTPATIENT)
Dept: PULMONOLOGY | Facility: CLINIC | Age: 84
End: 2019-11-08
Payer: MEDICAID

## 2019-11-08 VITALS
OXYGEN SATURATION: 96 % | BODY MASS INDEX: 25.4 KG/M2 | DIASTOLIC BLOOD PRESSURE: 77 MMHG | HEIGHT: 62 IN | HEART RATE: 84 BPM | WEIGHT: 138 LBS | SYSTOLIC BLOOD PRESSURE: 163 MMHG

## 2019-11-08 DIAGNOSIS — Z00.00 ENCOUNTER FOR GENERAL ADULT MEDICAL EXAMINATION W/OUT ABNORMAL FINDINGS: ICD-10-CM

## 2019-11-08 DIAGNOSIS — Z90.710 ACQUIRED ABSENCE OF BOTH CERVIX AND UTERUS: Chronic | ICD-10-CM

## 2019-11-08 PROCEDURE — 99213 OFFICE O/P EST LOW 20 MIN: CPT | Mod: GC

## 2019-11-08 RX ORDER — AMLODIPINE BESYLATE 10 MG/1
10 TABLET ORAL DAILY
Qty: 30 | Refills: 0 | Status: COMPLETED | COMMUNITY

## 2019-11-08 NOTE — ASSESSMENT
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 Patel\par - patient has right sided pleurx catheter which draining about 300-400 cc per week\par f/u repeat CTA chest (referred by Dr. Patel)\par - Refer for Chest XRAY and f/u in 2 months\par \par #difficulty breathing\par - dyspnea on walking\par - uses fluticasone inhaler\par \par Follow up in 2 months

## 2019-11-16 DIAGNOSIS — J91.0 MALIGNANT PLEURAL EFFUSION: ICD-10-CM

## 2019-11-16 DIAGNOSIS — Z00.00 ENCOUNTER FOR GENERAL ADULT MEDICAL EXAMINATION WITHOUT ABNORMAL FINDINGS: ICD-10-CM

## 2019-11-16 DIAGNOSIS — C34.91 MALIGNANT NEOPLASM OF UNSPECIFIED PART OF RIGHT BRONCHUS OR LUNG: ICD-10-CM

## 2019-11-20 ENCOUNTER — LABORATORY RESULT (OUTPATIENT)
Age: 84
End: 2019-11-20

## 2019-11-20 ENCOUNTER — APPOINTMENT (OUTPATIENT)
Dept: INFUSION THERAPY | Facility: CLINIC | Age: 84
End: 2019-11-20

## 2019-11-20 LAB
HCT VFR BLD CALC: 32.7 %
HGB BLD-MCNC: 10.8 G/DL
MCHC RBC-ENTMCNC: 27.9 PG
MCHC RBC-ENTMCNC: 33 G/DL
MCV RBC AUTO: 84.5 FL
PLATELET # BLD AUTO: 245 K/UL
PMV BLD: 9.6 FL
RBC # BLD: 3.87 M/UL
RBC # FLD: 13 %
WBC # FLD AUTO: 6.06 K/UL

## 2019-11-20 RX ORDER — PEMBROLIZUMAB 25 MG/ML
200 INJECTION, SOLUTION INTRAVENOUS ONCE
Refills: 0 | Status: COMPLETED | OUTPATIENT
Start: 2019-11-20 | End: 2019-11-20

## 2019-11-20 RX ADMIN — PEMBROLIZUMAB 216 MILLIGRAM(S): 25 INJECTION, SOLUTION INTRAVENOUS at 13:18

## 2019-11-20 RX ADMIN — PEMBROLIZUMAB 200 MILLIGRAM(S): 25 INJECTION, SOLUTION INTRAVENOUS at 13:50

## 2019-11-21 LAB
ALBUMIN SERPL ELPH-MCNC: 4.1 G/DL
ALP BLD-CCNC: 69 U/L
ALT SERPL-CCNC: 10 U/L
ANION GAP SERPL CALC-SCNC: 15 MMOL/L
AST SERPL-CCNC: 14 U/L
BILIRUB SERPL-MCNC: 0.4 MG/DL
BUN SERPL-MCNC: 26 MG/DL
CALCIUM SERPL-MCNC: 9.5 MG/DL
CHLORIDE SERPL-SCNC: 104 MMOL/L
CO2 SERPL-SCNC: 24 MMOL/L
CREAT SERPL-MCNC: 1 MG/DL
GLUCOSE SERPL-MCNC: 111 MG/DL
POTASSIUM SERPL-SCNC: 4.2 MMOL/L
PROT SERPL-MCNC: 6.6 G/DL
SODIUM SERPL-SCNC: 143 MMOL/L
TSH SERPL-ACNC: 1.07 UIU/ML

## 2019-12-11 ENCOUNTER — OUTPATIENT (OUTPATIENT)
Dept: OUTPATIENT SERVICES | Facility: HOSPITAL | Age: 84
LOS: 1 days | Discharge: HOME | End: 2019-12-11

## 2019-12-11 ENCOUNTER — APPOINTMENT (OUTPATIENT)
Dept: HEMATOLOGY ONCOLOGY | Facility: CLINIC | Age: 84
End: 2019-12-11
Payer: MEDICAID

## 2019-12-11 ENCOUNTER — LABORATORY RESULT (OUTPATIENT)
Age: 84
End: 2019-12-11

## 2019-12-11 ENCOUNTER — APPOINTMENT (OUTPATIENT)
Dept: INFUSION THERAPY | Facility: CLINIC | Age: 84
End: 2019-12-11
Payer: MEDICAID

## 2019-12-11 VITALS
SYSTOLIC BLOOD PRESSURE: 148 MMHG | DIASTOLIC BLOOD PRESSURE: 67 MMHG | HEIGHT: 62 IN | TEMPERATURE: 96.7 F | WEIGHT: 144 LBS | RESPIRATION RATE: 14 BRPM | HEART RATE: 77 BPM | BODY MASS INDEX: 26.5 KG/M2

## 2019-12-11 DIAGNOSIS — Z90.710 ACQUIRED ABSENCE OF BOTH CERVIX AND UTERUS: Chronic | ICD-10-CM

## 2019-12-11 DIAGNOSIS — C34.90 MALIGNANT NEOPLASM OF UNSPECIFIED PART OF UNSPECIFIED BRONCHUS OR LUNG: ICD-10-CM

## 2019-12-11 PROCEDURE — 99214 OFFICE O/P EST MOD 30 MIN: CPT

## 2019-12-11 RX ORDER — PEMBROLIZUMAB 25 MG/ML
200 INJECTION, SOLUTION INTRAVENOUS ONCE
Refills: 0 | Status: COMPLETED | OUTPATIENT
Start: 2019-12-11 | End: 2019-12-11

## 2019-12-11 RX ADMIN — PEMBROLIZUMAB 200 MILLIGRAM(S): 25 INJECTION, SOLUTION INTRAVENOUS at 13:02

## 2019-12-11 RX ADMIN — PEMBROLIZUMAB 216 MILLIGRAM(S): 25 INJECTION, SOLUTION INTRAVENOUS at 12:31

## 2019-12-14 NOTE — REVIEW OF SYSTEMS
[Fatigue] : fatigue [SOB on Exertion] : shortness of breath during exertion [Negative] : Allergic/Immunologic [Recent Change In Weight] : ~T no recent weight change [Dysphagia] : no dysphagia [Shortness Of Breath] : no shortness of breath [FreeTextEntry2] : seated in wheelchair

## 2019-12-14 NOTE — REASON FOR VISIT
[Follow-Up Visit] : a follow-up [FreeTextEntry2] : Stage IV lung cancer  [FreeTextEntry3] : family member

## 2019-12-14 NOTE — PHYSICAL EXAM
[Ambulatory and capable of all self care but unable to carry out any work activities] : Status 2- Ambulatory and capable of all self care but unable to carry out any work activities. Up and about more than 50% of waking hours [Normal] : grossly intact [de-identified] :  In wheelchair  as always [de-identified] : Has decreased breath sound around right Pleurx.

## 2019-12-14 NOTE — ASSESSMENT
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 \par - c/w Keytruda every 3 weeks, responding since less fluid coming out of Pleurx. she only drained ~200cc  after not draining it for 12 days\par - to see follow up with Dr. Lanza about removing the Pleurx once drainage < 50cc as per son.  They know to contact Dr. Lanza regarding drainage if it continues.\par - CBC, CMP, TSH today\par - CT C/A/P ordered to be done prior to next visit\par \par #Dysphagia and decrease appetite \par - this is much better and eating well, gaining weight\par - c/w Jevity 1.2 hector ordered to be consumed BiD\par \par #SOB mainly when she tries to sleep and now due to right nasal congestion\par - c/w Flonase\par \par #Has some insomnia \par - will start Melatonin 5 mg at bedtime \par \par RTC in 3 weeks\par \par Case reviewed with Dr. Craig, who agrees with plan of care.

## 2019-12-14 NOTE — HISTORY OF PRESENT ILLNESS
[Therapy: ___] : Therapy: [unfilled] [Cycle: ___] : Cycle: [unfilled] [de-identified] : \par This is a 83 year old female who I initially met in [facility] on 3/28/19. She was initially admitted on 2/20/2019 for a right loculated pleural effusion\par approx. 900 CCs, no PE and right apical 3.1x3.0 cm mass with pleural nodules on CTA of chest.  . Pt returned to ED on 3/15/2019 for right pleural\par effusion, had a thoracentesis in ED and was sent home. She than came back for SOB  due to recurrent effusion and a Pleurx was placed. Patholgoy showed adenocarcinoma. \par \par She ha lost a few pounds about  4. She never smoked. She does have weakness.  From her Pleurx she  has 500 cc removed every other day. She has pain after.\par \par Work up:\par 2/20/19 CTA chest:  Large right pleural effusion, maximal axial width of 7 cm correlating with an estimated volume of 900 cc. Associated \par near complete compressive atelectasis of the right lower lobe. Probable loculations of the effusion seen at the apex and at the right heart \par border (for example series 5 images 176, 52; series 9 image 69). 1 cm right upper lobe fissural nodule (series 5 image 139). Patent central \par airways. There is right apical 3.1 x 3.0 cm mass with irregular right apical pleural thickening (series 7, image 44), \par and multiple satellite pleural nodules including a more caudad 2.7 cm para-mediastinal nodule (series 7, image 175). Tissue sampling \par recommended.\par \par Path: \par Pleural fluid: 3/25:  Cell block material is hypocellular and shows a single minutecluster of malignant cells.Immunohistochemistry studies were performed at [facility] and the\par results are noncontributory\par 3/22: Addendum\par Cell block material shows macrophages (positive ),mesothelial cells (positive calretinin) and a single very minute cluster of atypical suspicious cells is negative for Fidel-Ep4.Material is insufficient for further diagnsotic studies (ie,mmunohistochemistry).\par 3/15/19: Pleural fluid: Addendum\par Immunohistochemical studies were performed on the cell block at\par [facility], [address] [place] [id number] (see NOTE). The results\par are as follows:\par \par CK7-                      Rare mesothelial cells positive\par CK20-                    Negative\par CK 5/6-                  Rare mesothelial cells positive\par Calretinin-             Scattered mesothelial cells positive\par CD68-                   Numerous histiocytes positive\par BerEP-4-               Negative\par Napsin-A-             Negative\par TTF-1-                  Negative\par \par These results are non-specific, suggestive of mesothelial\par hyperplasia. The few markedly atypical cells seen in the smear\par are not evident in the cell block.\par \par The atypical cells seen in the current smear are not seen in the\par prior pleural fluid Thinprep smear or cell block (72-EU-).\par \par \par Few marked atypical cells, suspicious for mesothelioma versus\par adenocarcinoma,\par in a background of numerous mesothelial cells, histiocytes and\par small lymphocytes.\par \par \par 3/25/19: Final DiagnosisPOSITIVE FOR MALIGNANT CELLS.Favor metastatic adenocarcinoma. Pending cell block.\par \par  I spke to Dr. Allison  and there are only a few cells thus furhter studies such as IHC, molecular PD-l1 is not possible\par  [de-identified] : 6/5/19\par She is here for ofllow up. Since last visit she started Keytruda. She had a PET/CT on April 17 that showed  Extensive FDG avid right-sided pleural mass/soft tissue thickening, \par with max SUV 21.5 within a right apical pleural soft tissue mass.\par \par 2.  Multiple FDG avid mediastinal lymph nodes, with max SUV 16.5 within a \par 2.6 x 2.1 cm subcarinal node.\par \par 3.  FDG avid lytic lesion within the left superior acetabulum (max SUV \par 14.8), suspicious for osseous metastasis.\par \par 4.  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 Used to be  500 ml  u 3 times a week. Takes 60 mg of oral moprphine a day 20 mg 3 times a day but pain is still severe. Has not had a bowel movement in unknown amount of days does not take lexatives although she has lexatives. \par \par 6/26/19 She is here for follow up. She is due for cycle 3 of Keytruda.  She has about 300 mg drained from her pleurax twice a week. Has SOB but saturated 94% on room air and 94% on ambualtion. Did not torate the fentanyl path had nausea.  On Morphine 10 mg BID doing well.  Constipation  is better. \par \par 7/17/19\par She is here for follow-up of Stage IV lung cancer with son.  She is due for cycle 3 of Keytruda.  CBC is stable from today.  She only takes the liquid morphine at night now.  She started eating slighlty more and has been feeling slightly better ,as per son.  He states that the pleurex drainage is less, it is checked every Friday + Monday.  On Friday there was about 225cc drained, but yesterday no drainage.  We will arrange for hydration today since she has had poor oral intake lately and small appetite.  Son reports she can tolerate fluids but she has early satiety with food.  Patient also reports feeling short of breath at rest, but her O2 sat is 98% on R/a.  Right sided pain is better. \par CT C/A/P (7/6/19) IMPRESSION:Since April 17, 2019:1. No definite new sites of metastatic disease.2. Right-sided pleural soft tissue mass/thickening, overall appearing decreased since April 17, 2019 with primarily residual right apical tumor. Tumor appears to infiltrate the mediastinum.3. No significant change in mediastinal lymphadenopathy including largest 2.6 x 2.1 cm subcarinal lymph node which remains unchanged. Retrocrural 2.5 x 2.3 cm metastasis or additional site of pleural tumor, unchanged.4. Irregularity of right anterior first rib, probably invaded by tumor, stable in retrospect. Left superior acetabular lucent lesion corresponding to FDG avid bone metastasis.5. Right pelvic indeterminate 7 cm mass; possible retroverted uterus with degenerating fibroid or less likely ovarian mass; previously non-FDG avid and probably benign. Further evaluation with pelvic ultrasound or MRI pelvis with and without IV contrast may be helpful.6. Status post right chest tube placement with decreased small loculated right pleural effusion with fluid within the major fissure. 7. New mild to moderate intrahepatic biliary dilation. Correlation with LFTs recommended. Consider further evaluation with ERCP or MRCP with and without IV contrast.\par \par 8/7/19\par She is here with her son. Overall she is doing much better. She is in wheelchair but now less pain, eating better and Pleurax is only draining about 25 cc twice a week, She does complain of SOB still. I explained this will improve as well but asked her to follow up with Dr. Lanza of pul. \par \par 8/28/19\par She is here for follow up. They are to see Dr. Lanza in Early september. There is minimal output from Pleurax.  Pain is much better and does not use narcotics anymore.  Overall better.   She has right sidede nasal congestion and states makes it hard to breath.\par \par 9/18/19\par She is doing well. She is due for cycle 7. Son was not eliana to schedule CTs priro to the visit but she looks better and better every visit and thus most likley still responding. She saw Dr. Lanza who started her on Flonase. She is due to see ENT Dr. Adams.  Appetie is better. No pains. Dr. Lanza is considering to remover the Pleurx possibly in November. \par \par 10/09/2019\par Patient is here for a follow up visit. Due for cycle 8 today. CT chest/abd/pelvis 09/2019 showed improvement in disease. Also has seen Dr. Adams and agreed to continue witj flonase as she has hypertrophy of turbinates. \par She is tolerating Ketryuda well. Her only complaint at this time is trouble in falling asleep. \par \par 10/30/19\par Patient is here for a follow up visit of Stage IV lung cancer with family member.  She is feeling well with no new complaints.   She feels dyspneic when taking a deep breath but her pain has improved.  Most recent CBC is stable. CT C/A/P on September 22nd showed improvement in right apical lung mass and pleural effusion and drained 150cc.  Due for cycle 9 today. \par \par 12/11/19\par Patient is here for a follow up visit of Stage IV lung cancer with family member.  She is feeling well.  They report her intake is good.  She still reports the same issue of dyspnea/sleeping issues despite being seen by ENT (who they report also recommended a nasal spray).  SInce last visit, they report the drainage of the Pleurex has remained consistent amount, however as of yesterday the color changed from yellow to dark red/lizette.  She denies any new SOB, dizziness, lightheadedness, new rashes, diarrhea or other complaints at this time.  She is otherwise tolerating Keytruda. [FreeTextEntry1] : Started Keytruda on 5/17/19

## 2019-12-16 LAB
ALBUMIN SERPL ELPH-MCNC: 3.9 G/DL
ALP BLD-CCNC: 76 U/L
ALT SERPL-CCNC: 38 U/L
ANION GAP SERPL CALC-SCNC: 14 MMOL/L
AST SERPL-CCNC: 25 U/L
BILIRUB SERPL-MCNC: 0.4 MG/DL
BUN SERPL-MCNC: 22 MG/DL
CALCIUM SERPL-MCNC: 9.2 MG/DL
CHLORIDE SERPL-SCNC: 103 MMOL/L
CO2 SERPL-SCNC: 25 MMOL/L
CREAT SERPL-MCNC: 1 MG/DL
GLUCOSE SERPL-MCNC: 85 MG/DL
HCT VFR BLD CALC: 33.6 %
HGB BLD-MCNC: 11.2 G/DL
MCHC RBC-ENTMCNC: 28.1 PG
MCHC RBC-ENTMCNC: 33.3 G/DL
MCV RBC AUTO: 84.2 FL
PLATELET # BLD AUTO: 221 K/UL
PMV BLD: 10.4 FL
POTASSIUM SERPL-SCNC: 3.6 MMOL/L
PROT SERPL-MCNC: 6.5 G/DL
RBC # BLD: 3.99 M/UL
RBC # FLD: 13.1 %
SODIUM SERPL-SCNC: 142 MMOL/L
TSH SERPL-ACNC: 0.71 UIU/ML
WBC # FLD AUTO: 5.31 K/UL

## 2019-12-19 ENCOUNTER — APPOINTMENT (OUTPATIENT)
Dept: OTOLARYNGOLOGY | Facility: CLINIC | Age: 84
End: 2019-12-19

## 2019-12-26 ENCOUNTER — FORM ENCOUNTER (OUTPATIENT)
Age: 84
End: 2019-12-26

## 2019-12-27 ENCOUNTER — OUTPATIENT (OUTPATIENT)
Dept: OUTPATIENT SERVICES | Facility: HOSPITAL | Age: 84
LOS: 1 days | Discharge: HOME | End: 2019-12-27
Payer: MEDICAID

## 2019-12-27 DIAGNOSIS — C34.91 MALIGNANT NEOPLASM OF UNSPECIFIED PART OF RIGHT BRONCHUS OR LUNG: ICD-10-CM

## 2019-12-27 DIAGNOSIS — Z90.710 ACQUIRED ABSENCE OF BOTH CERVIX AND UTERUS: Chronic | ICD-10-CM

## 2019-12-27 PROCEDURE — 74178 CT ABD&PLV WO CNTR FLWD CNTR: CPT | Mod: 26

## 2019-12-27 PROCEDURE — 71260 CT THORAX DX C+: CPT | Mod: 26

## 2019-12-30 ENCOUNTER — APPOINTMENT (OUTPATIENT)
Dept: HEMATOLOGY ONCOLOGY | Facility: CLINIC | Age: 84
End: 2019-12-30
Payer: MEDICAID

## 2019-12-30 VITALS
SYSTOLIC BLOOD PRESSURE: 119 MMHG | TEMPERATURE: 97.4 F | HEIGHT: 62 IN | HEART RATE: 69 BPM | DIASTOLIC BLOOD PRESSURE: 62 MMHG | RESPIRATION RATE: 14 BRPM

## 2019-12-30 PROCEDURE — 99214 OFFICE O/P EST MOD 30 MIN: CPT

## 2019-12-31 ENCOUNTER — LABORATORY RESULT (OUTPATIENT)
Age: 84
End: 2019-12-31

## 2019-12-31 ENCOUNTER — APPOINTMENT (OUTPATIENT)
Dept: INFUSION THERAPY | Facility: CLINIC | Age: 84
End: 2019-12-31

## 2019-12-31 RX ORDER — PEMBROLIZUMAB 25 MG/ML
200 INJECTION, SOLUTION INTRAVENOUS ONCE
Refills: 0 | Status: COMPLETED | OUTPATIENT
Start: 2019-12-31 | End: 2019-12-31

## 2019-12-31 RX ADMIN — PEMBROLIZUMAB 216 MILLIGRAM(S): 25 INJECTION, SOLUTION INTRAVENOUS at 12:14

## 2019-12-31 RX ADMIN — PEMBROLIZUMAB 200 MILLIGRAM(S): 25 INJECTION, SOLUTION INTRAVENOUS at 12:44

## 2020-01-03 ENCOUNTER — APPOINTMENT (OUTPATIENT)
Dept: CARDIOLOGY | Facility: CLINIC | Age: 85
End: 2020-01-03

## 2020-01-03 LAB
ALBUMIN SERPL ELPH-MCNC: 3.7 G/DL
ALP BLD-CCNC: 70 U/L
ALT SERPL-CCNC: 9 U/L
ANION GAP SERPL CALC-SCNC: 14 MMOL/L
AST SERPL-CCNC: 12 U/L
BILIRUB SERPL-MCNC: 0.3 MG/DL
BUN SERPL-MCNC: 27 MG/DL
CALCIUM SERPL-MCNC: 9 MG/DL
CHLORIDE SERPL-SCNC: 106 MMOL/L
CO2 SERPL-SCNC: 24 MMOL/L
CREAT SERPL-MCNC: 1 MG/DL
GLUCOSE SERPL-MCNC: 109 MG/DL
HCT VFR BLD CALC: 34 %
HGB BLD-MCNC: 11.3 G/DL
MCHC RBC-ENTMCNC: 27.7 PG
MCHC RBC-ENTMCNC: 33.2 G/DL
MCV RBC AUTO: 83.3 FL
PLATELET # BLD AUTO: 255 K/UL
PMV BLD: 9.9 FL
POTASSIUM SERPL-SCNC: 3.7 MMOL/L
PROT SERPL-MCNC: 6.2 G/DL
RBC # BLD: 4.08 M/UL
RBC # FLD: 13.2 %
SODIUM SERPL-SCNC: 144 MMOL/L
TSH SERPL-ACNC: 1.01 UIU/ML
WBC # FLD AUTO: 5.86 K/UL

## 2020-01-05 NOTE — HISTORY OF PRESENT ILLNESS
[Therapy: ___] : Therapy: [unfilled] [Cycle: ___] : Cycle: [unfilled] [de-identified] : \par This is a 83 year old female who I initially met in [facility] on 3/28/19. She was initially admitted on 2/20/2019 for a right loculated pleural effusion\par approx. 900 CCs, no PE and right apical 3.1x3.0 cm mass with pleural nodules on CTA of chest.  . Pt returned to ED on 3/15/2019 for right pleural\par effusion, had a thoracentesis in ED and was sent home. She than came back for SOB  due to recurrent effusion and a Pleurx was placed. Patholgoy showed adenocarcinoma. \par \par She ha lost a few pounds about  4. She never smoked. She does have weakness.  From her Pleurx she  has 500 cc removed every other day. She has pain after.\par \par Work up:\par 2/20/19 CTA chest:  Large right pleural effusion, maximal axial width of 7 cm correlating with an estimated volume of 900 cc. Associated \par near complete compressive atelectasis of the right lower lobe. Probable loculations of the effusion seen at the apex and at the right heart \par border (for example series 5 images 176, 52; series 9 image 69). 1 cm right upper lobe fissural nodule (series 5 image 139). Patent central \par airways. There is right apical 3.1 x 3.0 cm mass with irregular right apical pleural thickening (series 7, image 44), \par and multiple satellite pleural nodules including a more caudad 2.7 cm para-mediastinal nodule (series 7, image 175). Tissue sampling \par recommended.\par \par Path: \par Pleural fluid: 3/25:  Cell block material is hypocellular and shows a single minutecluster of malignant cells.Immunohistochemistry studies were performed at [facility] and the\par results are noncontributory\par 3/22: Addendum\par Cell block material shows macrophages (positive ),mesothelial cells (positive calretinin) and a single very minute cluster of atypical suspicious cells is negative for Fidel-Ep4.Material is insufficient for further diagnsotic studies (ie,mmunohistochemistry).\par 3/15/19: Pleural fluid: Addendum\par Immunohistochemical studies were performed on the cell block at\par [facility], [address] [place] [id number] (see NOTE). The results\par are as follows:\par \par CK7-                      Rare mesothelial cells positive\par CK20-                    Negative\par CK 5/6-                  Rare mesothelial cells positive\par Calretinin-             Scattered mesothelial cells positive\par CD68-                   Numerous histiocytes positive\par BerEP-4-               Negative\par Napsin-A-             Negative\par TTF-1-                  Negative\par \par These results are non-specific, suggestive of mesothelial\par hyperplasia. The few markedly atypical cells seen in the smear\par are not evident in the cell block.\par \par The atypical cells seen in the current smear are not seen in the\par prior pleural fluid Thinprep smear or cell block (91-GJ-).\par \par \par Few marked atypical cells, suspicious for mesothelioma versus\par adenocarcinoma,\par in a background of numerous mesothelial cells, histiocytes and\par small lymphocytes.\par \par \par 3/25/19: Final DiagnosisPOSITIVE FOR MALIGNANT CELLS.Favor metastatic adenocarcinoma. Pending cell block.\par \par  I spke to Dr. Allison  and there are only a few cells thus furhter studies such as IHC, molecular PD-l1 is not possible\par  [FreeTextEntry1] : Started Keytruda on 5/17/19 [de-identified] : 6/5/19\par She is here for ofllow up. Since last visit she started Keytruda. She had a PET/CT on April 17 that showed  Extensive FDG avid right-sided pleural mass/soft tissue thickening, \par with max SUV 21.5 within a right apical pleural soft tissue mass.\par \par 2.  Multiple FDG avid mediastinal lymph nodes, with max SUV 16.5 within a \par 2.6 x 2.1 cm subcarinal node.\par \par 3.  FDG avid lytic lesion within the left superior acetabulum (max SUV \par 14.8), suspicious for osseous metastasis.\par \par 4.  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 Used to be  500 ml  u 3 times a week. Takes 60 mg of oral moprphine a day 20 mg 3 times a day but pain is still severe. Has not had a bowel movement in unknown amount of days does not take lexatives although she has lexatives. \par \par 6/26/19 She is here for follow up. She is due for cycle 3 of Keytruda.  She has about 300 mg drained from her pleurax twice a week. Has SOB but saturated 94% on room air and 94% on ambualtion. Did not torate the fentanyl path had nausea.  On Morphine 10 mg BID doing well.  Constipation  is better. \par \par 7/17/19\par She is here for follow-up of Stage IV lung cancer with son.  She is due for cycle 3 of Keytruda.  CBC is stable from today.  She only takes the liquid morphine at night now.  She started eating slighlty more and has been feeling slightly better ,as per son.  He states that the pleurex drainage is less, it is checked every Friday + Monday.  On Friday there was about 225cc drained, but yesterday no drainage.  We will arrange for hydration today since she has had poor oral intake lately and small appetite.  Son reports she can tolerate fluids but she has early satiety with food.  Patient also reports feeling short of breath at rest, but her O2 sat is 98% on R/a.  Right sided pain is better. \par CT C/A/P (7/6/19) IMPRESSION:Since April 17, 2019:1. No definite new sites of metastatic disease.2. Right-sided pleural soft tissue mass/thickening, overall appearing decreased since April 17, 2019 with primarily residual right apical tumor. Tumor appears to infiltrate the mediastinum.3. No significant change in mediastinal lymphadenopathy including largest 2.6 x 2.1 cm subcarinal lymph node which remains unchanged. Retrocrural 2.5 x 2.3 cm metastasis or additional site of pleural tumor, unchanged.4. Irregularity of right anterior first rib, probably invaded by tumor, stable in retrospect. Left superior acetabular lucent lesion corresponding to FDG avid bone metastasis.5. Right pelvic indeterminate 7 cm mass; possible retroverted uterus with degenerating fibroid or less likely ovarian mass; previously non-FDG avid and probably benign. Further evaluation with pelvic ultrasound or MRI pelvis with and without IV contrast may be helpful.6. Status post right chest tube placement with decreased small loculated right pleural effusion with fluid within the major fissure. 7. New mild to moderate intrahepatic biliary dilation. Correlation with LFTs recommended. Consider further evaluation with ERCP or MRCP with and without IV contrast.\par \par 8/7/19\par She is here with her son. Overall she is doing much better. She is in wheelchair but now less pain, eating better and Pleurax is only draining about 25 cc twice a week, She does complain of SOB still. I explained this will improve as well but asked her to follow up with Dr. Lanza of pul. \par \par 8/28/19\par She is here for follow up. They are to see Dr. Lanza in Early september. There is minimal output from Pleurax.  Pain is much better and does not use narcotics anymore.  Overall better.   She has right sidede nasal congestion and states makes it hard to breath.\par \par 9/18/19\par She is doing well. She is due for cycle 7. Son was not eliana to schedule CTs priro to the visit but she looks better and better every visit and thus most likley still responding. She saw Dr. Lanza who started her on Flonase. She is due to see ENT Dr. Adams.  Appetie is better. No pains. Dr. Lanza is considering to remover the Pleurx possibly in November. \par \par 10/09/2019\par Patient is here for a follow up visit. Due for cycle 8 today. CT chest/abd/pelvis 09/2019 showed improvement in disease. Also has seen Dr. Adams and agreed to continue witj flonase as she has hypertrophy of turbinates. \par She is tolerating Ketryuda well. Her only complaint at this time is trouble in falling asleep. \par \par 10/30/19\par Patient is here for a follow up visit of Stage IV lung cancer with family member.  She is feeling well with no new complaints.   She feels dyspneic when taking a deep breath but her pain has improved.  Most recent CBC is stable. CT C/A/P on September 22nd showed improvement in right apical lung mass and pleural effusion and drained 150cc.  Due for cycle 9 today. \par \par 12/30/19\par Patient is here for a follow-up visit for  lung cancer with son.  Reviewed imaging results with patient and her family, which suggests worsening effusion but otherwise stable.  They drained 500cc from pleurx yesterday, reportedly sanguinous drainage.  She is agreeable to continue with cycle 12 of Keytruda tomorrow. She reports persistent dyspnea.  We discussed that this tx regimen may be losing its responsiveness.\par CT C/A/P (12.27.19) IMPRESSION: Worsening right-sided pleural effusion. Right apical spiculated nodule without difference. Unchanged appearance of the mediastinum. Unchanged appearance of the third thoracic vertebral body and right second rib. No interval change since prior examination. No change in hepatic hypodensity (likely fatty infiltration), splenic hypodensity (too small to characterize), left adrenal nodule or 1 cm retroperitoneal nodule on the right. No change in fat-containing mass in the anterior left thigh.

## 2020-01-05 NOTE — ASSESSMENT
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 Lanza, since increase in drainage from Pleurx  will hold off removal\par \par #Dysphagia and decrease appetite \par - this is much better and eating well\par - Jevity 1.2 hector ordered to be consumed BiD\par \par #SOB mainly when she tries to sleep and now due to right nasal congestion and now with worsening effusion\par - c/w Flonase\par -son knows how to drain the fluid but there is an issues with  obtaining vacuum bottles, multiple letters and requests have been made to the insurance company an suppliers \par \par #Has some insomnia \par - c/w Melatonin 5 mg at bedtime \par \par RTC in 3 weeks

## 2020-01-05 NOTE — REASON FOR VISIT
[Follow-Up Visit] : a follow-up [Patient Declined  Services] : - None: Patient declined  services [FreeTextEntry2] : Stage IV lung cancer  [FreeTextEntry3] : I speak fluent Finnish

## 2020-01-05 NOTE — REVIEW OF SYSTEMS
[Fatigue] : fatigue [SOB on Exertion] : shortness of breath during exertion [Constipation] : constipation [Negative] : Allergic/Immunologic [Shortness Of Breath] : no shortness of breath [Dysphagia] : no dysphagia [Recent Change In Weight] : ~T no recent weight change [FreeTextEntry2] : seated in wheelchair

## 2020-01-05 NOTE — PHYSICAL EXAM
[Ambulatory and capable of all self care but unable to carry out any work activities] : Status 2- Ambulatory and capable of all self care but unable to carry out any work activities. Up and about more than 50% of waking hours [Normal] : affect appropriate [de-identified] : Has decreased breath sound/ slight crackles around right Pleurx.   [de-identified] :  In wheelchair

## 2020-01-09 ENCOUNTER — INPATIENT (INPATIENT)
Facility: HOSPITAL | Age: 85
LOS: 4 days | Discharge: HOME | End: 2020-01-14
Attending: HOSPITALIST | Admitting: HOSPITALIST
Payer: MEDICAID

## 2020-01-09 VITALS
SYSTOLIC BLOOD PRESSURE: 163 MMHG | DIASTOLIC BLOOD PRESSURE: 75 MMHG | OXYGEN SATURATION: 96 % | TEMPERATURE: 99 F | RESPIRATION RATE: 22 BRPM | HEART RATE: 97 BPM

## 2020-01-09 DIAGNOSIS — Z90.710 ACQUIRED ABSENCE OF BOTH CERVIX AND UTERUS: Chronic | ICD-10-CM

## 2020-01-09 LAB
ALBUMIN SERPL ELPH-MCNC: 3.6 G/DL — SIGNIFICANT CHANGE UP (ref 3.5–5.2)
ALP SERPL-CCNC: 71 U/L — SIGNIFICANT CHANGE UP (ref 30–115)
ALT FLD-CCNC: 14 U/L — SIGNIFICANT CHANGE UP (ref 0–41)
ANION GAP SERPL CALC-SCNC: 13 MMOL/L — SIGNIFICANT CHANGE UP (ref 7–14)
APTT BLD: 29 SEC — SIGNIFICANT CHANGE UP (ref 27–39.2)
AST SERPL-CCNC: 12 U/L — SIGNIFICANT CHANGE UP (ref 0–41)
BASOPHILS # BLD AUTO: 0.01 K/UL — SIGNIFICANT CHANGE UP (ref 0–0.2)
BASOPHILS NFR BLD AUTO: 0.1 % — SIGNIFICANT CHANGE UP (ref 0–1)
BILIRUB SERPL-MCNC: 0.4 MG/DL — SIGNIFICANT CHANGE UP (ref 0.2–1.2)
BLD GP AB SCN SERPL QL: SIGNIFICANT CHANGE UP
BUN SERPL-MCNC: 19 MG/DL — SIGNIFICANT CHANGE UP (ref 10–20)
CALCIUM SERPL-MCNC: 8.9 MG/DL — SIGNIFICANT CHANGE UP (ref 8.5–10.1)
CHLORIDE SERPL-SCNC: 101 MMOL/L — SIGNIFICANT CHANGE UP (ref 98–110)
CO2 SERPL-SCNC: 22 MMOL/L — SIGNIFICANT CHANGE UP (ref 17–32)
CREAT SERPL-MCNC: 0.9 MG/DL — SIGNIFICANT CHANGE UP (ref 0.7–1.5)
EOSINOPHIL # BLD AUTO: 0.11 K/UL — SIGNIFICANT CHANGE UP (ref 0–0.7)
EOSINOPHIL NFR BLD AUTO: 1.1 % — SIGNIFICANT CHANGE UP (ref 0–8)
GLUCOSE SERPL-MCNC: 127 MG/DL — HIGH (ref 70–99)
HCT VFR BLD CALC: 31.4 % — LOW (ref 37–47)
HGB BLD-MCNC: 10.4 G/DL — LOW (ref 12–16)
IMM GRANULOCYTES NFR BLD AUTO: 0.3 % — SIGNIFICANT CHANGE UP (ref 0.1–0.3)
INR BLD: 1.09 RATIO — SIGNIFICANT CHANGE UP (ref 0.65–1.3)
LYMPHOCYTES # BLD AUTO: 1.33 K/UL — SIGNIFICANT CHANGE UP (ref 1.2–3.4)
LYMPHOCYTES # BLD AUTO: 13.7 % — LOW (ref 20.5–51.1)
MCHC RBC-ENTMCNC: 27.5 PG — SIGNIFICANT CHANGE UP (ref 27–31)
MCHC RBC-ENTMCNC: 33.1 G/DL — SIGNIFICANT CHANGE UP (ref 32–37)
MCV RBC AUTO: 83.1 FL — SIGNIFICANT CHANGE UP (ref 81–99)
MONOCYTES # BLD AUTO: 0.9 K/UL — HIGH (ref 0.1–0.6)
MONOCYTES NFR BLD AUTO: 9.3 % — SIGNIFICANT CHANGE UP (ref 1.7–9.3)
NEUTROPHILS # BLD AUTO: 7.34 K/UL — HIGH (ref 1.4–6.5)
NEUTROPHILS NFR BLD AUTO: 75.5 % — HIGH (ref 42.2–75.2)
NRBC # BLD: 0 /100 WBCS — SIGNIFICANT CHANGE UP (ref 0–0)
PLATELET # BLD AUTO: 308 K/UL — SIGNIFICANT CHANGE UP (ref 130–400)
POTASSIUM SERPL-MCNC: 3.4 MMOL/L — LOW (ref 3.5–5)
POTASSIUM SERPL-SCNC: 3.4 MMOL/L — LOW (ref 3.5–5)
PROT SERPL-MCNC: 6.4 G/DL — SIGNIFICANT CHANGE UP (ref 6–8)
PROTHROM AB SERPL-ACNC: 12.5 SEC — SIGNIFICANT CHANGE UP (ref 9.95–12.87)
RBC # BLD: 3.78 M/UL — LOW (ref 4.2–5.4)
RBC # FLD: 13.2 % — SIGNIFICANT CHANGE UP (ref 11.5–14.5)
SODIUM SERPL-SCNC: 136 MMOL/L — SIGNIFICANT CHANGE UP (ref 135–146)
TROPONIN T SERPL-MCNC: <0.01 NG/ML — SIGNIFICANT CHANGE UP
WBC # BLD: 9.72 K/UL — SIGNIFICANT CHANGE UP (ref 4.8–10.8)
WBC # FLD AUTO: 9.72 K/UL — SIGNIFICANT CHANGE UP (ref 4.8–10.8)

## 2020-01-09 PROCEDURE — 71045 X-RAY EXAM CHEST 1 VIEW: CPT | Mod: 26

## 2020-01-09 PROCEDURE — 93010 ELECTROCARDIOGRAM REPORT: CPT

## 2020-01-09 PROCEDURE — 99285 EMERGENCY DEPT VISIT HI MDM: CPT

## 2020-01-09 RX ORDER — AMLODIPINE BESYLATE 2.5 MG/1
1 TABLET ORAL
Qty: 0 | Refills: 0 | DISCHARGE

## 2020-01-09 RX ORDER — SIMVASTATIN 20 MG/1
1 TABLET, FILM COATED ORAL
Qty: 0 | Refills: 0 | DISCHARGE

## 2020-01-09 RX ORDER — INFLUENZA VIRUS VACCINE 15; 15; 15; 15 UG/.5ML; UG/.5ML; UG/.5ML; UG/.5ML
0.5 SUSPENSION INTRAMUSCULAR ONCE
Refills: 0 | Status: DISCONTINUED | OUTPATIENT
Start: 2020-01-09 | End: 2020-01-14

## 2020-01-09 RX ORDER — MORPHINE SULFATE 50 MG/1
4 CAPSULE, EXTENDED RELEASE ORAL ONCE
Refills: 0 | Status: DISCONTINUED | OUTPATIENT
Start: 2020-01-09 | End: 2020-01-09

## 2020-01-09 RX ORDER — ASPIRIN/CALCIUM CARB/MAGNESIUM 324 MG
1 TABLET ORAL
Qty: 0 | Refills: 0 | DISCHARGE

## 2020-01-09 RX ADMIN — MORPHINE SULFATE 4 MILLIGRAM(S): 50 CAPSULE, EXTENDED RELEASE ORAL at 21:56

## 2020-01-09 NOTE — H&P ADULT - ATTENDING COMMENTS
Pt was seen and examined at bedside independently, pt is c/o severe right sided chest and back pain, feels discomfort around Denver cath. She is very anxious and feels that she is SOB at all times, more comfortable on oxygen. Pt has h/o stage IV lung CA, improving on immunotherapy, consulted by oncology. Denver cath was put in by pulmonary, it is flushing but not draining fluids. Case d/w pulmonary fellow today, CT chest was recently done in December ( no need for repeat CT now), he thinks that pt likely developed loculated pleural effusion and extensive fibrotic changes in pleural cavity, will consult thoracic surgery and start pain medications ( pt is very uncomfortable, in mild distress).   I agree with resident's findings, assessment and plan above with few corrections in my note.     Vital Signs Last 24 Hrs  T(C): 37.1 (10 Arcadio 2020 12:12), Max: 37.5 (10 Arcadio 2020 05:30)  T(F): 98.8 (10 Arcadio 2020 12:12), Max: 99.5 (10 Arcadio 2020 05:30)  HR: 82 (10 Arcadio 2020 12:12) (82 - 91)  BP: 117/56 (10 Arcadio 2020 12:12) (117/56 - 148/68)  BP(mean): --  RR: 18 (10 Arcadio 2020 12:12) (18 - 22)  SpO2: 94% (10 Arcadio 2020 08:12) (90% - 94%)    PHYSICAL EXAM:  GENERAL: in mild distress secondary to pain  Neck: supple, no JVD  Chest: scars noted on right chest, Denver cath in right lower chest wall, skin intact, tenderness on palpation around the cath   Lungs: no breast sounds appreciated below right scapula, decreased BS at left base   CV: S1,S2  Abdomen/GI: soft, NT,ND, BS present  Extremities: no edema on LE   Neuro: AAOx3, no focal neuro deficit noted    LABs:                        10.7   8.49  )-----------( 286      ( 10 Arcadio 2020 07:48 )             32.9   01-10    143  |  106  |  26<H>  ----------------------------<  117<H>  4.7   |  23  |  1.0    Ca    9.1      10 Arcadio 2020 07:48  Mg     2.1     01-10    TPro  6.3  /  Alb  3.5  /  TBili  0.6  /  DBili  x   /  AST  12  /  ALT  14  /  AlkPhos  80  01-10  RADIOLOGY:  < from: Xray Shoulder 2 Views, Right (01.10.20 @ 14:51) >    impression:    No acute displaced fracture or dislocation.    Moderate right acromioclavicular and glenohumeral joint degenerative changes, stable.    Partially imaged is small/moderate right pleural effusion post chest tube placement.  < from: VA Duplex Lower Ext Vein Scan, Bilat (01.10.20 @ 11:33) >    Impression:    No evidence of deep venous thrombosis or superficial thrombophlebitis in bilateral lower extremities.  < from: Xray Chest 1 View AP/PA (01.09.20 @ 20:41) >    Impression:      Worsening bibasilar opacities/effusions, right greater than left.  < from: CT Chest w/ IV Cont (12.27.19 @ 10:08) >    Impression:    Worsening right-sided pleural effusion.    Right apical spiculated nodule without difference.    Unchanged appearance of the mediastinum.    Unchanged appearance of the third thoracic vertebral body and right second rib.    Thank you for the courtesy of this consult.    A/P  # Pleuritic CP/ right sided malignant pleural effusion, s/p Denver cath  - started on Percocet, Lidoderm patch   - will give Robaxin 750 mg Q 6 hours for sever mauscle spasm  - pt was consulted by pulmonary, cath is flushing w/o any output, likely pt developed a lot of scar tissue and loculated pleural effusion   - consulted thoracic surgery to consider pleurodesis of WATS   # Stage IV lung CA/ SOB   - consulted by oncology today  - pt responded to immunotherapy   - c/w Kerdura    - Nebs Q 4 hours   -supplement oxygen, monitor pulse Ox   - overall prognosis guarded   # c/w current medical management, DVT prophylaxis   #Progress Note Handoff  Pending (specify):  thoracic surgery, control pain, start Nebs   Family discussion: n/a, I spoke with pt, she agreed with a plan of care   Disposition: Home__x_/SNF___/Other________/Unknown at this time________

## 2020-01-09 NOTE — ED ADULT NURSE REASSESSMENT NOTE - NS ED NURSE REASSESS COMMENT FT1
Patient currently refusing BIPAP. States "I feel I am suffocating with it on." MD [id number] was made aware. Currently 96 on room air and 97% on 2lnc. Respiratory rate 22-24. MD Stated "give patient 1 hour and retry placing the bipap. " DARLENE walden was made aware.

## 2020-01-09 NOTE — H&P ADULT - HISTORY OF PRESENT ILLNESS
84 years old female known to have malignant right sided pleural effusions s/p Denver cath, Metastatic adenocarcinoma of lung (based on pleural fluid cytology) on ketruyda every 3 weeks last one was on 12/31, HTN, and Dyslipidemia, s/p GINA PAULA was brought to the ED for chest pain and shortness of breath x 1 day.  As per son at bedside, until summer last year catheter was draining 300-400 ml every other day but since then frequency had decreased to 400-500 ml 2 times / week and for past couple of months it has been 500 ml once / week.  Catheter was last drained on Jan 3rd and 500 ml came out. Yesterday nothing came out. But pt has been having right shoulder blade, axilla and right sided chest pain. Pain is constant, worse with deep breaths and change in position, relieved slightly with morphine, 10/10. also endorsed shortness breathing since then, mild but even at rest as well and also dry cough. Son also states that pt has profuse night sweats where she wakes up in the morning with drenching wet clothes x 1 month.  Pt denies any fever, nausea, vomiting, abdominal pain, lower extremity swelling, change in weight and appetite, recent travel, sick contact.       Pt was admitted on 2/20/2019 for a right loculated pleural effusion approx. 900 CCs, no PE and right apical 3.1x3.0 cm mass with pleural nodules. Pt was d/c on 2/22 with outpatient follow up with PMD, [facility]. Pt returned to ED on 3/15/2019 for right pleural effusion, had a thoracentesis in ED and was d/c after discussion with pulmonary Dr. Cruz. Pt saw Dr. Dickinson today for persistent SOB, dry cough, back pain where she has had the pleural effusion, weakness and night sweats. Pt was noted to have a malignancy in prior visits so was sent in for admission for a biopsy. Daughter in law also notes pt to have lost 8 pounds since the summer (4 pounds in the past 2 months). No CP. Pt did not eat today since her appointment was early this morning 84 years old female known to have malignant right sided pleural effusions s/p Denver cath, Metastatic adenocarcinoma of lung (based on pleural fluid cytology) on ketruyda every 3 weeks last one was on 12/31, HTN, and Dyslipidemia, s/p GINA CASASO was brought to the ED for chest pain and shortness of breath, inability to drain Denver catheter x 1 day.  As per son at bedside, until summer last year catheter was draining 300-400 ml every other day but since then frequency had decreased to 400-500 ml 2 times / week and for past couple of months it has been 500 ml once / week.  Catheter was last drained on Jan 3rd and 500 ml came out. Yesterday nothing came out. But pt has been having right shoulder blade, axilla and right sided chest pain. Pain is constant, worse with deep breaths and change in position, relieved slightly with morphine, 10/10. also endorsed shortness breathing since then, mild but even at rest as well and also dry cough. Son also states that pt has been experiencing profuse night sweats where she wakes up in the morning with drenching wet clothes x 1 month.  Son states that fluid color changed from clear to serosanguinous few months ago but for past 2 months it has been just blood coming out from catheter.  Pt denies any fever, nausea, vomiting, abdominal pain, lower extremity swelling, change in weight and appetite, recent travel, sick contact.    As per ED RN, pt pulse [id number] to 80s on RA so pt was placed on supplemental O2 but then remained hemodynamically stable. I explained to the pt that she might require BIPAP and pt has been refusing.

## 2020-01-09 NOTE — H&P ADULT - NSICDXPASTMEDICALHX_GEN_ALL_CORE_FT
PAST MEDICAL HISTORY:  High cholesterol     HTN (hypertension)     Pleural effusion     Stage IV adenocarcinoma of lung right

## 2020-01-09 NOTE — H&P ADULT - ASSESSMENT
84 years old female known to have malignant right sided pleural effusions s/p Denver cath, Metastatic adenocarcinoma of lung (based on pleural fluid cytology) on ketruyda every 3 weeks last one was on 12/31, HTN, and Dyslipidemia, s/p GINA BSO was brought to the ED for chest pain and shortness of breath, inability to drain Denver catheter x 1 day.    As per ED RN, pt pulse [id number] to 80s on RA so pt was placed on supplemental O2 but then remained hemodynamically stable. I explained to the pt that she might require BIPAP and pt has been refusing.    Assessment and Plan:       # Blocked Denver Catheter  # Recurrent malignant right sided pleural effusion  # Stage 4 adenocarcinoma lung right side on 3 weekly Keytruda therapy (last on 12/31)   Pulm was called by ED, rec they would place TPA in cath in the AM  But Son states bloody discharge from cath  fu with pulm if cath can be replaced  CXR shows bilateral pleural effusion R > L    Plan:  fu cbc, keep active type and screen, transfuse to keep Hb > 7  hold AC / ASA  fu VA doppler lower extremities, if negative start SCD until cleared by Pulm  fu heme onc and pulm recs  Pain control  cw supplemental O2, If Saturation drops will place onBIPAP but pt refusing for now  fu official read cxr      # Right shoulder pain, subscapular pain likely referred from effusion  fu right shoulder X ray     # Normocytic anemia  son states bloody discharge from catheter x 2 months  fu repeat cbc, keep active type and screen  transfuse to Hb >7    # Hypokalemia  repleted  fu am labs    # Hypertension : controlled   c/w Amlodipine    # DLD  Pt not on any home med      DVT Prophylaxis : holding anticoagulation  GI ppx: PPI  DASH Diet , gentle ambulation given SOB   from home, full code  Son Ayan  [phone]

## 2020-01-09 NOTE — ED PROVIDER NOTE - PROGRESS NOTE DETAILS
D/w Dr. Oneil. Said to admit patient to telemetry and pulmonology team would assess the pleurx in the morning. Recommending BIPAP for comfort.

## 2020-01-09 NOTE — ED PROVIDER NOTE - CLINICAL SUMMARY MEDICAL DECISION MAKING FREE TEXT BOX
83yo F with right malignant effusions (lung CA) s/p Denver cath p/w SOB and right chest pain. reports catheter is not draining like normal. CXR showing right effusion. Contacted pulmonology, plan for admission for eval/further treatment. Currently stable on bipap (for WOB).

## 2020-01-09 NOTE — ED PROVIDER NOTE - NS ED ROS FT
Eyes:  No visual changes, eye pain or discharge.  ENMT:  No hearing changes, pain, discharge or infections. No neck pain or stiffness.  Cardiac: +cp, sob.  No edema. No chest pain with exertion.  Respiratory: SOB. No cough.   GI:  No nausea, vomiting, diarrhea or abdominal pain.  :  No dysuria, frequency or burning.  MS:  No myalgia, muscle weakness, joint pain or back pain.  Neuro:  No headache or weakness.  No LOC.  Skin:  No skin rash.   Endocrine: No history of thyroid disease or diabetes.

## 2020-01-09 NOTE — ED PROVIDER NOTE - PHYSICAL EXAMINATION
CONSTITUTIONAL: Well-developed; well-nourished; in no acute distress.   SKIN: warm, dry  HEAD: Normocephalic; atraumatic.  EYES: PERRL, EOMI, normal sclera and conjunctiva   ENT: No nasal discharge; airway clear.  NECK: Supple; non tender.  CARD: S1, S2 normal; no murmurs, gallops, or rubs. Regular rate and rhythm.   RESP: Decreased breath sounds R base. Mild tachypnea. No wheezing, rales, rhonchi. Speaking in full sentences.   ABD: soft ntnd  EXT: Normal ROM.  No clubbing, cyanosis or edema.   LYMPH: No acute cervical adenopathy.  NEURO: Alert, oriented, grossly unremarkable  PSYCH: Cooperative, appropriate.

## 2020-01-09 NOTE — ED PROVIDER NOTE - ATTENDING CONTRIBUTION TO CARE
83yo F with PMHx right-sided malignant effusion s/p pleurx placement, HTN, HLD, presents with SOB and right sided chest pain. Son normally helps pt drain catheter, normally 300-500cc fluid every week but this week drained very little. Denies fever, chills, headache, lightheadedness, nausea, vomiting, abd pain, dysuria, leg swelling.     Vital signs reviewed  GENERAL: Mild respiratory distress  HEAD: NCAT  EYES: Anicteric  ENT: MMM  RESPIRATORY: Slightly increased respiratory effort. Tachypnea. Right base decreased breath sounds. No wheezing, rales.  CARDIOVASCULAR: Regular rate and rhythm  ABDOMEN: Soft. Nondistended. Nontender. No guarding or rebound.   MUSCULOSKELETAL/EXTREMITIES: Brisk cap refill. Equal radial pulses. No leg edema. No chest wall tenderness.  SKIN:  Warm and dry  NEURO: AAOx3. No gross FND.

## 2020-01-09 NOTE — ED ADULT NURSE NOTE - CHPI ED NUR SYMPTOMS POS
pt has drain from right lung that only drained 5ml yesterday/SHORTNESS OF BREATH SHORTNESS OF BREATH/pt has Denver drain from right lung that only drained 5ml yesterday

## 2020-01-09 NOTE — ED PROVIDER NOTE - OBJECTIVE STATEMENT
84 y f pmh R sided malignant effusion s/p pleurx placement in April 2019, htn, hld pw sob. SOB and R sided chest pain. Per son, usually drains 300-500 cc of fluid each week, but yesterday was unable to drain any fluid. Denies fever, chills, n/v, back pain, abd pain, diarrhea, constipation.

## 2020-01-09 NOTE — ED ADULT TRIAGE NOTE - CHIEF COMPLAINT QUOTE
Patient presents with rigth sided lung drain. As per son over the last 5 days little to no drainage. Usually drains about 300-500ml. Yesterday drained only about 5 ml and now having shortness of breath

## 2020-01-09 NOTE — ED ADULT NURSE NOTE - NSIMPLEMENTINTERV_GEN_ALL_ED
Implemented All Fall Risk Interventions:  [place] to call system. Call bell, personal items and telephone within reach. Instruct patient to call for assistance. Room bathroom lighting operational. Non-slip footwear when patient is off stretcher. Physically safe environment: no spills, clutter or unnecessary equipment. Stretcher in lowest position, wheels locked, appropriate side rails in place. Provide visual cue, wrist band, yellow gown, etc. Monitor gait and stability. Monitor for mental status changes and reorient to person, place, and time. Review medications for side effects contributing to fall risk. Reinforce activity limits and safety measures with patient and family.

## 2020-01-10 ENCOUNTER — APPOINTMENT (OUTPATIENT)
Dept: CARDIOLOGY | Facility: CLINIC | Age: 85
End: 2020-01-10

## 2020-01-10 LAB
ALBUMIN SERPL ELPH-MCNC: 3.5 G/DL — SIGNIFICANT CHANGE UP (ref 3.5–5.2)
ALP SERPL-CCNC: 80 U/L — SIGNIFICANT CHANGE UP (ref 30–115)
ALT FLD-CCNC: 14 U/L — SIGNIFICANT CHANGE UP (ref 0–41)
ANION GAP SERPL CALC-SCNC: 14 MMOL/L — SIGNIFICANT CHANGE UP (ref 7–14)
AST SERPL-CCNC: 12 U/L — SIGNIFICANT CHANGE UP (ref 0–41)
BASOPHILS # BLD AUTO: 0.01 K/UL — SIGNIFICANT CHANGE UP (ref 0–0.2)
BASOPHILS NFR BLD AUTO: 0.1 % — SIGNIFICANT CHANGE UP (ref 0–1)
BILIRUB SERPL-MCNC: 0.6 MG/DL — SIGNIFICANT CHANGE UP (ref 0.2–1.2)
BLD GP AB SCN SERPL QL: SIGNIFICANT CHANGE UP
BUN SERPL-MCNC: 26 MG/DL — HIGH (ref 10–20)
CALCIUM SERPL-MCNC: 9.1 MG/DL — SIGNIFICANT CHANGE UP (ref 8.5–10.1)
CHLORIDE SERPL-SCNC: 106 MMOL/L — SIGNIFICANT CHANGE UP (ref 98–110)
CHOLEST SERPL-MCNC: 139 MG/DL — SIGNIFICANT CHANGE UP (ref 100–200)
CO2 SERPL-SCNC: 23 MMOL/L — SIGNIFICANT CHANGE UP (ref 17–32)
CREAT SERPL-MCNC: 1 MG/DL — SIGNIFICANT CHANGE UP (ref 0.7–1.5)
EOSINOPHIL # BLD AUTO: 0.12 K/UL — SIGNIFICANT CHANGE UP (ref 0–0.7)
EOSINOPHIL NFR BLD AUTO: 1.4 % — SIGNIFICANT CHANGE UP (ref 0–8)
ESTIMATED AVERAGE GLUCOSE: 128 MG/DL — HIGH (ref 68–114)
GLUCOSE BLDC GLUCOMTR-MCNC: 130 MG/DL — HIGH (ref 70–99)
GLUCOSE BLDC GLUCOMTR-MCNC: 146 MG/DL — HIGH (ref 70–99)
GLUCOSE SERPL-MCNC: 117 MG/DL — HIGH (ref 70–99)
HBA1C BLD-MCNC: 6.1 % — HIGH (ref 4–5.6)
HCT VFR BLD CALC: 32.9 % — LOW (ref 37–47)
HDLC SERPL-MCNC: 51 MG/DL — SIGNIFICANT CHANGE UP
HGB BLD-MCNC: 10.7 G/DL — LOW (ref 12–16)
IMM GRANULOCYTES NFR BLD AUTO: 0.2 % — SIGNIFICANT CHANGE UP (ref 0.1–0.3)
LIPID PNL WITH DIRECT LDL SERPL: 79 MG/DL — SIGNIFICANT CHANGE UP (ref 4–129)
LYMPHOCYTES # BLD AUTO: 1.45 K/UL — SIGNIFICANT CHANGE UP (ref 1.2–3.4)
LYMPHOCYTES # BLD AUTO: 17.1 % — LOW (ref 20.5–51.1)
MAGNESIUM SERPL-MCNC: 2.1 MG/DL — SIGNIFICANT CHANGE UP (ref 1.8–2.4)
MCHC RBC-ENTMCNC: 27.5 PG — SIGNIFICANT CHANGE UP (ref 27–31)
MCHC RBC-ENTMCNC: 32.5 G/DL — SIGNIFICANT CHANGE UP (ref 32–37)
MCV RBC AUTO: 84.6 FL — SIGNIFICANT CHANGE UP (ref 81–99)
MONOCYTES # BLD AUTO: 0.45 K/UL — SIGNIFICANT CHANGE UP (ref 0.1–0.6)
MONOCYTES NFR BLD AUTO: 5.3 % — SIGNIFICANT CHANGE UP (ref 1.7–9.3)
NEUTROPHILS # BLD AUTO: 6.44 K/UL — SIGNIFICANT CHANGE UP (ref 1.4–6.5)
NEUTROPHILS NFR BLD AUTO: 75.9 % — HIGH (ref 42.2–75.2)
NRBC # BLD: 0 /100 WBCS — SIGNIFICANT CHANGE UP (ref 0–0)
PLATELET # BLD AUTO: 286 K/UL — SIGNIFICANT CHANGE UP (ref 130–400)
POTASSIUM SERPL-MCNC: 4.7 MMOL/L — SIGNIFICANT CHANGE UP (ref 3.5–5)
POTASSIUM SERPL-SCNC: 4.7 MMOL/L — SIGNIFICANT CHANGE UP (ref 3.5–5)
PROT SERPL-MCNC: 6.3 G/DL — SIGNIFICANT CHANGE UP (ref 6–8)
RBC # BLD: 3.89 M/UL — LOW (ref 4.2–5.4)
RBC # FLD: 13.5 % — SIGNIFICANT CHANGE UP (ref 11.5–14.5)
SODIUM SERPL-SCNC: 143 MMOL/L — SIGNIFICANT CHANGE UP (ref 135–146)
TOTAL CHOLESTEROL/HDL RATIO MEASUREMENT: 2.7 RATIO — LOW (ref 4–5.5)
TRIGL SERPL-MCNC: 94 MG/DL — SIGNIFICANT CHANGE UP (ref 10–149)
WBC # BLD: 8.49 K/UL — SIGNIFICANT CHANGE UP (ref 4.8–10.8)
WBC # FLD AUTO: 8.49 K/UL — SIGNIFICANT CHANGE UP (ref 4.8–10.8)

## 2020-01-10 PROCEDURE — 99232 SBSQ HOSP IP/OBS MODERATE 35: CPT

## 2020-01-10 PROCEDURE — 99222 1ST HOSP IP/OBS MODERATE 55: CPT

## 2020-01-10 PROCEDURE — 99253 IP/OBS CNSLTJ NEW/EST LOW 45: CPT

## 2020-01-10 PROCEDURE — 93970 EXTREMITY STUDY: CPT | Mod: 26

## 2020-01-10 PROCEDURE — 73030 X-RAY EXAM OF SHOULDER: CPT | Mod: 26,RT

## 2020-01-10 RX ORDER — MORPHINE SULFATE 50 MG/1
2 CAPSULE, EXTENDED RELEASE ORAL EVERY 6 HOURS
Refills: 0 | Status: DISCONTINUED | OUTPATIENT
Start: 2020-01-10 | End: 2020-01-13

## 2020-01-10 RX ORDER — ENOXAPARIN SODIUM 100 MG/ML
40 INJECTION SUBCUTANEOUS DAILY
Refills: 0 | Status: DISCONTINUED | OUTPATIENT
Start: 2020-01-10 | End: 2020-01-13

## 2020-01-10 RX ORDER — LIDOCAINE 4 G/100G
1 CREAM TOPICAL DAILY
Refills: 0 | Status: DISCONTINUED | OUTPATIENT
Start: 2020-01-10 | End: 2020-01-13

## 2020-01-10 RX ORDER — CHLORHEXIDINE GLUCONATE 213 G/1000ML
1 SOLUTION TOPICAL
Refills: 0 | Status: DISCONTINUED | OUTPATIENT
Start: 2020-01-10 | End: 2020-01-13

## 2020-01-10 RX ORDER — POTASSIUM CHLORIDE 20 MEQ
40 PACKET (EA) ORAL ONCE
Refills: 0 | Status: COMPLETED | OUTPATIENT
Start: 2020-01-10 | End: 2020-01-10

## 2020-01-10 RX ORDER — IPRATROPIUM/ALBUTEROL SULFATE 18-103MCG
3 AEROSOL WITH ADAPTER (GRAM) INHALATION EVERY 4 HOURS
Refills: 0 | Status: DISCONTINUED | OUTPATIENT
Start: 2020-01-10 | End: 2020-01-13

## 2020-01-10 RX ORDER — AMLODIPINE BESYLATE 2.5 MG/1
5 TABLET ORAL DAILY
Refills: 0 | Status: DISCONTINUED | OUTPATIENT
Start: 2020-01-10 | End: 2020-01-13

## 2020-01-10 RX ORDER — MORPHINE SULFATE 50 MG/1
5 CAPSULE, EXTENDED RELEASE ORAL EVERY 4 HOURS
Refills: 0 | Status: DISCONTINUED | OUTPATIENT
Start: 2020-01-10 | End: 2020-01-13

## 2020-01-10 RX ORDER — OXYCODONE AND ACETAMINOPHEN 5; 325 MG/1; MG/1
1 TABLET ORAL EVERY 4 HOURS
Refills: 0 | Status: DISCONTINUED | OUTPATIENT
Start: 2020-01-10 | End: 2020-01-13

## 2020-01-10 RX ORDER — LANOLIN ALCOHOL/MO/W.PET/CERES
5 CREAM (GRAM) TOPICAL AT BEDTIME
Refills: 0 | Status: DISCONTINUED | OUTPATIENT
Start: 2020-01-10 | End: 2020-01-13

## 2020-01-10 RX ORDER — METHOCARBAMOL 500 MG/1
750 TABLET, FILM COATED ORAL EVERY 6 HOURS
Refills: 0 | Status: DISCONTINUED | OUTPATIENT
Start: 2020-01-10 | End: 2020-01-13

## 2020-01-10 RX ORDER — FLUTICASONE PROPIONATE 50 MCG
1 SPRAY, SUSPENSION NASAL DAILY
Refills: 0 | Status: DISCONTINUED | OUTPATIENT
Start: 2020-01-10 | End: 2020-01-13

## 2020-01-10 RX ADMIN — AMLODIPINE BESYLATE 5 MILLIGRAM(S): 2.5 TABLET ORAL at 06:10

## 2020-01-10 RX ADMIN — Medication 3 MILLILITER(S): at 20:40

## 2020-01-10 RX ADMIN — Medication 40 MILLIEQUIVALENT(S): at 03:09

## 2020-01-10 RX ADMIN — LIDOCAINE 1 PATCH: 4 CREAM TOPICAL at 17:33

## 2020-01-10 RX ADMIN — Medication 5 MILLIGRAM(S): at 21:09

## 2020-01-10 RX ADMIN — ENOXAPARIN SODIUM 40 MILLIGRAM(S): 100 INJECTION SUBCUTANEOUS at 21:10

## 2020-01-10 RX ADMIN — OXYCODONE AND ACETAMINOPHEN 1 TABLET(S): 5; 325 TABLET ORAL at 17:32

## 2020-01-10 RX ADMIN — METHOCARBAMOL 750 MILLIGRAM(S): 500 TABLET, FILM COATED ORAL at 20:41

## 2020-01-10 RX ADMIN — MORPHINE SULFATE 2 MILLIGRAM(S): 50 CAPSULE, EXTENDED RELEASE ORAL at 06:35

## 2020-01-10 NOTE — PROGRESS NOTE ADULT - SUBJECTIVE AND OBJECTIVE BOX
Subjective:  The patient today admits that the nasal cannula is making her dyspnea better, still has dyspnea on minimal exertion and orthopnea. Still complains of right chest discomfort.     Objective:      amLODIPine   Tablet 5 milliGRAM(s) Oral daily  chlorhexidine 4% Liquid 1 Application(s) Topical <User Schedule>  influenza   Vaccine 0.5 milliLiter(s) IntraMuscular once  morphine  - Injectable 2 milliGRAM(s) IV Push every 6 hours PRN      PHYSICAL EXAM:  General: A/ox 3, No acute Distress  Neck: Supple, NO JVD  Cardiac: S1 S2 heard regular, No audible murmurs  Pulmonary: Decreased breath sounds over right base of lung, patient not tachypneic, no wheezing, no rhonchi  Abdomen: Soft, Non -tender, +BS   Extremities: No Rashes, No edema  Neuro: A/o x 3, No focal deficits  Psch: normal mood , normal affect    T(C): 37.1 (01-10-20 @ 12:12), Max: 37.5 (01-10-20 @ 05:30)  HR: 82 (01-10-20 @ 12:12) (82 - 97)  BP: 117/56 (01-10-20 @ 12:12) (117/56 - 163/75)  RR: 18 (01-10-20 @ 12:12) (18 - 22)  SpO2: 94% (01-10-20 @ 08:12) (90% - 96%)    LABS:                        10.7   8.49  )-----------( 286      ( 10 Arcadio 2020 07:48 )             32.9     01-10    143  |  106  |  26<H>  ----------------------------<  117<H>  4.7   |  23  |  1.0    Ca    9.1      10 Arcadio 2020 07:48  Mg     2.1     01-10    TPro  6.3  /  Alb  3.5  /  TBili  0.6  /  DBili  x   /  AST  12  /  ALT  14  /  AlkPhos  80  01-10    PT/INR - ( 09 Jan 2020 18:07 )   PT: 12.50 sec;   INR: 1.09 ratio         PTT - ( 09 Jan 2020 18:07 )  PTT:29.0 sec  Alanine Aminotransferase (ALT/SGPT): 14 U/L (01-10-20 @ 07:48)  Aspartate Aminotransferase (AST/SGOT): 12 U/L (01-10-20 @ 07:48)  Alanine Aminotransferase (ALT/SGPT): 14 U/L (01-09-20 @ 18:07)  Aspartate Aminotransferase (AST/SGOT): 12 U/L (01-09-20 @ 18:07)

## 2020-01-10 NOTE — CONSULT NOTE ADULT - SUBJECTIVE AND OBJECTIVE BOX
Patient is a 84y old  Female who presents with a chief complaint of     HPI:  84 years old female known to have malignant right sided pleural effusions s/p Denver cath, Metastatic adenocarcinoma of lung (based on pleural fluid cytology) on ketruyda every 3 weeks last one was on 12/31, HTN, and Dyslipidemia, s/p GINA CASASO was brought to the ED for chest pain and shortness of breath, inability to drain Denver catheter x 1 day.  As per son at bedside, until summer last year catheter was draining 300-400 ml every other day but since then frequency had decreased to 400-500 ml 2 times / week and for past couple of months it has been 500 ml once / week.  Catheter was last drained on Jan 3rd and 500 ml came out. Yesterday nothing came out. But pt has been having right shoulder blade, axilla and right sided chest pain. Pain is constant, worse with deep breaths and change in position, relieved slightly with morphine, 10/10. also endorsed shortness breathing since then, mild but even at rest as well and also dry cough. Son also states that pt has been experiencing profuse night sweats where she wakes up in the morning with drenching wet clothes x 1 month.  Son states that fluid color changed from clear to serosanguinous few months ago but for past 2 months it has been just blood coming out from catheter.  Pt denies any fever, nausea, vomiting, abdominal pain, lower extremity swelling, change in weight and appetite, recent travel, sick contact.    As per ED RN, pt pulse [id number] to 80s on RA so pt was placed on supplemental O2 but then remained hemodynamically stable. I explained to the pt that she might require BIPAP and pt has been refusing. (09 Jan 2020 23:20)      PAST MEDICAL & SURGICAL HISTORY:  Stage IV adenocarcinoma of lung: right  Pleural effusion  HTN (hypertension)  High cholesterol  H/O abdominal hysterectomy      SOCIAL HX:  nonsmoker, no etoh    FAMILY HISTORY:  Hypertension (Mother)  .  No cardiovascular or pulmonary family history     REVIEW OF SYSTEMS:    CONSTITUTIONAL:   + nightsweats  no fever   no chills.  no weight gain   + weight loss    EYES:   no discharge,   no pain  no redness,   no visual changes.    ENT:   Ears: no ear pain and no hearing problems.  Nose: no nasal congestion and no nasal drainage.  Mouth/Throat: no dysphagia,  no hoarseness and no throat pain.  Neck: no lumps, no pain, no stiffness and no swollen glands.     CARDIOVASCULAR:   no chest pain,   no swelling  no palpitations  no syncope    RESPIRATORY:  see HPI    GASTROINTESTINAL:   no abdominal pain,   no constipation,   no diarrhea,   no vomiting.    GENITOURINARY:  no dysuria,   no frequency,   no urgency  no hematuria.    MUSCULOSKELETAL:   no back pain,   no musculoskeletal pain,  no weakness.    SKIN:   no jaundice,   no lesions,   no pruritis,   no rashes.    NEURO:   no loss of consciousness,   no gait abnormality,   no headache,   no sensory deficits,   no weakness.    PSYCHIATRIC:   no known mental health issues  no anxiety  no depression    ALLERGIC/IMMUNOLOGIC:   No active allergic or immunologic issues      Allergies    No Known Allergies          PHYSICAL EXAM  Vital Signs Last 24 Hrs  T(C): 37.5 (10 Arcadio 2020 05:30), Max: 37.5 (10 Arcadio 2020 05:30)  T(F): 99.5 (10 Arcadio 2020 05:30), Max: 99.5 (10 Arcadio 2020 05:30)  HR: 91 (10 Arcadio 2020 05:30) (91 - 97)  BP: 133/63 (10 Arcadio 2020 05:30) (133/63 - 163/75)  BP(mean): --  RR: 20 (10 Arcadio 2020 08:12) (20 - 22)  SpO2: 94% (10 Arcadio 2020 08:12) (90% - 96%)    CONSTITUTIONAL:   Ill appearing.  NAD    ENT:   Airway patent,   Nasal mucosa clear.  Mouth with normal mucosa.   Throat has no vesicles,  no oropharyngeal exudates and uvula is midline.  No thrush    EYES:   Clear bilaterally,   pupils equal,   round and reactive to light.    CARDIAC:   Normal rate,   regular rhythm   Heart sounds S1, S2.   normal  cardiac impulse  no edema      CAROTID:   normal systolic impulse  no bruits    RESPIRATORY:  decreased breath sounds over right base  normal chest expansion  not tachypneic,  no use of accessory muscles    GASTROINTESTINAL:  Abdomen soft,   non-tender,   no guarding,   + BS    MUSCULOSKELETAL:   range of motion is not limited,  no muscle or joint tenderness  no clubbing, cyanosis              LABS:                          10.7   8.49  )-----------( 286      ( 10 Arcadio 2020 07:48 )             32.9                                               01-10    143  |  106  |  26<H>  ----------------------------<  117<H>  4.7   |  23  |  1.0    Ca    9.1      10 Arcadio 2020 07:48  Mg     2.1     01-10    TPro  6.3  /  Alb  3.5  /  TBili  0.6  /  DBili  x   /  AST  12  /  ALT  14  /  AlkPhos  80  01-10      PT/INR - ( 09 Jan 2020 18:07 )   PT: 12.50 sec;   INR: 1.09 ratio         PTT - ( 09 Jan 2020 18:07 )  PTT:29.0 sec                                           CARDIAC MARKERS ( 09 Jan 2020 18:07 )  x     / <0.01 ng/mL / x     / x     / x                                                LIVER FUNCTIONS - ( 10 Arcadio 2020 07:48 )  Alb: 3.5 g/dL / Pro: 6.3 g/dL / ALK PHOS: 80 U/L / ALT: 14 U/L / AST: 12 U/L / GGT: x                                                                                                MEDICATIONS  (STANDING):  amLODIPine   Tablet 5 milliGRAM(s) Oral daily  chlorhexidine 4% Liquid 1 Application(s) Topical <User Schedule>  influenza   Vaccine 0.5 milliLiter(s) IntraMuscular once    MEDICATIONS  (PRN):  morphine  - Injectable 2 milliGRAM(s) IV Push every 6 hours PRN Moderate Pain (4 - 6)

## 2020-01-10 NOTE — CONSULT NOTE ADULT - ASSESSMENT
IMPRESSION:    Recurrent right malignant effusion s/p pleurX in 3/2019  Bedside Chest US showing very trace pleural fluid  PleurX catheter tested with saline flush and is patent  HO metastatic adenoca on chemotherapy    RECOMMEND:    CT surgery eval for possible re-expansion?  O2 to maintain pulse ox >90%  HOB >45  Repeat CBC  Non-invasive ventilation prn    Attending attestation to follow IMPRESSION:    Recurrent right malignant effusion s/p pleurX in 3/2019  Bedside Chest US showing very trace pleural fluid  PleurX catheter tested with saline flush and is patent  HO metastatic adenoca on chemotherapy    RECOMMEND:    CT surgery eval if any further surgical intervention needed  O2 to maintain pulse ox >90%  HOB >45  Repeat CBC  Non-invasive ventilation prn    Attending attestation to follow IMPRESSION:    Recurrent right malignant effusion s/p pleurX in 3/2019  Bedside Chest US showing very trace pleural fluid  PleurX catheter tested with saline flush and is patent  HO metastatic adenoca on chemotherapy    RECOMMEND:    CT surgery eval if any further surgical intervention needed  Check venous duplex LE  O2 to maintain pulse ox >90%  HOB >45  Repeat CBC  Non-invasive ventilation prn    Attending attestation to follow

## 2020-01-10 NOTE — CONSULT NOTE ADULT - ATTENDING COMMENTS
General Thoracic Surgery Attestation    I have seen and examined the patient.  Where appropriate I have updated, edited, or corrected the resident's or PA's note with regard to findings, values, and plan.    patient appears to have two problems:  1) pleurex no longer draining.  I hooked it to a bottle myself today (friday) and scant fluid came out.  I generally remove pleurexes when they stop draining, and do not attempt flushing, tpa, etc.  Further catheters may be inserted if needed in a fresh pocket.  2) pain.  the patient has pain at the pleurex insertion site.  this can be addressed by removing the pleurex.  the patient, of note, also has pain in her chest diffusely that does not get worse or better with drainage.  this may or may not be related to pleurex and may or may not get better with removal.  this was discussed explicitly with the patient's son at bedside.    previous imaging was prior to the cessation of drainage.  would repeat ct chest, especially in light of the non typical pain that the patient is experiencing.  may remove pleurex +/- reinsertion if enough fluid is seen lateral, on monday after review of imaging.
She was seen and examined. She has more SOB. She has severe pain in Pleurx site that is worsened by inspiration. She states pain is not the same that she had when she had cancer.    She has been doing well on immunotherapy and most recent CTs do not show progression form December. There was more effusion though.    She was seen by Pulm.    I suspect that her pain is due to her Pleurx maybe with some kind of fibrosis and scaring causing her pain. She is reponding to treatment. I spoke with Dr. Emeka Hirsch and he will see her an will consider removing the Pleurx and we can see how she does. We have also been having issues with getting her with Pleurx kits to drain son was paying out of pocket.    She will continue with keytruda as outpatient.    She was on NC when I Saw her.      Plan:  #Pain is most likely due to Pleurx catheter, likely to get removed, to bee seen by Dr. Hirsch  -pain control please   -I don't believe pain is from progression at this point.    #Metastatic Lung cancer  -on immunotherapy, would continue as outpatient, recent CTs did not show  progression but the effusion was on concern but maybe not related to progression

## 2020-01-10 NOTE — CONSULT NOTE ADULT - SUBJECTIVE AND OBJECTIVE BOX
Patient is a 84y old  Female who presents with a chief complaint of SOB (10 Arcadio 2020 10:23)      HPI:  84 years old female known to have malignant right sided pleural effusions s/p Denver cath, Metastatic adenocarcinoma of lung (based on pleural fluid cytology) on ketruyda every 3 weeks last one was on 12/31, HTN, and Dyslipidemia, s/p GINA CASASO was brought to the ED for chest pain and shortness of breath, inability to drain Denver catheter x 1 day.  As per son at bedside, until summer last year catheter was draining 300-400 ml every other day but since then frequency had decreased to 400-500 ml 2 times / week and for past couple of months it has been 500 ml once / week.  Catheter was last drained on Jan 3rd and 500 ml came out. Yesterday nothing came out. But pt has been having right shoulder blade, axilla and right sided chest pain. Pain is constant, worse with deep breaths and change in position, relieved slightly with morphine, 10/10. also endorsed shortness breathing since then, mild but even at rest as well and also dry cough. Son also states that pt has been experiencing profuse night sweats where she wakes up in the morning with drenching wet clothes x 1 month.  Son states that fluid color changed from clear to serosanguinous few months ago but for past 2 months it has been just blood coming out from catheter.  Pt denies any fever, nausea, vomiting, abdominal pain, lower extremity swelling, change in weight and appetite, recent travel, sick contact.    As per ED RN, pt pulse [id number] to 80s on RA so pt was placed on supplemental O2 but then remained hemodynamically stable. I explained to the pt that she might require BIPAP and pt has been refusing. (09 Jan 2020 23:20)         PAST MEDICAL & SURGICAL HISTORY:  Stage IV adenocarcinoma of lung: right  Pleural effusion  HTN (hypertension)  High cholesterol  H/O abdominal hysterectomy      SOCIAL HISTORY: NO h/o smoking or alcohol use     FAMILY HISTORY:  Hypertension (Mother)    Allergies    No Known Allergies      Height (cm): 154.9 (01-09-20 @ 23:10)  Weight (kg): 64 (01-09-20 @ 23:10)  BMI (kg/m2): 26.7 (01-09-20 @ 23:10)  BSA (m2): 1.63 (01-09-20 @ 23:10)      HOME MEDICATIONS:  amLODIPine 5 mg oral tablet: 1 tab(s) orally once a day (09 Jan 2020 23:38)  aspirin 81 mg oral tablet: 1 tab(s) orally once a day (09 Jan 2020 23:38)      Vital Signs Last 24 Hrs  T(C): 37.1 (10 Arcadio 2020 12:12), Max: 37.5 (10 Arcadio 2020 05:30)  T(F): 98.8 (10 Arcadio 2020 12:12), Max: 99.5 (10 Arcadio 2020 05:30)  HR: 82 (10 Arcadio 2020 12:12) (82 - 97)  BP: 117/56 (10 Arcadio 2020 12:12) (117/56 - 163/75)  BP(mean): --  RR: 18 (10 Arcadio 2020 12:12) (18 - 22)  SpO2: 94% (10 Arcadio 2020 08:12) (90% - 96%)    PHYSICAL EXAM  General: lying on bed c/o pain in chest at the site of catheter  HEENT: clear oropharynx, anicteric sclera  CV: normal S1/S2 with no murmur rubs or gallops  Lungs: right chest wall tender to palpation breath sounds decreased on right side   Abdomen: soft non-tender non-distended, no hepatosplenomegaly  Ext: no clubbing cyanosis or edema  Skin: no rashes and no petechiae  Neuro: alert and oriented X 4, no focal deficits    MEDICATIONS  (STANDING):  amLODIPine   Tablet 5 milliGRAM(s) Oral daily  chlorhexidine 4% Liquid 1 Application(s) Topical <User Schedule>  fluticasone propionate 50 MICROgram(s)/spray Nasal Spray 1 Spray(s) Both Nostrils daily  influenza   Vaccine 0.5 milliLiter(s) IntraMuscular once  lidocaine   Patch 1 Patch Transdermal daily  melatonin 5 milliGRAM(s) Oral at bedtime    MEDICATIONS  (PRN):  morphine  - Injectable 2 milliGRAM(s) IV Push every 6 hours PRN Moderate Pain (4 - 6)  morphine Concentrate 5 milliGRAM(s) Oral every 4 hours PRN Moderate Pain (4 - 6)  oxycodone    5 mG/acetaminophen 325 mG 1 Tablet(s) Oral every 4 hours PRN Moderate Pain (4 - 6)      LABS:                          10.7   8.49  )-----------( 286      ( 10 Arcadio 2020 07:48 )             32.9         Mean Cell Volume : 84.6 fL  Mean Cell Hemoglobin : 27.5 pg  Mean Cell Hemoglobin Concentration : 32.5 g/dL  Auto Neutrophil # : 6.44 K/uL  Auto Lymphocyte # : 1.45 K/uL  Auto Monocyte # : 0.45 K/uL  Auto Eosinophil # : 0.12 K/uL  Auto Basophil # : 0.01 K/uL  Auto Neutrophil % : 75.9 %  Auto Lymphocyte % : 17.1 %  Auto Monocyte % : 5.3 %  Auto Eosinophil % : 1.4 %  Auto Basophil % : 0.1 %      Serial CBC's  01-10 @ 07:48  Hct-32.9 / Hgb-10.7 / Plat-286 / RBC-3.89 / WBC-8.49  Serial CBC's  01-09 @ 18:07  Hct-31.4 / Hgb-10.4 / Plat-308 / RBC-3.78 / WBC-9.72      01-10    143  |  106  |  26<H>  ----------------------------<  117<H>  4.7   |  23  |  1.0    Ca    9.1      10 Arcadio 2020 07:48  Mg     2.1     01-10    TPro  6.3  /  Alb  3.5  /  TBili  0.6  /  DBili  x   /  AST  12  /  ALT  14  /  AlkPhos  80  01-10      PT/INR - ( 09 Jan 2020 18:07 )   PT: 12.50 sec;   INR: 1.09 ratio         PTT - ( 09 Jan 2020 18:07 )  PTT:29.0 sec                            BLOOD SMEAR INTERPRETATION:       RADIOLOGY & ADDITIONAL STUDIES:    < from: CT Chest w/ IV Cont (12.27.19 @ 10:08) >    Impression:    Worsening right-sided pleural effusion.    Right apical spiculated nodule without difference.    Unchanged appearance of the mediastinum.    Unchanged appearance of the third thoracic vertebral body and right second rib.    Thank you for the courtesy of this consult.    Sincerely,      < end of copied text >

## 2020-01-10 NOTE — PROGRESS NOTE ADULT - ASSESSMENT
84 years old female known to have malignant right sided pleural effusions s/p Denver cath, Metastatic adenocarcinoma of lung (based on pleural fluid cytology) on ketruyda every 3 weeks last one was on 12/31, HTN, and Dyslipidemia, s/p GINA BSO was brought to the ED for chest pain and shortness of breath, inability to drain Denver catheter x 1 day.  As per ED RN, pt pulse [id number] to 80s on RA so pt was placed on supplemental O2 but then remained hemodynamically stable. I explained to the pt that she might require BIPAP and pt has been refusing.    # Dyspnea, chest discomfort  - Last CT-scan done in december showing Worsening right-sided pleural effusion. Right apical spiculated nodule without difference  - CXR on admission showed worsening bibasilar opacities/effusions, right greater than left.  - Denver Catheter not draining well  - Recurrent malignant right sided pleural effusion, Stage 4 adenocarcinoma lung right side on 3 weekly Keytruda therapy (last on 12/31)   - Pulmonary assessed catheter > could flush in and out of it but no return, not draining at all. Consider assessment by CT-surgery.  - Pending CT-surgery recommendations  - Currently SaO2 90% on room air, feels better with nasal cannula 2L O2 and SaO2 on 2L 98%     > Might discharge on home O2    # Normocytic anemia  - son states bloody discharge from catheter x 2 months  - CBCs stable    # Hypertension : controlled   - Continue Amlodipine    # DLD  - Pt not on any home med      DVT Prophylaxis : holding anticoagulation  GI ppx: PPI  DASH Diet , gentle ambulation given SOB   from home, full code  Son Ayan  [phone]

## 2020-01-10 NOTE — CONSULT NOTE ADULT - ASSESSMENT
84 years old female known to have malignant right sided pleural effusions s/p Denver cath, Metastatic adenocarcinoma of lung (based on pleural fluid cytology) on ketruyda every 3 weeks last one was on 12/31, HTN, and Dyslipidemia, s/p GINA BSO was brought to the ED for chest pain and shortness of breath, inability to drain Denver catheter x 1 day.    Oncology history :  Pt was diagnosed with adenocarcinoma of lung in 2019 after she was found to have loculated pleural effusion and Right apical mass 3.1 x 3 cm . Pt was started on keytruda s/p 12 cycles with last dose given on 12/30 . Now admitted for evaluation of pain at the site pleural catheter and decreased outpt.     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  - Catheter is flushing but with no return could be loculated effusion .   - Will recommend to have adequate pain control.  -Pulm recs appreciated .  - Will request CTS eval to see if pleurx could be removed or if pleurodesis could be performed at this time .  -Will do out pt PET scan , pt was notified on last visit that worsening dyspnea could be from progression of disease and failure of immunotherapy .  - Will discuss future treatment options once pt is more stable and in out pt settings .     # Normocytic anemia :  - Chronic likely from chronic disease . Will monitor .    will closely f/u with pt.

## 2020-01-10 NOTE — CONSULT NOTE ADULT - ASSESSMENT
84 years old female known to have malignant right sided pleural effusions s/p Denver cath, Metastatic adenocarcinoma of lung (based on pleural fluid cytology) on ketruyda every 3 weeks last one was on 12/31, HTN, and Dyslipidemia, s/p GINA BSO was brought to the ED for chest pain and shortness of breath, inability to drain [place] catheter x 1 day with consult for nondraining pleurx catheter    Plan:  - recommend CT Chest w/ IV contrast  - continue nasal cannula  - possible change of pleurx catheter on Monday

## 2020-01-10 NOTE — CONSULT NOTE ADULT - SUBJECTIVE AND OBJECTIVE BOX
MARIONJOANN [id number]  84y Female    HPI:  84 years old female known to have malignant right sided pleural effusions s/p Denver cath, Metastatic adenocarcinoma of lung (based on pleural fluid cytology) on ketruyda every 3 weeks last one was on 12/31, HTN, and Dyslipidemia, s/p GINA CASASO was brought to the ED for chest pain and shortness of breath, inability to drain Denver catheter x 1 day.  As per son at bedside, until summer last year catheter was draining 300-400 ml every other day but since then frequency had decreased to 400-500 ml 2 times / week and for past couple of months it has been 500 ml once / week.  Catheter was last drained on Jan 3rd and 500 ml came out. Yesterday nothing came out. But pt has been having right shoulder blade, axilla and right sided chest pain. Pain is constant, worse with deep breaths and change in position, relieved slightly with morphine, 10/10. also endorsed shortness breathing since then, mild but even at rest as well and also dry cough. Son also states that pt has been experiencing profuse night sweats where she wakes up in the morning with drenching wet clothes x 1 month.  Son states that fluid color changed from clear to serosanguinous few months ago but for past 2 months it has been just blood coming out from catheter.  Pt denies any fever, nausea, vomiting, abdominal pain, lower extremity swelling, change in weight and appetite, recent travel, sick contact.    As per ED RN, pt pulse [id number] to 80s on RA so pt was placed on supplemental O2 but then remained hemodynamically stable. I explained to the pt that she might require BIPAP and pt has been refusing. (09 Jan 2020 23:20)      Per discussion with the patient ([id number]), she has been having worsening chest pain, shoulder pain with inspiration since the 8th, with pain specifically at the pleurx catheter site. She had been having drainage of 500cc once a week until the 8th when insurance stopped covering the drainage bottles she usually uses. She reports that with the new bottles she has not had any drainage. She reports worsening shortness of breath with improvement of nasal cannula.     PAST MEDICAL & SURGICAL HISTORY:  Stage IV adenocarcinoma of lung: right  Pleural effusion  HTN (hypertension)  High cholesterol  H/O abdominal hysterectomy        MEDICATIONS  (STANDING):  albuterol/ipratropium for Nebulization 3 milliLiter(s) Nebulizer every 4 hours  amLODIPine   Tablet 5 milliGRAM(s) Oral daily  chlorhexidine 4% Liquid 1 Application(s) Topical <User Schedule>  enoxaparin Injectable 40 milliGRAM(s) SubCutaneous daily  fluticasone propionate 50 MICROgram(s)/spray Nasal Spray 1 Spray(s) Both Nostrils daily  influenza   Vaccine 0.5 milliLiter(s) IntraMuscular once  lidocaine   Patch 1 Patch Transdermal daily  melatonin 5 milliGRAM(s) Oral at bedtime  methocarbamol 750 milliGRAM(s) Oral every 6 hours    MEDICATIONS  (PRN):  morphine  - Injectable 2 milliGRAM(s) IV Push every 6 hours PRN Moderate Pain (4 - 6)  morphine Concentrate 5 milliGRAM(s) Oral every 4 hours PRN Moderate Pain (4 - 6)  oxycodone    5 mG/acetaminophen 325 mG 1 Tablet(s) Oral every 4 hours PRN Moderate Pain (4 - 6)      Allergies    No Known Allergies    Intolerances        REVIEW OF SYSTEMS    [x ] A ten-point review of systems was otherwise negative except as noted.  [ ] Due to altered mental status/intubation, subjective information were not able to be obtained from the patient. History was obtained, to the extent possible, from review of the chart and collateral sources of information.      Vital Signs Last 24 Hrs  T(C): 37.1 (10 Arcadio 2020 12:12), Max: 37.5 (10 Arcadio 2020 05:30)  T(F): 98.8 (10 Arcadio 2020 12:12), Max: 99.5 (10 Arcadio 2020 05:30)  HR: 82 (10 Arcadio 2020 12:12) (82 - 91)  BP: 117/56 (10 Arcadio 2020 12:12) (117/56 - 148/68)  BP(mean): --  RR: 18 (10 Arcadio 2020 12:12) (18 - 22)  SpO2: 94% (10 Arcadio 2020 20:30) (90% - 94%)    PHYSICAL EXAM:  GENERAL: NAD, short of breath, taking multiple pauses for breath while speaking, on nasal cannula, sweaty  CHEST/LUNG: Coarse to auscultation bilaterally, lidocaine patches on back, pleurx catheter in place  HEART: Regular rate and rhythm  ABDOMEN: Soft, Nontender, Nondistended;         LABS:  Labs:  CAPILLARY BLOOD GLUCOSE      POCT Blood Glucose.: 130 mg/dL (10 Arcadio 2020 16:19)                          10.7   8.49  )-----------( 286      ( 10 Arcadio 2020 07:48 )             32.9       Auto Neutrophil %: 75.9 % (01-10-20 @ 07:48)  Auto Immature Granulocyte %: 0.2 % (01-10-20 @ 07:48)    01-10    143  |  106  |  26<H>  ----------------------------<  117<H>  4.7   |  23  |  1.0      Calcium, Total Serum: 9.1 mg/dL (01-10-20 @ 07:48)      LFTs:             6.3  | 0.6  | 12       ------------------[80      ( 10 Arcadio 2020 07:48 )  3.5  | x    | 14               Coags:     12.50  ----< 1.09    ( 09 Jan 2020 18:07 )     29.0        CARDIAC MARKERS ( 09 Jan 2020 18:07 )  x     / <0.01 ng/mL / x     / x     / x            RADIOLOGY & ADDITIONAL STUDIES:  < from: Xray Chest 1 View AP/PA (01.09.20 @ 20:41) >  Findings:    Support devices: Right Pleurx catheter    Cardiac/mediastinum/hilum: Unchanged    Lung parenchyma/Pleura: Worsening bibasilar opacity/effusions, right greater than left. No pneumothorax.    Skeleton/soft tissues: Unchanged.    Impression:      Worsening bibasilar opacities/effusions, right greater than left.    < end of copied text >

## 2020-01-11 LAB
ALBUMIN SERPL ELPH-MCNC: 3.4 G/DL — LOW (ref 3.5–5.2)
ALP SERPL-CCNC: 77 U/L — SIGNIFICANT CHANGE UP (ref 30–115)
ALT FLD-CCNC: 12 U/L — SIGNIFICANT CHANGE UP (ref 0–41)
ANION GAP SERPL CALC-SCNC: 12 MMOL/L — SIGNIFICANT CHANGE UP (ref 7–14)
AST SERPL-CCNC: 11 U/L — SIGNIFICANT CHANGE UP (ref 0–41)
BASOPHILS # BLD AUTO: 0 K/UL — SIGNIFICANT CHANGE UP (ref 0–0.2)
BASOPHILS NFR BLD AUTO: 0 % — SIGNIFICANT CHANGE UP (ref 0–1)
BILIRUB SERPL-MCNC: 0.4 MG/DL — SIGNIFICANT CHANGE UP (ref 0.2–1.2)
BUN SERPL-MCNC: 26 MG/DL — HIGH (ref 10–20)
CALCIUM SERPL-MCNC: 9.3 MG/DL — SIGNIFICANT CHANGE UP (ref 8.5–10.1)
CHLORIDE SERPL-SCNC: 105 MMOL/L — SIGNIFICANT CHANGE UP (ref 98–110)
CO2 SERPL-SCNC: 23 MMOL/L — SIGNIFICANT CHANGE UP (ref 17–32)
CREAT SERPL-MCNC: 1.1 MG/DL — SIGNIFICANT CHANGE UP (ref 0.7–1.5)
EOSINOPHIL # BLD AUTO: 0.21 K/UL — SIGNIFICANT CHANGE UP (ref 0–0.7)
EOSINOPHIL NFR BLD AUTO: 2.2 % — SIGNIFICANT CHANGE UP (ref 0–8)
GLUCOSE SERPL-MCNC: 134 MG/DL — HIGH (ref 70–99)
HCT VFR BLD CALC: 34.3 % — LOW (ref 37–47)
HGB BLD-MCNC: 10.8 G/DL — LOW (ref 12–16)
IMM GRANULOCYTES NFR BLD AUTO: 0.4 % — HIGH (ref 0.1–0.3)
LYMPHOCYTES # BLD AUTO: 1.33 K/UL — SIGNIFICANT CHANGE UP (ref 1.2–3.4)
LYMPHOCYTES # BLD AUTO: 13.7 % — LOW (ref 20.5–51.1)
MCHC RBC-ENTMCNC: 26.8 PG — LOW (ref 27–31)
MCHC RBC-ENTMCNC: 31.5 G/DL — LOW (ref 32–37)
MCV RBC AUTO: 85.1 FL — SIGNIFICANT CHANGE UP (ref 81–99)
MONOCYTES # BLD AUTO: 0.73 K/UL — HIGH (ref 0.1–0.6)
MONOCYTES NFR BLD AUTO: 7.5 % — SIGNIFICANT CHANGE UP (ref 1.7–9.3)
NEUTROPHILS # BLD AUTO: 7.4 K/UL — HIGH (ref 1.4–6.5)
NEUTROPHILS NFR BLD AUTO: 76.2 % — HIGH (ref 42.2–75.2)
NRBC # BLD: 0 /100 WBCS — SIGNIFICANT CHANGE UP (ref 0–0)
PLATELET # BLD AUTO: 334 K/UL — SIGNIFICANT CHANGE UP (ref 130–400)
POTASSIUM SERPL-MCNC: 4.4 MMOL/L — SIGNIFICANT CHANGE UP (ref 3.5–5)
POTASSIUM SERPL-SCNC: 4.4 MMOL/L — SIGNIFICANT CHANGE UP (ref 3.5–5)
PROT SERPL-MCNC: 6.4 G/DL — SIGNIFICANT CHANGE UP (ref 6–8)
RBC # BLD: 4.03 M/UL — LOW (ref 4.2–5.4)
RBC # FLD: 13.2 % — SIGNIFICANT CHANGE UP (ref 11.5–14.5)
SODIUM SERPL-SCNC: 140 MMOL/L — SIGNIFICANT CHANGE UP (ref 135–146)
TSH SERPL-MCNC: 1.91 UIU/ML — SIGNIFICANT CHANGE UP (ref 0.27–4.2)
WBC # BLD: 9.71 K/UL — SIGNIFICANT CHANGE UP (ref 4.8–10.8)
WBC # FLD AUTO: 9.71 K/UL — SIGNIFICANT CHANGE UP (ref 4.8–10.8)

## 2020-01-11 PROCEDURE — 99233 SBSQ HOSP IP/OBS HIGH 50: CPT

## 2020-01-11 PROCEDURE — 71260 CT THORAX DX C+: CPT | Mod: 26

## 2020-01-11 RX ORDER — GABAPENTIN 400 MG/1
100 CAPSULE ORAL EVERY 8 HOURS
Refills: 0 | Status: DISCONTINUED | OUTPATIENT
Start: 2020-01-11 | End: 2020-01-11

## 2020-01-11 RX ORDER — GABAPENTIN 400 MG/1
100 CAPSULE ORAL EVERY 12 HOURS
Refills: 0 | Status: DISCONTINUED | OUTPATIENT
Start: 2020-01-11 | End: 2020-01-13

## 2020-01-11 RX ADMIN — AMLODIPINE BESYLATE 5 MILLIGRAM(S): 2.5 TABLET ORAL at 06:12

## 2020-01-11 RX ADMIN — MORPHINE SULFATE 2 MILLIGRAM(S): 50 CAPSULE, EXTENDED RELEASE ORAL at 12:00

## 2020-01-11 RX ADMIN — MORPHINE SULFATE 2 MILLIGRAM(S): 50 CAPSULE, EXTENDED RELEASE ORAL at 11:19

## 2020-01-11 RX ADMIN — METHOCARBAMOL 750 MILLIGRAM(S): 500 TABLET, FILM COATED ORAL at 01:10

## 2020-01-11 RX ADMIN — LIDOCAINE 1 PATCH: 4 CREAM TOPICAL at 23:01

## 2020-01-11 RX ADMIN — GABAPENTIN 100 MILLIGRAM(S): 400 CAPSULE ORAL at 08:48

## 2020-01-11 RX ADMIN — Medication 1 SPRAY(S): at 11:11

## 2020-01-11 RX ADMIN — MORPHINE SULFATE 5 MILLIGRAM(S): 50 CAPSULE, EXTENDED RELEASE ORAL at 09:51

## 2020-01-11 RX ADMIN — Medication 5 MILLIGRAM(S): at 21:56

## 2020-01-11 RX ADMIN — LIDOCAINE 1 PATCH: 4 CREAM TOPICAL at 17:45

## 2020-01-11 RX ADMIN — METHOCARBAMOL 750 MILLIGRAM(S): 500 TABLET, FILM COATED ORAL at 23:00

## 2020-01-11 RX ADMIN — LIDOCAINE 1 PATCH: 4 CREAM TOPICAL at 06:14

## 2020-01-11 RX ADMIN — METHOCARBAMOL 750 MILLIGRAM(S): 500 TABLET, FILM COATED ORAL at 11:12

## 2020-01-11 RX ADMIN — OXYCODONE AND ACETAMINOPHEN 1 TABLET(S): 5; 325 TABLET ORAL at 19:36

## 2020-01-11 RX ADMIN — LIDOCAINE 1 PATCH: 4 CREAM TOPICAL at 00:37

## 2020-01-11 RX ADMIN — GABAPENTIN 100 MILLIGRAM(S): 400 CAPSULE ORAL at 17:44

## 2020-01-11 RX ADMIN — LIDOCAINE 1 PATCH: 4 CREAM TOPICAL at 11:12

## 2020-01-11 RX ADMIN — OXYCODONE AND ACETAMINOPHEN 1 TABLET(S): 5; 325 TABLET ORAL at 21:56

## 2020-01-11 RX ADMIN — METHOCARBAMOL 750 MILLIGRAM(S): 500 TABLET, FILM COATED ORAL at 17:44

## 2020-01-11 RX ADMIN — METHOCARBAMOL 750 MILLIGRAM(S): 500 TABLET, FILM COATED ORAL at 06:12

## 2020-01-11 RX ADMIN — ENOXAPARIN SODIUM 40 MILLIGRAM(S): 100 INJECTION SUBCUTANEOUS at 11:11

## 2020-01-11 RX ADMIN — MORPHINE SULFATE 5 MILLIGRAM(S): 50 CAPSULE, EXTENDED RELEASE ORAL at 10:00

## 2020-01-11 NOTE — PROGRESS NOTE ADULT - SUBJECTIVE AND OBJECTIVE BOX
84 years old female known to have malignant right sided pleural effusions s/p Denver cath, Metastatic adenocarcinoma of lung (based on pleural fluid cytology) on Keytruda every 3 weeks last one was on 12/31, HTN, and Dyslipidemia, s/p GINA CASASO was brought to the ED for chest pain and shortness of breath, inability to drain Denver catheter. Pt was admitted to medical floor, consulted by pulmonary, cath is flushing, no output likely due to extensive fibrosis ( pt has a cath for one year now) and loculated effusion. Thoracic surgery consulted, CT chest with IV contrast recommended.  Today pt is still c/o shooting pain from her back to the front under her breast, can not find comfortable position.     Vital Signs Last 24 Hrs  T(C): 37.7 (11 Jan 2020 05:32), Max: 37.7 (11 Jan 2020 05:32)  T(F): 99.8 (11 Jan 2020 05:32), Max: 99.8 (11 Jan 2020 05:32)  HR: 72 (11 Jan 2020 05:32) (72 - 96)  BP: 128/58 (11 Jan 2020 05:32) (117/56 - 172/77)  BP(mean): --  RR: 19 (11 Jan 2020 05:32) (18 - 20)  SpO2: 93% (10 Arcadio 2020 22:11) (93% - 94%)    PHYSICAL EXAM:  General: in mild distress due to pain at rest, pleasant   Neck: Supple, NO JVD  Cardiac: S1 S2 heard regular, No audible murmurs  Pulmonary: no BS up to the scapula on the right, decreased BS on left   Abdomen: Soft, Non -tender, +BS   Extremities: No Rashes, No edema  Neuro: AAOx3  No focal deficits      LABS:                        10.8   9.71  )-----------( 334      ( 11 Jan 2020 05:43 )             34.3     01-11    140  |  105  |  26<H>  ----------------------------<  134<H>  4.4   |  23  |  1.1    Ca    9.3      11 Jan 2020 05:43  Mg     2.1     01-10    TPro  6.4  /  Alb  3.4<L>  /  TBili  0.4  /  DBili  x   /  AST  11  /  ALT  12  /  AlkPhos  77  01-11    PT/INR - ( 09 Jan 2020 18:07 )   PT: 12.50 sec;   INR: 1.09 ratio         PTT - ( 09 Jan 2020 18:07 )  PTT:29.0 sec  Aspartate Aminotransferase (AST/SGOT): 11 U/L (01-11-20 @ 05:43)  Alanine Aminotransferase (ALT/SGPT): 12 U/L (01-11-20 @ 05:43)  RADIOLOGY:    < from: CT Chest w/ IV Cont (01.11.20 @ 07:56) >  IMPRESSION:    Since CT scan performed on December 27, 2019;    1.  Right thoracostomy tube in stable position with slight interval decrease of right pleuraleffusion.  2.   Interval increase in right middle lobe consolidation/atelectasis.  3.  Stable spiculated nodule in the right lung apex, consistent with patient's known malignancy.    < from: Xray Shoulder 2 Views, Right (01.10.20 @ 14:51) >  Findings/  impression:    No acute displaced fracture or dislocation.    Moderate right acromioclavicular and glenohumeral joint degenerative changes, stable.    Partially imaged is small/moderate right pleural effusion post chest tube placement.    < end of copied text >  < from: [facility] Duplex Lower Ext Vein Scan, Bilat (01.10.20 @ 11:33) >  Impression:    No evidence of deep venous thrombosis or superficial thrombophlebitis in bilateral lower extremities.    < end of copied text >  < from: Transthoracic Echocardiogram (02.22.19 @ 07:12) >    Summary:   1. LV Ejection Fraction by Lopez's Method with a biplane EF of 56 %.   2. Normal left ventricular internal cavity size.   3. Normal left atrial size.   4. Normal right atrial size.   5. There is no evidence of pericardial effusion.   6. Thickening of the anterior and posterior mitral valve leaflets.   7. Trace pulmonic valve regurgitation.    < end of copied text >  MEDICATIONS  (STANDING):  albuterol/ipratropium for Nebulization 3 milliLiter(s) Nebulizer every 4 hours  amLODIPine   Tablet 5 milliGRAM(s) Oral daily  chlorhexidine 4% Liquid 1 Application(s) Topical <User Schedule>  enoxaparin Injectable 40 milliGRAM(s) SubCutaneous daily  fluticasone propionate 50 MICROgram(s)/spray Nasal Spray 1 Spray(s) Both Nostrils daily  gabapentin 100 milliGRAM(s) Oral every 12 hours  influenza   Vaccine 0.5 milliLiter(s) IntraMuscular once  lidocaine   Patch 1 Patch Transdermal daily  melatonin 5 milliGRAM(s) Oral at bedtime  methocarbamol 750 milliGRAM(s) Oral every 6 hours    MEDICATIONS  (PRN):  morphine  - Injectable 2 milliGRAM(s) IV Push every 6 hours PRN Moderate Pain (4 - 6)  morphine Concentrate 5 milliGRAM(s) Oral every 4 hours PRN Moderate Pain (4 - 6)  oxycodone    5 mG/acetaminophen 325 mG 1 Tablet(s) Oral every 4 hours PRN Moderate Pain (4 - 6)

## 2020-01-11 NOTE — PROGRESS NOTE ADULT - ASSESSMENT
84 years old female known to have malignant right sided pleural effusions s/p Denver cath, Metastatic adenocarcinoma of lung (based on pleural fluid cytology) on ketruyda every 3 weeks last one was on 12/31, HTN, and Dyslipidemia, s/p GINA BSO was brought to the ED for chest pain and shortness of breath, inability to drain Denver catheter x 1 day.  As per ED RN, pt pulse [id number] to 80s on RA so pt was placed on supplemental O2 but then remained hemodynamically stable. I explained to the pt that she might require BIPAP and pt has been refusing.    # Dyspnea, chest discomfort  - Last CT-scan done in december showing Worsening right-sided pleural effusion. Right apical spiculated nodule without difference  - CXR on admission showed worsening bibasilar opacities/effusions, right greater than left.  - Denver Catheter not draining well  - Recurrent malignant right sided pleural effusion, Stage 4 adenocarcinoma lung right side on 3 weekly Keytruda therapy (last on 12/31)   - Pulmonary assessed catheter > could flush in and out of it but no return, not draining at all. Consider assessment by CT-surgery.  - CT-chest done showed interval decrease of right pleural effusion. and Interval increase in right middle lobe consolidation/atelectasis.  - CT-surgery recommended possible change of Pleur-X catheter on 1/13      # Normocytic anemia  - son states bloody discharge from catheter x 2 months  - CBCs stable    # Hypertension : controlled   - Continue Amlodipine    # DLD  - Pt not on any home med      DVT Prophylaxis : holding anticoagulation  GI ppx: PPI  DASH Diet , gentle ambulation given SOB   from home, full code  Son Ayan  [phone] 84 years old female known to have malignant right sided pleural effusions s/p Denver cath, Metastatic adenocarcinoma of lung (based on pleural fluid cytology) on ketruyda every 3 weeks last one was on 12/31, HTN, and Dyslipidemia, s/p GINA BSO was brought to the ED for chest pain and shortness of breath, inability to drain Denver catheter x 1 day.  As per ED RN, pt pulse [id number] to 80s on RA so pt was placed on supplemental O2 but then remained hemodynamically stable. I explained to the pt that she might require BIPAP and pt has been refusing.    # Dyspnea, chest discomfort  - Last CT-scan done in december showing Worsening right-sided pleural effusion. Right apical spiculated nodule without difference  - CXR on admission showed worsening bibasilar opacities/effusions, right greater than left. Denver Catheter not draining well  - Recurrent malignant right sided pleural effusion, Stage 4 adenocarcinoma lung right side on 3 weekly Keytruda therapy (last on 12/31)   - Pulmonary assessed catheter > could flush in and out of it but no return, not draining at all.   - CT-chest done showed interval decrease of right pleural effusion. and Interval increase in right middle lobe consolidation/atelectasis.  - CT-surgery recommended possible change of Pleur-X catheter on 1/13  - Pain control with Roxanol and percocet    # Normocytic anemia  - son states bloody discharge from catheter x 2 months  - CBCs stable    # Hypertension : controlled   - Continue Amlodipine    # DLD  - Pt not on any home med    DVT Prophylaxis : holding anticoagulation  GI ppx: PPI  DASH Diet , gentle ambulation given SOB   from home, full code  Son Ayan  [phone]

## 2020-01-11 NOTE — PROGRESS NOTE ADULT - SUBJECTIVE AND OBJECTIVE BOX
Subjective:  The patient still experiences right sided back and thoracic pain, complains of mild dyspnea at rest.   No other significant complaints.     Objective:      albuterol/ipratropium for Nebulization 3 milliLiter(s) Nebulizer every 4 hours  amLODIPine   Tablet 5 milliGRAM(s) Oral daily  chlorhexidine 4% Liquid 1 Application(s) Topical <User Schedule>  enoxaparin Injectable 40 milliGRAM(s) SubCutaneous daily  fluticasone propionate 50 MICROgram(s)/spray Nasal Spray 1 Spray(s) Both Nostrils daily  gabapentin 100 milliGRAM(s) Oral every 12 hours  influenza   Vaccine 0.5 milliLiter(s) IntraMuscular once  lidocaine   Patch 1 Patch Transdermal daily  melatonin 5 milliGRAM(s) Oral at bedtime  methocarbamol 750 milliGRAM(s) Oral every 6 hours  morphine  - Injectable 2 milliGRAM(s) IV Push every 6 hours PRN  morphine Concentrate 5 milliGRAM(s) Oral every 4 hours PRN  oxycodone    5 mG/acetaminophen 325 mG 1 Tablet(s) Oral every 4 hours PRN      PHYSICAL EXAM:  General: A/ox 3, No acute Distress  Neck: Supple, NO JVD  Cardiac: S1 S2 heard regular, No audible murmurs  Pulmonary: Decreased breath sounds over right base of lung, patient not tachypneic, no wheezing, no rhonchi  Abdomen: Soft, Non -tender, +BS   Extremities: No Rashes, No edema  Neuro: A/o x 3, No focal deficits  Psch: normal mood , normal     T(C): 37.7 (01-11-20 @ 05:32), Max: 37.7 (01-11-20 @ 05:32)  HR: 72 (01-11-20 @ 05:32) (72 - 96)  BP: 128/58 (01-11-20 @ 05:32) (117/56 - 172/77)  RR: 19 (01-11-20 @ 05:32) (18 - 20)  SpO2: 93% (01-10-20 @ 22:11) (93% - 94%)    LABS:                        10.8   9.71  )-----------( 334      ( 11 Jan 2020 05:43 )             34.3     01-11    140  |  105  |  26<H>  ----------------------------<  134<H>  4.4   |  23  |  1.1    Ca    9.3      11 Jan 2020 05:43  Mg     2.1     01-10    TPro  6.4  /  Alb  3.4<L>  /  TBili  0.4  /  DBili  x   /  AST  11  /  ALT  12  /  AlkPhos  77  01-11    PT/INR - ( 09 Jan 2020 18:07 )   PT: 12.50 sec;   INR: 1.09 ratio         PTT - ( 09 Jan 2020 18:07 )  PTT:29.0 sec  Aspartate Aminotransferase (AST/SGOT): 11 U/L (01-11-20 @ 05:43)  Alanine Aminotransferase (ALT/SGPT): 12 U/L (01-11-20 @ 05:43)

## 2020-01-11 NOTE — PROGRESS NOTE ADULT - SUBJECTIVE AND OBJECTIVE BOX
GENERAL SURGERY PROGRESS NOTE     JOANN SCHULTZ  [address] [place] [address] day :2d  POD:  Procedure:   Surgical Attending: Deepak Avelar  Overnight events: No acute events     T(F): 98.9 (01-10-20 @ 22:11), Max: 99.5 (01-10-20 @ 05:30)  HR: 86 (01-10-20 @ 22:44) (82 - 96)  BP: 118/66 (01-10-20 @ 22:44) (117/56 - 172/77)  ABP: --  ABP(mean): --  RR: 20 (01-10-20 @ 22:11) (18 - 20)  SpO2: 93% (01-10-20 @ 22:11) (90% - 94%)      01-10-20 @ 07:01  -  01-11-20 @ 02:15  --------------------------------------------------------  IN:  Total IN: 0 mL    OUT:    Voided: 600 mL  Total OUT: 600 mL    Total NET: -600 mL        DIET/FLUIDS:   NG:                                                                                DRAINS:     BM:     EMESIS:     URINE:      GI proph:    AC/ proph:   ABx:     PHYSICAL EXAM:  GENERAL: NAD, well-appearing  CHEST/LUNG: Clear to auscultation bilaterally, Pleurx cath   HEART: Regular rate and rhythm  ABDOMEN: Soft, Nontender, Nondistended;   EXTREMITIES:  No clubbing, cyanosis, or edema      LABS  Labs:  CAPILLARY BLOOD GLUCOSE      POCT Blood Glucose.: 146 mg/dL (10 Arcadio 2020 21:34)  POCT Blood Glucose.: 130 mg/dL (10 Arcadio 2020 16:19)                          10.7   8.49  )-----------( 286      ( 10 Arcadio 2020 07:48 )             32.9       Auto Neutrophil %: 75.9 % (01-10-20 @ 07:48)  Auto Immature Granulocyte %: 0.2 % (01-10-20 @ 07:48)    01-10    143  |  106  |  26<H>  ----------------------------<  117<H>  4.7   |  23  |  1.0      Calcium, Total Serum: 9.1 mg/dL (01-10-20 @ 07:48)      LFTs:             6.3  | 0.6  | 12       ------------------[80      ( 10 Arcadio 2020 07:48 )  3.5  | x    | 14          Lipase:x      Amylase:x             Coags:     12.50  ----< 1.09    ( 09 Jan 2020 18:07 )     29.0        CARDIAC MARKERS ( 09 Jan 2020 18:07 )  x     / <0.01 ng/mL / x     / x     / x

## 2020-01-12 PROCEDURE — 93010 ELECTROCARDIOGRAM REPORT: CPT

## 2020-01-12 PROCEDURE — 99232 SBSQ HOSP IP/OBS MODERATE 35: CPT

## 2020-01-12 RX ORDER — SENNA PLUS 8.6 MG/1
2 TABLET ORAL AT BEDTIME
Refills: 0 | Status: DISCONTINUED | OUTPATIENT
Start: 2020-01-12 | End: 2020-01-13

## 2020-01-12 RX ADMIN — METHOCARBAMOL 750 MILLIGRAM(S): 500 TABLET, FILM COATED ORAL at 05:39

## 2020-01-12 RX ADMIN — Medication 1 SPRAY(S): at 12:57

## 2020-01-12 RX ADMIN — Medication 5 MILLIGRAM(S): at 21:27

## 2020-01-12 RX ADMIN — GABAPENTIN 100 MILLIGRAM(S): 400 CAPSULE ORAL at 17:02

## 2020-01-12 RX ADMIN — SENNA PLUS 2 TABLET(S): 8.6 TABLET ORAL at 23:12

## 2020-01-12 RX ADMIN — GABAPENTIN 100 MILLIGRAM(S): 400 CAPSULE ORAL at 05:39

## 2020-01-12 RX ADMIN — METHOCARBAMOL 750 MILLIGRAM(S): 500 TABLET, FILM COATED ORAL at 23:12

## 2020-01-12 RX ADMIN — METHOCARBAMOL 750 MILLIGRAM(S): 500 TABLET, FILM COATED ORAL at 12:57

## 2020-01-12 RX ADMIN — METHOCARBAMOL 750 MILLIGRAM(S): 500 TABLET, FILM COATED ORAL at 17:02

## 2020-01-12 RX ADMIN — LIDOCAINE 1 PATCH: 4 CREAM TOPICAL at 20:00

## 2020-01-12 RX ADMIN — LIDOCAINE 1 PATCH: 4 CREAM TOPICAL at 12:55

## 2020-01-12 RX ADMIN — AMLODIPINE BESYLATE 5 MILLIGRAM(S): 2.5 TABLET ORAL at 05:39

## 2020-01-12 RX ADMIN — ENOXAPARIN SODIUM 40 MILLIGRAM(S): 100 INJECTION SUBCUTANEOUS at 12:57

## 2020-01-12 RX ADMIN — CHLORHEXIDINE GLUCONATE 1 APPLICATION(S): 213 SOLUTION TOPICAL at 21:30

## 2020-01-12 NOTE — PROGRESS NOTE ADULT - ASSESSMENT
84 years old female known to have malignant right sided pleural effusions s/p Denver cath, Metastatic adenocarcinoma of lung (based on pleural fluid cytology) on ketruyda every 3 weeks last one was on 12/31, HTN, and Dyslipidemia, s/p GINA BSO was brought to the ED for chest pain and shortness of breath, inability to drain Denver catheter x 1 day with consult for nondraining pleurx catheter    Plan:  - continue nasal cannula  - Pre op today for pleurx catheter exchange on Monday 1/13  - CXR, EKG, CBC, BMP, PTT, INR, T&S, NPO after midnight, IVF as needed while NPO

## 2020-01-12 NOTE — PROGRESS NOTE ADULT - SUBJECTIVE AND OBJECTIVE BOX
84 years old female known to have malignant right sided pleural effusions s/p Denver cath, Metastatic adenocarcinoma of lung (based on pleural fluid cytology) on Keytruda every 3 weeks last one was on 12/31, HTN, and Dyslipidemia, s/p GINA CASASO was brought to the ED for chest pain and shortness of breath, inability to drain Denver catheter. Pt was admitted to medical floor, consulted by pulmonary, cath is flushing, no output likely due to extensive fibrosis ( pt has a cath for one year now) and loculated effusion. Thoracic surgery consulted, CT chest with IV contrast recommended.  Today pt is much more comfortable today, slept last night.     Vital Signs Last 24 Hrs  T(C): 37.1 (12 Jan 2020 05:19), Max: 37.5 (11 Jan 2020 19:42)  T(F): 98.8 (12 Jan 2020 05:19), Max: 99.5 (11 Jan 2020 19:42)  HR: 78 (12 Jan 2020 05:19) (78 - 86)  BP: 127/61 (12 Jan 2020 05:19) (127/61 - 162/71)  BP(mean): --  RR: 18 (12 Jan 2020 05:19) (18 - 19)  SpO2: 94% (11 Jan 2020 20:00) (94% - 94%)    PHYSICAL EXAM:  General: in mild distress due to pain at rest, pleasant   Neck: Supple, NO JVD  Cardiac: S1 S2 heard regular, No audible murmurs  Pulmonary: no BS up to the scapula on the right, decreased BS on left   Abdomen: Soft, Non -tender, +BS   Extremities: No Rashes, No edema  Neuro: AAOx3  No focal deficits      LABS:                     no new labs today                 10.8   9.71  )-----------( 334      ( 11 Jan 2020 05:43 )             34.3     01-11    140  |  105  |  26<H>  ----------------------------<  134<H>  4.4   |  23  |  1.1    Ca    9.3      11 Jan 2020 05:43  Mg     2.1     01-10    TPro  6.4  /  Alb  3.4<L>  /  TBili  0.4  /  DBili  x   /  AST  11  /  ALT  12  /  AlkPhos  77  01-11    PT/INR - ( 09 Jan 2020 18:07 )   PT: 12.50 sec;   INR: 1.09 ratio         PTT - ( 09 Jan 2020 18:07 )  PTT:29.0 sec  Aspartate Aminotransferase (AST/SGOT): 11 U/L (01-11-20 @ 05:43)  Alanine Aminotransferase (ALT/SGPT): 12 U/L (01-11-20 @ 05:43)  RADIOLOGY:    < from: CT Chest w/ IV Cont (01.11.20 @ 07:56) >  IMPRESSION:    Since CT scan performed on December 27, 2019;    1.  Right thoracostomy tube in stable position with slight interval decrease of right pleuraleffusion.  2.   Interval increase in right middle lobe consolidation/atelectasis.  3.  Stable spiculated nodule in the right lung apex, consistent with patient's known malignancy.    < from: Xray Shoulder 2 Views, Right (01.10.20 @ 14:51) >  Findings/  impression:    No acute displaced fracture or dislocation.    Moderate right acromioclavicular and glenohumeral joint degenerative changes, stable.    Partially imaged is small/moderate right pleural effusion post chest tube placement.    < end of copied text >  < from: VA Duplex Lower Ext Vein Scan, Bilat (01.10.20 @ 11:33) >  Impression:    No evidence of deep venous thrombosis or superficial thrombophlebitis in bilateral lower extremities.    < end of copied text >  < from: Transthoracic Echocardiogram (02.22.19 @ 07:12) >    Summary:   1. LV Ejection Fraction by Lopez's Method with a biplane EF of 56 %.   2. Normal left ventricular internal cavity size.   3. Normal left atrial size.   4. Normal right atrial size.   5. There is no evidence of pericardial effusion.   6. Thickening of the anterior and posterior mitral valve leaflets.   7. Trace pulmonic valve regurgitation.    < end of copied text >  MEDICATIONS  (STANDING):  albuterol/ipratropium for Nebulization 3 milliLiter(s) Nebulizer every 4 hours  amLODIPine   Tablet 5 milliGRAM(s) Oral daily  chlorhexidine 4% Liquid 1 Application(s) Topical <User Schedule>  enoxaparin Injectable 40 milliGRAM(s) SubCutaneous daily  fluticasone propionate 50 MICROgram(s)/spray Nasal Spray 1 Spray(s) Both Nostrils daily  gabapentin 100 milliGRAM(s) Oral every 12 hours  influenza   Vaccine 0.5 milliLiter(s) IntraMuscular once  lidocaine   Patch 1 Patch Transdermal daily  melatonin 5 milliGRAM(s) Oral at bedtime  methocarbamol 750 milliGRAM(s) Oral every 6 hours    MEDICATIONS  (PRN):  morphine  - Injectable 2 milliGRAM(s) IV Push every 6 hours PRN Moderate Pain (4 - 6)  morphine Concentrate 5 milliGRAM(s) Oral every 4 hours PRN Moderate Pain (4 - 6)  oxycodone    5 mG/acetaminophen 325 mG 1 Tablet(s) Oral every 4 hours PRN Moderate Pain (4 - 6)

## 2020-01-12 NOTE — PROGRESS NOTE ADULT - SUBJECTIVE AND OBJECTIVE BOX
GENERAL SURGERY PROGRESS NOTE     JOANN SCHULTZ  84y  Female  Hospital day :3d  POD:  Procedure:   OVERNIGHT EVENTS: Uneventful    T(F): 99.5 (01-11-20 @ 19:42), Max: 99.8 (01-11-20 @ 05:32)  HR: 79 (01-11-20 @ 20:00) (72 - 86)  BP: 156/65 (01-11-20 @ 20:00) (128/58 - 162/71)  RR: 18 (01-11-20 @ 19:42) (18 - 19)  SpO2: 94% (01-11-20 @ 20:00) (94% - 94%)      PHYSICAL EXAM:  GENERAL: NAD  CHEST/LUNG: Clear to auscultation bilaterally  HEART: Regular rate and rhythm  ABDOMEN: Soft, Nontender, Nondistended;   EXTREMITIES:  No clubbing, cyanosis, or edema      LABS  Labs:  CAPILLARY BLOOD GLUCOSE                              10.8   9.71  )-----------( 334      ( 11 Jan 2020 05:43 )             34.3       Auto Neutrophil %: 76.2 % (01-11-20 @ 05:43)  Auto Immature Granulocyte %: 0.4 % (01-11-20 @ 05:43)    01-11    140  |  105  |  26<H>  ----------------------------<  134<H>  4.4   |  23  |  1.1      Calcium, Total Serum: 9.3 mg/dL (01-11-20 @ 05:43)      LFTs:             6.4  | 0.4  | 11       ------------------[77      ( 11 Jan 2020 05:43 )  3.4  | x    | 12          Lipase:x      Amylase:x             Coags:                    RADIOLOGY & ADDITIONAL TESTS:    < from: CT Chest w/ IV Cont (01.11.20 @ 07:56) >  IMPRESSION:    Since CT scan performed on December 27, 2019;    1.  Right thoracostomy tube in stable position with slight interval decrease of right pleuraleffusion.  2.   Interval increase in right middle lobe consolidation/atelectasis.  3.  Stable spiculated nodule in the right lung apex, consistent with patient's known malignancy.    < end of copied text >

## 2020-01-12 NOTE — PROGRESS NOTE ADULT - ASSESSMENT
84 years old female known to have malignant right sided pleural effusions s/p Denver cath, Metastatic adenocarcinoma of lung (based on pleural fluid cytology) on ketruyda every 3 weeks last one was on 12/31, HTN, and Dyslipidemia, s/p GINA BSO was brought to the ED for chest pain and shortness of breath, inability to drain Denver catheter.    A/P     # Dyspnia/ pleuritic chest pain/ h/o Denver cath/ h/o stage IV lung CA   - pt has h/o malignant pleural effusion, loculated now with extensive fibrosis in pleural cavity   - consulted by pulmonary and thoracic surgery, CT chest was done today   - CT-surgery recommended  change of Pleur-X catheter on 1/13, keep NPO after midnight   - Pain control with Roxanol and percocet, Lidoderm topical   - pt was consulted by oncology c/w immunotherapy ( tumor regression was noted by oncology)   - pt was started on Robaxin and Neurontin  for better pain control   - on Nebs Q 4 hours   - comfortable on NC, monitor pulse Ox       # Normocytic anemia  - monitor H/H, keep Hb above 7.5  - stable for now     # Hypertension   -  DASH diet   - Continue Amlodipine    DVT Prophylaxis  # Overall prognosis is guarded   Andrea Botello  [phone]  #Progress Note Handoff  Pending (specify):   change of Pleur-X catheter on 1/13, NPO after midnight   Family discussion: n/a, I spoke with pt, she agreed with a plan of care   Disposition: Home__x_/SNF___/Other________/Unknown at this time________

## 2020-01-13 LAB
ALBUMIN SERPL ELPH-MCNC: 3.3 G/DL — LOW (ref 3.5–5.2)
ALP SERPL-CCNC: 71 U/L — SIGNIFICANT CHANGE UP (ref 30–115)
ALT FLD-CCNC: 11 U/L — SIGNIFICANT CHANGE UP (ref 0–41)
ANION GAP SERPL CALC-SCNC: 10 MMOL/L — SIGNIFICANT CHANGE UP (ref 7–14)
AST SERPL-CCNC: 9 U/L — SIGNIFICANT CHANGE UP (ref 0–41)
BASOPHILS # BLD AUTO: 0.01 K/UL — SIGNIFICANT CHANGE UP (ref 0–0.2)
BASOPHILS NFR BLD AUTO: 0.1 % — SIGNIFICANT CHANGE UP (ref 0–1)
BILIRUB SERPL-MCNC: 0.3 MG/DL — SIGNIFICANT CHANGE UP (ref 0.2–1.2)
BLD GP AB SCN SERPL QL: SIGNIFICANT CHANGE UP
BUN SERPL-MCNC: 18 MG/DL — SIGNIFICANT CHANGE UP (ref 10–20)
CALCIUM SERPL-MCNC: 9.1 MG/DL — SIGNIFICANT CHANGE UP (ref 8.5–10.1)
CHLORIDE SERPL-SCNC: 106 MMOL/L — SIGNIFICANT CHANGE UP (ref 98–110)
CO2 SERPL-SCNC: 24 MMOL/L — SIGNIFICANT CHANGE UP (ref 17–32)
CREAT SERPL-MCNC: 1.1 MG/DL — SIGNIFICANT CHANGE UP (ref 0.7–1.5)
EOSINOPHIL # BLD AUTO: 0.22 K/UL — SIGNIFICANT CHANGE UP (ref 0–0.7)
EOSINOPHIL NFR BLD AUTO: 2.6 % — SIGNIFICANT CHANGE UP (ref 0–8)
GLUCOSE SERPL-MCNC: 115 MG/DL — HIGH (ref 70–99)
GRAM STN FLD: SIGNIFICANT CHANGE UP
HCT VFR BLD CALC: 30.8 % — LOW (ref 37–47)
HGB BLD-MCNC: 10.1 G/DL — LOW (ref 12–16)
IMM GRANULOCYTES NFR BLD AUTO: 0.4 % — HIGH (ref 0.1–0.3)
LYMPHOCYTES # BLD AUTO: 1.18 K/UL — LOW (ref 1.2–3.4)
LYMPHOCYTES # BLD AUTO: 14.2 % — LOW (ref 20.5–51.1)
MAGNESIUM SERPL-MCNC: 2.2 MG/DL — SIGNIFICANT CHANGE UP (ref 1.8–2.4)
MCHC RBC-ENTMCNC: 27 PG — SIGNIFICANT CHANGE UP (ref 27–31)
MCHC RBC-ENTMCNC: 32.8 G/DL — SIGNIFICANT CHANGE UP (ref 32–37)
MCV RBC AUTO: 82.4 FL — SIGNIFICANT CHANGE UP (ref 81–99)
MONOCYTES # BLD AUTO: 0.66 K/UL — HIGH (ref 0.1–0.6)
MONOCYTES NFR BLD AUTO: 7.9 % — SIGNIFICANT CHANGE UP (ref 1.7–9.3)
NEUTROPHILS # BLD AUTO: 6.22 K/UL — SIGNIFICANT CHANGE UP (ref 1.4–6.5)
NEUTROPHILS NFR BLD AUTO: 74.8 % — SIGNIFICANT CHANGE UP (ref 42.2–75.2)
NRBC # BLD: 0 /100 WBCS — SIGNIFICANT CHANGE UP (ref 0–0)
PLATELET # BLD AUTO: 344 K/UL — SIGNIFICANT CHANGE UP (ref 130–400)
POTASSIUM SERPL-MCNC: 3.5 MMOL/L — SIGNIFICANT CHANGE UP (ref 3.5–5)
POTASSIUM SERPL-SCNC: 3.5 MMOL/L — SIGNIFICANT CHANGE UP (ref 3.5–5)
PROT SERPL-MCNC: 6.2 G/DL — SIGNIFICANT CHANGE UP (ref 6–8)
RBC # BLD: 3.74 M/UL — LOW (ref 4.2–5.4)
RBC # FLD: 13 % — SIGNIFICANT CHANGE UP (ref 11.5–14.5)
SODIUM SERPL-SCNC: 140 MMOL/L — SIGNIFICANT CHANGE UP (ref 135–146)
SPECIMEN SOURCE: SIGNIFICANT CHANGE UP
WBC # BLD: 8.32 K/UL — SIGNIFICANT CHANGE UP (ref 4.8–10.8)
WBC # FLD AUTO: 8.32 K/UL — SIGNIFICANT CHANGE UP (ref 4.8–10.8)

## 2020-01-13 PROCEDURE — 99232 SBSQ HOSP IP/OBS MODERATE 35: CPT

## 2020-01-13 PROCEDURE — 32552 REMOVE LUNG CATHETER: CPT

## 2020-01-13 PROCEDURE — 71045 X-RAY EXAM CHEST 1 VIEW: CPT | Mod: 26

## 2020-01-13 RX ORDER — DIPHENHYDRAMINE HCL 50 MG
25 CAPSULE ORAL ONCE
Refills: 0 | Status: COMPLETED | OUTPATIENT
Start: 2020-01-13 | End: 2020-01-13

## 2020-01-13 RX ORDER — METHOCARBAMOL 500 MG/1
750 TABLET, FILM COATED ORAL EVERY 6 HOURS
Refills: 0 | Status: DISCONTINUED | OUTPATIENT
Start: 2020-01-13 | End: 2020-01-14

## 2020-01-13 RX ORDER — AMLODIPINE BESYLATE 2.5 MG/1
5 TABLET ORAL DAILY
Refills: 0 | Status: DISCONTINUED | OUTPATIENT
Start: 2020-01-13 | End: 2020-01-14

## 2020-01-13 RX ORDER — IBUPROFEN 200 MG
400 TABLET ORAL EVERY 8 HOURS
Refills: 0 | Status: DISCONTINUED | OUTPATIENT
Start: 2020-01-13 | End: 2020-01-14

## 2020-01-13 RX ORDER — SENNA PLUS 8.6 MG/1
2 TABLET ORAL AT BEDTIME
Refills: 0 | Status: DISCONTINUED | OUTPATIENT
Start: 2020-01-13 | End: 2020-01-14

## 2020-01-13 RX ORDER — ONDANSETRON 8 MG/1
4 TABLET, FILM COATED ORAL EVERY 6 HOURS
Refills: 0 | Status: DISCONTINUED | OUTPATIENT
Start: 2020-01-13 | End: 2020-01-14

## 2020-01-13 RX ORDER — SODIUM CHLORIDE 9 MG/ML
1000 INJECTION, SOLUTION INTRAVENOUS
Refills: 0 | Status: DISCONTINUED | OUTPATIENT
Start: 2020-01-13 | End: 2020-01-13

## 2020-01-13 RX ORDER — DIPHENHYDRAMINE HCL 50 MG
25 CAPSULE ORAL ONCE
Refills: 0 | Status: DISCONTINUED | OUTPATIENT
Start: 2020-01-13 | End: 2020-01-13

## 2020-01-13 RX ORDER — LIDOCAINE 4 G/100G
1 CREAM TOPICAL DAILY
Refills: 0 | Status: DISCONTINUED | OUTPATIENT
Start: 2020-01-13 | End: 2020-01-14

## 2020-01-13 RX ORDER — ENOXAPARIN SODIUM 100 MG/ML
40 INJECTION SUBCUTANEOUS DAILY
Refills: 0 | Status: DISCONTINUED | OUTPATIENT
Start: 2020-01-13 | End: 2020-01-14

## 2020-01-13 RX ORDER — FLUTICASONE PROPIONATE 50 MCG
1 SPRAY, SUSPENSION NASAL DAILY
Refills: 0 | Status: DISCONTINUED | OUTPATIENT
Start: 2020-01-13 | End: 2020-01-14

## 2020-01-13 RX ORDER — ACETAMINOPHEN 500 MG
650 TABLET ORAL EVERY 6 HOURS
Refills: 0 | Status: DISCONTINUED | OUTPATIENT
Start: 2020-01-13 | End: 2020-01-14

## 2020-01-13 RX ORDER — CHLORHEXIDINE GLUCONATE 213 G/1000ML
1 SOLUTION TOPICAL
Refills: 0 | Status: DISCONTINUED | OUTPATIENT
Start: 2020-01-13 | End: 2020-01-14

## 2020-01-13 RX ORDER — LANOLIN ALCOHOL/MO/W.PET/CERES
5 CREAM (GRAM) TOPICAL AT BEDTIME
Refills: 0 | Status: DISCONTINUED | OUTPATIENT
Start: 2020-01-13 | End: 2020-01-14

## 2020-01-13 RX ORDER — HYDROCORTISONE 1 %
1 OINTMENT (GRAM) TOPICAL
Refills: 0 | Status: DISCONTINUED | OUTPATIENT
Start: 2020-01-13 | End: 2020-01-14

## 2020-01-13 RX ORDER — GABAPENTIN 400 MG/1
100 CAPSULE ORAL EVERY 12 HOURS
Refills: 0 | Status: DISCONTINUED | OUTPATIENT
Start: 2020-01-13 | End: 2020-01-14

## 2020-01-13 RX ADMIN — METHOCARBAMOL 750 MILLIGRAM(S): 500 TABLET, FILM COATED ORAL at 18:32

## 2020-01-13 RX ADMIN — METHOCARBAMOL 750 MILLIGRAM(S): 500 TABLET, FILM COATED ORAL at 05:16

## 2020-01-13 RX ADMIN — METHOCARBAMOL 750 MILLIGRAM(S): 500 TABLET, FILM COATED ORAL at 23:06

## 2020-01-13 RX ADMIN — LIDOCAINE 1 PATCH: 4 CREAM TOPICAL at 00:00

## 2020-01-13 RX ADMIN — LIDOCAINE 1 PATCH: 4 CREAM TOPICAL at 20:15

## 2020-01-13 RX ADMIN — LIDOCAINE 1 PATCH: 4 CREAM TOPICAL at 18:41

## 2020-01-13 RX ADMIN — SENNA PLUS 2 TABLET(S): 8.6 TABLET ORAL at 22:49

## 2020-01-13 RX ADMIN — GABAPENTIN 100 MILLIGRAM(S): 400 CAPSULE ORAL at 18:34

## 2020-01-13 RX ADMIN — Medication 25 MILLIGRAM(S): at 15:17

## 2020-01-13 RX ADMIN — MORPHINE SULFATE 2 MILLIGRAM(S): 50 CAPSULE, EXTENDED RELEASE ORAL at 08:50

## 2020-01-13 RX ADMIN — SODIUM CHLORIDE 75 MILLILITER(S): 9 INJECTION, SOLUTION INTRAVENOUS at 10:26

## 2020-01-13 RX ADMIN — MORPHINE SULFATE 2 MILLIGRAM(S): 50 CAPSULE, EXTENDED RELEASE ORAL at 09:26

## 2020-01-13 RX ADMIN — AMLODIPINE BESYLATE 5 MILLIGRAM(S): 2.5 TABLET ORAL at 05:16

## 2020-01-13 RX ADMIN — Medication 1 APPLICATION(S): at 18:31

## 2020-01-13 RX ADMIN — Medication 5 MILLIGRAM(S): at 22:49

## 2020-01-13 RX ADMIN — GABAPENTIN 100 MILLIGRAM(S): 400 CAPSULE ORAL at 05:16

## 2020-01-13 NOTE — PROGRESS NOTE ADULT - ASSESSMENT
84 years old female known to have malignant right sided pleural effusions s/p Denver cath, Metastatic adenocarcinoma of lung (based on pleural fluid cytology) on ketruyda every 3 weeks last one was on 12/31, HTN, and Dyslipidemia, s/p GINA BSO was brought to the ED for chest pain and shortness of breath, inability to drain Denver catheter.    A/P     # Dyspnia/ pleuritic chest pain/ h/o Denver cath/ h/o stage IV lung CA   - pt has h/o malignant pleural effusion, loculated now with extensive fibrosis in pleural cavity   - consulted by pulmonary and thoracic surgery, CT chest recommended and performed   - CT-surgery recommended  change of Pleur-X catheter today at noon time, case d/w CT surgery attending today   - Pain control with Roxanol and percocet, Lidoderm topical   - pt was consulted by oncology c/w immunotherapy ( tumor regression was noted by oncology)   -  on Robaxin and Neurontin  for better pain control   - on Nebs Q 4 hours   - comfortable on NC, monitor pulse Ox       # Normocytic anemia  - monitor H/H, keep Hb above 7.5  - stable for now     # Hypertension   -  DASH diet   - Continue Amlodipine    DVT Prophylaxis  # Overall prognosis is guarded   Andrea Botello  [phone]  #Progress Note Handoff  Pending (specify):   change of Pleur-X catheter vs removal today   Family discussion: n/a, I spoke with pt, she agreed with a plan of care   Disposition: Home__x_/SNF___/Other________/Unknown at this time________

## 2020-01-13 NOTE — PROGRESS NOTE ADULT - SUBJECTIVE AND OBJECTIVE BOX
SUBJECTIVE:    Patient is a 84y old Female who presents with a chief complaint of Pleural effusion (10 Arcadio 2020 16:36)    Currently admitted to medicine with the primary diagnosis of Pleural effusion     Today is hospital day 4d. This morning she is resting comfortably in bed and reports no new issues or overnight events. Patient denies fevers, chills, SOB, and chest pain. States she still has pain around her site of pleur-x catheter, worsened by movement.    PAST MEDICAL & SURGICAL HISTORY  Stage IV adenocarcinoma of lung: right  Pleural effusion  HTN (hypertension)  High cholesterol  H/O abdominal hysterectomy      ALLERGIES:  No Known Allergies    MEDICATIONS:  STANDING MEDICATIONS  albuterol/ipratropium for Nebulization 3 milliLiter(s) Nebulizer every 4 hours  amLODIPine   Tablet 5 milliGRAM(s) Oral daily  chlorhexidine 4% Liquid 1 Application(s) Topical <User Schedule>  enoxaparin Injectable 40 milliGRAM(s) SubCutaneous daily  fluticasone propionate 50 MICROgram(s)/spray Nasal Spray 1 Spray(s) Both Nostrils daily  gabapentin 100 milliGRAM(s) Oral every 12 hours  influenza   Vaccine 0.5 milliLiter(s) IntraMuscular once  lidocaine   Patch 1 Patch Transdermal daily  melatonin 5 milliGRAM(s) Oral at bedtime  methocarbamol 750 milliGRAM(s) Oral every 6 hours  senna 2 Tablet(s) Oral at bedtime    PRN MEDICATIONS  morphine  - Injectable 2 milliGRAM(s) IV Push every 6 hours PRN  morphine Concentrate 5 milliGRAM(s) Oral every 4 hours PRN  oxycodone    5 mG/acetaminophen 325 mG 1 Tablet(s) Oral every 4 hours PRN    VITALS:   T(F): 97.6  HR: 74  BP: 147/67  RR: 18  SpO2: 93%    LABS:                        RADIOLOGY:  < from: CT Chest w/ IV Cont (01.11.20 @ 07:56) >  IMPRESSION:    Since CT scan performed on December 27, 2019;    1.  Right thoracostomy tube in stable position with slight interval decrease of right pleuraleffusion.  2.   Interval increase in right middle lobe consolidation/atelectasis.  3.  Stable spiculated nodule in the right lung apex, consistent with patient's known malignancy.    < end of copied text >      PHYSICAL EXAM:  GEN: No acute distress. Lying comfortably on the bed.  PULM/CHEST: Decreased BS on R side, most especially at the bases; on O2 NC; has pleur-x catheter on R side of chest  CVS: Regular rate and rhythm, S1-S2, no murmurs  ABD: Soft, non-tender, non-distended, normoactive BS  EXT: No edema  NEURO: AAOx3

## 2020-01-13 NOTE — CHART NOTE - NSCHARTNOTEFT_GEN_A_CORE
Preop Dx: malfunctioning PleurX catheter  Surgeon: Dr. Hirsch  Procedure: Removal and insertion of PleurX catheter    Vital Signs Last 24 Hrs  T(C): 36.4 (13 Jan 2020 13:11), Max: 37.1 (12 Jan 2020 19:18)  T(F): 97.6 (13 Jan 2020 09:16), Max: 98.8 (12 Jan 2020 19:18)  HR: 74 (13 Jan 2020 13:11) (74 - 75)  BP: 147/67 (13 Jan 2020 13:11) (132/63 - 147/67)  BP(mean): --  RR: 18 (13 Jan 2020 13:11) (18 - 18)  SpO2: 91% (13 Jan 2020 13:11) (91% - 93%)                        10.1   8.32  )-----------( 344      ( 13 Jan 2020 07:39 )             30.8     01-13    140  |  106  |  18  ----------------------------<  115<H>  3.5   |  24  |  1.1    Ca    9.1      13 Jan 2020 07:39  Mg     2.2     01-13    TPro  6.2  /  Alb  3.3<L>  /  TBili  0.3  /  DBili  x   /  AST  9   /  ALT  11  /  AlkPhos  71  01-13      Daily Height in cm: 154.94 (13 Jan 2020 13:11)    Daily     EKG: Complete  CXR: Complete  Type and Screen: Active     A/P: 84y Female     - OR 1/13/20 for Removal and insertion of PleurX catheter with Dr. Hirsch  - NPO past midnight, except medications  - IVF while NPO  - Consent to be signed

## 2020-01-13 NOTE — PRE-ANESTHESIA EVALUATION ADULT - NSANTHOSAYNRD_GEN_A_CORE
No. SRINIVAS screening performed.  STOP BANG Legend: 0-2 = LOW Risk; 3-4 = INTERMEDIATE Risk; 5-8 = HIGH Risk

## 2020-01-13 NOTE — PROGRESS NOTE ADULT - SUBJECTIVE AND OBJECTIVE BOX
84 years old female known to have malignant right sided pleural effusions s/p Denver cath, Metastatic adenocarcinoma of lung (based on pleural fluid cytology) on Keytruda every 3 weeks last one was on 12/31, HTN, and Dyslipidemia, s/p GINA BSO was brought to the ED for chest pain and shortness of breath, inability to drain Denver catheter. Pt was admitted to medical floor, consulted by pulmonary, cath is flushing, no output likely due to extensive fibrosis ( pt has a cath for one year ) and loculated effusion. Thoracic surgery consulted, CT chest with IV contrast recommended. Pt was scheduled for OR today ( for Cath exchange vs removal).   Today pt is c/o right sided chest pain while moving from side to side.     Vital Signs Last 24 Hrs  T(C): 36.4 (13 Jan 2020 09:16), Max: 37.1 (12 Jan 2020 19:18)  T(F): 97.6 (13 Jan 2020 09:16), Max: 98.8 (12 Jan 2020 19:18)  HR: 74 (13 Jan 2020 09:16) (74 - 83)  BP: 147/67 (13 Jan 2020 09:16) (126/60 - 147/67)  BP(mean): --  RR: 18 (13 Jan 2020 09:16) (18 - 18)  SpO2: 91% (13 Jan 2020 09:16) (91% - 93%)    PHYSICAL EXAM:  General: in mild distress due to pain at rest, pleasant   Neck: Supple, NO JVD  Cardiac: S1 S2 heard regular, No audible murmurs  Pulmonary: no BS up to the scapula on the right, decreased BS on left , pleural cath noted on right lower chest with few surgical scars around   Abdomen: Soft, Non -tender, +BS   Extremities: No Rashes, No edema  Neuro: AAOx3  No focal deficits      LABS:                                           10.1   8.32  )-----------( 344      ( 13 Jan 2020 07:39 )             30.8   01-13    140  |  106  |  18  ----------------------------<  115<H>  3.5   |  24  |  1.1    Ca    9.1      13 Jan 2020 07:39  Mg     2.2     01-13    TPro  6.2  /  Alb  3.3<L>  /  TBili  0.3  /  DBili  x   /  AST  9   /  ALT  11  /  AlkPhos  71  01-13         PTT - ( 09 Jan 2020 18:07 )  PTT:29.0 sec  Aspartate Aminotransferase (AST/SGOT): 11 U/L (01-11-20 @ 05:43)  Alanine Aminotransferase (ALT/SGPT): 12 U/L (01-11-20 @ 05:43)  RADIOLOGY:    < from: CT Chest w/ IV Cont (01.11.20 @ 07:56) >  IMPRESSION:    Since CT scan performed on December 27, 2019;    1.  Right thoracostomy tube in stable position with slight interval decrease of right pleuraleffusion.  2.   Interval increase in right middle lobe consolidation/atelectasis.  3.  Stable spiculated nodule in the right lung apex, consistent with patient's known malignancy.    < from: Xray Shoulder 2 Views, Right (01.10.20 @ 14:51) >  Findings/  impression:    No acute displaced fracture or dislocation.    Moderate right acromioclavicular and glenohumeral joint degenerative changes, stable.    Partially imaged is small/moderate right pleural effusion post chest tube placement.    < end of copied text >  < from: VA Duplex Lower Ext Vein Scan, Bilat (01.10.20 @ 11:33) >  Impression:    No evidence of deep venous thrombosis or superficial thrombophlebitis in bilateral lower extremities.    < end of copied text >  < from: Transthoracic Echocardiogram (02.22.19 @ 07:12) >    Summary:   1. LV Ejection Fraction by Lopez's Method with a biplane EF of 56 %.   2. Normal left ventricular internal cavity size.   3. Normal left atrial size.   4. Normal right atrial size.   5. There is no evidence of pericardial effusion.   6. Thickening of the anterior and posterior mitral valve leaflets.   7. Trace pulmonic valve regurgitation.    < end of copied text >  MEDICATIONS  (STANDING):  albuterol/ipratropium for Nebulization 3 milliLiter(s) Nebulizer every 4 hours  amLODIPine   Tablet 5 milliGRAM(s) Oral daily  chlorhexidine 4% Liquid 1 Application(s) Topical <User Schedule>  dextrose 5% + sodium chloride 0.9%. 1000 milliLiter(s) (75 mL/Hr) IV Continuous <Continuous>  enoxaparin Injectable 40 milliGRAM(s) SubCutaneous daily  fluticasone propionate 50 MICROgram(s)/spray Nasal Spray 1 Spray(s) Both Nostrils daily  gabapentin 100 milliGRAM(s) Oral every 12 hours  influenza   Vaccine 0.5 milliLiter(s) IntraMuscular once  lidocaine   Patch 1 Patch Transdermal daily  melatonin 5 milliGRAM(s) Oral at bedtime  methocarbamol 750 milliGRAM(s) Oral every 6 hours  senna 2 Tablet(s) Oral at bedtime    MEDICATIONS  (PRN):  morphine  - Injectable 2 milliGRAM(s) IV Push every 6 hours PRN Moderate Pain (4 - 6)  morphine Concentrate 5 milliGRAM(s) Oral every 4 hours PRN Moderate Pain (4 - 6)  oxycodone    5 mG/acetaminophen 325 mG 1 Tablet(s) Oral every 4 hours PRN Moderate Pain (4 - 6)

## 2020-01-13 NOTE — PROGRESS NOTE ADULT - ASSESSMENT
84 years old female known to have malignant right sided pleural effusions s/p Denver cath, Metastatic adenocarcinoma of lung (based on pleural fluid cytology) on ketruyda every 3 weeks last one was on 12/31, HTN, and Dyslipidemia, s/p GINA BSO was brought to the ED for chest pain and shortness of breath, inability to drain Denver catheter.    # Dyspnia/ pleuritic chest pain/ h/o Denver cath/ h/o stage IV lung CA   - pt has h/o malignant pleural effusion, loculated now with extensive fibrosis in pleural cavity   - consulted by pulmonary and thoracic surgery  - CT-surgery recommended  change of Pleur-X catheter today; chronic pain deeper in chest may or may not respond to intervention  - Pain control with Roxanol and percocet, Lidoderm topical   - pt was consulted by oncology c/w immunotherapy ( tumor regression was noted by oncology); outpatient PET scan and to monitor progression of disease and/or failure of therapy  - pt was started on Robaxin and Neurontin  for better pain control   - on Nebs Q 4 hours   - comfortable on NC, monitor pulse ox    # Normocytic anemia  - monitor H/H, keep Hb above 7.5  - stable for now     # Hypertension   -  DASH diet   - Continue Amlodipine    DVT Prophylaxis: lovenox SQ  Son Ayan  [phone]  Pending (specify):   change of Pleur-X catheter today  Disposition: Home__x_/SNF___/Other________/Unknown at this time________ 84 years old female known to have malignant right sided pleural effusions s/p Denver cath, Metastatic adenocarcinoma of lung (based on pleural fluid cytology) on ketruyda every 3 weeks last one was on 12/31, HTN, and Dyslipidemia, s/p GINA BSO was brought to the ED for chest pain and shortness of breath, inability to drain Denver catheter.    # Dyspnea/ pleuritic chest pain/ h/o Denver cath/ h/o stage IV lung CA   - pt has h/o malignant pleural effusion, loculated now with extensive fibrosis in pleural cavity   - consulted by pulmonary and thoracic surgery  - CT-surgery recommended  change of Pleur-X catheter today; chronic pain deeper in chest may or may not respond to intervention  - Pain control with Roxanol and percocet, Lidoderm topical   - pt was consulted by oncology c/w immunotherapy ( tumor regression was noted by oncology); outpatient PET scan and to monitor progression of disease and/or failure of therapy  - pt was started on Robaxin and Neurontin  for better pain control   - on Nebs Q 4 hours   - comfortable on NC, monitor pulse ox  - Will start IVF D5 NS @ 75cc/hr for now    # Normocytic anemia  - monitor H/H, keep Hb above 7.5  - stable for now     # Hypertension   -  DASH diet   - Continue Amlodipine    DVT Prophylaxis: lovenox SQ  Son Ayan  [phone]  Pending (specify):   change of Pleur-X catheter today  Disposition: Home__x_/SNF___/Other________/Unknown at this time________

## 2020-01-13 NOTE — PROGRESS NOTE ADULT - SUBJECTIVE AND OBJECTIVE BOX
GENERAL SURGERY PROGRESS NOTE     JOANN SCHULTZ  [address] [place] [address] day :4d  POD:  Procedure:   Surgical Attending: Deepak Avelar  Overnight events: No acute events overnight, ct with minimal output.     T(F): 97.6 (01-13-20 @ 05:03), Max: 98.8 (01-12-20 @ 19:18)  HR: 74 (01-13-20 @ 05:03) (74 - 83)  BP: 147/67 (01-13-20 @ 05:03) (126/60 - 147/67)  ABP: --  ABP(mean): --  RR: 18 (01-13-20 @ 05:03) (18 - 18)  SpO2: 93% (01-12-20 @ 21:30) (93% - 93%)      PHYSICAL EXAM:  GENERAL: NAD, well-appearing  CHEST/LUNG: Clear to auscultation bilaterally  HEART: Regular rate and rhythm  ABDOMEN: Soft, Nontender, Nondistended;   EXTREMITIES:  No clubbing, cyanosis, or edema    < from: CT Chest w/ IV Cont (01.11.20 @ 07:56) >  Since CT scan performed on December 27, 2019;    1.  Right thoracostomy tube in stable position with slight interval decrease of right pleuraleffusion.  2.   Interval increase in right middle lobe consolidation/atelectasis.  3.  Stable spiculated nodule in the right lung apex, consistent with patient's known malignancy.      < end of copied text >

## 2020-01-13 NOTE — CHART NOTE - NSCHARTNOTEFT_GEN_A_CORE
Post Operative Note  Patient: JOANN SCHULTZ 84y (1935) Female   MRN: [id number]  Location: [address]  Visit: 01-09-20 Inpatient  Date: 01-13-20 @ 18:49    Procedure: S/P removal of PleurX  Subjective:   Nausea: no, Vomiting: no  Pain Assessment: no    Objective:  Vitals: T(F): 96.9 (01-13-20 @ 14:22), Max: 98.8 (01-12-20 @ 19:18)  HR: 87 (01-13-20 @ 14:22)  BP: 149/65 (01-13-20 @ 14:22) (132/63 - 149/65)  RR: 18 (01-13-20 @ 14:22)  SpO2: 95% (01-13-20 @ 14:22)  Vent Settings:     In:   IV Fluids:     Out:   EBL:   Voided Urine:   [place] Catheter: yes no   Drains:   OTILIA:  ,   Chest Tube:    NG Tube:     Physical Examination:  General Appearance: NAD  Chest: no bleeding, erythema, oozing, or hematoma at chest tube site  Incisions/Wounds: Dressings in place, clean, dry and intact, no signs of infection/active bleeding/drainage    Medications: [Standing]  acetaminophen   Tablet .. 650 milliGRAM(s) Oral every 6 hours PRN  amLODIPine   Tablet 5 milliGRAM(s) Oral daily  chlorhexidine 4% Liquid 1 Application(s) Topical <User Schedule>  enoxaparin Injectable 40 milliGRAM(s) SubCutaneous daily  fluticasone propionate 50 MICROgram(s)/spray Nasal Spray 1 Spray(s) Both Nostrils daily  gabapentin 100 milliGRAM(s) Oral every 12 hours  hydrocortisone 1% Cream 1 Application(s) Topical two times a day  ibuprofen  Tablet. 400 milliGRAM(s) Oral every 8 hours PRN  influenza   Vaccine 0.5 milliLiter(s) IntraMuscular once  lidocaine   Patch 1 Patch Transdermal daily  melatonin 5 milliGRAM(s) Oral at bedtime  methocarbamol 750 milliGRAM(s) Oral every 6 hours  ondansetron Injectable 4 milliGRAM(s) IV Push every 6 hours PRN  senna 2 Tablet(s) Oral at bedtime    Medications: [PRN]  acetaminophen   Tablet .. 650 milliGRAM(s) Oral every 6 hours PRN  amLODIPine   Tablet 5 milliGRAM(s) Oral daily  chlorhexidine 4% Liquid 1 Application(s) Topical <User Schedule>  enoxaparin Injectable 40 milliGRAM(s) SubCutaneous daily  fluticasone propionate 50 MICROgram(s)/spray Nasal Spray 1 Spray(s) Both Nostrils daily  gabapentin 100 milliGRAM(s) Oral every 12 hours  hydrocortisone 1% Cream 1 Application(s) Topical two times a day  ibuprofen  Tablet. 400 milliGRAM(s) Oral every 8 hours PRN  influenza   Vaccine 0.5 milliLiter(s) IntraMuscular once  lidocaine   Patch 1 Patch Transdermal daily  melatonin 5 milliGRAM(s) Oral at bedtime  methocarbamol 750 milliGRAM(s) Oral every 6 hours  ondansetron Injectable 4 milliGRAM(s) IV Push every 6 hours PRN  senna 2 Tablet(s) Oral at bedtime    Labs:                        10.1   8.32  )-----------( 344      ( 13 Jan 2020 07:39 )             30.8     01-13    140  |  106  |  18  ----------------------------<  115<H>  3.5   |  24  |  1.1    Ca    9.1      13 Jan 2020 07:39  Mg     2.2     01-13    TPro  6.2  /  Alb  3.3<L>  /  TBili  0.3  /  DBili  x   /  AST  9   /  ALT  11  /  AlkPhos  71  01-13          Imaging:  No post-op imaging studies    Assessment:  84yFemale patient S/P removal of PleurX    Plan:  -F/U AM CXR    Date/Time: 01-13-20 @ 18:49

## 2020-01-13 NOTE — PROGRESS NOTE ADULT - ASSESSMENT
84 years old female known to have malignant right sided pleural effusions s/p Denver cath, Metastatic adenocarcinoma of lung (based on pleural fluid cytology) on ketruyda every 3 weeks last one was on 12/31, HTN, and Dyslipidemia, s/p GINA BSO was brought to the ED for chest pain and shortness of breath, inability to drain Denver catheter x 1 day with consult for nondraining pleurx catheter    Plan:  - continue nasal cannula  -cath exchange today   - CXR, EKG, CBC, BMP, PTT, INR, T&S, NPO after midnight, IVF as needed while NPO

## 2020-01-13 NOTE — PROGRESS NOTE ADULT - ATTENDING COMMENTS
General Thoracic Surgery Attestation    I have seen and examined the patient.  Where appropriate I have updated, edited, or corrected the resident's or PA's note with regard to findings, values, and plan.    patient's imaging reviewed.  less fluid overall.  ? if pain is from pleural inflammation from tumor response.  will plan to remove pleurex as it is clearly no longer draining and there is pain at insertion site.  chronic pain deeper in chest may or may not respond to this intervention and this has been discussed with the patient's son at length.

## 2020-01-13 NOTE — PROGRESS NOTE ADULT - ASSESSMENT
#Pain is most likely due to Pleurx catheter was removed today 1/13/2020  - feels better  -I don't believe pain is from progression at this point.    #Metastatic Lung cancer  -on immunotherapy, would continue as outpatient, recent CTs did not show  progression.      #Rash maybe due to irritation from dressing from old Pleurx  -on Hydrocortisone cream      #Mild anemia probably from cancer      Can go home once ready

## 2020-01-13 NOTE — PROGRESS NOTE ADULT - SUBJECTIVE AND OBJECTIVE BOX
Her had her Pleurx removed today. There was not enough fluid to place another once back. She states pain is better now.  She complaints of itchiness in the area where the Pleurx was.      Vital Signs Last 24 Hrs  T(C): 36.1 (13 Jan 2020 14:22), Max: 37.1 (12 Jan 2020 19:18)  T(F): 96.9 (13 Jan 2020 14:22), Max: 98.8 (12 Jan 2020 19:18)  HR: 87 (13 Jan 2020 14:22) (74 - 87)  BP: 149/65 (13 Jan 2020 14:22) (132/63 - 149/65)  BP(mean): --  RR: 18 (13 Jan 2020 14:22) (18 - 18)  SpO2: 95% (13 Jan 2020 14:22) (91% - 95%)    Allergies:No Known Allergies  Intolerances  MEDICATIONS  (STANDING):  amLODIPine   Tablet 5 milliGRAM(s) Oral daily  chlorhexidine 4% Liquid 1 Application(s) Topical <User Schedule>  enoxaparin Injectable 40 milliGRAM(s) SubCutaneous daily  fluticasone propionate 50 MICROgram(s)/spray Nasal Spray 1 Spray(s) Both Nostrils daily  gabapentin 100 milliGRAM(s) Oral every 12 hours  hydrocortisone 1% Cream 1 Application(s) Topical two times a day  influenza   Vaccine 0.5 milliLiter(s) IntraMuscular once  lidocaine   Patch 1 Patch Transdermal daily  melatonin 5 milliGRAM(s) Oral at bedtime  methocarbamol 750 milliGRAM(s) Oral every 6 hours  senna 2 Tablet(s) Oral at bedtime    MEDICATIONS  (PRN):  acetaminophen   Tablet .. 650 milliGRAM(s) Oral every 6 hours PRN Mild Pain (1 - 3)  ibuprofen  Tablet. 400 milliGRAM(s) Oral every 8 hours PRN Mild Pain (1 - 3)  ondansetron Injectable 4 milliGRAM(s) IV Push every 6 hours PRN Nausea          CBC Full  -  ( 13 Jan 2020 07:39 )  WBC Count : 8.32 K/uL  Hemoglobin : 10.1 g/dL  Hematocrit : 30.8 %  Platelet Count - Automated : 344 K/uL  Mean Cell Volume : 82.4 fL  Mean Cell Hemoglobin : 27.0 pg  Mean Cell Hemoglobin Concentration : 32.8 g/dL  Auto Neutrophil # : 6.22 K/uL  Auto Lymphocyte # : 1.18 K/uL  Auto Monocyte # : 0.66 K/uL  Auto Eosinophil # : 0.22 K/uL  Auto Basophil # : 0.01 K/uL  Auto Neutrophil % : 74.8 %  Auto Lymphocyte % : 14.2 %  Auto Monocyte % : 7.9 %  Auto Eosinophil % : 2.6 %  Auto Basophil % : 0.1 %               10.1   8.32  )-----------( 344      ( 01-13 @ 07:39 )             30.8                10.8   9.71  )-----------( 334      ( 01-11 @ 05:43 )             34.3                10.7   8.49  )-----------( 286      ( 01-10 @ 07:48 )             32.9                10.4   9.72  )-----------( 308      ( 01-09 @ 18:07 )             31.4       01-13    140  |  106  |  18  ----------------------------<  115<H>  3.5   |  24  |  1.1    Ca    9.1      13 Jan 2020 07:39  Mg     2.2     01-13    TPro  6.2  /  Alb  3.3<L>  /  TBili  0.3  /  DBili  x   /  AST  9   /  ALT  11  /  AlkPhos  71  01-13  LIVER FUNCTIONS - ( 13 Jan 2020 07:39 )  Alb: 3.3 g/dL / Pro: 6.2 g/dL / ALK PHOS: 71 U/L / ALT: 11 U/L / AST: 9 U/L / GGT: x               CXR:  CXR:  Image(s) Available    EXAM:  XR CHEST FRONTAL 1V            PROCEDURE DATE:  01/13/2020            INTERPRETATION:  CLINICAL INDICATION:  removal of pleurex catheter, evaluate for hemo/pneumothorax    COMPARISON: Chest radiograph dated 1/9/2020    TECHNIQUE: Frontal radiograph the chest.    FINDINGS:    Support devices: Interval removal of right pleural catheter.     Cardiac/mediastinum/hilum: Stable.    Lung parenchyma/Pleura: Unchanged right pleural effusion/opacity. There is no pneumothorax.    Skeleton/soft tissues: Stable.    IMPRESSION:     Unchanged right pleural effusion/opacity.     No pneumothorax.                  UMESH DEAN M.D., ATTENDING RADIOLOGIST  This document has been electronically signed. Jan 13 2020  2:41PM              --  --  --  --      Path:    REVIEW OF SYSTEMS:    CONSTITUTIONAL: Has  weakness, fevers or chills  EYES/ENT: No visual changes;  No vertigo or throat pain   NECK: No pain or stiffness  RESPIRATORY: No cough, wheezing, hemoptysis; Has  shortness of breath  CARDIOVASCULAR: No chest pain or palpitations  GASTROINTESTINAL: No abdominal or epigastric pain. No nausea, vomiting, or hematemesis; No diarrhea or constipation. No melena or hematochezia.  GENITOURINARY: No dysuria, frequency or hematuria  NEUROLOGICAL: No numbness or weakness  SKIN: Has  itching,  and rash at the old catheter site      PHYSICAL EXAM:  GENERAL: NAD, on NC  HEAD:  Atraumatic, Normocephalic  EYES: EOMI, PERRLA, conjunctiva and sclera clear  CHEST/LUNG: No wheeze, decrease sound in posterior right lung field.  HEART: Regular rate and rhythm; No murmurs, rubs, or gallops  ABDOMEN: Soft, Nontender, Nondistended; Bowel sounds present    < from: CT Chest w/ IV Cont (01.11.20 @ 07:56) >  IMPRESSION:    Since CT scan performed on December 27, 2019;    1.  Right thoracostomy tube in stable position with slight interval decrease of right pleuraleffusion.  2.   Interval increase in right middle lobe consolidation/atelectasis.  3.  Stable spiculated nodule in the right lung apex, consistent with patient's known malignancy.    < end of copied text >    EXTREMITIES:, No clubbing, cyanosis, or edema  PSYCH: AAOx3  NEUROLOGY: non-focal  SKIN: There is a rash wehre she had her old Plueurx  it maybe irritation less likely fungal

## 2020-01-13 NOTE — PRE-ANESTHESIA EVALUATION ADULT - NS MD HP INPLANTS MED DEV
PT DAILY TREATMENT NOTE 10-18 Patient Name: Deanna Sewell Date:2018 : 1960 [x]  Patient  Verified Payor: [employer] / Plan: [address] [place] [address] / Product Type: Managed Care Medicaid / In time: 3:40  Out time: 4:20 Total Treatment Time (min): 30 Visit #: 3 of 4-8 Medicare/BCBS Only Total Timed Codes (min):  30 1:1 Treatment Time:  30 Treatment Area: Lower extremity dysfunction [R29.898] SUBJECTIVE Pain Level (0-10 scale): 0/10 Any medication changes, allergies to medications, adverse drug reactions, diagnosis change, or new procedure performed?: [x] No    [] Yes (see summary sheet for update) Subjective functional status/changes:   [] No changes reported Pt. Reports he has been doing ok. He reports he is still concerned about his knee and is afraid to move it around a lot. He reports leaning to side has been getting easier. OBJECTIVE 30 min Therapeutic Exercise:  [x] See flow sheet :  
Rationale: increase ROM, increase strength and improve balance to improve the patients ability to increase ease of ADLs With 
 [x] TE 
 [] TA 
 [] neuro 
 [] other: Patient Education: [x] Review HEP [] Progressed/Changed HEP based on:  
[] positioning   [] body mechanics   [] transfers   [] heat/ice application   
[] other:   
 
Other Objective/Functional Measures:  
Pt. Was apprehensive with LE motion Patella appeared to be tracking well during passive right knee extension/flexion. He was educated on following up with an orthopedic doctor if he still has concerns about his right knee He was challenged with mike pushouts with vertical movements Educated pt. On D/C from PT in the next couple visits unless he has any more functional mobility concerns that need to be addressed. Pain Level (0-10 scale) post treatment: 0/10 ASSESSMENT/Changes in Function:  Pt. Is progressing with physical therapy. He demonstrates improving balance with leaning activities and demonstrates improving trunk control. He has been having less back pain since [facility]. Patient will continue to benefit from skilled PT services to modify and progress therapeutic interventions, address functional mobility deficits, address ROM deficits, address strength deficits, analyze and address soft tissue restrictions, analyze and cue movement patterns, analyze and modify body mechanics/ergonomics and assess and modify postural abnormalities to attain remaining goals. Progress towards goals / Updated goals: 1. Patient will improve B hip flexor strength to 2/5 in order to increase ease of transfers at home. Not met (11/2/18) 2. Patient will demonstrate understanding of updated HEP in order to continue to improve following D/C. PLAN 
[]  Upgrade activities as tolerated     [x]  Continue plan of care 
[]  Update interventions per flow sheet      
[]  Discharge due to:_ 
[]  Other:_ Brandy Kelly, PT 11/2/2018  3:48 PM 
 
Future Appointments Date Time Provider Brittany Brown 11/2/2018  4:00 PM Tung Yusuf, PT [facility]  
11/5/2018  5:30 PM Tung Yusuf PT [id number]  
11/13/2018  2:00 PM [facility] [address] [facility]  
12/4/2018  3:45 PM Dilma Power MD [place]  
 
 None

## 2020-01-13 NOTE — PROCEDURE NOTE - GENERAL PROCEDURE DETAILS
area prepped and draped, patient on her left side.  skin around pleurex entry site was spread and slightly incised for 3-4mm to relax the opening.  with a clamp we spread circumferentially around the pleurex until the cuff was exposed.  the cuff was incised and the pleurex was withdrawn as pressure was held on the site.  pleurex tip was intact.  this was cultured.  single figure of 8 0 prolene suture placed and then gauze and tegaderm.

## 2020-01-14 ENCOUNTER — TRANSCRIPTION ENCOUNTER (OUTPATIENT)
Age: 85
End: 2020-01-14

## 2020-01-14 VITALS
HEART RATE: 81 BPM | DIASTOLIC BLOOD PRESSURE: 59 MMHG | TEMPERATURE: 98 F | RESPIRATION RATE: 18 BRPM | SYSTOLIC BLOOD PRESSURE: 125 MMHG

## 2020-01-14 LAB
ALBUMIN SERPL ELPH-MCNC: 3.2 G/DL — LOW (ref 3.5–5.2)
ALP SERPL-CCNC: 70 U/L — SIGNIFICANT CHANGE UP (ref 30–115)
ALT FLD-CCNC: 11 U/L — SIGNIFICANT CHANGE UP (ref 0–41)
ANION GAP SERPL CALC-SCNC: 13 MMOL/L — SIGNIFICANT CHANGE UP (ref 7–14)
AST SERPL-CCNC: 10 U/L — SIGNIFICANT CHANGE UP (ref 0–41)
BASOPHILS # BLD AUTO: 0 K/UL — SIGNIFICANT CHANGE UP (ref 0–0.2)
BASOPHILS NFR BLD AUTO: 0 % — SIGNIFICANT CHANGE UP (ref 0–1)
BILIRUB SERPL-MCNC: 0.2 MG/DL — SIGNIFICANT CHANGE UP (ref 0.2–1.2)
BUN SERPL-MCNC: 19 MG/DL — SIGNIFICANT CHANGE UP (ref 10–20)
CALCIUM SERPL-MCNC: 9.5 MG/DL — SIGNIFICANT CHANGE UP (ref 8.5–10.1)
CHLORIDE SERPL-SCNC: 108 MMOL/L — SIGNIFICANT CHANGE UP (ref 98–110)
CO2 SERPL-SCNC: 22 MMOL/L — SIGNIFICANT CHANGE UP (ref 17–32)
CREAT SERPL-MCNC: 1 MG/DL — SIGNIFICANT CHANGE UP (ref 0.7–1.5)
EOSINOPHIL # BLD AUTO: 0.16 K/UL — SIGNIFICANT CHANGE UP (ref 0–0.7)
EOSINOPHIL NFR BLD AUTO: 2.1 % — SIGNIFICANT CHANGE UP (ref 0–8)
GLUCOSE SERPL-MCNC: 112 MG/DL — HIGH (ref 70–99)
HCT VFR BLD CALC: 31.1 % — LOW (ref 37–47)
HGB BLD-MCNC: 10 G/DL — LOW (ref 12–16)
IMM GRANULOCYTES NFR BLD AUTO: 0.4 % — HIGH (ref 0.1–0.3)
LYMPHOCYTES # BLD AUTO: 1.14 K/UL — LOW (ref 1.2–3.4)
LYMPHOCYTES # BLD AUTO: 15 % — LOW (ref 20.5–51.1)
MCHC RBC-ENTMCNC: 26.7 PG — LOW (ref 27–31)
MCHC RBC-ENTMCNC: 32.2 G/DL — SIGNIFICANT CHANGE UP (ref 32–37)
MCV RBC AUTO: 83.2 FL — SIGNIFICANT CHANGE UP (ref 81–99)
MONOCYTES # BLD AUTO: 0.58 K/UL — SIGNIFICANT CHANGE UP (ref 0.1–0.6)
MONOCYTES NFR BLD AUTO: 7.6 % — SIGNIFICANT CHANGE UP (ref 1.7–9.3)
NEUTROPHILS # BLD AUTO: 5.71 K/UL — SIGNIFICANT CHANGE UP (ref 1.4–6.5)
NEUTROPHILS NFR BLD AUTO: 74.9 % — SIGNIFICANT CHANGE UP (ref 42.2–75.2)
NIGHT BLUE STAIN TISS: SIGNIFICANT CHANGE UP
NRBC # BLD: 0 /100 WBCS — SIGNIFICANT CHANGE UP (ref 0–0)
PLATELET # BLD AUTO: 322 K/UL — SIGNIFICANT CHANGE UP (ref 130–400)
POTASSIUM SERPL-MCNC: 4.4 MMOL/L — SIGNIFICANT CHANGE UP (ref 3.5–5)
POTASSIUM SERPL-SCNC: 4.4 MMOL/L — SIGNIFICANT CHANGE UP (ref 3.5–5)
PROT SERPL-MCNC: 6 G/DL — SIGNIFICANT CHANGE UP (ref 6–8)
RBC # BLD: 3.74 M/UL — LOW (ref 4.2–5.4)
RBC # FLD: 13 % — SIGNIFICANT CHANGE UP (ref 11.5–14.5)
SODIUM SERPL-SCNC: 143 MMOL/L — SIGNIFICANT CHANGE UP (ref 135–146)
SPECIMEN SOURCE: SIGNIFICANT CHANGE UP
WBC # BLD: 7.62 K/UL — SIGNIFICANT CHANGE UP (ref 4.8–10.8)
WBC # FLD AUTO: 7.62 K/UL — SIGNIFICANT CHANGE UP (ref 4.8–10.8)

## 2020-01-14 PROCEDURE — 99233 SBSQ HOSP IP/OBS HIGH 50: CPT

## 2020-01-14 PROCEDURE — 71045 X-RAY EXAM CHEST 1 VIEW: CPT | Mod: 26

## 2020-01-14 RX ORDER — SENNA PLUS 8.6 MG/1
2 TABLET ORAL
Qty: 60 | Refills: 0
Start: 2020-01-14 | End: 2020-02-12

## 2020-01-14 RX ORDER — FLUTICASONE PROPIONATE 50 MCG
1 SPRAY, SUSPENSION NASAL
Qty: 1 | Refills: 0
Start: 2020-01-14 | End: 2020-02-12

## 2020-01-14 RX ORDER — METHOCARBAMOL 500 MG/1
1 TABLET, FILM COATED ORAL
Qty: 56 | Refills: 0
Start: 2020-01-14 | End: 2020-01-27

## 2020-01-14 RX ORDER — ACETAMINOPHEN 500 MG
2 TABLET ORAL
Qty: 0 | Refills: 0 | DISCHARGE
Start: 2020-01-14

## 2020-01-14 RX ORDER — HYDROCORTISONE 1 %
1 OINTMENT (GRAM) TOPICAL
Qty: 1 | Refills: 0
Start: 2020-01-14 | End: 2020-01-20

## 2020-01-14 RX ORDER — GABAPENTIN 400 MG/1
1 CAPSULE ORAL
Qty: 60 | Refills: 0
Start: 2020-01-14 | End: 2020-02-12

## 2020-01-14 RX ORDER — LANOLIN ALCOHOL/MO/W.PET/CERES
1 CREAM (GRAM) TOPICAL
Qty: 30 | Refills: 0
Start: 2020-01-14 | End: 2020-02-12

## 2020-01-14 RX ADMIN — GABAPENTIN 100 MILLIGRAM(S): 400 CAPSULE ORAL at 05:50

## 2020-01-14 RX ADMIN — LIDOCAINE 1 PATCH: 4 CREAM TOPICAL at 05:11

## 2020-01-14 RX ADMIN — ENOXAPARIN SODIUM 40 MILLIGRAM(S): 100 INJECTION SUBCUTANEOUS at 12:09

## 2020-01-14 RX ADMIN — LIDOCAINE 1 PATCH: 4 CREAM TOPICAL at 12:09

## 2020-01-14 RX ADMIN — AMLODIPINE BESYLATE 5 MILLIGRAM(S): 2.5 TABLET ORAL at 05:50

## 2020-01-14 RX ADMIN — Medication 1 SPRAY(S): at 12:09

## 2020-01-14 RX ADMIN — METHOCARBAMOL 750 MILLIGRAM(S): 500 TABLET, FILM COATED ORAL at 12:09

## 2020-01-14 RX ADMIN — METHOCARBAMOL 750 MILLIGRAM(S): 500 TABLET, FILM COATED ORAL at 05:50

## 2020-01-14 NOTE — DISCHARGE NOTE PROVIDER - NSDCFUADDAPPT_GEN_ALL_CORE_FT
Please follow up with your hematologist / oncologist within one week upon discharge.    Follow up with cardiothoracic surgery as outpatient on discharge. Please follow up with your hematologist / oncologist within one week upon discharge.    Follow up with cardiothoracic surgery as outpatient on discharge.    Please follow up with geriatrics as outpatient at [address] [place] [address] ([phone]) at 2pm on January 27, 2019

## 2020-01-14 NOTE — DISCHARGE NOTE PROVIDER - NSDCFUSCHEDAPPT_GEN_ALL_CORE_FT
JOANN SCHULTZ ; 01/20/2020 ; [facility] Hem[facility]c [facility] JOANN Powers ; 01/21/2020 ; NPP Chemo [facility] Infus [facility] JOANN Bustos ; 02/21/2020 ; NPP PulmMed [address]

## 2020-01-14 NOTE — PROGRESS NOTE ADULT - ASSESSMENT
84 years old female known to have malignant right sided pleural effusions s/p Denver cath, Metastatic adenocarcinoma of lung (based on pleural fluid cytology) on ketruyda every 3 weeks last one was on 12/31, HTN, and Dyslipidemia, s/p GINA BSO was brought to the ED for chest pain and shortness of breath, inability to drain Denver catheter.    A/P     # Dyspnia/ pleuritic chest pain/ h/o Denver cath/ h/o stage IV lung CA   - pt has h/o malignant pleural effusion, loculated now with extensive fibrosis in pleural cavity   - consulted by pulmonary and thoracic surgery, CT chest recommended and performed   - CT-surgery removed  Pleur-X catheter on 1/13  - Pain control with Roxanol and percocet, Lidoderm topical   - pt was consulted by oncology c/w immunotherapy   -  on Robaxin and Neurontin  for better pain control   - on Nebs Q 4 hours   - comfortable on NC, monitor pulse Ox       # Normocytic anemia  - monitor H/H, keep Hb above 7.5  - stable for now     # Hypertension   -  DASH diet   - Continue Amlodipine    DVT Prophylaxis  # Overall prognosis is guarded   Andrea Botello  [phone]  #Progress Note Handoff  Pending (specify):   control pain   Family discussion: n/a, I spoke with pt, she agreed with a plan of care   Disposition: pt is stable for discharge home, f/u with PMD, oncology after discharge 84 years old female known to have malignant right sided pleural effusions s/p Denver cath, Metastatic adenocarcinoma of lung (based on pleural fluid cytology) on ketruyda every 3 weeks last one was on 12/31, HTN, and Dyslipidemia, s/p GINA BSO was brought to the ED for chest pain and shortness of breath, inability to drain Denver catheter.    A/P     # Dyspnia ( no clinical evidence of respiratory failure) / pleuritic chest pain/ h/o Denver cath/ h/o stage IV lung CA   - pt was anxious, hyperventilating, asking for oxygen but  pulse Ox was above 90 % on RA  - pt has h/o malignant pleural effusion, loculated now with extensive fibrosis in pleural cavity   - consulted by pulmonary and thoracic surgery, CT chest recommended and performed   - CT-surgery removed  Pleur-X catheter on 1/13  - Pain control with Roxanol and percocet, Lidoderm topical   - pt was consulted by oncology c/w immunotherapy   -  on Robaxin and Neurontin  for better pain control   - on Nebs Q 4 hours   - comfortable on NC, monitor pulse Ox       # Normocytic anemia  - monitor H/H, keep Hb above 7.5  - stable for now     # Hypertension   -  DASH diet   - Continue Amlodipine    DVT Prophylaxis  # Overall prognosis is guarded   Son Ayan  [phone]  #Progress Note Handoff  Pending (specify):   control pain   Family discussion: n/a, I spoke with pt, she agreed with a plan of care   Disposition: pt is stable for discharge home, f/u with PMD, oncology after discharge

## 2020-01-14 NOTE — DISCHARGE NOTE NURSING/CASE MANAGEMENT/SOCIAL WORK - NSDCFUADDAPPT_GEN_ALL_CORE_FT
Please follow up with your hematologist / oncologist within one week upon discharge.    Follow up with cardiothoracic surgery as outpatient on discharge.

## 2020-01-14 NOTE — DISCHARGE NOTE PROVIDER - CARE PROVIDER_API CALL
Mahamed Patel)  Hematology; Internal Medicine; Medical Oncology  [address] [place] [address]  Phone: [phone]  Fax: [phone]  Follow Up Time: 1 week    Emeka Hirsch)  Surgery; Thoracic Surgery  [address] [place] [address], [place] [address]  Phone: [phone]  Fax: [phone]  Follow Up Time: 1 week    Tamir Lanza)  Critical Care Medicine; Geriatric Medicine; Internal Medicine; Pulmonary Disease  [address] [place] [address], [place] [address]  Phone: [phone]  Fax: [phone]  Follow Up Time: 1 week

## 2020-01-14 NOTE — DISCHARGE NOTE PROVIDER - PROVIDER TOKENS
PROVIDER:[TOKEN:[[id number]],FOLLOWUP:[1 week]],PROVIDER:[TOKEN:[[id number]],FOLLOWUP:[1 week]],PROVIDER:[TOKEN:[[id number]],FOLLOWUP:[1 week]]

## 2020-01-14 NOTE — DISCHARGE NOTE NURSING/CASE MANAGEMENT/SOCIAL WORK - PATIENT PORTAL LINK FT
You can access the FollowMyHealth Patient Portal offered by [facility] by registering at the following website: [email or web address]. By joining [employer]’s FollowMyHealth portal, you will also be able to view your health information using other applications (apps) compatible with our system.

## 2020-01-14 NOTE — DISCHARGE NOTE PROVIDER - NSDCCPCAREPLAN_GEN_ALL_CORE_FT
PRINCIPAL DISCHARGE DIAGNOSIS  Diagnosis: Pleural effusion  Assessment and Plan of Treatment: Your catheter was removed as there was no indication that there was further fluid that could be drained. It is possible that the decrease in drainage is due to responsiveness of your cancer to immunotherapy. Follow up with hematology / oncology as outpatient upon discharge. Follow up with cardiothoracic surgery as outpatient on discharge as well. Let your doctor know should you develop and signs of severe worsening shortness of breath, wheezing, or other signs with difficulty breathing.

## 2020-01-14 NOTE — DISCHARGE NOTE PROVIDER - HOSPITAL COURSE
HPI:    84 years old female known to have malignant right sided pleural effusions s/p Denver cath, Metastatic adenocarcinoma of lung (based on pleural fluid cytology) on ketruyda every 3 weeks last one was on 12/31, HTN, and Dyslipidemia, s/p GINA CASASO was brought to the ED for chest pain and shortness of breath, inability to drain Denver catheter x 1 day.    As per son at bedside, until summer last year catheter was draining 300-400 ml every other day but since then frequency had decreased to 400-500 ml 2 times / week and for past couple of months it has been 500 ml once / week.    Catheter was last drained on Jan 3rd and 500 ml came out. Yesterday nothing came out. But pt has been having right shoulder blade, axilla and right sided chest pain. Pain is constant, worse with deep breaths and change in position, relieved slightly with morphine, 10/10. also endorsed shortness breathing since then, mild but even at rest as well and also dry cough. Son also states that pt has been experiencing profuse night sweats where she wakes up in the morning with drenching wet clothes x 1 month.    Son states that fluid color changed from clear to serosanguinous few months ago but for past 2 months it has been just blood coming out from catheter.    Pt denies any fever, nausea, vomiting, abdominal pain, lower extremity swelling, change in weight and appetite, recent travel, sick contact.        As per ED RN, pt pulse [id number] to 80s on RA so pt was placed on supplemental O2 but then remained hemodynamically stable. I explained to the pt that she might require BIPAP and pt has been refusing. (09 Jan 2020 23:20)            Patient was seen by CT surgery and recommended removal of the pleurx catheter as it was no longer draining and there was pain at the insertion site.     CT scan was reviewed and there was generally less fluid overall.    LE duplex was negative for DVT.    Patient also seen by heme/onc who stated her most recent CTs do not show progression from December.    On 1/13/20, patient underwent removal of her pleurx catheter.    A new pleurx catheter was not placed as her effusion is loculated and no further progression of effusion.    Patient noted improvement of her pain after removal of catheter.    Patient was placed on hydrocortisone cream for possible irritation around dressing of her pleurx catheter.    As per heme/onc, patient will continue immunotherapy as outpatient and can go home once ready.    She is stable for discharge and can follow up with heme/onc, pulm, and CT surgery as outpatient.

## 2020-01-14 NOTE — DISCHARGE NOTE PROVIDER - CARE PROVIDERS DIRECT ADDRESSES
[email or web address]

## 2020-01-14 NOTE — PROGRESS NOTE ADULT - SUBJECTIVE AND OBJECTIVE BOX
Hospital Day: 5  Post Operative Day:  Procedure:S/P removal of PleurX    Patient is a 84y old  Female who presents with a chief complaint of Pleural effusion (10 Arcadio 2020 16:36)    PAST MEDICAL & SURGICAL HISTORY:  Stage IV adenocarcinoma of lung: right  Pleural effusion  HTN (hypertension)  High cholesterol  H/O abdominal hysterectomy      Events of the Last 24h:  Vital Signs Last 24 Hrs  T(C): 36.2 (2020 19:11), Max: 36.4 (2020 05:03)  T(F): 97.2 (2020 19:11), Max: 97.6 (2020 05:03)  HR: 86 (2020 19:11) (74 - 87)  BP: 140/69 (2020 19:11) (140/69 - 149/65)  BP(mean): --  RR: 18 (2020 19:11) (18 - 18)  SpO2: 96% (2020 22:52) (91% - 96%)        Diet, DASH/TLC:   Sodium & Cholesterol Restricted (20 @ 15:02)      I&O's Summary   I&O's Detail      MEDICATIONS  (STANDING):  amLODIPine   Tablet 5 milliGRAM(s) Oral daily  chlorhexidine 4% Liquid 1 Application(s) Topical <User Schedule>  enoxaparin Injectable 40 milliGRAM(s) SubCutaneous daily  fluticasone propionate 50 MICROgram(s)/spray Nasal Spray 1 Spray(s) Both Nostrils daily  gabapentin 100 milliGRAM(s) Oral every 12 hours  hydrocortisone 1% Cream 1 Application(s) Topical two times a day  influenza   Vaccine 0.5 milliLiter(s) IntraMuscular once  lidocaine   Patch 1 Patch Transdermal daily  melatonin 5 milliGRAM(s) Oral at bedtime  methocarbamol 750 milliGRAM(s) Oral every 6 hours  senna 2 Tablet(s) Oral at bedtime    MEDICATIONS  (PRN):  acetaminophen   Tablet .. 650 milliGRAM(s) Oral every 6 hours PRN Mild Pain (1 - 3)  ibuprofen  Tablet. 400 milliGRAM(s) Oral every 8 hours PRN Mild Pain (1 - 3)  ondansetron Injectable 4 milliGRAM(s) IV Push every 6 hours PRN Nausea      PHYSICAL EXAM:    GENERAL: NAD    HEENT: NCAT    CHEST/LUNGS: CTAB, pleurx incision clean dry and intact     HEART: RRR,  No murmurs, rubs, or gallops    ABDOMEN: SNTND +BS    EXTREMITIES:  FROM, No clubbing, cyanosis, or edema, palpable pulse    NEURO: No focal neurological deficits    SKIN: No rashes or lesions    INCISION/WOUNDS:                          10.1   8.32  )-----------( 344      ( 2020 07:39 )             30.8        CBC Full  -  ( 2020 07:39 )  WBC Count : 8.32 K/uL  RBC Count : 3.74 M/uL  Hemoglobin : 10.1 g/dL  Hematocrit : 30.8 %  Platelet Count - Automated : 344 K/uL  Mean Cell Volume : 82.4 fL  Mean Cell Hemoglobin : 27.0 pg  Mean Cell Hemoglobin Concentration : 32.8 g/dL  Auto Neutrophil # : 6.22 K/uL  Auto Lymphocyte # : 1.18 K/uL  Auto Monocyte # : 0.66 K/uL  Auto Eosinophil # : 0.22 K/uL  Auto Basophil # : 0.01 K/uL  Auto Neutrophil % : 74.8 %  Auto Lymphocyte % : 14.2 %  Auto Monocyte % : 7.9 %  Auto Eosinophil % : 2.6 %  Auto Basophil % : 0.1 %               140   |  106   |  18                 Ca: 9.1    BMP:   ----------------------------< 115    M.2   (20 @ 07:39)             3.5    |  24    | 1.1                Ph: x        LFT:     TPro: 6.2 / Alb: 3.3 / TBili: 0.3 / DBili: x / AST: 9 / ALT: 11 / AlkPhos: 71   (20 @ 07:39)    LIVER FUNCTIONS - ( 2020 07:39 )  Alb: 3.3 g/dL / Pro: 6.2 g/dL / ALK PHOS: 71 U/L / ALT: 11 U/L / AST: 9 U/L / GGT: x                     Culture - Tissue with Gram Stain (collected 2020 13:53)  Source: .Tissue None  Gram Stain (2020 23:45):    No polymorphonuclear leukocytes per low power field    No organisms seen per oil power field    < from: Xray Chest 1 View AP/PA (20 @ 14:30) >  IMPRESSION:     Unchanged right pleural effusion/opacity.     No pneumothorax.      < end of copied text >

## 2020-01-14 NOTE — PROGRESS NOTE ADULT - SUBJECTIVE AND OBJECTIVE BOX
84 years old female known to have malignant right sided pleural effusions s/p Denver cath, Metastatic adenocarcinoma of lung (based on pleural fluid cytology) on Keytruda every 3 weeks last one was on 12/31, HTN, and Dyslipidemia, s/p GINA BSO was brought to the ED for chest pain and shortness of breath, inability to drain Denver catheter. Pt was admitted to medical floor, consulted by pulmonary, cath is flushing, no output likely due to extensive fibrosis ( pt has a cath for one year ) and loculated effusion. Thoracic surgery consulted, Pleurex cath was removed on 1/13  Today pt is c/o pain on movement, has less discomfort at rest.     Vital Signs Last 24 Hrs  T(C): 37 (14 Jan 2020 05:34), Max: 37 (14 Jan 2020 05:34)  T(F): 98.6 (14 Jan 2020 05:34), Max: 98.6 (14 Jan 2020 05:34)  HR: 78 (14 Jan 2020 05:34) (74 - 87)  BP: 131/59 (14 Jan 2020 05:34) (131/59 - 149/65)  BP(mean): --  RR: 18 (14 Jan 2020 05:34) (18 - 18)  SpO2: 92% (14 Jan 2020 11:24) (91% - 96%)    PHYSICAL EXAM:  General: in mild distress due to pain at rest, pleasant   Neck: Supple, NO JVD  Cardiac: S1 S2 heard regular, No audible murmurs  Pulmonary: no BS up to the scapula on the right, decreased BS on left , dressing noted on right lower chest   Abdomen: Soft, Non -tender, +BS   Extremities: No Rashes, No edema  Neuro: AAOx3  No focal deficits      LABS:                                                      10.0   7.62  )-----------( 322      ( 14 Jan 2020 06:08 )             31.1   01-14    143  |  108  |  19  ----------------------------<  112<H>  4.4   |  22  |  1.0    Ca    9.5      14 Jan 2020 06:08  Mg     2.2     01-13    TPro  6.0  /  Alb  3.2<L>  /  TBili  0.2  /  DBili  x   /  AST  10  /  ALT  11  /  AlkPhos  70  01-14         PTT - ( 09 Jan 2020 18:07 )  PTT:29.0 sec  Aspartate Aminotransferase (AST/SGOT): 11 U/L (01-11-20 @ 05:43)  Alanine Aminotransferase (ALT/SGPT): 12 U/L (01-11-20 @ 05:43)  RADIOLOGY:    < from: CT Chest w/ IV Cont (01.11.20 @ 07:56) >  IMPRESSION:    Since CT scan performed on December 27, 2019;    1.  Right thoracostomy tube in stable position with slight interval decrease of right pleuraleffusion.  2.   Interval increase in right middle lobe consolidation/atelectasis.  3.  Stable spiculated nodule in the right lung apex, consistent with patient's known malignancy.    < from: Xray Shoulder 2 Views, Right (01.10.20 @ 14:51) >  Findings/  impression:    No acute displaced fracture or dislocation.    Moderate right acromioclavicular and glenohumeral joint degenerative changes, stable.    Partially imaged is small/moderate right pleural effusion post chest tube placement.    < end of copied text >  < from: VA Duplex Lower Ext Vein Scan, Bilat (01.10.20 @ 11:33) >  Impression:    No evidence of deep venous thrombosis or superficial thrombophlebitis in bilateral lower extremities.    < end of copied text >  < from: Transthoracic Echocardiogram (02.22.19 @ 07:12) >    Summary:   1. LV Ejection Fraction by Lopez's Method with a biplane EF of 56 %.   2. Normal left ventricular internal cavity size.   3. Normal left atrial size.   4. Normal right atrial size.   5. There is no evidence of pericardial effusion.   6. Thickening of the anterior and posterior mitral valve leaflets.   7. Trace pulmonic valve regurgitation.    < from: Xray Chest 1 View- PORTABLE-Routine (01.14.20 @ 06:47) >  Impression:    Stable elevation right hemidiaphragm with residual loculated fluid right pleural space. No pneumothorax.    < end of copied text >        MEDICATIONS  (STANDING):  amLODIPine   Tablet 5 milliGRAM(s) Oral daily  chlorhexidine 4% Liquid 1 Application(s) Topical <User Schedule>  enoxaparin Injectable 40 milliGRAM(s) SubCutaneous daily  fluticasone propionate 50 MICROgram(s)/spray Nasal Spray 1 Spray(s) Both Nostrils daily  gabapentin 100 milliGRAM(s) Oral every 12 hours  hydrocortisone 1% Cream 1 Application(s) Topical two times a day  influenza   Vaccine 0.5 milliLiter(s) IntraMuscular once  lidocaine   Patch 1 Patch Transdermal daily  melatonin 5 milliGRAM(s) Oral at bedtime  methocarbamol 750 milliGRAM(s) Oral every 6 hours  senna 2 Tablet(s) Oral at bedtime    MEDICATIONS  (PRN):  acetaminophen   Tablet .. 650 milliGRAM(s) Oral every 6 hours PRN Mild Pain (1 - 3)  ibuprofen  Tablet. 400 milliGRAM(s) Oral every 8 hours PRN Mild Pain (1 - 3)  ondansetron Injectable 4 milliGRAM(s) IV Push every 6 hours PRN Nausea

## 2020-01-14 NOTE — DISCHARGE NOTE PROVIDER - NSDCMRMEDTOKEN_GEN_ALL_CORE_FT
amLODIPine 5 mg oral tablet: 1 tab(s) orally once a day  aspirin 81 mg oral tablet: 1 tab(s) orally once a day acetaminophen 325 mg oral tablet: 2 tab(s) orally every 6 hours, As needed, Mild Pain (1 - 3)  amLODIPine 5 mg oral tablet: 1 tab(s) orally once a day  aspirin 81 mg oral tablet: 1 tab(s) orally once a day  fluticasone 50 mcg/inh nasal spray: 1 spray(s) nasal once a day  gabapentin 100 mg oral capsule: 1 cap(s) orally every 12 hours  hydrocortisone 1% topical cream: 1 application topically 2 times a day  melatonin 5 mg oral tablet: 1 tab(s) orally once a day (at bedtime)  methocarbamol 750 mg oral tablet: 1 tab(s) orally every 6 hours  senna oral tablet: 2 tab(s) orally once a day (at bedtime)

## 2020-01-15 PROBLEM — C34.90 MALIGNANT NEOPLASM OF UNSPECIFIED PART OF UNSPECIFIED BRONCHUS OR LUNG: Chronic | Status: ACTIVE | Noted: 2020-01-09

## 2020-01-15 PROBLEM — J90 PLEURAL EFFUSION, NOT ELSEWHERE CLASSIFIED: Chronic | Status: ACTIVE | Noted: 2020-01-09

## 2020-01-18 DIAGNOSIS — J90 PLEURAL EFFUSION, NOT ELSEWHERE CLASSIFIED: ICD-10-CM

## 2020-01-18 DIAGNOSIS — E78.5 HYPERLIPIDEMIA, UNSPECIFIED: ICD-10-CM

## 2020-01-18 DIAGNOSIS — J91.0 MALIGNANT PLEURAL EFFUSION: ICD-10-CM

## 2020-01-18 DIAGNOSIS — R21 RASH AND OTHER NONSPECIFIC SKIN ERUPTION: ICD-10-CM

## 2020-01-18 DIAGNOSIS — D64.9 ANEMIA, UNSPECIFIED: ICD-10-CM

## 2020-01-18 DIAGNOSIS — M62.838 OTHER MUSCLE SPASM: ICD-10-CM

## 2020-01-18 DIAGNOSIS — M25.511 PAIN IN RIGHT SHOULDER: ICD-10-CM

## 2020-01-18 DIAGNOSIS — I10 ESSENTIAL (PRIMARY) HYPERTENSION: ICD-10-CM

## 2020-01-18 DIAGNOSIS — D63.0 ANEMIA IN NEOPLASTIC DISEASE: ICD-10-CM

## 2020-01-18 DIAGNOSIS — C34.90 MALIGNANT NEOPLASM OF UNSPECIFIED PART OF UNSPECIFIED BRONCHUS OR LUNG: ICD-10-CM

## 2020-01-18 DIAGNOSIS — Z53.29 PROCEDURE AND TREATMENT NOT CARRIED OUT BECAUSE OF PATIENT'S DECISION FOR OTHER REASONS: ICD-10-CM

## 2020-01-18 DIAGNOSIS — E87.6 HYPOKALEMIA: ICD-10-CM

## 2020-01-18 LAB
CULTURE RESULTS: SIGNIFICANT CHANGE UP
SPECIMEN SOURCE: SIGNIFICANT CHANGE UP

## 2020-01-20 ENCOUNTER — LABORATORY RESULT (OUTPATIENT)
Age: 85
End: 2020-01-20

## 2020-01-20 ENCOUNTER — APPOINTMENT (OUTPATIENT)
Dept: HEMATOLOGY ONCOLOGY | Facility: CLINIC | Age: 85
End: 2020-01-20
Payer: MEDICAID

## 2020-01-20 VITALS
RESPIRATION RATE: 14 BRPM | DIASTOLIC BLOOD PRESSURE: 68 MMHG | HEART RATE: 70 BPM | HEIGHT: 62 IN | SYSTOLIC BLOOD PRESSURE: 135 MMHG | TEMPERATURE: 98.5 F

## 2020-01-20 LAB
HCT VFR BLD CALC: 28.9 %
HGB BLD-MCNC: 9.7 G/DL
MCHC RBC-ENTMCNC: 26.8 PG
MCHC RBC-ENTMCNC: 33.6 G/DL
MCV RBC AUTO: 79.8 FL
PLATELET # BLD AUTO: 299 K/UL
PMV BLD: 9 FL
RBC # BLD: 3.62 M/UL
RBC # FLD: 13.3 %
WBC # FLD AUTO: 7.37 K/UL

## 2020-01-20 PROCEDURE — 99213 OFFICE O/P EST LOW 20 MIN: CPT

## 2020-01-20 NOTE — HISTORY OF PRESENT ILLNESS
[Therapy: ___] : Therapy: [unfilled] [Cycle: ___] : Cycle: [unfilled] [de-identified] : \par This is a 83 year old female who I initially met in [facility] on 3/28/19. She was initially admitted on 2/20/2019 for a right loculated pleural effusion\par approx. 900 CCs, no PE and right apical 3.1x3.0 cm mass with pleural nodules on CTA of chest.  . Pt returned to ED on 3/15/2019 for right pleural\par effusion, had a thoracentesis in ED and was sent home. She than came back for SOB  due to recurrent effusion and a Pleurx was placed. Patholgoy showed adenocarcinoma. \par \par She ha lost a few pounds about  4. She never smoked. She does have weakness.  From her Pleurx she  has 500 cc removed every other day. She has pain after.\par \par Work up:\par 2/20/19 CTA chest:  Large right pleural effusion, maximal axial width of 7 cm correlating with an estimated volume of 900 cc. Associated \par near complete compressive atelectasis of the right lower lobe. Probable loculations of the effusion seen at the apex and at the right heart \par border (for example series 5 images 176, 52; series 9 image 69). 1 cm right upper lobe fissural nodule (series 5 image 139). Patent central \par airways. There is right apical 3.1 x 3.0 cm mass with irregular right apical pleural thickening (series 7, image 44), \par and multiple satellite pleural nodules including a more caudad 2.7 cm para-mediastinal nodule (series 7, image 175). Tissue sampling \par recommended.\par \par Path: \par Pleural fluid: 3/25:  Cell block material is hypocellular and shows a single minutecluster of malignant cells.Immunohistochemistry studies were performed at [facility] and the\par results are noncontributory\par 3/22: Addendum\par Cell block material shows macrophages (positive ),mesothelial cells (positive calretinin) and a single very minute cluster of atypical suspicious cells is negative for Fidel-Ep4.Material is insufficient for further diagnsotic studies (ie,mmunohistochemistry).\par 3/15/19: Pleural fluid: Addendum\par Immunohistochemical studies were performed on the cell block at\par [facility], [address] [place] [id number] (see NOTE). The results\par are as follows:\par \par CK7-                      Rare mesothelial cells positive\par CK20-                    Negative\par CK 5/6-                  Rare mesothelial cells positive\par Calretinin-             Scattered mesothelial cells positive\par CD68-                   Numerous histiocytes positive\par BerEP-4-               Negative\par Napsin-A-             Negative\par TTF-1-                  Negative\par \par These results are non-specific, suggestive of mesothelial\par hyperplasia. The few markedly atypical cells seen in the smear\par are not evident in the cell block.\par \par The atypical cells seen in the current smear are not seen in the\par prior pleural fluid Thinprep smear or cell block (88-EZ-).\par \par \par Few marked atypical cells, suspicious for mesothelioma versus\par adenocarcinoma,\par in a background of numerous mesothelial cells, histiocytes and\par small lymphocytes.\par \par \par 3/25/19: Final DiagnosisPOSITIVE FOR MALIGNANT CELLS.Favor metastatic adenocarcinoma. Pending cell block.\par \par  I spke to Dr. Allison  and there are only a few cells thus furhter studies such as IHC, molecular PD-l1 is not possible\par  [FreeTextEntry1] : Started Keytruda on 5/17/19 [de-identified] : 6/5/19\par She is here for ofllow up. Since last visit she started Keytruda. She had a PET/CT on April 17 that showed  Extensive FDG avid right-sided pleural mass/soft tissue thickening, \par with max SUV 21.5 within a right apical pleural soft tissue mass.\par \par 2.  Multiple FDG avid mediastinal lymph nodes, with max SUV 16.5 within a \par 2.6 x 2.1 cm subcarinal node.\par \par 3.  FDG avid lytic lesion within the left superior acetabulum (max SUV \par 14.8), suspicious for osseous metastasis.\par \par 4.  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 Used to be  500 ml  u 3 times a week. Takes 60 mg of oral moprphine a day 20 mg 3 times a day but pain is still severe. Has not had a bowel movement in unknown amount of days does not take lexatives although she has lexatives. \par \par 6/26/19 She is here for follow up. She is due for cycle 3 of Keytruda.  She has about 300 mg drained from her pleurax twice a week. Has SOB but saturated 94% on room air and 94% on ambualtion. Did not torate the fentanyl path had nausea.  On Morphine 10 mg BID doing well.  Constipation  is better. \par \par 7/17/19\par She is here for follow-up of Stage IV lung cancer with son.  She is due for cycle 3 of Keytruda.  CBC is stable from today.  She only takes the liquid morphine at night now.  She started eating slighlty more and has been feeling slightly better ,as per son.  He states that the pleurex drainage is less, it is checked every Friday + Monday.  On Friday there was about 225cc drained, but yesterday no drainage.  We will arrange for hydration today since she has had poor oral intake lately and small appetite.  Son reports she can tolerate fluids but she has early satiety with food.  Patient also reports feeling short of breath at rest, but her O2 sat is 98% on R/a.  Right sided pain is better. \par CT C/A/P (7/6/19) IMPRESSION:Since April 17, 2019:1. No definite new sites of metastatic disease.2. Right-sided pleural soft tissue mass/thickening, overall appearing decreased since April 17, 2019 with primarily residual right apical tumor. Tumor appears to infiltrate the mediastinum.3. No significant change in mediastinal lymphadenopathy including largest 2.6 x 2.1 cm subcarinal lymph node which remains unchanged. Retrocrural 2.5 x 2.3 cm metastasis or additional site of pleural tumor, unchanged.4. Irregularity of right anterior first rib, probably invaded by tumor, stable in retrospect. Left superior acetabular lucent lesion corresponding to FDG avid bone metastasis.5. Right pelvic indeterminate 7 cm mass; possible retroverted uterus with degenerating fibroid or less likely ovarian mass; previously non-FDG avid and probably benign. Further evaluation with pelvic ultrasound or MRI pelvis with and without IV contrast may be helpful.6. Status post right chest tube placement with decreased small loculated right pleural effusion with fluid within the major fissure. 7. New mild to moderate intrahepatic biliary dilation. Correlation with LFTs recommended. Consider further evaluation with ERCP or MRCP with and without IV contrast.\par \par 8/7/19\par She is here with her son. Overall she is doing much better. She is in wheelchair but now less pain, eating better and Pleurax is only draining about 25 cc twice a week, She does complain of SOB still. I explained this will improve as well but asked her to follow up with Dr. Lanza of pul. \par \par 8/28/19\par She is here for follow up. They are to see Dr. Lanza in Early september. There is minimal output from Pleurax.  Pain is much better and does not use narcotics anymore.  Overall better.   She has right sidede nasal congestion and states makes it hard to breath.\par \par 9/18/19\par She is doing well. She is due for cycle 7. Son was not eliana to schedule CTs priro to the visit but she looks better and better every visit and thus most likley still responding. She saw Dr. Lanza who started her on Flonase. She is due to see ENT Dr. Adams.  Appetie is better. No pains. Dr. Lanza is considering to remover the Pleurx possibly in November. \par \par 10/09/2019\par Patient is here for a follow up visit. Due for cycle 8 today. CT chest/abd/pelvis 09/2019 showed improvement in disease. Also has seen Dr. Adams and agreed to continue witj flonase as she has hypertrophy of turbinates. \par She is tolerating Ketryuda well. Her only complaint at this time is trouble in falling asleep. \par \par 10/30/19\par Patient is here for a follow up visit of Stage IV lung cancer with family member.  She is feeling well with no new complaints.   She feels dyspneic when taking a deep breath but her pain has improved.  Most recent CBC is stable. CT C/A/P on September 22nd showed improvement in right apical lung mass and pleural effusion and drained 150cc.  Due for cycle 9 today. \par \par 12/30/19\par Patient is here for a follow-up visit for  lung cancer with son.  Reviewed imaging results with patient and her family, which suggests worsening effusion but otherwise stable.  They drained 500cc from pleurx yesterday, reportedly sanguinous drainage.  She is agreeable to continue with cycle 12 of Keytruda tomorrow. She reports persistent dyspnea.  We discussed that this tx regimen may be losing its responsiveness.\par CT C/A/P (12.27.19) IMPRESSION: Worsening right-sided pleural effusion. Right apical spiculated nodule without difference. Unchanged appearance of the mediastinum. Unchanged appearance of the third thoracic vertebral body and right second rib. No interval change since prior examination. No change in hepatic hypodensity (likely fatty infiltration), splenic hypodensity (too small to characterize), left adrenal nodule or 1 cm retroperitoneal nodule on the right. No change in fat-containing mass in the anterior left thigh.\par \par 1/20/2020\par She is here for a follow up visit of Stage IV lung cancer with family member.  Since last visit, she was admitted for pain in right posterior lung and shortness of breath.  During hospitalization, she had Pleurex removed with Dr. Hirsch  as it was questionable if this was the source of her pain and she no longer had significant drainage.  She reports persistent SOB but better abd  her pain has improved s/p pleurex removal.  \par CT Chest (1.11.20) IMPRESSION:Since CT scan performed on December 27, 2019;1.  Right thoracostomy tube in stable position with slight interval decrease of right pleural effusion.2.   Interval increase in right middle lobe consolidation/atelectasis.3.  Stable spiculated nodule in the right lung apex, consistent with patient's known malignancy.

## 2020-01-20 NOTE — REVIEW OF SYSTEMS
[Fatigue] : fatigue [SOB on Exertion] : shortness of breath during exertion [Constipation] : constipation [Negative] : Allergic/Immunologic [Recent Change In Weight] : ~T no recent weight change [Dysphagia] : no dysphagia [Shortness Of Breath] : no shortness of breath [FreeTextEntry2] : seated in wheelchair [FreeTextEntry4] : runny nose

## 2020-01-20 NOTE — PHYSICAL EXAM
[Ambulatory and capable of all self care but unable to carry out any work activities] : Status 2- Ambulatory and capable of all self care but unable to carry out any work activities. Up and about more than 50% of waking hours [Normal] : affect appropriate [de-identified] : Has decreased breath sound/ slight crackles around right Pleurx.   [de-identified] :  In wheelchair

## 2020-01-20 NOTE — REASON FOR VISIT
[Follow-Up Visit] : a follow-up [Patient Declined  Services] : - None: Patient declined  services [FreeTextEntry2] : Stage IV lung cancer  [FreeTextEntry3] : I speak fluent Guyanese

## 2020-01-20 NOTE — ASSESSMENT
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 Lanza, as indicated\par \par #Dysphagia and decrease appetite \par - this is much better and eating well\par - Jevity 1.2 hector ordered to be consumed BiD\par \par #SOB mainly when she tries to sleep and now due to right nasal congestion she has an  effusion\par - c/w Flonase\par -SOB maybe due to trapped lung and loculated effusion\par \par #Has some insomnia \par - c/w Melatonin 5 mg at bedtime \par \par RTC in 3 weeks

## 2020-01-21 ENCOUNTER — APPOINTMENT (OUTPATIENT)
Dept: INFUSION THERAPY | Facility: CLINIC | Age: 85
End: 2020-01-21

## 2020-01-21 LAB
ALBUMIN SERPL ELPH-MCNC: 3.5 G/DL
ALP BLD-CCNC: 66 U/L
ALT SERPL-CCNC: 12 U/L
ANION GAP SERPL CALC-SCNC: 13 MMOL/L
AST SERPL-CCNC: 11 U/L
BILIRUB SERPL-MCNC: 0.2 MG/DL
BUN SERPL-MCNC: 19 MG/DL
CALCIUM SERPL-MCNC: 9 MG/DL
CHLORIDE SERPL-SCNC: 104 MMOL/L
CO2 SERPL-SCNC: 20 MMOL/L
CREAT SERPL-MCNC: 0.9 MG/DL
GLUCOSE SERPL-MCNC: 103 MG/DL
POTASSIUM SERPL-SCNC: 3.4 MMOL/L
PROT SERPL-MCNC: 6.5 G/DL
SODIUM SERPL-SCNC: 137 MMOL/L

## 2020-01-21 RX ORDER — PEMBROLIZUMAB 25 MG/ML
200 INJECTION, SOLUTION INTRAVENOUS ONCE
Refills: 0 | Status: COMPLETED | OUTPATIENT
Start: 2020-01-21 | End: 2020-01-21

## 2020-01-21 RX ADMIN — PEMBROLIZUMAB 216 MILLIGRAM(S): 25 INJECTION, SOLUTION INTRAVENOUS at 11:00

## 2020-01-21 RX ADMIN — PEMBROLIZUMAB 200 MILLIGRAM(S): 25 INJECTION, SOLUTION INTRAVENOUS at 11:30

## 2020-01-22 LAB — TSH SERPL-ACNC: 0.42 UIU/ML

## 2020-01-26 NOTE — PROCEDURE NOTE - NSANESTHESIA_GEN_A_CORE
Progress Note - OB/GYN   Grace Marium 37 y o  female MRN: [id number]  Unit/Bed#: S -01 Encounter: [id number]    Assessment:  37 y o  P1 admitted with left lower abdominal pain, now resolved  Plan:  Follow up with Dr Mazariegos Call in the office  Will need repeat US in 4-6 weeks  Discharge home today    Subjective/Objective   Chief Complaint: abdominal pain  ]  Subjective:     Pain: no  Tolerating PO: yes  Voiding: yes  Flatus: yes  BM: no  Ambulating: yes  Chest pain: no  Shortness of breath: no  Leg pain: no        Objective:     Vitals: Blood pressure 126/83, pulse 78, temperature 97 7 °F (36 5 °C), temperature source Oral, resp  rate 18, height 5' 4" (1 626 m), weight 54 5 kg (120 lb 2 4 oz), SpO2 100 %  Physical Exam:     Physical Exam   Constitutional: She is oriented to person, place, and time  She appears well-developed and well-nourished  No distress  HENT:   Head: Normocephalic and atraumatic  Eyes: Pupils are equal, round, and reactive to light  EOM are normal    Neck: Normal range of motion  Cardiovascular: Normal rate and regular rhythm  Pulmonary/Chest: Effort normal and breath sounds normal  No respiratory distress  She exhibits no tenderness  Abdominal: Soft  She exhibits no distension and no mass  There is no tenderness  Musculoskeletal: Normal range of motion  She exhibits no edema or tenderness  Neurological: She is alert and oriented to person, place, and time  Skin: Skin is warm and dry  She is not diaphoretic  Psychiatric: She has a normal mood and affect  Her behavior is normal          Lab, Imaging and other studies: I have personally reviewed pertinent reports        Lab Results   Component Value Date    WBC 6 20 01/25/2020    HGB 13 1 01/25/2020    HCT 39 8 01/25/2020    MCV 90 01/25/2020     01/25/2020         Trini Dejesus MD, MPH  OB/GYN, PGY4  1/26/2020, 5:38 AM 1% lidocaine

## 2020-01-27 ENCOUNTER — APPOINTMENT (OUTPATIENT)
Dept: GERIATRICS | Facility: HOSPITAL | Age: 85
End: 2020-01-27

## 2020-02-10 ENCOUNTER — APPOINTMENT (OUTPATIENT)
Dept: HEMATOLOGY ONCOLOGY | Facility: CLINIC | Age: 85
End: 2020-02-10
Payer: MEDICAID

## 2020-02-10 ENCOUNTER — LABORATORY RESULT (OUTPATIENT)
Age: 85
End: 2020-02-10

## 2020-02-10 VITALS
RESPIRATION RATE: 14 BRPM | TEMPERATURE: 97.8 F | BODY MASS INDEX: 25.95 KG/M2 | HEIGHT: 62 IN | SYSTOLIC BLOOD PRESSURE: 128 MMHG | WEIGHT: 141 LBS | HEART RATE: 67 BPM | DIASTOLIC BLOOD PRESSURE: 68 MMHG

## 2020-02-10 DIAGNOSIS — Z85.118 PERSONAL HISTORY OF OTHER MALIGNANT NEOPLASM OF BRONCHUS AND LUNG: ICD-10-CM

## 2020-02-10 LAB
ALBUMIN SERPL ELPH-MCNC: 3.9 G/DL
ALP BLD-CCNC: 62 U/L
ALT SERPL-CCNC: 10 U/L
ANION GAP SERPL CALC-SCNC: 13 MMOL/L
AST SERPL-CCNC: 13 U/L
BILIRUB SERPL-MCNC: 0.3 MG/DL
BUN SERPL-MCNC: 24 MG/DL
CALCIUM SERPL-MCNC: 9.7 MG/DL
CHLORIDE SERPL-SCNC: 110 MMOL/L
CO2 SERPL-SCNC: 23 MMOL/L
CREAT SERPL-MCNC: 1 MG/DL
GLUCOSE SERPL-MCNC: 111 MG/DL
HCT VFR BLD CALC: 34.4 %
HGB BLD-MCNC: 11.2 G/DL
MCHC RBC-ENTMCNC: 26.7 PG
MCHC RBC-ENTMCNC: 32.6 G/DL
MCV RBC AUTO: 81.9 FL
PLATELET # BLD AUTO: 223 K/UL
PMV BLD: 9.6 FL
POTASSIUM SERPL-SCNC: 4.2 MMOL/L
PROT SERPL-MCNC: 6.7 G/DL
RBC # BLD: 4.2 M/UL
RBC # FLD: 14.7 %
SODIUM SERPL-SCNC: 146 MMOL/L
WBC # FLD AUTO: 5.67 K/UL

## 2020-02-10 PROCEDURE — 99214 OFFICE O/P EST MOD 30 MIN: CPT

## 2020-02-11 ENCOUNTER — FORM ENCOUNTER (OUTPATIENT)
Age: 85
End: 2020-02-11

## 2020-02-11 ENCOUNTER — APPOINTMENT (OUTPATIENT)
Dept: INFUSION THERAPY | Facility: CLINIC | Age: 85
End: 2020-02-11

## 2020-02-11 LAB — TSH SERPL-ACNC: 0.7 UIU/ML

## 2020-02-11 RX ORDER — PEMBROLIZUMAB 25 MG/ML
200 INJECTION, SOLUTION INTRAVENOUS ONCE
Refills: 0 | Status: COMPLETED | OUTPATIENT
Start: 2020-02-11 | End: 2020-02-11

## 2020-02-11 RX ADMIN — PEMBROLIZUMAB 200 MILLIGRAM(S): 25 INJECTION, SOLUTION INTRAVENOUS at 12:45

## 2020-02-11 RX ADMIN — PEMBROLIZUMAB 216 MILLIGRAM(S): 25 INJECTION, SOLUTION INTRAVENOUS at 12:12

## 2020-02-12 ENCOUNTER — APPOINTMENT (OUTPATIENT)
Dept: CARDIOTHORACIC SURGERY | Facility: CLINIC | Age: 85
End: 2020-02-12
Payer: MEDICAID

## 2020-02-12 ENCOUNTER — OUTPATIENT (OUTPATIENT)
Dept: OUTPATIENT SERVICES | Facility: HOSPITAL | Age: 85
LOS: 1 days | Discharge: HOME | End: 2020-02-12
Payer: MEDICAID

## 2020-02-12 VITALS
HEART RATE: 67 BPM | OXYGEN SATURATION: 97 % | TEMPERATURE: 97.5 F | DIASTOLIC BLOOD PRESSURE: 81 MMHG | RESPIRATION RATE: 13 BRPM | SYSTOLIC BLOOD PRESSURE: 147 MMHG

## 2020-02-12 DIAGNOSIS — Z90.710 ACQUIRED ABSENCE OF BOTH CERVIX AND UTERUS: Chronic | ICD-10-CM

## 2020-02-12 DIAGNOSIS — J90 PLEURAL EFFUSION, NOT ELSEWHERE CLASSIFIED: ICD-10-CM

## 2020-02-12 LAB
CULTURE RESULTS: SIGNIFICANT CHANGE UP
SPECIMEN SOURCE: SIGNIFICANT CHANGE UP

## 2020-02-12 PROCEDURE — 99213 OFFICE O/P EST LOW 20 MIN: CPT

## 2020-02-12 PROCEDURE — 71046 X-RAY EXAM CHEST 2 VIEWS: CPT | Mod: 26

## 2020-02-12 RX ORDER — POTASSIUM CHLORIDE 1500 MG/1
20 TABLET, FILM COATED, EXTENDED RELEASE ORAL
Qty: 5 | Refills: 0 | Status: DISCONTINUED | COMMUNITY
Start: 2019-07-17 | End: 2020-02-12

## 2020-02-12 RX ORDER — POTASSIUM CHLORIDE 1500 MG/1
20 TABLET, FILM COATED, EXTENDED RELEASE ORAL DAILY
Qty: 5 | Refills: 0 | Status: DISCONTINUED | COMMUNITY
Start: 2019-08-07 | End: 2020-02-12

## 2020-02-12 RX ORDER — MORPHINE SULFATE 10 MG/5ML
10 SOLUTION ORAL EVERY 4 HOURS
Qty: 900 | Refills: 0 | Status: DISCONTINUED | COMMUNITY
Start: 2019-04-16 | End: 2020-02-12

## 2020-02-12 RX ORDER — IBUPROFEN 200 MG/1
200 TABLET ORAL
Refills: 0 | Status: DISCONTINUED | COMMUNITY
End: 2020-02-12

## 2020-02-12 RX ORDER — ONDANSETRON 8 MG/1
8 TABLET, ORALLY DISINTEGRATING ORAL 3 TIMES DAILY
Qty: 90 | Refills: 3 | Status: DISCONTINUED | COMMUNITY
Start: 2019-05-17 | End: 2020-02-12

## 2020-02-12 RX ORDER — CHLORHEXIDINE GLUCONATE 4 %
5 LIQUID (ML) TOPICAL
Qty: 30 | Refills: 6 | Status: DISCONTINUED | COMMUNITY
Start: 2019-08-28 | End: 2020-02-12

## 2020-02-13 NOTE — HISTORY OF PRESENT ILLNESS
[Dyslipidemia] : Dyslipidemia [FreeTextEntry1] : 84 years old female known to have malignant right sided pleural effusions s/p Denver cath, Metastatic adenocarcinoma of lung (based on pleural fluid cytology) on ketruyda every 3 weeks last one was on 12/31, HTN, and Dyslipidemia, s/p GINA PAULA was brought to the ED for chest pain and shortness of \par breath, inability to drain Denver catheter x 1 day. As per son at bedside, until summer last year catheter was draining 300-400 ml every other day but since then frequency had decreased to 400-500 ml 2 times / \par week and for past couple of months it has been 500 ml once / week. Catheter was last drained on Jan 3rd and 500 ml came out. Yesterday nothing came out. But pt has been having right shoulder blade, axilla and right sided chest pain. Pain is constant, worse with deep breaths and change in position, relieved slightly with morphine, 10/10. also endorsed shortness breathing since then, mild but even at rest as well and also dry cough. Son also states that pt has been experiencing profuse night sweats where she wakes up in the morning with drenching wet clothes x 1 month. \par Son states that fluid color changed from clear to serosanguinous few months ago but for past 2 months it has been just blood coming out from catheter. Pt denies any fever, nausea, vomiting, abdominal pain, lower extremity swelling, change in weight and appetite, recent travel, sick contact. \par \par As per ED RN, pt pulse [id number] to 80s on RA so pt was placed on supplemental O2 but then remained hemodynamically stable. I explained to the pt that she might require BIPAP and pt has been refusing.\par \par Patient was seen by CT surgery and recommended removal of the pleurx catheter as it was no longer draining and there was pain at the insertion site. CT scan was reviewed and there was generally less fluid overall. LE duplex was negative for DVT. Patient also seen by heme/onc who stated her most recent CTs do not show progression from December. On 1/13/20, patient underwent removal of her pleurx catheter. A new pleurx catheter was not placed as her effusion is loculated and no further progression of effusion. Patient noted improvement of her pain after removal of catheter. Patient was placed on hydrocortisone cream for possible irritation around dressing of her pleurx catheter. As per heme/onc, patient will continue immunotherapy as outpatient and can go home once ready. \par \par Her healthcare team is as follows:\par PMD: ? (calli cantu)\par Pulm: Tamir Lanza (next appt 2/27)\par Hem/onc: Jorge\par  [Diabetes Mellitus] : no Diabetes Melllitus [Dialysis] : no dialysis [Hypertension] : no hypertension [Inhaled Medication Therapy] : no inhaled medication therapy [Infectious Endocarditis] : no infectious endocarditis [Home Oxygen] : no home oxygen use [Immunocompromise Present] : not immunocompromised [Sleep Apnea] : no sleep apnea [Liver Disease] : no liver disease [Peripheral Artery Disease] : no peripheral artery disease [Unresponsive Neurologic State] : not in a unresponsive neurologic state [Prior CVA] : with no prior CVA [Cerebrovascular Disease] : no cerebrovascular disease [Syncope] : no syncope [CVD TIA] : no CVD Tia [Prior Heart Failure] : no prior heart failure [Prior Myocardial Infarction] : No prior myocardial infarction

## 2020-02-13 NOTE — PHYSICAL EXAM
[General Appearance - Alert] : alert [General Appearance - In No Acute Distress] : in no acute distress [Auscultation Breath Sounds / Voice Sounds] : lungs were clear to auscultation bilaterally [] : no respiratory distress [Heart Rate And Rhythm] : heart rate was normal and rhythm regular [Heart Sounds] : normal S1 and S2 [Heart Sounds Gallop] : no gallops [Murmurs] : no murmurs [Heart Sounds Pericardial Friction Rub] : no pericardial rub [Deep Tendon Reflexes (DTR)] : deep tendon reflexes were 2+ and symmetric [Sensation] : the sensory exam was normal to light touch and pinprick [No Focal Deficits] : no focal deficits [Oriented To Time, Place, And Person] : oriented to person, place, and time [Impaired Insight] : insight and judgment were intact [Affect] : the affect was normal [FreeTextEntry1] : ambulates with a cane

## 2020-02-13 NOTE — ASSESSMENT
[FreeTextEntry1] : Ms. Cindy Montilla 85 y/o F presents today for follow up s/p removal of pleurX catheter. She reports discomfort at the old pleurx site, especially when rotating her torso and when reclining. She is notably sitting forward leaning on her cane during her visit today, which her son states is more comfortable for her. She reports no improvement in breathing since having pleurX catheter removed.\par \par I do not appreciate a significant change in her xrays from the time the patient had her catheter removed until now.  At the time I removed the catheter, the patient was continuously noting severe chest pain, and on careful questioning this appears not to be the case anymore, although the patient notes that she "feels the same" when I ask her general questions and almost seems not to remember the pain that was such a focus of my conversations with her and her family while in the hospital.  Most importantly, her breathing seems to be fairly good and she does not note any progressive shortness of breath.  From my standpoint, unless she has clear changes on her xrays or notes clear decrease in breathing, I do not feel strongly about placing additional catheters, especially as the fluid appears non dependant on last CT imaging.\par \par \par I personally performed the services described in the documentation, reviewed the documentation recorded by the scribe in my presence and it accurately and completely records my words and actions.\par

## 2020-02-19 PROBLEM — Z85.118: Status: RESOLVED | Noted: 2019-04-08 | Resolved: 2020-02-19

## 2020-02-19 NOTE — PHYSICAL EXAM
[Ambulatory and capable of all self care but unable to carry out any work activities] : Status 2- Ambulatory and capable of all self care but unable to carry out any work activities. Up and about more than 50% of waking hours [Normal] : affect appropriate [de-identified] :  In wheelchair   [de-identified] : Has decreased breath sound around RLL [de-identified] : R mid back wound healing from pleurx removal; stitch in place

## 2020-02-19 NOTE — HISTORY OF PRESENT ILLNESS
[Therapy: ___] : Therapy: [unfilled] [Cycle: ___] : Cycle: [unfilled] [FreeTextEntry1] : Started Keytruda on 5/17/19 [de-identified] : \par This is a 83 year old female who I initially met in [facility] on 3/28/19. She was initially admitted on 2/20/2019 for a right loculated pleural effusion\par approx. 900 CCs, no PE and right apical 3.1x3.0 cm mass with pleural nodules on CTA of chest.  . Pt returned to ED on 3/15/2019 for right pleural\par effusion, had a thoracentesis in ED and was sent home. She than came back for SOB  due to recurrent effusion and a Pleurx was placed. Patholgoy showed adenocarcinoma. \par \par She ha lost a few pounds about  4. She never smoked. She does have weakness.  From her Pleurx she  has 500 cc removed every other day. She has pain after.\par \par Work up:\par 2/20/19 CTA chest:  Large right pleural effusion, maximal axial width of 7 cm correlating with an estimated volume of 900 cc. Associated \par near complete compressive atelectasis of the right lower lobe. Probable loculations of the effusion seen at the apex and at the right heart \par border (for example series 5 images 176, 52; series 9 image 69). 1 cm right upper lobe fissural nodule (series 5 image 139). Patent central \par airways. There is right apical 3.1 x 3.0 cm mass with irregular right apical pleural thickening (series 7, image 44), \par and multiple satellite pleural nodules including a more caudad 2.7 cm para-mediastinal nodule (series 7, image 175). Tissue sampling \par recommended.\par \par Path: \par Pleural fluid: 3/25:  Cell block material is hypocellular and shows a single minutecluster of malignant cells.Immunohistochemistry studies were performed at [facility] and the\par results are noncontributory\par 3/22: Addendum\par Cell block material shows macrophages (positive ),mesothelial cells (positive calretinin) and a single very minute cluster of atypical suspicious cells is negative for Fidel-Ep4.Material is insufficient for further diagnsotic studies (ie,mmunohistochemistry).\par 3/15/19: Pleural fluid: Addendum\par Immunohistochemical studies were performed on the cell block at\par [facility], [address] [place] [id number] (see NOTE). The results\par are as follows:\par \par CK7-                      Rare mesothelial cells positive\par CK20-                    Negative\par CK 5/6-                  Rare mesothelial cells positive\par Calretinin-             Scattered mesothelial cells positive\par CD68-                   Numerous histiocytes positive\par BerEP-4-               Negative\par Napsin-A-             Negative\par TTF-1-                  Negative\par \par These results are non-specific, suggestive of mesothelial\par hyperplasia. The few markedly atypical cells seen in the smear\par are not evident in the cell block.\par \par The atypical cells seen in the current smear are not seen in the\par prior pleural fluid Thinprep smear or cell block (66-XF-).\par \par \par Few marked atypical cells, suspicious for mesothelioma versus\par adenocarcinoma,\par in a background of numerous mesothelial cells, histiocytes and\par small lymphocytes.\par \par \par 3/25/19: Final DiagnosisPOSITIVE FOR MALIGNANT CELLS.Favor metastatic adenocarcinoma. Pending cell block.\par \par  I spke to Dr. Allison  and there are only a few cells thus furhter studies such as IHC, molecular PD-l1 is not possible\par  [de-identified] : 6/5/19\par She is here for ofllow up. Since last visit she started Keytruda. She had a PET/CT on April 17 that showed  Extensive FDG avid right-sided pleural mass/soft tissue thickening, \par with max SUV 21.5 within a right apical pleural soft tissue mass.\par \par 2.  Multiple FDG avid mediastinal lymph nodes, with max SUV 16.5 within a \par 2.6 x 2.1 cm subcarinal node.\par \par 3.  FDG avid lytic lesion within the left superior acetabulum (max SUV \par 14.8), suspicious for osseous metastasis.\par \par 4.  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 Used to be  500 ml  u 3 times a week. Takes 60 mg of oral moprphine a day 20 mg 3 times a day but pain is still severe. Has not had a bowel movement in unknown amount of days does not take lexatives although she has lexatives. \par \par 6/26/19 She is here for follow up. She is due for cycle 3 of Keytruda.  She has about 300 mg drained from her pleurax twice a week. Has SOB but saturated 94% on room air and 94% on ambualtion. Did not torate the fentanyl path had nausea.  On Morphine 10 mg BID doing well.  Constipation  is better. \par \par 7/17/19\par She is here for follow-up of Stage IV lung cancer with son.  She is due for cycle 3 of Keytruda.  CBC is stable from today.  She only takes the liquid morphine at night now.  She started eating slighlty more and has been feeling slightly better ,as per son.  He states that the pleurex drainage is less, it is checked every Friday + Monday.  On Friday there was about 225cc drained, but yesterday no drainage.  We will arrange for hydration today since she has had poor oral intake lately and small appetite.  Son reports she can tolerate fluids but she has early satiety with food.  Patient also reports feeling short of breath at rest, but her O2 sat is 98% on R/a.  Right sided pain is better. \par CT C/A/P (7/6/19) IMPRESSION:Since April 17, 2019:1. No definite new sites of metastatic disease.2. Right-sided pleural soft tissue mass/thickening, overall appearing decreased since April 17, 2019 with primarily residual right apical tumor. Tumor appears to infiltrate the mediastinum.3. No significant change in mediastinal lymphadenopathy including largest 2.6 x 2.1 cm subcarinal lymph node which remains unchanged. Retrocrural 2.5 x 2.3 cm metastasis or additional site of pleural tumor, unchanged.4. Irregularity of right anterior first rib, probably invaded by tumor, stable in retrospect. Left superior acetabular lucent lesion corresponding to FDG avid bone metastasis.5. Right pelvic indeterminate 7 cm mass; possible retroverted uterus with degenerating fibroid or less likely ovarian mass; previously non-FDG avid and probably benign. Further evaluation with pelvic ultrasound or MRI pelvis with and without IV contrast may be helpful.6. Status post right chest tube placement with decreased small loculated right pleural effusion with fluid within the major fissure. 7. New mild to moderate intrahepatic biliary dilation. Correlation with LFTs recommended. Consider further evaluation with ERCP or MRCP with and without IV contrast.\par \par 8/7/19\par She is here with her son. Overall she is doing much better. She is in wheelchair but now less pain, eating better and Pleurax is only draining about 25 cc twice a week, She does complain of SOB still. I explained this will improve as well but asked her to follow up with Dr. Lanza of pul. \par \par 8/28/19\par She is here for follow up. They are to see Dr. Lanza in Early september. There is minimal output from Pleurax.  Pain is much better and does not use narcotics anymore.  Overall better.   She has right sidede nasal congestion and states makes it hard to breath.\par \par 9/18/19\par She is doing well. She is due for cycle 7. Son was not eliana to schedule CTs priro to the visit but she looks better and better every visit and thus most likley still responding. She saw Dr. Lanza who started her on Flonase. She is due to see ENT Dr. Adams.  Appetie is better. No pains. Dr. Lanza is considering to remover the Pleurx possibly in November. \par \par 10/09/2019\par Patient is here for a follow up visit. Due for cycle 8 today. CT chest/abd/pelvis 09/2019 showed improvement in disease. Also has seen Dr. Adams and agreed to continue witj flonase as she has hypertrophy of turbinates. \par She is tolerating Ketryuda well. Her only complaint at this time is trouble in falling asleep. \par \par 10/30/19\par Patient is here for a follow up visit of Stage IV lung cancer with family member.  She is feeling well with no new complaints.   She feels dyspneic when taking a deep breath but her pain has improved.  Most recent CBC is stable. CT C/A/P on September 22nd showed improvement in right apical lung mass and pleural effusion and drained 150cc.  Due for cycle 9 today. \par \par 12/30/19\par Patient is here for a follow-up visit for  lung cancer with son.  Reviewed imaging results with patient and her family, which suggests worsening effusion but otherwise stable.  They drained 500cc from pleurx yesterday, reportedly sanguinous drainage.  She is agreeable to continue with cycle 12 of Keytruda tomorrow. She reports persistent dyspnea.  We discussed that this tx regimen may be losing its responsiveness.\par CT C/A/P (12.27.19) IMPRESSION: Worsening right-sided pleural effusion. Right apical spiculated nodule without difference. Unchanged appearance of the mediastinum. Unchanged appearance of the third thoracic vertebral body and right second rib. No interval change since prior examination. No change in hepatic hypodensity (likely fatty infiltration), splenic hypodensity (too small to characterize), left adrenal nodule or 1 cm retroperitoneal nodule on the right. No change in fat-containing mass in the anterior left thigh.\par \par 2/10/2020\par She is here for follow-up accompanied by son.  Since last visit she was admitted to hospital for pain and nausea. She had pleurx catheter removed during most recent hospitalization on 1/13/2020.  She complains of insomnia but generally feels well.  Most recent scan reviewed with patient.  She reports she had a rash after discharge from the hospital whichh has since resolved.  \par CT Chest (1.11.2020) IMPRESSION:Since CT scan performed on December 27, 2019;1.  Right thoracostomy tube in stable position with slight interval decrease of right pleural effusion. 2.   Interval increase in right middle lobe consolidation/atelectasis. 3.  Stable spiculated nodule in the right lung apex, consistent with patient's known malignancy.\par \par She feels much better now that her Pleurx is gone. Does not have any pain. Feels dyspnic which is stable.

## 2020-02-19 NOTE — REASON FOR VISIT
[Follow-Up Visit] : a follow-up [Patient Declined  Services] : - None: Patient declined  services [FreeTextEntry2] : Stage IV lung cancer  [FreeTextEntry3] : I speak fluent [place]

## 2020-02-19 NOTE — ASSESSMENT
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 \par - plan to rescan iaround April 2020; CT from 1/2020 shows no growth and no new mets thus would proceed with same treatment for now\par - c/w Keytruda every 3 weeks\par - follow up with Dr. Lanza as indicated\par \par #Dysphagia and decrease appetite \par - this is much better and eating well\par - Jevity 1.2 hector ordered to be consumed BiD\par \par #SOB mainly when she tries to sleep; due to right nasal congestion\par - c/w Flonase\par \par #Has some insomnia \par - c/w Melatonin 5 mg at bedtime \par \par RTC in 3 weeks

## 2020-02-21 ENCOUNTER — OUTPATIENT (OUTPATIENT)
Dept: OUTPATIENT SERVICES | Facility: HOSPITAL | Age: 85
LOS: 1 days | Discharge: HOME | End: 2020-02-21

## 2020-02-21 ENCOUNTER — APPOINTMENT (OUTPATIENT)
Dept: PULMONOLOGY | Facility: CLINIC | Age: 85
End: 2020-02-21
Payer: MEDICAID

## 2020-02-21 VITALS
WEIGHT: 144 LBS | DIASTOLIC BLOOD PRESSURE: 64 MMHG | HEIGHT: 62 IN | HEART RATE: 70 BPM | SYSTOLIC BLOOD PRESSURE: 149 MMHG | BODY MASS INDEX: 26.5 KG/M2 | OXYGEN SATURATION: 95 %

## 2020-02-21 DIAGNOSIS — Z90.710 ACQUIRED ABSENCE OF BOTH CERVIX AND UTERUS: Chronic | ICD-10-CM

## 2020-02-21 DIAGNOSIS — J90 PLEURAL EFFUSION, NOT ELSEWHERE CLASSIFIED: ICD-10-CM

## 2020-02-21 PROCEDURE — 99213 OFFICE O/P EST LOW 20 MIN: CPT | Mod: GC

## 2020-02-21 NOTE — HISTORY OF PRESENT ILLNESS
[FreeTextEntry1] : 84 yo F with PMH of HTN, malignant pleural effusion and right lung adenocarcinoma (diagnosed in 2019) with mets to bone s/p 6 cycles of Keytruda (started on 05/2019) presents for follow up from Dr. Patel for possible Pleurx removal. The patient had Pleurx placed upon evaluation for neoplasm in March 2019. The patient reports draining 400 cc on 9/9/2019. She also notes that she has nasal congestion which is worse when she goes to sleep at night and has been using Afrin for the last 4 days. \par

## 2020-02-21 NOTE — PHYSICAL EXAM
[Normal Appearance] : normal appearance [Well Groomed] : well groomed [General Appearance - In No Acute Distress] : no acute distress [Neck Appearance] : the appearance of the neck was normal [Jugular Venous Distention Increased] : there was no jugular-venous distention [Heart Sounds] : normal S1 and S2 [Murmurs] : no murmurs present [Arterial Pulses Normal] : the arterial pulses were normal [] : no respiratory distress [Exaggerated Use Of Accessory Muscles For Inspiration] : no accessory muscle use [Auscultation Breath Sounds / Voice Sounds] : lungs were clear to auscultation bilaterally [FreeTextEntry1] : right pleurx catheter on back [Abdomen Soft] : soft [Bowel Sounds] : normal bowel sounds [Abdomen Tenderness] : non-tender [Abnormal Walk] : normal gait [Nail Clubbing] : no clubbing of the fingernails [Cyanosis, Localized] : no localized cyanosis

## 2020-02-21 NOTE — END OF VISIT
[FreeTextEntry3] : thoracic sx note reviewed-concur with the assessment and plan-f/u with onc//////doing better without denver in-re symps-chr pl effus-malig [Time Spent: ___ minutes] : I have spent [unfilled] minutes of face to face time with the patient

## 2020-02-21 NOTE — ASSESSMENT
[FreeTextEntry1] : Adenocarcinoma of right lung (162.9) (C34.91)\par Primary cancer of right upper lobe of lung (162.3) (C34.11)\par \par 82 yo F with with HTN and metastatic lung adenocarcinoma s/p  cycles of Keytruda presents for follow up for possible Pleurx removal \par \par \par # Metastatic Adenocarcinoma of the lung/ malignant pleural effusion\par - s/p 8 cycles of Keytruda (started on 5/2019)\par - Follows Dr. Patel\par - patient has right sided pleurx catheter which draining about 300-400 cc per week\par f/u repeat CTA chest (referred by Dr. Patel)\par - Refer for Chest XRAY and f/u in 2 months\par \par #difficulty breathing\par - dyspnea on walking\par - uses fluticasone inhaler\par \par Follow up in 2 months

## 2020-03-02 ENCOUNTER — APPOINTMENT (OUTPATIENT)
Dept: HEMATOLOGY ONCOLOGY | Facility: CLINIC | Age: 85
End: 2020-03-02
Payer: MEDICAID

## 2020-03-02 VITALS
TEMPERATURE: 98.7 F | BODY MASS INDEX: 26.5 KG/M2 | HEIGHT: 62 IN | SYSTOLIC BLOOD PRESSURE: 136 MMHG | HEART RATE: 65 BPM | WEIGHT: 144 LBS | DIASTOLIC BLOOD PRESSURE: 65 MMHG

## 2020-03-02 PROCEDURE — 99214 OFFICE O/P EST MOD 30 MIN: CPT

## 2020-03-03 ENCOUNTER — APPOINTMENT (OUTPATIENT)
Dept: INFUSION THERAPY | Facility: CLINIC | Age: 85
End: 2020-03-03

## 2020-03-03 ENCOUNTER — OUTPATIENT (OUTPATIENT)
Dept: OUTPATIENT SERVICES | Facility: HOSPITAL | Age: 85
LOS: 1 days | Discharge: HOME | End: 2020-03-03

## 2020-03-03 ENCOUNTER — LABORATORY RESULT (OUTPATIENT)
Age: 85
End: 2020-03-03

## 2020-03-03 DIAGNOSIS — J91.0 MALIGNANT PLEURAL EFFUSION: ICD-10-CM

## 2020-03-03 DIAGNOSIS — Z51.12 ENCOUNTER FOR ANTINEOPLASTIC IMMUNOTHERAPY: ICD-10-CM

## 2020-03-03 DIAGNOSIS — C34.90 MALIGNANT NEOPLASM OF UNSPECIFIED PART OF UNSPECIFIED BRONCHUS OR LUNG: ICD-10-CM

## 2020-03-03 DIAGNOSIS — Z90.710 ACQUIRED ABSENCE OF BOTH CERVIX AND UTERUS: Chronic | ICD-10-CM

## 2020-03-03 DIAGNOSIS — C34.91 MALIGNANT NEOPLASM OF UNSPECIFIED PART OF RIGHT BRONCHUS OR LUNG: ICD-10-CM

## 2020-03-03 RX ORDER — PEMBROLIZUMAB 25 MG/ML
200 INJECTION, SOLUTION INTRAVENOUS ONCE
Refills: 0 | Status: COMPLETED | OUTPATIENT
Start: 2020-03-03 | End: 2020-03-03

## 2020-03-03 RX ADMIN — PEMBROLIZUMAB 200 MILLIGRAM(S): 25 INJECTION, SOLUTION INTRAVENOUS at 13:00

## 2020-03-03 RX ADMIN — PEMBROLIZUMAB 216 MILLIGRAM(S): 25 INJECTION, SOLUTION INTRAVENOUS at 12:26

## 2020-03-04 LAB
ALBUMIN SERPL ELPH-MCNC: 4 G/DL
ALP BLD-CCNC: 59 U/L
ALT SERPL-CCNC: 9 U/L
ANION GAP SERPL CALC-SCNC: 12 MMOL/L
AST SERPL-CCNC: 14 U/L
BILIRUB SERPL-MCNC: 0.4 MG/DL
BUN SERPL-MCNC: 25 MG/DL
CALCIUM SERPL-MCNC: 9.4 MG/DL
CHLORIDE SERPL-SCNC: 103 MMOL/L
CO2 SERPL-SCNC: 26 MMOL/L
CREAT SERPL-MCNC: 1 MG/DL
CULTURE RESULTS: SIGNIFICANT CHANGE UP
GLUCOSE SERPL-MCNC: 98 MG/DL
HCT VFR BLD CALC: 33.9 %
HGB BLD-MCNC: 11.1 G/DL
MCHC RBC-ENTMCNC: 26.7 PG
MCHC RBC-ENTMCNC: 32.7 G/DL
MCV RBC AUTO: 81.5 FL
PLATELET # BLD AUTO: 211 K/UL
PMV BLD: 9.8 FL
POTASSIUM SERPL-SCNC: 3.9 MMOL/L
PROT SERPL-MCNC: 6.6 G/DL
RBC # BLD: 4.16 M/UL
RBC # FLD: 15.1 %
SODIUM SERPL-SCNC: 141 MMOL/L
SPECIMEN SOURCE: SIGNIFICANT CHANGE UP
TSH SERPL-ACNC: 0.94 UIU/ML
WBC # FLD AUTO: 5.66 K/UL

## 2020-03-05 NOTE — PHYSICAL EXAM
[Ambulatory and capable of all self care but unable to carry out any work activities] : Status 2- Ambulatory and capable of all self care but unable to carry out any work activities. Up and about more than 50% of waking hours [Normal] : affect appropriate [de-identified] :  In wheelchair   [de-identified] : Has decreased breath sound around RLL [de-identified] : R mid back wound healing from pleurx removal

## 2020-03-05 NOTE — REVIEW OF SYSTEMS
[Fatigue] : fatigue [SOB on Exertion] : shortness of breath during exertion [Constipation] : constipation [Negative] : Endocrine [Recent Change In Weight] : ~T no recent weight change [Dysphagia] : no dysphagia [Shortness Of Breath] : no shortness of breath [FreeTextEntry2] : seated in wheelchair [FreeTextEntry4] : reports nasal congestion L>R

## 2020-03-05 NOTE — ASSESSMENT
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 \par - plan to rescan around April 2020; CT from 1/2020 shows no growth and no new mets thus would proceed with same treatment for now\par - c/w Keytruda every 3 weeks\par - follow up with Dr. Lanza as indicated\par \par #Dysphagia and decrease appetite \par - this is much better and eating well\par - Jevity 1.2 hector ordered to be consumed BiD\par \par #SOB mainly when she tries to sleep; due to right nasal congestion\par - c/w Flonase\par \par #Has some insomnia \par - c/w Melatonin 5 mg at bedtime \par \par RTC in 3 weeks

## 2020-03-05 NOTE — REASON FOR VISIT
[Patient Declined  Services] : - None: Patient declined  services [Follow-Up Visit] : a follow-up [FreeTextEntry2] : Stage IV lung cancer  [FreeTextEntry3] : I speak fluent [place]ian

## 2020-03-05 NOTE — HISTORY OF PRESENT ILLNESS
[Therapy: ___] : Therapy: [unfilled] [Cycle: ___] : Cycle: [unfilled] [de-identified] : \par This is a 83 year old female who I initially met in [facility] on 3/28/19. She was initially admitted on 2/20/2019 for a right loculated pleural effusion\par approx. 900 CCs, no PE and right apical 3.1x3.0 cm mass with pleural nodules on CTA of chest.  . Pt returned to ED on 3/15/2019 for right pleural\par effusion, had a thoracentesis in ED and was sent home. She than came back for SOB  due to recurrent effusion and a Pleurx was placed. Patholgoy showed adenocarcinoma. \par \par She ha lost a few pounds about  4. She never smoked. She does have weakness.  From her Pleurx she  has 500 cc removed every other day. She has pain after.\par \par Work up:\par 2/20/19 CTA chest:  Large right pleural effusion, maximal axial width of 7 cm correlating with an estimated volume of 900 cc. Associated \par near complete compressive atelectasis of the right lower lobe. Probable loculations of the effusion seen at the apex and at the right heart \par border (for example series 5 images 176, 52; series 9 image 69). 1 cm right upper lobe fissural nodule (series 5 image 139). Patent central \par airways. There is right apical 3.1 x 3.0 cm mass with irregular right apical pleural thickening (series 7, image 44), \par and multiple satellite pleural nodules including a more caudad 2.7 cm para-mediastinal nodule (series 7, image 175). Tissue sampling \par recommended.\par \par Path: \par Pleural fluid: 3/25:  Cell block material is hypocellular and shows a single minutecluster of malignant cells.Immunohistochemistry studies were performed at [facility] and the\par results are noncontributory\par 3/22: Addendum\par Cell block material shows macrophages (positive ),mesothelial cells (positive calretinin) and a single very minute cluster of atypical suspicious cells is negative for Fidel-Ep4.Material is insufficient for further diagnsotic studies (ie,mmunohistochemistry).\par 3/15/19: Pleural fluid: Addendum\par Immunohistochemical studies were performed on the cell block at\par [facility], [address] [place] [id number] (see NOTE). The results\par are as follows:\par \par CK7-                      Rare mesothelial cells positive\par CK20-                    Negative\par CK 5/6-                  Rare mesothelial cells positive\par Calretinin-             Scattered mesothelial cells positive\par CD68-                   Numerous histiocytes positive\par BerEP-4-               Negative\par Napsin-A-             Negative\par TTF-1-                  Negative\par \par These results are non-specific, suggestive of mesothelial\par hyperplasia. The few markedly atypical cells seen in the smear\par are not evident in the cell block.\par \par The atypical cells seen in the current smear are not seen in the\par prior pleural fluid Thinprep smear or cell block (98-HY-).\par \par \par Few marked atypical cells, suspicious for mesothelioma versus\par adenocarcinoma,\par in a background of numerous mesothelial cells, histiocytes and\par small lymphocytes.\par \par \par 3/25/19: Final DiagnosisPOSITIVE FOR MALIGNANT CELLS.Favor metastatic adenocarcinoma. Pending cell block.\par \par  I spke to Dr. Allison  and there are only a few cells thus furhter studies such as IHC, molecular PD-l1 is not possible\par  [FreeTextEntry1] : Started Keytruda on 5/17/19 [de-identified] : 6/5/19\par She is here for ofllow up. Since last visit she started Keytruda. She had a PET/CT on April 17 that showed  Extensive FDG avid right-sided pleural mass/soft tissue thickening, \par with max SUV 21.5 within a right apical pleural soft tissue mass.\par \par 2.  Multiple FDG avid mediastinal lymph nodes, with max SUV 16.5 within a \par 2.6 x 2.1 cm subcarinal node.\par \par 3.  FDG avid lytic lesion within the left superior acetabulum (max SUV \par 14.8), suspicious for osseous metastasis.\par \par 4.  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 Used to be  500 ml  u 3 times a week. Takes 60 mg of oral moprphine a day 20 mg 3 times a day but pain is still severe. Has not had a bowel movement in unknown amount of days does not take lexatives although she has lexatives. \par \par 6/26/19 She is here for follow up. She is due for cycle 3 of Keytruda.  She has about 300 mg drained from her pleurax twice a week. Has SOB but saturated 94% on room air and 94% on ambualtion. Did not torate the fentanyl path had nausea.  On Morphine 10 mg BID doing well.  Constipation  is better. \par \par 7/17/19\par She is here for follow-up of Stage IV lung cancer with son.  She is due for cycle 3 of Keytruda.  CBC is stable from today.  She only takes the liquid morphine at night now.  She started eating slighlty more and has been feeling slightly better ,as per son.  He states that the pleurex drainage is less, it is checked every Friday + Monday.  On Friday there was about 225cc drained, but yesterday no drainage.  We will arrange for hydration today since she has had poor oral intake lately and small appetite.  Son reports she can tolerate fluids but she has early satiety with food.  Patient also reports feeling short of breath at rest, but her O2 sat is 98% on R/a.  Right sided pain is better. \par CT C/A/P (7/6/19) IMPRESSION:Since April 17, 2019:1. No definite new sites of metastatic disease.2. Right-sided pleural soft tissue mass/thickening, overall appearing decreased since April 17, 2019 with primarily residual right apical tumor. Tumor appears to infiltrate the mediastinum.3. No significant change in mediastinal lymphadenopathy including largest 2.6 x 2.1 cm subcarinal lymph node which remains unchanged. Retrocrural 2.5 x 2.3 cm metastasis or additional site of pleural tumor, unchanged.4. Irregularity of right anterior first rib, probably invaded by tumor, stable in retrospect. Left superior acetabular lucent lesion corresponding to FDG avid bone metastasis.5. Right pelvic indeterminate 7 cm mass; possible retroverted uterus with degenerating fibroid or less likely ovarian mass; previously non-FDG avid and probably benign. Further evaluation with pelvic ultrasound or MRI pelvis with and without IV contrast may be helpful.6. Status post right chest tube placement with decreased small loculated right pleural effusion with fluid within the major fissure. 7. New mild to moderate intrahepatic biliary dilation. Correlation with LFTs recommended. Consider further evaluation with ERCP or MRCP with and without IV contrast.\par \par 8/7/19\par She is here with her son. Overall she is doing much better. She is in wheelchair but now less pain, eating better and Pleurax is only draining about 25 cc twice a week, She does complain of SOB still. I explained this will improve as well but asked her to follow up with Dr. Lanza of pul. \par \par 8/28/19\par She is here for follow up. They are to see Dr. Lanza in Early september. There is minimal output from Pleurax.  Pain is much better and does not use narcotics anymore.  Overall better.   She has right sidede nasal congestion and states makes it hard to breath.\par \par 9/18/19\par She is doing well. She is due for cycle 7. Son was not eliana to schedule CTs priro to the visit but she looks better and better every visit and thus most likley still responding. She saw Dr. Lanza who started her on Flonase. She is due to see ENT Dr. Adams.  Appetie is better. No pains. Dr. Lanza is considering to remover the Pleurx possibly in November. \par \par 10/09/2019\par Patient is here for a follow up visit. Due for cycle 8 today. CT chest/abd/pelvis 09/2019 showed improvement in disease. Also has seen Dr. Adams and agreed to continue witj flonase as she has hypertrophy of turbinates. \par She is tolerating Ketryuda well. Her only complaint at this time is trouble in falling asleep. \par \par 10/30/19\par Patient is here for a follow up visit of Stage IV lung cancer with family member.  She is feeling well with no new complaints.   She feels dyspneic when taking a deep breath but her pain has improved.  Most recent CBC is stable. CT C/A/P on September 22nd showed improvement in right apical lung mass and pleural effusion and drained 150cc.  Due for cycle 9 today. \par \par 12/30/19\par Patient is here for a follow-up visit for  lung cancer with son.  Reviewed imaging results with patient and her family, which suggests worsening effusion but otherwise stable.  They drained 500cc from pleurx yesterday, reportedly sanguinous drainage.  She is agreeable to continue with cycle 12 of Keytruda tomorrow. She reports persistent dyspnea.  We discussed that this tx regimen may be losing its responsiveness.\par CT C/A/P (12.27.19) IMPRESSION: Worsening right-sided pleural effusion. Right apical spiculated nodule without difference. Unchanged appearance of the mediastinum. Unchanged appearance of the third thoracic vertebral body and right second rib. No interval change since prior examination. No change in hepatic hypodensity (likely fatty infiltration), splenic hypodensity (too small to characterize), left adrenal nodule or 1 cm retroperitoneal nodule on the right. No change in fat-containing mass in the anterior left thigh.\par \par 2/10/2020\par She is here for follow-up accompanied by son.  Since last visit she was admitted to hospital for pain and nausea. She had pleurx catheter removed during most recent hospitalization on 1/13/2020.  She complains of insomnia but generally feels well.  Most recent scan reviewed with patient.  She reports she had a rash after discharge from the hospital which has since resolved.  \par CT Chest (1.11.2020) IMPRESSION:Since CT scan performed on December 27, 2019;1.  Right thoracostomy tube in stable position with slight interval decrease of right pleural effusion. 2.   Interval increase in right middle lobe consolidation/atelectasis. 3.  Stable spiculated nodule in the right lung apex, consistent with patient's known malignancy.\par \par She feels much better now that her Pleurx is gone. Does not have any pain. Feels dyspneic which is stable.\par \par 3/2/2020\par She is here for follow-up for lung cancer accompanied by son. Since last visit, she followed with Dr. Lanza [facility], and Dr. Hirsch from [facility] who agreed against placing additional drainage catheters based on most recent CT imaging.  She still reports some SOB.  She also reports taking a [place] medication named Volidol and Panangin for here breathing.  Patient reports mild dizziness after taking Trazodone; we recommended halving dose.

## 2020-03-24 NOTE — PHYSICAL EXAM
[Normal Appearance] : normal appearance [Well Groomed] : well groomed [General Appearance - In No Acute Distress] : no acute distress [Neck Appearance] : the appearance of the neck was normal [Jugular Venous Distention Increased] : there was no jugular-venous distention [Murmurs] : no murmurs present [Heart Sounds] : normal S1 and S2 [Arterial Pulses Normal] : the arterial pulses were normal [] : no respiratory distress [Exaggerated Use Of Accessory Muscles For Inspiration] : no accessory muscle use [Auscultation Breath Sounds / Voice Sounds] : lungs were clear to auscultation bilaterally [Bowel Sounds] : normal bowel sounds [Abdomen Soft] : soft [Abnormal Walk] : normal gait [Abdomen Tenderness] : non-tender [Nail Clubbing] : no clubbing of the fingernails [Cyanosis, Localized] : no localized cyanosis [FreeTextEntry1] : right pleurx catheter on back DISCHARGE

## 2020-05-04 ENCOUNTER — LABORATORY RESULT (OUTPATIENT)
Age: 85
End: 2020-05-04

## 2020-05-04 ENCOUNTER — APPOINTMENT (OUTPATIENT)
Dept: INFUSION THERAPY | Facility: CLINIC | Age: 85
End: 2020-05-04

## 2020-05-04 ENCOUNTER — APPOINTMENT (OUTPATIENT)
Dept: HEMATOLOGY ONCOLOGY | Facility: CLINIC | Age: 85
End: 2020-05-04
Payer: MEDICAID

## 2020-05-04 VITALS
DIASTOLIC BLOOD PRESSURE: 92 MMHG | HEART RATE: 84 BPM | BODY MASS INDEX: 27.97 KG/M2 | RESPIRATION RATE: 16 BRPM | TEMPERATURE: 97.3 F | WEIGHT: 152 LBS | SYSTOLIC BLOOD PRESSURE: 148 MMHG | HEIGHT: 62 IN

## 2020-05-04 LAB
HCT VFR BLD CALC: 33.3 %
HGB BLD-MCNC: 11.2 G/DL
MCHC RBC-ENTMCNC: 26.7 PG
MCHC RBC-ENTMCNC: 33.6 G/DL
MCV RBC AUTO: 79.5 FL
PLATELET # BLD AUTO: 225 K/UL
PMV BLD: 9.8 FL
RBC # BLD: 4.19 M/UL
RBC # FLD: 14.7 %
WBC # FLD AUTO: 5.81 K/UL

## 2020-05-04 PROCEDURE — 99214 OFFICE O/P EST MOD 30 MIN: CPT

## 2020-05-04 RX ORDER — PEMBROLIZUMAB 25 MG/ML
400 INJECTION, SOLUTION INTRAVENOUS ONCE
Refills: 0 | Status: COMPLETED | OUTPATIENT
Start: 2020-05-04 | End: 2020-05-04

## 2020-05-04 RX ADMIN — PEMBROLIZUMAB 232 MILLIGRAM(S): 25 INJECTION, SOLUTION INTRAVENOUS at 12:58

## 2020-05-04 RX ADMIN — PEMBROLIZUMAB 400 MILLIGRAM(S): 25 INJECTION, SOLUTION INTRAVENOUS at 13:30

## 2020-05-04 NOTE — REVIEW OF SYSTEMS
[Fatigue] : fatigue [Constipation] : constipation [SOB on Exertion] : shortness of breath during exertion [Negative] : Allergic/Immunologic [Recent Change In Weight] : ~T no recent weight change [Dysphagia] : no dysphagia [Shortness Of Breath] : no shortness of breath [Difficulty Walking] : difficulty walking [FreeTextEntry2] : seated in wheelchair [FreeTextEntry4] : reports nasal congestion L>R

## 2020-05-04 NOTE — PHYSICAL EXAM
[Ambulatory and capable of all self care but unable to carry out any work activities] : Status 2- Ambulatory and capable of all self care but unable to carry out any work activities. Up and about more than 50% of waking hours [Normal] : affect appropriate [de-identified] :  In wheelchair  but does stand up by herseld [de-identified] : R mid back wound healing from pleurx removal [de-identified] : Has decreased breath sound around RLL but this was sight and good air movement overall and no egophony

## 2020-05-04 NOTE — HISTORY OF PRESENT ILLNESS
[Therapy: ___] : Therapy: [unfilled] [Cycle: ___] : Cycle: [unfilled] [de-identified] : \par This is a 83 year old female who I initially met in [facility] on 3/28/19. She was initially admitted on 2/20/2019 for a right loculated pleural effusion\par approx. 900 CCs, no PE and right apical 3.1x3.0 cm mass with pleural nodules on CTA of chest.  . Pt returned to ED on 3/15/2019 for right pleural\par effusion, had a thoracentesis in ED and was sent home. She than came back for SOB  due to recurrent effusion and a Pleurx was placed. Patholgoy showed adenocarcinoma. \par \par She ha lost a few pounds about  4. She never smoked. She does have weakness.  From her Pleurx she  has 500 cc removed every other day. She has pain after.\par \par Work up:\par 2/20/19 CTA chest:  Large right pleural effusion, maximal axial width of 7 cm correlating with an estimated volume of 900 cc. Associated \par near complete compressive atelectasis of the right lower lobe. Probable loculations of the effusion seen at the apex and at the right heart \par border (for example series 5 images 176, 52; series 9 image 69). 1 cm right upper lobe fissural nodule (series 5 image 139). Patent central \par airways. There is right apical 3.1 x 3.0 cm mass with irregular right apical pleural thickening (series 7, image 44), \par and multiple satellite pleural nodules including a more caudad 2.7 cm para-mediastinal nodule (series 7, image 175). Tissue sampling \par recommended.\par \par Path: \par Pleural fluid: 3/25:  Cell block material is hypocellular and shows a single minutecluster of malignant cells.Immunohistochemistry studies were performed at [facility] and the\par results are noncontributory\par 3/22: Addendum\par Cell block material shows macrophages (positive ),mesothelial cells (positive calretinin) and a single very minute cluster of atypical suspicious cells is negative for Fidel-Ep4.Material is insufficient for further diagnsotic studies (ie,mmunohistochemistry).\par 3/15/19: Pleural fluid: Addendum\par Immunohistochemical studies were performed on the cell block at\par [facility], [address] [place] [id number] (see NOTE). The results\par are as follows:\par \par CK7-                      Rare mesothelial cells positive\par CK20-                    Negative\par CK 5/6-                  Rare mesothelial cells positive\par Calretinin-             Scattered mesothelial cells positive\par CD68-                   Numerous histiocytes positive\par BerEP-4-               Negative\par Napsin-A-             Negative\par TTF-1-                  Negative\par \par These results are non-specific, suggestive of mesothelial\par hyperplasia. The few markedly atypical cells seen in the smear\par are not evident in the cell block.\par \par The atypical cells seen in the current smear are not seen in the\par prior pleural fluid Thinprep smear or cell block (28-RG-).\par \par \par Few marked atypical cells, suspicious for mesothelioma versus\par adenocarcinoma,\par in a background of numerous mesothelial cells, histiocytes and\par small lymphocytes.\par \par \par 3/25/19: Final DiagnosisPOSITIVE FOR MALIGNANT CELLS.Favor metastatic adenocarcinoma. Pending cell block.\par \par  I spke to Dr. Allison  and there are only a few cells thus furhter studies such as IHC, molecular PD-l1 is not possible\par  [FreeTextEntry1] : Started Keytruda on 5/17/19 [de-identified] : 6/5/19\par She is here for ofllow up. Since last visit she started Keytruda. She had a PET/CT on April 17 that showed  Extensive FDG avid right-sided pleural mass/soft tissue thickening, \par with max SUV 21.5 within a right apical pleural soft tissue mass.\par \par 2.  Multiple FDG avid mediastinal lymph nodes, with max SUV 16.5 within a \par 2.6 x 2.1 cm subcarinal node.\par \par 3.  FDG avid lytic lesion within the left superior acetabulum (max SUV \par 14.8), suspicious for osseous metastasis.\par \par 4.  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 Used to be  500 ml  u 3 times a week. Takes 60 mg of oral moprphine a day 20 mg 3 times a day but pain is still severe. Has not had a bowel movement in unknown amount of days does not take lexatives although she has lexatives. \par \par 6/26/19 She is here for follow up. She is due for cycle 3 of Keytruda.  She has about 300 mg drained from her pleurax twice a week. Has SOB but saturated 94% on room air and 94% on ambualtion. Did not torate the fentanyl path had nausea.  On Morphine 10 mg BID doing well.  Constipation  is better. \par \par 7/17/19\par She is here for follow-up of Stage IV lung cancer with son.  She is due for cycle 3 of Keytruda.  CBC is stable from today.  She only takes the liquid morphine at night now.  She started eating slighlty more and has been feeling slightly better ,as per son.  He states that the pleurex drainage is less, it is checked every Friday + Monday.  On Friday there was about 225cc drained, but yesterday no drainage.  We will arrange for hydration today since she has had poor oral intake lately and small appetite.  Son reports she can tolerate fluids but she has early satiety with food.  Patient also reports feeling short of breath at rest, but her O2 sat is 98% on R/a.  Right sided pain is better. \par CT C/A/P (7/6/19) IMPRESSION:Since April 17, 2019:1. No definite new sites of metastatic disease.2. Right-sided pleural soft tissue mass/thickening, overall appearing decreased since April 17, 2019 with primarily residual right apical tumor. Tumor appears to infiltrate the mediastinum.3. No significant change in mediastinal lymphadenopathy including largest 2.6 x 2.1 cm subcarinal lymph node which remains unchanged. Retrocrural 2.5 x 2.3 cm metastasis or additional site of pleural tumor, unchanged.4. Irregularity of right anterior first rib, probably invaded by tumor, stable in retrospect. Left superior acetabular lucent lesion corresponding to FDG avid bone metastasis.5. Right pelvic indeterminate 7 cm mass; possible retroverted uterus with degenerating fibroid or less likely ovarian mass; previously non-FDG avid and probably benign. Further evaluation with pelvic ultrasound or MRI pelvis with and without IV contrast may be helpful.6. Status post right chest tube placement with decreased small loculated right pleural effusion with fluid within the major fissure. 7. New mild to moderate intrahepatic biliary dilation. Correlation with LFTs recommended. Consider further evaluation with ERCP or MRCP with and without IV contrast.\par \par 8/7/19\par She is here with her son. Overall she is doing much better. She is in wheelchair but now less pain, eating better and Pleurax is only draining about 25 cc twice a week, She does complain of SOB still. I explained this will improve as well but asked her to follow up with Dr. Lanza of pul. \par \par 8/28/19\par She is here for follow up. They are to see Dr. Lanza in Early september. There is minimal output from Pleurax.  Pain is much better and does not use narcotics anymore.  Overall better.   She has right sidede nasal congestion and states makes it hard to breath.\par \par 9/18/19\par She is doing well. She is due for cycle 7. Son was not eliana to schedule CTs priro to the visit but she looks better and better every visit and thus most likley still responding. She saw Dr. Lanza who started her on Flonase. She is due to see ENT Dr. Adams.  Appetie is better. No pains. Dr. Lanza is considering to remover the Pleurx possibly in November. \par \par 10/09/2019\par Patient is here for a follow up visit. Due for cycle 8 today. CT chest/abd/pelvis 09/2019 showed improvement in disease. Also has seen Dr. Adams and agreed to continue witj flonase as she has hypertrophy of turbinates. \par She is tolerating Ketryuda well. Her only complaint at this time is trouble in falling asleep. \par \par 10/30/19\par Patient is here for a follow up visit of Stage IV lung cancer with family member.  She is feeling well with no new complaints.   She feels dyspneic when taking a deep breath but her pain has improved.  Most recent CBC is stable. CT C/A/P on September 22nd showed improvement in right apical lung mass and pleural effusion and drained 150cc.  Due for cycle 9 today. \par \par 12/30/19\par Patient is here for a follow-up visit for  lung cancer with son.  Reviewed imaging results with patient and her family, which suggests worsening effusion but otherwise stable.  They drained 500cc from pleurx yesterday, reportedly sanguinous drainage.  She is agreeable to continue with cycle 12 of Keytruda tomorrow. She reports persistent dyspnea.  We discussed that this tx regimen may be losing its responsiveness.\par CT C/A/P (12.27.19) IMPRESSION: Worsening right-sided pleural effusion. Right apical spiculated nodule without difference. Unchanged appearance of the mediastinum. Unchanged appearance of the third thoracic vertebral body and right second rib. No interval change since prior examination. No change in hepatic hypodensity (likely fatty infiltration), splenic hypodensity (too small to characterize), left adrenal nodule or 1 cm retroperitoneal nodule on the right. No change in fat-containing mass in the anterior left thigh.\par \par 2/10/2020\par She is here for follow-up accompanied by son.  Since last visit she was admitted to hospital for pain and nausea. She had pleurx catheter removed during most recent hospitalization on 1/13/2020.  She complains of insomnia but generally feels well.  Most recent scan reviewed with patient.  She reports she had a rash after discharge from the hospital which has since resolved.  \par CT Chest (1.11.2020) IMPRESSION:Since CT scan performed on December 27, 2019;1.  Right thoracostomy tube in stable position with slight interval decrease of right pleural effusion. 2.   Interval increase in right middle lobe consolidation/atelectasis. 3.  Stable spiculated nodule in the right lung apex, consistent with patient's known malignancy.\par \par She feels much better now that her Pleurx is gone. Does not have any pain. Feels dyspneic which is stable.\par \par 3/2/2020\par She is here for follow-up for lung cancer accompanied by son. Since last visit, she followed with Dr. Lanza, [facility], and Dr. Hirsch from [facility] who agreed against placing additional drainage catheters based on most recent CT imaging.  She still reports some SOB.  She also reports taking a [place] medication named Volidol and Panangin for here breathing.  Patient reports mild dizziness after taking Trazodone; we recommended halving dose.  \par \par 5/4/2020\par She is here for follow-up for lung cancer accompanied by son.  Her last treatment as in [facility] due to COVID> she is doing well .She was on Room air. She states SOB is the same. She has paraspinal back pain/hip pain.

## 2020-05-04 NOTE — ASSESSMENT
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 Lanza as indicated\par \par #Dysphagia and decrease appetite \par - this is much better and eating well\par - Jevity 1.2 hector ordered to be consumed BiD\par \par #SOB mainly when she tries to sleep; due to right nasal congestion\par - c/w Flonase\par \par #Has some insomnia \par - c/w Melatonin 5 mg at bedtime \par \par RTC in  6 weeks if CT is stable

## 2020-05-04 NOTE — REASON FOR VISIT
[Follow-Up Visit] : a follow-up [Patient Declined  Services] : - None: Patient declined  services [FreeTextEntry2] : Stage IV lung cancer  [FreeTextEntry3] : I speak fluent [place]

## 2020-05-05 LAB
ALBUMIN SERPL ELPH-MCNC: 4.1 G/DL
ALP BLD-CCNC: 75 U/L
ALT SERPL-CCNC: 11 U/L
ANION GAP SERPL CALC-SCNC: 13 MMOL/L
AST SERPL-CCNC: 15 U/L
BILIRUB SERPL-MCNC: 0.4 MG/DL
BUN SERPL-MCNC: 19 MG/DL
CALCIUM SERPL-MCNC: 9.6 MG/DL
CHLORIDE SERPL-SCNC: 109 MMOL/L
CO2 SERPL-SCNC: 25 MMOL/L
CREAT SERPL-MCNC: 1 MG/DL
GLUCOSE SERPL-MCNC: 92 MG/DL
POTASSIUM SERPL-SCNC: 3.7 MMOL/L
PROT SERPL-MCNC: 6.9 G/DL
SODIUM SERPL-SCNC: 147 MMOL/L
TSH SERPL-ACNC: 1.07 UIU/ML

## 2020-05-09 ENCOUNTER — RESULT REVIEW (OUTPATIENT)
Age: 85
End: 2020-05-09

## 2020-05-09 ENCOUNTER — OUTPATIENT (OUTPATIENT)
Dept: OUTPATIENT SERVICES | Facility: HOSPITAL | Age: 85
LOS: 1 days | Discharge: HOME | End: 2020-05-09
Payer: MEDICAID

## 2020-05-09 DIAGNOSIS — J91.0 MALIGNANT PLEURAL EFFUSION: ICD-10-CM

## 2020-05-09 DIAGNOSIS — C34.91 MALIGNANT NEOPLASM OF UNSPECIFIED PART OF RIGHT BRONCHUS OR LUNG: ICD-10-CM

## 2020-05-09 DIAGNOSIS — Z90.710 ACQUIRED ABSENCE OF BOTH CERVIX AND UTERUS: Chronic | ICD-10-CM

## 2020-05-09 PROCEDURE — 74177 CT ABD & PELVIS W/CONTRAST: CPT | Mod: 26

## 2020-05-09 PROCEDURE — 71260 CT THORAX DX C+: CPT | Mod: 26

## 2020-06-12 ENCOUNTER — APPOINTMENT (OUTPATIENT)
Dept: HEMATOLOGY ONCOLOGY | Facility: CLINIC | Age: 85
End: 2020-06-12

## 2020-06-12 ENCOUNTER — LABORATORY RESULT (OUTPATIENT)
Age: 85
End: 2020-06-12

## 2020-06-15 ENCOUNTER — LABORATORY RESULT (OUTPATIENT)
Age: 85
End: 2020-06-15

## 2020-06-15 ENCOUNTER — APPOINTMENT (OUTPATIENT)
Dept: HEMATOLOGY ONCOLOGY | Facility: CLINIC | Age: 85
End: 2020-06-15
Payer: MEDICAID

## 2020-06-15 ENCOUNTER — APPOINTMENT (OUTPATIENT)
Dept: INFUSION THERAPY | Facility: CLINIC | Age: 85
End: 2020-06-15
Payer: MEDICAID

## 2020-06-15 VITALS
TEMPERATURE: 97.6 F | HEIGHT: 62 IN | OXYGEN SATURATION: 92 % | HEART RATE: 62 BPM | SYSTOLIC BLOOD PRESSURE: 139 MMHG | WEIGHT: 152 LBS | RESPIRATION RATE: 16 BRPM | BODY MASS INDEX: 27.97 KG/M2 | DIASTOLIC BLOOD PRESSURE: 70 MMHG

## 2020-06-15 LAB
HCT VFR BLD CALC: 35.3 %
HGB BLD-MCNC: 11.8 G/DL
MCHC RBC-ENTMCNC: 26.5 PG
MCHC RBC-ENTMCNC: 33.4 G/DL
MCV RBC AUTO: 79.3 FL
PLATELET # BLD AUTO: 212 K/UL
PMV BLD: 9.6 FL
RBC # BLD: 4.45 M/UL
RBC # FLD: 14.6 %
WBC # FLD AUTO: 5.38 K/UL

## 2020-06-15 PROCEDURE — 99214 OFFICE O/P EST MOD 30 MIN: CPT

## 2020-06-15 RX ORDER — PEMBROLIZUMAB 25 MG/ML
400 INJECTION, SOLUTION INTRAVENOUS ONCE
Refills: 0 | Status: COMPLETED | OUTPATIENT
Start: 2020-06-15 | End: 2020-06-15

## 2020-06-15 RX ADMIN — PEMBROLIZUMAB 400 MILLIGRAM(S): 25 INJECTION, SOLUTION INTRAVENOUS at 15:00

## 2020-06-15 RX ADMIN — PEMBROLIZUMAB 232 MILLIGRAM(S): 25 INJECTION, SOLUTION INTRAVENOUS at 14:23

## 2020-06-16 LAB
ALBUMIN SERPL ELPH-MCNC: 4.1 G/DL
ALP BLD-CCNC: 74 U/L
ALT SERPL-CCNC: 12 U/L
ANION GAP SERPL CALC-SCNC: 15 MMOL/L
AST SERPL-CCNC: 14 U/L
BILIRUB SERPL-MCNC: 0.4 MG/DL
BUN SERPL-MCNC: 20 MG/DL
CALCIUM SERPL-MCNC: 9.2 MG/DL
CHLORIDE SERPL-SCNC: 107 MMOL/L
CO2 SERPL-SCNC: 23 MMOL/L
CREAT SERPL-MCNC: 1 MG/DL
GLUCOSE SERPL-MCNC: 93 MG/DL
POTASSIUM SERPL-SCNC: 3.9 MMOL/L
PROT SERPL-MCNC: 6.6 G/DL
SODIUM SERPL-SCNC: 145 MMOL/L

## 2020-06-16 NOTE — ASSESSMENT
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 Lanza  if needed\par \par #Dysphagia and decrease appetite \par - this is much better and eating well\par - Jevity 1.2 hector ordered to be consumed BiD\par \par #SOB mainly when she tries to sleep; due to right nasal congestion\par - c/w Flonase, rx refilled\par \par #Has some insomnia \par - c/w Melatonin 5 mg at bedtime \par \par RTC in  6 weeks, sooner if needed

## 2020-06-16 NOTE — HISTORY OF PRESENT ILLNESS
[Therapy: ___] : Therapy: [unfilled] [Cycle: ___] : Cycle: [unfilled] [de-identified] : \par This is a 83 year old female who I initially met in [facility] on 3/28/19. She was initially admitted on 2/20/2019 for a right loculated pleural effusion\par approx. 900 CCs, no PE and right apical 3.1x3.0 cm mass with pleural nodules on CTA of chest.  . Pt returned to ED on 3/15/2019 for right pleural\par effusion, had a thoracentesis in ED and was sent home. She than came back for SOB  due to recurrent effusion and a Pleurx was placed. Patholgoy showed adenocarcinoma. \par \par She ha lost a few pounds about  4. She never smoked. She does have weakness.  From her Pleurx she  has 500 cc removed every other day. She has pain after.\par \par Work up:\par 2/20/19 CTA chest:  Large right pleural effusion, maximal axial width of 7 cm correlating with an estimated volume of 900 cc. Associated \par near complete compressive atelectasis of the right lower lobe. Probable loculations of the effusion seen at the apex and at the right heart \par border (for example series 5 images 176, 52; series 9 image 69). 1 cm right upper lobe fissural nodule (series 5 image 139). Patent central \par airways. There is right apical 3.1 x 3.0 cm mass with irregular right apical pleural thickening (series 7, image 44), \par and multiple satellite pleural nodules including a more caudad 2.7 cm para-mediastinal nodule (series 7, image 175). Tissue sampling \par recommended.\par \par Path: \par Pleural fluid: 3/25:  Cell block material is hypocellular and shows a single minutecluster of malignant cells.Immunohistochemistry studies were performed at [facility] and the\par results are noncontributory\par 3/22: Addendum\par Cell block material shows macrophages (positive ),mesothelial cells (positive calretinin) and a single very minute cluster of atypical suspicious cells is negative for Fidel-Ep4.Material is insufficient for further diagnsotic studies (ie,mmunohistochemistry).\par 3/15/19: Pleural fluid: Addendum\par Immunohistochemical studies were performed on the cell block at\par [facility], [address] [place] [id number] (see NOTE). The results\par are as follows:\par \par CK7-                      Rare mesothelial cells positive\par CK20-                    Negative\par CK 5/6-                  Rare mesothelial cells positive\par Calretinin-             Scattered mesothelial cells positive\par CD68-                   Numerous histiocytes positive\par BerEP-4-               Negative\par Napsin-A-             Negative\par TTF-1-                  Negative\par \par These results are non-specific, suggestive of mesothelial\par hyperplasia. The few markedly atypical cells seen in the smear\par are not evident in the cell block.\par \par The atypical cells seen in the current smear are not seen in the\par prior pleural fluid Thinprep smear or cell block (76-PH-).\par \par \par Few marked atypical cells, suspicious for mesothelioma versus\par adenocarcinoma,\par in a background of numerous mesothelial cells, histiocytes and\par small lymphocytes.\par \par \par 3/25/19: Final DiagnosisPOSITIVE FOR MALIGNANT CELLS.Favor metastatic adenocarcinoma. Pending cell block.\par \par  I spke to Dr. Allison  and there are only a few cells thus furhter studies such as IHC, molecular PD-l1 is not possible\par  [FreeTextEntry1] : Started Keytruda on 5/17/19, changed to 6 week dosing starting 5/4/2020 [de-identified] : 6/5/19\par She is here for ofllow up. Since last visit she started Keytruda. She had a PET/CT on April 17 that showed  Extensive FDG avid right-sided pleural mass/soft tissue thickening, \par with max SUV 21.5 within a right apical pleural soft tissue mass.\par \par 2.  Multiple FDG avid mediastinal lymph nodes, with max SUV 16.5 within a \par 2.6 x 2.1 cm subcarinal node.\par \par 3.  FDG avid lytic lesion within the left superior acetabulum (max SUV \par 14.8), suspicious for osseous metastasis.\par \par 4.  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 Used to be  500 ml  u 3 times a week. Takes 60 mg of oral moprphine a day 20 mg 3 times a day but pain is still severe. Has not had a bowel movement in unknown amount of days does not take lexatives although she has lexatives. \par \par 6/26/19 She is here for follow up. She is due for cycle 3 of Keytruda.  She has about 300 mg drained from her pleurax twice a week. Has SOB but saturated 94% on room air and 94% on ambualtion. Did not torate the fentanyl path had nausea.  On Morphine 10 mg BID doing well.  Constipation  is better. \par \par 7/17/19\par She is here for follow-up of Stage IV lung cancer with son.  She is due for cycle 3 of Keytruda.  CBC is stable from today.  She only takes the liquid morphine at night now.  She started eating slighlty more and has been feeling slightly better ,as per son.  He states that the pleurex drainage is less, it is checked every Friday + Monday.  On Friday there was about 225cc drained, but yesterday no drainage.  We will arrange for hydration today since she has had poor oral intake lately and small appetite.  Son reports she can tolerate fluids but she has early satiety with food.  Patient also reports feeling short of breath at rest, but her O2 sat is 98% on R/a.  Right sided pain is better. \par CT C/A/P (7/6/19) IMPRESSION:Since April 17, 2019:1. No definite new sites of metastatic disease.2. Right-sided pleural soft tissue mass/thickening, overall appearing decreased since April 17, 2019 with primarily residual right apical tumor. Tumor appears to infiltrate the mediastinum.3. No significant change in mediastinal lymphadenopathy including largest 2.6 x 2.1 cm subcarinal lymph node which remains unchanged. Retrocrural 2.5 x 2.3 cm metastasis or additional site of pleural tumor, unchanged.4. Irregularity of right anterior first rib, probably invaded by tumor, stable in retrospect. Left superior acetabular lucent lesion corresponding to FDG avid bone metastasis.5. Right pelvic indeterminate 7 cm mass; possible retroverted uterus with degenerating fibroid or less likely ovarian mass; previously non-FDG avid and probably benign. Further evaluation with pelvic ultrasound or MRI pelvis with and without IV contrast may be helpful.6. Status post right chest tube placement with decreased small loculated right pleural effusion with fluid within the major fissure. 7. New mild to moderate intrahepatic biliary dilation. Correlation with LFTs recommended. Consider further evaluation with ERCP or MRCP with and without IV contrast.\par \par 8/7/19\par She is here with her son. Overall she is doing much better. She is in wheelchair but now less pain, eating better and Pleurax is only draining about 25 cc twice a week, She does complain of SOB still. I explained this will improve as well but asked her to follow up with Dr. Lanza of pul. \par \par 8/28/19\par She is here for follow up. They are to see Dr. Lanza in Early september. There is minimal output from Pleurax.  Pain is much better and does not use narcotics anymore.  Overall better.   She has right sidede nasal congestion and states makes it hard to breath.\par \par 9/18/19\par She is doing well. She is due for cycle 7. Son was not eliana to schedule CTs priro to the visit but she looks better and better every visit and thus most likley still responding. She saw Dr. Lanza who started her on Flonase. She is due to see ENT Dr. Adams.  Appetie is better. No pains. Dr. Lanza is considering to remover the Pleurx possibly in November. \par \par 10/09/2019\par Patient is here for a follow up visit. Due for cycle 8 today. CT chest/abd/pelvis 09/2019 showed improvement in disease. Also has seen Dr. Adams and agreed to continue witj flonase as she has hypertrophy of turbinates. \par She is tolerating Ketryuda well. Her only complaint at this time is trouble in falling asleep. \par \par 10/30/19\par Patient is here for a follow up visit of Stage IV lung cancer with family member.  She is feeling well with no new complaints.   She feels dyspneic when taking a deep breath but her pain has improved.  Most recent CBC is stable. CT C/A/P on September 22nd showed improvement in right apical lung mass and pleural effusion and drained 150cc.  Due for cycle 9 today. \par \par 12/30/19\par Patient is here for a follow-up visit for  lung cancer with son.  Reviewed imaging results with patient and her family, which suggests worsening effusion but otherwise stable.  They drained 500cc from pleurx yesterday, reportedly sanguinous drainage.  She is agreeable to continue with cycle 12 of Keytruda tomorrow. She reports persistent dyspnea.  We discussed that this tx regimen may be losing its responsiveness.\par CT C/A/P (12.27.19) IMPRESSION: Worsening right-sided pleural effusion. Right apical spiculated nodule without difference. Unchanged appearance of the mediastinum. Unchanged appearance of the third thoracic vertebral body and right second rib. No interval change since prior examination. No change in hepatic hypodensity (likely fatty infiltration), splenic hypodensity (too small to characterize), left adrenal nodule or 1 cm retroperitoneal nodule on the right. No change in fat-containing mass in the anterior left thigh.\par \par 2/10/2020\par She is here for follow-up accompanied by son.  Since last visit she was admitted to hospital for pain and nausea. She had pleurx catheter removed during most recent hospitalization on 1/13/2020.  She complains of insomnia but generally feels well.  Most recent scan reviewed with patient.  She reports she had a rash after discharge from the hospital which has since resolved.  \par CT Chest (1.11.2020) IMPRESSION:Since CT scan performed on December 27, 2019;1.  Right thoracostomy tube in stable position with slight interval decrease of right pleural effusion. 2.   Interval increase in right middle lobe consolidation/atelectasis. 3.  Stable spiculated nodule in the right lung apex, consistent with patient's known malignancy.\par \par She feels much better now that her Pleurx is gone. Does not have any pain. Feels dyspneic which is stable.\par \par 3/2/2020\par She is here for follow-up for lung cancer accompanied by son. Since last visit, she followed with Dr. Lanza, [facility], and Dr. Hirsch from [facility] who agreed against placing additional drainage catheters based on most recent CT imaging.  She still reports some SOB.  She also reports taking a [place] medication named Volidol and Panangin for here breathing.  Patient reports mild dizziness after taking Trazodone; we recommended halving dose.  \par \par 5/4/2020\par She is here for follow-up for lung cancer accompanied by son.  Her last treatment as in [facility] due to COVID> she is doing well .She was on Room air. She states SOB is the same. She has paraspinal back pain/hip pain. \par \par 6/15/2020\par She is here for follow-up for lung cancer accompanied by son.  Patient denies fever, chills, vomiting, worsening cough, new rash, persistent diarrhea or bleeding.  Reviewed most recent imaging and had a discussion regarding continuing with Keytruda every 6 weeks for now as she is clinically stable.  She does complain of weakness and nasal congestion which improves using Fluticasone spray.  She uses oxygen intermittently at home.  \par CT C/A/P (5.9.2020) IMPRESSION:1. Stable spiculated right upper lobe mass measuring up to 3.1 cm.2. New areas of ground glass and reticular opacities within the medial left lung, with new subcentimeter nodular opacities within the left lower lobe. Findings may be postinfectious or postinflammatory in etiology. CT of the chest in 2-3 months is recommended.3. Decreased mediastinal and right hilar lymphadenopathy.4. Resolved right pleural effusion. 1.  No findings of new or progressive metastatic disease in the abdomen or pelvis. 2.  Indeterminate left adrenal nodule and right retroperitoneal nodule, stable in size from prior CT. 3.  Partially visualized fat-containing mass in the anterior left thigh-considerations include benign and malignant neoplastic etiology. Recommend further evaluation with MRI if not already performed. 4.  Sclerotic appearance of metastatic left acetabular lesion, similar in appearance to prior CT, possibly representing healing- was previously lytic on PET/CT of 4/17/2019. 5.  Lobulated pelvic structure again seen, possibly uterus with fibroids. Further evaluation with pelvic ultrasound may be performed as warranted.

## 2020-06-16 NOTE — REVIEW OF SYSTEMS
[Fatigue] : fatigue [Constipation] : constipation [SOB on Exertion] : shortness of breath during exertion [Difficulty Walking] : difficulty walking [Negative] : Allergic/Immunologic [Recent Change In Weight] : ~T no recent weight change [Dysphagia] : no dysphagia [Shortness Of Breath] : no shortness of breath [FreeTextEntry4] : reports nasal congestion L>R [FreeTextEntry2] : seated in wheelchair

## 2020-06-16 NOTE — PHYSICAL EXAM
[Ambulatory and capable of all self care but unable to carry out any work activities] : Status 2- Ambulatory and capable of all self care but unable to carry out any work activities. Up and about more than 50% of waking hours [Normal] : affect appropriate [de-identified] :  In wheelchair  but does stand up by herseld [de-identified] : Has decreased breath sound around RLL but this was sight and good air movement overall and no egophony  [de-identified] : R mid back wound healing from pleurx removal

## 2020-06-16 NOTE — REASON FOR VISIT
[Follow-Up Visit] : a follow-up [Patient Declined  Services] : - None: Patient declined  services [FreeTextEntry2] : Stage IV lung cancer  [FreeTextEntry3] : I speak fluent [place]

## 2020-06-16 NOTE — END OF VISIT
[FreeTextEntry3] : She is doing well. She is here with her son. Her last CT was stable. We will c.w keytruda every 6 weeks. RTC in 6 weeks. She continues  to have multitude of chronic non specific issues and all questions were answered.

## 2020-06-17 LAB — TSH SERPL-ACNC: 0.59 UIU/ML

## 2020-06-26 ENCOUNTER — APPOINTMENT (OUTPATIENT)
Dept: PULMONOLOGY | Facility: CLINIC | Age: 85
End: 2020-06-26
Payer: MEDICAID

## 2020-06-26 ENCOUNTER — OUTPATIENT (OUTPATIENT)
Dept: OUTPATIENT SERVICES | Facility: HOSPITAL | Age: 85
LOS: 1 days | Discharge: HOME | End: 2020-06-26

## 2020-06-26 DIAGNOSIS — Z90.710 ACQUIRED ABSENCE OF BOTH CERVIX AND UTERUS: Chronic | ICD-10-CM

## 2020-06-26 PROCEDURE — 99213 OFFICE O/P EST LOW 20 MIN: CPT | Mod: GC

## 2020-06-26 NOTE — HISTORY OF PRESENT ILLNESS
[Never] : never [TextBox_4] : 84 yo F with PMH of HTN, malignant pleural effusion and right lung adenocarcinoma (diagnosed in 2019) with mets to bone s/p 6 cycles of Keytruda (started on 05/2019) presents for follow up.  As per son, patient has had increasing dyspnea; and has had daily night time awakenings due to her dyspnea but denies orthopnea.  Patient has moderate dyspnea on light exertion and does have mild dyspnea when at rest.  Patient's sonm endorses that she has been weaker since her last visit.

## 2020-06-26 NOTE — ASSESSMENT
[FreeTextEntry1] : 84 yo F with PMH of HTN, malignant pleural effusion and right lung adenocarcinoma (diagnosed in 2019) with mets to bone s/p 9 cycles of Keytruda (started on 05/2019) presents for follow up.  As per son, patient has had increasing dyspnea; and has had daily night time awakenings due to her dyspnea but denies orthopnea.  Patient has moderate dyspnea on light exertion and does have mild dyspnea when at rest.  Patient's sonm endorses that she has been weaker since her last visit.  \par \par #Metastatic Right Lung Adenocarcinoma \par CTA of Chest demonstrates no interval changes of RUL mass, shows resolution of pleural effusion \par Patient endorses more dyspnea than last visit\par Albuterol PRN prescribed with spacer \par f/u PRN \par f/u heme/onc-->on keytruda\par

## 2020-06-26 NOTE — REVIEW OF SYSTEMS
[Fatigue] : fatigue [Poor Appetite] : poor appetite [Dyspnea] : dyspnea [SOB on Exertion] : sob on exertion [Anemia] : anemia [Cough] : no cough [Syncope] : no syncope [Orthopnea] : no orthopnea [Chest Discomfort] : no chest discomfort [Diarrhea] : no diarrhea [GERD] : no gerd

## 2020-06-26 NOTE — PHYSICAL EXAM
[No Neck Mass] : no neck mass [Normal Rate/Rhythm] : normal rate/rhythm [Normal S1, S2] : normal s1, s2 [No Murmurs] : no murmurs [No Abnormalities] : no abnormalities [Normal Bowel Sounds] : normal bowel sounds [No Masses] : no masses [No Clubbing] : no clubbing [No Cyanosis] : no cyanosis [No Edema] : no edema [TextBox_2] : Patient in tripod position; dyspneic  [TextBox_68] : Decreased breath sounds bilaterally

## 2020-07-07 DIAGNOSIS — C34.91 MALIGNANT NEOPLASM OF UNSPECIFIED PART OF RIGHT BRONCHUS OR LUNG: ICD-10-CM

## 2020-07-14 ENCOUNTER — APPOINTMENT (OUTPATIENT)
Dept: CARDIOLOGY | Facility: CLINIC | Age: 85
End: 2020-07-14
Payer: MEDICAID

## 2020-07-14 VITALS
WEIGHT: 152 LBS | SYSTOLIC BLOOD PRESSURE: 142 MMHG | BODY MASS INDEX: 27.97 KG/M2 | HEIGHT: 62 IN | DIASTOLIC BLOOD PRESSURE: 70 MMHG

## 2020-07-14 VITALS — HEART RATE: 67 BPM | TEMPERATURE: 97.7 F

## 2020-07-14 DIAGNOSIS — Z82.49 FAMILY HISTORY OF ISCHEMIC HEART DISEASE AND OTHER DISEASES OF THE CIRCULATORY SYSTEM: ICD-10-CM

## 2020-07-14 DIAGNOSIS — I49.3 VENTRICULAR PREMATURE DEPOLARIZATION: ICD-10-CM

## 2020-07-14 DIAGNOSIS — J90 PLEURAL EFFUSION, NOT ELSEWHERE CLASSIFIED: ICD-10-CM

## 2020-07-14 PROCEDURE — 99204 OFFICE O/P NEW MOD 45 MIN: CPT

## 2020-07-14 PROCEDURE — 93000 ELECTROCARDIOGRAM COMPLETE: CPT

## 2020-07-14 RX ORDER — ASPIRIN/CALCIUM/MAG/ALUMINUM 81 MG
81 TABLET, DELAYED RELEASE (ENTERIC COATED) ORAL
Refills: 0 | Status: COMPLETED | COMMUNITY
End: 2020-07-14

## 2020-07-14 RX ORDER — MAGNESIUM OXIDE 241.3 MG/1000MG
400 TABLET ORAL DAILY
Qty: 30 | Refills: 3 | Status: ACTIVE | COMMUNITY
Start: 2020-07-14 | End: 1900-01-01

## 2020-07-14 NOTE — ASSESSMENT
[FreeTextEntry1] : Advanced Adenocarcinoma of the lung, Stage IV on Keytruda\par CT scan results noted\par ECG - NSR, poor R-wave progression, PVC's.\par \par Re-start Mg (will use MG oxide, as her [place] medication is not available here and her potassium level is normal, so does not need to be supplemented.\par \par Obtain 2D echo to assess for CHF\par \par F/u with pulmonary and oncology\par \par Check BNP with next labs\par \par F/u after the echo

## 2020-07-14 NOTE — HISTORY OF PRESENT ILLNESS
[FreeTextEntry1] : 86 y/o female with advanced NSCLCA (Adenocarcinoma, Stage IV) on chemotherapy with Keytruda, started last year, was referred for cardiology evaluation for dyspnea. Patient reports dyspnea, both at rest and with exertion. + fatigue. No chest pain. had a pleural effusion, which had resolved. Recent CT scan with stable disease, no effusion, ground glass opacities in the lung field. patient also reports she had PVC's - was diagnosed in [place] and was taking "Panangin" (Mg and K supplement).

## 2020-07-14 NOTE — PHYSICAL EXAM
[Normal Appearance] : normal appearance [Well Groomed] : well groomed [No Deformities] : no deformities [General Appearance - In No Acute Distress] : no acute distress [Normal Conjunctiva] : the conjunctiva exhibited no abnormalities [Heart Rate And Rhythm] : heart rate and rhythm were normal [Heart Sounds] : normal S1 and S2 [Murmurs] : no murmurs present [Edema] : no peripheral edema present [] : no respiratory distress [Exaggerated Use Of Accessory Muscles For Inspiration] : no accessory muscle use [Respiration, Rhythm And Depth] : normal respiratory rhythm and effort [Auscultation Breath Sounds / Voice Sounds] : lungs were clear to auscultation bilaterally [Bowel Sounds] : normal bowel sounds [Abdomen Soft] : soft [Abnormal Walk] : normal gait [Abdomen Tenderness] : non-tender [FreeTextEntry1] : walks with a cane [Nail Clubbing] : no clubbing of the fingernails [Cyanosis, Localized] : no localized cyanosis [Skin Turgor] : normal skin turgor [Skin Color & Pigmentation] : normal skin color and pigmentation [Oriented To Time, Place, And Person] : oriented to person, place, and time [Affect] : the affect was normal

## 2020-07-24 ENCOUNTER — APPOINTMENT (OUTPATIENT)
Dept: CARDIOLOGY | Facility: CLINIC | Age: 85
End: 2020-07-24
Payer: MEDICAID

## 2020-07-24 PROCEDURE — 93306 TTE W/DOPPLER COMPLETE: CPT

## 2020-07-27 ENCOUNTER — APPOINTMENT (OUTPATIENT)
Dept: HEMATOLOGY ONCOLOGY | Facility: CLINIC | Age: 85
End: 2020-07-27

## 2020-07-27 ENCOUNTER — LABORATORY RESULT (OUTPATIENT)
Age: 85
End: 2020-07-27

## 2020-07-27 ENCOUNTER — APPOINTMENT (OUTPATIENT)
Dept: INFUSION THERAPY | Facility: CLINIC | Age: 85
End: 2020-07-27

## 2020-07-27 LAB
ALBUMIN SERPL ELPH-MCNC: 4.1 G/DL
ALP BLD-CCNC: 74 U/L
ALT SERPL-CCNC: 16 U/L
ANION GAP SERPL CALC-SCNC: 15 MMOL/L
AST SERPL-CCNC: 18 U/L
BILIRUB SERPL-MCNC: 0.4 MG/DL
BUN SERPL-MCNC: 22 MG/DL
CALCIUM SERPL-MCNC: 9.5 MG/DL
CHLORIDE SERPL-SCNC: 106 MMOL/L
CO2 SERPL-SCNC: 21 MMOL/L
CREAT SERPL-MCNC: 1.1 MG/DL
GLUCOSE SERPL-MCNC: 106 MG/DL
HCT VFR BLD CALC: 34.2 %
HGB BLD-MCNC: 11.5 G/DL
MCHC RBC-ENTMCNC: 27.3 PG
MCHC RBC-ENTMCNC: 33.6 G/DL
MCV RBC AUTO: 81.2 FL
PLATELET # BLD AUTO: 208 K/UL
PMV BLD: 9.7 FL
POTASSIUM SERPL-SCNC: 4 MMOL/L
PROT SERPL-MCNC: 6.5 G/DL
RBC # BLD: 4.21 M/UL
RBC # FLD: 14.6 %
SODIUM SERPL-SCNC: 142 MMOL/L
WBC # FLD AUTO: 5.37 K/UL

## 2020-07-27 RX ORDER — PEMBROLIZUMAB 25 MG/ML
400 INJECTION, SOLUTION INTRAVENOUS ONCE
Refills: 0 | Status: COMPLETED | OUTPATIENT
Start: 2020-07-27 | End: 2020-07-27

## 2020-07-27 RX ADMIN — PEMBROLIZUMAB 232 MILLIGRAM(S): 25 INJECTION, SOLUTION INTRAVENOUS at 13:13

## 2020-07-27 RX ADMIN — PEMBROLIZUMAB 400 MILLIGRAM(S): 25 INJECTION, SOLUTION INTRAVENOUS at 13:43

## 2020-07-28 LAB — TSH SERPL-ACNC: 0.51 UIU/ML

## 2020-08-03 ENCOUNTER — APPOINTMENT (OUTPATIENT)
Dept: CARDIOLOGY | Facility: CLINIC | Age: 85
End: 2020-08-03
Payer: MEDICAID

## 2020-08-03 ENCOUNTER — LABORATORY RESULT (OUTPATIENT)
Age: 85
End: 2020-08-03

## 2020-08-03 VITALS
BODY MASS INDEX: 27.6 KG/M2 | HEIGHT: 62 IN | HEART RATE: 78 BPM | WEIGHT: 150 LBS | DIASTOLIC BLOOD PRESSURE: 70 MMHG | SYSTOLIC BLOOD PRESSURE: 162 MMHG | TEMPERATURE: 97.8 F

## 2020-08-03 PROCEDURE — 93000 ELECTROCARDIOGRAM COMPLETE: CPT

## 2020-08-03 PROCEDURE — 99213 OFFICE O/P EST LOW 20 MIN: CPT

## 2020-08-03 RX ORDER — LOSARTAN POTASSIUM 25 MG/1
25 TABLET, FILM COATED ORAL DAILY
Qty: 90 | Refills: 3 | Status: ACTIVE | COMMUNITY
Start: 2020-08-03 | End: 1900-01-01

## 2020-08-03 NOTE — HISTORY OF PRESENT ILLNESS
[FreeTextEntry1] : 84 y/o female with advanced NSCLCA (Adenocarcinoma, Stage IV) on chemotherapy with Keytruda, started last year, was referred for cardiology evaluation for dyspnea. Patient reports dyspnea, both at rest and with exertion. + fatigue. No chest pain. had a pleural effusion, which had resolved. Recent CT scan with stable disease, no effusion, ground glass opacities in the lung field. patient also reports she had PVC's - was diagnosed in [place] and was taking "Panangin" (Mg and K supplement).

## 2020-08-03 NOTE — ASSESSMENT
[FreeTextEntry1] : Advanced Adenocarcinoma of the lung, Stage IV on Keytruda\par CT scan results noted\par ECG - NSR, poor R-wave progression, PVC's.\par \par C/w Mg (will use MG oxide, as her [place] medication is not available here and her potassium level is normal, so does not need to be supplemented.\par \par results of 2D echo reviewed - discussed with the patient and son\par \par F/u with pulmonary and oncology\par \par Check BNP with next labs Rx give to the son\par \par F/u in 3 months

## 2020-08-03 NOTE — PHYSICAL EXAM
[Well Groomed] : well groomed [Normal Appearance] : normal appearance [No Deformities] : no deformities [General Appearance - In No Acute Distress] : no acute distress [Normal Conjunctiva] : the conjunctiva exhibited no abnormalities [] : no respiratory distress [Respiration, Rhythm And Depth] : normal respiratory rhythm and effort [Auscultation Breath Sounds / Voice Sounds] : lungs were clear to auscultation bilaterally [Exaggerated Use Of Accessory Muscles For Inspiration] : no accessory muscle use [Heart Rate And Rhythm] : heart rate and rhythm were normal [Murmurs] : no murmurs present [Heart Sounds] : normal S1 and S2 [Bowel Sounds] : normal bowel sounds [Edema] : no peripheral edema present [Abdomen Soft] : soft [Abdomen Tenderness] : non-tender [Abnormal Walk] : normal gait [FreeTextEntry1] : walks with a cane [Cyanosis, Localized] : no localized cyanosis [Nail Clubbing] : no clubbing of the fingernails [Oriented To Time, Place, And Person] : oriented to person, place, and time [Skin Turgor] : normal skin turgor [Skin Color & Pigmentation] : normal skin color and pigmentation [Affect] : the affect was normal

## 2020-09-04 ENCOUNTER — LABORATORY RESULT (OUTPATIENT)
Age: 85
End: 2020-09-04

## 2020-09-04 ENCOUNTER — OUTPATIENT (OUTPATIENT)
Dept: OUTPATIENT SERVICES | Facility: HOSPITAL | Age: 85
LOS: 1 days | Discharge: HOME | End: 2020-09-04

## 2020-09-04 DIAGNOSIS — Z11.59 ENCOUNTER FOR SCREENING FOR OTHER VIRAL DISEASES: ICD-10-CM

## 2020-09-04 DIAGNOSIS — Z90.710 ACQUIRED ABSENCE OF BOTH CERVIX AND UTERUS: Chronic | ICD-10-CM

## 2020-09-09 ENCOUNTER — APPOINTMENT (OUTPATIENT)
Dept: HEMATOLOGY ONCOLOGY | Facility: CLINIC | Age: 85
End: 2020-09-09
Payer: MEDICAID

## 2020-09-09 ENCOUNTER — OUTPATIENT (OUTPATIENT)
Dept: OUTPATIENT SERVICES | Facility: HOSPITAL | Age: 85
LOS: 1 days | Discharge: HOME | End: 2020-09-09

## 2020-09-09 ENCOUNTER — APPOINTMENT (OUTPATIENT)
Dept: INFUSION THERAPY | Facility: CLINIC | Age: 85
End: 2020-09-09
Payer: MEDICAID

## 2020-09-09 ENCOUNTER — LABORATORY RESULT (OUTPATIENT)
Age: 85
End: 2020-09-09

## 2020-09-09 VITALS
HEIGHT: 62 IN | DIASTOLIC BLOOD PRESSURE: 71 MMHG | BODY MASS INDEX: 27.6 KG/M2 | TEMPERATURE: 98.4 F | HEART RATE: 63 BPM | SYSTOLIC BLOOD PRESSURE: 161 MMHG | WEIGHT: 150 LBS | RESPIRATION RATE: 16 BRPM

## 2020-09-09 DIAGNOSIS — Z51.12 ENCOUNTER FOR ANTINEOPLASTIC IMMUNOTHERAPY: ICD-10-CM

## 2020-09-09 DIAGNOSIS — Z90.710 ACQUIRED ABSENCE OF BOTH CERVIX AND UTERUS: Chronic | ICD-10-CM

## 2020-09-09 DIAGNOSIS — J91.0 MALIGNANT PLEURAL EFFUSION: ICD-10-CM

## 2020-09-09 DIAGNOSIS — C34.91 MALIGNANT NEOPLASM OF UNSPECIFIED PART OF RIGHT BRONCHUS OR LUNG: ICD-10-CM

## 2020-09-09 LAB
HCT VFR BLD CALC: 36.9 %
HGB BLD-MCNC: 12.1 G/DL
MCHC RBC-ENTMCNC: 26.8 PG
MCHC RBC-ENTMCNC: 32.8 G/DL
MCV RBC AUTO: 81.8 FL
PLATELET # BLD AUTO: 224 K/UL
PMV BLD: 9.8 FL
RBC # BLD: 4.51 M/UL
RBC # FLD: 14.1 %
WBC # FLD AUTO: 5.83 K/UL

## 2020-09-09 PROCEDURE — 99214 OFFICE O/P EST MOD 30 MIN: CPT

## 2020-09-09 RX ORDER — PEMBROLIZUMAB 25 MG/ML
400 INJECTION, SOLUTION INTRAVENOUS ONCE
Refills: 0 | Status: COMPLETED | OUTPATIENT
Start: 2020-09-09 | End: 2020-09-09

## 2020-09-09 RX ADMIN — PEMBROLIZUMAB 232 MILLIGRAM(S): 25 INJECTION, SOLUTION INTRAVENOUS at 12:21

## 2020-09-09 RX ADMIN — PEMBROLIZUMAB 400 MILLIGRAM(S): 25 INJECTION, SOLUTION INTRAVENOUS at 12:55

## 2020-09-10 LAB
ALBUMIN SERPL ELPH-MCNC: 4.2 G/DL
ALP BLD-CCNC: 75 U/L
ALT SERPL-CCNC: 18 U/L
ANION GAP SERPL CALC-SCNC: 13 MMOL/L
AST SERPL-CCNC: 17 U/L
BILIRUB SERPL-MCNC: 0.4 MG/DL
BUN SERPL-MCNC: 22 MG/DL
CALCIUM SERPL-MCNC: 9.6 MG/DL
CHLORIDE SERPL-SCNC: 106 MMOL/L
CO2 SERPL-SCNC: 26 MMOL/L
CREAT SERPL-MCNC: 1.1 MG/DL
GLUCOSE SERPL-MCNC: 102 MG/DL
POTASSIUM SERPL-SCNC: 4.7 MMOL/L
PROT SERPL-MCNC: 6.9 G/DL
SODIUM SERPL-SCNC: 145 MMOL/L
TSH SERPL-ACNC: 0.7 UIU/ML

## 2020-09-10 NOTE — REVIEW OF SYSTEMS
[Fatigue] : fatigue [Difficulty Walking] : difficulty walking [Negative] : Allergic/Immunologic [Shortness Of Breath] : shortness of breath [Recent Change In Weight] : ~T no recent weight change [Dysphagia] : no dysphagia [SOB on Exertion] : no shortness of breath during exertion [FreeTextEntry2] : seated in wheelchair [Constipation] : no constipation [FreeTextEntry4] : reports nasal congestion [FreeTextEntry6] : O2 sat 97% at rest

## 2020-09-10 NOTE — ASSESSMENT
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  \par - c/w Keytruda 400 mg every 6 weeks\par She follows with Dr Lanza prn \par Script provided for repeat CT CAP\par \par #Dysphagia and decrease appetite - better now \par c/w- Jevity 1.2 hector  BiD\par \par #SOB mainly when she tries to sleep; due to right nasal congestion\par - c/w Flonase, rx refilled\par On Albuterol prn\par Advised to use saline nasal spray as she has c/o dryness in her nose \par \par #Has some insomnia \par - c/w Melatonin 5 mg at bedtime \par \par Will check CMP and TSH today\par RTC in  6 weeks, sooner if needed\par \par Patient was seen and examined and discussed with Dr Craig

## 2020-09-10 NOTE — REASON FOR VISIT
[Follow-Up Visit] : a follow-up [Patient Declined  Services] : - None: Patient declined  services [FreeTextEntry2] : Stage IV lung cancer  [FreeTextEntry3] : I speak fluent Citizen of [place]

## 2020-09-10 NOTE — HISTORY OF PRESENT ILLNESS
[Therapy: ___] : Therapy: [unfilled] [Cycle: ___] : Cycle: [unfilled] [de-identified] : \par This is a 83 year old female who I initially met in [facility] on 3/28/19. She was initially admitted on 2/20/2019 for a right loculated pleural effusion\par approx. 900 CCs, no PE and right apical 3.1x3.0 cm mass with pleural nodules on CTA of chest.  . Pt returned to ED on 3/15/2019 for right pleural\par effusion, had a thoracentesis in ED and was sent home. She than came back for SOB  due to recurrent effusion and a Pleurx was placed. Patholgoy showed adenocarcinoma. \par \par She ha lost a few pounds about  4. She never smoked. She does have weakness.  From her Pleurx she  has 500 cc removed every other day. She has pain after.\par \par Work up:\par 2/20/19 CTA chest:  Large right pleural effusion, maximal axial width of 7 cm correlating with an estimated volume of 900 cc. Associated \par near complete compressive atelectasis of the right lower lobe. Probable loculations of the effusion seen at the apex and at the right heart \par border (for example series 5 images 176, 52; series 9 image 69). 1 cm right upper lobe fissural nodule (series 5 image 139). Patent central \par airways. There is right apical 3.1 x 3.0 cm mass with irregular right apical pleural thickening (series 7, image 44), \par and multiple satellite pleural nodules including a more caudad 2.7 cm para-mediastinal nodule (series 7, image 175). Tissue sampling \par recommended.\par \par Path: \par Pleural fluid: 3/25:  Cell block material is hypocellular and shows a single minutecluster of malignant cells.Immunohistochemistry studies were performed at [facility] and the\par results are noncontributory\par 3/22: Addendum\par Cell block material shows macrophages (positive ),mesothelial cells (positive calretinin) and a single very minute cluster of atypical suspicious cells is negative for Fidel-Ep4.Material is insufficient for further diagnsotic studies (ie,mmunohistochemistry).\par 3/15/19: Pleural fluid: Addendum\par Immunohistochemical studies were performed on the cell block at\par [facility], [address] [place] [id number] (see NOTE). The results\par are as follows:\par \par CK7-                      Rare mesothelial cells positive\par CK20-                    Negative\par CK 5/6-                  Rare mesothelial cells positive\par Calretinin-             Scattered mesothelial cells positive\par CD68-                   Numerous histiocytes positive\par BerEP-4-               Negative\par Napsin-A-             Negative\par TTF-1-                  Negative\par \par These results are non-specific, suggestive of mesothelial\par hyperplasia. The few markedly atypical cells seen in the smear\par are not evident in the cell block.\par \par The atypical cells seen in the current smear are not seen in the\par prior pleural fluid Thinprep smear or cell block (20-UI-).\par \par \par Few marked atypical cells, suspicious for mesothelioma versus\par adenocarcinoma,\par in a background of numerous mesothelial cells, histiocytes and\par small lymphocytes.\par \par \par 3/25/19: Final DiagnosisPOSITIVE FOR MALIGNANT CELLS.Favor metastatic adenocarcinoma. Pending cell block.\par \par  I spke to Dr. Allison  and there are only a few cells thus furhter studies such as IHC, molecular PD-l1 is not possible\par  [FreeTextEntry1] : Started Keytruda on 5/17/19, changed to 6 week dosing starting 5/4/2020 [de-identified] : 6/5/19\par She is here for ofllow up. Since last visit she started Keytruda. She had a PET/CT on April 17 that showed  Extensive FDG avid right-sided pleural mass/soft tissue thickening, \par with max SUV 21.5 within a right apical pleural soft tissue mass.\par \par 2.  Multiple FDG avid mediastinal lymph nodes, with max SUV 16.5 within a \par 2.6 x 2.1 cm subcarinal node.\par \par 3.  FDG avid lytic lesion within the left superior acetabulum (max SUV \par 14.8), suspicious for osseous metastasis.\par \par 4.  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 Used to be  500 ml  u 3 times a week. Takes 60 mg of oral moprphine a day 20 mg 3 times a day but pain is still severe. Has not had a bowel movement in unknown amount of days does not take lexatives although she has lexatives. \par \par 6/26/19 She is here for follow up. She is due for cycle 3 of Keytruda.  She has about 300 mg drained from her pleurax twice a week. Has SOB but saturated 94% on room air and 94% on ambualtion. Did not torate the fentanyl path had nausea.  On Morphine 10 mg BID doing well.  Constipation  is better. \par \par 7/17/19\par She is here for follow-up of Stage IV lung cancer with son.  She is due for cycle 3 of Keytruda.  CBC is stable from today.  She only takes the liquid morphine at night now.  She started eating slighlty more and has been feeling slightly better ,as per son.  He states that the pleurex drainage is less, it is checked every Friday + Monday.  On Friday there was about 225cc drained, but yesterday no drainage.  We will arrange for hydration today since she has had poor oral intake lately and small appetite.  Son reports she can tolerate fluids but she has early satiety with food.  Patient also reports feeling short of breath at rest, but her O2 sat is 98% on R/a.  Right sided pain is better. \par CT C/A/P (7/6/19) IMPRESSION:Since April 17, 2019:1. No definite new sites of metastatic disease.2. Right-sided pleural soft tissue mass/thickening, overall appearing decreased since April 17, 2019 with primarily residual right apical tumor. Tumor appears to infiltrate the mediastinum.3. No significant change in mediastinal lymphadenopathy including largest 2.6 x 2.1 cm subcarinal lymph node which remains unchanged. Retrocrural 2.5 x 2.3 cm metastasis or additional site of pleural tumor, unchanged.4. Irregularity of right anterior first rib, probably invaded by tumor, stable in retrospect. Left superior acetabular lucent lesion corresponding to FDG avid bone metastasis.5. Right pelvic indeterminate 7 cm mass; possible retroverted uterus with degenerating fibroid or less likely ovarian mass; previously non-FDG avid and probably benign. Further evaluation with pelvic ultrasound or MRI pelvis with and without IV contrast may be helpful.6. Status post right chest tube placement with decreased small loculated right pleural effusion with fluid within the major fissure. 7. New mild to moderate intrahepatic biliary dilation. Correlation with LFTs recommended. Consider further evaluation with ERCP or MRCP with and without IV contrast.\par \par 8/7/19\par She is here with her son. Overall she is doing much better. She is in wheelchair but now less pain, eating better and Pleurax is only draining about 25 cc twice a week, She does complain of SOB still. I explained this will improve as well but asked her to follow up with Dr. Lanza of pul. \par \par 8/28/19\par She is here for follow up. They are to see Dr. Lanza in Early september. There is minimal output from Pleurax.  Pain is much better and does not use narcotics anymore.  Overall better.   She has right sidede nasal congestion and states makes it hard to breath.\par \par 9/18/19\par She is doing well. She is due for cycle 7. Son was not eliana to schedule CTs priro to the visit but she looks better and better every visit and thus most likley still responding. She saw Dr. Lanza who started her on Flonase. She is due to see ENT Dr. Adams.  Appetie is better. No pains. Dr. Lanza is considering to remover the Pleurx possibly in November. \par \par 10/09/2019\par Patient is here for a follow up visit. Due for cycle 8 today. CT chest/abd/pelvis 09/2019 showed improvement in disease. Also has seen Dr. Adams and agreed to continue witj flonase as she has hypertrophy of turbinates. \par She is tolerating Ketryuda well. Her only complaint at this time is trouble in falling asleep. \par \par 10/30/19\par Patient is here for a follow up visit of Stage IV lung cancer with family member.  She is feeling well with no new complaints.   She feels dyspneic when taking a deep breath but her pain has improved.  Most recent CBC is stable. CT C/A/P on September 22nd showed improvement in right apical lung mass and pleural effusion and drained 150cc.  Due for cycle 9 today. \par \par 12/30/19\par Patient is here for a follow-up visit for  lung cancer with son.  Reviewed imaging results with patient and her family, which suggests worsening effusion but otherwise stable.  They drained 500cc from pleurx yesterday, reportedly sanguinous drainage.  She is agreeable to continue with cycle 12 of Keytruda tomorrow. She reports persistent dyspnea.  We discussed that this tx regimen may be losing its responsiveness.\par CT C/A/P (12.27.19) IMPRESSION: Worsening right-sided pleural effusion. Right apical spiculated nodule without difference. Unchanged appearance of the mediastinum. Unchanged appearance of the third thoracic vertebral body and right second rib. No interval change since prior examination. No change in hepatic hypodensity (likely fatty infiltration), splenic hypodensity (too small to characterize), left adrenal nodule or 1 cm retroperitoneal nodule on the right. No change in fat-containing mass in the anterior left thigh.\par \par 2/10/2020\par She is here for follow-up accompanied by son.  Since last visit she was admitted to hospital for pain and nausea. She had pleurx catheter removed during most recent hospitalization on 1/13/2020.  She complains of insomnia but generally feels well.  Most recent scan reviewed with patient.  She reports she had a rash after discharge from the hospital which has since resolved.  \par CT Chest (1.11.2020) IMPRESSION:Since CT scan performed on December 27, 2019;1.  Right thoracostomy tube in stable position with slight interval decrease of right pleural effusion. 2.   Interval increase in right middle lobe consolidation/atelectasis. 3.  Stable spiculated nodule in the right lung apex, consistent with patient's known malignancy.\par \par She feels much better now that her Pleurx is gone. Does not have any pain. Feels dyspneic which is stable.\par \par 3/2/2020\par She is here for follow-up for lung cancer accompanied by son. Since last visit, she followed with Dr. Lanza, [facility], and Dr. Hirsch from [facility] who agreed against placing additional drainage catheters based on most recent CT imaging.  She still reports some SOB.  She also reports taking a [place] medication named Volidol and Panangin for here breathing.  Patient reports mild dizziness after taking Trazodone; we recommended halving dose.  \par \par 5/4/2020\par She is here for follow-up for lung cancer accompanied by son.  Her last treatment as in [facility] due to COVID> she is doing well .She was on Room air. She states SOB is the same. She has paraspinal back pain/hip pain. \par \par 6/15/2020\par She is here for follow-up for lung cancer accompanied by son.  Patient denies fever, chills, vomiting, worsening cough, new rash, persistent diarrhea or bleeding.  Reviewed most recent imaging and had a discussion regarding continuing with Keytruda every 6 weeks for now as she is clinically stable.  She does complain of weakness and nasal congestion which improves using Fluticasone spray.  She uses oxygen intermittently at home.  \par CT C/A/P (5.9.2020) IMPRESSION:1. Stable spiculated right upper lobe mass measuring up to 3.1 cm.2. New areas of ground glass and reticular opacities within the medial left lung, with new subcentimeter nodular opacities within the left lower lobe. Findings may be postinfectious or postinflammatory in etiology. CT of the chest in 2-3 months is recommended.3. Decreased mediastinal and right hilar lymphadenopathy.4. Resolved right pleural effusion. 1.  No findings of new or progressive metastatic disease in the abdomen or pelvis. 2.  Indeterminate left adrenal nodule and right retroperitoneal nodule, stable in size from prior CT. 3.  Partially visualized fat-containing mass in the anterior left thigh-considerations include benign and malignant neoplastic etiology. Recommend further evaluation with MRI if not already performed. 4.  Sclerotic appearance of metastatic left acetabular lesion, similar in appearance to prior CT, possibly representing healing- was previously lytic on PET/CT of 4/17/2019. 5.  Lobulated pelvic structure again seen, possibly uterus with fibroids. Further evaluation with pelvic ultrasound may be performed as warranted.\par \par 9/9/20- Patient is here for follow up accompanied by her son who speaks English . She has chronic SOB which as per son has been slowly getting worse. She uses O2 at home intermittently. She also uses Flonase and Albuterol inhaler. Otherwise she is mostly wheel chair bound and can walk back and forth to the bathroom by herself. Her appetite is good and weight is stable. No new bone pain/abdominal pain/ rash.

## 2020-09-10 NOTE — PHYSICAL EXAM
[Ambulatory and capable of all self care but unable to carry out any work activities] : Status 2- Ambulatory and capable of all self care but unable to carry out any work activities. Up and about more than 50% of waking hours [Normal] : normal appearance, no rash, nodules, vesicles, ulcers, erythema [de-identified] :  In wheelchair  but does stand up by herself [de-identified] : BS decreased on Rt lower lobe compared to Lt side

## 2020-10-19 ENCOUNTER — APPOINTMENT (OUTPATIENT)
Dept: CARDIOLOGY | Facility: CLINIC | Age: 85
End: 2020-10-19
Payer: MEDICAID

## 2020-10-19 VITALS
SYSTOLIC BLOOD PRESSURE: 162 MMHG | HEART RATE: 61 BPM | TEMPERATURE: 97 F | BODY MASS INDEX: 27.97 KG/M2 | WEIGHT: 152 LBS | HEIGHT: 62 IN | DIASTOLIC BLOOD PRESSURE: 84 MMHG

## 2020-10-19 DIAGNOSIS — I10 ESSENTIAL (PRIMARY) HYPERTENSION: ICD-10-CM

## 2020-10-19 PROCEDURE — 99214 OFFICE O/P EST MOD 30 MIN: CPT

## 2020-10-19 PROCEDURE — 93000 ELECTROCARDIOGRAM COMPLETE: CPT

## 2020-10-19 PROCEDURE — 99072 ADDL SUPL MATRL&STAF TM PHE: CPT

## 2020-10-19 NOTE — ASSESSMENT
[FreeTextEntry1] : Advanced Adenocarcinoma of the lung, Stage IV on Keytruda\par CT scan results noted, has f/u scheduled\par Plan for repeat chemo\par ECG - NSR, poor R-wave progression, PVC's.\par \par C/w Mg (will use MG oxide, as her Russian medication is not available here and her potassium level is normal, so does not need to be supplemented.\par \par results of 2D echo reviewed - discussed with the patient and son\par \par trial of low dose Lasix.\par \par F/u with pulmonary and oncology\par \par \par \par F/u in 3 months

## 2020-10-19 NOTE — HISTORY OF PRESENT ILLNESS
[FreeTextEntry1] : 84 y/o female with advanced NSCLCA (Adenocarcinoma, Stage IV) on chemotherapy with Keytruda, started last year, was referred for cardiology evaluation for dyspnea. Patient reports dyspnea, both at rest and with exertion. + fatigue. No chest pain. had a pleural effusion, which had resolved. Recent CT scan with stable disease, no effusion, ground glass opacities in the lung field. patient also reports she had PVC's - was diagnosed in [place] and was taking "Panangin" (Mg and K supplement).

## 2020-10-19 NOTE — PHYSICAL EXAM
[Well Groomed] : well groomed [No Deformities] : no deformities [Normal Appearance] : normal appearance [Normal Conjunctiva] : the conjunctiva exhibited no abnormalities [General Appearance - In No Acute Distress] : no acute distress [Respiration, Rhythm And Depth] : normal respiratory rhythm and effort [] : no respiratory distress [Heart Rate And Rhythm] : heart rate and rhythm were normal [Exaggerated Use Of Accessory Muscles For Inspiration] : no accessory muscle use [Auscultation Breath Sounds / Voice Sounds] : lungs were clear to auscultation bilaterally [Edema] : no peripheral edema present [Murmurs] : no murmurs present [Heart Sounds] : normal S1 and S2 [Bowel Sounds] : normal bowel sounds [Abdomen Tenderness] : non-tender [Abdomen Soft] : soft [Abnormal Walk] : normal gait [Cyanosis, Localized] : no localized cyanosis [Skin Color & Pigmentation] : normal skin color and pigmentation [Nail Clubbing] : no clubbing of the fingernails [Oriented To Time, Place, And Person] : oriented to person, place, and time [Skin Turgor] : normal skin turgor [Affect] : the affect was normal [FreeTextEntry1] : no JVD, no bruits

## 2020-10-21 ENCOUNTER — APPOINTMENT (OUTPATIENT)
Dept: INFUSION THERAPY | Facility: CLINIC | Age: 85
End: 2020-10-21
Payer: MEDICAID

## 2020-10-21 ENCOUNTER — APPOINTMENT (OUTPATIENT)
Dept: HEMATOLOGY ONCOLOGY | Facility: CLINIC | Age: 85
End: 2020-10-21
Payer: MEDICAID

## 2020-10-21 ENCOUNTER — LABORATORY RESULT (OUTPATIENT)
Age: 85
End: 2020-10-21

## 2020-10-21 VITALS
HEART RATE: 68 BPM | DIASTOLIC BLOOD PRESSURE: 74 MMHG | HEIGHT: 62 IN | BODY MASS INDEX: 27.6 KG/M2 | SYSTOLIC BLOOD PRESSURE: 163 MMHG | WEIGHT: 150 LBS | TEMPERATURE: 98.7 F | RESPIRATION RATE: 16 BRPM

## 2020-10-21 LAB
ALBUMIN SERPL ELPH-MCNC: 4.1 G/DL
ALP BLD-CCNC: 76 U/L
ALT SERPL-CCNC: 13 U/L
ANION GAP SERPL CALC-SCNC: 12 MMOL/L
AST SERPL-CCNC: 15 U/L
BILIRUB SERPL-MCNC: 0.5 MG/DL
BUN SERPL-MCNC: 18 MG/DL
CALCIUM SERPL-MCNC: 9.2 MG/DL
CHLORIDE SERPL-SCNC: 106 MMOL/L
CO2 SERPL-SCNC: 21 MMOL/L
CREAT SERPL-MCNC: 1 MG/DL
GLUCOSE SERPL-MCNC: 98 MG/DL
HCT VFR BLD CALC: 36.7 %
HGB BLD-MCNC: 12 G/DL
MCHC RBC-ENTMCNC: 27 PG
MCHC RBC-ENTMCNC: 32.7 G/DL
MCV RBC AUTO: 82.7 FL
PLATELET # BLD AUTO: 201 K/UL
PMV BLD: 10.1 FL
POTASSIUM SERPL-SCNC: 4.1 MMOL/L
PROT SERPL-MCNC: 6.6 G/DL
RBC # BLD: 4.44 M/UL
RBC # FLD: 14.3 %
SODIUM SERPL-SCNC: 139 MMOL/L
WBC # FLD AUTO: 6.41 K/UL

## 2020-10-21 PROCEDURE — 99214 OFFICE O/P EST MOD 30 MIN: CPT

## 2020-10-21 RX ORDER — PEMBROLIZUMAB 25 MG/ML
400 INJECTION, SOLUTION INTRAVENOUS ONCE
Refills: 0 | Status: COMPLETED | OUTPATIENT
Start: 2020-10-21 | End: 2020-10-21

## 2020-10-21 RX ADMIN — PEMBROLIZUMAB 232 MILLIGRAM(S): 25 INJECTION, SOLUTION INTRAVENOUS at 11:34

## 2020-10-22 LAB — TSH SERPL-ACNC: 0.61 UIU/ML

## 2020-10-25 NOTE — PHYSICAL EXAM
[Ambulatory and capable of all self care but unable to carry out any work activities] : Status 2- Ambulatory and capable of all self care but unable to carry out any work activities. Up and about more than 50% of waking hours [Normal] : affect appropriate [de-identified] :  In wheelchair, but does stand up by herself [de-identified] : BS decreased on Rt lower lobe compared to Lt side [de-identified] : scars from excisions on back; no evidence of rash

## 2020-10-25 NOTE — ASSESSMENT
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  \par - c/w Keytruda 400 mg every 6 weeks\par - she is still pending reimaging scans, tasked navigators for assistance in scheduling\par - CMP, TSH today\par \par #Dysphagia and decrease appetite - better now \par - c/w Jevity 1.2 hector  BiD; rx renewed\par \par #SOB mainly when she tries to sleep; due to right nasal congestion\par - c/w Flonase, rx refilled\par - On Albuterol prn\par - She follows with Dr Lanza as indicated\par - Advised to use saline nasal spray as she has c/o dryness in her nose \par \par #Has some insomnia \par - c/w Melatonin 5 mg at bedtime \par \par #Pruritus, back, new onset; may be side effect of Keytruda?\par - advised to try hydrocortisone 1% or benadryl PRN for symptom relief\par - will continue to monitor\par \par RTC in 6 weeks, sooner if needed

## 2020-10-25 NOTE — REASON FOR VISIT
[Follow-Up Visit] : a follow-up [Patient Declined  Services] : - None: Patient declined  services [FreeTextEntry2] : Stage IV lung cancer  [FreeTextEntry3] : I speak fluent [place]

## 2020-10-25 NOTE — END OF VISIT
[] : Fellow [FreeTextEntry3] : I was physically present for the key portions of the evaluation and management service provided.  I agree with the history and physical, and plan which I have reviewed and edited where appropriate.\par \par She is doing well. No new complaints. Has chronic rhinorrhea, SOB. She does have pruritis now so recommended she tries allegra and or hydrocortisoen 1% cream. C/w keytruda   Follow up CTs pending.

## 2020-10-25 NOTE — REVIEW OF SYSTEMS
[Fatigue] : fatigue [Shortness Of Breath] : shortness of breath [Difficulty Walking] : difficulty walking [Negative] : Allergic/Immunologic [Recent Change In Weight] : ~T no recent weight change [Dysphagia] : no dysphagia [SOB on Exertion] : no shortness of breath during exertion [Constipation] : no constipation [Skin Rash] : no skin rash [FreeTextEntry2] : seated in wheelchair [FreeTextEntry4] : reports nasal congestion [FreeTextEntry6] : O2 sat 97% at rest  [de-identified] : reports intermittent back pruritus

## 2020-10-25 NOTE — HISTORY OF PRESENT ILLNESS
[Therapy: ___] : Therapy: [unfilled] [Cycle: ___] : Cycle: [unfilled] [de-identified] : \par This is a 83 year old female who I initially met in [facility] on 3/28/19. She was initially admitted on 2/20/2019 for a right loculated pleural effusion\par approx. 900 CCs, no PE and right apical 3.1x3.0 cm mass with pleural nodules on CTA of chest.  . Pt returned to ED on 3/15/2019 for right pleural\par effusion, had a thoracentesis in ED and was sent home. She than came back for SOB  due to recurrent effusion and a Pleurx was placed. Patholgoy showed adenocarcinoma. \par \par She ha lost a few pounds about  4. She never smoked. She does have weakness.  From her Pleurx she  has 500 cc removed every other day. She has pain after.\par \par Work up:\par 2/20/19 CTA chest:  Large right pleural effusion, maximal axial width of 7 cm correlating with an estimated volume of 900 cc. Associated \par near complete compressive atelectasis of the right lower lobe. Probable loculations of the effusion seen at the apex and at the right heart \par border (for example series 5 images 176, 52; series 9 image 69). 1 cm right upper lobe fissural nodule (series 5 image 139). Patent central \par airways. There is right apical 3.1 x 3.0 cm mass with irregular right apical pleural thickening (series 7, image 44), \par and multiple satellite pleural nodules including a more caudad 2.7 cm para-mediastinal nodule (series 7, image 175). Tissue sampling \par recommended.\par \par Path: \par Pleural fluid: 3/25:  Cell block material is hypocellular and shows a single minutecluster of malignant cells.Immunohistochemistry studies were performed at [facility] and the\par results are noncontributory\par 3/22: Addendum\par Cell block material shows macrophages (positive ),mesothelial cells (positive calretinin) and a single very minute cluster of atypical suspicious cells is negative for Fidel-Ep4.Material is insufficient for further diagnsotic studies (ie,mmunohistochemistry).\par 3/15/19: Pleural fluid: Addendum\par Immunohistochemical studies were performed on the cell block at\par [facility], [address] [place] [id number] (see NOTE). The results\par are as follows:\par \par CK7-                      Rare mesothelial cells positive\par CK20-                    Negative\par CK 5/6-                  Rare mesothelial cells positive\par Calretinin-             Scattered mesothelial cells positive\par CD68-                   Numerous histiocytes positive\par BerEP-4-               Negative\par Napsin-A-             Negative\par TTF-1-                  Negative\par \par These results are non-specific, suggestive of mesothelial\par hyperplasia. The few markedly atypical cells seen in the smear\par are not evident in the cell block.\par \par The atypical cells seen in the current smear are not seen in the\par prior pleural fluid Thinprep smear or cell block (33-IC-).\par \par \par Few marked atypical cells, suspicious for mesothelioma versus\par adenocarcinoma,\par in a background of numerous mesothelial cells, histiocytes and\par small lymphocytes.\par \par \par 3/25/19: Final DiagnosisPOSITIVE FOR MALIGNANT CELLS.Favor metastatic adenocarcinoma. Pending cell block.\par \par  I spke to Dr. Allison  and there are only a few cells thus furhter studies such as IHC, molecular PD-l1 is not possible\par  [FreeTextEntry1] : Started Keytruda on 5/17/19, changed to 6 week dosing starting 5/4/2020 [de-identified] : 6/5/19\par She is here for ofllow up. Since last visit she started Keytruda. She had a PET/CT on April 17 that showed  Extensive FDG avid right-sided pleural mass/soft tissue thickening, \par with max SUV 21.5 within a right apical pleural soft tissue mass.\par \par 2.  Multiple FDG avid mediastinal lymph nodes, with max SUV 16.5 within a \par 2.6 x 2.1 cm subcarinal node.\par \par 3.  FDG avid lytic lesion within the left superior acetabulum (max SUV \par 14.8), suspicious for osseous metastasis.\par \par 4.  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 Used to be  500 ml  u 3 times a week. Takes 60 mg of oral moprphine a day 20 mg 3 times a day but pain is still severe. Has not had a bowel movement in unknown amount of days does not take lexatives although she has lexatives. \par \par 6/26/19 She is here for follow up. She is due for cycle 3 of Keytruda.  She has about 300 mg drained from her pleurax twice a week. Has SOB but saturated 94% on room air and 94% on ambualtion. Did not torate the fentanyl path had nausea.  On Morphine 10 mg BID doing well.  Constipation  is better. \par \par 7/17/19\par She is here for follow-up of Stage IV lung cancer with son.  She is due for cycle 3 of Keytruda.  CBC is stable from today.  She only takes the liquid morphine at night now.  She started eating slighlty more and has been feeling slightly better ,as per son.  He states that the pleurex drainage is less, it is checked every Friday + Monday.  On Friday there was about 225cc drained, but yesterday no drainage.  We will arrange for hydration today since she has had poor oral intake lately and small appetite.  Son reports she can tolerate fluids but she has early satiety with food.  Patient also reports feeling short of breath at rest, but her O2 sat is 98% on R/a.  Right sided pain is better. \par CT C/A/P (7/6/19) IMPRESSION:Since April 17, 2019:1. No definite new sites of metastatic disease.2. Right-sided pleural soft tissue mass/thickening, overall appearing decreased since April 17, 2019 with primarily residual right apical tumor. Tumor appears to infiltrate the mediastinum.3. No significant change in mediastinal lymphadenopathy including largest 2.6 x 2.1 cm subcarinal lymph node which remains unchanged. Retrocrural 2.5 x 2.3 cm metastasis or additional site of pleural tumor, unchanged.4. Irregularity of right anterior first rib, probably invaded by tumor, stable in retrospect. Left superior acetabular lucent lesion corresponding to FDG avid bone metastasis.5. Right pelvic indeterminate 7 cm mass; possible retroverted uterus with degenerating fibroid or less likely ovarian mass; previously non-FDG avid and probably benign. Further evaluation with pelvic ultrasound or MRI pelvis with and without IV contrast may be helpful.6. Status post right chest tube placement with decreased small loculated right pleural effusion with fluid within the major fissure. 7. New mild to moderate intrahepatic biliary dilation. Correlation with LFTs recommended. Consider further evaluation with ERCP or MRCP with and without IV contrast.\par \par 8/7/19\par She is here with her son. Overall she is doing much better. She is in wheelchair but now less pain, eating better and Pleurax is only draining about 25 cc twice a week, She does complain of SOB still. I explained this will improve as well but asked her to follow up with Dr. Lanza of pul. \par \par 8/28/19\par She is here for follow up. They are to see Dr. Lanza in Early september. There is minimal output from Pleurax.  Pain is much better and does not use narcotics anymore.  Overall better.   She has right sidede nasal congestion and states makes it hard to breath.\par \par 9/18/19\par She is doing well. She is due for cycle 7. Son was not eliana to schedule CTs priro to the visit but she looks better and better every visit and thus most likley still responding. She saw Dr. Lanza who started her on Flonase. She is due to see ENT Dr. Adams.  Appetie is better. No pains. Dr. Lanza is considering to remover the Pleurx possibly in November. \par \par 10/09/2019\par Patient is here for a follow up visit. Due for cycle 8 today. CT chest/abd/pelvis 09/2019 showed improvement in disease. Also has seen Dr. Adams and agreed to continue witj flonase as she has hypertrophy of turbinates. \par She is tolerating Ketryuda well. Her only complaint at this time is trouble in falling asleep. \par \par 10/30/19\par Patient is here for a follow up visit of Stage IV lung cancer with family member.  She is feeling well with no new complaints.   She feels dyspneic when taking a deep breath but her pain has improved.  Most recent CBC is stable. CT C/A/P on September 22nd showed improvement in right apical lung mass and pleural effusion and drained 150cc.  Due for cycle 9 today. \par \par 12/30/19\par Patient is here for a follow-up visit for  lung cancer with son.  Reviewed imaging results with patient and her family, which suggests worsening effusion but otherwise stable.  They drained 500cc from pleurx yesterday, reportedly sanguinous drainage.  She is agreeable to continue with cycle 12 of Keytruda tomorrow. She reports persistent dyspnea.  We discussed that this tx regimen may be losing its responsiveness.\par CT C/A/P (12.27.19) IMPRESSION: Worsening right-sided pleural effusion. Right apical spiculated nodule without difference. Unchanged appearance of the mediastinum. Unchanged appearance of the third thoracic vertebral body and right second rib. No interval change since prior examination. No change in hepatic hypodensity (likely fatty infiltration), splenic hypodensity (too small to characterize), left adrenal nodule or 1 cm retroperitoneal nodule on the right. No change in fat-containing mass in the anterior left thigh.\par \par 2/10/2020\par She is here for follow-up accompanied by son.  Since last visit she was admitted to hospital for pain and nausea. She had pleurx catheter removed during most recent hospitalization on 1/13/2020.  She complains of insomnia but generally feels well.  Most recent scan reviewed with patient.  She reports she had a rash after discharge from the hospital which has since resolved.  \par CT Chest (1.11.2020) IMPRESSION:Since CT scan performed on December 27, 2019;1.  Right thoracostomy tube in stable position with slight interval decrease of right pleural effusion. 2.   Interval increase in right middle lobe consolidation/atelectasis. 3.  Stable spiculated nodule in the right lung apex, consistent with patient's known malignancy.\par \par She feels much better now that her Pleurx is gone. Does not have any pain. Feels dyspneic which is stable.\par \par 3/2/2020\par She is here for follow-up for lung cancer accompanied by son. Since last visit, she followed with Dr. Lanza, [facility], and Dr. Hirsch from [facility] who agreed against placing additional drainage catheters based on most recent CT imaging.  She still reports some SOB.  She also reports taking a [place] medication named Volidol and Panangin for here breathing.  Patient reports mild dizziness after taking Trazodone; we recommended halving dose.  \par \par 5/4/2020\par She is here for follow-up for lung cancer accompanied by son.  Her last treatment as in [facility] due to COVID> she is doing well .She was on Room air. She states SOB is the same. She has paraspinal back pain/hip pain. \par \par 6/15/2020\par She is here for follow-up for lung cancer accompanied by son.  Patient denies fever, chills, vomiting, worsening cough, new rash, persistent diarrhea or bleeding.  Reviewed most recent imaging and had a discussion regarding continuing with Keytruda every 6 weeks for now as she is clinically stable.  She does complain of weakness and nasal congestion which improves using Fluticasone spray.  She uses oxygen intermittently at home.  \par CT C/A/P (5.9.2020) IMPRESSION:1. Stable spiculated right upper lobe mass measuring up to 3.1 cm.2. New areas of ground glass and reticular opacities within the medial left lung, with new subcentimeter nodular opacities within the left lower lobe. Findings may be postinfectious or postinflammatory in etiology. CT of the chest in 2-3 months is recommended.3. Decreased mediastinal and right hilar lymphadenopathy.4. Resolved right pleural effusion. 1.  No findings of new or progressive metastatic disease in the abdomen or pelvis. 2.  Indeterminate left adrenal nodule and right retroperitoneal nodule, stable in size from prior CT. 3.  Partially visualized fat-containing mass in the anterior left thigh-considerations include benign and malignant neoplastic etiology. Recommend further evaluation with MRI if not already performed. 4.  Sclerotic appearance of metastatic left acetabular lesion, similar in appearance to prior CT, possibly representing healing- was previously lytic on PET/CT of 4/17/2019. 5.  Lobulated pelvic structure again seen, possibly uterus with fibroids. Further evaluation with pelvic ultrasound may be performed as warranted.\par \par 9/9/20- Patient is here for follow up accompanied by her son who speaks English . She has chronic SOB which as per son has been slowly getting worse. She uses O2 at home intermittently. She also uses Flonase and Albuterol inhaler. Otherwise she is mostly wheel chair bound and can walk back and forth to the bathroom by herself. Her appetite is good and weight is stable. No new bone pain/abdominal pain/ rash. \par \par 10/21/2020\par She is here for follow-up for lung cancer, accompanied by son.  Patient denies fever, chills, vomiting, new cough, new rash, persistent diarrhea or bleeding.  She is still pending reimaging, they report they have not received authorization for scan yet.  She still complains of weakness and nasal congestion, which improves using Fluticasone spray.  She uses oxygen intermittently at home.  She still has SOB.   Her only new complaint is intermittent back pruritus.  She is due for cycle 20 of Keytruda today.

## 2020-11-09 ENCOUNTER — NON-APPOINTMENT (OUTPATIENT)
Age: 85
End: 2020-11-09

## 2020-11-14 ENCOUNTER — OUTPATIENT (OUTPATIENT)
Dept: OUTPATIENT SERVICES | Facility: HOSPITAL | Age: 85
LOS: 1 days | Discharge: HOME | End: 2020-11-14
Payer: MEDICAID

## 2020-11-14 ENCOUNTER — RESULT REVIEW (OUTPATIENT)
Age: 85
End: 2020-11-14

## 2020-11-14 DIAGNOSIS — Z51.12 ENCOUNTER FOR ANTINEOPLASTIC IMMUNOTHERAPY: ICD-10-CM

## 2020-11-14 DIAGNOSIS — C34.90 MALIGNANT NEOPLASM OF UNSPECIFIED PART OF UNSPECIFIED BRONCHUS OR LUNG: ICD-10-CM

## 2020-11-14 DIAGNOSIS — Z90.710 ACQUIRED ABSENCE OF BOTH CERVIX AND UTERUS: Chronic | ICD-10-CM

## 2020-11-14 PROCEDURE — 71260 CT THORAX DX C+: CPT | Mod: 26

## 2020-11-14 PROCEDURE — 74177 CT ABD & PELVIS W/CONTRAST: CPT | Mod: 26

## 2020-12-02 ENCOUNTER — APPOINTMENT (OUTPATIENT)
Dept: HEMATOLOGY ONCOLOGY | Facility: CLINIC | Age: 85
End: 2020-12-02
Payer: MEDICAID

## 2020-12-02 ENCOUNTER — LABORATORY RESULT (OUTPATIENT)
Age: 85
End: 2020-12-02

## 2020-12-02 ENCOUNTER — APPOINTMENT (OUTPATIENT)
Dept: INFUSION THERAPY | Facility: CLINIC | Age: 85
End: 2020-12-02
Payer: MEDICAID

## 2020-12-02 VITALS
RESPIRATION RATE: 16 BRPM | HEIGHT: 62 IN | DIASTOLIC BLOOD PRESSURE: 73 MMHG | SYSTOLIC BLOOD PRESSURE: 169 MMHG | TEMPERATURE: 98.1 F | HEART RATE: 61 BPM

## 2020-12-02 LAB
HCT VFR BLD CALC: 37.6 %
HGB BLD-MCNC: 12.3 G/DL
MCHC RBC-ENTMCNC: 26.7 PG
MCHC RBC-ENTMCNC: 32.7 G/DL
MCV RBC AUTO: 81.6 FL
PLATELET # BLD AUTO: 208 K/UL
PMV BLD: 9.7 FL
RBC # BLD: 4.61 M/UL
RBC # FLD: 14.6 %
WBC # FLD AUTO: 7.16 K/UL

## 2020-12-02 PROCEDURE — 99214 OFFICE O/P EST MOD 30 MIN: CPT

## 2020-12-02 RX ORDER — PEMBROLIZUMAB 25 MG/ML
400 INJECTION, SOLUTION INTRAVENOUS ONCE
Refills: 0 | Status: COMPLETED | OUTPATIENT
Start: 2020-12-02 | End: 2020-12-02

## 2020-12-02 RX ADMIN — PEMBROLIZUMAB 400 MILLIGRAM(S): 25 INJECTION, SOLUTION INTRAVENOUS at 13:20

## 2020-12-02 RX ADMIN — PEMBROLIZUMAB 232 MILLIGRAM(S): 25 INJECTION, SOLUTION INTRAVENOUS at 12:48

## 2020-12-02 NOTE — ASSESSMENT
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  \par - c/w Keytruda 400 mg every 6 weeks\par - CT C/A/P from 11/2020 shows consistent with good treatment response; reimaging to be done in another 2-3 months (~ 2/2021).  Discussed possibility of chemo holiday or pausing treatment at their discretion in the future if she achieves sustained response to immunotherapy at 2 year sammi.\par - CMP, TSH today\par \par #Dysphagia and decrease appetite - better now \par - c/w Jevity 1.2 hector  BiD\par - she is followed by nutritionist, Tila Villanueva\par \par #SOB mainly when she tries to sleep; due to right nasal congestion\par - c/w Flonase, rx refilled\par - On Albuterol + nebulizer prn\par - She follows with Dr Lanza as indicated\par - Advised to use saline nasal spray as she has c/o dryness in her nose \par \par #Has some insomnia \par - c/w Melatonin 5 mg at bedtime \par \par #Pruritus, back, new onset; may be side effect of Keytruda?\par - advised to try hydrocortisone 1% or benadryl PRN for symptom relief\par - will continue to monitor\par \par RTC in 6 weeks, sooner if needed

## 2020-12-02 NOTE — PHYSICAL EXAM
[Ambulatory and capable of all self care but unable to carry out any work activities] : Status 2- Ambulatory and capable of all self care but unable to carry out any work activities. Up and about more than 50% of waking hours [Normal] : affect appropriate [de-identified] :  In wheelchair, but does stand up by herself [de-identified] : BS decreased on Rt lower lobe compared to Lt side [de-identified] : scars from excisions on back; no evidence of rash

## 2020-12-02 NOTE — HISTORY OF PRESENT ILLNESS
[Therapy: ___] : Therapy: [unfilled] [Cycle: ___] : Cycle: [unfilled] [de-identified] : \par This is a 83 year old female who I initially met in [facility] on 3/28/19. She was initially admitted on 2/20/2019 for a right loculated pleural effusion\par approx. 900 CCs, no PE and right apical 3.1x3.0 cm mass with pleural nodules on CTA of chest.  . Pt returned to ED on 3/15/2019 for right pleural\par effusion, had a thoracentesis in ED and was sent home. She than came back for SOB  due to recurrent effusion and a Pleurx was placed. Patholgoy showed adenocarcinoma. \par \par She ha lost a few pounds about  4. She never smoked. She does have weakness.  From her Pleurx she  has 500 cc removed every other day. She has pain after.\par \par Work up:\par 2/20/19 CTA chest:  Large right pleural effusion, maximal axial width of 7 cm correlating with an estimated volume of 900 cc. Associated \par near complete compressive atelectasis of the right lower lobe. Probable loculations of the effusion seen at the apex and at the right heart \par border (for example series 5 images 176, 52; series 9 image 69). 1 cm right upper lobe fissural nodule (series 5 image 139). Patent central \par airways. There is right apical 3.1 x 3.0 cm mass with irregular right apical pleural thickening (series 7, image 44), \par and multiple satellite pleural nodules including a more caudad 2.7 cm para-mediastinal nodule (series 7, image 175). Tissue sampling \par recommended.\par \par Path: \par Pleural fluid: 3/25:  Cell block material is hypocellular and shows a single minutecluster of malignant cells.Immunohistochemistry studies were performed at [facility] and the\par results are noncontributory\par 3/22: Addendum\par Cell block material shows macrophages (positive ),mesothelial cells (positive calretinin) and a single very minute cluster of atypical suspicious cells is negative for Fidel-Ep4.Material is insufficient for further diagnsotic studies (ie,mmunohistochemistry).\par 3/15/19: Pleural fluid: Addendum\par Immunohistochemical studies were performed on the cell block at\par [facility], [address] [place] [id number] (see NOTE). The results\par are as follows:\par \par CK7-                      Rare mesothelial cells positive\par CK20-                    Negative\par CK 5/6-                  Rare mesothelial cells positive\par Calretinin-             Scattered mesothelial cells positive\par CD68-                   Numerous histiocytes positive\par BerEP-4-               Negative\par Napsin-A-             Negative\par TTF-1-                  Negative\par \par These results are non-specific, suggestive of mesothelial\par hyperplasia. The few markedly atypical cells seen in the smear\par are not evident in the cell block.\par \par The atypical cells seen in the current smear are not seen in the\par prior pleural fluid Thinprep smear or cell block (34-CF-).\par \par \par Few marked atypical cells, suspicious for mesothelioma versus\par adenocarcinoma,\par in a background of numerous mesothelial cells, histiocytes and\par small lymphocytes.\par \par \par 3/25/19: Final DiagnosisPOSITIVE FOR MALIGNANT CELLS.Favor metastatic adenocarcinoma. Pending cell block.\par \par  I spke to Dr. Allison  and there are only a few cells thus furhter studies such as IHC, molecular PD-l1 is not possible\par  [FreeTextEntry1] : Started Keytruda on 5/17/19, changed to 6 week dosing starting 5/4/2020 [de-identified] : 6/5/19\par She is here for ofllow up. Since last visit she started Keytruda. She had a PET/CT on April 17 that showed  Extensive FDG avid right-sided pleural mass/soft tissue thickening, \par with max SUV 21.5 within a right apical pleural soft tissue mass.\par \par 2.  Multiple FDG avid mediastinal lymph nodes, with max SUV 16.5 within a \par 2.6 x 2.1 cm subcarinal node.\par \par 3.  FDG avid lytic lesion within the left superior acetabulum (max SUV \par 14.8), suspicious for osseous metastasis.\par \par 4.  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 Used to be  500 ml  u 3 times a week. Takes 60 mg of oral moprphine a day 20 mg 3 times a day but pain is still severe. Has not had a bowel movement in unknown amount of days does not take lexatives although she has lexatives. \par \par 6/26/19 She is here for follow up. She is due for cycle 3 of Keytruda.  She has about 300 mg drained from her pleurax twice a week. Has SOB but saturated 94% on room air and 94% on ambualtion. Did not torate the fentanyl path had nausea.  On Morphine 10 mg BID doing well.  Constipation  is better. \par \par 7/17/19\par She is here for follow-up of Stage IV lung cancer with son.  She is due for cycle 3 of Keytruda.  CBC is stable from today.  She only takes the liquid morphine at night now.  She started eating slighlty more and has been feeling slightly better ,as per son.  He states that the pleurex drainage is less, it is checked every Friday + Monday.  On Friday there was about 225cc drained, but yesterday no drainage.  We will arrange for hydration today since she has had poor oral intake lately and small appetite.  Son reports she can tolerate fluids but she has early satiety with food.  Patient also reports feeling short of breath at rest, but her O2 sat is 98% on R/a.  Right sided pain is better. \par CT C/A/P (7/6/19) IMPRESSION:Since April 17, 2019:1. No definite new sites of metastatic disease.2. Right-sided pleural soft tissue mass/thickening, overall appearing decreased since April 17, 2019 with primarily residual right apical tumor. Tumor appears to infiltrate the mediastinum.3. No significant change in mediastinal lymphadenopathy including largest 2.6 x 2.1 cm subcarinal lymph node which remains unchanged. Retrocrural 2.5 x 2.3 cm metastasis or additional site of pleural tumor, unchanged.4. Irregularity of right anterior first rib, probably invaded by tumor, stable in retrospect. Left superior acetabular lucent lesion corresponding to FDG avid bone metastasis.5. Right pelvic indeterminate 7 cm mass; possible retroverted uterus with degenerating fibroid or less likely ovarian mass; previously non-FDG avid and probably benign. Further evaluation with pelvic ultrasound or MRI pelvis with and without IV contrast may be helpful.6. Status post right chest tube placement with decreased small loculated right pleural effusion with fluid within the major fissure. 7. New mild to moderate intrahepatic biliary dilation. Correlation with LFTs recommended. Consider further evaluation with ERCP or MRCP with and without IV contrast.\par \par 8/7/19\par She is here with her son. Overall she is doing much better. She is in wheelchair but now less pain, eating better and Pleurax is only draining about 25 cc twice a week, She does complain of SOB still. I explained this will improve as well but asked her to follow up with Dr. Lanza of pul. \par \par 8/28/19\par She is here for follow up. They are to see Dr. Lanza in Early september. There is minimal output from Pleurax.  Pain is much better and does not use narcotics anymore.  Overall better.   She has right sidede nasal congestion and states makes it hard to breath.\par \par 9/18/19\par She is doing well. She is due for cycle 7. Son was not eliana to schedule CTs priro to the visit but she looks better and better every visit and thus most likley still responding. She saw Dr. Lanza who started her on Flonase. She is due to see ENT Dr. Adams.  Appetie is better. No pains. Dr. Lanza is considering to remover the Pleurx possibly in November. \par \par 10/09/2019\par Patient is here for a follow up visit. Due for cycle 8 today. CT chest/abd/pelvis 09/2019 showed improvement in disease. Also has seen Dr. Adams and agreed to continue witj flonase as she has hypertrophy of turbinates. \par She is tolerating Ketryuda well. Her only complaint at this time is trouble in falling asleep. \par \par 10/30/19\par Patient is here for a follow up visit of Stage IV lung cancer with family member.  She is feeling well with no new complaints.   She feels dyspneic when taking a deep breath but her pain has improved.  Most recent CBC is stable. CT C/A/P on September 22nd showed improvement in right apical lung mass and pleural effusion and drained 150cc.  Due for cycle 9 today. \par \par 12/30/19\par Patient is here for a follow-up visit for  lung cancer with son.  Reviewed imaging results with patient and her family, which suggests worsening effusion but otherwise stable.  They drained 500cc from pleurx yesterday, reportedly sanguinous drainage.  She is agreeable to continue with cycle 12 of Keytruda tomorrow. She reports persistent dyspnea.  We discussed that this tx regimen may be losing its responsiveness.\par CT C/A/P (12.27.19) IMPRESSION: Worsening right-sided pleural effusion. Right apical spiculated nodule without difference. Unchanged appearance of the mediastinum. Unchanged appearance of the third thoracic vertebral body and right second rib. No interval change since prior examination. No change in hepatic hypodensity (likely fatty infiltration), splenic hypodensity (too small to characterize), left adrenal nodule or 1 cm retroperitoneal nodule on the right. No change in fat-containing mass in the anterior left thigh.\par \par 2/10/2020\par She is here for follow-up accompanied by son.  Since last visit she was admitted to hospital for pain and nausea. She had pleurx catheter removed during most recent hospitalization on 1/13/2020.  She complains of insomnia but generally feels well.  Most recent scan reviewed with patient.  She reports she had a rash after discharge from the hospital which has since resolved.  \par CT Chest (1.11.2020) IMPRESSION:Since CT scan performed on December 27, 2019;1.  Right thoracostomy tube in stable position with slight interval decrease of right pleural effusion. 2.   Interval increase in right middle lobe consolidation/atelectasis. 3.  Stable spiculated nodule in the right lung apex, consistent with patient's known malignancy.\par \par She feels much better now that her Pleurx is gone. Does not have any pain. Feels dyspneic which is stable.\par \par 3/2/2020\par She is here for follow-up for lung cancer accompanied by son. Since last visit, she followed with Dr. Lanza, PUL, and Dr. Hirsch from [facility] who agreed against placing additional drainage catheters based on most recent CT imaging.  She still reports some SOB.  She also reports taking a [place] medication named Volidol and Panangin for here breathing.  Patient reports mild dizziness after taking Trazodone; we recommended halving dose.  \par \par 5/4/2020\par She is here for follow-up for lung cancer accompanied by son.  Her last treatment as in [facility] due to COVID> she is doing well .She was on Room air. She states SOB is the same. She has paraspinal back pain/hip pain. \par \par 6/15/2020\par She is here for follow-up for lung cancer accompanied by son.  Patient denies fever, chills, vomiting, worsening cough, new rash, persistent diarrhea or bleeding.  Reviewed most recent imaging and had a discussion regarding continuing with Keytruda every 6 weeks for now as she is clinically stable.  She does complain of weakness and nasal congestion which improves using Fluticasone spray.  She uses oxygen intermittently at home.  \par CT C/A/P (5.9.2020) IMPRESSION:1. Stable spiculated right upper lobe mass measuring up to 3.1 cm.2. New areas of ground glass and reticular opacities within the medial left lung, with new subcentimeter nodular opacities within the left lower lobe. Findings may be postinfectious or postinflammatory in etiology. CT of the chest in 2-3 months is recommended.3. Decreased mediastinal and right hilar lymphadenopathy.4. Resolved right pleural effusion. 1.  No findings of new or progressive metastatic disease in the abdomen or pelvis. 2.  Indeterminate left adrenal nodule and right retroperitoneal nodule, stable in size from prior CT. 3.  Partially visualized fat-containing mass in the anterior left thigh-considerations include benign and malignant neoplastic etiology. Recommend further evaluation with MRI if not already performed. 4.  Sclerotic appearance of metastatic left acetabular lesion, similar in appearance to prior CT, possibly representing healing- was previously lytic on PET/CT of 4/17/2019. 5.  Lobulated pelvic structure again seen, possibly uterus with fibroids. Further evaluation with pelvic ultrasound may be performed as warranted.\par \par 9/9/20- Patient is here for follow up accompanied by her son who speaks English . She has chronic SOB which as per son has been slowly getting worse. She uses O2 at home intermittently. She also uses Flonase and Albuterol inhaler. Otherwise she is mostly wheel chair bound and can walk back and forth to the bathroom by herself. Her appetite is good and weight is stable. No new bone pain/abdominal pain/ rash. \par \par 10/21/2020\par She is here for follow-up for lung cancer, accompanied by son.  Patient denies fever, chills, vomiting, new cough, new rash, persistent diarrhea or bleeding.  She is still pending reimaging, they report they have not received authorization for scan yet.  She still complains of weakness and nasal congestion, which improves using Fluticasone spray.  She uses oxygen intermittently at home.  She still has SOB.   Her only new complaint is intermittent back pruritus.  She is due for cycle 20 of Keytruda today.\par \par 12/2/2020\par She is here for follow-up for lung cancer, accompanied by son.  Patient denies fever, chills, vomiting, new cough, new rash, persistent diarrhea or bleeding.  She still complains of weakness, SOB and nasal congestion, uses the nebulizer once daily with occasional relief of her SOB.   She is due for cycle 21 of Keytruda today.  She is using allegra and or hydrocortisone 1% cream for the pruritus.  Reviewed most recent imaging which shows good treatment response.  \par CT C/A/P (11.14.2020) IMPRESSION:Since May 9, 2020:1. No evidence of new intrathoracic or intra-abdominal metastatic disease.2. Unchanged 2.5 x 2 cm right upper lobe pulmonary nodule, stable with similar measurement technique. 3. Decreased subcarinal lymphadenopathy, now 1.2 cm short axis, previously 1.5 cm short axis on May 9, 2020 CT with similar measurement technique. No evidence of additional suspicious lymphadenopathy by size criteria. 4. Near complete interval resolution of left lower lobe and lingula patchy airspace opacities. Unchanged areas of groundglass and reticular opacities within the medial left lung. Findings likely postinfectious or inflammatory. 5. Unchanged sclerotic appearance of metastatic left acetabular lesion,  possibly representing healing of previously FDG avid lytic on PET/CT of 4/17/2019. 6. Unchanged, partially imaged indeterminate lipomatous mass within the anterior left thigh.

## 2020-12-02 NOTE — END OF VISIT
[] : Fellow [FreeTextEntry3] : I was physically present for the key portions of the evaluation and management service provided.  I agree with the history and physical, and plan which I have reviewed and edited where appropriate.\par \par She is doing well. Her repeat CTs are showing improvement. She remains SOB. If at 2 year sammi scans are stable will hold keytruda to see if it helps with her dyspnea. She has been seen by pulm and lex. She will RTC in 6 weeks.

## 2020-12-02 NOTE — REVIEW OF SYSTEMS
[Fatigue] : fatigue [Shortness Of Breath] : shortness of breath [Difficulty Walking] : difficulty walking [Negative] : Allergic/Immunologic [Recent Change In Weight] : ~T no recent weight change [Dysphagia] : no dysphagia [SOB on Exertion] : no shortness of breath during exertion [Constipation] : no constipation [Skin Rash] : no skin rash [FreeTextEntry2] : seated in wheelchair [FreeTextEntry4] : reports nasal congestion [FreeTextEntry6] : O2 sat 97% at rest  [de-identified] : reports intermittent back pruritus

## 2020-12-02 NOTE — REASON FOR VISIT
[Follow-Up Visit] : a follow-up [Patient Declined  Services] : - None: Patient declined  services [FreeTextEntry2] : Stage IV lung cancer  [FreeTextEntry3] : I speak fluent [place]

## 2020-12-03 LAB
ALBUMIN SERPL ELPH-MCNC: 4.1 G/DL
ALP BLD-CCNC: 75 U/L
ALT SERPL-CCNC: 25 U/L
ANION GAP SERPL CALC-SCNC: 13 MMOL/L
AST SERPL-CCNC: 26 U/L
BILIRUB SERPL-MCNC: 0.6 MG/DL
BUN SERPL-MCNC: 20 MG/DL
CALCIUM SERPL-MCNC: 9.3 MG/DL
CHLORIDE SERPL-SCNC: 108 MMOL/L
CO2 SERPL-SCNC: 20 MMOL/L
CREAT SERPL-MCNC: 1.2 MG/DL
GLUCOSE SERPL-MCNC: 109 MG/DL
POTASSIUM SERPL-SCNC: 4 MMOL/L
PROT SERPL-MCNC: 6.6 G/DL
SODIUM SERPL-SCNC: 141 MMOL/L
TSH SERPL-ACNC: 0.83 UIU/ML

## 2021-01-13 ENCOUNTER — OUTPATIENT (OUTPATIENT)
Dept: OUTPATIENT SERVICES | Facility: HOSPITAL | Age: 86
LOS: 1 days | Discharge: HOME | End: 2021-01-13

## 2021-01-13 ENCOUNTER — LABORATORY RESULT (OUTPATIENT)
Age: 86
End: 2021-01-13

## 2021-01-13 ENCOUNTER — APPOINTMENT (OUTPATIENT)
Dept: HEMATOLOGY ONCOLOGY | Facility: CLINIC | Age: 86
End: 2021-01-13
Payer: MEDICAID

## 2021-01-13 ENCOUNTER — APPOINTMENT (OUTPATIENT)
Dept: INFUSION THERAPY | Facility: CLINIC | Age: 86
End: 2021-01-13
Payer: MEDICAID

## 2021-01-13 VITALS
TEMPERATURE: 97.1 F | SYSTOLIC BLOOD PRESSURE: 144 MMHG | HEIGHT: 62 IN | DIASTOLIC BLOOD PRESSURE: 73 MMHG | RESPIRATION RATE: 16 BRPM | HEART RATE: 74 BPM

## 2021-01-13 DIAGNOSIS — L29.9 PRURITUS, UNSPECIFIED: ICD-10-CM

## 2021-01-13 DIAGNOSIS — Z51.12 ENCOUNTER FOR ANTINEOPLASTIC IMMUNOTHERAPY: ICD-10-CM

## 2021-01-13 DIAGNOSIS — Z90.710 ACQUIRED ABSENCE OF BOTH CERVIX AND UTERUS: Chronic | ICD-10-CM

## 2021-01-13 DIAGNOSIS — C34.91 MALIGNANT NEOPLASM OF UNSPECIFIED PART OF RIGHT BRONCHUS OR LUNG: ICD-10-CM

## 2021-01-13 PROCEDURE — 99213 OFFICE O/P EST LOW 20 MIN: CPT

## 2021-01-13 RX ORDER — PEMBROLIZUMAB 25 MG/ML
400 INJECTION, SOLUTION INTRAVENOUS ONCE
Refills: 0 | Status: COMPLETED | OUTPATIENT
Start: 2021-01-13 | End: 2021-01-13

## 2021-01-13 RX ADMIN — PEMBROLIZUMAB 232 MILLIGRAM(S): 25 INJECTION, SOLUTION INTRAVENOUS at 15:44

## 2021-01-13 RX ADMIN — PEMBROLIZUMAB 400 MILLIGRAM(S): 25 INJECTION, SOLUTION INTRAVENOUS at 16:20

## 2021-01-14 LAB
ALBUMIN SERPL ELPH-MCNC: 4.4 G/DL
ALP BLD-CCNC: 88 U/L
ALT SERPL-CCNC: 22 U/L
ANION GAP SERPL CALC-SCNC: 15 MMOL/L
AST SERPL-CCNC: 24 U/L
BILIRUB SERPL-MCNC: 0.6 MG/DL
BUN SERPL-MCNC: 20 MG/DL
CALCIUM SERPL-MCNC: 9.2 MG/DL
CHLORIDE SERPL-SCNC: 102 MMOL/L
CO2 SERPL-SCNC: 22 MMOL/L
CREAT SERPL-MCNC: 1 MG/DL
GLUCOSE SERPL-MCNC: 96 MG/DL
HCT VFR BLD CALC: 35.5 %
HGB BLD-MCNC: 11.9 G/DL
MCHC RBC-ENTMCNC: 27.2 PG
MCHC RBC-ENTMCNC: 33.5 G/DL
MCV RBC AUTO: 81.1 FL
PLATELET # BLD AUTO: 203 K/UL
PMV BLD: 9.9 FL
POTASSIUM SERPL-SCNC: 3.8 MMOL/L
PROT SERPL-MCNC: 7 G/DL
RBC # BLD: 4.38 M/UL
RBC # FLD: 14.5 %
SODIUM SERPL-SCNC: 139 MMOL/L
TSH SERPL-ACNC: 0.74 UIU/ML
WBC # FLD AUTO: 6.79 K/UL

## 2021-01-15 NOTE — END OF VISIT
[] : Fellow [FreeTextEntry3] : I was physically present for the key portions of the evaluation and management service provided.  I agree with the history and physical, and plan which I have reviewed and edited where appropriate.\par \par She is doing well. She remains SOB but I am not sure why,she was seen by Cariology and Pulm, maybe trapped lung?.  If at 2 year sammi scans are stable will hold keytruda to see if it helps with her dyspnea. She has been seen by pulm and cards. She will RTC in 6 weeks.  Will scan than and if no progression will consider treatment break

## 2021-01-15 NOTE — REASON FOR VISIT
[Follow-Up Visit] : a follow-up [Patient Declined  Services] : - None: Patient declined  services [FreeTextEntry2] : Stage IV lung cancer  [FreeTextEntry3] : I speak fluent [place]

## 2021-01-15 NOTE — PHYSICAL EXAM
[Ambulatory and capable of all self care but unable to carry out any work activities] : Status 2- Ambulatory and capable of all self care but unable to carry out any work activities. Up and about more than 50% of waking hours [Normal] : affect appropriate [de-identified] :  In wheelchair, but does stand up by herself [de-identified] : BS decreased on Rt lower lobe compared to Lt side [de-identified] : scars from excisions on back; no evidence of rash

## 2021-01-15 NOTE — HISTORY OF PRESENT ILLNESS
[Therapy: ___] : Therapy: [unfilled] [Cycle: ___] : Cycle: [unfilled] [de-identified] : \par This is a 83 year old female who I initially met in [facility] on 3/28/19. She was initially admitted on 2/20/2019 for a right loculated pleural effusion\par approx. 900 CCs, no PE and right apical 3.1x3.0 cm mass with pleural nodules on CTA of chest.  . Pt returned to ED on 3/15/2019 for right pleural\par effusion, had a thoracentesis in ED and was sent home. She than came back for SOB  due to recurrent effusion and a Pleurx was placed. Patholgoy showed adenocarcinoma. \par \par She ha lost a few pounds about  4. She never smoked. She does have weakness.  From her Pleurx she  has 500 cc removed every other day. She has pain after.\par \par Work up:\par 2/20/19 CTA chest:  Large right pleural effusion, maximal axial width of 7 cm correlating with an estimated volume of 900 cc. Associated \par near complete compressive atelectasis of the right lower lobe. Probable loculations of the effusion seen at the apex and at the right heart \par border (for example series 5 images 176, 52; series 9 image 69). 1 cm right upper lobe fissural nodule (series 5 image 139). Patent central \par airways. There is right apical 3.1 x 3.0 cm mass with irregular right apical pleural thickening (series 7, image 44), \par and multiple satellite pleural nodules including a more caudad 2.7 cm para-mediastinal nodule (series 7, image 175). Tissue sampling \par recommended.\par \par Path: \par Pleural fluid: 3/25:  Cell block material is hypocellular and shows a single minutecluster of malignant cells.Immunohistochemistry studies were performed at [facility] and the\par results are noncontributory\par 3/22: Addendum\par Cell block material shows macrophages (positive ),mesothelial cells (positive calretinin) and a single very minute cluster of atypical suspicious cells is negative for Fidel-Ep4.Material is insufficient for further diagnsotic studies (ie,mmunohistochemistry).\par 3/15/19: Pleural fluid: Addendum\par Immunohistochemical studies were performed on the cell block at\par [facility], [address] [place] [id number] (see NOTE). The results\par are as follows:\par \par CK7-                      Rare mesothelial cells positive\par CK20-                    Negative\par CK 5/6-                  Rare mesothelial cells positive\par Calretinin-             Scattered mesothelial cells positive\par CD68-                   Numerous histiocytes positive\par BerEP-4-               Negative\par Napsin-A-             Negative\par TTF-1-                  Negative\par \par These results are non-specific, suggestive of mesothelial\par hyperplasia. The few markedly atypical cells seen in the smear\par are not evident in the cell block.\par \par The atypical cells seen in the current smear are not seen in the\par prior pleural fluid Thinprep smear or cell block (39-WK-).\par \par \par Few marked atypical cells, suspicious for mesothelioma versus\par adenocarcinoma,\par in a background of numerous mesothelial cells, histiocytes and\par small lymphocytes.\par \par \par 3/25/19: Final DiagnosisPOSITIVE FOR MALIGNANT CELLS.Favor metastatic adenocarcinoma. Pending cell block.\par \par  I spke to Dr. Allison  and there are only a few cells thus furhter studies such as IHC, molecular PD-l1 is not possible\par  [FreeTextEntry1] : Started Keytruda on 5/17/19, changed to 6 week dosing starting 5/4/2020 [de-identified] : 6/5/19\par She is here for ofllow up. Since last visit she started Keytruda. She had a PET/CT on April 17 that showed  Extensive FDG avid right-sided pleural mass/soft tissue thickening, \par with max SUV 21.5 within a right apical pleural soft tissue mass.\par \par 2.  Multiple FDG avid mediastinal lymph nodes, with max SUV 16.5 within a \par 2.6 x 2.1 cm subcarinal node.\par \par 3.  FDG avid lytic lesion within the left superior acetabulum (max SUV \par 14.8), suspicious for osseous metastasis.\par \par 4.  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 Used to be  500 ml  u 3 times a week. Takes 60 mg of oral moprphine a day 20 mg 3 times a day but pain is still severe. Has not had a bowel movement in unknown amount of days does not take lexatives although she has lexatives. \par \par 6/26/19 She is here for follow up. She is due for cycle 3 of Keytruda.  She has about 300 mg drained from her pleurax twice a week. Has SOB but saturated 94% on room air and 94% on ambualtion. Did not torate the fentanyl path had nausea.  On Morphine 10 mg BID doing well.  Constipation  is better. \par \par 7/17/19\par She is here for follow-up of Stage IV lung cancer with son.  She is due for cycle 3 of Keytruda.  CBC is stable from today.  She only takes the liquid morphine at night now.  She started eating slighlty more and has been feeling slightly better ,as per son.  He states that the pleurex drainage is less, it is checked every Friday + Monday.  On Friday there was about 225cc drained, but yesterday no drainage.  We will arrange for hydration today since she has had poor oral intake lately and small appetite.  Son reports she can tolerate fluids but she has early satiety with food.  Patient also reports feeling short of breath at rest, but her O2 sat is 98% on R/a.  Right sided pain is better. \par CT C/A/P (7/6/19) IMPRESSION:Since April 17, 2019:1. No definite new sites of metastatic disease.2. Right-sided pleural soft tissue mass/thickening, overall appearing decreased since April 17, 2019 with primarily residual right apical tumor. Tumor appears to infiltrate the mediastinum.3. No significant change in mediastinal lymphadenopathy including largest 2.6 x 2.1 cm subcarinal lymph node which remains unchanged. Retrocrural 2.5 x 2.3 cm metastasis or additional site of pleural tumor, unchanged.4. Irregularity of right anterior first rib, probably invaded by tumor, stable in retrospect. Left superior acetabular lucent lesion corresponding to FDG avid bone metastasis.5. Right pelvic indeterminate 7 cm mass; possible retroverted uterus with degenerating fibroid or less likely ovarian mass; previously non-FDG avid and probably benign. Further evaluation with pelvic ultrasound or MRI pelvis with and without IV contrast may be helpful.6. Status post right chest tube placement with decreased small loculated right pleural effusion with fluid within the major fissure. 7. New mild to moderate intrahepatic biliary dilation. Correlation with LFTs recommended. Consider further evaluation with ERCP or MRCP with and without IV contrast.\par \par 8/7/19\par She is here with her son. Overall she is doing much better. She is in wheelchair but now less pain, eating better and Pleurax is only draining about 25 cc twice a week, She does complain of SOB still. I explained this will improve as well but asked her to follow up with Dr. Lanza of pul. \par \par 8/28/19\par She is here for follow up. They are to see Dr. Lanza in Early september. There is minimal output from Pleurax.  Pain is much better and does not use narcotics anymore.  Overall better.   She has right sidede nasal congestion and states makes it hard to breath.\par \par 9/18/19\par She is doing well. She is due for cycle 7. Son was not eliana to schedule CTs priro to the visit but she looks better and better every visit and thus most likley still responding. She saw Dr. Lanza who started her on Flonase. She is due to see ENT Dr. Adams.  Appetie is better. No pains. Dr. Lanza is considering to remover the Pleurx possibly in November. \par \par 10/09/2019\par Patient is here for a follow up visit. Due for cycle 8 today. CT chest/abd/pelvis 09/2019 showed improvement in disease. Also has seen Dr. Adams and agreed to continue witj flonase as she has hypertrophy of turbinates. \par She is tolerating Ketryuda well. Her only complaint at this time is trouble in falling asleep. \par \par 10/30/19\par Patient is here for a follow up visit of Stage IV lung cancer with family member.  She is feeling well with no new complaints.   She feels dyspneic when taking a deep breath but her pain has improved.  Most recent CBC is stable. CT C/A/P on September 22nd showed improvement in right apical lung mass and pleural effusion and drained 150cc.  Due for cycle 9 today. \par \par 12/30/19\par Patient is here for a follow-up visit for  lung cancer with son.  Reviewed imaging results with patient and her family, which suggests worsening effusion but otherwise stable.  They drained 500cc from pleurx yesterday, reportedly sanguinous drainage.  She is agreeable to continue with cycle 12 of Keytruda tomorrow. She reports persistent dyspnea.  We discussed that this tx regimen may be losing its responsiveness.\par CT C/A/P (12.27.19) IMPRESSION: Worsening right-sided pleural effusion. Right apical spiculated nodule without difference. Unchanged appearance of the mediastinum. Unchanged appearance of the third thoracic vertebral body and right second rib. No interval change since prior examination. No change in hepatic hypodensity (likely fatty infiltration), splenic hypodensity (too small to characterize), left adrenal nodule or 1 cm retroperitoneal nodule on the right. No change in fat-containing mass in the anterior left thigh.\par \par 2/10/2020\par She is here for follow-up accompanied by son.  Since last visit she was admitted to hospital for pain and nausea. She had pleurx catheter removed during most recent hospitalization on 1/13/2020.  She complains of insomnia but generally feels well.  Most recent scan reviewed with patient.  She reports she had a rash after discharge from the hospital which has since resolved.  \par CT Chest (1.11.2020) IMPRESSION:Since CT scan performed on December 27, 2019;1.  Right thoracostomy tube in stable position with slight interval decrease of right pleural effusion. 2.   Interval increase in right middle lobe consolidation/atelectasis. 3.  Stable spiculated nodule in the right lung apex, consistent with patient's known malignancy.\par \par She feels much better now that her Pleurx is gone. Does not have any pain. Feels dyspneic which is stable.\par \par 3/2/2020\par She is here for follow-up for lung cancer accompanied by son. Since last visit, she followed with Dr. Lanza, [facility], and Dr. Hirsch from [facility] who agreed against placing additional drainage catheters based on most recent CT imaging.  She still reports some SOB.  She also reports taking a Russian medication named Volidol and Panangin for here breathing.  Patient reports mild dizziness after taking Trazodone; we recommended halving dose.  \par \par 5/4/2020\par She is here for follow-up for lung cancer accompanied by son.  Her last treatment as in [facility] due to COVID> she is doing well .She was on Room air. She states SOB is the same. She has paraspinal back pain/hip pain. \par \par 6/15/2020\par She is here for follow-up for lung cancer accompanied by son.  Patient denies fever, chills, vomiting, worsening cough, new rash, persistent diarrhea or bleeding.  Reviewed most recent imaging and had a discussion regarding continuing with Keytruda every 6 weeks for now as she is clinically stable.  She does complain of weakness and nasal congestion which improves using Fluticasone spray.  She uses oxygen intermittently at home.  \par CT C/A/P (5.9.2020) IMPRESSION:1. Stable spiculated right upper lobe mass measuring up to 3.1 cm.2. New areas of ground glass and reticular opacities within the medial left lung, with new subcentimeter nodular opacities within the left lower lobe. Findings may be postinfectious or postinflammatory in etiology. CT of the chest in 2-3 months is recommended.3. Decreased mediastinal and right hilar lymphadenopathy.4. Resolved right pleural effusion. 1.  No findings of new or progressive metastatic disease in the abdomen or pelvis. 2.  Indeterminate left adrenal nodule and right retroperitoneal nodule, stable in size from prior CT. 3.  Partially visualized fat-containing mass in the anterior left thigh-considerations include benign and malignant neoplastic etiology. Recommend further evaluation with MRI if not already performed. 4.  Sclerotic appearance of metastatic left acetabular lesion, similar in appearance to prior CT, possibly representing healing- was previously lytic on PET/CT of 4/17/2019. 5.  Lobulated pelvic structure again seen, possibly uterus with fibroids. Further evaluation with pelvic ultrasound may be performed as warranted.\par \par 9/9/20- Patient is here for follow up accompanied by her son who speaks English . She has chronic SOB which as per son has been slowly getting worse. She uses O2 at home intermittently. She also uses Flonase and Albuterol inhaler. Otherwise she is mostly wheel chair bound and can walk back and forth to the bathroom by herself. Her appetite is good and weight is stable. No new bone pain/abdominal pain/ rash. \par \par 10/21/2020\par She is here for follow-up for lung cancer, accompanied by son.  Patient denies fever, chills, vomiting, new cough, new rash, persistent diarrhea or bleeding.  She is still pending reimaging, they report they have not received authorization for scan yet.  She still complains of weakness and nasal congestion, which improves using Fluticasone spray.  She uses oxygen intermittently at home.  She still has SOB.   Her only new complaint is intermittent back pruritus.  She is due for cycle 20 of Keytruda today.\par \par 12/2/2020\par She is here for follow-up for lung cancer, accompanied by son.  Patient denies fever, chills, vomiting, new cough, new rash, persistent diarrhea or bleeding.  She still complains of weakness, SOB and nasal congestion, uses the nebulizer once daily with occasional relief of her SOB.   She is due for cycle 21 of Keytruda today.  She is using allegra and or hydrocortisone 1% cream for the pruritus.  Reviewed most recent imaging which shows good treatment response.  \par CT C/A/P (11.14.2020) IMPRESSION:Since May 9, 2020:1. No evidence of new intrathoracic or intra-abdominal metastatic disease.2. Unchanged 2.5 x 2 cm right upper lobe pulmonary nodule, stable with similar measurement technique. 3. Decreased subcarinal lymphadenopathy, now 1.2 cm short axis, previously 1.5 cm short axis on May 9, 2020 CT with similar measurement technique. No evidence of additional suspicious lymphadenopathy by size criteria. 4. Near complete interval resolution of left lower lobe and lingula patchy airspace opacities. Unchanged areas of groundglass and reticular opacities within the medial left lung. Findings likely postinfectious or inflammatory. 5. Unchanged sclerotic appearance of metastatic left acetabular lesion,  possibly representing healing of previously FDG avid lytic on PET/CT of 4/17/2019. 6. Unchanged, partially imaged indeterminate lipomatous mass within the anterior left thigh.\par \par 1/13/21\par She is here for follow-up for lung cancer, accompanied by son.  She still complains of weakness, SOB daily which is unchanged.  She is due for cycle 22 of Keytruda today.  She continues to use allegra and or hydrocortisone 1% cream for the pruritus.  Patient denies fever, chills, vomiting, new cough, new rash, persistent diarrhea or bleeding.  She also reports musculoskeletal pain.

## 2021-01-15 NOTE — ASSESSMENT
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  \par - c/w Keytruda 400 mg every 6 weeks\par - CT C/A/P from 11/2020 shows consistent with good treatment response; reimaging will be ordered at next visit (~ 2/2021).  Discussed possibility of chemo holiday or pausing treatment at their discretion in the future if she achieves sustained response to immunotherapy at 2 year sammi.\par - CMP, TSH today\par \par #Dysphagia and decrease appetite - better now \par - c/w Jevity 1.2 hector  BiD\par - she is followed by nutritionist, Tila Villanueva\par \par #SOB mainly when she tries to sleep; due to right nasal congestion\par - c/w Flonase, rx refilled\par - On Albuterol + nebulizer prn\par - She follows with Dr Lanza as indicated\par - Advised to use saline nasal spray as she has c/o dryness in her nose \par \par #Has some insomnia \par - c/w Melatonin 5 mg at bedtime \par \par #Pruritus, back, new onset; may be side effect of Keytruda?\par - advised to try hydrocortisone 1% or benadryl PRN for symptom relief\par - will continue to monitor\par \par RTC in 6 weeks, sooner if needed

## 2021-01-15 NOTE — REVIEW OF SYSTEMS
[Fatigue] : fatigue [Shortness Of Breath] : shortness of breath [Difficulty Walking] : difficulty walking [Negative] : Allergic/Immunologic [SOB on Exertion] : shortness of breath during exertion [Muscle Pain] : muscle pain [Recent Change In Weight] : ~T no recent weight change [Dysphagia] : no dysphagia [Skin Rash] : no skin rash [Constipation] : no constipation [FreeTextEntry2] : seated in wheelchair [FreeTextEntry4] : reports nasal congestion [de-identified] : reports intermittent back pruritus

## 2021-02-08 NOTE — PRE-OP CHECKLIST - TO WHOM
Received call from the patient. She states she was seen for the first time by Dr. Kathe Penaloza on 1/13/21.  She states she thought that appt was [facility] physical. [facility]     Dr. Kathe Penaloza, was this appt for establish care
The patient was called and informed. Call transferred to call center to schedule the appointment.
We adressed chonic medical problems  Different from medicare annual  Advise to come in for medicare annual when able
rn

## 2021-02-21 ENCOUNTER — LABORATORY RESULT (OUTPATIENT)
Age: 86
End: 2021-02-21

## 2021-02-21 ENCOUNTER — OUTPATIENT (OUTPATIENT)
Dept: OUTPATIENT SERVICES | Facility: HOSPITAL | Age: 86
LOS: 1 days | Discharge: HOME | End: 2021-02-21

## 2021-02-21 DIAGNOSIS — Z11.59 ENCOUNTER FOR SCREENING FOR OTHER VIRAL DISEASES: ICD-10-CM

## 2021-02-21 DIAGNOSIS — Z90.710 ACQUIRED ABSENCE OF BOTH CERVIX AND UTERUS: Chronic | ICD-10-CM

## 2021-02-24 ENCOUNTER — LABORATORY RESULT (OUTPATIENT)
Age: 86
End: 2021-02-24

## 2021-02-24 ENCOUNTER — APPOINTMENT (OUTPATIENT)
Dept: INFUSION THERAPY | Facility: CLINIC | Age: 86
End: 2021-02-24
Payer: MEDICAID

## 2021-02-24 ENCOUNTER — APPOINTMENT (OUTPATIENT)
Dept: HEMATOLOGY ONCOLOGY | Facility: CLINIC | Age: 86
End: 2021-02-24
Payer: MEDICAID

## 2021-02-24 VITALS
TEMPERATURE: 97.6 F | SYSTOLIC BLOOD PRESSURE: 160 MMHG | HEART RATE: 55 BPM | RESPIRATION RATE: 16 BRPM | DIASTOLIC BLOOD PRESSURE: 69 MMHG | HEIGHT: 72 IN

## 2021-02-24 LAB
ALBUMIN SERPL ELPH-MCNC: 4 G/DL
ALP BLD-CCNC: 78 U/L
ALT SERPL-CCNC: 22 U/L
ANION GAP SERPL CALC-SCNC: 13 MMOL/L
AST SERPL-CCNC: 19 U/L
BILIRUB SERPL-MCNC: 0.5 MG/DL
BUN SERPL-MCNC: 20 MG/DL
CALCIUM SERPL-MCNC: 9.4 MG/DL
CHLORIDE SERPL-SCNC: 110 MMOL/L
CO2 SERPL-SCNC: 21 MMOL/L
CREAT SERPL-MCNC: 1.2 MG/DL
GLUCOSE SERPL-MCNC: 105 MG/DL
HCT VFR BLD CALC: 37.1 %
HGB BLD-MCNC: 12.4 G/DL
MCHC RBC-ENTMCNC: 27.3 PG
MCHC RBC-ENTMCNC: 33.4 G/DL
MCV RBC AUTO: 81.7 FL
PLATELET # BLD AUTO: 198 K/UL
PMV BLD: 10.5 FL
POTASSIUM SERPL-SCNC: 4 MMOL/L
PROT SERPL-MCNC: 6.6 G/DL
RBC # BLD: 4.54 M/UL
RBC # FLD: 14.3 %
SODIUM SERPL-SCNC: 144 MMOL/L
WBC # FLD AUTO: 6.91 K/UL

## 2021-02-24 PROCEDURE — 99213 OFFICE O/P EST LOW 20 MIN: CPT

## 2021-02-24 NOTE — REVIEW OF SYSTEMS
[Fatigue] : fatigue [Shortness Of Breath] : shortness of breath [SOB on Exertion] : shortness of breath during exertion [Muscle Pain] : muscle pain [Difficulty Walking] : difficulty walking [Negative] : Allergic/Immunologic [Recent Change In Weight] : ~T no recent weight change [Dysphagia] : no dysphagia [Constipation] : no constipation [Skin Rash] : no skin rash [FreeTextEntry2] : seated in wheelchair [FreeTextEntry4] : reports nasal congestion [de-identified] : reports intermittent back pruritus

## 2021-02-24 NOTE — HISTORY OF PRESENT ILLNESS
[Therapy: ___] : Therapy: [unfilled] [Cycle: ___] : Cycle: [unfilled] [de-identified] : \par This is a 83 year old female who I initially met in [facility] on 3/28/19. She was initially admitted on 2/20/2019 for a right loculated pleural effusion\par approx. 900 CCs, no PE and right apical 3.1x3.0 cm mass with pleural nodules on CTA of chest.  . Pt returned to ED on 3/15/2019 for right pleural\par effusion, had a thoracentesis in ED and was sent home. She than came back for SOB  due to recurrent effusion and a Pleurx was placed. Patholgoy showed adenocarcinoma. \par \par She ha lost a few pounds about  4. She never smoked. She does have weakness.  From her Pleurx she  has 500 cc removed every other day. She has pain after.\par \par Work up:\par 2/20/19 CTA chest:  Large right pleural effusion, maximal axial width of 7 cm correlating with an estimated volume of 900 cc. Associated \par near complete compressive atelectasis of the right lower lobe. Probable loculations of the effusion seen at the apex and at the right heart \par border (for example series 5 images 176, 52; series 9 image 69). 1 cm right upper lobe fissural nodule (series 5 image 139). Patent central \par airways. There is right apical 3.1 x 3.0 cm mass with irregular right apical pleural thickening (series 7, image 44), \par and multiple satellite pleural nodules including a more caudad 2.7 cm para-mediastinal nodule (series 7, image 175). Tissue sampling \par recommended.\par \par Path: \par Pleural fluid: 3/25:  Cell block material is hypocellular and shows a single minutecluster of malignant cells.Immunohistochemistry studies were performed at [facility] and the\par results are noncontributory\par 3/22: Addendum\par Cell block material shows macrophages (positive ),mesothelial cells (positive calretinin) and a single very minute cluster of atypical suspicious cells is negative for Fidel-Ep4.Material is insufficient for further diagnsotic studies (ie,mmunohistochemistry).\par 3/15/19: Pleural fluid: Addendum\par Immunohistochemical studies were performed on the cell block at\par [facility], [address] [place] [id number] (see NOTE). The results\par are as follows:\par \par CK7-                      Rare mesothelial cells positive\par CK20-                    Negative\par CK 5/6-                  Rare mesothelial cells positive\par Calretinin-             Scattered mesothelial cells positive\par CD68-                   Numerous histiocytes positive\par BerEP-4-               Negative\par Napsin-A-             Negative\par TTF-1-                  Negative\par \par These results are non-specific, suggestive of mesothelial\par hyperplasia. The few markedly atypical cells seen in the smear\par are not evident in the cell block.\par \par The atypical cells seen in the current smear are not seen in the\par prior pleural fluid Thinprep smear or cell block (75-OB-).\par \par \par Few marked atypical cells, suspicious for mesothelioma versus\par adenocarcinoma,\par in a background of numerous mesothelial cells, histiocytes and\par small lymphocytes.\par \par \par 3/25/19: Final DiagnosisPOSITIVE FOR MALIGNANT CELLS.Favor metastatic adenocarcinoma. Pending cell block.\par \par  I spke to Dr. Allison  and there are only a few cells thus furhter studies such as IHC, molecular PD-l1 is not possible\par  [FreeTextEntry1] : Started Keytruda on 5/17/19, changed to 6 week dosing starting 5/4/2020 last traetment was on 1/13/21 [de-identified] : 6/5/19\par She is here for ofllow up. Since last visit she started Keytruda. She had a PET/CT on April 17 that showed  Extensive FDG avid right-sided pleural mass/soft tissue thickening, \par with max SUV 21.5 within a right apical pleural soft tissue mass.\par \par 2.  Multiple FDG avid mediastinal lymph nodes, with max SUV 16.5 within a \par 2.6 x 2.1 cm subcarinal node.\par \par 3.  FDG avid lytic lesion within the left superior acetabulum (max SUV \par 14.8), suspicious for osseous metastasis.\par \par 4.  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 Used to be  500 ml  u 3 times a week. Takes 60 mg of oral moprphine a day 20 mg 3 times a day but pain is still severe. Has not had a bowel movement in unknown amount of days does not take lexatives although she has lexatives. \par \par 6/26/19 She is here for follow up. She is due for cycle 3 of Keytruda.  She has about 300 mg drained from her pleurax twice a week. Has SOB but saturated 94% on room air and 94% on ambualtion. Did not torate the fentanyl path had nausea.  On Morphine 10 mg BID doing well.  Constipation  is better. \par \par 7/17/19\par She is here for follow-up of Stage IV lung cancer with son.  She is due for cycle 3 of Keytruda.  CBC is stable from today.  She only takes the liquid morphine at night now.  She started eating slighlty more and has been feeling slightly better ,as per son.  He states that the pleurex drainage is less, it is checked every Friday + Monday.  On Friday there was about 225cc drained, but yesterday no drainage.  We will arrange for hydration today since she has had poor oral intake lately and small appetite.  Son reports she can tolerate fluids but she has early satiety with food.  Patient also reports feeling short of breath at rest, but her O2 sat is 98% on R/a.  Right sided pain is better. \par CT C/A/P (7/6/19) IMPRESSION:Since April 17, 2019:1. No definite new sites of metastatic disease.2. Right-sided pleural soft tissue mass/thickening, overall appearing decreased since April 17, 2019 with primarily residual right apical tumor. Tumor appears to infiltrate the mediastinum.3. No significant change in mediastinal lymphadenopathy including largest 2.6 x 2.1 cm subcarinal lymph node which remains unchanged. Retrocrural 2.5 x 2.3 cm metastasis or additional site of pleural tumor, unchanged.4. Irregularity of right anterior first rib, probably invaded by tumor, stable in retrospect. Left superior acetabular lucent lesion corresponding to FDG avid bone metastasis.5. Right pelvic indeterminate 7 cm mass; possible retroverted uterus with degenerating fibroid or less likely ovarian mass; previously non-FDG avid and probably benign. Further evaluation with pelvic ultrasound or MRI pelvis with and without IV contrast may be helpful.6. Status post right chest tube placement with decreased small loculated right pleural effusion with fluid within the major fissure. 7. New mild to moderate intrahepatic biliary dilation. Correlation with LFTs recommended. Consider further evaluation with ERCP or MRCP with and without IV contrast.\par \par 8/7/19\par She is here with her son. Overall she is doing much better. She is in wheelchair but now less pain, eating better and Pleurax is only draining about 25 cc twice a week, She does complain of SOB still. I explained this will improve as well but asked her to follow up with Dr. Lanza of pul. \par \par 8/28/19\par She is here for follow up. They are to see Dr. Lanza in Early september. There is minimal output from Pleurax.  Pain is much better and does not use narcotics anymore.  Overall better.   She has right sidede nasal congestion and states makes it hard to breath.\par \par 9/18/19\par She is doing well. She is due for cycle 7. Son was not eliana to schedule CTs priro to the visit but she looks better and better every visit and thus most likley still responding. She saw Dr. Lanza who started her on Flonase. She is due to see ENT Dr. Adams.  Appetie is better. No pains. Dr. Lanza is considering to remover the Pleurx possibly in November. \par \par 10/09/2019\par Patient is here for a follow up visit. Due for cycle 8 today. CT chest/abd/pelvis 09/2019 showed improvement in disease. Also has seen Dr. Adams and agreed to continue witj flonase as she has hypertrophy of turbinates. \par She is tolerating Ketryuda well. Her only complaint at this time is trouble in falling asleep. \par \par 10/30/19\par Patient is here for a follow up visit of Stage IV lung cancer with family member.  She is feeling well with no new complaints.   She feels dyspneic when taking a deep breath but her pain has improved.  Most recent CBC is stable. CT C/A/P on September 22nd showed improvement in right apical lung mass and pleural effusion and drained 150cc.  Due for cycle 9 today. \par \par 12/30/19\par Patient is here for a follow-up visit for  lung cancer with son.  Reviewed imaging results with patient and her family, which suggests worsening effusion but otherwise stable.  They drained 500cc from pleurx yesterday, reportedly sanguinous drainage.  She is agreeable to continue with cycle 12 of Keytruda tomorrow. She reports persistent dyspnea.  We discussed that this tx regimen may be losing its responsiveness.\par CT C/A/P (12.27.19) IMPRESSION: Worsening right-sided pleural effusion. Right apical spiculated nodule without difference. Unchanged appearance of the mediastinum. Unchanged appearance of the third thoracic vertebral body and right second rib. No interval change since prior examination. No change in hepatic hypodensity (likely fatty infiltration), splenic hypodensity (too small to characterize), left adrenal nodule or 1 cm retroperitoneal nodule on the right. No change in fat-containing mass in the anterior left thigh.\par \par 2/10/2020\par She is here for follow-up accompanied by son.  Since last visit she was admitted to hospital for pain and nausea. She had pleurx catheter removed during most recent hospitalization on 1/13/2020.  She complains of insomnia but generally feels well.  Most recent scan reviewed with patient.  She reports she had a rash after discharge from the hospital which has since resolved.  \par CT Chest (1.11.2020) IMPRESSION:Since CT scan performed on December 27, 2019;1.  Right thoracostomy tube in stable position with slight interval decrease of right pleural effusion. 2.   Interval increase in right middle lobe consolidation/atelectasis. 3.  Stable spiculated nodule in the right lung apex, consistent with patient's known malignancy.\par \par She feels much better now that her Pleurx is gone. Does not have any pain. Feels dyspneic which is stable.\par \par 3/2/2020\par She is here for follow-up for lung cancer accompanied by son. Since last visit, she followed with Dr. Lanza, [facility], and Dr. Hirsch from [facility] who agreed against placing additional drainage catheters based on most recent CT imaging.  She still reports some SOB.  She also reports taking a Russian medication named Volidol and Panangin for here breathing.  Patient reports mild dizziness after taking Trazodone; we recommended halving dose.  \par \par 5/4/2020\par She is here for follow-up for lung cancer accompanied by son.  Her last treatment as in [facility] due to COVID> she is doing well .She was on Room air. She states SOB is the same. She has paraspinal back pain/hip pain. \par \par 6/15/2020\par She is here for follow-up for lung cancer accompanied by son.  Patient denies fever, chills, vomiting, worsening cough, new rash, persistent diarrhea or bleeding.  Reviewed most recent imaging and had a discussion regarding continuing with Keytruda every 6 weeks for now as she is clinically stable.  She does complain of weakness and nasal congestion which improves using Fluticasone spray.  She uses oxygen intermittently at home.  \par CT C/A/P (5.9.2020) IMPRESSION:1. Stable spiculated right upper lobe mass measuring up to 3.1 cm.2. New areas of ground glass and reticular opacities within the medial left lung, with new subcentimeter nodular opacities within the left lower lobe. Findings may be postinfectious or postinflammatory in etiology. CT of the chest in 2-3 months is recommended.3. Decreased mediastinal and right hilar lymphadenopathy.4. Resolved right pleural effusion. 1.  No findings of new or progressive metastatic disease in the abdomen or pelvis. 2.  Indeterminate left adrenal nodule and right retroperitoneal nodule, stable in size from prior CT. 3.  Partially visualized fat-containing mass in the anterior left thigh-considerations include benign and malignant neoplastic etiology. Recommend further evaluation with MRI if not already performed. 4.  Sclerotic appearance of metastatic left acetabular lesion, similar in appearance to prior CT, possibly representing healing- was previously lytic on PET/CT of 4/17/2019. 5.  Lobulated pelvic structure again seen, possibly uterus with fibroids. Further evaluation with pelvic ultrasound may be performed as warranted.\par \par 9/9/20- Patient is here for follow up accompanied by her son who speaks English . She has chronic SOB which as per son has been slowly getting worse. She uses O2 at home intermittently. She also uses Flonase and Albuterol inhaler. Otherwise she is mostly wheel chair bound and can walk back and forth to the bathroom by herself. Her appetite is good and weight is stable. No new bone pain/abdominal pain/ rash. \par \par 10/21/2020\par She is here for follow-up for lung cancer, accompanied by son.  Patient denies fever, chills, vomiting, new cough, new rash, persistent diarrhea or bleeding.  She is still pending reimaging, they report they have not received authorization for scan yet.  She still complains of weakness and nasal congestion, which improves using Fluticasone spray.  She uses oxygen intermittently at home.  She still has SOB.   Her only new complaint is intermittent back pruritus.  She is due for cycle 20 of Keytruda today.\par \par 12/2/2020\par She is here for follow-up for lung cancer, accompanied by son.  Patient denies fever, chills, vomiting, new cough, new rash, persistent diarrhea or bleeding.  She still complains of weakness, SOB and nasal congestion, uses the nebulizer once daily with occasional relief of her SOB.   She is due for cycle 21 of Keytruda today.  She is using allegra and or hydrocortisone 1% cream for the pruritus.  Reviewed most recent imaging which shows good treatment response.  \par CT C/A/P (11.14.2020) IMPRESSION:Since May 9, 2020:1. No evidence of new intrathoracic or intra-abdominal metastatic disease.2. Unchanged 2.5 x 2 cm right upper lobe pulmonary nodule, stable with similar measurement technique. 3. Decreased subcarinal lymphadenopathy, now 1.2 cm short axis, previously 1.5 cm short axis on May 9, 2020 CT with similar measurement technique. No evidence of additional suspicious lymphadenopathy by size criteria. 4. Near complete interval resolution of left lower lobe and lingula patchy airspace opacities. Unchanged areas of groundglass and reticular opacities within the medial left lung. Findings likely postinfectious or inflammatory. 5. Unchanged sclerotic appearance of metastatic left acetabular lesion,  possibly representing healing of previously FDG avid lytic on PET/CT of 4/17/2019. 6. Unchanged, partially imaged indeterminate lipomatous mass within the anterior left thigh.\par \par 1/13/21\par She is here for follow-up for lung cancer, accompanied by son.  She still complains of weakness, SOB daily which is unchanged.  She is due for cycle 22 of Keytruda today.  She continues to use allegra and or hydrocortisone 1% cream for the pruritus.  Patient denies fever, chills, vomiting, new cough, new rash, persistent diarrhea or bleeding.  She also reports musculoskeletal pain.  \par \par 2/24/21\par She is here with her son. She is doing well but still has same complaitns. mainly fatigue and SOB as well as MSK pain behind the left scapula. We had a long talk and I expleind that the fatigue and SOB maybe due to kaytruda so we decided to stop it and observe her. On exam no changes.

## 2021-02-24 NOTE — PHYSICAL EXAM
[Ambulatory and capable of all self care but unable to carry out any work activities] : Status 2- Ambulatory and capable of all self care but unable to carry out any work activities. Up and about more than 50% of waking hours [Normal] : affect appropriate [de-identified] :  In wheelchair, but does stand up by herself [de-identified] : Slight decrease in breath sound RLL.  [de-identified] : scars from excisions on back; no evidence of rash

## 2021-02-24 NOTE — ASSESSMENT
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 \par - CMP, TSH today\par \par #Dysphagia and decrease appetite - better now \par - c/w Jevity 1.2 hector  BiD\par - she is followed by nutritionist, Tila Villanueva\par \par #SOB mainly when she tries to sleep; due to right nasal congestion maybe from Keytruda\par - c/w Flonase, rx refilled\par - On Albuterol + nebulizer prn\par - She follows with Dr Lanza as indicated\par - Advised to use saline nasal spray as she has c/o dryness in her nose \par \par #Has some insomnia \par - c/w Melatonin 5 mg at bedtime \par \par #Pruritus, back, new onset; may be side effect of Keytruda?\par - advised to try hydrocortisone 1% or benadryl PRN for symptom relief\par - will continue to monitor\par \par RTC in In April if CT C/A/P do not show progression. will call with results.

## 2021-02-24 NOTE — REASON FOR VISIT
[Follow-Up Visit] : a follow-up [Patient Declined  Services] : - None: Patient declined  services [FreeTextEntry2] : Stage IV lung cancer  [FreeTextEntry3] : I speak fluent Norwegian

## 2021-02-25 LAB — TSH SERPL-ACNC: 0.61 UIU/ML

## 2021-03-12 ENCOUNTER — RESULT REVIEW (OUTPATIENT)
Age: 86
End: 2021-03-12

## 2021-03-12 ENCOUNTER — OUTPATIENT (OUTPATIENT)
Dept: OUTPATIENT SERVICES | Facility: HOSPITAL | Age: 86
LOS: 1 days | Discharge: HOME | End: 2021-03-12
Payer: MEDICAID

## 2021-03-12 DIAGNOSIS — Z51.12 ENCOUNTER FOR ANTINEOPLASTIC IMMUNOTHERAPY: ICD-10-CM

## 2021-03-12 DIAGNOSIS — Z90.710 ACQUIRED ABSENCE OF BOTH CERVIX AND UTERUS: Chronic | ICD-10-CM

## 2021-03-12 PROCEDURE — 71260 CT THORAX DX C+: CPT | Mod: 26

## 2021-03-12 PROCEDURE — 74177 CT ABD & PELVIS W/CONTRAST: CPT | Mod: 26

## 2021-03-31 NOTE — DISCHARGE NOTE NURSING/CASE MANAGEMENT/SOCIAL WORK - NSDPACMPNY_GEN_ALL_CORE
[address] [place] [address] [address], [address], [place]  Phone:  [phone]  FNR:[phone]       Name: Carley Naranjo  : 1955    Chief Complaint   Patient presents with    6 Month Follow-Up    Hypertension       HPI:     Carley Naranjo is a 77 y.o. female who presents today for     HPI  Retiring in May. She is looking forward to being with granchildren more. Doing well with asthma --130 puffs left on albuterol. Uses antihistamine as needed --- tires her out. Reviewed labs from October. Improved sugars. Activity limited due to her knee -- may get surgery after she retires. HTN and cholesterol controlled   Knows wait is high. Current Outpatient Medications:     lisinopril-hydroCHLOROthiazide (PRINZIDE;ZESTORETIC) 20-25 MG per tablet, Take 1 tablet by mouth daily, Disp: 90 tablet, Rfl: 3    pravastatin (PRAVACHOL) 20 MG tablet, Take 1 tablet by mouth daily, Disp: 90 tablet, Rfl: 3    albuterol sulfate (PROAIR RESPICLICK) 725 (90 Base) MCG/ACT aerosol powder inhalation, Inhale 2 puffs into the lungs every 6 hours as needed for Wheezing or Shortness of Breath, Disp: 1 Inhaler, Rfl: 1    Multiple Vitamins-Minerals (WOMENS MULTI VITAMIN & MINERAL PO), Take 1 tablet by mouth daily. , Disp: , Rfl:     Calcium Polycarbophil (FIBERCON PO), Take 1 tablet by mouth 2 times daily. , Disp: , Rfl:     Omeprazole Magnesium (PRILOSEC OTC PO), Take 1 tablet by mouth daily. , Disp: , Rfl:     Allergies   Allergen Reactions    Cefdinir      rash    Amoxicillin Rash       Review of Systems   Constitutional: Negative for fatigue and fever. Respiratory: Negative for shortness of breath. Cardiovascular: Negative for chest pain and leg swelling.        Objective:     /66 (Site: Left Upper Arm, Position: Sitting, Cuff Size: Large Adult)   Pulse 81   Temp 97 °F (36.1 °C) (Temporal)   Wt 299 lb (135.6 kg)   SpO2 98%   BMI 44.15 kg/m²      Wt Readings from Last 3 Encounters: 03/31/21 299 lb (135.6 kg)   09/30/20 287 lb 12.8 oz (130.5 kg)   03/12/20 295 lb (133.8 kg)     BP Readings from Last 3 Encounters:   03/31/21 126/66   09/30/20 118/76   03/12/20 132/72     Lab Results   Component Value Date    LABA1C 5.7 10/06/2020    LABA1C 6.0 09/26/2015     Lab Results   Component Value Date     10/06/2020    K 4.0 10/06/2020     10/06/2020    CO2 28 10/06/2020    BUN 20 10/06/2020    CREATININE 0.8 10/06/2020    GLUCOSE 102 10/06/2020    GLUCOSE 95 09/28/2019    CALCIUM 9.7 10/06/2020      Lab Results   Component Value Date    CHOL 168 10/06/2020    CHOL 184 09/28/2019    CHOL 242 (H) 09/29/2018     Lab Results   Component Value Date    TRIG 118 10/06/2020    TRIG 113 09/28/2019    TRIG 135 09/29/2018     Lab Results   Component Value Date    HDL 41 10/06/2020    HDL 46 09/28/2019    HDL 46 09/29/2018     Lab Results   Component Value Date    LDLCALC 103 10/06/2020    LDLCALC 115 (H) 09/28/2019    LDLCALC 169 (H) 09/29/2018     Lab Results   Component Value Date    VLDL 22 09/28/2019    VLDL 27 09/29/2018    VLDL 19 09/30/2017     No results found for: CHOLHDLRATIO    Physical Exam  Constitutional:       General: She is not in acute distress. Appearance: Normal appearance. She is not ill-appearing. Cardiovascular:      Rate and Rhythm: Normal rate and regular rhythm. Heart sounds: No murmur. Pulmonary:      Effort: Pulmonary effort is normal. No respiratory distress. Breath sounds: Normal breath sounds. No wheezing. Musculoskeletal:         General: No swelling. Right lower leg: Edema (1+) present. Left lower leg: Edema (1+) present. Neurological:      Mental Status: She is alert. Psychiatric:         Mood and Affect: Mood normal.         Assessment/Plan:     Kimberly Machado was seen today for 6 month follow-up and hypertension. Diagnoses and all orders for this visit:    Essential hypertension  -     Comprehensive Metabolic Panel;  Future  -     CBC Family

## 2021-05-13 ENCOUNTER — RX RENEWAL (OUTPATIENT)
Age: 86
End: 2021-05-13

## 2021-05-17 ENCOUNTER — APPOINTMENT (OUTPATIENT)
Dept: HEMATOLOGY ONCOLOGY | Facility: CLINIC | Age: 86
End: 2021-05-17
Payer: MEDICAID

## 2021-05-17 ENCOUNTER — OUTPATIENT (OUTPATIENT)
Dept: OUTPATIENT SERVICES | Facility: HOSPITAL | Age: 86
LOS: 1 days | Discharge: HOME | End: 2021-05-17

## 2021-05-17 ENCOUNTER — LABORATORY RESULT (OUTPATIENT)
Age: 86
End: 2021-05-17

## 2021-05-17 VITALS
HEIGHT: 62 IN | HEART RATE: 58 BPM | TEMPERATURE: 97.1 F | RESPIRATION RATE: 16 BRPM | SYSTOLIC BLOOD PRESSURE: 122 MMHG | DIASTOLIC BLOOD PRESSURE: 60 MMHG

## 2021-05-17 DIAGNOSIS — Z90.710 ACQUIRED ABSENCE OF BOTH CERVIX AND UTERUS: Chronic | ICD-10-CM

## 2021-05-17 DIAGNOSIS — Z51.12 ENCOUNTER FOR ANTINEOPLASTIC IMMUNOTHERAPY: ICD-10-CM

## 2021-05-17 DIAGNOSIS — C34.91 MALIGNANT NEOPLASM OF UNSPECIFIED PART OF RIGHT BRONCHUS OR LUNG: ICD-10-CM

## 2021-05-17 LAB
ALBUMIN SERPL ELPH-MCNC: 3.9 G/DL
ALP BLD-CCNC: 71 U/L
ALT SERPL-CCNC: 15 U/L
ANION GAP SERPL CALC-SCNC: 12 MMOL/L
AST SERPL-CCNC: 13 U/L
BILIRUB SERPL-MCNC: 0.5 MG/DL
BUN SERPL-MCNC: 19 MG/DL
CALCIUM SERPL-MCNC: 9.3 MG/DL
CHLORIDE SERPL-SCNC: 110 MMOL/L
CO2 SERPL-SCNC: 21 MMOL/L
CREAT SERPL-MCNC: 1.1 MG/DL
GLUCOSE SERPL-MCNC: 92 MG/DL
HCT VFR BLD CALC: 36.8 %
HGB BLD-MCNC: 12.2 G/DL
MCHC RBC-ENTMCNC: 26.7 PG
MCHC RBC-ENTMCNC: 33.2 G/DL
MCV RBC AUTO: 80.5 FL
PLATELET # BLD AUTO: 211 K/UL
PMV BLD: 9.1 FL
POTASSIUM SERPL-SCNC: 4.1 MMOL/L
PROT SERPL-MCNC: 6.3 G/DL
RBC # BLD: 4.57 M/UL
RBC # FLD: 14.6 %
SODIUM SERPL-SCNC: 143 MMOL/L
WBC # FLD AUTO: 6.22 K/UL

## 2021-05-17 PROCEDURE — 99213 OFFICE O/P EST LOW 20 MIN: CPT

## 2021-05-18 LAB — TSH SERPL-ACNC: 0.47 UIU/ML

## 2021-05-20 NOTE — REVIEW OF SYSTEMS
[Fatigue] : fatigue [Recent Change In Weight] : ~T no recent weight change [Dysphagia] : no dysphagia [Shortness Of Breath] : shortness of breath [SOB on Exertion] : shortness of breath during exertion [Constipation] : no constipation [Muscle Pain] : no muscle pain [Skin Rash] : no skin rash [Difficulty Walking] : difficulty walking [Negative] : Allergic/Immunologic [FreeTextEntry2] : seated in wheelchair [FreeTextEntry4] : reports nasal congestion [de-identified] : reports intermittent back pruritus

## 2021-05-20 NOTE — ASSESSMENT
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 \par - stop  Keytruda 400 mg every 6 weeks due to fatigue and dyspnea and her preferences \par - CT C/A/P from from 3/70998 were stable and we stopped keytruda and is now on observation.\par -CBC  CMP, TSH today and will check  Restaging CT C/A/P now and if no progression she will RTC in July 20201\par \par #Dysphagia and decrease appetite - better now \par - c/w Jevity 1.2 hector  BiD\par - she is followed by nutritionist, Tila Villanueva\par \par #SOB mainly when she tries to sleep; due to right nasal congestion maybe from Keytruda, Keytruda was stopped and SOB is not changed\par - c/w Flonase, rx refilled\par - On Albuterol + nebulizer prn\par - She follows with Dr Lanza as indicated\par - Advised to use saline nasal spray as she has c/o dryness in her nose \par \par #Has some insomnia \par - c/w Melatonin 5 mg at bedtime \par \par #Pruritus, back, new onset; may be side effect of Keytruda?. Did not complain today\par - has  hydrocortisone 1% or benadryl PRN for symptom relief if happens again\par - will continue to monitor\par \par RTC as above [Palliative] : Goals of care discussed with patient: Palliative

## 2021-05-20 NOTE — PHYSICAL EXAM
[Ambulatory and capable of all self care but unable to carry out any work activities] : Status 2- Ambulatory and capable of all self care but unable to carry out any work activities. Up and about more than 50% of waking hours [Normal] : affect appropriate [de-identified] :  In wheelchair, but does stand up by herself [de-identified] : Slight decrease in breath sound RLL.  [de-identified] : scars from excisions on back; no evidence of rash

## 2021-05-20 NOTE — HISTORY OF PRESENT ILLNESS
[de-identified] : \par This is a 83 year old female who I initially met in [facility] on 3/28/19. She was initially admitted on 2/20/2019 for a right loculated pleural effusion\par approx. 900 CCs, no PE and right apical 3.1x3.0 cm mass with pleural nodules on CTA of chest.  . Pt returned to ED on 3/15/2019 for right pleural\par effusion, had a thoracentesis in ED and was sent home. She than came back for SOB  due to recurrent effusion and a Pleurx was placed. Patholgoy showed adenocarcinoma. \par \par She ha lost a few pounds about  4. She never smoked. She does have weakness.  From her Pleurx she  has 500 cc removed every other day. She has pain after.\par \par Work up:\par 2/20/19 CTA chest:  Large right pleural effusion, maximal axial width of 7 cm correlating with an estimated volume of 900 cc. Associated \par near complete compressive atelectasis of the right lower lobe. Probable loculations of the effusion seen at the apex and at the right heart \par border (for example series 5 images 176, 52; series 9 image 69). 1 cm right upper lobe fissural nodule (series 5 image 139). Patent central \par airways. There is right apical 3.1 x 3.0 cm mass with irregular right apical pleural thickening (series 7, image 44), \par and multiple satellite pleural nodules including a more caudad 2.7 cm para-mediastinal nodule (series 7, image 175). Tissue sampling \par recommended.\par \par Path: \par Pleural fluid: 3/25:  Cell block material is hypocellular and shows a single minutecluster of malignant cells.Immunohistochemistry studies were performed at [facility] and the\par results are noncontributory\par 3/22: Addendum\par Cell block material shows macrophages (positive ),mesothelial cells (positive calretinin) and a single very minute cluster of atypical suspicious cells is negative for Fidel-Ep4.Material is insufficient for further diagnsotic studies (ie,mmunohistochemistry).\par 3/15/19: Pleural fluid: Addendum\par Immunohistochemical studies were performed on the cell block at\par [facility], [address] [place] [id number] (see NOTE). The results\par are as follows:\par \par CK7-                      Rare mesothelial cells positive\par CK20-                    Negative\par CK 5/6-                  Rare mesothelial cells positive\par Calretinin-             Scattered mesothelial cells positive\par CD68-                   Numerous histiocytes positive\par BerEP-4-               Negative\par Napsin-A-             Negative\par TTF-1-                  Negative\par \par These results are non-specific, suggestive of mesothelial\par hyperplasia. The few markedly atypical cells seen in the smear\par are not evident in the cell block.\par \par The atypical cells seen in the current smear are not seen in the\par prior pleural fluid Thinprep smear or cell block (80-TT-).\par \par \par Few marked atypical cells, suspicious for mesothelioma versus\par adenocarcinoma,\par in a background of numerous mesothelial cells, histiocytes and\par small lymphocytes.\par \par \par 3/25/19: Final DiagnosisPOSITIVE FOR MALIGNANT CELLS.Favor metastatic adenocarcinoma. Pending cell block.\par \par  I spke to Dr. Allison  and there are only a few cells thus furhter studies such as IHC, molecular PD-l1 is not possible\par  [Therapy: ___] : Therapy: [unfilled] [Cycle: ___] : Cycle: [unfilled] [FreeTextEntry1] : Started Keytruda on 5/17/19, changed to 6 week dosing starting 5/4/2020 last traetment was on 1/13/21 [de-identified] : 6/5/19\par She is here for ofllow up. Since last visit she started Keytruda. She had a PET/CT on April 17 that showed  Extensive FDG avid right-sided pleural mass/soft tissue thickening, \par with max SUV 21.5 within a right apical pleural soft tissue mass.\par \par 2.  Multiple FDG avid mediastinal lymph nodes, with max SUV 16.5 within a \par 2.6 x 2.1 cm subcarinal node.\par \par 3.  FDG avid lytic lesion within the left superior acetabulum (max SUV \par 14.8), suspicious for osseous metastasis.\par \par 4.  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 Used to be  500 ml  u 3 times a week. Takes 60 mg of oral moprphine a day 20 mg 3 times a day but pain is still severe. Has not had a bowel movement in unknown amount of days does not take lexatives although she has lexatives. \par \par 6/26/19 She is here for follow up. She is due for cycle 3 of Keytruda.  She has about 300 mg drained from her pleurax twice a week. Has SOB but saturated 94% on room air and 94% on ambualtion. Did not torate the fentanyl path had nausea.  On Morphine 10 mg BID doing well.  Constipation  is better. \par \par 7/17/19\par She is here for follow-up of Stage IV lung cancer with son.  She is due for cycle 3 of Keytruda.  CBC is stable from today.  She only takes the liquid morphine at night now.  She started eating slighlty more and has been feeling slightly better ,as per son.  He states that the pleurex drainage is less, it is checked every Friday + Monday.  On Friday there was about 225cc drained, but yesterday no drainage.  We will arrange for hydration today since she has had poor oral intake lately and small appetite.  Son reports she can tolerate fluids but she has early satiety with food.  Patient also reports feeling short of breath at rest, but her O2 sat is 98% on R/a.  Right sided pain is better. \par CT C/A/P (7/6/19) IMPRESSION:Since April 17, 2019:1. No definite new sites of metastatic disease.2. Right-sided pleural soft tissue mass/thickening, overall appearing decreased since April 17, 2019 with primarily residual right apical tumor. Tumor appears to infiltrate the mediastinum.3. No significant change in mediastinal lymphadenopathy including largest 2.6 x 2.1 cm subcarinal lymph node which remains unchanged. Retrocrural 2.5 x 2.3 cm metastasis or additional site of pleural tumor, unchanged.4. Irregularity of right anterior first rib, probably invaded by tumor, stable in retrospect. Left superior acetabular lucent lesion corresponding to FDG avid bone metastasis.5. Right pelvic indeterminate 7 cm mass; possible retroverted uterus with degenerating fibroid or less likely ovarian mass; previously non-FDG avid and probably benign. Further evaluation with pelvic ultrasound or MRI pelvis with and without IV contrast may be helpful.6. Status post right chest tube placement with decreased small loculated right pleural effusion with fluid within the major fissure. 7. New mild to moderate intrahepatic biliary dilation. Correlation with LFTs recommended. Consider further evaluation with ERCP or MRCP with and without IV contrast.\par \par 8/7/19\par She is here with her son. Overall she is doing much better. She is in wheelchair but now less pain, eating better and Pleurax is only draining about 25 cc twice a week, She does complain of SOB still. I explained this will improve as well but asked her to follow up with Dr. Lanza of pul. \par \par 8/28/19\par She is here for follow up. They are to see Dr. Lanza in Early september. There is minimal output from Pleurax.  Pain is much better and does not use narcotics anymore.  Overall better.   She has right sidede nasal congestion and states makes it hard to breath.\par \par 9/18/19\par She is doing well. She is due for cycle 7. Son was not eliana to schedule CTs priro to the visit but she looks better and better every visit and thus most likley still responding. She saw Dr. Lanza who started her on Flonase. She is due to see ENT Dr. Adams.  Appetie is better. No pains. Dr. Lanza is considering to remover the Pleurx possibly in November. \par \par 10/09/2019\par Patient is here for a follow up visit. Due for cycle 8 today. CT chest/abd/pelvis 09/2019 showed improvement in disease. Also has seen Dr. Adams and agreed to continue witj flonase as she has hypertrophy of turbinates. \par She is tolerating Ketryuda well. Her only complaint at this time is trouble in falling asleep. \par \par 10/30/19\par Patient is here for a follow up visit of Stage IV lung cancer with family member.  She is feeling well with no new complaints.   She feels dyspneic when taking a deep breath but her pain has improved.  Most recent CBC is stable. CT C/A/P on September 22nd showed improvement in right apical lung mass and pleural effusion and drained 150cc.  Due for cycle 9 today. \par \par 12/30/19\par Patient is here for a follow-up visit for  lung cancer with son.  Reviewed imaging results with patient and her family, which suggests worsening effusion but otherwise stable.  They drained 500cc from pleurx yesterday, reportedly sanguinous drainage.  She is agreeable to continue with cycle 12 of Keytruda tomorrow. She reports persistent dyspnea.  We discussed that this tx regimen may be losing its responsiveness.\par CT C/A/P (12.27.19) IMPRESSION: Worsening right-sided pleural effusion. Right apical spiculated nodule without difference. Unchanged appearance of the mediastinum. Unchanged appearance of the third thoracic vertebral body and right second rib. No interval change since prior examination. No change in hepatic hypodensity (likely fatty infiltration), splenic hypodensity (too small to characterize), left adrenal nodule or 1 cm retroperitoneal nodule on the right. No change in fat-containing mass in the anterior left thigh.\par \par 2/10/2020\par She is here for follow-up accompanied by son.  Since last visit she was admitted to hospital for pain and nausea. She had pleurx catheter removed during most recent hospitalization on 1/13/2020.  She complains of insomnia but generally feels well.  Most recent scan reviewed with patient.  She reports she had a rash after discharge from the hospital which has since resolved.  \par CT Chest (1.11.2020) IMPRESSION:Since CT scan performed on December 27, 2019;1.  Right thoracostomy tube in stable position with slight interval decrease of right pleural effusion. 2.   Interval increase in right middle lobe consolidation/atelectasis. 3.  Stable spiculated nodule in the right lung apex, consistent with patient's known malignancy.\par \par She feels much better now that her Pleurx is gone. Does not have any pain. Feels dyspneic which is stable.\par \par 3/2/2020\par She is here for follow-up for lung cancer accompanied by son. Since last visit, she followed with Dr. Lanza, [facility], and Dr. Hirsch from [facility] who agreed against placing additional drainage catheters based on most recent CT imaging.  She still reports some SOB.  She also reports taking a [place] medication named Volidol and Panangin for here breathing.  Patient reports mild dizziness after taking Trazodone; we recommended halving dose.  \par \par 5/4/2020\par She is here for follow-up for lung cancer accompanied by son.  Her last treatment as in [facility] due to COVID> she is doing well .She was on Room air. She states SOB is the same. She has paraspinal back pain/hip pain. \par \par 6/15/2020\par She is here for follow-up for lung cancer accompanied by son.  Patient denies fever, chills, vomiting, worsening cough, new rash, persistent diarrhea or bleeding.  Reviewed most recent imaging and had a discussion regarding continuing with Keytruda every 6 weeks for now as she is clinically stable.  She does complain of weakness and nasal congestion which improves using Fluticasone spray.  She uses oxygen intermittently at home.  \par CT C/A/P (5.9.2020) IMPRESSION:1. Stable spiculated right upper lobe mass measuring up to 3.1 cm.2. New areas of ground glass and reticular opacities within the medial left lung, with new subcentimeter nodular opacities within the left lower lobe. Findings may be postinfectious or postinflammatory in etiology. CT of the chest in 2-3 months is recommended.3. Decreased mediastinal and right hilar lymphadenopathy.4. Resolved right pleural effusion. 1.  No findings of new or progressive metastatic disease in the abdomen or pelvis. 2.  Indeterminate left adrenal nodule and right retroperitoneal nodule, stable in size from prior CT. 3.  Partially visualized fat-containing mass in the anterior left thigh-considerations include benign and malignant neoplastic etiology. Recommend further evaluation with MRI if not already performed. 4.  Sclerotic appearance of metastatic left acetabular lesion, similar in appearance to prior CT, possibly representing healing- was previously lytic on PET/CT of 4/17/2019. 5.  Lobulated pelvic structure again seen, possibly uterus with fibroids. Further evaluation with pelvic ultrasound may be performed as warranted.\par \par 9/9/20- Patient is here for follow up accompanied by her son who speaks English . She has chronic SOB which as per son has been slowly getting worse. She uses O2 at home intermittently. She also uses Flonase and Albuterol inhaler. Otherwise she is mostly wheel chair bound and can walk back and forth to the bathroom by herself. Her appetite is good and weight is stable. No new bone pain/abdominal pain/ rash. \par \par 10/21/2020\par She is here for follow-up for lung cancer, accompanied by son.  Patient denies fever, chills, vomiting, new cough, new rash, persistent diarrhea or bleeding.  She is still pending reimaging, they report they have not received authorization for scan yet.  She still complains of weakness and nasal congestion, which improves using Fluticasone spray.  She uses oxygen intermittently at home.  She still has SOB.   Her only new complaint is intermittent back pruritus.  She is due for cycle 20 of Keytruda today.\par \par 12/2/2020\par She is here for follow-up for lung cancer, accompanied by son.  Patient denies fever, chills, vomiting, new cough, new rash, persistent diarrhea or bleeding.  She still complains of weakness, SOB and nasal congestion, uses the nebulizer once daily with occasional relief of her SOB.   She is due for cycle 21 of Keytruda today.  She is using allegra and or hydrocortisone 1% cream for the pruritus.  Reviewed most recent imaging which shows good treatment response.  \par CT C/A/P (11.14.2020) IMPRESSION:Since May 9, 2020:1. No evidence of new intrathoracic or intra-abdominal metastatic disease.2. Unchanged 2.5 x 2 cm right upper lobe pulmonary nodule, stable with similar measurement technique. 3. Decreased subcarinal lymphadenopathy, now 1.2 cm short axis, previously 1.5 cm short axis on May 9, 2020 CT with similar measurement technique. No evidence of additional suspicious lymphadenopathy by size criteria. 4. Near complete interval resolution of left lower lobe and lingula patchy airspace opacities. Unchanged areas of groundglass and reticular opacities within the medial left lung. Findings likely postinfectious or inflammatory. 5. Unchanged sclerotic appearance of metastatic left acetabular lesion,  possibly representing healing of previously FDG avid lytic on PET/CT of 4/17/2019. 6. Unchanged, partially imaged indeterminate lipomatous mass within the anterior left thigh.\par \par 1/13/21\par She is here for follow-up for lung cancer, accompanied by son.  She still complains of weakness, SOB daily which is unchanged.  She is due for cycle 22 of Keytruda today.  She continues to use allegra and or hydrocortisone 1% cream for the pruritus.  Patient denies fever, chills, vomiting, new cough, new rash, persistent diarrhea or bleeding.  She also reports musculoskeletal pain.  \par \par 2/24/21\par She is here with her son. She is doing well but still has same complaitns. mainly fatigue and SOB as well as MSK pain behind the left scapula. We had a long talk and I expleind that the fatigue and SOB maybe due to kaytruda so we decided to stop it and observe her. On exam no changes. \par \par \par 5/17/21\par She is here for [facility] wu with her son. Her CT C/A/P in Marh 2021  showed Since November 14, 2020. No significant interval change\par \par approximate 2.5 cm x 2 cm right upper lobe nodule (series 4/33). Stable reticular opacities medial right upper lobe.\par \par Stable ill-defined subcarinal lymph nodes (series 4/88).\par \par \par Stable bibasilar subsegmental discoid atelectasis. No pleural effusions.\par \par \par No new sites of metastatic disease in the abdomen and pelvis.\par \par Previously described sclerotic left acetabular lesion felt to represent a healed metastasis based on prior PET/CT is poorly visualized on the current study.\par \par Unchanged, partially imaged indeterminate lipomatous mass within the anterior left thigh.\par \par Given that she been on  Keytruda now for 2 years with no progression of disease and she continued to have shortness of breath we decided to give her a treatment break.\par \par \par Today even well-being of treatment she still continues to have the same except complains specifically short of breath for which she had an extensive workup. She also has rhinorrhea  so she was referred to ENT again as per their request and I refilled Flonase

## 2021-05-20 NOTE — REASON FOR VISIT
[Follow-Up Visit] : a follow-up [Patient Declined  Services] : - None: Patient declined  services [FreeTextEntry2] : Stage IV lung cancer  [FreeTextEntry3] : I speak fluent Citizen of [place]

## 2021-05-21 DIAGNOSIS — R09.81 NASAL CONGESTION: ICD-10-CM

## 2021-06-12 ENCOUNTER — RESULT REVIEW (OUTPATIENT)
Age: 86
End: 2021-06-12

## 2021-06-12 ENCOUNTER — OUTPATIENT (OUTPATIENT)
Dept: OUTPATIENT SERVICES | Facility: HOSPITAL | Age: 86
LOS: 1 days | Discharge: HOME | End: 2021-06-12
Payer: MEDICAID

## 2021-06-12 DIAGNOSIS — C34.91 MALIGNANT NEOPLASM OF UNSPECIFIED PART OF RIGHT BRONCHUS OR LUNG: ICD-10-CM

## 2021-06-12 DIAGNOSIS — Z90.710 ACQUIRED ABSENCE OF BOTH CERVIX AND UTERUS: Chronic | ICD-10-CM

## 2021-06-12 PROCEDURE — 71260 CT THORAX DX C+: CPT | Mod: 26

## 2021-06-12 PROCEDURE — 74177 CT ABD & PELVIS W/CONTRAST: CPT | Mod: 26

## 2021-07-26 ENCOUNTER — OUTPATIENT (OUTPATIENT)
Dept: OUTPATIENT SERVICES | Facility: HOSPITAL | Age: 86
LOS: 1 days | Discharge: HOME | End: 2021-07-26

## 2021-07-26 ENCOUNTER — LABORATORY RESULT (OUTPATIENT)
Age: 86
End: 2021-07-26

## 2021-07-26 ENCOUNTER — APPOINTMENT (OUTPATIENT)
Dept: HEMATOLOGY ONCOLOGY | Facility: CLINIC | Age: 86
End: 2021-07-26
Payer: MEDICAID

## 2021-07-26 VITALS
HEART RATE: 65 BPM | DIASTOLIC BLOOD PRESSURE: 71 MMHG | HEIGHT: 62 IN | OXYGEN SATURATION: 97 % | TEMPERATURE: 97.8 F | RESPIRATION RATE: 16 BRPM | SYSTOLIC BLOOD PRESSURE: 141 MMHG

## 2021-07-26 DIAGNOSIS — Z90.710 ACQUIRED ABSENCE OF BOTH CERVIX AND UTERUS: Chronic | ICD-10-CM

## 2021-07-26 PROCEDURE — 99213 OFFICE O/P EST LOW 20 MIN: CPT

## 2021-07-26 NOTE — PHYSICAL EXAM
[Ambulatory and capable of all self care but unable to carry out any work activities] : Status 2- Ambulatory and capable of all self care but unable to carry out any work activities. Up and about more than 50% of waking hours [Normal] : affect appropriate [de-identified] :  In wheelchair, but does stand up by herself [de-identified] : Slight decrease in breath sound RLL.  [de-identified] : scars from excisions on back; no evidence of rash

## 2021-07-26 NOTE — ASSESSMENT
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 \par - stop  Keytruda 400 mg every 6 weeks due to fatigue and dyspnea and her preferences \par - CT C/A/P from from 3/57132 were stable and we stopped keytruda and is now on observation with CT from 6/2021 showing stable findings\par -CBC  CMP, TSH today and will check CT C/A/P in 9/2021\par \par #Dysphagia and decrease appetite - better now \par - c/w Jevity 1.2 hector  BiD\par - she is followed by nutritionist, Tila Villanueva\par \par #SOB mainly when she tries to sleep; due to right nasal congestion maybe from Keytruda, Keytruda was stopped and SOB is not changed\par - c/w Flonase, rx refilled\par - On Albuterol + nebulizer prn\par - She follows with Dr Lanza as indicated\par - Advised to use saline nasal spray as she has c/o dryness in her nose \par \par #Has some insomnia \par - c/w Melatonin 5 mg at bedtime \par \par #Pruritus, back, new onset; may be side effect of Keytruda?. Did not complain today\par - has  hydrocortisone 1% or benadryl PRN for symptom relief if happens again\par - will continue to monitor\par \par RTC in 9/2021 will cehck CTs than

## 2021-07-26 NOTE — REVIEW OF SYSTEMS
[Fatigue] : fatigue [Shortness Of Breath] : shortness of breath [SOB on Exertion] : shortness of breath during exertion [Difficulty Walking] : difficulty walking [Negative] : Allergic/Immunologic [Recent Change In Weight] : ~T no recent weight change [Dysphagia] : no dysphagia [Constipation] : no constipation [Muscle Pain] : no muscle pain [Skin Rash] : no skin rash [FreeTextEntry2] : seated in wheelchair [FreeTextEntry4] : reports nasal congestion chronic [FreeTextEntry6] : Unchanged SOB for years  [de-identified] : reports intermittent back pruritus

## 2021-07-26 NOTE — REASON FOR VISIT
[Follow-Up Visit] : a follow-up [Patient Declined  Services] : - None: Patient declined  services [FreeTextEntry2] : Stage IV lung cancer  [FreeTextEntry3] : I speak fluent [place]

## 2021-07-26 NOTE — HISTORY OF PRESENT ILLNESS
[Therapy: ___] : Therapy: [unfilled] [Cycle: ___] : Cycle: [unfilled] [de-identified] : \par This is a 83 year old female who I initially met in [facility] on 3/28/19. She was initially admitted on 2/20/2019 for a right loculated pleural effusion\par approx. 900 CCs, no PE and right apical 3.1x3.0 cm mass with pleural nodules on CTA of chest.  . Pt returned to ED on 3/15/2019 for right pleural\par effusion, had a thoracentesis in ED and was sent home. She than came back for SOB  due to recurrent effusion and a Pleurx was placed. Patholgoy showed adenocarcinoma. \par \par She ha lost a few pounds about  4. She never smoked. She does have weakness.  From her Pleurx she  has 500 cc removed every other day. She has pain after.\par \par Work up:\par 2/20/19 CTA chest:  Large right pleural effusion, maximal axial width of 7 cm correlating with an estimated volume of 900 cc. Associated \par near complete compressive atelectasis of the right lower lobe. Probable loculations of the effusion seen at the apex and at the right heart \par border (for example series 5 images 176, 52; series 9 image 69). 1 cm right upper lobe fissural nodule (series 5 image 139). Patent central \par airways. There is right apical 3.1 x 3.0 cm mass with irregular right apical pleural thickening (series 7, image 44), \par and multiple satellite pleural nodules including a more caudad 2.7 cm para-mediastinal nodule (series 7, image 175). Tissue sampling \par recommended.\par \par Path: \par Pleural fluid: 3/25:  Cell block material is hypocellular and shows a single minutecluster of malignant cells.Immunohistochemistry studies were performed at [facility] and the\par results are noncontributory\par 3/22: Addendum\par Cell block material shows macrophages (positive ),mesothelial cells (positive calretinin) and a single very minute cluster of atypical suspicious cells is negative for Fidel-Ep4.Material is insufficient for further diagnsotic studies (ie,mmunohistochemistry).\par 3/15/19: Pleural fluid: Addendum\par Immunohistochemical studies were performed on the cell block at\par [facility], [address] [place] [id number] (see NOTE). The results\par are as follows:\par \par CK7-                      Rare mesothelial cells positive\par CK20-                    Negative\par CK 5/6-                  Rare mesothelial cells positive\par Calretinin-             Scattered mesothelial cells positive\par CD68-                   Numerous histiocytes positive\par BerEP-4-               Negative\par Napsin-A-             Negative\par TTF-1-                  Negative\par \par These results are non-specific, suggestive of mesothelial\par hyperplasia. The few markedly atypical cells seen in the smear\par are not evident in the cell block.\par \par The atypical cells seen in the current smear are not seen in the\par prior pleural fluid Thinprep smear or cell block (54-[id number]).\par \par \par Few marked atypical cells, suspicious for mesothelioma versus\par adenocarcinoma,\par in a background of numerous mesothelial cells, histiocytes and\par small lymphocytes.\par \par \par 3/25/19: Final DiagnosisPOSITIVE FOR MALIGNANT CELLS.Favor metastatic adenocarcinoma. Pending cell block.\par \par  I spke to Dr. Allison  and there are only a few cells thus furhter studies such as IHC, molecular PD-l1 is not possible\par  [FreeTextEntry1] : Started Keytruda on 5/17/19, changed to 6 week dosing starting 5/4/2020 last traetment was on 1/13/21 [de-identified] : 6/5/19\par She is here for ofllow up. Since last visit she started Keytruda. She had a PET/CT on April 17 that showed  Extensive FDG avid right-sided pleural mass/soft tissue thickening, \par with max SUV 21.5 within a right apical pleural soft tissue mass.\par \par 2.  Multiple FDG avid mediastinal lymph nodes, with max SUV 16.5 within a \par 2.6 x 2.1 cm subcarinal node.\par \par 3.  FDG avid lytic lesion within the left superior acetabulum (max SUV \par 14.8), suspicious for osseous metastasis.\par \par 4.  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 Used to be  500 ml  u 3 times a week. Takes 60 mg of oral moprphine a day 20 mg 3 times a day but pain is still severe. Has not had a bowel movement in unknown amount of days does not take lexatives although she has lexatives. \par \par 6/26/19 She is here for follow up. She is due for cycle 3 of Keytruda.  She has about 300 mg drained from her pleurax twice a week. Has SOB but saturated 94% on room air and 94% on ambualtion. Did not torate the fentanyl path had nausea.  On Morphine 10 mg BID doing well.  Constipation  is better. \par \par 7/17/19\par She is here for follow-up of Stage IV lung cancer with son.  She is due for cycle 3 of Keytruda.  CBC is stable from today.  She only takes the liquid morphine at night now.  She started eating slighlty more and has been feeling slightly better ,as per son.  He states that the pleurex drainage is less, it is checked every Friday + Monday.  On Friday there was about 225cc drained, but yesterday no drainage.  We will arrange for hydration today since she has had poor oral intake lately and small appetite.  Son reports she can tolerate fluids but she has early satiety with food.  Patient also reports feeling short of breath at rest, but her O2 sat is 98% on R/a.  Right sided pain is better. \par CT C/A/P (7/6/19) IMPRESSION:Since April 17, 2019:1. No definite new sites of metastatic disease.2. Right-sided pleural soft tissue mass/thickening, overall appearing decreased since April 17, 2019 with primarily residual right apical tumor. Tumor appears to infiltrate the mediastinum.3. No significant change in mediastinal lymphadenopathy including largest 2.6 x 2.1 cm subcarinal lymph node which remains unchanged. Retrocrural 2.5 x 2.3 cm metastasis or additional site of pleural tumor, unchanged.4. Irregularity of right anterior first rib, probably invaded by tumor, stable in retrospect. Left superior acetabular lucent lesion corresponding to FDG avid bone metastasis.5. Right pelvic indeterminate 7 cm mass; possible retroverted uterus with degenerating fibroid or less likely ovarian mass; previously non-FDG avid and probably benign. Further evaluation with pelvic ultrasound or MRI pelvis with and without IV contrast may be helpful.6. Status post right chest tube placement with decreased small loculated right pleural effusion with fluid within the major fissure. 7. New mild to moderate intrahepatic biliary dilation. Correlation with LFTs recommended. Consider further evaluation with ERCP or MRCP with and without IV contrast.\par \par 8/7/19\par She is here with her son. Overall she is doing much better. She is in wheelchair but now less pain, eating better and Pleurax is only draining about 25 cc twice a week, She does complain of SOB still. I explained this will improve as well but asked her to follow up with Dr. Lanza of pul. \par \par 8/28/19\par She is here for follow up. They are to see Dr. Lanza in Early september. There is minimal output from Pleurax.  Pain is much better and does not use narcotics anymore.  Overall better.   She has right sidede nasal congestion and states makes it hard to breath.\par \par 9/18/19\par She is doing well. She is due for cycle 7. Son was not eliana to schedule CTs priro to the visit but she looks better and better every visit and thus most likley still responding. She saw Dr. Lanza who started her on Flonase. She is due to see ENT Dr. Adams.  Appetie is better. No pains. Dr. Lanza is considering to remover the Pleurx possibly in November. \par \par 10/09/2019\par Patient is here for a follow up visit. Due for cycle 8 today. CT chest/abd/pelvis 09/2019 showed improvement in disease. Also has seen Dr. Adams and agreed to continue witj flonase as she has hypertrophy of turbinates. \par She is tolerating Ketryuda well. Her only complaint at this time is trouble in falling asleep. \par \par 10/30/19\par Patient is here for a follow up visit of Stage IV lung cancer with family member.  She is feeling well with no new complaints.   She feels dyspneic when taking a deep breath but her pain has improved.  Most recent CBC is stable. CT C/A/P on September 22nd showed improvement in right apical lung mass and pleural effusion and drained 150cc.  Due for cycle 9 today. \par \par 12/30/19\par Patient is here for a follow-up visit for  lung cancer with son.  Reviewed imaging results with patient and her family, which suggests worsening effusion but otherwise stable.  They drained 500cc from pleurx yesterday, reportedly sanguinous drainage.  She is agreeable to continue with cycle 12 of Keytruda tomorrow. She reports persistent dyspnea.  We discussed that this tx regimen may be losing its responsiveness.\par CT C/A/P (12.27.19) IMPRESSION: Worsening right-sided pleural effusion. Right apical spiculated nodule without difference. Unchanged appearance of the mediastinum. Unchanged appearance of the third thoracic vertebral body and right second rib. No interval change since prior examination. No change in hepatic hypodensity (likely fatty infiltration), splenic hypodensity (too small to characterize), left adrenal nodule or 1 cm retroperitoneal nodule on the right. No change in fat-containing mass in the anterior left thigh.\par \par 2/10/2020\par She is here for follow-up accompanied by son.  Since last visit she was admitted to hospital for pain and nausea. She had pleurx catheter removed during most recent hospitalization on 1/13/2020.  She complains of insomnia but generally feels well.  Most recent scan reviewed with patient.  She reports she had a rash after discharge from the hospital which has since resolved.  \par CT Chest (1.11.2020) IMPRESSION:Since CT scan performed on December 27, 2019;1.  Right thoracostomy tube in stable position with slight interval decrease of right pleural effusion. 2.   Interval increase in right middle lobe consolidation/atelectasis. 3.  Stable spiculated nodule in the right lung apex, consistent with patient's known malignancy.\par \par She feels much better now that her Pleurx is gone. Does not have any pain. Feels dyspneic which is stable.\par \par 3/2/2020\par She is here for follow-up for lung cancer accompanied by son. Since last visit, she followed with Dr. Lanza, [facility], and Dr. Hirsch from [facility] who agreed against placing additional drainage catheters based on most recent CT imaging.  She still reports some SOB.  She also reports taking a Russian medication named Volidol and Panangin for here breathing.  Patient reports mild dizziness after taking Trazodone; we recommended halving dose.  \par \par 5/4/2020\par She is here for follow-up for lung cancer accompanied by son.  Her last treatment as in [facility] due to COVID> she is doing well .She was on Room air. She states SOB is the same. She has paraspinal back pain/hip pain. \par \par 6/15/2020\par She is here for follow-up for lung cancer accompanied by son.  Patient denies fever, chills, vomiting, worsening cough, new rash, persistent diarrhea or bleeding.  Reviewed most recent imaging and had a discussion regarding continuing with Keytruda every 6 weeks for now as she is clinically stable.  She does complain of weakness and nasal congestion which improves using Fluticasone spray.  She uses oxygen intermittently at home.  \par CT C/A/P (5.9.2020) IMPRESSION:1. Stable spiculated right upper lobe mass measuring up to 3.1 cm.2. New areas of ground glass and reticular opacities within the medial left lung, with new subcentimeter nodular opacities within the left lower lobe. Findings may be postinfectious or postinflammatory in etiology. CT of the chest in 2-3 months is recommended.3. Decreased mediastinal and right hilar lymphadenopathy.4. Resolved right pleural effusion. 1.  No findings of new or progressive metastatic disease in the abdomen or pelvis. 2.  Indeterminate left adrenal nodule and right retroperitoneal nodule, stable in size from prior CT. 3.  Partially visualized fat-containing mass in the anterior left thigh-considerations include benign and malignant neoplastic etiology. Recommend further evaluation with MRI if not already performed. 4.  Sclerotic appearance of metastatic left acetabular lesion, similar in appearance to prior CT, possibly representing healing- was previously lytic on PET/CT of 4/17/2019. 5.  Lobulated pelvic structure again seen, possibly uterus with fibroids. Further evaluation with pelvic ultrasound may be performed as warranted.\par \par 9/9/20- Patient is here for follow up accompanied by her son who speaks English . She has chronic SOB which as per son has been slowly getting worse. She uses O2 at home intermittently. She also uses Flonase and Albuterol inhaler. Otherwise she is mostly wheel chair bound and can walk back and forth to the bathroom by herself. Her appetite is good and weight is stable. No new bone pain/abdominal pain/ rash. \par \par 10/21/2020\par She is here for follow-up for lung cancer, accompanied by son.  Patient denies fever, chills, vomiting, new cough, new rash, persistent diarrhea or bleeding.  She is still pending reimaging, they report they have not received authorization for scan yet.  She still complains of weakness and nasal congestion, which improves using Fluticasone spray.  She uses oxygen intermittently at home.  She still has SOB.   Her only new complaint is intermittent back pruritus.  She is due for cycle 20 of Keytruda today.\par \par 12/2/2020\par She is here for follow-up for lung cancer, accompanied by son.  Patient denies fever, chills, vomiting, new cough, new rash, persistent diarrhea or bleeding.  She still complains of weakness, SOB and nasal congestion, uses the nebulizer once daily with occasional relief of her SOB.   She is due for cycle 21 of Keytruda today.  She is using allegra and or hydrocortisone 1% cream for the pruritus.  Reviewed most recent imaging which shows good treatment response.  \par CT C/A/P (11.14.2020) IMPRESSION:Since May 9, 2020:1. No evidence of new intrathoracic or intra-abdominal metastatic disease.2. Unchanged 2.5 x 2 cm right upper lobe pulmonary nodule, stable with similar measurement technique. 3. Decreased subcarinal lymphadenopathy, now 1.2 cm short axis, previously 1.5 cm short axis on May 9, 2020 CT with similar measurement technique. No evidence of additional suspicious lymphadenopathy by size criteria. 4. Near complete interval resolution of left lower lobe and lingula patchy airspace opacities. Unchanged areas of groundglass and reticular opacities within the medial left lung. Findings likely postinfectious or inflammatory. 5. Unchanged sclerotic appearance of metastatic left acetabular lesion,  possibly representing healing of previously FDG avid lytic on PET/CT of 4/17/2019. 6. Unchanged, partially imaged indeterminate lipomatous mass within the anterior left thigh.\par \par 1/13/21\par She is here for follow-up for lung cancer, accompanied by son.  She still complains of weakness, SOB daily which is unchanged.  She is due for cycle 22 of Keytruda today.  She continues to use allegra and or hydrocortisone 1% cream for the pruritus.  Patient denies fever, chills, vomiting, new cough, new rash, persistent diarrhea or bleeding.  She also reports musculoskeletal pain.  \par \par 2/24/21\par She is here with her son. She is doing well but still has same complaitns. mainly fatigue and SOB as well as MSK pain behind the left scapula. We had a long talk and I expleind that the fatigue and SOB maybe due to kaytruda so we decided to stop it and observe her. On exam no changes. \par \par \par 5/17/21\par She is here for [facility] wu with her son. Her CT C/A/P in Marh 2021  showed Since November 14, 2020. No significant interval change\par \par approximate 2.5 cm x 2 cm right upper lobe nodule (series 4/33). Stable reticular opacities medial right upper lobe.\par \par Stable ill-defined subcarinal lymph nodes (series 4/88).\par \par \par Stable bibasilar subsegmental discoid atelectasis. No pleural effusions.\par \par \par No new sites of metastatic disease in the abdomen and pelvis.\par \par Previously described sclerotic left acetabular lesion felt to represent a healed metastasis based on prior PET/CT is poorly visualized on the current study.\par \par Unchanged, partially imaged indeterminate lipomatous mass within the anterior left thigh.\par \par Given that she been on  Keytruda now for 2 years with no progression of disease and she continued to have shortness of breath we decided to give her a treatment break.\par \par \par Today even well-being of treatment she still continues to have the same except complains specifically short of breath for which she had an extensive workup. She also has rhinorrhea  so she was referred to ENT again as per their request and I refilled Flonase\par \par 7/26/21\par She is here for follow up. She had CT C/A/P on 6/12/21: IMPRESSION:\par \par No CT evidence of new sites of metastatic disease in the chest, abdomen and pelvis.\par \par Stable 2.5 cm right upper lobe nodule.\par \par Interval decrease in size of subcarinal lymph node.\par \par Slight interval increase in reticular and groundglass opacities left lower lobe, possibly postinfectious/postinflammatory.\par \par Unchanged, partially imaged indeterminate lipomatous mass within the anterior left thigh.\par \par CBC is normal today,.\par \par She is here with her son. 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

## 2021-07-27 DIAGNOSIS — J91.0 MALIGNANT PLEURAL EFFUSION: ICD-10-CM

## 2021-07-27 DIAGNOSIS — C34.91 MALIGNANT NEOPLASM OF UNSPECIFIED PART OF RIGHT BRONCHUS OR LUNG: ICD-10-CM

## 2021-07-27 LAB
ALBUMIN SERPL ELPH-MCNC: 4 G/DL
ALP BLD-CCNC: 85 U/L
ALT SERPL-CCNC: 11 U/L
ANION GAP SERPL CALC-SCNC: 12 MMOL/L
AST SERPL-CCNC: 15 U/L
BILIRUB SERPL-MCNC: 0.5 MG/DL
BUN SERPL-MCNC: 18 MG/DL
CALCIUM SERPL-MCNC: 9.3 MG/DL
CHLORIDE SERPL-SCNC: 105 MMOL/L
CO2 SERPL-SCNC: 21 MMOL/L
CREAT SERPL-MCNC: 1 MG/DL
GLUCOSE SERPL-MCNC: 101 MG/DL
HCT VFR BLD CALC: 38.3 %
HGB BLD-MCNC: 12.3 G/DL
MCHC RBC-ENTMCNC: 26.2 PG
MCHC RBC-ENTMCNC: 32.1 G/DL
MCV RBC AUTO: 81.5 FL
PLATELET # BLD AUTO: 214 K/UL
PMV BLD: 9.5 FL
POTASSIUM SERPL-SCNC: 4.2 MMOL/L
PROT SERPL-MCNC: 6.6 G/DL
RBC # BLD: 4.7 M/UL
RBC # FLD: 14.4 %
SODIUM SERPL-SCNC: 138 MMOL/L
WBC # FLD AUTO: 5.96 K/UL

## 2021-10-06 PROBLEM — I10 ESSENTIAL HYPERTENSION: Status: ACTIVE | Noted: 2019-04-08

## 2021-11-29 ENCOUNTER — LABORATORY RESULT (OUTPATIENT)
Age: 86
End: 2021-11-29

## 2021-11-29 ENCOUNTER — APPOINTMENT (OUTPATIENT)
Dept: HEMATOLOGY ONCOLOGY | Facility: CLINIC | Age: 86
End: 2021-11-29
Payer: MEDICAID

## 2021-11-29 VITALS — TEMPERATURE: 97.2 F | RESPIRATION RATE: 16 BRPM | HEIGHT: 62 IN

## 2021-11-29 VITALS
OXYGEN SATURATION: 97 % | HEIGHT: 62 IN | WEIGHT: 149 LBS | BODY MASS INDEX: 27.42 KG/M2 | RESPIRATION RATE: 16 BRPM | SYSTOLIC BLOOD PRESSURE: 142 MMHG | DIASTOLIC BLOOD PRESSURE: 63 MMHG | TEMPERATURE: 97.8 F

## 2021-11-29 PROCEDURE — 99214 OFFICE O/P EST MOD 30 MIN: CPT

## 2021-11-30 LAB
ALBUMIN SERPL ELPH-MCNC: 4 G/DL
ALP BLD-CCNC: 89 U/L
ALT SERPL-CCNC: 9 U/L
ANION GAP SERPL CALC-SCNC: 17 MMOL/L
AST SERPL-CCNC: 12 U/L
BILIRUB SERPL-MCNC: 0.3 MG/DL
BUN SERPL-MCNC: 20 MG/DL
CALCIUM SERPL-MCNC: 9.3 MG/DL
CHLORIDE SERPL-SCNC: 107 MMOL/L
CO2 SERPL-SCNC: 16 MMOL/L
CREAT SERPL-MCNC: 1 MG/DL
GLUCOSE SERPL-MCNC: 99 MG/DL
HCT VFR BLD CALC: 37.7 %
HGB BLD-MCNC: 12.1 G/DL
MCHC RBC-ENTMCNC: 26.7 PG
MCHC RBC-ENTMCNC: 32.1 G/DL
MCV RBC AUTO: 83 FL
PLATELET # BLD AUTO: 243 K/UL
PMV BLD: 9.2 FL
POTASSIUM SERPL-SCNC: 4.1 MMOL/L
PROT SERPL-MCNC: 6.6 G/DL
RBC # BLD: 4.54 M/UL
RBC # FLD: 15.1 %
SODIUM SERPL-SCNC: 140 MMOL/L
WBC # FLD AUTO: 6.44 K/UL

## 2021-11-30 NOTE — ASSESSMENT
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 \par - CT C/A/P from from 3/39950 were stable and we stopped keytruda and is now on observation with CT from 6/2021 showing stable findings\par -CBC  CMP, TSH today and will check CT C/A/P - Script given \par \par #Dysphagia and decrease appetite - better now \par - c/w Jevity 1.2 hector  BiD\par - she is followed by nutritionist, Tila Villanueva\par \par #SOB mainly when she tries to sleep; due to right nasal congestion maybe from Keytruda, Keytruda was stopped and SOB is not changed\par - c/w Flonase, rx refilled\par - On Albuterol + nebulizer prn\par - She follows with Dr Lanza as indicated\par - Advised to use saline nasal spray as she has c/o dryness in her nose \par \par #Has some insomnia \par - c/w Melatonin 5 mg at bedtime \par \par #Pruritus, back, new onset; may be side effect of Keytruda?. Did not complain today\par - has  hydrocortisone 1% or benadryl PRN for symptom relief if happens again\par - will continue to monitor\par \par RTC in 3 months \par Patient was seen and examined and discussed with .

## 2021-11-30 NOTE — END OF VISIT
[] : Fellow [FreeTextEntry3] : She returns for follow-up today.  She continues to have the same chronic complaints now that she always had for the last several years including unclear left-sided pain that she thinks is from vascular source in origin, insomnia, shortness of breath, she seen multiple specialists and we are unable to exactly determine the cause of her pain but I explained that the shortness of breath as before is possibly related to her lung cancer may be resulting in decreased function from previous Pleurx catheter leading to fibrosis and decrease in lung capacity?.\par \par We will check blood work today and will also repeat CT chest abdomen and pelvis if there is no progression on exams she will see us in 3 months otherwise will be started back on immunotherapy.  They know to see a call us sooner if she starts getting worsening of her symptoms or any new symptoms.

## 2021-11-30 NOTE — REVIEW OF SYSTEMS
[Fatigue] : fatigue [Shortness Of Breath] : shortness of breath [SOB on Exertion] : shortness of breath during exertion [Difficulty Walking] : difficulty walking [Negative] : Allergic/Immunologic [Recent Change In Weight] : ~T no recent weight change [Dysphagia] : no dysphagia [Constipation] : no constipation [Muscle Pain] : no muscle pain [Skin Rash] : no skin rash [FreeTextEntry2] : seated in wheelchair [FreeTextEntry4] : reports nasal congestion chronic [FreeTextEntry6] : Unchanged SOB for years

## 2021-11-30 NOTE — PHYSICAL EXAM
[Ambulatory and capable of all self care but unable to carry out any work activities] : Status 2- Ambulatory and capable of all self care but unable to carry out any work activities. Up and about more than 50% of waking hours [Normal] : affect appropriate [de-identified] :  In wheelchair, but does stand up by herself [de-identified] : Slight decrease in breath sound RLL.  [de-identified] : scars from excisions on back; no evidence of rash

## 2021-11-30 NOTE — HISTORY OF PRESENT ILLNESS
[Therapy: ___] : Therapy: [unfilled] [Cycle: ___] : Cycle: [unfilled] [de-identified] : \par This is a 83 year old female who I initially met in [facility] on 3/28/19. She was initially admitted on 2/20/2019 for a right loculated pleural effusion\par approx. 900 CCs, no PE and right apical 3.1x3.0 cm mass with pleural nodules on CTA of chest.  . Pt returned to ED on 3/15/2019 for right pleural\par effusion, had a thoracentesis in ED and was sent home. She than came back for SOB  due to recurrent effusion and a Pleurx was placed. Patholgoy showed adenocarcinoma. \par \par She ha lost a few pounds about  4. She never smoked. She does have weakness.  From her Pleurx she  has 500 cc removed every other day. She has pain after.\par \par Work up:\par 2/20/19 CTA chest:  Large right pleural effusion, maximal axial width of 7 cm correlating with an estimated volume of 900 cc. Associated \par near complete compressive atelectasis of the right lower lobe. Probable loculations of the effusion seen at the apex and at the right heart \par border (for example series 5 images 176, 52; series 9 image 69). 1 cm right upper lobe fissural nodule (series 5 image 139). Patent central \par airways. There is right apical 3.1 x 3.0 cm mass with irregular right apical pleural thickening (series 7, image 44), \par and multiple satellite pleural nodules including a more caudad 2.7 cm para-mediastinal nodule (series 7, image 175). Tissue sampling \par recommended.\par \par Path: \par Pleural fluid: 3/25:  Cell block material is hypocellular and shows a single minutecluster of malignant cells.Immunohistochemistry studies were performed at [facility] and the\par results are noncontributory\par 3/22: Addendum\par Cell block material shows macrophages (positive ),mesothelial cells (positive calretinin) and a single very minute cluster of atypical suspicious cells is negative for Fidel-Ep4.Material is insufficient for further diagnsotic studies (ie,mmunohistochemistry).\par 3/15/19: Pleural fluid: Addendum\par Immunohistochemical studies were performed on the cell block at\par [facility], [address] [place] [id number] (see NOTE). The results\par are as follows:\par \par CK7-                      Rare mesothelial cells positive\par CK20-                    Negative\par CK 5/6-                  Rare mesothelial cells positive\par Calretinin-             Scattered mesothelial cells positive\par CD68-                   Numerous histiocytes positive\par BerEP-4-               Negative\par Napsin-A-             Negative\par TTF-1-                  Negative\par \par These results are non-specific, suggestive of mesothelial\par hyperplasia. The few markedly atypical cells seen in the smear\par are not evident in the cell block.\par \par The atypical cells seen in the current smear are not seen in the\par prior pleural fluid Thinprep smear or cell block (86-UP-).\par \par \par Few marked atypical cells, suspicious for mesothelioma versus\par adenocarcinoma,\par in a background of numerous mesothelial cells, histiocytes and\par small lymphocytes.\par \par \par 3/25/19: Final DiagnosisPOSITIVE FOR MALIGNANT CELLS.Favor metastatic adenocarcinoma. Pending cell block.\par \par  I spke to Dr. Allison  and there are only a few cells thus furhter studies such as IHC, molecular PD-l1 is not possible\par  [FreeTextEntry1] : Started Keytruda on 5/17/19, changed to 6 week dosing starting 5/4/2020 last traetment was on 1/13/21 [de-identified] : 6/5/19\par She is here for ofllow up. Since last visit she started Keytruda. She had a PET/CT on April 17 that showed  Extensive FDG avid right-sided pleural mass/soft tissue thickening, \par with max SUV 21.5 within a right apical pleural soft tissue mass.\par \par 2.  Multiple FDG avid mediastinal lymph nodes, with max SUV 16.5 within a \par 2.6 x 2.1 cm subcarinal node.\par \par 3.  FDG avid lytic lesion within the left superior acetabulum (max SUV \par 14.8), suspicious for osseous metastasis.\par \par 4.  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 Used to be  500 ml  u 3 times a week. Takes 60 mg of oral moprphine a day 20 mg 3 times a day but pain is still severe. Has not had a bowel movement in unknown amount of days does not take lexatives although she has lexatives. \par \par 6/26/19 She is here for follow up. She is due for cycle 3 of Keytruda.  She has about 300 mg drained from her pleurax twice a week. Has SOB but saturated 94% on room air and 94% on ambualtion. Did not torate the fentanyl path had nausea.  On Morphine 10 mg BID doing well.  Constipation  is better. \par \par 7/17/19\par She is here for follow-up of Stage IV lung cancer with son.  She is due for cycle 3 of Keytruda.  CBC is stable from today.  She only takes the liquid morphine at night now.  She started eating slighlty more and has been feeling slightly better ,as per son.  He states that the pleurex drainage is less, it is checked every Friday + Monday.  On Friday there was about 225cc drained, but yesterday no drainage.  We will arrange for hydration today since she has had poor oral intake lately and small appetite.  Son reports she can tolerate fluids but she has early satiety with food.  Patient also reports feeling short of breath at rest, but her O2 sat is 98% on R/a.  Right sided pain is better. \par CT C/A/P (7/6/19) IMPRESSION:Since April 17, 2019:1. No definite new sites of metastatic disease.2. Right-sided pleural soft tissue mass/thickening, overall appearing decreased since April 17, 2019 with primarily residual right apical tumor. Tumor appears to infiltrate the mediastinum.3. No significant change in mediastinal lymphadenopathy including largest 2.6 x 2.1 cm subcarinal lymph node which remains unchanged. Retrocrural 2.5 x 2.3 cm metastasis or additional site of pleural tumor, unchanged.4. Irregularity of right anterior first rib, probably invaded by tumor, stable in retrospect. Left superior acetabular lucent lesion corresponding to FDG avid bone metastasis.5. Right pelvic indeterminate 7 cm mass; possible retroverted uterus with degenerating fibroid or less likely ovarian mass; previously non-FDG avid and probably benign. Further evaluation with pelvic ultrasound or MRI pelvis with and without IV contrast may be helpful.6. Status post right chest tube placement with decreased small loculated right pleural effusion with fluid within the major fissure. 7. New mild to moderate intrahepatic biliary dilation. Correlation with LFTs recommended. Consider further evaluation with ERCP or MRCP with and without IV contrast.\par \par 8/7/19\par She is here with her son. Overall she is doing much better. She is in wheelchair but now less pain, eating better and Pleurax is only draining about 25 cc twice a week, She does complain of SOB still. I explained this will improve as well but asked her to follow up with Dr. Lanza of pul. \par \par 8/28/19\par She is here for follow up. They are to see Dr. Lanaz in Early september. There is minimal output from Pleurax.  Pain is much better and does not use narcotics anymore.  Overall better.   She has right sidede nasal congestion and states makes it hard to breath.\par \par 9/18/19\par She is doing well. She is due for cycle 7. Son was not eliana to schedule CTs priro to the visit but she looks better and better every visit and thus most likley still responding. She saw Dr. Lanza who started her on Flonase. She is due to see ENT Dr. Adams.  Appetie is better. No pains. Dr. Lanza is considering to remover the Pleurx possibly in November. \par \par 10/09/2019\par Patient is here for a follow up visit. Due for cycle 8 today. CT chest/abd/pelvis 09/2019 showed improvement in disease. Also has seen Dr. Adams and agreed to continue witj flonase as she has hypertrophy of turbinates. \par She is tolerating Ketryuda well. Her only complaint at this time is trouble in falling asleep. \par \par 10/30/19\par Patient is here for a follow up visit of Stage IV lung cancer with family member.  She is feeling well with no new complaints.   She feels dyspneic when taking a deep breath but her pain has improved.  Most recent CBC is stable. CT C/A/P on September 22nd showed improvement in right apical lung mass and pleural effusion and drained 150cc.  Due for cycle 9 today. \par \par 12/30/19\par Patient is here for a follow-up visit for  lung cancer with son.  Reviewed imaging results with patient and her family, which suggests worsening effusion but otherwise stable.  They drained 500cc from pleurx yesterday, reportedly sanguinous drainage.  She is agreeable to continue with cycle 12 of Keytruda tomorrow. She reports persistent dyspnea.  We discussed that this tx regimen may be losing its responsiveness.\par CT C/A/P (12.27.19) IMPRESSION: Worsening right-sided pleural effusion. Right apical spiculated nodule without difference. Unchanged appearance of the mediastinum. Unchanged appearance of the third thoracic vertebral body and right second rib. No interval change since prior examination. No change in hepatic hypodensity (likely fatty infiltration), splenic hypodensity (too small to characterize), left adrenal nodule or 1 cm retroperitoneal nodule on the right. No change in fat-containing mass in the anterior left thigh.\par \par 2/10/2020\par She is here for follow-up accompanied by son.  Since last visit she was admitted to hospital for pain and nausea. She had pleurx catheter removed during most recent hospitalization on 1/13/2020.  She complains of insomnia but generally feels well.  Most recent scan reviewed with patient.  She reports she had a rash after discharge from the hospital which has since resolved.  \par CT Chest (1.11.2020) IMPRESSION:Since CT scan performed on December 27, 2019;1.  Right thoracostomy tube in stable position with slight interval decrease of right pleural effusion. 2.   Interval increase in right middle lobe consolidation/atelectasis. 3.  Stable spiculated nodule in the right lung apex, consistent with patient's known malignancy.\par \par She feels much better now that her Pleurx is gone. Does not have any pain. Feels dyspneic which is stable.\par \par 3/2/2020\par She is here for follow-up for lung cancer accompanied by son. Since last visit, she followed with Dr. Lanza, [facility], and Dr. Hirsch from [facility] who agreed against placing additional drainage catheters based on most recent CT imaging.  She still reports some SOB.  She also reports taking a Russian medication named Volidol and Panangin for here breathing.  Patient reports mild dizziness after taking Trazodone; we recommended halving dose.  \par \par 5/4/2020\par She is here for follow-up for lung cancer accompanied by son.  Her last treatment as in [facility] due to COVID> she is doing well .She was on Room air. She states SOB is the same. She has paraspinal back pain/hip pain. \par \par 6/15/2020\par She is here for follow-up for lung cancer accompanied by son.  Patient denies fever, chills, vomiting, worsening cough, new rash, persistent diarrhea or bleeding.  Reviewed most recent imaging and had a discussion regarding continuing with Keytruda every 6 weeks for now as she is clinically stable.  She does complain of weakness and nasal congestion which improves using Fluticasone spray.  She uses oxygen intermittently at home.  \par CT C/A/P (5.9.2020) IMPRESSION:1. Stable spiculated right upper lobe mass measuring up to 3.1 cm.2. New areas of ground glass and reticular opacities within the medial left lung, with new subcentimeter nodular opacities within the left lower lobe. Findings may be postinfectious or postinflammatory in etiology. CT of the chest in 2-3 months is recommended.3. Decreased mediastinal and right hilar lymphadenopathy.4. Resolved right pleural effusion. 1.  No findings of new or progressive metastatic disease in the abdomen or pelvis. 2.  Indeterminate left adrenal nodule and right retroperitoneal nodule, stable in size from prior CT. 3.  Partially visualized fat-containing mass in the anterior left thigh-considerations include benign and malignant neoplastic etiology. Recommend further evaluation with MRI if not already performed. 4.  Sclerotic appearance of metastatic left acetabular lesion, similar in appearance to prior CT, possibly representing healing- was previously lytic on PET/CT of 4/17/2019. 5.  Lobulated pelvic structure again seen, possibly uterus with fibroids. Further evaluation with pelvic ultrasound may be performed as warranted.\par \par 9/9/20- Patient is here for follow up accompanied by her son who speaks English . She has chronic SOB which as per son has been slowly getting worse. She uses O2 at home intermittently. She also uses Flonase and Albuterol inhaler. Otherwise she is mostly wheel chair bound and can walk back and forth to the bathroom by herself. Her appetite is good and weight is stable. No new bone pain/abdominal pain/ rash. \par \par 10/21/2020\par She is here for follow-up for lung cancer, accompanied by son.  Patient denies fever, chills, vomiting, new cough, new rash, persistent diarrhea or bleeding.  She is still pending reimaging, they report they have not received authorization for scan yet.  She still complains of weakness and nasal congestion, which improves using Fluticasone spray.  She uses oxygen intermittently at home.  She still has SOB.   Her only new complaint is intermittent back pruritus.  She is due for cycle 20 of Keytruda today.\par \par 12/2/2020\par She is here for follow-up for lung cancer, accompanied by son.  Patient denies fever, chills, vomiting, new cough, new rash, persistent diarrhea or bleeding.  She still complains of weakness, SOB and nasal congestion, uses the nebulizer once daily with occasional relief of her SOB.   She is due for cycle 21 of Keytruda today.  She is using allegra and or hydrocortisone 1% cream for the pruritus.  Reviewed most recent imaging which shows good treatment response.  \par CT C/A/P (11.14.2020) IMPRESSION:Since May 9, 2020:1. No evidence of new intrathoracic or intra-abdominal metastatic disease.2. Unchanged 2.5 x 2 cm right upper lobe pulmonary nodule, stable with similar measurement technique. 3. Decreased subcarinal lymphadenopathy, now 1.2 cm short axis, previously 1.5 cm short axis on May 9, 2020 CT with similar measurement technique. No evidence of additional suspicious lymphadenopathy by size criteria. 4. Near complete interval resolution of left lower lobe and lingula patchy airspace opacities. Unchanged areas of groundglass and reticular opacities within the medial left lung. Findings likely postinfectious or inflammatory. 5. Unchanged sclerotic appearance of metastatic left acetabular lesion,  possibly representing healing of previously FDG avid lytic on PET/CT of 4/17/2019. 6. Unchanged, partially imaged indeterminate lipomatous mass within the anterior left thigh.\par \par 1/13/21\par She is here for follow-up for lung cancer, accompanied by son.  She still complains of weakness, SOB daily which is unchanged.  She is due for cycle 22 of Keytruda today.  She continues to use allegra and or hydrocortisone 1% cream for the pruritus.  Patient denies fever, chills, vomiting, new cough, new rash, persistent diarrhea or bleeding.  She also reports musculoskeletal pain.  \par \par 2/24/21\par She is here with her son. She is doing well but still has same complaitns. mainly fatigue and SOB as well as MSK pain behind the left scapula. We had a long talk and I expleind that the fatigue and SOB maybe due to kaytruda so we decided to stop it and observe her. On exam no changes. \par \par \par 5/17/21\par She is here for [facility] wu with her son. Her CT C/A/P in Marh 2021  showed Since November 14, 2020. No significant interval change\par \par approximate 2.5 cm x 2 cm right upper lobe nodule (series 4/33). Stable reticular opacities medial right upper lobe.\par \par Stable ill-defined subcarinal lymph nodes (series 4/88).\par \par \par Stable bibasilar subsegmental discoid atelectasis. No pleural effusions.\par \par \par No new sites of metastatic disease in the abdomen and pelvis.\par \par Previously described sclerotic left acetabular lesion felt to represent a healed metastasis based on prior PET/CT is poorly visualized on the current study.\par \par Unchanged, partially imaged indeterminate lipomatous mass within the anterior left thigh.\par \par Given that she been on  Keytruda now for 2 years with no progression of disease and she continued to have shortness of breath we decided to give her a treatment break.\par \par \par Today even well-being of treatment she still continues to have the same except complains specifically short of breath for which she had an extensive workup. She also has rhinorrhea  so she was referred to ENT again as per their request and I refilled Flonase\par \par 7/26/21\par She is here for follow up. She had CT C/A/P on 6/12/21: IMPRESSION:\par \par No CT evidence of new sites of metastatic disease in the chest, abdomen and pelvis.\par \par Stable 2.5 cm right upper lobe nodule.\par \par Interval decrease in size of subcarinal lymph node.\par \par Slight interval increase in reticular and groundglass opacities left lower lobe, possibly postinfectious/postinflammatory.\par \par Unchanged, partially imaged indeterminate lipomatous mass within the anterior left thigh.\par \par CBC is normal today,.\par \par She is here with her son. 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 \par \par \par 11/29/21- Patient is here for follow up for stage 4 lung NSCLC. She was on keytruda and has been on break since Feb 2021. Her last imaging in June 2021 showed stable disease. She has chronic complaints of fatigue, dyspnea, pain in her left side back and insomnia. All these complaints are chronic with no new worsening of symptoms.

## 2021-12-06 ENCOUNTER — NON-APPOINTMENT (OUTPATIENT)
Age: 86
End: 2021-12-06

## 2021-12-18 ENCOUNTER — RESULT REVIEW (OUTPATIENT)
Age: 86
End: 2021-12-18

## 2021-12-18 ENCOUNTER — OUTPATIENT (OUTPATIENT)
Dept: OUTPATIENT SERVICES | Facility: HOSPITAL | Age: 86
LOS: 1 days | Discharge: HOME | End: 2021-12-18
Payer: MEDICAID

## 2021-12-18 DIAGNOSIS — C34.91 MALIGNANT NEOPLASM OF UNSPECIFIED PART OF RIGHT BRONCHUS OR LUNG: ICD-10-CM

## 2021-12-18 DIAGNOSIS — R10.2 PELVIC AND PERINEAL PAIN: ICD-10-CM

## 2021-12-18 DIAGNOSIS — Z90.710 ACQUIRED ABSENCE OF BOTH CERVIX AND UTERUS: Chronic | ICD-10-CM

## 2021-12-18 DIAGNOSIS — R10.9 UNSPECIFIED ABDOMINAL PAIN: ICD-10-CM

## 2021-12-18 PROCEDURE — 71260 CT THORAX DX C+: CPT | Mod: 26

## 2021-12-18 PROCEDURE — 74177 CT ABD & PELVIS W/CONTRAST: CPT | Mod: 26

## 2021-12-29 ENCOUNTER — LABORATORY RESULT (OUTPATIENT)
Age: 86
End: 2021-12-29

## 2021-12-29 ENCOUNTER — APPOINTMENT (OUTPATIENT)
Dept: INFUSION THERAPY | Facility: CLINIC | Age: 86
End: 2021-12-29

## 2021-12-29 RX ORDER — PEMBROLIZUMAB 25 MG/ML
400 INJECTION, SOLUTION INTRAVENOUS ONCE
Refills: 0 | Status: COMPLETED | OUTPATIENT
Start: 2021-12-29 | End: 2021-12-29

## 2021-12-29 RX ADMIN — PEMBROLIZUMAB 232 MILLIGRAM(S): 25 INJECTION, SOLUTION INTRAVENOUS at 16:18

## 2022-01-10 LAB
ALBUMIN SERPL ELPH-MCNC: 4.2 G/DL
ALP BLD-CCNC: 90 U/L
ALT SERPL-CCNC: 18 U/L
ANION GAP SERPL CALC-SCNC: 15 MMOL/L
AST SERPL-CCNC: 37 U/L
BILIRUB SERPL-MCNC: 0.4 MG/DL
BUN SERPL-MCNC: 19 MG/DL
CALCIUM SERPL-MCNC: 9.3 MG/DL
CHLORIDE SERPL-SCNC: 104 MMOL/L
CO2 SERPL-SCNC: 17 MMOL/L
CREAT SERPL-MCNC: 1 MG/DL
GLUCOSE SERPL-MCNC: 98 MG/DL
HCT VFR BLD CALC: 36.1 %
HGB BLD-MCNC: 12.1 G/DL
MCHC RBC-ENTMCNC: 27 PG
MCHC RBC-ENTMCNC: 33.5 G/DL
MCV RBC AUTO: 80.6 FL
PLATELET # BLD AUTO: 209 K/UL
PMV BLD: 10.8 FL
POTASSIUM SERPL-SCNC: 5.5 MMOL/L
PROT SERPL-MCNC: 7.2 G/DL
RBC # BLD: 4.48 M/UL
RBC # FLD: 15.1 %
SODIUM SERPL-SCNC: 136 MMOL/L
TSH SERPL-ACNC: 0.83 UIU/ML
WBC # FLD AUTO: 7.53 K/UL

## 2022-01-14 ENCOUNTER — NON-APPOINTMENT (OUTPATIENT)
Age: 87
End: 2022-01-14

## 2022-02-09 ENCOUNTER — LABORATORY RESULT (OUTPATIENT)
Age: 87
End: 2022-02-09

## 2022-02-09 ENCOUNTER — APPOINTMENT (OUTPATIENT)
Dept: HEMATOLOGY ONCOLOGY | Facility: CLINIC | Age: 87
End: 2022-02-09
Payer: MEDICAID

## 2022-02-09 ENCOUNTER — APPOINTMENT (OUTPATIENT)
Dept: INFUSION THERAPY | Facility: CLINIC | Age: 87
End: 2022-02-09
Payer: MEDICAID

## 2022-02-09 ENCOUNTER — OUTPATIENT (OUTPATIENT)
Dept: OUTPATIENT SERVICES | Facility: HOSPITAL | Age: 87
LOS: 1 days | Discharge: HOME | End: 2022-02-09

## 2022-02-09 DIAGNOSIS — C34.91 MALIGNANT NEOPLASM OF UNSPECIFIED PART OF RIGHT BRONCHUS OR LUNG: ICD-10-CM

## 2022-02-09 DIAGNOSIS — Z90.710 ACQUIRED ABSENCE OF BOTH CERVIX AND UTERUS: Chronic | ICD-10-CM

## 2022-02-09 DIAGNOSIS — R06.00 DYSPNEA, UNSPECIFIED: ICD-10-CM

## 2022-02-09 DIAGNOSIS — J91.0 MALIGNANT PLEURAL EFFUSION: ICD-10-CM

## 2022-02-09 PROCEDURE — 99214 OFFICE O/P EST MOD 30 MIN: CPT

## 2022-02-09 RX ORDER — PEMBROLIZUMAB 25 MG/ML
400 INJECTION, SOLUTION INTRAVENOUS ONCE
Refills: 0 | Status: COMPLETED | OUTPATIENT
Start: 2022-02-09 | End: 2022-02-09

## 2022-02-09 RX ADMIN — PEMBROLIZUMAB 232 MILLIGRAM(S): 25 INJECTION, SOLUTION INTRAVENOUS at 16:07

## 2022-02-11 NOTE — PHYSICAL EXAM
[de-identified] :  In wheelchair, but does stand up by herself [de-identified] : Slight decrease in breath sound RLL.  [de-identified] : L thigh lipoma, deep, difficult to palpate but no more painful with applied pressure [de-identified] : scars from excisions on back; no evidence of rash

## 2022-02-11 NOTE — REVIEW OF SYSTEMS
[Night Sweats] : night sweats [Recent Change In Weight] : ~T no recent weight change [Dysphagia] : no dysphagia [Constipation] : no constipation [Muscle Pain] : no muscle pain [Skin Rash] : no skin rash [FreeTextEntry2] : seated in wheelchair , occasional night sweats [FreeTextEntry4] : reports nasal congestion chronic [FreeTextEntry6] : Unchanged SOB for years  [FreeTextEntry9] : reports chronic L sided pain

## 2022-02-11 NOTE — ASSESSMENT
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  Will plan to order reimaging at next visit or sooner if any new complaints.\par - resumed Keytruda on 12/29/2021\par - c/w Keytruda 400mg every 6 weeks on 2.9.2022\par - c/w oxycodone 5mg every 8 hours as needed for pain with bowel regimen , Rx renewed\par - CBC  CMP, TSH today \par \par # Dysphagia and decrease appetite - better now \par - c/w Jevity 1.2 hector  BiD\par - she is followed by nutritionist, Tila Villanueva\par \par # SOB mainly when she tries to sleep; due to right nasal congestion maybe from Keytruda, Keytruda was stopped and SOB did not change\par - c/w Flonase\par - On Albuterol + nebulizer prn\par - She follows with Dr Lanza as indicated\par - Advised to use saline nasal spray as she has c/o dryness in her nose \par \par # Has some insomnia \par - c/w Melatonin 5 mg at bedtime \par \par # Pruritus, back, new onset; may be side effect of Keytruda?. Did not complain today\par - has  hydrocortisone 1% or benadryl PRN for symptom relief if happens again\par - will continue to monitor\par \par RTC in 6 weeks with CBC, CMP, TSH\par \par Case reviewed and patient examined with Dr. Mclaughlin, who agrees with plan of care.

## 2022-02-11 NOTE — HISTORY OF PRESENT ILLNESS
[de-identified] : \par This is a 83 year old female who I initially met in [facility] on 3/28/19. She was initially admitted on 2/20/2019 for a right loculated pleural effusion\par approx. 900 CCs, no PE and right apical 3.1x3.0 cm mass with pleural nodules on CTA of chest.  . Pt returned to ED on 3/15/2019 for right pleural\par effusion, had a thoracentesis in ED and was sent home. She than came back for SOB  due to recurrent effusion and a Pleurx was placed. Patholgoy showed adenocarcinoma. \par \par She ha lost a few pounds about  4. She never smoked. She does have weakness.  From her Pleurx she  has 500 cc removed every other day. She has pain after.\par \par Work up:\par 2/20/19 CTA chest:  Large right pleural effusion, maximal axial width of 7 cm correlating with an estimated volume of 900 cc. Associated \par near complete compressive atelectasis of the right lower lobe. Probable loculations of the effusion seen at the apex and at the right heart \par border (for example series 5 images 176, 52; series 9 image 69). 1 cm right upper lobe fissural nodule (series 5 image 139). Patent central \par airways. There is right apical 3.1 x 3.0 cm mass with irregular right apical pleural thickening (series 7, image 44), \par and multiple satellite pleural nodules including a more caudad 2.7 cm para-mediastinal nodule (series 7, image 175). Tissue sampling \par recommended.\par \par Path: \par Pleural fluid: 3/25:  Cell block material is hypocellular and shows a single minutecluster of malignant cells.Immunohistochemistry studies were performed at [facility] and the\par results are noncontributory\par 3/22: Addendum\par Cell block material shows macrophages (positive ),mesothelial cells (positive calretinin) and a single very minute cluster of atypical suspicious cells is negative for Fidel-Ep4.Material is insufficient for further diagnsotic studies (ie,mmunohistochemistry).\par 3/15/19: Pleural fluid: Addendum\par Immunohistochemical studies were performed on the cell block at\par [facility], [address] [place] [id number] (see NOTE). The results\par are as follows:\par \par CK7-                      Rare mesothelial cells positive\par CK20-                    Negative\par CK 5/6-                  Rare mesothelial cells positive\par Calretinin-             Scattered mesothelial cells positive\par CD68-                   Numerous histiocytes positive\par BerEP-4-               Negative\par Napsin-A-             Negative\par TTF-1-                  Negative\par \par These results are non-specific, suggestive of mesothelial\par hyperplasia. The few markedly atypical cells seen in the smear\par are not evident in the cell block.\par \par The atypical cells seen in the current smear are not seen in the\par prior pleural fluid Thinprep smear or cell block (70-OS-).\par \par \par Few marked atypical cells, suspicious for mesothelioma versus\par adenocarcinoma,\par in a background of numerous mesothelial cells, histiocytes and\par small lymphocytes.\par \par \par 3/25/19: Final DiagnosisPOSITIVE FOR MALIGNANT CELLS.Favor metastatic adenocarcinoma. Pending cell block.\par \par  I spke to Dr. Allison  and there are only a few cells thus furhter studies such as IHC, molecular PD-l1 is not possible\par  [FreeTextEntry1] : Started Keytruda on 5/17/19, changed to 6 week dosing starting 5/4/2020 last traetment was on 1/13/21 [de-identified] : 6/5/19\par She is here for ofllow up. Since last visit she started Keytruda. She had a PET/CT on April 17 that showed  Extensive FDG avid right-sided pleural mass/soft tissue thickening, \par with max SUV 21.5 within a right apical pleural soft tissue mass.\par \par 2.  Multiple FDG avid mediastinal lymph nodes, with max SUV 16.5 within a \par 2.6 x 2.1 cm subcarinal node.\par \par 3.  FDG avid lytic lesion within the left superior acetabulum (max SUV \par 14.8), suspicious for osseous metastasis.\par \par 4.  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 Used to be  500 ml  u 3 times a week. Takes 60 mg of oral moprphine a day 20 mg 3 times a day but pain is still severe. Has not had a bowel movement in unknown amount of days does not take lexatives although she has lexatives. \par \par 6/26/19 She is here for follow up. She is due for cycle 3 of Keytruda.  She has about 300 mg drained from her pleurax twice a week. Has SOB but saturated 94% on room air and 94% on ambualtion. Did not torate the fentanyl path had nausea.  On Morphine 10 mg BID doing well.  Constipation  is better. \par \par 7/17/19\par She is here for follow-up of Stage IV lung cancer with son.  She is due for cycle 3 of Keytruda.  CBC is stable from today.  She only takes the liquid morphine at night now.  She started eating slighlty more and has been feeling slightly better ,as per son.  He states that the pleurex drainage is less, it is checked every Friday + Monday.  On Friday there was about 225cc drained, but yesterday no drainage.  We will arrange for hydration today since she has had poor oral intake lately and small appetite.  Son reports she can tolerate fluids but she has early satiety with food.  Patient also reports feeling short of breath at rest, but her O2 sat is 98% on R/a.  Right sided pain is better. \par CT C/A/P (7/6/19) IMPRESSION:Since April 17, 2019:1. No definite new sites of metastatic disease.2. Right-sided pleural soft tissue mass/thickening, overall appearing decreased since April 17, 2019 with primarily residual right apical tumor. Tumor appears to infiltrate the mediastinum.3. No significant change in mediastinal lymphadenopathy including largest 2.6 x 2.1 cm subcarinal lymph node which remains unchanged. Retrocrural 2.5 x 2.3 cm metastasis or additional site of pleural tumor, unchanged.4. Irregularity of right anterior first rib, probably invaded by tumor, stable in retrospect. Left superior acetabular lucent lesion corresponding to FDG avid bone metastasis.5. Right pelvic indeterminate 7 cm mass; possible retroverted uterus with degenerating fibroid or less likely ovarian mass; previously non-FDG avid and probably benign. Further evaluation with pelvic ultrasound or MRI pelvis with and without IV contrast may be helpful.6. Status post right chest tube placement with decreased small loculated right pleural effusion with fluid within the major fissure. 7. New mild to moderate intrahepatic biliary dilation. Correlation with LFTs recommended. Consider further evaluation with ERCP or MRCP with and without IV contrast.\par \par 8/7/19\par She is here with her son. Overall she is doing much better. She is in wheelchair but now less pain, eating better and Pleurax is only draining about 25 cc twice a week, She does complain of SOB still. I explained this will improve as well but asked her to follow up with Dr. Lanza of pul. \par \par 8/28/19\par She is here for follow up. They are to see Dr. Lanza in Early september. There is minimal output from Pleurax.  Pain is much better and does not use narcotics anymore.  Overall better.   She has right sidede nasal congestion and states makes it hard to breath.\par \par 9/18/19\par She is doing well. She is due for cycle 7. Son was not eliana to schedule CTs priro to the visit but she looks better and better every visit and thus most likley still responding. She saw Dr. Lanza who started her on Flonase. She is due to see ENT Dr. Adams.  Appetie is better. No pains. Dr. Lanza is considering to remover the Pleurx possibly in November. \par \par 10/09/2019\par Patient is here for a follow up visit. Due for cycle 8 today. CT chest/abd/pelvis 09/2019 showed improvement in disease. Also has seen Dr. Adams and agreed to continue witj flonase as she has hypertrophy of turbinates. \par She is tolerating Ketryuda well. Her only complaint at this time is trouble in falling asleep. \par \par 10/30/19\par Patient is here for a follow up visit of Stage IV lung cancer with family member.  She is feeling well with no new complaints.   She feels dyspneic when taking a deep breath but her pain has improved.  Most recent CBC is stable. CT C/A/P on September 22nd showed improvement in right apical lung mass and pleural effusion and drained 150cc.  Due for cycle 9 today. \par \par 12/30/19\par Patient is here for a follow-up visit for  lung cancer with son.  Reviewed imaging results with patient and her family, which suggests worsening effusion but otherwise stable.  They drained 500cc from pleurx yesterday, reportedly sanguinous drainage.  She is agreeable to continue with cycle 12 of Keytruda tomorrow. She reports persistent dyspnea.  We discussed that this tx regimen may be losing its responsiveness.\par CT C/A/P (12.27.19) IMPRESSION: Worsening right-sided pleural effusion. Right apical spiculated nodule without difference. Unchanged appearance of the mediastinum. Unchanged appearance of the third thoracic vertebral body and right second rib. No interval change since prior examination. No change in hepatic hypodensity (likely fatty infiltration), splenic hypodensity (too small to characterize), left adrenal nodule or 1 cm retroperitoneal nodule on the right. No change in fat-containing mass in the anterior left thigh.\par \par 2/10/2020\par She is here for follow-up accompanied by son.  Since last visit she was admitted to hospital for pain and nausea. She had pleurx catheter removed during most recent hospitalization on 1/13/2020.  She complains of insomnia but generally feels well.  Most recent scan reviewed with patient.  She reports she had a rash after discharge from the hospital which has since resolved.  \par CT Chest (1.11.2020) IMPRESSION:Since CT scan performed on December 27, 2019;1.  Right thoracostomy tube in stable position with slight interval decrease of right pleural effusion. 2.   Interval increase in right middle lobe consolidation/atelectasis. 3.  Stable spiculated nodule in the right lung apex, consistent with patient's known malignancy.\par \par She feels much better now that her Pleurx is gone. Does not have any pain. Feels dyspneic which is stable.\par \par 3/2/2020\par She is here for follow-up for lung cancer accompanied by son. Since last visit, she followed with Dr. Lanza, [facility], and Dr. Hirsch from [facility] who agreed against placing additional drainage catheters based on most recent CT imaging.  She still reports some SOB.  She also reports taking a Russian medication named Volidol and Panangin for here breathing.  Patient reports mild dizziness after taking Trazodone; we recommended halving dose.  \par \par 5/4/2020\par She is here for follow-up for lung cancer accompanied by son.  Her last treatment as in [facility] due to COVID> she is doing well .She was on Room air. She states SOB is the same. She has paraspinal back pain/hip pain. \par \par 6/15/2020\par She is here for follow-up for lung cancer accompanied by son.  Patient denies fever, chills, vomiting, worsening cough, new rash, persistent diarrhea or bleeding.  Reviewed most recent imaging and had a discussion regarding continuing with Keytruda every 6 weeks for now as she is clinically stable.  She does complain of weakness and nasal congestion which improves using Fluticasone spray.  She uses oxygen intermittently at home.  \par CT C/A/P (5.9.2020) IMPRESSION:1. Stable spiculated right upper lobe mass measuring up to 3.1 cm.2. New areas of ground glass and reticular opacities within the medial left lung, with new subcentimeter nodular opacities within the left lower lobe. Findings may be postinfectious or postinflammatory in etiology. CT of the chest in 2-3 months is recommended.3. Decreased mediastinal and right hilar lymphadenopathy.4. Resolved right pleural effusion. 1.  No findings of new or progressive metastatic disease in the abdomen or pelvis. 2.  Indeterminate left adrenal nodule and right retroperitoneal nodule, stable in size from prior CT. 3.  Partially visualized fat-containing mass in the anterior left thigh-considerations include benign and malignant neoplastic etiology. Recommend further evaluation with MRI if not already performed. 4.  Sclerotic appearance of metastatic left acetabular lesion, similar in appearance to prior CT, possibly representing healing- was previously lytic on PET/CT of 4/17/2019. 5.  Lobulated pelvic structure again seen, possibly uterus with fibroids. Further evaluation with pelvic ultrasound may be performed as warranted.\par \par 9/9/20- Patient is here for follow up accompanied by her son who speaks English . She has chronic SOB which as per son has been slowly getting worse. She uses O2 at home intermittently. She also uses Flonase and Albuterol inhaler. Otherwise she is mostly wheel chair bound and can walk back and forth to the bathroom by herself. Her appetite is good and weight is stable. No new bone pain/abdominal pain/ rash. \par \par 10/21/2020\par She is here for follow-up for lung cancer, accompanied by son.  Patient denies fever, chills, vomiting, new cough, new rash, persistent diarrhea or bleeding.  She is still pending reimaging, they report they have not received authorization for scan yet.  She still complains of weakness and nasal congestion, which improves using Fluticasone spray.  She uses oxygen intermittently at home.  She still has SOB.   Her only new complaint is intermittent back pruritus.  She is due for cycle 20 of Keytruda today.\par \par 12/2/2020\par She is here for follow-up for lung cancer, accompanied by son.  Patient denies fever, chills, vomiting, new cough, new rash, persistent diarrhea or bleeding.  She still complains of weakness, SOB and nasal congestion, uses the nebulizer once daily with occasional relief of her SOB.   She is due for cycle 21 of Keytruda today.  She is using allegra and or hydrocortisone 1% cream for the pruritus.  Reviewed most recent imaging which shows good treatment response.  \par CT C/A/P (11.14.2020) IMPRESSION:Since May 9, 2020:1. No evidence of new intrathoracic or intra-abdominal metastatic disease.2. Unchanged 2.5 x 2 cm right upper lobe pulmonary nodule, stable with similar measurement technique. 3. Decreased subcarinal lymphadenopathy, now 1.2 cm short axis, previously 1.5 cm short axis on May 9, 2020 CT with similar measurement technique. No evidence of additional suspicious lymphadenopathy by size criteria. 4. Near complete interval resolution of left lower lobe and lingula patchy airspace opacities. Unchanged areas of groundglass and reticular opacities within the medial left lung. Findings likely postinfectious or inflammatory. 5. Unchanged sclerotic appearance of metastatic left acetabular lesion,  possibly representing healing of previously FDG avid lytic on PET/CT of 4/17/2019. 6. Unchanged, partially imaged indeterminate lipomatous mass within the anterior left thigh.\par \par 1/13/21\par She is here for follow-up for lung cancer, accompanied by son.  She still complains of weakness, SOB daily which is unchanged.  She is due for cycle 22 of Keytruda today.  She continues to use allegra and or hydrocortisone 1% cream for the pruritus.  Patient denies fever, chills, vomiting, new cough, new rash, persistent diarrhea or bleeding.  She also reports musculoskeletal pain.  \par \par 2/24/21\par She is here with her son. She is doing well but still has same complaitns. mainly fatigue and SOB as well as MSK pain behind the left scapula. We had a long talk and I expleind that the fatigue and SOB maybe due to kaytruda so we decided to stop it and observe her. On exam no changes. \par \par \par 5/17/21\par She is here for [facility] wu with her son. Her CT C/A/P in Marh 2021  showed Since November 14, 2020. No significant interval change\par \par approximate 2.5 cm x 2 cm right upper lobe nodule (series 4/33). Stable reticular opacities medial right upper lobe.\par \par Stable ill-defined subcarinal lymph nodes (series 4/88).\par \par \par Stable bibasilar subsegmental discoid atelectasis. No pleural effusions.\par \par \par No new sites of metastatic disease in the abdomen and pelvis.\par \par Previously described sclerotic left acetabular lesion felt to represent a healed metastasis based on prior PET/CT is poorly visualized on the current study.\par \par Unchanged, partially imaged indeterminate lipomatous mass within the anterior left thigh.\par \par Given that she been on  Keytruda now for 2 years with no progression of disease and she continued to have shortness of breath we decided to give her a treatment break.\par \par \par Today even well-being of treatment she still continues to have the same except complains specifically short of breath for which she had an extensive workup. She also has rhinorrhea  so she was referred to ENT again as per their request and I refilled Flonase\par \par 7/26/21\par She is here for follow up. She had CT C/A/P on 6/12/21: IMPRESSION:\par \par No CT evidence of new sites of metastatic disease in the chest, abdomen and pelvis.\par \par Stable 2.5 cm right upper lobe nodule.\par \par Interval decrease in size of subcarinal lymph node.\par \par Slight interval increase in reticular and groundglass opacities left lower lobe, possibly postinfectious/postinflammatory.\par \par Unchanged, partially imaged indeterminate lipomatous mass within the anterior left thigh.\par \par CBC is normal today,.\par \par She is here with her son. 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 \par \par \par 11/29/21- Patient is here for follow up for stage 4 lung NSCLC. She was on keytruda and has been on break since Feb 2021. Her last imaging in June 2021 showed stable disease. She has chronic complaints of fatigue, dyspnea, pain in her left side back and insomnia. All these complaints are chronic with no new worsening of symptoms. \par \par 2/9/22\par She returns for follow-up today for NSCLC, accompanied by son who helps with translation at patient request.  She is due for next cycle of immunotherapy today.  She continues to have the same chronic complaints now that she always had for the last several years including unclear left-sided pain, shortness of breath, she seen multiple specialists and we are unable to exactly determine the cause of her pain but I explained that the shortness of breath as before is possibly related to her lung cancer may be resulting in decreased function from previous Pleurx catheter leading to fibrosis and decrease in lung capacity.  She takes oxycodone 5mg as needed for the L sided pain.  Reviewed most recent CT imaging which shows increase in RUL nodule, L adrenal nodule and new tubular opacity within the right upper lobe but otherwise stable.  \par CT C/A/P (12.18.2021) IMPRESSION:Compared to CT chest 6/12/2021,Interval increase in size of the right upper lobe nodule now measuring 2.8 x 2.0 x 2.4 cm previously 2.5 x 2.0 x 2.0 cm. New tubular opacity within the right upper lobe.Significant interval increase in size of left adrenal nodule, now measuring 4.6 x 4.3 cm, highly suggestive of metastasis/collision tumor in this patient with history of lung cancer.  Right upper quadrant peritoneal nodules without significant change from prior. Continued attention on follow-up imaging.Partially imaged indeterminate lipomatous mass in the left thigh redemonstrated. Recommend MR evaluation.

## 2022-02-14 LAB
ALBUMIN SERPL ELPH-MCNC: 3.8 G/DL
ALP BLD-CCNC: 83 U/L
ALT SERPL-CCNC: 16 U/L
ANION GAP SERPL CALC-SCNC: 12 MMOL/L
AST SERPL-CCNC: 16 U/L
BILIRUB SERPL-MCNC: 0.3 MG/DL
BUN SERPL-MCNC: 22 MG/DL
CALCIUM SERPL-MCNC: 9 MG/DL
CHLORIDE SERPL-SCNC: 108 MMOL/L
CO2 SERPL-SCNC: 19 MMOL/L
CREAT SERPL-MCNC: 1 MG/DL
GLUCOSE SERPL-MCNC: 98 MG/DL
HCT VFR BLD CALC: 36.3 %
HGB BLD-MCNC: 12 G/DL
MCHC RBC-ENTMCNC: 27.1 PG
MCHC RBC-ENTMCNC: 33.1 G/DL
MCV RBC AUTO: 81.9 FL
PLATELET # BLD AUTO: 194 K/UL
PMV BLD: 11.4 FL
POTASSIUM SERPL-SCNC: 4 MMOL/L
PROT SERPL-MCNC: 6.2 G/DL
RBC # BLD: 4.43 M/UL
RBC # FLD: 15.4 %
SODIUM SERPL-SCNC: 139 MMOL/L
TSH SERPL-ACNC: 0.21 UIU/ML
WBC # FLD AUTO: 6.66 K/UL

## 2022-02-16 ENCOUNTER — NON-APPOINTMENT (OUTPATIENT)
Age: 87
End: 2022-02-16

## 2022-02-18 ENCOUNTER — APPOINTMENT (OUTPATIENT)
Dept: HEMATOLOGY ONCOLOGY | Facility: CLINIC | Age: 87
End: 2022-02-18

## 2022-03-23 ENCOUNTER — LABORATORY RESULT (OUTPATIENT)
Age: 87
End: 2022-03-23

## 2022-03-23 ENCOUNTER — APPOINTMENT (OUTPATIENT)
Dept: INFUSION THERAPY | Facility: CLINIC | Age: 87
End: 2022-03-23
Payer: MEDICAID

## 2022-03-23 ENCOUNTER — APPOINTMENT (OUTPATIENT)
Dept: HEMATOLOGY ONCOLOGY | Facility: CLINIC | Age: 87
End: 2022-03-23
Payer: MEDICAID

## 2022-03-23 VITALS
HEIGHT: 62 IN | SYSTOLIC BLOOD PRESSURE: 157 MMHG | BODY MASS INDEX: 24.11 KG/M2 | HEART RATE: 57 BPM | WEIGHT: 131 LBS | DIASTOLIC BLOOD PRESSURE: 70 MMHG | TEMPERATURE: 97.8 F

## 2022-03-23 LAB
HCT VFR BLD CALC: 35.6 %
HGB BLD-MCNC: 11.8 G/DL
MCHC RBC-ENTMCNC: 27.3 PG
MCHC RBC-ENTMCNC: 33.1 G/DL
MCV RBC AUTO: 82.2 FL
PLATELET # BLD AUTO: 181 K/UL
PMV BLD: 9.6 FL
RBC # BLD: 4.33 M/UL
RBC # FLD: 15.4 %
WBC # FLD AUTO: 5.9 K/UL

## 2022-03-23 PROCEDURE — 99214 OFFICE O/P EST MOD 30 MIN: CPT

## 2022-03-23 RX ORDER — PEMBROLIZUMAB 25 MG/ML
400 INJECTION, SOLUTION INTRAVENOUS ONCE
Refills: 0 | Status: COMPLETED | OUTPATIENT
Start: 2022-03-23 | End: 2022-03-23

## 2022-03-23 RX ADMIN — PEMBROLIZUMAB 232 MILLIGRAM(S): 25 INJECTION, SOLUTION INTRAVENOUS at 14:07

## 2022-03-23 NOTE — ASSESSMENT
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 \par - stopped  Keytruda 400 mg every 6 weeks due to fatigue and dyspnea and her preferences\par - CT C/A/P from [id number]/94618 were stable and we stopped keytruda and was on observation with CT from 6/2021 showing stable findings\par - CT C/A/P from 12/2021 showed increase in RUL nodule, L adrenal nodule and new tubular opacity within the right upper lobe but otherwise stable. \par - resumed Keytruda on 12/29/2021\par - c/w Keytruda 400mg every 6 weeks on 3.23.2022\par - c/w oxycodone 5mg every 8 hours as needed for pain with bowel regime,she does not take  at this point\par - repeat CT C/A/P with IVC ordered to be done prior to next visit, Rx given\par - CBC was stable , CMP, TSH today \par \par # Dysphagia and decrease appetite - better now \par - c/w Jevity 1.2 hector  BiD\par - she is followed by nutritionist, Tila Villanueva\par \par # SOB mainly when she tries to sleep; due to right nasal congestion maybe from Keytruda, Keytruda was stopped and SOB did not change\par - c/w Flonase\par - On Albuterol + nebulizer prn\par - She follows with Dr Lanza as indicated\par - Advised to use saline nasal spray as she has c/o dryness in her nose \par \par # Has some insomnia \par - c/w Melatonin 5 mg at bedtime \par \par # Pruritus, back,  ; may be side effect of Keytruda?. Did not complain today\par - has  hydrocortisone 1% or benadryl PRN for symptom relief if happens again\par - will continue to monitor\par \par RTC in 6 weeks with CBC, CMP, TSH, Will call with CTs resutls

## 2022-03-23 NOTE — HISTORY OF PRESENT ILLNESS
[Therapy: ___] : Therapy: [unfilled] [Cycle: ___] : Cycle: [unfilled] [de-identified] : \par This is a 83 year old female who I initially met in [facility] on 3/28/19. She was initially admitted on 2/20/2019 for a right loculated pleural effusion\par approx. 900 CCs, no PE and right apical 3.1x3.0 cm mass with pleural nodules on CTA of chest.  . Pt returned to ED on 3/15/2019 for right pleural\par effusion, had a thoracentesis in ED and was sent home. She than came back for SOB  due to recurrent effusion and a Pleurx was placed. Patholgoy showed adenocarcinoma. \par \par She ha lost a few pounds about  4. She never smoked. She does have weakness.  From her Pleurx she  has 500 cc removed every other day. She has pain after.\par \par Work up:\par 2/20/19 CTA chest:  Large right pleural effusion, maximal axial width of 7 cm correlating with an estimated volume of 900 cc. Associated \par near complete compressive atelectasis of the right lower lobe. Probable loculations of the effusion seen at the apex and at the right heart \par border (for example series 5 images 176, 52; series 9 image 69). 1 cm right upper lobe fissural nodule (series 5 image 139). Patent central \par airways. There is right apical 3.1 x 3.0 cm mass with irregular right apical pleural thickening (series 7, image 44), \par and multiple satellite pleural nodules including a more caudad 2.7 cm para-mediastinal nodule (series 7, image 175). Tissue sampling \par recommended.\par \par Path: \par Pleural fluid: 3/25:  Cell block material is hypocellular and shows a single minutecluster of malignant cells.Immunohistochemistry studies were performed at [facility] and the\par results are noncontributory\par 3/22: Addendum\par Cell block material shows macrophages (positive ),mesothelial cells (positive calretinin) and a single very minute cluster of atypical suspicious cells is negative for Fidel-Ep4.Material is insufficient for further diagnsotic studies (ie,mmunohistochemistry).\par 3/15/19: Pleural fluid: Addendum\par Immunohistochemical studies were performed on the cell block at\par [facility], [address] [place] [id number] (see NOTE). The results\par are as follows:\par \par CK7-                      Rare mesothelial cells positive\par CK20-                    Negative\par CK 5/6-                  Rare mesothelial cells positive\par Calretinin-             Scattered mesothelial cells positive\par CD68-                   Numerous histiocytes positive\par BerEP-4-               Negative\par Napsin-A-             Negative\par TTF-1-                  Negative\par \par These results are non-specific, suggestive of mesothelial\par hyperplasia. The few markedly atypical cells seen in the smear\par are not evident in the cell block.\par \par The atypical cells seen in the current smear are not seen in the\par prior pleural fluid Thinprep smear or cell block (70-SG-).\par \par \par Few marked atypical cells, suspicious for mesothelioma versus\par adenocarcinoma,\par in a background of numerous mesothelial cells, histiocytes and\par small lymphocytes.\par \par \par 3/25/19: Final DiagnosisPOSITIVE FOR MALIGNANT CELLS.Favor metastatic adenocarcinoma. Pending cell block.\par \par  I spke to Dr. Allison  and there are only a few cells thus furhter studies such as IHC, molecular PD-l1 is not possible\par  [FreeTextEntry1] : Started Keytruda on 5/17/19, changed to 6 week dosing starting 5/4/2020 last traetment was on 1/13/21 [de-identified] : 6/5/19\par She is here for ofllow up. Since last visit she started Keytruda. She had a PET/CT on April 17 that showed  Extensive FDG avid right-sided pleural mass/soft tissue thickening, \par with max SUV 21.5 within a right apical pleural soft tissue mass.\par \par 2.  Multiple FDG avid mediastinal lymph nodes, with max SUV 16.5 within a \par 2.6 x 2.1 cm subcarinal node.\par \par 3.  FDG avid lytic lesion within the left superior acetabulum (max SUV \par 14.8), suspicious for osseous metastasis.\par \par 4.  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 Used to be  500 ml  u 3 times a week. Takes 60 mg of oral moprphine a day 20 mg 3 times a day but pain is still severe. Has not had a bowel movement in unknown amount of days does not take lexatives although she has lexatives. \par \par 6/26/19 She is here for follow up. She is due for cycle 3 of Keytruda.  She has about 300 mg drained from her pleurax twice a week. Has SOB but saturated 94% on room air and 94% on ambualtion. Did not torate the fentanyl path had nausea.  On Morphine 10 mg BID doing well.  Constipation  is better. \par \par 7/17/19\par She is here for follow-up of Stage IV lung cancer with son.  She is due for cycle 3 of Keytruda.  CBC is stable from today.  She only takes the liquid morphine at night now.  She started eating slighlty more and has been feeling slightly better ,as per son.  He states that the pleurex drainage is less, it is checked every Friday + Monday.  On Friday there was about 225cc drained, but yesterday no drainage.  We will arrange for hydration today since she has had poor oral intake lately and small appetite.  Son reports she can tolerate fluids but she has early satiety with food.  Patient also reports feeling short of breath at rest, but her O2 sat is 98% on R/a.  Right sided pain is better. \par CT C/A/P (7/6/19) IMPRESSION:Since April 17, 2019:1. No definite new sites of metastatic disease.2. Right-sided pleural soft tissue mass/thickening, overall appearing decreased since April 17, 2019 with primarily residual right apical tumor. Tumor appears to infiltrate the mediastinum.3. No significant change in mediastinal lymphadenopathy including largest 2.6 x 2.1 cm subcarinal lymph node which remains unchanged. Retrocrural 2.5 x 2.3 cm metastasis or additional site of pleural tumor, unchanged.4. Irregularity of right anterior first rib, probably invaded by tumor, stable in retrospect. Left superior acetabular lucent lesion corresponding to FDG avid bone metastasis.5. Right pelvic indeterminate 7 cm mass; possible retroverted uterus with degenerating fibroid or less likely ovarian mass; previously non-FDG avid and probably benign. Further evaluation with pelvic ultrasound or MRI pelvis with and without IV contrast may be helpful.6. Status post right chest tube placement with decreased small loculated right pleural effusion with fluid within the major fissure. 7. New mild to moderate intrahepatic biliary dilation. Correlation with LFTs recommended. Consider further evaluation with ERCP or MRCP with and without IV contrast.\par \par 8/7/19\par She is here with her son. Overall she is doing much better. She is in wheelchair but now less pain, eating better and Pleurax is only draining about 25 cc twice a week, She does complain of SOB still. I explained this will improve as well but asked her to follow up with Dr. Lanza of pul. \par \par 8/28/19\par She is here for follow up. They are to see Dr. Lanza in Early september. There is minimal output from Pleurax.  Pain is much better and does not use narcotics anymore.  Overall better.   She has right sidede nasal congestion and states makes it hard to breath.\par \par 9/18/19\par She is doing well. She is due for cycle 7. Son was not eliana to schedule CTs priro to the visit but she looks better and better every visit and thus most likley still responding. She saw Dr. Lanza who started her on Flonase. She is due to see ENT Dr. Adams.  Appetie is better. No pains. Dr. Lanza is considering to remover the Pleurx possibly in November. \par \par 10/09/2019\par Patient is here for a follow up visit. Due for cycle 8 today. CT chest/abd/pelvis 09/2019 showed improvement in disease. Also has seen Dr. Adams and agreed to continue witj flonase as she has hypertrophy of turbinates. \par She is tolerating Ketryuda well. Her only complaint at this time is trouble in falling asleep. \par \par 10/30/19\par Patient is here for a follow up visit of Stage IV lung cancer with family member.  She is feeling well with no new complaints.   She feels dyspneic when taking a deep breath but her pain has improved.  Most recent CBC is stable. CT C/A/P on September 22nd showed improvement in right apical lung mass and pleural effusion and drained 150cc.  Due for cycle 9 today. \par \par 12/30/19\par Patient is here for a follow-up visit for  lung cancer with son.  Reviewed imaging results with patient and her family, which suggests worsening effusion but otherwise stable.  They drained 500cc from pleurx yesterday, reportedly sanguinous drainage.  She is agreeable to continue with cycle 12 of Keytruda tomorrow. She reports persistent dyspnea.  We discussed that this tx regimen may be losing its responsiveness.\par CT C/A/P (12.27.19) IMPRESSION: Worsening right-sided pleural effusion. Right apical spiculated nodule without difference. Unchanged appearance of the mediastinum. Unchanged appearance of the third thoracic vertebral body and right second rib. No interval change since prior examination. No change in hepatic hypodensity (likely fatty infiltration), splenic hypodensity (too small to characterize), left adrenal nodule or 1 cm retroperitoneal nodule on the right. No change in fat-containing mass in the anterior left thigh.\par \par 2/10/2020\par She is here for follow-up accompanied by son.  Since last visit she was admitted to hospital for pain and nausea. She had pleurx catheter removed during most recent hospitalization on 1/13/2020.  She complains of insomnia but generally feels well.  Most recent scan reviewed with patient.  She reports she had a rash after discharge from the hospital which has since resolved.  \par CT Chest (1.11.2020) IMPRESSION:Since CT scan performed on December 27, 2019;1.  Right thoracostomy tube in stable position with slight interval decrease of right pleural effusion. 2.   Interval increase in right middle lobe consolidation/atelectasis. 3.  Stable spiculated nodule in the right lung apex, consistent with patient's known malignancy.\par \par She feels much better now that her Pleurx is gone. Does not have any pain. Feels dyspneic which is stable.\par \par 3/2/2020\par She is here for follow-up for lung cancer accompanied by son. Since last visit, she followed with Dr. Lanza, PUL, and Dr. Hirsch from [facility] who agreed against placing additional drainage catheters based on most recent CT imaging.  She still reports some SOB.  She also reports taking a Russian medication named Volidol and Panangin for here breathing.  Patient reports mild dizziness after taking Trazodone; we recommended halving dose.  \par \par 5/4/2020\par She is here for follow-up for lung cancer accompanied by son.  Her last treatment as in [facility] due to COVID> she is doing well .She was on Room air. She states SOB is the same. She has paraspinal back pain/hip pain. \par \par 6/15/2020\par She is here for follow-up for lung cancer accompanied by son.  Patient denies fever, chills, vomiting, worsening cough, new rash, persistent diarrhea or bleeding.  Reviewed most recent imaging and had a discussion regarding continuing with Keytruda every 6 weeks for now as she is clinically stable.  She does complain of weakness and nasal congestion which improves using Fluticasone spray.  She uses oxygen intermittently at home.  \par CT C/A/P (5.9.2020) IMPRESSION:1. Stable spiculated right upper lobe mass measuring up to 3.1 cm.2. New areas of ground glass and reticular opacities within the medial left lung, with new subcentimeter nodular opacities within the left lower lobe. Findings may be postinfectious or postinflammatory in etiology. CT of the chest in 2-3 months is recommended.3. Decreased mediastinal and right hilar lymphadenopathy.4. Resolved right pleural effusion. 1.  No findings of new or progressive metastatic disease in the abdomen or pelvis. 2.  Indeterminate left adrenal nodule and right retroperitoneal nodule, stable in size from prior CT. 3.  Partially visualized fat-containing mass in the anterior left thigh-considerations include benign and malignant neoplastic etiology. Recommend further evaluation with MRI if not already performed. 4.  Sclerotic appearance of metastatic left acetabular lesion, similar in appearance to prior CT, possibly representing healing- was previously lytic on PET/CT of 4/17/2019. 5.  Lobulated pelvic structure again seen, possibly uterus with fibroids. Further evaluation with pelvic ultrasound may be performed as warranted.\par \par 9/9/20- Patient is here for follow up accompanied by her son who speaks English . She has chronic SOB which as per son has been slowly getting worse. She uses O2 at home intermittently. She also uses Flonase and Albuterol inhaler. Otherwise she is mostly wheel chair bound and can walk back and forth to the bathroom by herself. Her appetite is good and weight is stable. No new bone pain/abdominal pain/ rash. \par \par 10/21/2020\par She is here for follow-up for lung cancer, accompanied by son.  Patient denies fever, chills, vomiting, new cough, new rash, persistent diarrhea or bleeding.  She is still pending reimaging, they report they have not received authorization for scan yet.  She still complains of weakness and nasal congestion, which improves using Fluticasone spray.  She uses oxygen intermittently at home.  She still has SOB.   Her only new complaint is intermittent back pruritus.  She is due for cycle 20 of Keytruda today.\par \par 12/2/2020\par She is here for follow-up for lung cancer, accompanied by son.  Patient denies fever, chills, vomiting, new cough, new rash, persistent diarrhea or bleeding.  She still complains of weakness, SOB and nasal congestion, uses the nebulizer once daily with occasional relief of her SOB.   She is due for cycle 21 of Keytruda today.  She is using allegra and or hydrocortisone 1% cream for the pruritus.  Reviewed most recent imaging which shows good treatment response.  \par CT C/A/P (11.14.2020) IMPRESSION:Since May 9, 2020:1. No evidence of new intrathoracic or intra-abdominal metastatic disease.2. Unchanged 2.5 x 2 cm right upper lobe pulmonary nodule, stable with similar measurement technique. 3. Decreased subcarinal lymphadenopathy, now 1.2 cm short axis, previously 1.5 cm short axis on May 9, 2020 CT with similar measurement technique. No evidence of additional suspicious lymphadenopathy by size criteria. 4. Near complete interval resolution of left lower lobe and lingula patchy airspace opacities. Unchanged areas of groundglass and reticular opacities within the medial left lung. Findings likely postinfectious or inflammatory. 5. Unchanged sclerotic appearance of metastatic left acetabular lesion,  possibly representing healing of previously FDG avid lytic on PET/CT of 4/17/2019. 6. Unchanged, partially imaged indeterminate lipomatous mass within the anterior left thigh.\par \par 1/13/21\par She is here for follow-up for lung cancer, accompanied by son.  She still complains of weakness, SOB daily which is unchanged.  She is due for cycle 22 of Keytruda today.  She continues to use allegra and or hydrocortisone 1% cream for the pruritus.  Patient denies fever, chills, vomiting, new cough, new rash, persistent diarrhea or bleeding.  She also reports musculoskeletal pain.  \par \par 2/24/21\par She is here with her son. She is doing well but still has same complaitns. mainly fatigue and SOB as well as MSK pain behind the left scapula. We had a long talk and I expleind that the fatigue and SOB maybe due to kaytruda so we decided to stop it and observe her. On exam no changes. \par \par \par 5/17/21\par She is here for [facility] wu with her son. Her CT C/A/P in Marh 2021  showed Since November 14, 2020. No significant interval change\par \par approximate 2.5 cm x 2 cm right upper lobe nodule (series 4/33). Stable reticular opacities medial right upper lobe.\par \par Stable ill-defined subcarinal lymph nodes (series 4/88).\par \par \par Stable bibasilar subsegmental discoid atelectasis. No pleural effusions.\par \par \par No new sites of metastatic disease in the abdomen and pelvis.\par \par Previously described sclerotic left acetabular lesion felt to represent a healed metastasis based on prior PET/CT is poorly visualized on the current study.\par \par Unchanged, partially imaged indeterminate lipomatous mass within the anterior left thigh.\par \par Given that she been on  Keytruda now for 2 years with no progression of disease and she continued to have shortness of breath we decided to give her a treatment break.\par \par \par Today even well-being of treatment she still continues to have the same except complains specifically short of breath for which she had an extensive workup. She also has rhinorrhea  so she was referred to ENT again as per their request and I refilled Flonase\par \par 7/26/21\par She is here for follow up. She had CT C/A/P on 6/12/21: IMPRESSION:\par \par No CT evidence of new sites of metastatic disease in the chest, abdomen and pelvis.\par \par Stable 2.5 cm right upper lobe nodule.\par \par Interval decrease in size of subcarinal lymph node.\par \par Slight interval increase in reticular and groundglass opacities left lower lobe, possibly postinfectious/postinflammatory.\par \par Unchanged, partially imaged indeterminate lipomatous mass within the anterior left thigh.\par \par CBC is normal today,.\par \par She is here with her son. 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 \par \par \par 11/29/21- Patient is here for follow up for stage 4 lung NSCLC. She was on keytruda and has been on break since Feb 2021. Her last imaging in June 2021 showed stable disease. She has chronic complaints of fatigue, dyspnea, pain in her left side back and insomnia. All these complaints are chronic with no new worsening of symptoms. \par \par 2/9/22\par She returns for follow-up today for NSCLC, accompanied by son who helps with translation at patient request.  She is due for next cycle of immunotherapy today.  She continues to have the same chronic complaints now that she always had for the last several years including unclear left-sided pain, shortness of breath, she seen multiple specialists and we are unable to exactly determine the cause of her pain but I explained that the shortness of breath as before is possibly related to her lung cancer may be resulting in decreased function from previous Pleurx catheter leading to fibrosis and decrease in lung capacity.  She takes oxycodone 5mg as needed for the L sided pain.  Reviewed most recent CT imaging which shows increase in RUL nodule, L adrenal nodule and new tubular opacity within the right upper lobe but otherwise stable.  \par CT C/A/P (12.18.2021) IMPRESSION:Compared to CT chest 6/12/2021,Interval increase in size of the right upper lobe nodule now measuring 2.8 x 2.0 x 2.4 cm previously 2.5 x 2.0 x 2.0 cm. New tubular opacity within the right upper lobe.Significant interval increase in size of left adrenal nodule, now measuring 4.6 x 4.3 cm, highly suggestive of metastasis/collision tumor in this patient with history of lung cancer.  Right upper quadrant peritoneal nodules without significant change from prior. Continued attention on follow-up imaging.Partially imaged indeterminate lipomatous mass in the left thigh redemonstrated. Recommend MR evaluation.\par \par 3/23/22\par She returns for follow-up today for NSCLC, accompanied by son who helps with translation at patient request.  She is due for next cycle of immunotherapy today.  She continues to have the same chronic complaints now that she always had for the last several years including unclear left-sided pain, shortness of breath, she seen multiple specialists and we are unable to exactly determine the cause of her pain but I explained that the shortness of breath as before is possibly related to her lung cancer may be resulting in decreased function from previous Pleurx catheter leading to fibrosis and decrease in lung capacity.  She takes oxycodone 5mg as needed for the L sided pain She has chronic SOB that she believes is due to her heart. 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  She was started on anti depresent by PCP but she is not taking it

## 2022-03-23 NOTE — PHYSICAL EXAM
[Ambulatory and capable of all self care but unable to carry out any work activities] : Status 2- Ambulatory and capable of all self care but unable to carry out any work activities. Up and about more than 50% of waking hours [Normal] : affect appropriate [de-identified] :  In wheelchair, but does stand up by herself [de-identified] : Lungs sounded clear today [de-identified] : L thigh lipoma, deep,  [de-identified] : no evidence of rash

## 2022-03-23 NOTE — REVIEW OF SYSTEMS
[Night Sweats] : night sweats [Fatigue] : fatigue [Shortness Of Breath] : shortness of breath [SOB on Exertion] : shortness of breath during exertion [Difficulty Walking] : difficulty walking [Negative] : Allergic/Immunologic [Recent Change In Weight] : ~T no recent weight change [Dysphagia] : no dysphagia [Constipation] : no constipation [Muscle Pain] : no muscle pain [Skin Rash] : no skin rash [FreeTextEntry2] : seated in wheelchair , occasional night sweats for some time now [FreeTextEntry4] : reports nasal congestion chronic [FreeTextEntry6] : Unchanged SOB for years  [FreeTextEntry9] : reports chronic L sided pain

## 2022-03-24 LAB
ALBUMIN SERPL ELPH-MCNC: 4.1 G/DL
ALP BLD-CCNC: 70 U/L
ALT SERPL-CCNC: 12 U/L
ANION GAP SERPL CALC-SCNC: 12 MMOL/L
AST SERPL-CCNC: 27 U/L
BILIRUB SERPL-MCNC: 0.5 MG/DL
BUN SERPL-MCNC: 18 MG/DL
CALCIUM SERPL-MCNC: 9.4 MG/DL
CHLORIDE SERPL-SCNC: 109 MMOL/L
CO2 SERPL-SCNC: 19 MMOL/L
CREAT SERPL-MCNC: 0.9 MG/DL
EGFR: 62 ML/MIN/1.73M2
GLUCOSE SERPL-MCNC: 89 MG/DL
POTASSIUM SERPL-SCNC: 4.8 MMOL/L
PROT SERPL-MCNC: 6.5 G/DL
SODIUM SERPL-SCNC: 140 MMOL/L
TSH SERPL-ACNC: 0.51 UIU/ML

## 2022-04-03 ENCOUNTER — OUTPATIENT (OUTPATIENT)
Dept: OUTPATIENT SERVICES | Facility: HOSPITAL | Age: 87
LOS: 1 days | Discharge: HOME | End: 2022-04-03
Payer: MEDICAID

## 2022-04-03 ENCOUNTER — RESULT REVIEW (OUTPATIENT)
Age: 87
End: 2022-04-03

## 2022-04-03 DIAGNOSIS — C34.91 MALIGNANT NEOPLASM OF UNSPECIFIED PART OF RIGHT BRONCHUS OR LUNG: ICD-10-CM

## 2022-04-03 DIAGNOSIS — Z90.710 ACQUIRED ABSENCE OF BOTH CERVIX AND UTERUS: Chronic | ICD-10-CM

## 2022-04-03 PROCEDURE — 71260 CT THORAX DX C+: CPT | Mod: 26

## 2022-04-03 PROCEDURE — 74177 CT ABD & PELVIS W/CONTRAST: CPT | Mod: 26

## 2022-04-22 NOTE — DISCHARGE NOTE PROVIDER - NSDCQMERRANDS_GEN_ALL_CORE
[FreeTextEntry1] : He is aware that due to long-term nature of chronic catheter, he will always have bacteriuria.  However sediments and debris recently have been clogging his catheter.  He responded to Augmentin.  Can try methenamine as chronic prophylaxis to see if urine stays clear.  Methenamine is often more effective without a catheter since indwelling time in the bladder is needed for it to become effective.  He will try for 1 month and get back to me with his response.  If not effective, may consider low-dose prophylactic antibiotic.\par \par Francis Clancy MD, FACS\par [facility]\par  of Urology\par \par [address], [address]\[place] [address]\par \par [address], [address]\[place] [address]\par \par Tel: [phone]\par Fax: [phone] No

## 2022-05-04 ENCOUNTER — APPOINTMENT (OUTPATIENT)
Dept: HEMATOLOGY ONCOLOGY | Facility: CLINIC | Age: 87
End: 2022-05-04
Payer: MEDICAID

## 2022-05-04 ENCOUNTER — APPOINTMENT (OUTPATIENT)
Dept: INFUSION THERAPY | Facility: CLINIC | Age: 87
End: 2022-05-04
Payer: MEDICAID

## 2022-05-04 ENCOUNTER — LABORATORY RESULT (OUTPATIENT)
Age: 87
End: 2022-05-04

## 2022-05-04 VITALS
DIASTOLIC BLOOD PRESSURE: 66 MMHG | BODY MASS INDEX: 24.48 KG/M2 | SYSTOLIC BLOOD PRESSURE: 135 MMHG | TEMPERATURE: 97.9 F | HEART RATE: 53 BPM | WEIGHT: 133 LBS | HEIGHT: 62 IN | RESPIRATION RATE: 16 BRPM

## 2022-05-04 PROCEDURE — 99213 OFFICE O/P EST LOW 20 MIN: CPT

## 2022-05-04 RX ORDER — PEMBROLIZUMAB 25 MG/ML
400 INJECTION, SOLUTION INTRAVENOUS ONCE
Refills: 0 | Status: COMPLETED | OUTPATIENT
Start: 2022-05-04 | End: 2022-05-04

## 2022-05-04 RX ADMIN — PEMBROLIZUMAB 400 MILLIGRAM(S): 25 INJECTION, SOLUTION INTRAVENOUS at 13:17

## 2022-05-04 RX ADMIN — PEMBROLIZUMAB 232 MILLIGRAM(S): 25 INJECTION, SOLUTION INTRAVENOUS at 12:47

## 2022-05-04 NOTE — PHYSICAL EXAM
[Ambulatory and capable of all self care but unable to carry out any work activities] : Status 2- Ambulatory and capable of all self care but unable to carry out any work activities. Up and about more than 50% of waking hours [Normal] : affect appropriate [de-identified] :  In wheelchair, but does stand up by herself [de-identified] : Lungs sounded clear today [de-identified] : L thigh lipoma, deep [de-identified] : no evidence of rash

## 2022-05-04 NOTE — REVIEW OF SYSTEMS
[Night Sweats] : night sweats [Fatigue] : fatigue [Shortness Of Breath] : shortness of breath [SOB on Exertion] : shortness of breath during exertion [Difficulty Walking] : difficulty walking [Negative] : Allergic/Immunologic [Recent Change In Weight] : ~T no recent weight change [Dysphagia] : no dysphagia [Constipation] : no constipation [Muscle Pain] : no muscle pain [Skin Rash] : no skin rash [FreeTextEntry2] : seated in wheelchair , occasional night sweats for some time now [FreeTextEntry4] : reports nasal congestion chronic [FreeTextEntry6] : Unchanged SOB for years

## 2022-05-04 NOTE — HISTORY OF PRESENT ILLNESS
[Therapy: ___] : Therapy: [unfilled] [Cycle: ___] : Cycle: [unfilled] [de-identified] : \par This is a 83 year old female who I initially met in [facility] on 3/28/19. She was initially admitted on 2/20/2019 for a right loculated pleural effusion\par approx. 900 CCs, no PE and right apical 3.1x3.0 cm mass with pleural nodules on CTA of chest.  . Pt returned to ED on 3/15/2019 for right pleural\par effusion, had a thoracentesis in ED and was sent home. She than came back for SOB  due to recurrent effusion and a Pleurx was placed. Patholgoy showed adenocarcinoma. \par \par She ha lost a few pounds about  4. She never smoked. She does have weakness.  From her Pleurx she  has 500 cc removed every other day. She has pain after.\par \par Work up:\par 2/20/19 CTA chest:  Large right pleural effusion, maximal axial width of 7 cm correlating with an estimated volume of 900 cc. Associated \par near complete compressive atelectasis of the right lower lobe. Probable loculations of the effusion seen at the apex and at the right heart \par border (for example series 5 images 176, 52; series 9 image 69). 1 cm right upper lobe fissural nodule (series 5 image 139). Patent central \par airways. There is right apical 3.1 x 3.0 cm mass with irregular right apical pleural thickening (series 7, image 44), \par and multiple satellite pleural nodules including a more caudad 2.7 cm para-mediastinal nodule (series 7, image 175). Tissue sampling \par recommended.\par \par Path: \par Pleural fluid: 3/25:  Cell block material is hypocellular and shows a single minutecluster of malignant cells.Immunohistochemistry studies were performed at [facility] and the\par results are noncontributory\par 3/22: Addendum\par Cell block material shows macrophages (positive ),mesothelial cells (positive calretinin) and a single very minute cluster of atypical suspicious cells is negative for Fidel-Ep4.Material is insufficient for further diagnsotic studies (ie,mmunohistochemistry).\par 3/15/19: Pleural fluid: Addendum\par Immunohistochemical studies were performed on the cell block at\par [facility], [address] [place] [id number] (see NOTE). The results\par are as follows:\par \par CK7-                      Rare mesothelial cells positive\par CK20-                    Negative\par CK 5/6-                  Rare mesothelial cells positive\par Calretinin-             Scattered mesothelial cells positive\par CD68-                   Numerous histiocytes positive\par BerEP-4-               Negative\par Napsin-A-             Negative\par TTF-1-                  Negative\par \par These results are non-specific, suggestive of mesothelial\par hyperplasia. The few markedly atypical cells seen in the smear\par are not evident in the cell block.\par \par The atypical cells seen in the current smear are not seen in the\par prior pleural fluid Thinprep smear or cell block (32-NG-).\par \par \par Few marked atypical cells, suspicious for mesothelioma versus\par adenocarcinoma,\par in a background of numerous mesothelial cells, histiocytes and\par small lymphocytes.\par \par \par 3/25/19: Final DiagnosisPOSITIVE FOR MALIGNANT CELLS.Favor metastatic adenocarcinoma. Pending cell block.\par \par  I spke to Dr. Allison  and there are only a few cells thus furhter studies such as IHC, molecular PD-l1 is not possible\par  [FreeTextEntry1] : Started Keytruda on 5/17/19, changed to 6 week dosing starting 5/4/2020 last traetment was on 1/13/21 [de-identified] : 6/5/19\par She is here for ofllow up. Since last visit she started Keytruda. She had a PET/CT on April 17 that showed  Extensive FDG avid right-sided pleural mass/soft tissue thickening, \par with max SUV 21.5 within a right apical pleural soft tissue mass.\par \par 2.  Multiple FDG avid mediastinal lymph nodes, with max SUV 16.5 within a \par 2.6 x 2.1 cm subcarinal node.\par \par 3.  FDG avid lytic lesion within the left superior acetabulum (max SUV \par 14.8), suspicious for osseous metastasis.\par \par 4.  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 Used to be  500 ml  u 3 times a week. Takes 60 mg of oral moprphine a day 20 mg 3 times a day but pain is still severe. Has not had a bowel movement in unknown amount of days does not take lexatives although she has lexatives. \par \par 6/26/19 She is here for follow up. She is due for cycle 3 of Keytruda.  She has about 300 mg drained from her pleurax twice a week. Has SOB but saturated 94% on room air and 94% on ambualtion. Did not torate the fentanyl path had nausea.  On Morphine 10 mg BID doing well.  Constipation  is better. \par \par 7/17/19\par She is here for follow-up of Stage IV lung cancer with son.  She is due for cycle 3 of Keytruda.  CBC is stable from today.  She only takes the liquid morphine at night now.  She started eating slighlty more and has been feeling slightly better ,as per son.  He states that the pleurex drainage is less, it is checked every Friday + Monday.  On Friday there was about 225cc drained, but yesterday no drainage.  We will arrange for hydration today since she has had poor oral intake lately and small appetite.  Son reports she can tolerate fluids but she has early satiety with food.  Patient also reports feeling short of breath at rest, but her O2 sat is 98% on R/a.  Right sided pain is better. \par CT C/A/P (7/6/19) IMPRESSION:Since April 17, 2019:1. No definite new sites of metastatic disease.2. Right-sided pleural soft tissue mass/thickening, overall appearing decreased since April 17, 2019 with primarily residual right apical tumor. Tumor appears to infiltrate the mediastinum.3. No significant change in mediastinal lymphadenopathy including largest 2.6 x 2.1 cm subcarinal lymph node which remains unchanged. Retrocrural 2.5 x 2.3 cm metastasis or additional site of pleural tumor, unchanged.4. Irregularity of right anterior first rib, probably invaded by tumor, stable in retrospect. Left superior acetabular lucent lesion corresponding to FDG avid bone metastasis.5. Right pelvic indeterminate 7 cm mass; possible retroverted uterus with degenerating fibroid or less likely ovarian mass; previously non-FDG avid and probably benign. Further evaluation with pelvic ultrasound or MRI pelvis with and without IV contrast may be helpful.6. Status post right chest tube placement with decreased small loculated right pleural effusion with fluid within the major fissure. 7. New mild to moderate intrahepatic biliary dilation. Correlation with LFTs recommended. Consider further evaluation with ERCP or MRCP with and without IV contrast.\par \par 8/7/19\par She is here with her son. Overall she is doing much better. She is in wheelchair but now less pain, eating better and Pleurax is only draining about 25 cc twice a week, She does complain of SOB still. I explained this will improve as well but asked her to follow up with Dr. Lanza of pul. \par \par 8/28/19\par She is here for follow up. They are to see Dr. Lanza in Early september. There is minimal output from Pleurax.  Pain is much better and does not use narcotics anymore.  Overall better.   She has right sidede nasal congestion and states makes it hard to breath.\par \par 9/18/19\par She is doing well. She is due for cycle 7. Son was not eliana to schedule CTs priro to the visit but she looks better and better every visit and thus most likley still responding. She saw Dr. Lanza who started her on Flonase. She is due to see ENT Dr. Adams.  Appetie is better. No pains. Dr. Lanza is considering to remover the Pleurx possibly in November. \par \par 10/09/2019\par Patient is here for a follow up visit. Due for cycle 8 today. CT chest/abd/pelvis 09/2019 showed improvement in disease. Also has seen Dr. Adams and agreed to continue witj flonase as she has hypertrophy of turbinates. \par She is tolerating Ketryuda well. Her only complaint at this time is trouble in falling asleep. \par \par 10/30/19\par Patient is here for a follow up visit of Stage IV lung cancer with family member.  She is feeling well with no new complaints.   She feels dyspneic when taking a deep breath but her pain has improved.  Most recent CBC is stable. CT C/A/P on September 22nd showed improvement in right apical lung mass and pleural effusion and drained 150cc.  Due for cycle 9 today. \par \par 12/30/19\par Patient is here for a follow-up visit for  lung cancer with son.  Reviewed imaging results with patient and her family, which suggests worsening effusion but otherwise stable.  They drained 500cc from pleurx yesterday, reportedly sanguinous drainage.  She is agreeable to continue with cycle 12 of Keytruda tomorrow. She reports persistent dyspnea.  We discussed that this tx regimen may be losing its responsiveness.\par CT C/A/P (12.27.19) IMPRESSION: Worsening right-sided pleural effusion. Right apical spiculated nodule without difference. Unchanged appearance of the mediastinum. Unchanged appearance of the third thoracic vertebral body and right second rib. No interval change since prior examination. No change in hepatic hypodensity (likely fatty infiltration), splenic hypodensity (too small to characterize), left adrenal nodule or 1 cm retroperitoneal nodule on the right. No change in fat-containing mass in the anterior left thigh.\par \par 2/10/2020\par She is here for follow-up accompanied by son.  Since last visit she was admitted to hospital for pain and nausea. She had pleurx catheter removed during most recent hospitalization on 1/13/2020.  She complains of insomnia but generally feels well.  Most recent scan reviewed with patient.  She reports she had a rash after discharge from the hospital which has since resolved.  \par CT Chest (1.11.2020) IMPRESSION:Since CT scan performed on December 27, 2019;1.  Right thoracostomy tube in stable position with slight interval decrease of right pleural effusion. 2.   Interval increase in right middle lobe consolidation/atelectasis. 3.  Stable spiculated nodule in the right lung apex, consistent with patient's known malignancy.\par \par She feels much better now that her Pleurx is gone. Does not have any pain. Feels dyspneic which is stable.\par \par 3/2/2020\par She is here for follow-up for lung cancer accompanied by son. Since last visit, she followed with Dr. Lanza, P[facility], and Dr. Hirsch from [facility] who agreed against placing additional drainage catheters based on most recent CT imaging.  She still reports some SOB.  She also reports taking a [place] medication named Volidol and Panangin for here breathing.  Patient reports mild dizziness after taking Trazodone; we recommended halving dose.  \par \par 5/4/2020\par She is here for follow-up for lung cancer accompanied by son.  Her last treatment as in [facility] due to COVID> she is doing well .She was on Room air. She states SOB is the same. She has paraspinal back pain/hip pain. \par \par 6/15/2020\par She is here for follow-up for lung cancer accompanied by son.  Patient denies fever, chills, vomiting, worsening cough, new rash, persistent diarrhea or bleeding.  Reviewed most recent imaging and had a discussion regarding continuing with Keytruda every 6 weeks for now as she is clinically stable.  She does complain of weakness and nasal congestion which improves using Fluticasone spray.  She uses oxygen intermittently at home.  \par CT C/A/P (5.9.2020) IMPRESSION:1. Stable spiculated right upper lobe mass measuring up to 3.1 cm.2. New areas of ground glass and reticular opacities within the medial left lung, with new subcentimeter nodular opacities within the left lower lobe. Findings may be postinfectious or postinflammatory in etiology. CT of the chest in 2-3 months is recommended.3. Decreased mediastinal and right hilar lymphadenopathy.4. Resolved right pleural effusion. 1.  No findings of new or progressive metastatic disease in the abdomen or pelvis. 2.  Indeterminate left adrenal nodule and right retroperitoneal nodule, stable in size from prior CT. 3.  Partially visualized fat-containing mass in the anterior left thigh-considerations include benign and malignant neoplastic etiology. Recommend further evaluation with MRI if not already performed. 4.  Sclerotic appearance of metastatic left acetabular lesion, similar in appearance to prior CT, possibly representing healing- was previously lytic on PET/CT of 4/17/2019. 5.  Lobulated pelvic structure again seen, possibly uterus with fibroids. Further evaluation with pelvic ultrasound may be performed as warranted.\par \par 9/9/20- Patient is here for follow up accompanied by her son who speaks English . She has chronic SOB which as per son has been slowly getting worse. She uses O2 at home intermittently. She also uses Flonase and Albuterol inhaler. Otherwise she is mostly wheel chair bound and can walk back and forth to the bathroom by herself. Her appetite is good and weight is stable. No new bone pain/abdominal pain/ rash. \par \par 10/21/2020\par She is here for follow-up for lung cancer, accompanied by son.  Patient denies fever, chills, vomiting, new cough, new rash, persistent diarrhea or bleeding.  She is still pending reimaging, they report they have not received authorization for scan yet.  She still complains of weakness and nasal congestion, which improves using Fluticasone spray.  She uses oxygen intermittently at home.  She still has SOB.   Her only new complaint is intermittent back pruritus.  She is due for cycle 20 of Keytruda today.\par \par 12/2/2020\par She is here for follow-up for lung cancer, accompanied by son.  Patient denies fever, chills, vomiting, new cough, new rash, persistent diarrhea or bleeding.  She still complains of weakness, SOB and nasal congestion, uses the nebulizer once daily with occasional relief of her SOB.   She is due for cycle 21 of Keytruda today.  She is using allegra and or hydrocortisone 1% cream for the pruritus.  Reviewed most recent imaging which shows good treatment response.  \par CT C/A/P (11.14.2020) IMPRESSION:Since May 9, 2020:1. No evidence of new intrathoracic or intra-abdominal metastatic disease.2. Unchanged 2.5 x 2 cm right upper lobe pulmonary nodule, stable with similar measurement technique. 3. Decreased subcarinal lymphadenopathy, now 1.2 cm short axis, previously 1.5 cm short axis on May 9, 2020 CT with similar measurement technique. No evidence of additional suspicious lymphadenopathy by size criteria. 4. Near complete interval resolution of left lower lobe and lingula patchy airspace opacities. Unchanged areas of groundglass and reticular opacities within the medial left lung. Findings likely postinfectious or inflammatory. 5. Unchanged sclerotic appearance of metastatic left acetabular lesion,  possibly representing healing of previously FDG avid lytic on PET/CT of 4/17/2019. 6. Unchanged, partially imaged indeterminate lipomatous mass within the anterior left thigh.\par \par 1/13/21\par She is here for follow-up for lung cancer, accompanied by son.  She still complains of weakness, SOB daily which is unchanged.  She is due for cycle 22 of Keytruda today.  She continues to use allegra and or hydrocortisone 1% cream for the pruritus.  Patient denies fever, chills, vomiting, new cough, new rash, persistent diarrhea or bleeding.  She also reports musculoskeletal pain.  \par \par 2/24/21\par She is here with her son. She is doing well but still has same complaitns. mainly fatigue and SOB as well as MSK pain behind the left scapula. We had a long talk and I expleind that the fatigue and SOB maybe due to kaytruda so we decided to stop it and observe her. On exam no changes. \par \par \par 5/17/21\par She is here for [facility] wu with her son. Her CT C/A/P in Marh 2021  showed Since November 14, 2020. No significant interval change\par \par approximate 2.5 cm x 2 cm right upper lobe nodule (series 4/33). Stable reticular opacities medial right upper lobe.\par \par Stable ill-defined subcarinal lymph nodes (series 4/88).\par \par \par Stable bibasilar subsegmental discoid atelectasis. No pleural effusions.\par \par \par No new sites of metastatic disease in the abdomen and pelvis.\par \par Previously described sclerotic left acetabular lesion felt to represent a healed metastasis based on prior PET/CT is poorly visualized on the current study.\par \par Unchanged, partially imaged indeterminate lipomatous mass within the anterior left thigh.\par \par Given that she been on  Keytruda now for 2 years with no progression of disease and she continued to have shortness of breath we decided to give her a treatment break.\par \par \par Today even well-being of treatment she still continues to have the same except complains specifically short of breath for which she had an extensive workup. She also has rhinorrhea  so she was referred to ENT again as per their request and I refilled Flonase\par \par 7/26/21\par She is here for follow up. She had CT C/A/P on 6/12/21: IMPRESSION:\par \par No CT evidence of new sites of metastatic disease in the chest, abdomen and pelvis.\par \par Stable 2.5 cm right upper lobe nodule.\par \par Interval decrease in size of subcarinal lymph node.\par \par Slight interval increase in reticular and groundglass opacities left lower lobe, possibly postinfectious/postinflammatory.\par \par Unchanged, partially imaged indeterminate lipomatous mass within the anterior left thigh.\par \par CBC is normal today,.\par \par She is here with her son. 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 \par \par \par 11/29/21- Patient is here for follow up for stage 4 lung NSCLC. She was on keytruda and has been on break since Feb 2021. Her last imaging in June 2021 showed stable disease. She has chronic complaints of fatigue, dyspnea, pain in her left side back and insomnia. All these complaints are chronic with no new worsening of symptoms. \par \par 2/9/22\par She returns for follow-up today for NSCLC, accompanied by son who helps with translation at patient request.  She is due for next cycle of immunotherapy today.  She continues to have the same chronic complaints now that she always had for the last several years including unclear left-sided pain, shortness of breath, she seen multiple specialists and we are unable to exactly determine the cause of her pain but I explained that the shortness of breath as before is possibly related to her lung cancer may be resulting in decreased function from previous Pleurx catheter leading to fibrosis and decrease in lung capacity.  She takes oxycodone 5mg as needed for the L sided pain.  Reviewed most recent CT imaging which shows increase in RUL nodule, L adrenal nodule and new tubular opacity within the right upper lobe but otherwise stable.  \par CT C/A/P (12.18.2021) IMPRESSION:Compared to CT chest 6/12/2021,Interval increase in size of the right upper lobe nodule now measuring 2.8 x 2.0 x 2.4 cm previously 2.5 x 2.0 x 2.0 cm. New tubular opacity within the right upper lobe.Significant interval increase in size of left adrenal nodule, now measuring 4.6 x 4.3 cm, highly suggestive of metastasis/collision tumor in this patient with history of lung cancer.  Right upper quadrant peritoneal nodules without significant change from prior. Continued attention on follow-up imaging.Partially imaged indeterminate lipomatous mass in the left thigh redemonstrated. Recommend MR evaluation.\par \par 3/23/22\par She returns for follow-up today for NSCLC, accompanied by son who helps with translation at patient request.  She is due for next cycle of immunotherapy today.  She continues to have the same chronic complaints now that she always had for the last several years including unclear left-sided pain, shortness of breath, she seen multiple specialists and we are unable to exactly determine the cause of her pain but I explained that the shortness of breath as before is possibly related to her lung cancer may be resulting in decreased function from previous Pleurx catheter leading to fibrosis and decrease in lung capacity.  She takes oxycodone 5mg as needed for the L sided pain She has chronic SOB that she believes is due to her heart. 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  She was started on anti depresent by PCP but she is not taking it\par \par 5/4/22\par She returns for follow-up today for NSCLC, accompanied by son using Congolese translation.  She is due for next cycle of immunotherapy today.  She continues to have the same chronic complaints now that she always had for the last several years including unclear left-sided pain, shortness of breath.  She has oxycodone 5mg at home but does not need to take it since pain has resolved.  She has chronic SOB that she believes is due to her heart.  Reviewed most recent CBC, which is stable with only mild leukopenia and anemia, hgb 11.7g/dL.  Patient denies fever, persistent rash, rash, new cough or bleeding.  Reviewed most recent CT imaging which shows decreased upper lobe tubular opacity, new nodular opacities within the right lung, bilateral lower lobe opacities (may be post inflammatory in etiology), decrease of left adrenal nodule. \par CT C/A/P (4.3.2022) IMPRESSION:1. Stable spiculated centrally necrotic right upper lobe nodule measuring up to 2.8 cm.2. Decreased upper lobe tubular opacity. New nodular opacities within the right lung. CT of the chest in 3 months is recommended for follow-up.3. Bilateral lower lobe predominant groundglass and reticular opacities, increased on the left. Findings are nonspecific and may be post inflammatory in etiology.4. Stable mediastinal and bilateral hilar lymph nodes.Since CT abdomen pelvis performed on December 18, 2021;Continued interval decrease of left adrenal nodule, now measuring 3 x 2.2 cm (previously 4.6 x 4.3 cm).Stable posterior right upper quadrant peritoneal nodules. Recommend continue follow-up on subsequent imaging.Unchanged indeterminate left thigh lipomatous lesion. Consider MRI for further evaluation.

## 2022-05-04 NOTE — ASSESSMENT
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 \par - stopped  Keytruda 400 mg every 6 weeks due to fatigue and dyspnea and her preferences\par - CT C/A/P from 3/65826 were stable and we stopped keytruda and was on observation with CT from 6/2021 showing stable findings\par - resumed Keytruda on 12/29/2021\par - CT C/A/P from 04/02/2022 shows decreased upper lobe tubular opacity, new nodular opacities within the right lung, bilateral lower lobe opacities,(may be post inflammatory in etiology), decrease of left adrenal nodule.\par - c/w Keytruda 400mg every 6 weeks on 5.4.2022\par - c/w oxycodone 5mg every 8 hours as needed for pain with bowel regime,she does not take  at this point\par - Labwork today: CBC, CMP, TSH \par - plan to order reimaging at next visit \par \par # Dysphagia and decrease appetite - better now \par - c/w Jevity 1.2 hector  BiD\par - she is followed by nutritionist, Tila Villanueva\par \par # SOB mainly when she tries to sleep; due to right nasal congestion maybe from Keytruda, Keytruda was stopped and SOB did not change\par - c/w Flonase\par - On Albuterol + nebulizer prn\par - She follows with Dr Lanza as indicated\par - Advised to use saline nasal spray as she has c/o dryness in her nose \par \par # Has some insomnia \par - c/w Melatonin 5 mg at bedtime \par \par # Pruritus, back ; may be side effect of Keytruda?  Did not complain today\par - has hydrocortisone 1% or benadryl PRN for symptom relief if happens again\par - will continue to monitor\par \par RTC in 6 weeks with CBC, CMP, TSH ; sooner if needed

## 2022-05-09 LAB
ALBUMIN SERPL ELPH-MCNC: 3.9 G/DL
ALP BLD-CCNC: 66 U/L
ALT SERPL-CCNC: 11 U/L
ANION GAP SERPL CALC-SCNC: 11 MMOL/L
AST SERPL-CCNC: 15 U/L
BILIRUB SERPL-MCNC: 0.5 MG/DL
BUN SERPL-MCNC: 23 MG/DL
CALCIUM SERPL-MCNC: 9.4 MG/DL
CHLORIDE SERPL-SCNC: 109 MMOL/L
CO2 SERPL-SCNC: 21 MMOL/L
CREAT SERPL-MCNC: 1.1 MG/DL
EGFR: 49 ML/MIN/1.73M2
GLUCOSE SERPL-MCNC: 92 MG/DL
HCT VFR BLD CALC: 35.3 %
HGB BLD-MCNC: 11.7 G/DL
MCHC RBC-ENTMCNC: 27.7 PG
MCHC RBC-ENTMCNC: 33.1 G/DL
MCV RBC AUTO: 83.5 FL
PLATELET # BLD AUTO: 198 K/UL
PMV BLD: 9.4 FL
POTASSIUM SERPL-SCNC: 4.3 MMOL/L
PROT SERPL-MCNC: 6.4 G/DL
RBC # BLD: 4.23 M/UL
RBC # FLD: 14.6 %
SODIUM SERPL-SCNC: 141 MMOL/L
TSH SERPL-ACNC: 0.45 UIU/ML
WBC # FLD AUTO: 4.97 K/UL

## 2022-05-25 NOTE — PROGRESS NOTE ADULT - ASSESSMENT
Multiple views of the left coronary artery obtained using power injection. 84 years old female known to have malignant right sided pleural effusions s/p Denver cath, Metastatic adenocarcinoma of lung (based on pleural fluid cytology) on ketruyda every 3 weeks last one was on 12/31, HTN, and Dyslipidemia, s/p GINA BSO was brought to the ED for chest pain and shortness of breath, inability to drain Denver catheter.    A/P     # Dyspnia/ pleuritic chest pain/ h/o Denver cath/ h/o stage IV lung CA   - pt has h/o malignant pleural effusion, loculated now with extensive fibrosis in pleural cavity   - consulted by pulmonary and thoracic surgery, CT chest was done today   - CT-surgery recommended possible change of Pleur-X catheter on 1/13  - Pain control with Roxanol and percocet, Lidoderm topical   - pt was consulted by oncology c/w immunotherapy ( tumor regression was noted by oncology)   - pt was started on Robaxin and Neurontin today for better pain control   - on Nebs Q 4 hours   - comfortable on NC, monitor pulse Ox       # Normocytic anemia  - monitor H/H, keep Hb above 7.5  - stable for now     # Hypertension   -  DASH diet   - Continue Amlodipine    DVT Prophylaxis  # Overall prognosis is guarded   Andrea Botello  [phone]  #Progress Note Handoff  Pending (specify):  CT-surgery recommended possible change of Pleur-X catheter on 1/13, started on Neurontin for better pain control today   Family discussion: n/a, I spoke with pt, she agreed with a plan of care   Disposition: Home__x_/SNF___/Other________/Unknown at this time________ Cigarettes

## 2022-06-15 ENCOUNTER — LABORATORY RESULT (OUTPATIENT)
Age: 87
End: 2022-06-15

## 2022-06-15 ENCOUNTER — OUTPATIENT (OUTPATIENT)
Dept: OUTPATIENT SERVICES | Facility: HOSPITAL | Age: 87
LOS: 1 days | Discharge: HOME | End: 2022-06-15

## 2022-06-15 ENCOUNTER — APPOINTMENT (OUTPATIENT)
Dept: HEMATOLOGY ONCOLOGY | Facility: CLINIC | Age: 87
End: 2022-06-15
Payer: MEDICAID

## 2022-06-15 ENCOUNTER — APPOINTMENT (OUTPATIENT)
Dept: INFUSION THERAPY | Facility: CLINIC | Age: 87
End: 2022-06-15
Payer: MEDICAID

## 2022-06-15 VITALS
TEMPERATURE: 97.6 F | WEIGHT: 131 LBS | RESPIRATION RATE: 16 BRPM | HEIGHT: 62 IN | DIASTOLIC BLOOD PRESSURE: 87 MMHG | BODY MASS INDEX: 24.11 KG/M2 | SYSTOLIC BLOOD PRESSURE: 141 MMHG | HEART RATE: 74 BPM

## 2022-06-15 DIAGNOSIS — R06.00 DYSPNEA, UNSPECIFIED: ICD-10-CM

## 2022-06-15 DIAGNOSIS — Z90.710 ACQUIRED ABSENCE OF BOTH CERVIX AND UTERUS: Chronic | ICD-10-CM

## 2022-06-15 DIAGNOSIS — C34.91 MALIGNANT NEOPLASM OF UNSPECIFIED PART OF RIGHT BRONCHUS OR LUNG: ICD-10-CM

## 2022-06-15 DIAGNOSIS — Z51.12 ENCOUNTER FOR ANTINEOPLASTIC IMMUNOTHERAPY: ICD-10-CM

## 2022-06-15 DIAGNOSIS — J91.0 MALIGNANT PLEURAL EFFUSION: ICD-10-CM

## 2022-06-15 LAB
HCT VFR BLD CALC: 36.4 %
HGB BLD-MCNC: 12.4 G/DL
MCHC RBC-ENTMCNC: 28.4 PG
MCHC RBC-ENTMCNC: 34.1 G/DL
MCV RBC AUTO: 83.3 FL
PLATELET # BLD AUTO: 165 K/UL
PMV BLD: 10.6 FL
RBC # BLD: 4.37 M/UL
RBC # FLD: 14 %
WBC # FLD AUTO: 5.47 K/UL

## 2022-06-15 PROCEDURE — 99214 OFFICE O/P EST MOD 30 MIN: CPT

## 2022-06-15 RX ORDER — PEMBROLIZUMAB 25 MG/ML
400 INJECTION, SOLUTION INTRAVENOUS ONCE
Refills: 0 | Status: COMPLETED | OUTPATIENT
Start: 2022-06-15 | End: 2022-06-15

## 2022-06-15 RX ADMIN — PEMBROLIZUMAB 232 MILLIGRAM(S): 25 INJECTION, SOLUTION INTRAVENOUS at 12:21

## 2022-06-28 ENCOUNTER — NON-APPOINTMENT (OUTPATIENT)
Age: 87
End: 2022-06-28

## 2022-06-28 NOTE — REVIEW OF SYSTEMS
[Night Sweats] : night sweats [Fatigue] : fatigue [Shortness Of Breath] : shortness of breath [SOB on Exertion] : shortness of breath during exertion [Difficulty Walking] : difficulty walking [Negative] : Allergic/Immunologic [Joint Pain] : joint pain [Muscle Weakness] : muscle weakness [Recent Change In Weight] : ~T no recent weight change [Dysphagia] : no dysphagia [Constipation] : no constipation [Muscle Pain] : no muscle pain [Skin Rash] : no skin rash [FreeTextEntry2] : seated in wheelchair , occasional night sweats for some time now [FreeTextEntry4] : reports nasal congestion chronic [FreeTextEntry6] : Unchanged SOB for years  [FreeTextEntry9] : reports chronic L sided pain

## 2022-06-28 NOTE — ASSESSMENT
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 \par - resumed Keytruda on 12/29/2021\par - c/w Keytruda 400mg every 6 weeks \par - c/w oxycodone 5mg every 8 hours as needed for pain with bowel regime,she does not take  at this point\par - repeat CT C/A/P with IVC  ordered \par - CBC was stable , CMP, TSH today \par \par # Dysphagia and decrease appetite - better now \par - c/w Jevity 1.2 hector  BiD\par - she is followed by nutritionist, Tila Villanueva\par \par # SOB mainly when she tries to sleep; due to right nasal congestion maybe from Keytruda, Keytruda was stopped and SOB did not change\par - c/w Flonase\par - On Albuterol + nebulizer prn\par - She follows with Dr Lanza as indicated\par - Advised to use saline nasal spray as she has c/o dryness in her nose \par \par # Has some insomnia \par - c/w Melatonin 5 mg at bedtime \par \par # Pruritus, back,  ; may be side effect of Keytruda?. Did not complain today\par - has  hydrocortisone 1% or benadryl PRN for symptom relief if happens again\par - will continue to monitor\par \par #Pain at T spine right at the level of scapula with tenderness\par -will see what CTs show will consider MR T spine  if CTs are  negative\par -Oxycodine refilled.\par \par RTC in 6 weeks  if DAVID on scans. will possibly order MR T spine\par \par \par Addendum: I am ordering a lightweight wheelchair for this patient.  She is currently receiving oncology follow-up and treatment for stage IV lung cancer and is on immunotherapy.  Ms. Montilla fatigues easily and experiences shortness of breath and also has cancer related pain.  Provision of a lightweight wheelchair would assure that the patient  can attend all medical appointments as well as all her treatments.  Patient is willing to use a wheelchair and family would assist in propelling her wheelchair.  Thank you for your compassion and understanding.\par

## 2022-06-28 NOTE — HISTORY OF PRESENT ILLNESS
[Therapy: ___] : Therapy: [unfilled] [Cycle: ___] : Cycle: [unfilled] [de-identified] : \par This is a 83 year old female who I initially met in [facility] on 3/28/19. She was initially admitted on 2/20/2019 for a right loculated pleural effusion\par approx. 900 CCs, no PE and right apical 3.1x3.0 cm mass with pleural nodules on CTA of chest.  . Pt returned to ED on 3/15/2019 for right pleural\par effusion, had a thoracentesis in ED and was sent home. She than came back for SOB  due to recurrent effusion and a Pleurx was placed. Patholgoy showed adenocarcinoma. \par \par She ha lost a few pounds about  4. She never smoked. She does have weakness.  From her Pleurx she  has 500 cc removed every other day. She has pain after.\par \par Work up:\par 2/20/19 CTA chest:  Large right pleural effusion, maximal axial width of 7 cm correlating with an estimated volume of 900 cc. Associated \par near complete compressive atelectasis of the right lower lobe. Probable loculations of the effusion seen at the apex and at the right heart \par border (for example series 5 images 176, 52; series 9 image 69). 1 cm right upper lobe fissural nodule (series 5 image 139). Patent central \par airways. There is right apical 3.1 x 3.0 cm mass with irregular right apical pleural thickening (series 7, image 44), \par and multiple satellite pleural nodules including a more caudad 2.7 cm para-mediastinal nodule (series 7, image 175). Tissue sampling \par recommended.\par \par Path: \par Pleural fluid: 3/25:  Cell block material is hypocellular and shows a single minutecluster of malignant cells.Immunohistochemistry studies were performed at [facility] and the\par results are noncontributory\par 3/22: Addendum\par Cell block material shows macrophages (positive ),mesothelial cells (positive calretinin) and a single very minute cluster of atypical suspicious cells is negative for Fidel-Ep4.Material is insufficient for further diagnsotic studies (ie,mmunohistochemistry).\par 3/15/19: Pleural fluid: Addendum\par Immunohistochemical studies were performed on the cell block at\par [facility], [address] [place] [id number] (see NOTE). The results\par are as follows:\par \par CK7-                      Rare mesothelial cells positive\par CK20-                    Negative\par CK 5/6-                  Rare mesothelial cells positive\par Calretinin-             Scattered mesothelial cells positive\par CD68-                   Numerous histiocytes positive\par BerEP-4-               Negative\par Napsin-A-             Negative\par TTF-1-                  Negative\par \par These results are non-specific, suggestive of mesothelial\par hyperplasia. The few markedly atypical cells seen in the smear\par are not evident in the cell block.\par \par The atypical cells seen in the current smear are not seen in the\par prior pleural fluid Thinprep smear or cell block (73-QP-).\par \par \par Few marked atypical cells, suspicious for mesothelioma versus\par adenocarcinoma,\par in a background of numerous mesothelial cells, histiocytes and\par small lymphocytes.\par \par \par 3/25/19: Final DiagnosisPOSITIVE FOR MALIGNANT CELLS.Favor metastatic adenocarcinoma. Pending cell block.\par \par  I spke to Dr. Allison  and there are only a few cells thus furhter studies such as IHC, molecular PD-l1 is not possible\par  [FreeTextEntry1] : Started Keytruda on 5/17/19, changed to 6 week dosing starting 5/4/2020 last traetment was on 1/13/21 [de-identified] : 6/5/19\par She is here for ofllow up. Since last visit she started Keytruda. She had a PET/CT on April 17 that showed  Extensive FDG avid right-sided pleural mass/soft tissue thickening, \par with max SUV 21.5 within a right apical pleural soft tissue mass.\par \par 2.  Multiple FDG avid mediastinal lymph nodes, with max SUV 16.5 within a \par 2.6 x 2.1 cm subcarinal node.\par \par 3.  FDG avid lytic lesion within the left superior acetabulum (max SUV \par 14.8), suspicious for osseous metastasis.\par \par 4.  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 Used to be  500 ml  u 3 times a week. Takes 60 mg of oral moprphine a day 20 mg 3 times a day but pain is still severe. Has not had a bowel movement in unknown amount of days does not take lexatives although she has lexatives. \par \par 6/26/19 She is here for follow up. She is due for cycle 3 of Keytruda.  She has about 300 mg drained from her pleurax twice a week. Has SOB but saturated 94% on room air and 94% on ambualtion. Did not torate the fentanyl path had nausea.  On Morphine 10 mg BID doing well.  Constipation  is better. \par \par 7/17/19\par She is here for follow-up of Stage IV lung cancer with son.  She is due for cycle 3 of Keytruda.  CBC is stable from today.  She only takes the liquid morphine at night now.  She started eating slighlty more and has been feeling slightly better ,as per son.  He states that the pleurex drainage is less, it is checked every Friday + Monday.  On Friday there was about 225cc drained, but yesterday no drainage.  We will arrange for hydration today since she has had poor oral intake lately and small appetite.  Son reports she can tolerate fluids but she has early satiety with food.  Patient also reports feeling short of breath at rest, but her O2 sat is 98% on R/a.  Right sided pain is better. \par CT C/A/P (7/6/19) IMPRESSION:Since April 17, 2019:1. No definite new sites of metastatic disease.2. Right-sided pleural soft tissue mass/thickening, overall appearing decreased since April 17, 2019 with primarily residual right apical tumor. Tumor appears to infiltrate the mediastinum.3. No significant change in mediastinal lymphadenopathy including largest 2.6 x 2.1 cm subcarinal lymph node which remains unchanged. Retrocrural 2.5 x 2.3 cm metastasis or additional site of pleural tumor, unchanged.4. Irregularity of right anterior first rib, probably invaded by tumor, stable in retrospect. Left superior acetabular lucent lesion corresponding to FDG avid bone metastasis.5. Right pelvic indeterminate 7 cm mass; possible retroverted uterus with degenerating fibroid or less likely ovarian mass; previously non-FDG avid and probably benign. Further evaluation with pelvic ultrasound or MRI pelvis with and without IV contrast may be helpful.6. Status post right chest tube placement with decreased small loculated right pleural effusion with fluid within the major fissure. 7. New mild to moderate intrahepatic biliary dilation. Correlation with LFTs recommended. Consider further evaluation with ERCP or MRCP with and without IV contrast.\par \par 8/7/19\par She is here with her son. Overall she is doing much better. She is in wheelchair but now less pain, eating better and Pleurax is only draining about 25 cc twice a week, She does complain of SOB still. I explained this will improve as well but asked her to follow up with Dr. Lanza of pul. \par \par 8/28/19\par She is here for follow up. They are to see Dr. Lanza in Early september. There is minimal output from Pleurax.  Pain is much better and does not use narcotics anymore.  Overall better.   She has right sidede nasal congestion and states makes it hard to breath.\par \par 9/18/19\par She is doing well. She is due for cycle 7. Son was not eliana to schedule CTs priro to the visit but she looks better and better every visit and thus most likley still responding. She saw Dr. Lanza who started her on Flonase. She is due to see ENT Dr. Adams.  Appetie is better. No pains. Dr. Lanza is considering to remover the Pleurx possibly in November. \par \par 10/09/2019\par Patient is here for a follow up visit. Due for cycle 8 today. CT chest/abd/pelvis 09/2019 showed improvement in disease. Also has seen Dr. Adams and agreed to continue witj flonase as she has hypertrophy of turbinates. \par She is tolerating Ketryuda well. Her only complaint at this time is trouble in falling asleep. \par \par 10/30/19\par Patient is here for a follow up visit of Stage IV lung cancer with family member.  She is feeling well with no new complaints.   She feels dyspneic when taking a deep breath but her pain has improved.  Most recent CBC is stable. CT C/A/P on September 22nd showed improvement in right apical lung mass and pleural effusion and drained 150cc.  Due for cycle 9 today. \par \par 12/30/19\par Patient is here for a follow-up visit for  lung cancer with son.  Reviewed imaging results with patient and her family, which suggests worsening effusion but otherwise stable.  They drained 500cc from pleurx yesterday, reportedly sanguinous drainage.  She is agreeable to continue with cycle 12 of Keytruda tomorrow. She reports persistent dyspnea.  We discussed that this tx regimen may be losing its responsiveness.\par CT C/A/P (12.27.19) IMPRESSION: Worsening right-sided pleural effusion. Right apical spiculated nodule without difference. Unchanged appearance of the mediastinum. Unchanged appearance of the third thoracic vertebral body and right second rib. No interval change since prior examination. No change in hepatic hypodensity (likely fatty infiltration), splenic hypodensity (too small to characterize), left adrenal nodule or 1 cm retroperitoneal nodule on the right. No change in fat-containing mass in the anterior left thigh.\par \par 2/10/2020\par She is here for follow-up accompanied by son.  Since last visit she was admitted to hospital for pain and nausea. She had pleurx catheter removed during most recent hospitalization on 1/13/2020.  She complains of insomnia but generally feels well.  Most recent scan reviewed with patient.  She reports she had a rash after discharge from the hospital which has since resolved.  \par CT Chest (1.11.2020) IMPRESSION:Since CT scan performed on December 27, 2019;1.  Right thoracostomy tube in stable position with slight interval decrease of right pleural effusion. 2.   Interval increase in right middle lobe consolidation/atelectasis. 3.  Stable spiculated nodule in the right lung apex, consistent with patient's known malignancy.\par \par She feels much better now that her Pleurx is gone. Does not have any pain. Feels dyspneic which is stable.\par \par 3/2/2020\par She is here for follow-up for lung cancer accompanied by son. Since last visit, she followed with Dr. Lanza, PUL, and Dr. Hirsch from [facility] who agreed against placing additional drainage catheters based on most recent CT imaging.  She still reports some SOB.  She also reports taking a [place] medication named Volidol and Panangin for here breathing.  Patient reports mild dizziness after taking Trazodone; we recommended halving dose.  \par \par 5/4/2020\par She is here for follow-up for lung cancer accompanied by son.  Her last treatment as in [facility] due to COVID> she is doing well .She was on Room air. She states SOB is the same. She has paraspinal back pain/hip pain. \par \par 6/15/2020\par She is here for follow-up for lung cancer accompanied by son.  Patient denies fever, chills, vomiting, worsening cough, new rash, persistent diarrhea or bleeding.  Reviewed most recent imaging and had a discussion regarding continuing with Keytruda every 6 weeks for now as she is clinically stable.  She does complain of weakness and nasal congestion which improves using Fluticasone spray.  She uses oxygen intermittently at home.  \par CT C/A/P (5.9.2020) IMPRESSION:1. Stable spiculated right upper lobe mass measuring up to 3.1 cm.2. New areas of ground glass and reticular opacities within the medial left lung, with new subcentimeter nodular opacities within the left lower lobe. Findings may be postinfectious or postinflammatory in etiology. CT of the chest in 2-3 months is recommended.3. Decreased mediastinal and right hilar lymphadenopathy.4. Resolved right pleural effusion. 1.  No findings of new or progressive metastatic disease in the abdomen or pelvis. 2.  Indeterminate left adrenal nodule and right retroperitoneal nodule, stable in size from prior CT. 3.  Partially visualized fat-containing mass in the anterior left thigh-considerations include benign and malignant neoplastic etiology. Recommend further evaluation with MRI if not already performed. 4.  Sclerotic appearance of metastatic left acetabular lesion, similar in appearance to prior CT, possibly representing healing- was previously lytic on PET/CT of 4/17/2019. 5.  Lobulated pelvic structure again seen, possibly uterus with fibroids. Further evaluation with pelvic ultrasound may be performed as warranted.\par \par 9/9/20- Patient is here for follow up accompanied by her son who speaks English . She has chronic SOB which as per son has been slowly getting worse. She uses O2 at home intermittently. She also uses Flonase and Albuterol inhaler. Otherwise she is mostly wheel chair bound and can walk back and forth to the bathroom by herself. Her appetite is good and weight is stable. No new bone pain/abdominal pain/ rash. \par \par 10/21/2020\par She is here for follow-up for lung cancer, accompanied by son.  Patient denies fever, chills, vomiting, new cough, new rash, persistent diarrhea or bleeding.  She is still pending reimaging, they report they have not received authorization for scan yet.  She still complains of weakness and nasal congestion, which improves using Fluticasone spray.  She uses oxygen intermittently at home.  She still has SOB.   Her only new complaint is intermittent back pruritus.  She is due for cycle 20 of Keytruda today.\par \par 12/2/2020\par She is here for follow-up for lung cancer, accompanied by son.  Patient denies fever, chills, vomiting, new cough, new rash, persistent diarrhea or bleeding.  She still complains of weakness, SOB and nasal congestion, uses the nebulizer once daily with occasional relief of her SOB.   She is due for cycle 21 of Keytruda today.  She is using allegra and or hydrocortisone 1% cream for the pruritus.  Reviewed most recent imaging which shows good treatment response.  \par CT C/A/P (11.14.2020) IMPRESSION:Since May 9, 2020:1. No evidence of new intrathoracic or intra-abdominal metastatic disease.2. Unchanged 2.5 x 2 cm right upper lobe pulmonary nodule, stable with similar measurement technique. 3. Decreased subcarinal lymphadenopathy, now 1.2 cm short axis, previously 1.5 cm short axis on May 9, 2020 CT with similar measurement technique. No evidence of additional suspicious lymphadenopathy by size criteria. 4. Near complete interval resolution of left lower lobe and lingula patchy airspace opacities. Unchanged areas of groundglass and reticular opacities within the medial left lung. Findings likely postinfectious or inflammatory. 5. Unchanged sclerotic appearance of metastatic left acetabular lesion,  possibly representing healing of previously FDG avid lytic on PET/CT of 4/17/2019. 6. Unchanged, partially imaged indeterminate lipomatous mass within the anterior left thigh.\par \par 1/13/21\par She is here for follow-up for lung cancer, accompanied by son.  She still complains of weakness, SOB daily which is unchanged.  She is due for cycle 22 of Keytruda today.  She continues to use allegra and or hydrocortisone 1% cream for the pruritus.  Patient denies fever, chills, vomiting, new cough, new rash, persistent diarrhea or bleeding.  She also reports musculoskeletal pain.  \par \par 2/24/21\par She is here with her son. She is doing well but still has same complaitns. mainly fatigue and SOB as well as MSK pain behind the left scapula. We had a long talk and I expleind that the fatigue and SOB maybe due to kaytruda so we decided to stop it and observe her. On exam no changes. \par \par \par 5/17/21\par She is here for [facility] wu with her son. Her CT C/A/P in Marh 2021  showed Since November 14, 2020. No significant interval change\par \par approximate 2.5 cm x 2 cm right upper lobe nodule (series 4/33). Stable reticular opacities medial right upper lobe.\par \par Stable ill-defined subcarinal lymph nodes (series 4/88).\par \par \par Stable bibasilar subsegmental discoid atelectasis. No pleural effusions.\par \par \par No new sites of metastatic disease in the abdomen and pelvis.\par \par Previously described sclerotic left acetabular lesion felt to represent a healed metastasis based on prior PET/CT is poorly visualized on the current study.\par \par Unchanged, partially imaged indeterminate lipomatous mass within the anterior left thigh.\par \par Given that she been on  Keytruda now for 2 years with no progression of disease and she continued to have shortness of breath we decided to give her a treatment break.\par \par \par Today even well-being of treatment she still continues to have the same except complains specifically short of breath for which she had an extensive workup. She also has rhinorrhea  so she was referred to ENT again as per their request and I refilled Flonase\par \par 7/26/21\par She is here for follow up. She had CT C/A/P on 6/12/21: IMPRESSION:\par \par No CT evidence of new sites of metastatic disease in the chest, abdomen and pelvis.\par \par Stable 2.5 cm right upper lobe nodule.\par \par Interval decrease in size of subcarinal lymph node.\par \par Slight interval increase in reticular and groundglass opacities left lower lobe, possibly postinfectious/postinflammatory.\par \par Unchanged, partially imaged indeterminate lipomatous mass within the anterior left thigh.\par \par CBC is normal today,.\par \par She is here with her son. 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 \par \par \par 11/29/21- Patient is here for follow up for stage 4 lung NSCLC. She was on keytruda and has been on break since Feb 2021. Her last imaging in June 2021 showed stable disease. She has chronic complaints of fatigue, dyspnea, pain in her left side back and insomnia. All these complaints are chronic with no new worsening of symptoms. \par \par 2/9/22\par She returns for follow-up today for NSCLC, accompanied by son who helps with translation at patient request.  She is due for next cycle of immunotherapy today.  She continues to have the same chronic complaints now that she always had for the last several years including unclear left-sided pain, shortness of breath, she seen multiple specialists and we are unable to exactly determine the cause of her pain but I explained that the shortness of breath as before is possibly related to her lung cancer may be resulting in decreased function from previous Pleurx catheter leading to fibrosis and decrease in lung capacity.  She takes oxycodone 5mg as needed for the L sided pain.  Reviewed most recent CT imaging which shows increase in RUL nodule, L adrenal nodule and new tubular opacity within the right upper lobe but otherwise stable.  \par CT C/A/P (12.18.2021) IMPRESSION:Compared to CT chest 6/12/2021,Interval increase in size of the right upper lobe nodule now measuring 2.8 x 2.0 x 2.4 cm previously 2.5 x 2.0 x 2.0 cm. New tubular opacity within the right upper lobe.Significant interval increase in size of left adrenal nodule, now measuring 4.6 x 4.3 cm, highly suggestive of metastasis/collision tumor in this patient with history of lung cancer.  Right upper quadrant peritoneal nodules without significant change from prior. Continued attention on follow-up imaging.Partially imaged indeterminate lipomatous mass in the left thigh redemonstrated. Recommend MR evaluation.\par \par 3/23/22\par She returns for follow-up today for NSCLC, accompanied by son who helps with translation at patient request.  She is due for next cycle of immunotherapy today.  She continues to have the same chronic complaints now that she always had for the last several years including unclear left-sided pain, shortness of breath, she seen multiple specialists and we are unable to exactly determine the cause of her pain but I explained that the shortness of breath as before is possibly related to her lung cancer may be resulting in decreased function from previous Pleurx catheter leading to fibrosis and decrease in lung capacity.  She takes oxycodone 5mg as needed for the L sided pain She has chronic SOB that she believes is due to her heart. 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  She was started on anti depresent by PCP but she is not taking it\par \par 6/15/22\par She is here for follow up. She is due for cycle 27 of Keyturda. CBC from today is  Normal. She has pain in entire left side of chest including shouldr. She takes over the counter meds son does not know then name. SOB is the same. Lungs sounded clear except for decraese sounds in RLL which is same.

## 2022-06-28 NOTE — PHYSICAL EXAM
[Ambulatory and capable of all self care but unable to carry out any work activities] : Status 2- Ambulatory and capable of all self care but unable to carry out any work activities. Up and about more than 50% of waking hours [Normal] : affect appropriate [de-identified] :  In wheelchair, but does stand up by herself [de-identified] : Lungs as above. decrease RLL air movement at base [de-identified] : L thigh lipoma, deep, , pain at mid T spine on palapation  [de-identified] : no evidence of rash

## 2022-06-30 ENCOUNTER — NON-APPOINTMENT (OUTPATIENT)
Age: 87
End: 2022-06-30

## 2022-07-08 ENCOUNTER — NON-APPOINTMENT (OUTPATIENT)
Age: 87
End: 2022-07-08

## 2022-07-09 ENCOUNTER — RESULT REVIEW (OUTPATIENT)
Age: 87
End: 2022-07-09

## 2022-07-09 ENCOUNTER — OUTPATIENT (OUTPATIENT)
Dept: OUTPATIENT SERVICES | Facility: HOSPITAL | Age: 87
LOS: 1 days | Discharge: HOME | End: 2022-07-09

## 2022-07-09 DIAGNOSIS — Z90.710 ACQUIRED ABSENCE OF BOTH CERVIX AND UTERUS: Chronic | ICD-10-CM

## 2022-07-09 DIAGNOSIS — C34.91 MALIGNANT NEOPLASM OF UNSPECIFIED PART OF RIGHT BRONCHUS OR LUNG: ICD-10-CM

## 2022-07-09 PROCEDURE — 71260 CT THORAX DX C+: CPT | Mod: 26

## 2022-07-09 PROCEDURE — 74177 CT ABD & PELVIS W/CONTRAST: CPT | Mod: 26

## 2022-07-19 ENCOUNTER — NON-APPOINTMENT (OUTPATIENT)
Age: 87
End: 2022-07-19

## 2022-07-21 ENCOUNTER — NON-APPOINTMENT (OUTPATIENT)
Age: 87
End: 2022-07-21

## 2022-07-26 LAB
ALBUMIN SERPL ELPH-MCNC: 4.2 G/DL
ALP BLD-CCNC: 78 U/L
ALT SERPL-CCNC: 31 U/L
ANION GAP SERPL CALC-SCNC: 15 MMOL/L
AST SERPL-CCNC: 19 U/L
BILIRUB SERPL-MCNC: 0.4 MG/DL
BUN SERPL-MCNC: 25 MG/DL
CALCIUM SERPL-MCNC: 9.5 MG/DL
CHLORIDE SERPL-SCNC: 110 MMOL/L
CO2 SERPL-SCNC: 20 MMOL/L
CREAT SERPL-MCNC: 1.1 MG/DL
EGFR: 49 ML/MIN/1.73M2
GLUCOSE SERPL-MCNC: 100 MG/DL
POTASSIUM SERPL-SCNC: 4.2 MMOL/L
PROT SERPL-MCNC: 6.7 G/DL
SODIUM SERPL-SCNC: 145 MMOL/L
TSH SERPL-ACNC: 0.3 UIU/ML

## 2022-07-27 ENCOUNTER — LABORATORY RESULT (OUTPATIENT)
Age: 87
End: 2022-07-27

## 2022-07-27 ENCOUNTER — APPOINTMENT (OUTPATIENT)
Dept: INFUSION THERAPY | Facility: CLINIC | Age: 87
End: 2022-07-27

## 2022-07-27 ENCOUNTER — NON-APPOINTMENT (OUTPATIENT)
Age: 87
End: 2022-07-27

## 2022-07-27 ENCOUNTER — APPOINTMENT (OUTPATIENT)
Dept: HEMATOLOGY ONCOLOGY | Facility: CLINIC | Age: 87
End: 2022-07-27

## 2022-07-27 VITALS
RESPIRATION RATE: 16 BRPM | HEIGHT: 62 IN | HEART RATE: 90 BPM | TEMPERATURE: 97.1 F | SYSTOLIC BLOOD PRESSURE: 147 MMHG | DIASTOLIC BLOOD PRESSURE: 60 MMHG

## 2022-07-27 LAB
HCT VFR BLD CALC: 35.7 %
HGB BLD-MCNC: 12.1 G/DL
MCHC RBC-ENTMCNC: 28 PG
MCHC RBC-ENTMCNC: 33.9 G/DL
MCV RBC AUTO: 82.6 FL
PLATELET # BLD AUTO: 176 K/UL
PMV BLD: 10.1 FL
RBC # BLD: 4.32 M/UL
RBC # FLD: 13.7 %
WBC # FLD AUTO: 6.31 K/UL

## 2022-07-27 PROCEDURE — 99214 OFFICE O/P EST MOD 30 MIN: CPT

## 2022-07-27 RX ORDER — PEMBROLIZUMAB 25 MG/ML
200 INJECTION, SOLUTION INTRAVENOUS ONCE
Refills: 0 | Status: COMPLETED | OUTPATIENT
Start: 2022-07-27 | End: 2022-07-27

## 2022-07-27 RX ADMIN — PEMBROLIZUMAB 200 MILLIGRAM(S): 25 INJECTION, SOLUTION INTRAVENOUS at 13:19

## 2022-07-27 NOTE — HISTORY OF PRESENT ILLNESS
[de-identified] : \par This is a 83 year old female who I initially met in [facility] on 3/28/19. She was initially admitted on 2/20/2019 for a right loculated pleural effusion\par approx. 900 CCs, no PE and right apical 3.1x3.0 cm mass with pleural nodules on CTA of chest.  . Pt returned to ED on 3/15/2019 for right pleural\par effusion, had a thoracentesis in ED and was sent home. She than came back for SOB  due to recurrent effusion and a Pleurx was placed. Patholgoy showed adenocarcinoma. \par \par She ha lost a few pounds about  4. She never smoked. She does have weakness.  From her Pleurx she  has 500 cc removed every other day. She has pain after.\par \par Work up:\par 2/20/19 CTA chest:  Large right pleural effusion, maximal axial width of 7 cm correlating with an estimated volume of 900 cc. Associated \par near complete compressive atelectasis of the right lower lobe. Probable loculations of the effusion seen at the apex and at the right heart \par border (for example series 5 images 176, 52; series 9 image 69). 1 cm right upper lobe fissural nodule (series 5 image 139). Patent central \par airways. There is right apical 3.1 x 3.0 cm mass with irregular right apical pleural thickening (series 7, image 44), \par and multiple satellite pleural nodules including a more caudad 2.7 cm para-mediastinal nodule (series 7, image 175). Tissue sampling \par recommended.\par \par Path: \par Pleural fluid: 3/25:  Cell block material is hypocellular and shows a single minutecluster of malignant cells.Immunohistochemistry studies were performed at [facility] and the\par results are noncontributory\par 3/22: Addendum\par Cell block material shows macrophages (positive ),mesothelial cells (positive calretinin) and a single very minute cluster of atypical suspicious cells is negative for Fidel-Ep4.Material is insufficient for further diagnsotic studies (ie,mmunohistochemistry).\par 3/15/19: Pleural fluid: Addendum\par Immunohistochemical studies were performed on the cell block at\par [facility], [address] [place] [id number] (see NOTE). The results\par are as follows:\par \par CK7-                      Rare mesothelial cells positive\par CK20-                    Negative\par CK 5/6-                  Rare mesothelial cells positive\par Calretinin-             Scattered mesothelial cells positive\par CD68-                   Numerous histiocytes positive\par BerEP-4-               Negative\par Napsin-A-             Negative\par TTF-1-                  Negative\par \par These results are non-specific, suggestive of mesothelial\par hyperplasia. The few markedly atypical cells seen in the smear\par are not evident in the cell block.\par \par The atypical cells seen in the current smear are not seen in the\par prior pleural fluid Thinprep smear or cell block (45-BT-).\par \par \par Few marked atypical cells, suspicious for mesothelioma versus\par adenocarcinoma,\par in a background of numerous mesothelial cells, histiocytes and\par small lymphocytes.\par \par \par 3/25/19: Final DiagnosisPOSITIVE FOR MALIGNANT CELLS.Favor metastatic adenocarcinoma. Pending cell block.\par \par  I spke to Dr. Allison  and there are only a few cells thus furhter studies such as IHC, molecular PD-l1 is not possible\par  [FreeTextEntry1] : Started Keytruda on 5/17/19, changed to 6 week dosing starting 5/4/2020 last treatment was on 1/13/21 ; pt prefers every 3 week dosing starting 7/27/22 [de-identified] : 6/5/19\par She is here for ofllow up. Since last visit she started Keytruda. She had a PET/CT on April 17 that showed  Extensive FDG avid right-sided pleural mass/soft tissue thickening, \par with max SUV 21.5 within a right apical pleural soft tissue mass.\par \par 2.  Multiple FDG avid mediastinal lymph nodes, with max SUV 16.5 within a \par 2.6 x 2.1 cm subcarinal node.\par \par 3.  FDG avid lytic lesion within the left superior acetabulum (max SUV \par 14.8), suspicious for osseous metastasis.\par \par 4.  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 Used to be  500 ml  u 3 times a week. Takes 60 mg of oral moprphine a day 20 mg 3 times a day but pain is still severe. Has not had a bowel movement in unknown amount of days does not take lexatives although she has lexatives. \par \par 6/26/19 She is here for follow up. She is due for cycle 3 of Keytruda.  She has about 300 mg drained from her pleurax twice a week. Has SOB but saturated 94% on room air and 94% on ambualtion. Did not torate the fentanyl path had nausea.  On Morphine 10 mg BID doing well.  Constipation  is better. \par \par 7/17/19\par She is here for follow-up of Stage IV lung cancer with son.  She is due for cycle 3 of Keytruda.  CBC is stable from today.  She only takes the liquid morphine at night now.  She started eating slighlty more and has been feeling slightly better ,as per son.  He states that the pleurex drainage is less, it is checked every Friday + Monday.  On Friday there was about 225cc drained, but yesterday no drainage.  We will arrange for hydration today since she has had poor oral intake lately and small appetite.  Son reports she can tolerate fluids but she has early satiety with food.  Patient also reports feeling short of breath at rest, but her O2 sat is 98% on R/a.  Right sided pain is better. \par CT C/A/P (7/6/19) IMPRESSION:Since April 17, 2019:1. No definite new sites of metastatic disease.2. Right-sided pleural soft tissue mass/thickening, overall appearing decreased since April 17, 2019 with primarily residual right apical tumor. Tumor appears to infiltrate the mediastinum.3. No significant change in mediastinal lymphadenopathy including largest 2.6 x 2.1 cm subcarinal lymph node which remains unchanged. Retrocrural 2.5 x 2.3 cm metastasis or additional site of pleural tumor, unchanged.4. Irregularity of right anterior first rib, probably invaded by tumor, stable in retrospect. Left superior acetabular lucent lesion corresponding to FDG avid bone metastasis.5. Right pelvic indeterminate 7 cm mass; possible retroverted uterus with degenerating fibroid or less likely ovarian mass; previously non-FDG avid and probably benign. Further evaluation with pelvic ultrasound or MRI pelvis with and without IV contrast may be helpful.6. Status post right chest tube placement with decreased small loculated right pleural effusion with fluid within the major fissure. 7. New mild to moderate intrahepatic biliary dilation. Correlation with LFTs recommended. Consider further evaluation with ERCP or MRCP with and without IV contrast.\par \par 8/7/19\par She is here with her son. Overall she is doing much better. She is in wheelchair but now less pain, eating better and Pleurax is only draining about 25 cc twice a week, She does complain of SOB still. I explained this will improve as well but asked her to follow up with Dr. Lanza of pul. \par \par 8/28/19\par She is here for follow up. They are to see Dr. Lanza in Early september. There is minimal output from Pleurax.  Pain is much better and does not use narcotics anymore.  Overall better.   She has right sidede nasal congestion and states makes it hard to breath.\par \par 9/18/19\par She is doing well. She is due for cycle 7. Son was not eliana to schedule CTs priro to the visit but she looks better and better every visit and thus most likley still responding. She saw Dr. Lanza who started her on Flonase. She is due to see ENT Dr. Adams.  Appetie is better. No pains. Dr. Lanza is considering to remover the Pleurx possibly in November. \par \par 10/09/2019\par Patient is here for a follow up visit. Due for cycle 8 today. CT chest/abd/pelvis 09/2019 showed improvement in disease. Also has seen Dr. Adams and agreed to continue witj flonase as she has hypertrophy of turbinates. \par She is tolerating Ketryuda well. Her only complaint at this time is trouble in falling asleep. \par \par 10/30/19\par Patient is here for a follow up visit of Stage IV lung cancer with family member.  She is feeling well with no new complaints.   She feels dyspneic when taking a deep breath but her pain has improved.  Most recent CBC is stable. CT C/A/P on September 22nd showed improvement in right apical lung mass and pleural effusion and drained 150cc.  Due for cycle 9 today. \par \par 12/30/19\par Patient is here for a follow-up visit for  lung cancer with son.  Reviewed imaging results with patient and her family, which suggests worsening effusion but otherwise stable.  They drained 500cc from pleurx yesterday, reportedly sanguinous drainage.  She is agreeable to continue with cycle 12 of Keytruda tomorrow. She reports persistent dyspnea.  We discussed that this tx regimen may be losing its responsiveness.\par CT C/A/P (12.27.19) IMPRESSION: Worsening right-sided pleural effusion. Right apical spiculated nodule without difference. Unchanged appearance of the mediastinum. Unchanged appearance of the third thoracic vertebral body and right second rib. No interval change since prior examination. No change in hepatic hypodensity (likely fatty infiltration), splenic hypodensity (too small to characterize), left adrenal nodule or 1 cm retroperitoneal nodule on the right. No change in fat-containing mass in the anterior left thigh.\par \par 2/10/2020\par She is here for follow-up accompanied by son.  Since last visit she was admitted to hospital for pain and nausea. She had pleurx catheter removed during most recent hospitalization on 1/13/2020.  She complains of insomnia but generally feels well.  Most recent scan reviewed with patient.  She reports she had a rash after discharge from the hospital which has since resolved.  \par CT Chest (1.11.2020) IMPRESSION:Since CT scan performed on December 27, 2019;1.  Right thoracostomy tube in stable position with slight interval decrease of right pleural effusion. 2.   Interval increase in right middle lobe consolidation/atelectasis. 3.  Stable spiculated nodule in the right lung apex, consistent with patient's known malignancy.\par \par She feels much better now that her Pleurx is gone. Does not have any pain. Feels dyspneic which is stable.\par \par 3/2/2020\par She is here for follow-up for lung cancer accompanied by son. Since last visit, she followed with Dr. Lanza, P[facility], and Dr. Hirsch from [facility] who agreed against placing additional drainage catheters based on most recent CT imaging.  She still reports some SOB.  She also reports taking a Russian medication named Volidol and Panangin for here breathing.  Patient reports mild dizziness after taking Trazodone; we recommended halving dose.  \par \par 5/4/2020\par She is here for follow-up for lung cancer accompanied by son.  Her last treatment as in [facility] due to COVID> she is doing well .She was on Room air. She states SOB is the same. She has paraspinal back pain/hip pain. \par \par 6/15/2020\par She is here for follow-up for lung cancer accompanied by son.  Patient denies fever, chills, vomiting, worsening cough, new rash, persistent diarrhea or bleeding.  Reviewed most recent imaging and had a discussion regarding continuing with Keytruda every 6 weeks for now as she is clinically stable.  She does complain of weakness and nasal congestion which improves using Fluticasone spray.  She uses oxygen intermittently at home.  \par CT C/A/P (5.9.2020) IMPRESSION:1. Stable spiculated right upper lobe mass measuring up to 3.1 cm.2. New areas of ground glass and reticular opacities within the medial left lung, with new subcentimeter nodular opacities within the left lower lobe. Findings may be postinfectious or postinflammatory in etiology. CT of the chest in 2-3 months is recommended.3. Decreased mediastinal and right hilar lymphadenopathy.4. Resolved right pleural effusion. 1.  No findings of new or progressive metastatic disease in the abdomen or pelvis. 2.  Indeterminate left adrenal nodule and right retroperitoneal nodule, stable in size from prior CT. 3.  Partially visualized fat-containing mass in the anterior left thigh-considerations include benign and malignant neoplastic etiology. Recommend further evaluation with MRI if not already performed. 4.  Sclerotic appearance of metastatic left acetabular lesion, similar in appearance to prior CT, possibly representing healing- was previously lytic on PET/CT of 4/17/2019. 5.  Lobulated pelvic structure again seen, possibly uterus with fibroids. Further evaluation with pelvic ultrasound may be performed as warranted.\par \par 9/9/20- Patient is here for follow up accompanied by her son who speaks English . She has chronic SOB which as per son has been slowly getting worse. She uses O2 at home intermittently. She also uses Flonase and Albuterol inhaler. Otherwise she is mostly wheel chair bound and can walk back and forth to the bathroom by herself. Her appetite is good and weight is stable. No new bone pain/abdominal pain/ rash. \par \par 10/21/2020\par She is here for follow-up for lung cancer, accompanied by son.  Patient denies fever, chills, vomiting, new cough, new rash, persistent diarrhea or bleeding.  She is still pending reimaging, they report they have not received authorization for scan yet.  She still complains of weakness and nasal congestion, which improves using Fluticasone spray.  She uses oxygen intermittently at home.  She still has SOB.   Her only new complaint is intermittent back pruritus.  She is due for cycle 20 of Keytruda today.\par \par 12/2/2020\par She is here for follow-up for lung cancer, accompanied by son.  Patient denies fever, chills, vomiting, new cough, new rash, persistent diarrhea or bleeding.  She still complains of weakness, SOB and nasal congestion, uses the nebulizer once daily with occasional relief of her SOB.   She is due for cycle 21 of Keytruda today.  She is using allegra and or hydrocortisone 1% cream for the pruritus.  Reviewed most recent imaging which shows good treatment response.  \par CT C/A/P (11.14.2020) IMPRESSION:Since May 9, 2020:1. No evidence of new intrathoracic or intra-abdominal metastatic disease.2. Unchanged 2.5 x 2 cm right upper lobe pulmonary nodule, stable with similar measurement technique. 3. Decreased subcarinal lymphadenopathy, now 1.2 cm short axis, previously 1.5 cm short axis on May 9, 2020 CT with similar measurement technique. No evidence of additional suspicious lymphadenopathy by size criteria. 4. Near complete interval resolution of left lower lobe and lingula patchy airspace opacities. Unchanged areas of groundglass and reticular opacities within the medial left lung. Findings likely postinfectious or inflammatory. 5. Unchanged sclerotic appearance of metastatic left acetabular lesion,  possibly representing healing of previously FDG avid lytic on PET/CT of 4/17/2019. 6. Unchanged, partially imaged indeterminate lipomatous mass within the anterior left thigh.\par \par 1/13/21\par She is here for follow-up for lung cancer, accompanied by son.  She still complains of weakness, SOB daily which is unchanged.  She is due for cycle 22 of Keytruda today.  She continues to use allegra and or hydrocortisone 1% cream for the pruritus.  Patient denies fever, chills, vomiting, new cough, new rash, persistent diarrhea or bleeding.  She also reports musculoskeletal pain.  \par \par 2/24/21\par She is here with her son. She is doing well but still has same complaitns. mainly fatigue and SOB as well as MSK pain behind the left scapula. We had a long talk and I expleind that the fatigue and SOB maybe due to kaytruda so we decided to stop it and observe her. On exam no changes. \par \par \par 5/17/21\par She is here for [facility] wu with her son. Her CT C/A/P in Marh 2021  showed Since November 14, 2020. No significant interval change\par \par approximate 2.5 cm x 2 cm right upper lobe nodule (series 4/33). Stable reticular opacities medial right upper lobe.\par \par Stable ill-defined subcarinal lymph nodes (series 4/88).\par \par \par Stable bibasilar subsegmental discoid atelectasis. No pleural effusions.\par \par \par No new sites of metastatic disease in the abdomen and pelvis.\par \par Previously described sclerotic left acetabular lesion felt to represent a healed metastasis based on prior PET/CT is poorly visualized on the current study.\par \par Unchanged, partially imaged indeterminate lipomatous mass within the anterior left thigh.\par \par Given that she been on  Keytruda now for 2 years with no progression of disease and she continued to have shortness of breath we decided to give her a treatment break.\par \par \par Today even well-being of treatment she still continues to have the same except complains specifically short of breath for which she had an extensive workup. She also has rhinorrhea  so she was referred to ENT again as per their request and I refilled Flonase\par \par 7/26/21\par She is here for follow up. She had CT C/A/P on 6/12/21: IMPRESSION:\par \par No CT evidence of new sites of metastatic disease in the chest, abdomen and pelvis.\par \par Stable 2.5 cm right upper lobe nodule.\par \par Interval decrease in size of subcarinal lymph node.\par \par Slight interval increase in reticular and groundglass opacities left lower lobe, possibly postinfectious/postinflammatory.\par \par Unchanged, partially imaged indeterminate lipomatous mass within the anterior left thigh.\par \par CBC is normal today,.\par \par She is here with her son. 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 \par \par \par 11/29/21- Patient is here for follow up for stage 4 lung NSCLC. She was on keytruda and has been on break since Feb 2021. Her last imaging in June 2021 showed stable disease. She has chronic complaints of fatigue, dyspnea, pain in her left side back and insomnia. All these complaints are chronic with no new worsening of symptoms. \par \par 2/9/22\par She returns for follow-up today for NSCLC, accompanied by son who helps with translation at patient request.  She is due for next cycle of immunotherapy today.  She continues to have the same chronic complaints now that she always had for the last several years including unclear left-sided pain, shortness of breath, she seen multiple specialists and we are unable to exactly determine the cause of her pain but I explained that the shortness of breath as before is possibly related to her lung cancer may be resulting in decreased function from previous Pleurx catheter leading to fibrosis and decrease in lung capacity.  She takes oxycodone 5mg as needed for the L sided pain.  Reviewed most recent CT imaging which shows increase in RUL nodule, L adrenal nodule and new tubular opacity within the right upper lobe but otherwise stable.  \par CT C/A/P (12.18.2021) IMPRESSION:Compared to CT chest 6/12/2021,Interval increase in size of the right upper lobe nodule now measuring 2.8 x 2.0 x 2.4 cm previously 2.5 x 2.0 x 2.0 cm. New tubular opacity within the right upper lobe.Significant interval increase in size of left adrenal nodule, now measuring 4.6 x 4.3 cm, highly suggestive of metastasis/collision tumor in this patient with history of lung cancer.  Right upper quadrant peritoneal nodules without significant change from prior. Continued attention on follow-up imaging.Partially imaged indeterminate lipomatous mass in the left thigh redemonstrated. Recommend MR evaluation.\par \par 3/23/22\par She returns for follow-up today for NSCLC, accompanied by son who helps with translation at patient request.  She is due for next cycle of immunotherapy today.  She continues to have the same chronic complaints now that she always had for the last several years including unclear left-sided pain, shortness of breath, she seen multiple specialists and we are unable to exactly determine the cause of her pain but I explained that the shortness of breath as before is possibly related to her lung cancer may be resulting in decreased function from previous Pleurx catheter leading to fibrosis and decrease in lung capacity.  She takes oxycodone 5mg as needed for the L sided pain She has chronic SOB that she believes is due to her heart. 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  She was started on anti depresent by PCP but she is not taking it\par \par 6/15/22\par She is here for follow up. She is due for cycle 27 of Keyturda. CBC from today is  Normal. She has pain in entire left side of chest including shouldr. She takes over the counter meds son does not know then name. SOB is the same. Lungs sounded clear except for decrease sounds in RLL which is same. \par \par 7/27/22\par She returns for follow-up today for NSCLC, accompanied by son who helps with Sammarinese translation at patient request.  She is due for next cycle of immunotherapy today.  She continues to have the same chronic complaints that she always had for the last several years including unclear left-sided pain, shortness of breath, she seen multiple specialists and we are unable to exactly determine the cause of her pain.  She takes oxycodone 5mg as needed for the L sided pain, requesting refill.  Reviewed most recent CT imaging which shows stable findings and continued treatment response.  Patient states she feels better when she receives the immunotherapy and therefore is requesting to go back to every 3 week frequency again.  \par CT C/A/P (7.9.2022) IMPRESSION:CHEST:Stable spiculated right apical lung mass, measuring maximally up to 3.2 cm.Previously seen right upper lobe 5 mm solid nodule is no longer visualized.Stable mediastinal prominent lymph nodes.Other previously noted pulmonary findings are difficult to examine given degree of respiratory motion.ABDOMEN/PELVIS:Interval decrease of left adrenal lesion, now measuring 2.1 x 2.1 cm (previously 3 x 2.2 cm).Decreased right upper quadrant soft tissue nodules measuring up to 0.6 cm (previously 1 cm).

## 2022-07-27 NOTE — REVIEW OF SYSTEMS
[Recent Change In Weight] : ~T no recent weight change [Dysphagia] : no dysphagia [Constipation] : no constipation [Muscle Pain] : no muscle pain [Skin Rash] : no skin rash [FreeTextEntry2] : seated in wheelchair , occasional night sweats for some time now [FreeTextEntry4] : reports nasal congestion chronic [FreeTextEntry6] : Unchanged SOB for years  [FreeTextEntry9] : reports chronic L sided pain

## 2022-07-27 NOTE — ASSESSMENT
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 \par - resumed Keytruda on 12/29/2021\par - CT C/A/P with IVC from 7.9.2022 shows stable findings and continued treatment response; plan to reimage around 10/2022 as long as no new complains\par - change to Keytruda 200mg every 3 weeks starting 7/27/2022 per patient request (feels better when she receives infusion)\par - c/w oxycodone 5mg every 8 hours as needed for pain with bowel regimen \par - Labwork today:  CBC, CMP, TSH today \par \par # Dysphagia and decrease appetite - better now \par - c/w Jevity 1.2 hector  BiD\par - she is followed by nutritionist, Tila Villanueva\par \par # SOB mainly when she tries to sleep; due to right nasal congestion maybe from Keytruda, Keytruda was stopped and SOB did not change\par - c/w Flonase , Rx refilled\par - On Albuterol + nebulizer prn\par - She follows with Dr Lanza as indicated\par - Advised to use saline nasal spray as she has c/o dryness in her nose \par \par # Has some insomnia \par - c/w Melatonin 5 mg at bedtime \par \par # Pruritus, back ; may be side effect of Keytruda?\par - Did not complain today\par - has hydrocortisone 1% or benadryl PRN for symptom relief if happens again\par \par # Pain at T spine right at the level of scapula with tenderness\par - c/w oxycodone 5mg every 8 hours as needed ; I-Stop  accessed and reviewed.  Medication refilled.  Reviewed risks and benefits of narcotic consumption and prescribed dosing/frequency with patient.\par - will possibly order MR T spine\par \par RTC in 3 weeks with CBC, CMP, TSH

## 2022-07-27 NOTE — PHYSICAL EXAM
[de-identified] :  In wheelchair, but does stand up by herself [de-identified] : Lungs as above. decrease RLL air movement at base [de-identified] : L thigh lipoma, deep, , pain at mid T spine on palapation  [de-identified] : no evidence of rash

## 2022-07-27 NOTE — END OF VISIT
[FreeTextEntry3] : I was physically present for the key portions of the evaluation and management service provided.  I agree with the history and physical, and plan which I have reviewed and edited where appropriate.\par \par She is here for follow-up.  She is due for her Keytruda.  Her most recent CAT scan showed continuation of response and will continue with next cycle but she asked me to be treated every 3 weeks because she feels better after treatment for a day or 2.  She continues to have the same complaints she had for the last several years with runny nose, difficulty breathing, heart pain.  She has seen multiple specialists in the past including ENT, pulmonary, cardiology without any identifiable cause.  She also has severe musculoskeletal pain for which I have her on oxycodone and it is helping her and will continue at this point in time,\par \par Will continue with immunotherapy, Keytruda she will see us back in 3 weeks for next dose.  We will repeat scans approximately in late October early November if no new clinical issues\par \par Blood worked order

## 2022-07-28 ENCOUNTER — NON-APPOINTMENT (OUTPATIENT)
Age: 87
End: 2022-07-28

## 2022-07-28 LAB
ALBUMIN SERPL ELPH-MCNC: 4 G/DL
ALP BLD-CCNC: 68 U/L
ALT SERPL-CCNC: 9 U/L
ANION GAP SERPL CALC-SCNC: 11 MMOL/L
AST SERPL-CCNC: 13 U/L
BILIRUB SERPL-MCNC: 0.4 MG/DL
BUN SERPL-MCNC: 21 MG/DL
CALCIUM SERPL-MCNC: 9.4 MG/DL
CHLORIDE SERPL-SCNC: 106 MMOL/L
CO2 SERPL-SCNC: 23 MMOL/L
CREAT SERPL-MCNC: 1.1 MG/DL
EGFR: 49 ML/MIN/1.73M2
GLUCOSE SERPL-MCNC: 89 MG/DL
POTASSIUM SERPL-SCNC: 4.1 MMOL/L
PROT SERPL-MCNC: 6.2 G/DL
SODIUM SERPL-SCNC: 140 MMOL/L
TSH SERPL-ACNC: 0.64 UIU/ML

## 2022-08-17 ENCOUNTER — APPOINTMENT (OUTPATIENT)
Dept: INFUSION THERAPY | Facility: CLINIC | Age: 87
End: 2022-08-17

## 2022-08-17 ENCOUNTER — APPOINTMENT (OUTPATIENT)
Dept: HEMATOLOGY ONCOLOGY | Facility: CLINIC | Age: 87
End: 2022-08-17

## 2022-08-17 ENCOUNTER — LABORATORY RESULT (OUTPATIENT)
Age: 87
End: 2022-08-17

## 2022-08-17 VITALS
DIASTOLIC BLOOD PRESSURE: 64 MMHG | TEMPERATURE: 97.1 F | HEART RATE: 57 BPM | SYSTOLIC BLOOD PRESSURE: 135 MMHG | WEIGHT: 128 LBS | HEIGHT: 62 IN | RESPIRATION RATE: 16 BRPM | BODY MASS INDEX: 23.55 KG/M2

## 2022-08-17 LAB
ALBUMIN SERPL ELPH-MCNC: 3.9 G/DL
ALP BLD-CCNC: 72 U/L
ALT SERPL-CCNC: 9 U/L
ANION GAP SERPL CALC-SCNC: 9 MMOL/L
AST SERPL-CCNC: 13 U/L
BILIRUB SERPL-MCNC: 0.5 MG/DL
BUN SERPL-MCNC: 21 MG/DL
CALCIUM SERPL-MCNC: 9.3 MG/DL
CHLORIDE SERPL-SCNC: 105 MMOL/L
CO2 SERPL-SCNC: 25 MMOL/L
CREAT SERPL-MCNC: 1 MG/DL
EGFR: 55 ML/MIN/1.73M2
GLUCOSE SERPL-MCNC: 88 MG/DL
HCT VFR BLD CALC: 34.2 %
HGB BLD-MCNC: 11.5 G/DL
MCHC RBC-ENTMCNC: 27.6 PG
MCHC RBC-ENTMCNC: 33.6 G/DL
MCV RBC AUTO: 82.2 FL
PLATELET # BLD AUTO: 174 K/UL
PMV BLD: 10.5 FL
POTASSIUM SERPL-SCNC: 4.3 MMOL/L
PROT SERPL-MCNC: 6 G/DL
RBC # BLD: 4.16 M/UL
RBC # FLD: 13.9 %
SODIUM SERPL-SCNC: 139 MMOL/L
TSH SERPL-ACNC: 0.34 UIU/ML
WBC # FLD AUTO: 4.99 K/UL

## 2022-08-17 PROCEDURE — 99214 OFFICE O/P EST MOD 30 MIN: CPT

## 2022-08-17 RX ORDER — PEMBROLIZUMAB 25 MG/ML
200 INJECTION, SOLUTION INTRAVENOUS ONCE
Refills: 0 | Status: COMPLETED | OUTPATIENT
Start: 2022-08-17 | End: 2022-08-17

## 2022-08-17 RX ADMIN — PEMBROLIZUMAB 200 MILLIGRAM(S): 25 INJECTION, SOLUTION INTRAVENOUS at 12:27

## 2022-08-17 NOTE — ASSESSMENT
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 \par - resumed Keytruda on 12/29/2021\par - CT C/A/P with IVC from 7.9.2022 shows stable findings and continued treatment response; plan to reimage around 10/2022 as long as no new complains\par - continue with  Keytruda 200mg every 3 weeks starting 7/27/2022 per patient request (feels better when she receives infusion)\par - c/w oxycodone 5mg every 8 hours as needed for pain with bowel regimen \par - Labwork today:  CBC reviewed , CMP, TSH today \par \par # Dysphagia and decrease appetite - better now \par - c/w Jevity 1.2 hector  BiD\par - she is followed by nutritionist, Tila Villanueva\par \par # SOB mainly when she tries to sleep; due to right nasal congestion maybe from Keytruda, Keytruda was stopped and SOB did not change\par - c/w Flonase , Rx refilled\par - On Albuterol + nebulizer prn\par - She follows with Dr Lanza as indicated\par - Advised to use saline nasal spray as she has c/o dryness in her nose \par \par # Has some insomnia \par - c/w Melatonin 5 mg at bedtime \par \par # Pruritus, back ; may be side effect of Keytruda?\par - Did not complain today\par - has hydrocortisone 1% or benadryl PRN for symptom relief if happens again\par \par # Pain at T spine right at the level of scapula with tenderness\par - c/w oxycodone 5mg every 8 hours as needed  Reviewed risks and benefits of narcotic consumption and prescribed dosing/frequency with patient.\par - will possibly order MR T spine\par \par RTC in 3 weeks with CBC, CMP, TSH

## 2022-08-17 NOTE — HISTORY OF PRESENT ILLNESS
[Therapy: ___] : Therapy: [unfilled] [Cycle: ___] : Cycle: [unfilled] [de-identified] : \par This is a 83 year old female who I initially met in [facility] on 3/28/19. She was initially admitted on 2/20/2019 for a right loculated pleural effusion\par approx. 900 CCs, no PE and right apical 3.1x3.0 cm mass with pleural nodules on CTA of chest.  . Pt returned to ED on 3/15/2019 for right pleural\par effusion, had a thoracentesis in ED and was sent home. She than came back for SOB  due to recurrent effusion and a Pleurx was placed. Patholgoy showed adenocarcinoma. \par \par She ha lost a few pounds about  4. She never smoked. She does have weakness.  From her Pleurx she  has 500 cc removed every other day. She has pain after.\par \par Work up:\par 2/20/19 CTA chest:  Large right pleural effusion, maximal axial width of 7 cm correlating with an estimated volume of 900 cc. Associated \par near complete compressive atelectasis of the right lower lobe. Probable loculations of the effusion seen at the apex and at the right heart \par border (for example series 5 images 176, 52; series 9 image 69). 1 cm right upper lobe fissural nodule (series 5 image 139). Patent central \par airways. There is right apical 3.1 x 3.0 cm mass with irregular right apical pleural thickening (series 7, image 44), \par and multiple satellite pleural nodules including a more caudad 2.7 cm para-mediastinal nodule (series 7, image 175). Tissue sampling \par recommended.\par \par Path: \par Pleural fluid: 3/25:  Cell block material is hypocellular and shows a single minutecluster of malignant cells.Immunohistochemistry studies were performed at [facility] and the\par results are noncontributory\par 3/22: Addendum\par Cell block material shows macrophages (positive ),mesothelial cells (positive calretinin) and a single very minute cluster of atypical suspicious cells is negative for Fidel-Ep4.Material is insufficient for further diagnsotic studies (ie,mmunohistochemistry).\par 3/15/19: Pleural fluid: Addendum\par Immunohistochemical studies were performed on the cell block at\par [facility], [address] [place] [id number] (see NOTE). The results\par are as follows:\par \par CK7-                      Rare mesothelial cells positive\par CK20-                    Negative\par CK 5/6-                  Rare mesothelial cells positive\par Calretinin-             Scattered mesothelial cells positive\par CD68-                   Numerous histiocytes positive\par BerEP-4-               Negative\par Napsin-A-             Negative\par TTF-1-                  Negative\par \par These results are non-specific, suggestive of mesothelial\par hyperplasia. The few markedly atypical cells seen in the smear\par are not evident in the cell block.\par \par The atypical cells seen in the current smear are not seen in the\par prior pleural fluid Thinprep smear or cell block (48-VJ-).\par \par \par Few marked atypical cells, suspicious for mesothelioma versus\par adenocarcinoma,\par in a background of numerous mesothelial cells, histiocytes and\par small lymphocytes.\par \par \par 3/25/19: Final DiagnosisPOSITIVE FOR MALIGNANT CELLS.Favor metastatic adenocarcinoma. Pending cell block.\par \par  I spke to Dr. Allison  and there are only a few cells thus furhter studies such as IHC, molecular PD-l1 is not possible\par  [FreeTextEntry1] : Started Keytruda on 5/17/19, changed to 6 week dosing starting 5/4/2020 last treatment was on 1/13/21 ; pt prefers every 3 week dosing starting 7/27/22 [de-identified] : 6/5/19\par She is here for ofllow up. Since last visit she started Keytruda. She had a PET/CT on April 17 that showed  Extensive FDG avid right-sided pleural mass/soft tissue thickening, \par with max SUV 21.5 within a right apical pleural soft tissue mass.\par \par 2.  Multiple FDG avid mediastinal lymph nodes, with max SUV 16.5 within a \par 2.6 x 2.1 cm subcarinal node.\par \par 3.  FDG avid lytic lesion within the left superior acetabulum (max SUV \par 14.8), suspicious for osseous metastasis.\par \par 4.  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 Used to be  500 ml  u 3 times a week. Takes 60 mg of oral moprphine a day 20 mg 3 times a day but pain is still severe. Has not had a bowel movement in unknown amount of days does not take lexatives although she has lexatives. \par \par 6/26/19 She is here for follow up. She is due for cycle 3 of Keytruda.  She has about 300 mg drained from her pleurax twice a week. Has SOB but saturated 94% on room air and 94% on ambualtion. Did not torate the fentanyl path had nausea.  On Morphine 10 mg BID doing well.  Constipation  is better. \par \par 7/17/19\par She is here for follow-up of Stage IV lung cancer with son.  She is due for cycle 3 of Keytruda.  CBC is stable from today.  She only takes the liquid morphine at night now.  She started eating slighlty more and has been feeling slightly better ,as per son.  He states that the pleurex drainage is less, it is checked every Friday + Monday.  On Friday there was about 225cc drained, but yesterday no drainage.  We will arrange for hydration today since she has had poor oral intake lately and small appetite.  Son reports she can tolerate fluids but she has early satiety with food.  Patient also reports feeling short of breath at rest, but her O2 sat is 98% on R/a.  Right sided pain is better. \par CT C/A/P (7/6/19) IMPRESSION:Since April 17, 2019:1. No definite new sites of metastatic disease.2. Right-sided pleural soft tissue mass/thickening, overall appearing decreased since April 17, 2019 with primarily residual right apical tumor. Tumor appears to infiltrate the mediastinum.3. No significant change in mediastinal lymphadenopathy including largest 2.6 x 2.1 cm subcarinal lymph node which remains unchanged. Retrocrural 2.5 x 2.3 cm metastasis or additional site of pleural tumor, unchanged.4. Irregularity of right anterior first rib, probably invaded by tumor, stable in retrospect. Left superior acetabular lucent lesion corresponding to FDG avid bone metastasis.5. Right pelvic indeterminate 7 cm mass; possible retroverted uterus with degenerating fibroid or less likely ovarian mass; previously non-FDG avid and probably benign. Further evaluation with pelvic ultrasound or MRI pelvis with and without IV contrast may be helpful.6. Status post right chest tube placement with decreased small loculated right pleural effusion with fluid within the major fissure. 7. New mild to moderate intrahepatic biliary dilation. Correlation with LFTs recommended. Consider further evaluation with ERCP or MRCP with and without IV contrast.\par \par 8/7/19\par She is here with her son. Overall she is doing much better. She is in wheelchair but now less pain, eating better and Pleurax is only draining about 25 cc twice a week, She does complain of SOB still. I explained this will improve as well but asked her to follow up with Dr. Lanza of pul. \par \par 8/28/19\par She is here for follow up. They are to see Dr. Lanza in Early september. There is minimal output from Pleurax.  Pain is much better and does not use narcotics anymore.  Overall better.   She has right sidede nasal congestion and states makes it hard to breath.\par \par 9/18/19\par She is doing well. She is due for cycle 7. Son was not eliana to schedule CTs priro to the visit but she looks better and better every visit and thus most likley still responding. She saw Dr. Lanza who started her on Flonase. She is due to see ENT Dr. Adams.  Appetie is better. No pains. Dr. Lanza is considering to remover the Pleurx possibly in November. \par \par 10/09/2019\par Patient is here for a follow up visit. Due for cycle 8 today. CT chest/abd/pelvis 09/2019 showed improvement in disease. Also has seen Dr. Adams and agreed to continue witj flonase as she has hypertrophy of turbinates. \par She is tolerating Ketryuda well. Her only complaint at this time is trouble in falling asleep. \par \par 10/30/19\par Patient is here for a follow up visit of Stage IV lung cancer with family member.  She is feeling well with no new complaints.   She feels dyspneic when taking a deep breath but her pain has improved.  Most recent CBC is stable. CT C/A/P on September 22nd showed improvement in right apical lung mass and pleural effusion and drained 150cc.  Due for cycle 9 today. \par \par 12/30/19\par Patient is here for a follow-up visit for  lung cancer with son.  Reviewed imaging results with patient and her family, which suggests worsening effusion but otherwise stable.  They drained 500cc from pleurx yesterday, reportedly sanguinous drainage.  She is agreeable to continue with cycle 12 of Keytruda tomorrow. She reports persistent dyspnea.  We discussed that this tx regimen may be losing its responsiveness.\par CT C/A/P (12.27.19) IMPRESSION: Worsening right-sided pleural effusion. Right apical spiculated nodule without difference. Unchanged appearance of the mediastinum. Unchanged appearance of the third thoracic vertebral body and right second rib. No interval change since prior examination. No change in hepatic hypodensity (likely fatty infiltration), splenic hypodensity (too small to characterize), left adrenal nodule or 1 cm retroperitoneal nodule on the right. No change in fat-containing mass in the anterior left thigh.\par \par 2/10/2020\par She is here for follow-up accompanied by son.  Since last visit she was admitted to hospital for pain and nausea. She had pleurx catheter removed during most recent hospitalization on 1/13/2020.  She complains of insomnia but generally feels well.  Most recent scan reviewed with patient.  She reports she had a rash after discharge from the hospital which has since resolved.  \par CT Chest (1.11.2020) IMPRESSION:Since CT scan performed on December 27, 2019;1.  Right thoracostomy tube in stable position with slight interval decrease of right pleural effusion. 2.   Interval increase in right middle lobe consolidation/atelectasis. 3.  Stable spiculated nodule in the right lung apex, consistent with patient's known malignancy.\par \par She feels much better now that her Pleurx is gone. Does not have any pain. Feels dyspneic which is stable.\par \par 3/2/2020\par She is here for follow-up for lung cancer accompanied by son. Since last visit, she followed with Dr. Lanza, P[facility], and Dr. Hirsch from [facility] who agreed against placing additional drainage catheters based on most recent CT imaging.  She still reports some SOB.  She also reports taking a [place] medication named Volidol and Panangin for here breathing.  Patient reports mild dizziness after taking Trazodone; we recommended halving dose.  \par \par 5/4/2020\par She is here for follow-up for lung cancer accompanied by son.  Her last treatment as in [facility] due to COVID> she is doing well .She was on Room air. She states SOB is the same. She has paraspinal back pain/hip pain. \par \par 6/15/2020\par She is here for follow-up for lung cancer accompanied by son.  Patient denies fever, chills, vomiting, worsening cough, new rash, persistent diarrhea or bleeding.  Reviewed most recent imaging and had a discussion regarding continuing with Keytruda every 6 weeks for now as she is clinically stable.  She does complain of weakness and nasal congestion which improves using Fluticasone spray.  She uses oxygen intermittently at home.  \par CT C/A/P (5.9.2020) IMPRESSION:1. Stable spiculated right upper lobe mass measuring up to 3.1 cm.2. New areas of ground glass and reticular opacities within the medial left lung, with new subcentimeter nodular opacities within the left lower lobe. Findings may be postinfectious or postinflammatory in etiology. CT of the chest in 2-3 months is recommended.3. Decreased mediastinal and right hilar lymphadenopathy.4. Resolved right pleural effusion. 1.  No findings of new or progressive metastatic disease in the abdomen or pelvis. 2.  Indeterminate left adrenal nodule and right retroperitoneal nodule, stable in size from prior CT. 3.  Partially visualized fat-containing mass in the anterior left thigh-considerations include benign and malignant neoplastic etiology. Recommend further evaluation with MRI if not already performed. 4.  Sclerotic appearance of metastatic left acetabular lesion, similar in appearance to prior CT, possibly representing healing- was previously lytic on PET/CT of 4/17/2019. 5.  Lobulated pelvic structure again seen, possibly uterus with fibroids. Further evaluation with pelvic ultrasound may be performed as warranted.\par \par 9/9/20- Patient is here for follow up accompanied by her son who speaks English . She has chronic SOB which as per son has been slowly getting worse. She uses O2 at home intermittently. She also uses Flonase and Albuterol inhaler. Otherwise she is mostly wheel chair bound and can walk back and forth to the bathroom by herself. Her appetite is good and weight is stable. No new bone pain/abdominal pain/ rash. \par \par 10/21/2020\par She is here for follow-up for lung cancer, accompanied by son.  Patient denies fever, chills, vomiting, new cough, new rash, persistent diarrhea or bleeding.  She is still pending reimaging, they report they have not received authorization for scan yet.  She still complains of weakness and nasal congestion, which improves using Fluticasone spray.  She uses oxygen intermittently at home.  She still has SOB.   Her only new complaint is intermittent back pruritus.  She is due for cycle 20 of Keytruda today.\par \par 12/2/2020\par She is here for follow-up for lung cancer, accompanied by son.  Patient denies fever, chills, vomiting, new cough, new rash, persistent diarrhea or bleeding.  She still complains of weakness, SOB and nasal congestion, uses the nebulizer once daily with occasional relief of her SOB.   She is due for cycle 21 of Keytruda today.  She is using allegra and or hydrocortisone 1% cream for the pruritus.  Reviewed most recent imaging which shows good treatment response.  \par CT C/A/P (11.14.2020) IMPRESSION:Since May 9, 2020:1. No evidence of new intrathoracic or intra-abdominal metastatic disease.2. Unchanged 2.5 x 2 cm right upper lobe pulmonary nodule, stable with similar measurement technique. 3. Decreased subcarinal lymphadenopathy, now 1.2 cm short axis, previously 1.5 cm short axis on May 9, 2020 CT with similar measurement technique. No evidence of additional suspicious lymphadenopathy by size criteria. 4. Near complete interval resolution of left lower lobe and lingula patchy airspace opacities. Unchanged areas of groundglass and reticular opacities within the medial left lung. Findings likely postinfectious or inflammatory. 5. Unchanged sclerotic appearance of metastatic left acetabular lesion,  possibly representing healing of previously FDG avid lytic on PET/CT of 4/17/2019. 6. Unchanged, partially imaged indeterminate lipomatous mass within the anterior left thigh.\par \par 1/13/21\par She is here for follow-up for lung cancer, accompanied by son.  She still complains of weakness, SOB daily which is unchanged.  She is due for cycle 22 of Keytruda today.  She continues to use allegra and or hydrocortisone 1% cream for the pruritus.  Patient denies fever, chills, vomiting, new cough, new rash, persistent diarrhea or bleeding.  She also reports musculoskeletal pain.  \par \par 2/24/21\par She is here with her son. She is doing well but still has same complaitns. mainly fatigue and SOB as well as MSK pain behind the left scapula. We had a long talk and I expleind that the fatigue and SOB maybe due to kaytruda so we decided to stop it and observe her. On exam no changes. \par \par \par 5/17/21\par She is here for [facility] wu with her son. Her CT C/A/P in Marh 2021  showed Since November 14, 2020. No significant interval change\par \par approximate 2.5 cm x 2 cm right upper lobe nodule (series 4/33). Stable reticular opacities medial right upper lobe.\par \par Stable ill-defined subcarinal lymph nodes (series 4/88).\par \par \par Stable bibasilar subsegmental discoid atelectasis. No pleural effusions.\par \par \par No new sites of metastatic disease in the abdomen and pelvis.\par \par Previously described sclerotic left acetabular lesion felt to represent a healed metastasis based on prior PET/CT is poorly visualized on the current study.\par \par Unchanged, partially imaged indeterminate lipomatous mass within the anterior left thigh.\par \par Given that she been on  Keytruda now for 2 years with no progression of disease and she continued to have shortness of breath we decided to give her a treatment break.\par \par \par Today even well-being of treatment she still continues to have the same except complains specifically short of breath for which she had an extensive workup. She also has rhinorrhea  so she was referred to ENT again as per their request and I refilled Flonase\par \par 7/26/21\par She is here for follow up. She had CT C/A/P on 6/12/21: IMPRESSION:\par \par No CT evidence of new sites of metastatic disease in the chest, abdomen and pelvis.\par \par Stable 2.5 cm right upper lobe nodule.\par \par Interval decrease in size of subcarinal lymph node.\par \par Slight interval increase in reticular and groundglass opacities left lower lobe, possibly postinfectious/postinflammatory.\par \par Unchanged, partially imaged indeterminate lipomatous mass within the anterior left thigh.\par \par CBC is normal today,.\par \par She is here with her son. 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 \par \par \par 11/29/21- Patient is here for follow up for stage 4 lung NSCLC. She was on keytruda and has been on break since Feb 2021. Her last imaging in June 2021 showed stable disease. She has chronic complaints of fatigue, dyspnea, pain in her left side back and insomnia. All these complaints are chronic with no new worsening of symptoms. \par \par 2/9/22\par She returns for follow-up today for NSCLC, accompanied by son who helps with translation at patient request.  She is due for next cycle of immunotherapy today.  She continues to have the same chronic complaints now that she always had for the last several years including unclear left-sided pain, shortness of breath, she seen multiple specialists and we are unable to exactly determine the cause of her pain but I explained that the shortness of breath as before is possibly related to her lung cancer may be resulting in decreased function from previous Pleurx catheter leading to fibrosis and decrease in lung capacity.  She takes oxycodone 5mg as needed for the L sided pain.  Reviewed most recent CT imaging which shows increase in RUL nodule, L adrenal nodule and new tubular opacity within the right upper lobe but otherwise stable.  \par CT C/A/P (12.18.2021) IMPRESSION:Compared to CT chest 6/12/2021,Interval increase in size of the right upper lobe nodule now measuring 2.8 x 2.0 x 2.4 cm previously 2.5 x 2.0 x 2.0 cm. New tubular opacity within the right upper lobe.Significant interval increase in size of left adrenal nodule, now measuring 4.6 x 4.3 cm, highly suggestive of metastasis/collision tumor in this patient with history of lung cancer.  Right upper quadrant peritoneal nodules without significant change from prior. Continued attention on follow-up imaging.Partially imaged indeterminate lipomatous mass in the left thigh redemonstrated. Recommend MR evaluation.\par \par 3/23/22\par She returns for follow-up today for NSCLC, accompanied by son who helps with translation at patient request.  She is due for next cycle of immunotherapy today.  She continues to have the same chronic complaints now that she always had for the last several years including unclear left-sided pain, shortness of breath, she seen multiple specialists and we are unable to exactly determine the cause of her pain but I explained that the shortness of breath as before is possibly related to her lung cancer may be resulting in decreased function from previous Pleurx catheter leading to fibrosis and decrease in lung capacity.  She takes oxycodone 5mg as needed for the L sided pain She has chronic SOB that she believes is due to her heart. 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  She was started on anti depresent by PCP but she is not taking it\par \par 6/15/22\par She is here for follow up. She is due for cycle 27 of Keyturda. CBC from today is  Normal. She has pain in entire left side of chest including shouldr. She takes over the counter meds son does not know then name. SOB is the same. Lungs sounded clear except for decrease sounds in RLL which is same. \par \par 7/27/22\par She returns for follow-up today for NSCLC, accompanied by son who helps with [place] translation at patient request.  She is due for next cycle of immunotherapy today.  She continues to have the same chronic complaints that she always had for the last several years including unclear left-sided pain, shortness of breath, she seen multiple specialists and we are unable to exactly determine the cause of her pain.  She takes oxycodone 5mg as needed for the L sided pain, requesting refill.  Reviewed most recent CT imaging which shows stable findings and continued treatment response.  Patient states she feels better when she receives the immunotherapy and therefore is requesting to go back to every 3 week frequency again.  \par CT C/A/P (7.9.2022) IMPRESSION:CHEST:Stable spiculated right apical lung mass, measuring maximally up to 3.2 cm.Previously seen right upper lobe 5 mm solid nodule is no longer visualized.Stable mediastinal prominent lymph nodes.Other previously noted pulmonary findings are difficult to examine given degree of respiratory motion.ABDOMEN/PELVIS:Interval decrease of left adrenal lesion, now measuring 2.1 x 2.1 cm (previously 3 x 2.2 cm).Decreased right upper quadrant soft tissue nodules measuring up to 0.6 cm (previously 1 cm).\par \par 8/17/22\par she is here for follow up. She is due for cycle 29 of keyturda now every 3 weeks.  CBC wuth mild anemai only. New new complaints SOB is stable. Has pruritus on back but no rash recommended a trial of Aquaphor.  States oxycodone helps with pain and night and sleeps much better.

## 2022-08-17 NOTE — REVIEW OF SYSTEMS
[Night Sweats] : night sweats [Fatigue] : fatigue [Shortness Of Breath] : shortness of breath [SOB on Exertion] : shortness of breath during exertion [Joint Pain] : joint pain [Muscle Weakness] : muscle weakness [Difficulty Walking] : difficulty walking [Negative] : Allergic/Immunologic [Recent Change In Weight] : ~T no recent weight change [Dysphagia] : no dysphagia [Constipation] : no constipation [Muscle Pain] : no muscle pain [Skin Rash] : no skin rash [FreeTextEntry2] : seated in wheelchair , occasional night sweats for some time now [FreeTextEntry4] : reports nasal congestion chronic [FreeTextEntry6] : Unchanged SOB for years  [FreeTextEntry9] : reports chronic L sided pain

## 2022-08-17 NOTE — PHYSICAL EXAM
[Ambulatory and capable of all self care but unable to carry out any work activities] : Status 2- Ambulatory and capable of all self care but unable to carry out any work activities. Up and about more than 50% of waking hours [Normal] : affect appropriate [de-identified] :  In wheelchair, but does stand up by herself [de-identified] : Lungs soudned clear today  [de-identified] : L thigh lipoma, deep, , pain at mid T spine on palapation  [de-identified] : no evidence of rash

## 2022-09-07 ENCOUNTER — NON-APPOINTMENT (OUTPATIENT)
Age: 87
End: 2022-09-07

## 2022-09-07 ENCOUNTER — APPOINTMENT (OUTPATIENT)
Dept: INFUSION THERAPY | Facility: CLINIC | Age: 87
End: 2022-09-07

## 2022-09-07 ENCOUNTER — APPOINTMENT (OUTPATIENT)
Dept: HEMATOLOGY ONCOLOGY | Facility: CLINIC | Age: 87
End: 2022-09-07

## 2022-09-07 VITALS
RESPIRATION RATE: 16 BRPM | TEMPERATURE: 98.4 F | HEART RATE: 56 BPM | DIASTOLIC BLOOD PRESSURE: 67 MMHG | HEIGHT: 62 IN | SYSTOLIC BLOOD PRESSURE: 150 MMHG

## 2022-09-07 LAB
BASOPHILS # BLD AUTO: 0 K/UL
BASOPHILS NFR BLD AUTO: 0 %
EOSINOPHIL # BLD AUTO: 0.19 K/UL
EOSINOPHIL NFR BLD AUTO: 3 %
HCT VFR BLD CALC: 35 %
HGB BLD-MCNC: 11.8 G/DL
IMM GRANULOCYTES NFR BLD AUTO: 0.5 %
LYMPHOCYTES # BLD AUTO: 1.6 K/UL
LYMPHOCYTES NFR BLD AUTO: 25.6 %
MAN DIFF?: NORMAL
MCHC RBC-ENTMCNC: 27.6 PG
MCHC RBC-ENTMCNC: 33.7 G/DL
MCV RBC AUTO: 82 FL
MONOCYTES # BLD AUTO: 0.43 K/UL
MONOCYTES NFR BLD AUTO: 6.9 %
NEUTROPHILS # BLD AUTO: 4 K/UL
NEUTROPHILS NFR BLD AUTO: 64 %
PLATELET # BLD AUTO: 191 K/UL
RBC # BLD: 4.27 M/UL
RBC # FLD: 14.5 %
WBC # FLD AUTO: 6.25 K/UL

## 2022-09-07 PROCEDURE — 99214 OFFICE O/P EST MOD 30 MIN: CPT

## 2022-09-07 RX ORDER — PEMBROLIZUMAB 25 MG/ML
200 INJECTION, SOLUTION INTRAVENOUS ONCE
Refills: 0 | Status: COMPLETED | OUTPATIENT
Start: 2022-09-07 | End: 2022-09-07

## 2022-09-07 RX ADMIN — PEMBROLIZUMAB 200 MILLIGRAM(S): 25 INJECTION, SOLUTION INTRAVENOUS at 18:00

## 2022-09-08 LAB
MAGNESIUM SERPL-MCNC: 2.1 MG/DL
TSH SERPL-ACNC: 0.24 UIU/ML

## 2022-09-09 NOTE — HISTORY OF PRESENT ILLNESS
[Therapy: ___] : Therapy: [unfilled] [Cycle: ___] : Cycle: [unfilled] [de-identified] : 08/08/2019 \par This is a 83 year old female who I initially met in [facility] on 3/28/19. She was initially admitted on 2/20/2019 for a right loculated pleural effusion approx. 900 CCs, no PE and right apical 3.1x3.0 cm mass with pleural nodules on CTA of chest. Pt returned to ED on 3/15/2019 for right pleural effusion, had a thoracentesis in ED and was sent home. She than came back for SOB  due to recurrent effusion and a Pleurx was placed. Pathology showed adenocarcinoma. \par \par She ha lost a few pounds about  4. She never smoked. She does have weakness.  From her Pleurx she  has 500 cc removed every other day. She has pain after.\par \par Work up:\par 02/20/2019 CTA chest:  Large right pleural effusion, maximal axial width of 7 cm correlating with an estimated volume of 900 cc. Associated near complete compressive atelectasis of the right lower lobe. Probable loculations of the effusion seen at the apex and at the right heart border (for example series 5 images 176, 52; series 9 image 69). 1 cm right upper lobe fissural nodule (series 5 image 139). Patent central airways. There is right apical 3.1 x 3.0 cm mass with irregular right apical pleural thickening (series 7, image 44), and multiple satellite pleural nodules including a more caudad 2.7 cm para-mediastinal nodule (series 7, image 175). Tissue sampling recommended.\par \par PATHOLOGY:\par 03/25/2019 Pleural fluid: Cell block material is hypocellular and shows a single minutecluster of malignant cells.Immunohistochemistry studies were performed at [facility] and the\par results are noncontributory\par 03/22/2019: Addendum\par Cell block material shows macrophages (positive ),mesothelial cells (positive calretinin) and a single very minute cluster of atypical suspicious cells is negative for Fidel-Ep4.Material is insufficient for further diagnostic studies (ie, immunohistochemistry).\par \par 03/15/2019: Pleural fluid: Addendum Immunohistochemical studies were performed on the cell block at [facility], [facility], [address] [place] [address], [facility] (see NOTE). The results are as follows:\par CK7-                     Rare mesothelial cells positive\par CK20-                   Negative\par CK 5/6-                 Rare mesothelial cells positive\par Calretinin-             Scattered mesothelial cells positive\par CD68-                   Numerous histiocytes positive\par BerEP-4-               Negative\par Napsin-A-             Negative\par TTF-1-                  Negative\par These results are non-specific, suggestive of mesothelialhyperplasia. The few markedly atypical cells seen in the smear are not evident in the cell block.\par The atypical cells seen in the current smear are not seen in the prior pleural fluid Thinprep smear or cell block (51-XT-).\par Few marked atypical cells, suspicious for mesothelioma versus adenocarcinoma, in a background of numerous mesothelial cells, histiocytes and small lymphocytes.\par 03/25/2019: Final Diagnosis - POSITIVE FOR MALIGNANT CELLS. Favor metastatic adenocarcinoma. Pending cell block.\par I spoke to Dr. Allison  and there are only a few cells thus further studies such as IHC, molecular PD-l1 is not possible.\par  [FreeTextEntry1] : 05/17/2019 Started Keytruda, 05/04/2020 changed to 6 week dosing starting; 01/13/2021 last treatment; 07/27/2022 pt prefers every 3 week dosing. [de-identified] : 6/5/19\par She is here for follow up. Since last visit she started Keytruda. She had a PET/CT on April 17 that showed  Extensive FDG avid right-sided pleural mass/soft tissue thickening, \par with max SUV 21.5 within a right apical pleural soft tissue mass.\par 2.  Multiple FDG avid mediastinal lymph nodes, with max SUV 16.5 within a 2.6 x 2.1 cm subcarinal node.\par 3.  FDG avid lytic lesion within the left superior acetabulum (max SUV 14.8), suspicious for osseous metastasis.\par 4.  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 Used to be  500 ml  u 3 times a week. Takes 60 mg of oral morphine a day 20 mg 3 times a day but pain is still severe. Has not had a bowel movement in unknown amount of days does not take laxatives although she has laxatives. \par \par 6/26/19 She is here for follow up. She is due for cycle 3 of Keytruda. She has about 300 mg drained from her pleura twice a week. Has SOB but saturated 94% on room air and 94% on ambulation. Did not tolerate the fentanyl path had nausea. On Morphine 10 mg BID doing well. Constipation is better. \par \par 7/17/19\par She is here for follow-up of Stage IV lung cancer with son.  She is due for cycle 3 of Keytruda.  CBC is stable from today.  She only takes the liquid morphine at night now.  She started eating slightly more and has been feeling slightly better ,as per son.  He states that the pleurex drainage is less, it is checked every Friday + Monday.  On Friday there was about 225 cc drained, but yesterday no drainage.  We will arrange for hydration today since she has had poor oral intake lately and small appetite.  Son reports she can tolerate fluids but she has early satiety with food.  Patient also reports feeling short of breath at rest, but her O2 sat is 98% on R/a.  Right sided pain is better. \par 07/06/2019 CT C/A/P IMPRESSION: Since April 17, 2019: 1. No definite new sites of metastatic disease. 2. Right-sided pleural soft tissue mass/thickening, overall appearing decreased since April 17, 2019 with primarily residual right apical tumor. Tumor appears to infiltrate the mediastinum.3. No significant change in mediastinal lymphadenopathy including largest 2.6 x 2.1 cm subcarinal lymph node which remains unchanged. Retrocrural 2.5 x 2.3 cm metastasis or additional site of pleural tumor, unchanged.4. Irregularity of right anterior first rib, probably invaded by tumor, stable in retrospect. Left superior acetabular lucent lesion corresponding to FDG avid bone metastasis.5. Right pelvic indeterminate 7 cm mass; possible retroverted uterus with degenerating fibroid or less likely ovarian mass; previously non-FDG avid and probably benign. Further evaluation with pelvic ultrasound or MRI pelvis with and without IV contrast may be helpful.6. Status post right chest tube placement with decreased small loculated right pleural effusion with fluid within the major fissure. 7. New mild to moderate intrahepatic biliary dilation. Correlation with LFTs recommended. Consider further evaluation with ERCP or MRCP with and without IV contrast.\par \par 8/7/19\par She is here with her son. Overall she is doing much better. She is in wheelchair but now less pain, eating better and Pleurax is only draining about 25 cc twice a week, She does complain of SOB still. I explained this will improve as well but asked her to follow up with Dr. Lanza of pulmonology. \par \par 8/28/19\par She is here for follow up. They are to see Dr. Lanza in Early September. There is minimal output from Pleurax. Pain is much better and does not use narcotics anymore.  Overall better. She has right side nasal congestion and states makes it hard to breath.\par \par 9/18/19\par She is doing well. She is due for cycle 7. Son was not eliana to schedule CTs prior to the visit but she looks better and better every visit and thus most likely still responding. She saw Dr. Lanza who started her on Flonase. She is due to see ENT Dr. Adams.  Appetite is better. No pains. Dr. Lanza is considering to remover the Pleurx possibly in November. \par \par 10/09/2019\par Patient is here for a follow up visit. Due for cycle 8 today. CT chest/abd/pelvis 09/2019 showed improvement in disease. Also has seen Dr. Adams and agreed to continue with Flonase as she has hypertrophy of turbinates. \par She is tolerating Ketryuda well. Her only complaint at this time is trouble in falling asleep. \par \par 10/30/19\par Patient is here for a follow up visit of Stage IV lung cancer with family member.  She is feeling well with no new complaints.   She feels dyspneic when taking a deep breath but her pain has improved.  Most recent CBC is stable. 09/22/2019 CT C/A/P showed improvement in right apical lung mass and pleural effusion and drained 150cc. Due for cycle 9 today. \par \par 12/30/19\par Patient is here for a follow-up visit for  lung cancer with son.  Reviewed imaging results with patient and her family, which suggests worsening effusion but otherwise stable.  They drained 500 cc from pleurx yesterday, reportedly sanguinous drainage.  She is agreeable to continue with cycle 12 of Keytruda tomorrow. She reports persistent dyspnea.  We discussed that this tx regimen may be losing its responsiveness.\par 12/27/2019 CT C/A/P IMPRESSION: Worsening right-sided pleural effusion. Right apical spiculated nodule without difference. Unchanged appearance of the mediastinum. Unchanged appearance of the third thoracic vertebral body and right second rib. No interval change since prior examination. No change in hepatic hypodensity (likely fatty infiltration), splenic hypodensity (too small to characterize), left adrenal nodule or 1 cm retroperitoneal nodule on the right. No change in fat-containing mass in the anterior left thigh.\par \par 2/10/2020\par She is here for follow-up accompanied by son.  Since last visit she was admitted to hospital for pain and nausea. She had pleurx catheter removed during most recent hospitalization on 1/13/2020.  She complains of insomnia but generally feels well.  Most recent scan reviewed with patient.  She reports she had a rash after discharge from the hospital which has since resolved.  \par 01/11/2020 CT Chest IMPRESSION: Since CT scan performed on 12/27/2019; 1.  Right thoracostomy tube in stable position with slight interval decrease of right pleural effusion. 2.   Interval increase in right middle lobe consolidation/atelectasis. 3.  Stable spiculated nodule in the right lung apex, consistent with patient's known malignancy.\par \par She feels much better now that her Pleurx is gone. Does not have any pain. Feels dyspneic which is stable.\par \par 3/2/2020\par She is here for follow-up for lung cancer accompanied by son. Since last visit, she followed with Dr. Lanza pulmonology, and Dr. Hirsch from [facility] who agreed against placing additional drainage catheters based on most recent CT imaging. She still reports some SOB. She also reports taking a [place] medication named Volidol and Panangin for here breathing. Patient reports mild dizziness after taking Trazodone; we recommended halving dose.  \par \par 5/4/2020\par She is here for follow-up for lung cancer accompanied by son. Her last treatment as in March due to COVID she is doing well. She was on Room air. She states SOB is the same. She has paraspinal back pain/hip pain. \par \par 6/15/2020\par She is here for follow-up for lung cancer accompanied by son.  Patient denies fever, chills, vomiting, worsening cough, new rash, persistent diarrhea or bleeding.  Reviewed most recent imaging and had a discussion regarding continuing with Keytruda every 6 weeks for now as she is clinically stable.  She does complain of weakness and nasal congestion which improves using Fluticasone spray.  She uses oxygen intermittently at home.  \par 05/09/2020 CT C/A/P IMPRESSION: 1. Stable spiculated right upper lobe mass measuring up to 3.1 cm.2. New areas of ground glass and reticular opacities within the medial left lung, with new subcentimeter nodular opacities within the left lower lobe. Findings may be postinfectious or postinflammatory in etiology. CT of the chest in 2-3 months is recommended. 3. Decreased mediastinal and right hilar lymphadenopathy. 4. Resolved right pleural effusion. 1.  No findings of new or progressive metastatic disease in the abdomen or pelvis. 2.  Indeterminate left adrenal nodule and right retroperitoneal nodule, stable in size from prior CT. 3.  Partially visualized fat-containing mass in the anterior left thigh-considerations include benign and malignant neoplastic etiology. Recommend further evaluation with MRI if not already performed. 4.  Sclerotic appearance of metastatic left acetabular lesion, similar in appearance to prior CT, possibly representing healing- was previously lytic on PET/CT of 4/17/2019. 5.  Lobulated pelvic structure again seen, possibly uterus with fibroids. Further evaluation with pelvic ultrasound may be performed as warranted.\par \par 9/9/20\par Patient is here for follow up accompanied by her son who speaks English . She has chronic SOB which as per son has been slowly getting worse. She uses O2 at home intermittently. She also uses Flonase and Albuterol inhaler. Otherwise she is mostly wheel chair bound and can walk back and forth to the bathroom by herself. Her appetite is good and weight is stable. No new bone pain/abdominal pain/ rash. \par \par 10/21/2020\par She is here for follow-up for lung cancer, accompanied by son.  Patient denies fever, chills, vomiting, new cough, new rash, persistent diarrhea or bleeding.  She is still pending reimaging, they report they have not received authorization for scan yet.  She still complains of weakness and nasal congestion, which improves using Fluticasone spray.  She uses oxygen intermittently at home.  She still has SOB.   Her only new complaint is intermittent back pruritus.  She is due for cycle 20 of Keytruda today.\par \par 12/2/2020\par She is here for follow-up for lung cancer, accompanied by son.  Patient denies fever, chills, vomiting, new cough, new rash, persistent diarrhea or bleeding.  She still complains of weakness, SOB and nasal congestion, uses the nebulizer once daily with occasional relief of her SOB.   She is due for cycle 21 of Keytruda today.  She is using allegra and or hydrocortisone 1% cream for the pruritus.  Reviewed most recent imaging which shows good treatment response.  \par 11/14/2020 CT C/A/P IMPRESSION: Since May 9, 2020: 1. No evidence of new intrathoracic or intra-abdominal metastatic disease. 2. Unchanged 2.5 x 2 cm right upper lobe pulmonary nodule, stable with similar measurement technique. 3. Decreased subcarinal lymphadenopathy, now 1.2 cm short axis, previously 1.5 cm short axis on May 9, 2020 CT with similar measurement technique. No evidence of additional suspicious lymphadenopathy by size criteria. 4. Near complete interval resolution of left lower lobe and lingula patchy airspace opacities. Unchanged areas of groundglass and reticular opacities within the medial left lung. Findings likely postinfectious or inflammatory. 5. Unchanged sclerotic appearance of metastatic left acetabular lesion,  possibly representing healing of previously FDG avid lytic on PET/CT of 4/17/2019. 6. Unchanged, partially imaged indeterminate lipomatous mass within the anterior left thigh.\par \par 1/13/21\par She is here for follow-up for lung cancer, accompanied by son.  She still complains of weakness, SOB daily which is unchanged.  She is due for cycle 22 of Keytruda today.  She continues to use Allegra and or hydrocortisone 1% cream for the pruritus.  Patient denies fever, chills, vomiting, new cough, new rash, persistent diarrhea or bleeding.  She also reports musculoskeletal pain.  \par \par 2/24/21\par She is here with her son. She is doing well but still has same complaints. mainly fatigue and SOB as well as MSK pain behind the left scapula. We had a long talk and I explained that the fatigue and SOB maybe due to kaytruda so we decided to stop it and observe her. On exam no changes. \par \par \par 5/17/21\par She is here for followup with her son. Her CT C/A/P in March 2021  showed Since November 14, 2020. No significant interval change approximate 2.5 cm x 2 cm right upper lobe nodule (series 4/33). Stable reticular opacities medial right upper lobe. Stable ill-defined subcarinal lymph nodes (series 4/88). Stable bibasilar subsegmental discoid atelectasis. No pleural effusions. No new sites of metastatic disease in the abdomen and pelvis. Previously described sclerotic left acetabular lesion felt to represent a healed metastasis based on prior PET/CT is poorly visualized on the current study. Unchanged, partially imaged indeterminate lipomatous mass within the anterior left thigh.\par Given that she been on  Keytruda now for 2 years with no progression of disease and she continued to have shortness of breath we decided to give her a treatment break.\par Today even well-being of treatment she still continues to have the same except complains specifically short of breath for which she had an extensive workup. She also has rhinorrhea  so she was referred to ENT again as per their request and I refilled Flonase\par \par 7/26/21\par She is here for follow up. She had CT C/A/P on 6/12/21: IMPRESSION: No CT evidence of new sites of metastatic disease in the chest, abdomen and pelvis. Stable 2.5 cm right upper lobe nodule. Interval decrease in size of subcarinal lymph node. Slight interval increase in reticular and groundglass opacities left lower lobe, possibly postinfectious/postinflammatory. Unchanged, partially imaged indeterminate lipomatous mass within the anterior left thigh.\par CBC is normal today. She is here with her son. No new symptoms has chronic rhinorrhea and chronic SOB with non specific pain in left scapula with unclear cause that has been present for years. \par \par 11/29/21 Patient is here for follow up for stage 4 lung NSCLC. She was on keytruda and has been on break since Feb 2021. Her last imaging in June 2021 showed stable disease. She has chronic complaints of fatigue, dyspnea, pain in her left side back and insomnia. All these complaints are chronic with no new worsening of symptoms. \par \par 2/9/22\par She returns for follow-up today for NSCLC, accompanied by son who helps with translation at patient request.  She is due for next cycle of immunotherapy today.  She continues to have the same chronic complaints now that she always had for the last several years including unclear left-sided pain, shortness of breath, she seen multiple specialists and we are unable to exactly determine the cause of her pain but I explained that the shortness of breath as before is possibly related to her lung cancer may be resulting in decreased function from previous Pleurx catheter leading to fibrosis and decrease in lung capacity.  She takes oxycodone 5mg as needed for the L sided pain.  Reviewed most recent CT imaging which shows increase in RUL nodule, L adrenal nodule and new tubular opacity within the right upper lobe but otherwise stable.  \par 12/18/2021 CT C/A/P IMPRESSION: Compared to CT chest 6/12/2021,Interval increase in size of the right upper lobe nodule now measuring 2.8 x 2.0 x 2.4 cm previously 2.5 x 2.0 x 2.0 cm. New tubular opacity within the right upper lobe.Significant interval increase in size of left adrenal nodule, now measuring 4.6 x 4.3 cm, highly suggestive of metastasis/collision tumor in this patient with history of lung cancer.  Right upper quadrant peritoneal nodules without significant change from prior. Continued attention on follow-up imaging.Partially imaged indeterminate lipomatous mass in the left thigh redemonstrated. Recommend MR evaluation.\par \par 3/23/22\par She returns for follow-up today for NSCLC, accompanied by son who helps with translation at patient request.  She is due for next cycle of immunotherapy today.  She continues to have the same chronic complaints now that she always had for the last several years including unclear left-sided pain, shortness of breath, she seen multiple specialists and we are unable to exactly determine the cause of her pain but I explained that the shortness of breath as before is possibly related to her lung cancer may be resulting in decreased function from previous Pleurx catheter leading to fibrosis and decrease in lung capacity.  She takes oxycodone 5 mg as needed for the L sided pain She has chronic SOB that she believes is due to her heart. 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  She was started on antidepressant by PCP but she is not taking it.\par \par 6/15/22\par She is here for follow up. She is due for cycle 27 of Keyturda. CBC from today is  Normal. She has pain in entire left side of chest including shouldr. She takes over the counter meds son does not know then name. SOB is the same. Lungs sounded clear except for decrease sounds in RLL which is same. \par \par 7/27/22\par She returns for follow-up today for NSCLC, accompanied by son who helps with [place]ese translation at patient request.  She is due for next cycle of immunotherapy today.  She continues to have the same chronic complaints that she always had for the last several years including unclear left-sided pain, shortness of breath, she seen multiple specialists and we are unable to exactly determine the cause of her pain.  She takes oxycodone 5 mg as needed for the L sided pain, requesting refill.  Reviewed most recent CT imaging which shows stable findings and continued treatment response.  Patient states she feels better when she receives the immunotherapy and therefore is requesting to go back to every 3 week frequency again.  \par 07/09/2022 CT C/A/P IMPRESSION:CHEST:Stable spiculated right apical lung mass, measuring maximally up to 3.2 cm.Previously seen right upper lobe 5 mm solid nodule is no longer visualized.Stable mediastinal prominent lymph nodes.Other previously noted pulmonary findings are difficult to examine given degree of respiratory motion.ABDOMEN/PELVIS:Interval decrease of left adrenal lesion, now measuring 2.1 x 2.1 cm (previously 3 x 2.2 cm).Decreased right upper quadrant soft tissue nodules measuring up to 0.6 cm (previously 1 cm).\par \par 8/17/22\par she is here for follow up. She is due for cycle 29 of keyturda now every 3 weeks.  CBC with mild anemia only. New new complaints SOB is stable. Has pruritus on back but no rash recommended a trial of Aquaphor.  States oxycodone helps with pain and night and sleeps much better.\par \par 09/07/2022 accompanied by her son; reports no changes in her generalized dull pains. She uses home remedies to "clean up her pulmonary pathways".

## 2022-09-09 NOTE — END OF VISIT
[FreeTextEntry3] : I was physically present for the key portions of the evaluation and management service provided.  I agree with the history and physical, and plan which I have reviewed and edited where appropriate.\par \par She returns for follow-up today.  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  She has pending CAT scans in October.  She continues to have the same symptoms as before although oxycodone is helping.  She still struggles with pruritus I suggested we start gabapentin at a low dose and titrate up very slowly to see if it helps, she has creams including steroid creams as well.  Blood work will be done today including TSH and she will see us back in 3 weeks

## 2022-09-09 NOTE — ASSESSMENT
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 \par - resumed Keytruda on 12/29/2021\par - 07/09/2022 CT C/A/P with IVC shows stable findings and continued treatment response.\par \par # Dysphagia and decrease appetite - better now \par - c/w Jevity 1.2 hector BiD\par - she is followed by nutritionist, Tila Villanueva\par \par # SOB mainly when she tries to sleep; due to right nasal congestion maybe from Keytruda, Keytruda was stopped and SOB did not change\par - c/w Flonase , Rx refilled\par - On Albuterol + nebulizer PRN\par - She follows with Dr Lanza as indicated\par - Advised to use saline nasal spray as she has c/o dryness in her nose \par \par # Some insomnia \par - c/w Melatonin 5 mg at bedtime \par \par # Pruritus, back; may be side effect of Keytruda?\par - Did not complain today\par - has hydrocortisone 1% or Benadryl PRN for symptom relief if happens again\par \par # Pain at T spine right at the level of scapula with tenderness\par - c/w oxycodone 5 mg every 8 hours as needed - Reviewed risks and benefits of narcotic consumption and prescribed dosing/frequency with patient.\par - will possibly order MRT spine if pain progressing.\par \par PLAN:\par \par - continue Keytruda 200 mg every 3 weeks starting 7/27/2022 per patient request (feels better when she receives infusion) - C30 GIVE TODAY.\par - continue oxycodone 5 mg every 8 hours as needed for pain with bowel regimen.\par - start gabapentin 100 mg PO QHS ramping up by 100 mg weekly.\par - repeat CT C/A/P - around 10/2022 as long as no new complains.\par \par - Labwork today:  CBC reviewed , CMP, TSH today \par \par RTC in 3 weeks with CBC, CMP, TSH

## 2022-09-09 NOTE — PHYSICAL EXAM
[Capable of only limited self care, confined to bed or chair more than 50% of waking hours] : Status 3- Capable of only limited self care, confined to bed or chair more than 50% of waking hours [Normal] : affect appropriate [de-identified] :  In wheelchair, but does stand up by herself [de-identified] : Lungs sounded clear today  [de-identified] : L thigh lipoma, deep, pain at mid T spine on palpation

## 2022-09-09 NOTE — REASON FOR VISIT
[Follow-Up Visit] : a follow-up [Family Member] : family member [FreeTextEntry2] : Stage IV lung cancer

## 2022-09-09 NOTE — REVIEW OF SYSTEMS
[Night Sweats] : night sweats [Fatigue] : fatigue [Shortness Of Breath] : shortness of breath [SOB on Exertion] : shortness of breath during exertion [Joint Pain] : joint pain [Muscle Weakness] : muscle weakness [Difficulty Walking] : difficulty walking [Negative] : Allergic/Immunologic [Recent Change In Weight] : ~T no recent weight change [Dysphagia] : no dysphagia [Constipation] : no constipation [Muscle Pain] : no muscle pain [Skin Rash] : no skin rash [FreeTextEntry2] : seated in wheelchair [FreeTextEntry4] : chronic nasal congestion [FreeTextEntry6] : SOB for years - unchanged [FreeTextEntry9] : chronic L sided pain [de-identified] : pruritus at her back

## 2022-09-28 ENCOUNTER — APPOINTMENT (OUTPATIENT)
Dept: INFUSION THERAPY | Facility: CLINIC | Age: 87
End: 2022-09-28

## 2022-09-28 ENCOUNTER — APPOINTMENT (OUTPATIENT)
Dept: HEMATOLOGY ONCOLOGY | Facility: CLINIC | Age: 87
End: 2022-09-28

## 2022-09-28 VITALS
HEIGHT: 62 IN | WEIGHT: 130 LBS | HEART RATE: 58 BPM | DIASTOLIC BLOOD PRESSURE: 72 MMHG | SYSTOLIC BLOOD PRESSURE: 158 MMHG | TEMPERATURE: 96.7 F | BODY MASS INDEX: 23.92 KG/M2

## 2022-09-28 DIAGNOSIS — R06.00 DYSPNEA, UNSPECIFIED: ICD-10-CM

## 2022-09-28 LAB
BASOPHILS # BLD AUTO: 0 K/UL
BASOPHILS NFR BLD AUTO: 0 %
EOSINOPHIL # BLD AUTO: 0.13 K/UL
EOSINOPHIL NFR BLD AUTO: 2.6 %
HCT VFR BLD CALC: 33.4 %
HGB BLD-MCNC: 11.3 G/DL
IMM GRANULOCYTES NFR BLD AUTO: 0.4 %
LYMPHOCYTES # BLD AUTO: 1.54 K/UL
LYMPHOCYTES NFR BLD AUTO: 31.3 %
MAN DIFF?: NORMAL
MCHC RBC-ENTMCNC: 28 PG
MCHC RBC-ENTMCNC: 33.8 G/DL
MCV RBC AUTO: 82.9 FL
MONOCYTES # BLD AUTO: 0.36 K/UL
MONOCYTES NFR BLD AUTO: 7.3 %
NEUTROPHILS # BLD AUTO: 2.87 K/UL
NEUTROPHILS NFR BLD AUTO: 58.4 %
PLATELET # BLD AUTO: 182 K/UL
RBC # BLD: 4.03 M/UL
RBC # FLD: 14.4 %
WBC # FLD AUTO: 4.92 K/UL

## 2022-09-28 PROCEDURE — 99214 OFFICE O/P EST MOD 30 MIN: CPT

## 2022-09-28 RX ORDER — PEMBROLIZUMAB 25 MG/ML
200 INJECTION, SOLUTION INTRAVENOUS ONCE
Refills: 0 | Status: COMPLETED | OUTPATIENT
Start: 2022-09-28 | End: 2022-09-28

## 2022-09-28 RX ADMIN — PEMBROLIZUMAB 200 MILLIGRAM(S): 25 INJECTION, SOLUTION INTRAVENOUS at 14:17

## 2022-09-28 NOTE — PHYSICAL EXAM
[Capable of only limited self care, confined to bed or chair more than 50% of waking hours] : Status 3- Capable of only limited self care, confined to bed or chair more than 50% of waking hours [Normal] : affect appropriate [de-identified] : seated in wheelchair, but does stand up by herself [de-identified] : Lungs sounded clear today  [de-identified] : L thigh lipoma

## 2022-09-28 NOTE — ASSESSMENT
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 \par - resumed Keytruda on 12/29/2021\par - 07/09/2022 CT C/A/P with IVC shows stable findings and continued treatment response.\par \par # Dysphagia and decrease appetite - better now \par - c/w Jevity 1.2 hector BiD \par - she is followed by nutritionist, Tila Villanueva \par \par # SOB mainly when she tries to sleep; due to right nasal congestion maybe from Keytruda, Keytruda was stopped and SOB did not change\par - c/w Flonase , Rx refilled\par - On Albuterol + nebulizer PRN\par - She follows with Dr Lanza as indicated\par - Advised to use saline nasal spray as she has c/o dryness in her nose \par \par # Some insomnia \par - c/w Melatonin 5 mg at bedtime \par \par # Pruritus, back; may be side effect of Keytruda?\par - has hydrocortisone 1% or Benadryl PRN for symptom relief if happens again\par - she STOPPED gabapentin due to side effects \par \par # Pain at T spine right at the level of scapula with tenderness\par - c/w oxycodone 5 mg every 8 hours as needed - Reviewed risks and benefits of narcotic consumption and prescribed dosing/frequency with patient.\par - will possibly order MRT spine if pain progressing.\par \par PLAN:\par - c/w cycle 31 of Keytruda 200mg on 9/28 (every 3 week dosing as of 7/27/2022)\par - continue oxycodone 5 mg every 8 hours as needed for pain with bowel regimen , they have refill\par - CT C/A/P with IVC ordered to be done around 10/2022 ; tasked [employer] for assistance with scheduling + Rx given \par - Labwork today:  CBC, CMP, TSH, T3, FT4 today \par \par Will refill her Amlodipine 5mg daily as courtesy since patient is out of medication and they cannot reach PCP.  Recommended to follow with PCP for further discussion and titration of anti-hypertensives, if needed.\par \par RTC in 3 weeks with Dr. Patel with CBC, CMP, TSH

## 2022-09-28 NOTE — HISTORY OF PRESENT ILLNESS
[Therapy: ___] : Therapy: [unfilled] [Cycle: ___] : Cycle: [unfilled] [de-identified] : 08/08/2019 \par This is a 83 year old female who I initially met in [facility] on 3/28/19. She was initially admitted on 2/20/2019 for a right loculated pleural effusion approx. 900 CCs, no PE and right apical 3.1x3.0 cm mass with pleural nodules on CTA of chest. Pt returned to ED on 3/15/2019 for right pleural effusion, had a thoracentesis in ED and was sent home. She than came back for SOB  due to recurrent effusion and a Pleurx was placed. Pathology showed adenocarcinoma. \par \par She ha lost a few pounds about  4. She never smoked. She does have weakness.  From her Pleurx she  has 500 cc removed every other day. She has pain after.\par \par Work up:\par 02/20/2019 CTA chest:  Large right pleural effusion, maximal axial width of 7 cm correlating with an estimated volume of 900 cc. Associated near complete compressive atelectasis of the right lower lobe. Probable loculations of the effusion seen at the apex and at the right heart border (for example series 5 images 176, 52; series 9 image 69). 1 cm right upper lobe fissural nodule (series 5 image 139). Patent central airways. There is right apical 3.1 x 3.0 cm mass with irregular right apical pleural thickening (series 7, image 44), and multiple satellite pleural nodules including a more caudad 2.7 cm para-mediastinal nodule (series 7, image 175). Tissue sampling recommended.\par \par PATHOLOGY:\par 03/25/2019 Pleural fluid: Cell block material is hypocellular and shows a single minutecluster of malignant cells.Immunohistochemistry studies were performed at [facility] and the\par results are noncontributory\par 03/22/2019: Addendum\par Cell block material shows macrophages (positive ),mesothelial cells (positive calretinin) and a single very minute cluster of atypical suspicious cells is negative for Fidel-Ep4.Material is insufficient for further diagnostic studies (ie, immunohistochemistry).\par \par 03/15/2019: Pleural fluid: Addendum Immunohistochemical studies were performed on the cell block at [facility], [facility], [address] [place] [address], [facility] (see NOTE). The results are as follows:\par CK7-                     Rare mesothelial cells positive\par CK20-                   Negative\par CK 5/6-                 Rare mesothelial cells positive\par Calretinin-             Scattered mesothelial cells positive\par CD68-                   Numerous histiocytes positive\par BerEP-4-               Negative\par Napsin-A-             Negative\par TTF-1-                  Negative\par These results are non-specific, suggestive of mesothelialhyperplasia. The few markedly atypical cells seen in the smear are not evident in the cell block.\par The atypical cells seen in the current smear are not seen in the prior pleural fluid Thinprep smear or cell block (83-TD-).\par Few marked atypical cells, suspicious for mesothelioma versus adenocarcinoma, in a background of numerous mesothelial cells, histiocytes and small lymphocytes.\par 03/25/2019: Final Diagnosis - POSITIVE FOR MALIGNANT CELLS. Favor metastatic adenocarcinoma. Pending cell block.\par I spoke to Dr. Allison  and there are only a few cells thus further studies such as IHC, molecular PD-l1 is not possible.\par  [FreeTextEntry1] : 05/17/2019 Started Keytruda, 05/04/2020 changed to 6 week dosing starting; 01/13/2021 last treatment; 07/27/2022 pt prefers every 3 week dosing. [de-identified] : 6/5/19\par She is here for follow up. Since last visit she started Keytruda. She had a PET/CT on April 17 that showed  Extensive FDG avid right-sided pleural mass/soft tissue thickening, \par with max SUV 21.5 within a right apical pleural soft tissue mass.\par 2.  Multiple FDG avid mediastinal lymph nodes, with max SUV 16.5 within a 2.6 x 2.1 cm subcarinal node.\par 3.  FDG avid lytic lesion within the left superior acetabulum (max SUV 14.8), suspicious for osseous metastasis.\par 4.  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 Used to be  500 ml  u 3 times a week. Takes 60 mg of oral morphine a day 20 mg 3 times a day but pain is still severe. Has not had a bowel movement in unknown amount of days does not take laxatives although she has laxatives. \par \par 6/26/19 She is here for follow up. She is due for cycle 3 of Keytruda. She has about 300 mg drained from her pleura twice a week. Has SOB but saturated 94% on room air and 94% on ambulation. Did not tolerate the fentanyl path had nausea. On Morphine 10 mg BID doing well. Constipation is better. \par \par 7/17/19\par She is here for follow-up of Stage IV lung cancer with son.  She is due for cycle 3 of Keytruda.  CBC is stable from today.  She only takes the liquid morphine at night now.  She started eating slightly more and has been feeling slightly better ,as per son.  He states that the pleurex drainage is less, it is checked every Friday + Monday.  On Friday there was about 225 cc drained, but yesterday no drainage.  We will arrange for hydration today since she has had poor oral intake lately and small appetite.  Son reports she can tolerate fluids but she has early satiety with food.  Patient also reports feeling short of breath at rest, but her O2 sat is 98% on R/a.  Right sided pain is better. \par 07/06/2019 CT C/A/P IMPRESSION: Since April 17, 2019: 1. No definite new sites of metastatic disease. 2. Right-sided pleural soft tissue mass/thickening, overall appearing decreased since April 17, 2019 with primarily residual right apical tumor. Tumor appears to infiltrate the mediastinum.3. No significant change in mediastinal lymphadenopathy including largest 2.6 x 2.1 cm subcarinal lymph node which remains unchanged. Retrocrural 2.5 x 2.3 cm metastasis or additional site of pleural tumor, unchanged.4. Irregularity of right anterior first rib, probably invaded by tumor, stable in retrospect. Left superior acetabular lucent lesion corresponding to FDG avid bone metastasis.5. Right pelvic indeterminate 7 cm mass; possible retroverted uterus with degenerating fibroid or less likely ovarian mass; previously non-FDG avid and probably benign. Further evaluation with pelvic ultrasound or MRI pelvis with and without IV contrast may be helpful.6. Status post right chest tube placement with decreased small loculated right pleural effusion with fluid within the major fissure. 7. New mild to moderate intrahepatic biliary dilation. Correlation with LFTs recommended. Consider further evaluation with ERCP or MRCP with and without IV contrast.\par \par 8/7/19\par She is here with her son. Overall she is doing much better. She is in wheelchair but now less pain, eating better and Pleurax is only draining about 25 cc twice a week, She does complain of SOB still. I explained this will improve as well but asked her to follow up with Dr. Lanza of pulmonology. \par \par 8/28/19\par She is here for follow up. They are to see Dr. Lanza in Early September. There is minimal output from Pleurax. Pain is much better and does not use narcotics anymore.  Overall better. She has right side nasal congestion and states makes it hard to breath.\par \par 9/18/19\par She is doing well. She is due for cycle 7. Son was not eliana to schedule CTs prior to the visit but she looks better and better every visit and thus most likely still responding. She saw Dr. Lanza who started her on Flonase. She is due to see ENT Dr. Adams.  Appetite is better. No pains. Dr. Lanza is considering to remover the Pleurx possibly in November. \par \par 10/09/2019\par Patient is here for a follow up visit. Due for cycle 8 today. CT chest/abd/pelvis 09/2019 showed improvement in disease. Also has seen Dr. Adams and agreed to continue with Flonase as she has hypertrophy of turbinates. \par She is tolerating Ketryuda well. Her only complaint at this time is trouble in falling asleep. \par \par 10/30/19\par Patient is here for a follow up visit of Stage IV lung cancer with family member.  She is feeling well with no new complaints.   She feels dyspneic when taking a deep breath but her pain has improved.  Most recent CBC is stable. 09/22/2019 CT C/A/P showed improvement in right apical lung mass and pleural effusion and drained 150cc. Due for cycle 9 today. \par \par 12/30/19\par Patient is here for a follow-up visit for  lung cancer with son.  Reviewed imaging results with patient and her family, which suggests worsening effusion but otherwise stable.  They drained 500 cc from pleurx yesterday, reportedly sanguinous drainage.  She is agreeable to continue with cycle 12 of Keytruda tomorrow. She reports persistent dyspnea.  We discussed that this tx regimen may be losing its responsiveness.\par 12/27/2019 CT C/A/P IMPRESSION: Worsening right-sided pleural effusion. Right apical spiculated nodule without difference. Unchanged appearance of the mediastinum. Unchanged appearance of the third thoracic vertebral body and right second rib. No interval change since prior examination. No change in hepatic hypodensity (likely fatty infiltration), splenic hypodensity (too small to characterize), left adrenal nodule or 1 cm retroperitoneal nodule on the right. No change in fat-containing mass in the anterior left thigh.\par \par 2/10/2020\par She is here for follow-up accompanied by son.  Since last visit she was admitted to hospital for pain and nausea. She had pleurx catheter removed during most recent hospitalization on 1/13/2020.  She complains of insomnia but generally feels well.  Most recent scan reviewed with patient.  She reports she had a rash after discharge from the hospital which has since resolved.  \par 01/11/2020 CT Chest IMPRESSION: Since CT scan performed on 12/27/2019; 1.  Right thoracostomy tube in stable position with slight interval decrease of right pleural effusion. 2.   Interval increase in right middle lobe consolidation/atelectasis. 3.  Stable spiculated nodule in the right lung apex, consistent with patient's known malignancy.\par \par She feels much better now that her Pleurx is gone. Does not have any pain. Feels dyspneic which is stable.\par \par 3/2/2020\par She is here for follow-up for lung cancer accompanied by son. Since last visit, she followed with Dr. Lanza pulmonology, and Dr. Hirsch from [facility] who agreed against placing additional drainage catheters based on most recent CT imaging. She still reports some SOB. She also reports taking a [place] medication named Volidol and Panangin for here breathing. Patient reports mild dizziness after taking Trazodone; we recommended halving dose.  \par \par 5/4/2020\par She is here for follow-up for lung cancer accompanied by son. Her last treatment as in March due to COVID she is doing well. She was on Room air. She states SOB is the same. She has paraspinal back pain/hip pain. \par \par 6/15/2020\par She is here for follow-up for lung cancer accompanied by son.  Patient denies fever, chills, vomiting, worsening cough, new rash, persistent diarrhea or bleeding.  Reviewed most recent imaging and had a discussion regarding continuing with Keytruda every 6 weeks for now as she is clinically stable.  She does complain of weakness and nasal congestion which improves using Fluticasone spray.  She uses oxygen intermittently at home.  \par 05/09/2020 CT C/A/P IMPRESSION: 1. Stable spiculated right upper lobe mass measuring up to 3.1 cm.2. New areas of ground glass and reticular opacities within the medial left lung, with new subcentimeter nodular opacities within the left lower lobe. Findings may be postinfectious or postinflammatory in etiology. CT of the chest in 2-3 months is recommended. 3. Decreased mediastinal and right hilar lymphadenopathy. 4. Resolved right pleural effusion. 1.  No findings of new or progressive metastatic disease in the abdomen or pelvis. 2.  Indeterminate left adrenal nodule and right retroperitoneal nodule, stable in size from prior CT. 3.  Partially visualized fat-containing mass in the anterior left thigh-considerations include benign and malignant neoplastic etiology. Recommend further evaluation with MRI if not already performed. 4.  Sclerotic appearance of metastatic left acetabular lesion, similar in appearance to prior CT, possibly representing healing- was previously lytic on PET/CT of 4/17/2019. 5.  Lobulated pelvic structure again seen, possibly uterus with fibroids. Further evaluation with pelvic ultrasound may be performed as warranted.\par \par 9/9/20\par Patient is here for follow up accompanied by her son who speaks English . She has chronic SOB which as per son has been slowly getting worse. She uses O2 at home intermittently. She also uses Flonase and Albuterol inhaler. Otherwise she is mostly wheel chair bound and can walk back and forth to the bathroom by herself. Her appetite is good and weight is stable. No new bone pain/abdominal pain/ rash. \par \par 10/21/2020\par She is here for follow-up for lung cancer, accompanied by son.  Patient denies fever, chills, vomiting, new cough, new rash, persistent diarrhea or bleeding.  She is still pending reimaging, they report they have not received authorization for scan yet.  She still complains of weakness and nasal congestion, which improves using Fluticasone spray.  She uses oxygen intermittently at home.  She still has SOB.   Her only new complaint is intermittent back pruritus.  She is due for cycle 20 of Keytruda today.\par \par 12/2/2020\par She is here for follow-up for lung cancer, accompanied by son.  Patient denies fever, chills, vomiting, new cough, new rash, persistent diarrhea or bleeding.  She still complains of weakness, SOB and nasal congestion, uses the nebulizer once daily with occasional relief of her SOB.   She is due for cycle 21 of Keytruda today.  She is using allegra and or hydrocortisone 1% cream for the pruritus.  Reviewed most recent imaging which shows good treatment response.  \par 11/14/2020 CT C/A/P IMPRESSION: Since May 9, 2020: 1. No evidence of new intrathoracic or intra-abdominal metastatic disease. 2. Unchanged 2.5 x 2 cm right upper lobe pulmonary nodule, stable with similar measurement technique. 3. Decreased subcarinal lymphadenopathy, now 1.2 cm short axis, previously 1.5 cm short axis on May 9, 2020 CT with similar measurement technique. No evidence of additional suspicious lymphadenopathy by size criteria. 4. Near complete interval resolution of left lower lobe and lingula patchy airspace opacities. Unchanged areas of groundglass and reticular opacities within the medial left lung. Findings likely postinfectious or inflammatory. 5. Unchanged sclerotic appearance of metastatic left acetabular lesion,  possibly representing healing of previously FDG avid lytic on PET/CT of 4/17/2019. 6. Unchanged, partially imaged indeterminate lipomatous mass within the anterior left thigh.\par \par 1/13/21\par She is here for follow-up for lung cancer, accompanied by son.  She still complains of weakness, SOB daily which is unchanged.  She is due for cycle 22 of Keytruda today.  She continues to use Allegra and or hydrocortisone 1% cream for the pruritus.  Patient denies fever, chills, vomiting, new cough, new rash, persistent diarrhea or bleeding.  She also reports musculoskeletal pain.  \par \par 2/24/21\par She is here with her son. She is doing well but still has same complaints. mainly fatigue and SOB as well as MSK pain behind the left scapula. We had a long talk and I explained that the fatigue and SOB maybe due to kaytruda so we decided to stop it and observe her. On exam no changes. \par \par \par 5/17/21\par She is here for followup with her son. Her CT C/A/P in March 2021  showed Since November 14, 2020. No significant interval change approximate 2.5 cm x 2 cm right upper lobe nodule (series 4/33). Stable reticular opacities medial right upper lobe. Stable ill-defined subcarinal lymph nodes (series 4/88). Stable bibasilar subsegmental discoid atelectasis. No pleural effusions. No new sites of metastatic disease in the abdomen and pelvis. Previously described sclerotic left acetabular lesion felt to represent a healed metastasis based on prior PET/CT is poorly visualized on the current study. Unchanged, partially imaged indeterminate lipomatous mass within the anterior left thigh.\par Given that she been on  Keytruda now for 2 years with no progression of disease and she continued to have shortness of breath we decided to give her a treatment break.\par Today even well-being of treatment she still continues to have the same except complains specifically short of breath for which she had an extensive workup. She also has rhinorrhea  so she was referred to ENT again as per their request and I refilled Flonase\par \par 7/26/21\par She is here for follow up. She had CT C/A/P on 6/12/21: IMPRESSION: No CT evidence of new sites of metastatic disease in the chest, abdomen and pelvis. Stable 2.5 cm right upper lobe nodule. Interval decrease in size of subcarinal lymph node. Slight interval increase in reticular and groundglass opacities left lower lobe, possibly postinfectious/postinflammatory. Unchanged, partially imaged indeterminate lipomatous mass within the anterior left thigh.\par CBC is normal today. She is here with her son. No new symptoms has chronic rhinorrhea and chronic SOB with non specific pain in left scapula with unclear cause that has been present for years. \par \par 11/29/21 Patient is here for follow up for stage 4 lung NSCLC. She was on keytruda and has been on break since Feb 2021. Her last imaging in June 2021 showed stable disease. She has chronic complaints of fatigue, dyspnea, pain in her left side back and insomnia. All these complaints are chronic with no new worsening of symptoms. \par \par 2/9/22\par She returns for follow-up today for NSCLC, accompanied by son who helps with translation at patient request.  She is due for next cycle of immunotherapy today.  She continues to have the same chronic complaints now that she always had for the last several years including unclear left-sided pain, shortness of breath, she seen multiple specialists and we are unable to exactly determine the cause of her pain but I explained that the shortness of breath as before is possibly related to her lung cancer may be resulting in decreased function from previous Pleurx catheter leading to fibrosis and decrease in lung capacity.  She takes oxycodone 5mg as needed for the L sided pain.  Reviewed most recent CT imaging which shows increase in RUL nodule, L adrenal nodule and new tubular opacity within the right upper lobe but otherwise stable.  \par 12/18/2021 CT C/A/P IMPRESSION: Compared to CT chest 6/12/2021,Interval increase in size of the right upper lobe nodule now measuring 2.8 x 2.0 x 2.4 cm previously 2.5 x 2.0 x 2.0 cm. New tubular opacity within the right upper lobe.Significant interval increase in size of left adrenal nodule, now measuring 4.6 x 4.3 cm, highly suggestive of metastasis/collision tumor in this patient with history of lung cancer.  Right upper quadrant peritoneal nodules without significant change from prior. Continued attention on follow-up imaging.Partially imaged indeterminate lipomatous mass in the left thigh redemonstrated. Recommend MR evaluation.\par \par 3/23/22\par She returns for follow-up today for NSCLC, accompanied by son who helps with translation at patient request.  She is due for next cycle of immunotherapy today.  She continues to have the same chronic complaints now that she always had for the last several years including unclear left-sided pain, shortness of breath, she seen multiple specialists and we are unable to exactly determine the cause of her pain but I explained that the shortness of breath as before is possibly related to her lung cancer may be resulting in decreased function from previous Pleurx catheter leading to fibrosis and decrease in lung capacity.  She takes oxycodone 5 mg as needed for the L sided pain She has chronic SOB that she believes is due to her heart. 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  She was started on antidepressant by PCP but she is not taking it.\par \par 6/15/22\par She is here for follow up. She is due for cycle 27 of Keyturda. CBC from today is  Normal. She has pain in entire left side of chest including shouldr. She takes over the counter meds son does not know then name. SOB is the same. Lungs sounded clear except for decrease sounds in RLL which is same. \par \par 7/27/22\par She returns for follow-up today for NSCLC, accompanied by son who helps with French translation at patient request.  She is due for next cycle of immunotherapy today.  She continues to have the same chronic complaints that she always had for the last several years including unclear left-sided pain, shortness of breath, she seen multiple specialists and we are unable to exactly determine the cause of her pain.  She takes oxycodone 5 mg as needed for the L sided pain, requesting refill.  Reviewed most recent CT imaging which shows stable findings and continued treatment response.  Patient states she feels better when she receives the immunotherapy and therefore is requesting to go back to every 3 week frequency again.  \par 07/09/2022 CT C/A/P IMPRESSION:CHEST:Stable spiculated right apical lung mass, measuring maximally up to 3.2 cm.Previously seen right upper lobe 5 mm solid nodule is no longer visualized.Stable mediastinal prominent lymph nodes.Other previously noted pulmonary findings are difficult to examine given degree of respiratory motion.ABDOMEN/PELVIS:Interval decrease of left adrenal lesion, now measuring 2.1 x 2.1 cm (previously 3 x 2.2 cm).Decreased right upper quadrant soft tissue nodules measuring up to 0.6 cm (previously 1 cm).\par \par 8/17/22\par she is here for follow up. She is due for cycle 29 of keyturda now every 3 weeks.  CBC with mild anemia only. New new complaints SOB is stable. Has pruritus on back but no rash recommended a trial of Aquaphor.  States oxycodone helps with pain and night and sleeps much better.\par \par 09/07/2022 accompanied by her son; reports no changes in her generalized dull pains. She uses home remedies to "clean up her pulmonary pathways".\par \par 9/28/22\par She returns for follow-up today for NSCLC, accompanied by son who helps with French translation at patient request.  She is due for next cycle of immunotherapy today.  She continues to have the same chronic complaints that she always had for the last several years including unclear left-sided pain, shortness of breath, she seen multiple specialists and we are unable to exactly determine the cause of her pain.  She takes oxycodone 5 mg as needed for the L sided abd pain.  She still struggles with pruritus so we suggested to try gabapentin at a low dose and titrate up very slowly to see if it helps, however she did not see any improvement and did not like the way the gabapentin made her feel.  She has since stopped the gabapentin.  Reviewed most recent CBC which shows mild, normocytic anemia with hgb 11.3g/dL.

## 2022-09-28 NOTE — REVIEW OF SYSTEMS
[Night Sweats] : night sweats [Fatigue] : fatigue [Shortness Of Breath] : shortness of breath [SOB on Exertion] : shortness of breath during exertion [Joint Pain] : joint pain [Muscle Weakness] : muscle weakness [Difficulty Walking] : difficulty walking [Negative] : Allergic/Immunologic [Recent Change In Weight] : ~T no recent weight change [Dysphagia] : no dysphagia [Constipation] : constipation [Muscle Pain] : no muscle pain [Skin Rash] : no skin rash [FreeTextEntry2] : seated in wheelchair [FreeTextEntry4] : chronic nasal congestion [FreeTextEntry6] : SOB for years - unchanged [FreeTextEntry7] : occasional hemorrhoidal bleeding  [FreeTextEntry9] : chronic L sided pain [de-identified] : pruritus at her back

## 2022-09-29 ENCOUNTER — NON-APPOINTMENT (OUTPATIENT)
Age: 87
End: 2022-09-29

## 2022-09-29 LAB
ANION GAP SERPL CALC-SCNC: 9 MMOL/L
BUN SERPL-MCNC: 18 MG/DL
CALCIUM SERPL-MCNC: 9.2 MG/DL
CHLORIDE SERPL-SCNC: 108 MMOL/L
CO2 SERPL-SCNC: 24 MMOL/L
CREAT SERPL-MCNC: 1.1 MG/DL
EGFR: 49 ML/MIN/1.73M2
GLUCOSE SERPL-MCNC: 98 MG/DL
MAGNESIUM SERPL-MCNC: 2.1 MG/DL
POTASSIUM SERPL-SCNC: 4 MMOL/L
SODIUM SERPL-SCNC: 141 MMOL/L
T3 SERPL-MCNC: 94 NG/DL
T4 FREE SERPL-MCNC: 1.2 NG/DL
TSH SERPL-ACNC: 0.71 UIU/ML

## 2022-10-15 ENCOUNTER — OUTPATIENT (OUTPATIENT)
Dept: OUTPATIENT SERVICES | Facility: HOSPITAL | Age: 87
LOS: 1 days | Discharge: HOME | End: 2022-10-15

## 2022-10-15 ENCOUNTER — RESULT REVIEW (OUTPATIENT)
Age: 87
End: 2022-10-15

## 2022-10-15 DIAGNOSIS — C34.91 MALIGNANT NEOPLASM OF UNSPECIFIED PART OF RIGHT BRONCHUS OR LUNG: ICD-10-CM

## 2022-10-15 PROCEDURE — 71260 CT THORAX DX C+: CPT | Mod: 26

## 2022-10-15 PROCEDURE — 74177 CT ABD & PELVIS W/CONTRAST: CPT | Mod: 26

## 2022-10-19 ENCOUNTER — APPOINTMENT (OUTPATIENT)
Dept: INFUSION THERAPY | Facility: CLINIC | Age: 87
End: 2022-10-19

## 2022-10-19 ENCOUNTER — OUTPATIENT (OUTPATIENT)
Dept: OUTPATIENT SERVICES | Facility: HOSPITAL | Age: 87
LOS: 1 days | Discharge: HOME | End: 2022-10-19

## 2022-10-19 ENCOUNTER — APPOINTMENT (OUTPATIENT)
Dept: HEMATOLOGY ONCOLOGY | Facility: CLINIC | Age: 87
End: 2022-10-19

## 2022-10-19 VITALS
HEART RATE: 58 BPM | BODY MASS INDEX: 23.92 KG/M2 | DIASTOLIC BLOOD PRESSURE: 69 MMHG | HEIGHT: 62 IN | WEIGHT: 130 LBS | RESPIRATION RATE: 16 BRPM | SYSTOLIC BLOOD PRESSURE: 144 MMHG

## 2022-10-19 DIAGNOSIS — Z90.710 ACQUIRED ABSENCE OF BOTH CERVIX AND UTERUS: Chronic | ICD-10-CM

## 2022-10-19 DIAGNOSIS — C34.91 MALIGNANT NEOPLASM OF UNSPECIFIED PART OF RIGHT BRONCHUS OR LUNG: ICD-10-CM

## 2022-10-19 PROCEDURE — 99214 OFFICE O/P EST MOD 30 MIN: CPT

## 2022-10-19 RX ORDER — PEMBROLIZUMAB 25 MG/ML
200 INJECTION, SOLUTION INTRAVENOUS ONCE
Refills: 0 | Status: COMPLETED | OUTPATIENT
Start: 2022-10-19 | End: 2022-10-19

## 2022-10-19 RX ADMIN — PEMBROLIZUMAB 200 MILLIGRAM(S): 25 INJECTION, SOLUTION INTRAVENOUS at 16:50

## 2022-10-19 NOTE — ASSESSMENT
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 \par - resumed Keytruda on 12/29/2021\par - 07/09/2022 CT C/A/P with IVC shows stable findings and continued treatment response.\par -10/15/2022 CT C/A/P no progression  on CT A/P aawaiting CT chest addendum but jannette has not progressed based on review \par \par # Dysphagia and decrease appetite - better now \par - c/w Jevity 1.2 hector BiD \par - she is followed by nutritionist, Tila Villanueva \par \par # SOB mainly when she tries to sleep; due to right nasal congestion maybe from Keytruda, Keytruda was stopped and SOB did not change\par - c/w Flonase , Rx refilled\par - On Albuterol + nebulizer PRN\par - She follows with Dr Lanza as indicated\par - Advised to use saline nasal spray as she has c/o dryness in her nose \par \par # Some insomnia \par - c/w Melatonin 5 mg at bedtime \par \par # Pruritus, back; may be side effect of Keytruda?\par - has hydrocortisone 1% or Benadryl PRN for symptom relief if happens again\par - she STOPPED gabapentin due to side effects \par \par # Pain at T spine right at the level of scapula with tenderness maybe from T3 compression fracture \par - c/w oxycodone 5 mg every 8 hours as needed - Reviewed risks and benefits of narcotic consumption and prescribed dosing/frequency with patient.\par -will have her see neurosurgery Dr. Weaver. She may need MR T spine but no mass is reported on CT  \par \par PLAN:\par - c/w cycle 33 of Keytruda 200mg on 9/28 (every 3 week dosing as of 7/27/2022)\par - continue oxycodone 5 mg every 8 hours as needed for pain with bowel regimen , they have refill\par -Neurosuegery referal for T3 compression fracture \par - Labwork today:  CBC, CMP, TSH, T3, FT4 today \par -reffiled her BP meds\par \par RTC in 3 weeks , maybe will  hold keytruda since not sure why she has severe pruritus but given the distribution does not make sense for it to e immune mediated?  [Palliative] : Goals of care discussed with patient: Palliative

## 2022-10-19 NOTE — REVIEW OF SYSTEMS
[Night Sweats] : night sweats [Fatigue] : fatigue [Recent Change In Weight] : ~T no recent weight change [Dysphagia] : no dysphagia [Shortness Of Breath] : shortness of breath [SOB on Exertion] : shortness of breath during exertion [Constipation] : constipation [Joint Pain] : joint pain [Muscle Pain] : no muscle pain [Muscle Weakness] : muscle weakness [Skin Rash] : no skin rash [Difficulty Walking] : difficulty walking [Negative] : Allergic/Immunologic [FreeTextEntry2] : seated in wheelchair [FreeTextEntry4] : chronic nasal congestion [FreeTextEntry6] : SOB for years - unchanged [FreeTextEntry7] : occasional hemorrhoidal bleeding  [FreeTextEntry9] : chronic L sided pain [de-identified] : pruritus at her back

## 2022-10-19 NOTE — PHYSICAL EXAM
[Capable of only limited self care, confined to bed or chair more than 50% of waking hours] : Status 3- Capable of only limited self care, confined to bed or chair more than 50% of waking hours [Normal] : affect appropriate [de-identified] : seated in wheelchair, but does stand up by herself [de-identified] : Lungs sounded clear today  [de-identified] : L thigh lipoma [de-identified] : No rash

## 2022-10-19 NOTE — HISTORY OF PRESENT ILLNESS
[de-identified] : 08/08/2019 \par This is a 83 year old female who I initially met in [facility] on 3/28/19. She was initially admitted on 2/20/2019 for a right loculated pleural effusion approx. 900 CCs, no PE and right apical 3.1x3.0 cm mass with pleural nodules on CTA of chest. Pt returned to ED on 3/15/2019 for right pleural effusion, had a thoracentesis in ED and was sent home. She than came back for SOB  due to recurrent effusion and a Pleurx was placed. Pathology showed adenocarcinoma. \par \par She ha lost a few pounds about  4. She never smoked. She does have weakness.  From her Pleurx she  has 500 cc removed every other day. She has pain after.\par \par Work up:\par 02/20/2019 CTA chest:  Large right pleural effusion, maximal axial width of 7 cm correlating with an estimated volume of 900 cc. Associated near complete compressive atelectasis of the right lower lobe. Probable loculations of the effusion seen at the apex and at the right heart border (for example series 5 images 176, 52; series 9 image 69). 1 cm right upper lobe fissural nodule (series 5 image 139). Patent central airways. There is right apical 3.1 x 3.0 cm mass with irregular right apical pleural thickening (series 7, image 44), and multiple satellite pleural nodules including a more caudad 2.7 cm para-mediastinal nodule (series 7, image 175). Tissue sampling recommended.\par \par PATHOLOGY:\par 03/25/2019 Pleural fluid: Cell block material is hypocellular and shows a single minutecluster of malignant cells.Immunohistochemistry studies were performed at [facility] and the\par results are noncontributory\par 03/22/2019: Addendum\par Cell block material shows macrophages (positive ),mesothelial cells (positive calretinin) and a single very minute cluster of atypical suspicious cells is negative for Fidel-Ep4.Material is insufficient for further diagnostic studies (ie, immunohistochemistry).\par \par 03/15/2019: Pleural fluid: Addendum Immunohistochemical studies were performed on the cell block at [facility], [facility], [address] [place] [address], [facility] (see NOTE). The results are as follows:\par CK7-                     Rare mesothelial cells positive\par CK20-                   Negative\par CK 5/6-                 Rare mesothelial cells positive\par Calretinin-             Scattered mesothelial cells positive\par CD68-                   Numerous histiocytes positive\par BerEP-4-               Negative\par Napsin-A-             Negative\par TTF-1-                  Negative\par These results are non-specific, suggestive of mesothelialhyperplasia. The few markedly atypical cells seen in the smear are not evident in the cell block.\par The atypical cells seen in the current smear are not seen in the prior pleural fluid Thinprep smear or cell block (58-OX-).\par Few marked atypical cells, suspicious for mesothelioma versus adenocarcinoma, in a background of numerous mesothelial cells, histiocytes and small lymphocytes.\par 03/25/2019: Final Diagnosis - POSITIVE FOR MALIGNANT CELLS. Favor metastatic adenocarcinoma. Pending cell block.\par I spoke to Dr. Allison  and there are only a few cells thus further studies such as IHC, molecular PD-l1 is not possible.\par  [Therapy: ___] : Therapy: [unfilled] [Cycle: ___] : Cycle: [unfilled] [FreeTextEntry1] : 05/17/2019 Started Keytruda, 05/04/2020 changed to 6 week dosing starting; 01/13/2021 last treatment; 07/27/2022 pt prefers every 3 week dosing. [de-identified] : 6/5/19\par She is here for follow up. Since last visit she started Keytruda. She had a PET/CT on April 17 that showed  Extensive FDG avid right-sided pleural mass/soft tissue thickening, \par with max SUV 21.5 within a right apical pleural soft tissue mass.\par 2.  Multiple FDG avid mediastinal lymph nodes, with max SUV 16.5 within a 2.6 x 2.1 cm subcarinal node.\par 3.  FDG avid lytic lesion within the left superior acetabulum (max SUV 14.8), suspicious for osseous metastasis.\par 4.  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 Used to be  500 ml  u 3 times a week. Takes 60 mg of oral morphine a day 20 mg 3 times a day but pain is still severe. Has not had a bowel movement in unknown amount of days does not take laxatives although she has laxatives. \par \par 6/26/19 She is here for follow up. She is due for cycle 3 of Keytruda. She has about 300 mg drained from her pleura twice a week. Has SOB but saturated 94% on room air and 94% on ambulation. Did not tolerate the fentanyl path had nausea. On Morphine 10 mg BID doing well. Constipation is better. \par \par 7/17/19\par She is here for follow-up of Stage IV lung cancer with son.  She is due for cycle 3 of Keytruda.  CBC is stable from today.  She only takes the liquid morphine at night now.  She started eating slightly more and has been feeling slightly better ,as per son.  He states that the pleurex drainage is less, it is checked every Friday + Monday.  On Friday there was about 225 cc drained, but yesterday no drainage.  We will arrange for hydration today since she has had poor oral intake lately and small appetite.  Son reports she can tolerate fluids but she has early satiety with food.  Patient also reports feeling short of breath at rest, but her O2 sat is 98% on R/a.  Right sided pain is better. \par 07/06/2019 CT C/A/P IMPRESSION: Since April 17, 2019: 1. No definite new sites of metastatic disease. 2. Right-sided pleural soft tissue mass/thickening, overall appearing decreased since April 17, 2019 with primarily residual right apical tumor. Tumor appears to infiltrate the mediastinum.3. No significant change in mediastinal lymphadenopathy including largest 2.6 x 2.1 cm subcarinal lymph node which remains unchanged. Retrocrural 2.5 x 2.3 cm metastasis or additional site of pleural tumor, unchanged.4. Irregularity of right anterior first rib, probably invaded by tumor, stable in retrospect. Left superior acetabular lucent lesion corresponding to FDG avid bone metastasis.5. Right pelvic indeterminate 7 cm mass; possible retroverted uterus with degenerating fibroid or less likely ovarian mass; previously non-FDG avid and probably benign. Further evaluation with pelvic ultrasound or MRI pelvis with and without IV contrast may be helpful.6. Status post right chest tube placement with decreased small loculated right pleural effusion with fluid within the major fissure. 7. New mild to moderate intrahepatic biliary dilation. Correlation with LFTs recommended. Consider further evaluation with ERCP or MRCP with and without IV contrast.\par \par 8/7/19\par She is here with her son. Overall she is doing much better. She is in wheelchair but now less pain, eating better and Pleurax is only draining about 25 cc twice a week, She does complain of SOB still. I explained this will improve as well but asked her to follow up with Dr. Lanza of pulmonology. \par \par 8/28/19\par She is here for follow up. They are to see Dr. Lanza in Early September. There is minimal output from Pleurax. Pain is much better and does not use narcotics anymore.  Overall better. She has right side nasal congestion and states makes it hard to breath.\par \par 9/18/19\par She is doing well. She is due for cycle 7. Son was not eliana to schedule CTs prior to the visit but she looks better and better every visit and thus most likely still responding. She saw Dr. Lanza who started her on Flonase. She is due to see ENT Dr. Adams.  Appetite is better. No pains. Dr. Lanza is considering to remover the Pleurx possibly in November. \par \par 10/09/2019\par Patient is here for a follow up visit. Due for cycle 8 today. CT chest/abd/pelvis 09/2019 showed improvement in disease. Also has seen Dr. Adams and agreed to continue with Flonase as she has hypertrophy of turbinates. \par She is tolerating Ketryuda well. Her only complaint at this time is trouble in falling asleep. \par \par 10/30/19\par Patient is here for a follow up visit of Stage IV lung cancer with family member.  She is feeling well with no new complaints.   She feels dyspneic when taking a deep breath but her pain has improved.  Most recent CBC is stable. 09/22/2019 CT C/A/P showed improvement in right apical lung mass and pleural effusion and drained 150cc. Due for cycle 9 today. \par \par 12/30/19\par Patient is here for a follow-up visit for  lung cancer with son.  Reviewed imaging results with patient and her family, which suggests worsening effusion but otherwise stable.  They drained 500 cc from pleurx yesterday, reportedly sanguinous drainage.  She is agreeable to continue with cycle 12 of Keytruda tomorrow. She reports persistent dyspnea.  We discussed that this tx regimen may be losing its responsiveness.\par 12/27/2019 CT C/A/P IMPRESSION: Worsening right-sided pleural effusion. Right apical spiculated nodule without difference. Unchanged appearance of the mediastinum. Unchanged appearance of the third thoracic vertebral body and right second rib. No interval change since prior examination. No change in hepatic hypodensity (likely fatty infiltration), splenic hypodensity (too small to characterize), left adrenal nodule or 1 cm retroperitoneal nodule on the right. No change in fat-containing mass in the anterior left thigh.\par \par 2/10/2020\par She is here for follow-up accompanied by son.  Since last visit she was admitted to hospital for pain and nausea. She had pleurx catheter removed during most recent hospitalization on 1/13/2020.  She complains of insomnia but generally feels well.  Most recent scan reviewed with patient.  She reports she had a rash after discharge from the hospital which has since resolved.  \par 01/11/2020 CT Chest IMPRESSION: Since CT scan performed on 12/27/2019; 1.  Right thoracostomy tube in stable position with slight interval decrease of right pleural effusion. 2.   Interval increase in right middle lobe consolidation/atelectasis. 3.  Stable spiculated nodule in the right lung apex, consistent with patient's known malignancy.\par \par She feels much better now that her Pleurx is gone. Does not have any pain. Feels dyspneic which is stable.\par \par 3/2/2020\par She is here for follow-up for lung cancer accompanied by son. Since last visit, she followed with Dr. Lanza pulmonology, and Dr. Hirsch from [facility] who agreed against placing additional drainage catheters based on most recent CT imaging. She still reports some SOB. She also reports taking a [place] medication named Volidol and Panangin for here breathing. Patient reports mild dizziness after taking Trazodone; we recommended halving dose.  \par \par 5/4/2020\par She is here for follow-up for lung cancer accompanied by son. Her last treatment as in March due to COVID she is doing well. She was on Room air. She states SOB is the same. She has paraspinal back pain/hip pain. \par \par 6/15/2020\par She is here for follow-up for lung cancer accompanied by son.  Patient denies fever, chills, vomiting, worsening cough, new rash, persistent diarrhea or bleeding.  Reviewed most recent imaging and had a discussion regarding continuing with Keytruda every 6 weeks for now as she is clinically stable.  She does complain of weakness and nasal congestion which improves using Fluticasone spray.  She uses oxygen intermittently at home.  \par 05/09/2020 CT C/A/P IMPRESSION: 1. Stable spiculated right upper lobe mass measuring up to 3.1 cm.2. New areas of ground glass and reticular opacities within the medial left lung, with new subcentimeter nodular opacities within the left lower lobe. Findings may be postinfectious or postinflammatory in etiology. CT of the chest in 2-3 months is recommended. 3. Decreased mediastinal and right hilar lymphadenopathy. 4. Resolved right pleural effusion. 1.  No findings of new or progressive metastatic disease in the abdomen or pelvis. 2.  Indeterminate left adrenal nodule and right retroperitoneal nodule, stable in size from prior CT. 3.  Partially visualized fat-containing mass in the anterior left thigh-considerations include benign and malignant neoplastic etiology. Recommend further evaluation with MRI if not already performed. 4.  Sclerotic appearance of metastatic left acetabular lesion, similar in appearance to prior CT, possibly representing healing- was previously lytic on PET/CT of 4/17/2019. 5.  Lobulated pelvic structure again seen, possibly uterus with fibroids. Further evaluation with pelvic ultrasound may be performed as warranted.\par \par 9/9/20\par Patient is here for follow up accompanied by her son who speaks English . She has chronic SOB which as per son has been slowly getting worse. She uses O2 at home intermittently. She also uses Flonase and Albuterol inhaler. Otherwise she is mostly wheel chair bound and can walk back and forth to the bathroom by herself. Her appetite is good and weight is stable. No new bone pain/abdominal pain/ rash. \par \par 10/21/2020\par She is here for follow-up for lung cancer, accompanied by son.  Patient denies fever, chills, vomiting, new cough, new rash, persistent diarrhea or bleeding.  She is still pending reimaging, they report they have not received authorization for scan yet.  She still complains of weakness and nasal congestion, which improves using Fluticasone spray.  She uses oxygen intermittently at home.  She still has SOB.   Her only new complaint is intermittent back pruritus.  She is due for cycle 20 of Keytruda today.\par \par 12/2/2020\par She is here for follow-up for lung cancer, accompanied by son.  Patient denies fever, chills, vomiting, new cough, new rash, persistent diarrhea or bleeding.  She still complains of weakness, SOB and nasal congestion, uses the nebulizer once daily with occasional relief of her SOB.   She is due for cycle 21 of Keytruda today.  She is using allegra and or hydrocortisone 1% cream for the pruritus.  Reviewed most recent imaging which shows good treatment response.  \par 11/14/2020 CT C/A/P IMPRESSION: Since May 9, 2020: 1. No evidence of new intrathoracic or intra-abdominal metastatic disease. 2. Unchanged 2.5 x 2 cm right upper lobe pulmonary nodule, stable with similar measurement technique. 3. Decreased subcarinal lymphadenopathy, now 1.2 cm short axis, previously 1.5 cm short axis on May 9, 2020 CT with similar measurement technique. No evidence of additional suspicious lymphadenopathy by size criteria. 4. Near complete interval resolution of left lower lobe and lingula patchy airspace opacities. Unchanged areas of groundglass and reticular opacities within the medial left lung. Findings likely postinfectious or inflammatory. 5. Unchanged sclerotic appearance of metastatic left acetabular lesion,  possibly representing healing of previously FDG avid lytic on PET/CT of 4/17/2019. 6. Unchanged, partially imaged indeterminate lipomatous mass within the anterior left thigh.\par \par 1/13/21\par She is here for follow-up for lung cancer, accompanied by son.  She still complains of weakness, SOB daily which is unchanged.  She is due for cycle 22 of Keytruda today.  She continues to use Allegra and or hydrocortisone 1% cream for the pruritus.  Patient denies fever, chills, vomiting, new cough, new rash, persistent diarrhea or bleeding.  She also reports musculoskeletal pain.  \par \par 2/24/21\par She is here with her son. She is doing well but still has same complaints. mainly fatigue and SOB as well as MSK pain behind the left scapula. We had a long talk and I explained that the fatigue and SOB maybe due to kaytruda so we decided to stop it and observe her. On exam no changes. \par \par \par 5/17/21\par She is here for followup with her son. Her CT C/A/P in March 2021  showed Since November 14, 2020. No significant interval change approximate 2.5 cm x 2 cm right upper lobe nodule (series 4/33). Stable reticular opacities medial right upper lobe. Stable ill-defined subcarinal lymph nodes (series 4/88). Stable bibasilar subsegmental discoid atelectasis. No pleural effusions. No new sites of metastatic disease in the abdomen and pelvis. Previously described sclerotic left acetabular lesion felt to represent a healed metastasis based on prior PET/CT is poorly visualized on the current study. Unchanged, partially imaged indeterminate lipomatous mass within the anterior left thigh.\par Given that she been on  Keytruda now for 2 years with no progression of disease and she continued to have shortness of breath we decided to give her a treatment break.\par Today even well-being of treatment she still continues to have the same except complains specifically short of breath for which she had an extensive workup. She also has rhinorrhea  so she was referred to ENT again as per their request and I refilled Flonase\par \par 7/26/21\par She is here for follow up. She had CT C/A/P on 6/12/21: IMPRESSION: No CT evidence of new sites of metastatic disease in the chest, abdomen and pelvis. Stable 2.5 cm right upper lobe nodule. Interval decrease in size of subcarinal lymph node. Slight interval increase in reticular and groundglass opacities left lower lobe, possibly postinfectious/postinflammatory. Unchanged, partially imaged indeterminate lipomatous mass within the anterior left thigh.\par CBC is normal today. She is here with her son. No new symptoms has chronic rhinorrhea and chronic SOB with non specific pain in left scapula with unclear cause that has been present for years. \par \par 11/29/21 Patient is here for follow up for stage 4 lung NSCLC. She was on keytruda and has been on break since Feb 2021. Her last imaging in June 2021 showed stable disease. She has chronic complaints of fatigue, dyspnea, pain in her left side back and insomnia. All these complaints are chronic with no new worsening of symptoms. \par \par 2/9/22\par She returns for follow-up today for NSCLC, accompanied by son who helps with translation at patient request.  She is due for next cycle of immunotherapy today.  She continues to have the same chronic complaints now that she always had for the last several years including unclear left-sided pain, shortness of breath, she seen multiple specialists and we are unable to exactly determine the cause of her pain but I explained that the shortness of breath as before is possibly related to her lung cancer may be resulting in decreased function from previous Pleurx catheter leading to fibrosis and decrease in lung capacity.  She takes oxycodone 5mg as needed for the L sided pain.  Reviewed most recent CT imaging which shows increase in RUL nodule, L adrenal nodule and new tubular opacity within the right upper lobe but otherwise stable.  \par 12/18/2021 CT C/A/P IMPRESSION: Compared to CT chest 6/12/2021,Interval increase in size of the right upper lobe nodule now measuring 2.8 x 2.0 x 2.4 cm previously 2.5 x 2.0 x 2.0 cm. New tubular opacity within the right upper lobe.Significant interval increase in size of left adrenal nodule, now measuring 4.6 x 4.3 cm, highly suggestive of metastasis/collision tumor in this patient with history of lung cancer.  Right upper quadrant peritoneal nodules without significant change from prior. Continued attention on follow-up imaging.Partially imaged indeterminate lipomatous mass in the left thigh redemonstrated. Recommend MR evaluation.\par \par 3/23/22\par She returns for follow-up today for NSCLC, accompanied by son who helps with translation at patient request.  She is due for next cycle of immunotherapy today.  She continues to have the same chronic complaints now that she always had for the last several years including unclear left-sided pain, shortness of breath, she seen multiple specialists and we are unable to exactly determine the cause of her pain but I explained that the shortness of breath as before is possibly related to her lung cancer may be resulting in decreased function from previous Pleurx catheter leading to fibrosis and decrease in lung capacity.  She takes oxycodone 5 mg as needed for the L sided pain She has chronic SOB that she believes is due to her heart. 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  She was started on antidepressant by PCP but she is not taking it.\par \par 6/15/22\par She is here for follow up. She is due for cycle 27 of Keyturda. CBC from today is  Normal. She has pain in entire left side of chest including shouldr. She takes over the counter meds son does not know then name. SOB is the same. Lungs sounded clear except for decrease sounds in RLL which is same. \par \par 7/27/22\par She returns for follow-up today for NSCLC, accompanied by son who helps with Ivorian translation at patient request.  She is due for next cycle of immunotherapy today.  She continues to have the same chronic complaints that she always had for the last several years including unclear left-sided pain, shortness of breath, she seen multiple specialists and we are unable to exactly determine the cause of her pain.  She takes oxycodone 5 mg as needed for the L sided pain, requesting refill.  Reviewed most recent CT imaging which shows stable findings and continued treatment response.  Patient states she feels better when she receives the immunotherapy and therefore is requesting to go back to every 3 week frequency again.  \par 07/09/2022 CT C/A/P IMPRESSION:CHEST:Stable spiculated right apical lung mass, measuring maximally up to 3.2 cm.Previously seen right upper lobe 5 mm solid nodule is no longer visualized.Stable mediastinal prominent lymph nodes.Other previously noted pulmonary findings are difficult to examine given degree of respiratory motion.ABDOMEN/PELVIS:Interval decrease of left adrenal lesion, now measuring 2.1 x 2.1 cm (previously 3 x 2.2 cm).Decreased right upper quadrant soft tissue nodules measuring up to 0.6 cm (previously 1 cm).\par \par 8/17/22\par she is here for follow up. She is due for cycle 29 of keyturda now every 3 weeks.  CBC with mild anemia only. New new complaints SOB is stable. Has pruritus on back but no rash recommended a trial of Aquaphor.  States oxycodone helps with pain and night and sleeps much better.\par \par 09/07/2022 accompanied by her son; reports no changes in her generalized dull pains. She uses home remedies to "clean up her pulmonary pathways".\par \par 9/28/22\par She returns for follow-up today for NSCLC, accompanied by son who helps with Ivorian translation at patient request.  She is due for next cycle of immunotherapy today.  She continues to have the same chronic complaints that she always had for the last several years including unclear left-sided pain, shortness of breath, she seen multiple specialists and we are unable to exactly determine the cause of her pain.  She takes oxycodone 5 mg as needed for the L sided abd pain.  She still struggles with pruritus so we suggested to try gabapentin at a low dose and titrate up very slowly to see if it helps, however she did not see any improvement and did not like the way the gabapentin made her feel.  She has since stopped the gabapentin.  Reviewed most recent CBC which shows mild, normocytic anemia with hgb 11.3g/dL.  \par \par 10/19/2022\par She is here for frankie scott and is due for keytruda arturo serjior lung cancer. She had CT C/A/P done on 10/15/2022 but for some reason they were not compared. I reached out to radiologist and CT abd was comapred and tehre is no progression. I am waiting for the CT chest but basdon report there is no progression that i can tell by comparing the CTs. She still has same symptoms, ABRAHAM, pain in scapula area, pruritis aslo only at mid back bettween scapula. She did not tolerate gabapentin. we decided not to hold treatment for the pruritis and contineu. No rash on exam at all.

## 2022-10-20 LAB
ALBUMIN SERPL ELPH-MCNC: 4.2 G/DL
ALP BLD-CCNC: 69 U/L
ALT SERPL-CCNC: 9 U/L
ANION GAP SERPL CALC-SCNC: 9 MMOL/L
AST SERPL-CCNC: 14 U/L
BASOPHILS # BLD AUTO: 0 K/UL
BASOPHILS NFR BLD AUTO: 0 %
BILIRUB SERPL-MCNC: 0.4 MG/DL
BUN SERPL-MCNC: 22 MG/DL
CALCIUM SERPL-MCNC: 8.7 MG/DL
CHLORIDE SERPL-SCNC: 105 MMOL/L
CO2 SERPL-SCNC: 24 MMOL/L
CREAT SERPL-MCNC: 1.1 MG/DL
EGFR: 49 ML/MIN/1.73M2
EOSINOPHIL # BLD AUTO: 0.2 K/UL
EOSINOPHIL NFR BLD AUTO: 3.7 %
GLUCOSE SERPL-MCNC: 92 MG/DL
HCT VFR BLD CALC: 34.4 %
HGB BLD-MCNC: 11.3 G/DL
IMM GRANULOCYTES NFR BLD AUTO: 0.4 %
LYMPHOCYTES # BLD AUTO: 1.63 K/UL
LYMPHOCYTES NFR BLD AUTO: 29.9 %
MAN DIFF?: NORMAL
MCHC RBC-ENTMCNC: 27.7 PG
MCHC RBC-ENTMCNC: 32.8 G/DL
MCV RBC AUTO: 84.3 FL
MONOCYTES # BLD AUTO: 0.49 K/UL
MONOCYTES NFR BLD AUTO: 9 %
NEUTROPHILS # BLD AUTO: 3.12 K/UL
NEUTROPHILS NFR BLD AUTO: 57 %
PLATELET # BLD AUTO: 179 K/UL
POTASSIUM SERPL-SCNC: 4.1 MMOL/L
PROT SERPL-MCNC: 6.1 G/DL
RBC # BLD: 4.08 M/UL
RBC # FLD: 13.8 %
SODIUM SERPL-SCNC: 138 MMOL/L
T3 SERPL-MCNC: 101 NG/DL
T4 FREE SERPL-MCNC: 1.1 NG/DL
TSH SERPL-ACNC: 0.63 UIU/ML
WBC # FLD AUTO: 5.46 K/UL

## 2022-11-08 ENCOUNTER — APPOINTMENT (OUTPATIENT)
Dept: HEMATOLOGY ONCOLOGY | Facility: CLINIC | Age: 87
End: 2022-11-08

## 2022-11-08 LAB
BASOPHILS # BLD AUTO: 0.01 K/UL
BASOPHILS NFR BLD AUTO: 0.2 %
EOSINOPHIL # BLD AUTO: 0.14 K/UL
EOSINOPHIL NFR BLD AUTO: 3 %
HCT VFR BLD CALC: 33.9 %
HGB BLD-MCNC: 11.3 G/DL
IMM GRANULOCYTES NFR BLD AUTO: 0.2 %
LYMPHOCYTES # BLD AUTO: 1.51 K/UL
LYMPHOCYTES NFR BLD AUTO: 32.7 %
MAN DIFF?: NORMAL
MCHC RBC-ENTMCNC: 27.6 PG
MCHC RBC-ENTMCNC: 33.3 G/DL
MCV RBC AUTO: 82.9 FL
MONOCYTES # BLD AUTO: 0.35 K/UL
MONOCYTES NFR BLD AUTO: 7.6 %
NEUTROPHILS # BLD AUTO: 2.6 K/UL
NEUTROPHILS NFR BLD AUTO: 56.3 %
PLATELET # BLD AUTO: 202 K/UL
RBC # BLD: 4.09 M/UL
RBC # FLD: 14.1 %
WBC # FLD AUTO: 4.62 K/UL

## 2022-11-09 ENCOUNTER — APPOINTMENT (OUTPATIENT)
Dept: INFUSION THERAPY | Facility: CLINIC | Age: 87
End: 2022-11-09

## 2022-11-09 ENCOUNTER — APPOINTMENT (OUTPATIENT)
Dept: NEUROSURGERY | Facility: CLINIC | Age: 87
End: 2022-11-09

## 2022-11-09 ENCOUNTER — APPOINTMENT (OUTPATIENT)
Dept: HEMATOLOGY ONCOLOGY | Facility: CLINIC | Age: 87
End: 2022-11-09

## 2022-11-09 VITALS
RESPIRATION RATE: 16 BRPM | SYSTOLIC BLOOD PRESSURE: 135 MMHG | TEMPERATURE: 97.4 F | HEART RATE: 62 BPM | DIASTOLIC BLOOD PRESSURE: 74 MMHG | HEIGHT: 62 IN

## 2022-11-09 VITALS — HEIGHT: 62 IN | BODY MASS INDEX: 23.92 KG/M2 | WEIGHT: 130 LBS

## 2022-11-09 LAB
ALBUMIN SERPL ELPH-MCNC: 3.9 G/DL
ALP BLD-CCNC: 74 U/L
ALT SERPL-CCNC: 21 U/L
ANION GAP SERPL CALC-SCNC: 11 MMOL/L
AST SERPL-CCNC: 17 U/L
BILIRUB SERPL-MCNC: 0.5 MG/DL
BUN SERPL-MCNC: 24 MG/DL
CALCIUM SERPL-MCNC: 9.3 MG/DL
CHLORIDE SERPL-SCNC: 114 MMOL/L
CO2 SERPL-SCNC: 24 MMOL/L
CREAT SERPL-MCNC: 1.1 MG/DL
EGFR: 49 ML/MIN/1.73M2
GLUCOSE SERPL-MCNC: 100 MG/DL
POTASSIUM SERPL-SCNC: 4.7 MMOL/L
PROT SERPL-MCNC: 6.1 G/DL
SODIUM SERPL-SCNC: 149 MMOL/L
TSH SERPL-ACNC: 0.25 UIU/ML

## 2022-11-09 PROCEDURE — 99204 OFFICE O/P NEW MOD 45 MIN: CPT

## 2022-11-09 PROCEDURE — 99214 OFFICE O/P EST MOD 30 MIN: CPT

## 2022-11-09 RX ORDER — OXYCODONE HYDROCHLORIDE 5 MG/1
5 TABLET ORAL ONCE
Refills: 0 | Status: DISCONTINUED | OUTPATIENT
Start: 2022-11-09 | End: 2022-11-09

## 2022-11-09 RX ORDER — OXYCODONE AND ACETAMINOPHEN 5; 325 MG/1; MG/1
1 TABLET ORAL ONCE
Refills: 0 | Status: DISCONTINUED | OUTPATIENT
Start: 2022-11-09 | End: 2022-11-09

## 2022-11-09 RX ORDER — PEMBROLIZUMAB 25 MG/ML
200 INJECTION, SOLUTION INTRAVENOUS ONCE
Refills: 0 | Status: COMPLETED | OUTPATIENT
Start: 2022-11-09 | End: 2022-11-09

## 2022-11-09 RX ADMIN — PEMBROLIZUMAB 200 MILLIGRAM(S): 25 INJECTION, SOLUTION INTRAVENOUS at 12:47

## 2022-11-09 RX ADMIN — OXYCODONE AND ACETAMINOPHEN 1 TABLET(S): 5; 325 TABLET ORAL at 13:12

## 2022-11-10 LAB
ALBUMIN SERPL ELPH-MCNC: 4.1 G/DL
ALP BLD-CCNC: 70 U/L
ALT SERPL-CCNC: 21 U/L
ANION GAP SERPL CALC-SCNC: 12 MMOL/L
AST SERPL-CCNC: 19 U/L
BILIRUB SERPL-MCNC: 0.4 MG/DL
BUN SERPL-MCNC: 22 MG/DL
CALCIUM SERPL-MCNC: 8.8 MG/DL
CHLORIDE SERPL-SCNC: 107 MMOL/L
CO2 SERPL-SCNC: 18 MMOL/L
CREAT SERPL-MCNC: 1 MG/DL
EGFR: 55 ML/MIN/1.73M2
GLUCOSE SERPL-MCNC: 99 MG/DL
POTASSIUM SERPL-SCNC: 3.9 MMOL/L
PROT SERPL-MCNC: 6.2 G/DL
SODIUM SERPL-SCNC: 137 MMOL/L

## 2022-11-10 NOTE — HISTORY OF PRESENT ILLNESS
[de-identified] : 87-year-old female who presents for neurosurgical consultation accompanied by her son.  As review, she has a past medical history significant for stage IV metastatic lung CA, adenocarcinoma- NSCLC, which was diagnosed in August 2019.  She was previously on Keytruda for disease management but it appears as though this regimen has recently stopped. Moderate to severe left scapular pain reported which has been present for an undetermined amount of time.  Her son reports nearly chronic pain involving the region with discomfort upon palpation.  Patient reports difficulty with deep inspiration at times due to the extent of her pain.  CT chest has been reviewed at this time, I have also reviewed studies from 2019.  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  She has not undergone pain management consultation and she has also not trialed interventional nor conservative managements for the region.  She continues with pain control as issued by her oncologist.\par \par CT Chest: 10/2022- [facility]- chronic moderate compression deformity of T3.\par \par PHYSICAL EXAM: \par Constitutional: Well appearing, no distress\par HEENT: Normocephalic Atraumatic\par Psychiatric: Alert and oriented to all spheres, normal mood\par Pulmonary: No respiratory distress\par \par Neurologic: Patient with kyphotic posturing, wheelchair bound\par CN II-XII grossly intact\par Palpation: (+) left scapula tenderness to palpation, no precise thoracic tenderness to palpation\par Strength: strength intact, reduced efforts\par Sensation: Full sensation to light touch in all extremities\par Reflexes: \par  2+ biceps\par  2+ triceps\par \par  No José's\par  No clonus\par \par ROM limited in all directions\par \par Gait: wheelchair\par

## 2022-11-10 NOTE — ASSESSMENT
[FreeTextEntry1] : 86 yo F hx of Stage IV metastatic NSCLC, diagnosed 2019. She presents with reports of long standing left scapular pain which can correlate with T3 chronic compression deformity (imaging from 2019 confirms presence). I have advised that she can proceed with interventional pain management treatments.  Kyphoplasty is not indicated considering the chronicity of the compression deformity. She and her son express understanding and I have referred her to Dr. Sarmiento of Pain Management to pursue treatment efforts.\par \par All questions and concerns have been answered and they remains agreeable to the above mentioned plan.\par \par She can return to our office as needed moving forward.\par \par Florence Greene PA-C\par Sheila Weaver MD

## 2022-11-11 LAB — TSH SERPL-ACNC: 0.21 UIU/ML

## 2022-11-11 NOTE — REVIEW OF SYSTEMS
[Night Sweats] : night sweats [Fatigue] : fatigue [Shortness Of Breath] : shortness of breath [SOB on Exertion] : shortness of breath during exertion [Constipation] : constipation [Joint Pain] : joint pain [Muscle Weakness] : muscle weakness [Difficulty Walking] : difficulty walking [Negative] : Allergic/Immunologic [Recent Change In Weight] : ~T no recent weight change [Dysphagia] : no dysphagia [Muscle Pain] : no muscle pain [Skin Rash] : no skin rash [FreeTextEntry2] : seated in wheelchair [FreeTextEntry4] : chronic nasal congestion [FreeTextEntry6] : SOB for years - unchanged [FreeTextEntry7] : occasional hemorrhoidal bleeding  [FreeTextEntry9] : chronic L sided pain [de-identified] : pruritus at her back

## 2022-11-11 NOTE — HISTORY OF PRESENT ILLNESS
[Therapy: ___] : Therapy: [unfilled] [de-identified] : 08/08/2019 \par This is a 83 year old female who I initially met in [facility] on 3/28/19. She was initially admitted on 2/20/2019 for a right loculated pleural effusion approx. 900 CCs, no PE and right apical 3.1x3.0 cm mass with pleural nodules on CTA of chest. Pt returned to ED on 3/15/2019 for right pleural effusion, had a thoracentesis in ED and was sent home. She than came back for SOB  due to recurrent effusion and a Pleurx was placed. Pathology showed adenocarcinoma. \par \par She ha lost a few pounds about  4. She never smoked. She does have weakness.  From her Pleurx she  has 500 cc removed every other day. She has pain after.\par \par Work up:\par 02/20/2019 CTA chest:  Large right pleural effusion, maximal axial width of 7 cm correlating with an estimated volume of 900 cc. Associated near complete compressive atelectasis of the right lower lobe. Probable loculations of the effusion seen at the apex and at the right heart border (for example series 5 images 176, 52; series 9 image 69). 1 cm right upper lobe fissural nodule (series 5 image 139). Patent central airways. There is right apical 3.1 x 3.0 cm mass with irregular right apical pleural thickening (series 7, image 44), and multiple satellite pleural nodules including a more caudad 2.7 cm para-mediastinal nodule (series 7, image 175). Tissue sampling recommended.\par \par PATHOLOGY:\par 03/25/2019 Pleural fluid: Cell block material is hypocellular and shows a single minutecluster of malignant cells.Immunohistochemistry studies were performed at [facility] and the\par results are noncontributory\par 03/22/2019: Addendum\par Cell block material shows macrophages (positive ),mesothelial cells (positive calretinin) and a single very minute cluster of atypical suspicious cells is negative for Fidel-Ep4.Material is insufficient for further diagnostic studies (ie, immunohistochemistry).\par \par 03/15/2019: Pleural fluid: Addendum Immunohistochemical studies were performed on the cell block at [facility], [facility], [address] [place] [address], [facility] (see NOTE). The results are as follows:\par CK7-                     Rare mesothelial cells positive\par CK20-                   Negative\par CK 5/6-                 Rare mesothelial cells positive\par Calretinin-             Scattered mesothelial cells positive\par CD68-                   Numerous histiocytes positive\par BerEP-4-               Negative\par Napsin-A-             Negative\par TTF-1-                  Negative\par These results are non-specific, suggestive of mesothelialhyperplasia. The few markedly atypical cells seen in the smear are not evident in the cell block.\par The atypical cells seen in the current smear are not seen in the prior pleural fluid Thinprep smear or cell block (14-AN-).\par Few marked atypical cells, suspicious for mesothelioma versus adenocarcinoma, in a background of numerous mesothelial cells, histiocytes and small lymphocytes.\par 03/25/2019: Final Diagnosis - POSITIVE FOR MALIGNANT CELLS. Favor metastatic adenocarcinoma. Pending cell block.\par I spoke to Dr. Allison  and there are only a few cells thus further studies such as IHC, molecular PD-l1 is not possible.\par  [FreeTextEntry1] : 05/17/2019 Started Keytruda, 05/04/2020 changed to 6 week dosing starting; 01/13/2021 last treatment; 07/27/2022 the patient prefers every 3 week dosing. [de-identified] : 6/5/19\par She is here for follow up. Since last visit she started Keytruda. She had a PET/CT on April 17 that showed  Extensive FDG avid right-sided pleural mass/soft tissue thickening, \par with max SUV 21.5 within a right apical pleural soft tissue mass.\par 2.  Multiple FDG avid mediastinal lymph nodes, with max SUV 16.5 within a 2.6 x 2.1 cm subcarinal node.\par 3.  FDG avid lytic lesion within the left superior acetabulum (max SUV 14.8), suspicious for osseous metastasis.\par 4.  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 Used to be  500 ml  u 3 times a week. Takes 60 mg of oral morphine a day 20 mg 3 times a day but pain is still severe. Has not had a bowel movement in unknown amount of days does not take laxatives although she has laxatives. \par \par 6/26/19 She is here for follow up. She is due for cycle 3 of Keytruda. She has about 300 mg drained from her pleura twice a week. Has SOB but saturated 94% on room air and 94% on ambulation. Did not tolerate the fentanyl path had nausea. On Morphine 10 mg BID doing well. Constipation is better. \par \par 7/17/19\par She is here for follow-up of Stage IV lung cancer with son.  She is due for cycle 3 of Keytruda.  CBC is stable from today.  She only takes the liquid morphine at night now.  She started eating slightly more and has been feeling slightly better ,as per son.  He states that the pleurex drainage is less, it is checked every Friday + Monday.  On Friday there was about 225 cc drained, but yesterday no drainage.  We will arrange for hydration today since she has had poor oral intake lately and small appetite.  Son reports she can tolerate fluids but she has early satiety with food.  Patient also reports feeling short of breath at rest, but her O2 sat is 98% on R/a.  Right sided pain is better. \par 07/06/2019 CT C/A/P IMPRESSION: Since April 17, 2019: 1. No definite new sites of metastatic disease. 2. Right-sided pleural soft tissue mass/thickening, overall appearing decreased since April 17, 2019 with primarily residual right apical tumor. Tumor appears to infiltrate the mediastinum.3. No significant change in mediastinal lymphadenopathy including largest 2.6 x 2.1 cm subcarinal lymph node which remains unchanged. Retrocrural 2.5 x 2.3 cm metastasis or additional site of pleural tumor, unchanged.4. Irregularity of right anterior first rib, probably invaded by tumor, stable in retrospect. Left superior acetabular lucent lesion corresponding to FDG avid bone metastasis.5. Right pelvic indeterminate 7 cm mass; possible retroverted uterus with degenerating fibroid or less likely ovarian mass; previously non-FDG avid and probably benign. Further evaluation with pelvic ultrasound or MRI pelvis with and without IV contrast may be helpful.6. Status post right chest tube placement with decreased small loculated right pleural effusion with fluid within the major fissure. 7. New mild to moderate intrahepatic biliary dilation. Correlation with LFTs recommended. Consider further evaluation with ERCP or MRCP with and without IV contrast.\par \par 8/7/19\par She is here with her son. Overall she is doing much better. She is in wheelchair but now less pain, eating better and Pleurax is only draining about 25 cc twice a week, She does complain of SOB still. I explained this will improve as well but asked her to follow up with Dr. Lanza of pulmonology. \par \par 8/28/19\par She is here for follow up. They are to see Dr. Lanza in Early September. There is minimal output from Pleurax. Pain is much better and does not use narcotics anymore.  Overall better. She has right side nasal congestion and states makes it hard to breath.\par \par 9/18/19\par She is doing well. She is due for cycle 7. Son was not eliana to schedule CTs prior to the visit but she looks better and better every visit and thus most likely still responding. She saw Dr. Lanza who started her on Flonase. She is due to see ENT Dr. Adams.  Appetite is better. No pains. Dr. Lanza is considering to remover the Pleurx possibly in November. \par \par 10/09/2019\par Patient is here for a follow up visit. Due for cycle 8 today. CT chest/abd/pelvis 09/2019 showed improvement in disease. Also has seen Dr. Adams and agreed to continue with Flonase as she has hypertrophy of turbinates. \par She is tolerating Ketryuda well. Her only complaint at this time is trouble in falling asleep. \par \par 10/30/19\par Patient is here for a follow up visit of Stage IV lung cancer with family member.  She is feeling well with no new complaints.   She feels dyspneic when taking a deep breath but her pain has improved.  Most recent CBC is stable. 09/22/2019 CT C/A/P showed improvement in right apical lung mass and pleural effusion and drained 150cc. Due for cycle 9 today. \par \par 12/30/19\par Patient is here for a follow-up visit for  lung cancer with son.  Reviewed imaging results with patient and her family, which suggests worsening effusion but otherwise stable.  They drained 500 cc from pleurx yesterday, reportedly sanguinous drainage.  She is agreeable to continue with cycle 12 of Keytruda tomorrow. She reports persistent dyspnea.  We discussed that this tx regimen may be losing its responsiveness.\par 12/27/2019 CT C/A/P IMPRESSION: Worsening right-sided pleural effusion. Right apical spiculated nodule without difference. Unchanged appearance of the mediastinum. Unchanged appearance of the third thoracic vertebral body and right second rib. No interval change since prior examination. No change in hepatic hypodensity (likely fatty infiltration), splenic hypodensity (too small to characterize), left adrenal nodule or 1 cm retroperitoneal nodule on the right. No change in fat-containing mass in the anterior left thigh.\par \par 2/10/2020\par She is here for follow-up accompanied by son.  Since last visit she was admitted to hospital for pain and nausea. She had pleurx catheter removed during most recent hospitalization on 1/13/2020.  She complains of insomnia but generally feels well.  Most recent scan reviewed with patient.  She reports she had a rash after discharge from the hospital which has since resolved.  \par 01/11/2020 CT Chest IMPRESSION: Since CT scan performed on 12/27/2019; 1.  Right thoracostomy tube in stable position with slight interval decrease of right pleural effusion. 2.   Interval increase in right middle lobe consolidation/atelectasis. 3.  Stable spiculated nodule in the right lung apex, consistent with patient's known malignancy.\par \par She feels much better now that her Pleurx is gone. Does not have any pain. Feels dyspneic which is stable.\par \par 3/2/2020\par She is here for follow-up for lung cancer accompanied by son. Since last visit, she followed with Dr. Lanza pulmonology, and Dr. Hirsch from [facility] who agreed against placing additional drainage catheters based on most recent CT imaging. She still reports some SOB. She also reports taking a [place] medication named Volidol and Panangin for here breathing. Patient reports mild dizziness after taking Trazodone; we recommended halving dose.  \par \par 5/4/2020\par She is here for follow-up for lung cancer accompanied by son. Her last treatment as in March due to COVID she is doing well. She was on Room air. She states SOB is the same. She has paraspinal back pain/hip pain. \par \par 6/15/2020\par She is here for follow-up for lung cancer accompanied by son.  Patient denies fever, chills, vomiting, worsening cough, new rash, persistent diarrhea or bleeding.  Reviewed most recent imaging and had a discussion regarding continuing with Keytruda every 6 weeks for now as she is clinically stable.  She does complain of weakness and nasal congestion which improves using Fluticasone spray.  She uses oxygen intermittently at home.  \par 05/09/2020 CT C/A/P IMPRESSION: 1. Stable spiculated right upper lobe mass measuring up to 3.1 cm.2. New areas of ground glass and reticular opacities within the medial left lung, with new subcentimeter nodular opacities within the left lower lobe. Findings may be postinfectious or postinflammatory in etiology. CT of the chest in 2-3 months is recommended. 3. Decreased mediastinal and right hilar lymphadenopathy. 4. Resolved right pleural effusion. 1.  No findings of new or progressive metastatic disease in the abdomen or pelvis. 2.  Indeterminate left adrenal nodule and right retroperitoneal nodule, stable in size from prior CT. 3.  Partially visualized fat-containing mass in the anterior left thigh-considerations include benign and malignant neoplastic etiology. Recommend further evaluation with MRI if not already performed. 4.  Sclerotic appearance of metastatic left acetabular lesion, similar in appearance to prior CT, possibly representing healing- was previously lytic on PET/CT of 4/17/2019. 5.  Lobulated pelvic structure again seen, possibly uterus with fibroids. Further evaluation with pelvic ultrasound may be performed as warranted.\par \par 9/9/20\par Patient is here for follow up accompanied by her son who speaks English . She has chronic SOB which as per son has been slowly getting worse. She uses O2 at home intermittently. She also uses Flonase and Albuterol inhaler. Otherwise she is mostly wheel chair bound and can walk back and forth to the bathroom by herself. Her appetite is good and weight is stable. No new bone pain/abdominal pain/ rash. \par \par 10/21/2020\par She is here for follow-up for lung cancer, accompanied by son.  Patient denies fever, chills, vomiting, new cough, new rash, persistent diarrhea or bleeding.  She is still pending reimaging, they report they have not received authorization for scan yet.  She still complains of weakness and nasal congestion, which improves using Fluticasone spray.  She uses oxygen intermittently at home.  She still has SOB.   Her only new complaint is intermittent back pruritus.  She is due for cycle 20 of Keytruda today.\par \par 12/2/2020\par She is here for follow-up for lung cancer, accompanied by son.  Patient denies fever, chills, vomiting, new cough, new rash, persistent diarrhea or bleeding.  She still complains of weakness, SOB and nasal congestion, uses the nebulizer once daily with occasional relief of her SOB.   She is due for cycle 21 of Keytruda today.  She is using allegra and or hydrocortisone 1% cream for the pruritus.  Reviewed most recent imaging which shows good treatment response.  \par 11/14/2020 CT C/A/P IMPRESSION: Since May 9, 2020: 1. No evidence of new intrathoracic or intra-abdominal metastatic disease. 2. Unchanged 2.5 x 2 cm right upper lobe pulmonary nodule, stable with similar measurement technique. 3. Decreased subcarinal lymphadenopathy, now 1.2 cm short axis, previously 1.5 cm short axis on May 9, 2020 CT with similar measurement technique. No evidence of additional suspicious lymphadenopathy by size criteria. 4. Near complete interval resolution of left lower lobe and lingula patchy airspace opacities. Unchanged areas of groundglass and reticular opacities within the medial left lung. Findings likely postinfectious or inflammatory. 5. Unchanged sclerotic appearance of metastatic left acetabular lesion,  possibly representing healing of previously FDG avid lytic on PET/CT of 4/17/2019. 6. Unchanged, partially imaged indeterminate lipomatous mass within the anterior left thigh.\par \par 1/13/21\par She is here for follow-up for lung cancer, accompanied by son.  She still complains of weakness, SOB daily which is unchanged.  She is due for cycle 22 of Keytruda today.  She continues to use Allegra and or hydrocortisone 1% cream for the pruritus.  Patient denies fever, chills, vomiting, new cough, new rash, persistent diarrhea or bleeding.  She also reports musculoskeletal pain.  \par \par 2/24/21\par She is here with her son. She is doing well but still has same complaints. mainly fatigue and SOB as well as MSK pain behind the left scapula. We had a long talk and I explained that the fatigue and SOB maybe due to kaytruda so we decided to stop it and observe her. On exam no changes. \par \par \par 5/17/21\par She is here for followup with her son. Her CT C/A/P in March 2021  showed Since November 14, 2020. No significant interval change approximate 2.5 cm x 2 cm right upper lobe nodule (series 4/33). Stable reticular opacities medial right upper lobe. Stable ill-defined subcarinal lymph nodes (series 4/88). Stable bibasilar subsegmental discoid atelectasis. No pleural effusions. No new sites of metastatic disease in the abdomen and pelvis. Previously described sclerotic left acetabular lesion felt to represent a healed metastasis based on prior PET/CT is poorly visualized on the current study. Unchanged, partially imaged indeterminate lipomatous mass within the anterior left thigh.\par Given that she been on  Keytruda now for 2 years with no progression of disease and she continued to have shortness of breath we decided to give her a treatment break.\par Today even well-being of treatment she still continues to have the same except complains specifically short of breath for which she had an extensive workup. She also has rhinorrhea  so she was referred to ENT again as per their request and I refilled Flonase\par \par 7/26/21\par She is here for follow up. She had CT C/A/P on 6/12/21: IMPRESSION: No CT evidence of new sites of metastatic disease in the chest, abdomen and pelvis. Stable 2.5 cm right upper lobe nodule. Interval decrease in size of subcarinal lymph node. Slight interval increase in reticular and groundglass opacities left lower lobe, possibly postinfectious/postinflammatory. Unchanged, partially imaged indeterminate lipomatous mass within the anterior left thigh.\par CBC is normal today. She is here with her son. No new symptoms has chronic rhinorrhea and chronic SOB with non specific pain in left scapula with unclear cause that has been present for years. \par \par 11/29/21 Patient is here for follow up for stage 4 lung NSCLC. She was on keytruda and has been on break since Feb 2021. Her last imaging in June 2021 showed stable disease. She has chronic complaints of fatigue, dyspnea, pain in her left side back and insomnia. All these complaints are chronic with no new worsening of symptoms. \par \par 2/9/22\par She returns for follow-up today for NSCLC, accompanied by son who helps with translation at patient request.  She is due for next cycle of immunotherapy today.  She continues to have the same chronic complaints now that she always had for the last several years including unclear left-sided pain, shortness of breath, she seen multiple specialists and we are unable to exactly determine the cause of her pain but I explained that the shortness of breath as before is possibly related to her lung cancer may be resulting in decreased function from previous Pleurx catheter leading to fibrosis and decrease in lung capacity.  She takes oxycodone 5mg as needed for the L sided pain.  Reviewed most recent CT imaging which shows increase in RUL nodule, L adrenal nodule and new tubular opacity within the right upper lobe but otherwise stable.  \par 12/18/2021 CT C/A/P IMPRESSION: Compared to CT chest 6/12/2021,Interval increase in size of the right upper lobe nodule now measuring 2.8 x 2.0 x 2.4 cm previously 2.5 x 2.0 x 2.0 cm. New tubular opacity within the right upper lobe.Significant interval increase in size of left adrenal nodule, now measuring 4.6 x 4.3 cm, highly suggestive of metastasis/collision tumor in this patient with history of lung cancer.  Right upper quadrant peritoneal nodules without significant change from prior. Continued attention on follow-up imaging.Partially imaged indeterminate lipomatous mass in the left thigh redemonstrated. Recommend MR evaluation.\par \par 3/23/22\par She returns for follow-up today for NSCLC, accompanied by son who helps with translation at patient request.  She is due for next cycle of immunotherapy today.  She continues to have the same chronic complaints now that she always had for the last several years including unclear left-sided pain, shortness of breath, she seen multiple specialists and we are unable to exactly determine the cause of her pain but I explained that the shortness of breath as before is possibly related to her lung cancer may be resulting in decreased function from previous Pleurx catheter leading to fibrosis and decrease in lung capacity.  She takes oxycodone 5 mg as needed for the L sided pain She has chronic SOB that she believes is due to her heart. 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  She was started on antidepressant by PCP but she is not taking it.\par \par 6/15/22\par She is here for follow up. She is due for cycle 27 of Keyturda. CBC from today is  Normal. She has pain in entire left side of chest including shouldr. She takes over the counter meds son does not know then name. SOB is the same. Lungs sounded clear except for decrease sounds in RLL which is same. \par \par 7/27/22\par She returns for follow-up today for NSCLC, accompanied by son who helps with Fijian translation at patient request.  She is due for next cycle of immunotherapy today.  She continues to have the same chronic complaints that she always had for the last several years including unclear left-sided pain, shortness of breath, she seen multiple specialists and we are unable to exactly determine the cause of her pain.  She takes oxycodone 5 mg as needed for the L sided pain, requesting refill.  Reviewed most recent CT imaging which shows stable findings and continued treatment response.  Patient states she feels better when she receives the immunotherapy and therefore is requesting to go back to every 3 week frequency again.  \par 07/09/2022 CT C/A/P IMPRESSION:CHEST:Stable spiculated right apical lung mass, measuring maximally up to 3.2 cm.Previously seen right upper lobe 5 mm solid nodule is no longer visualized.Stable mediastinal prominent lymph nodes.Other previously noted pulmonary findings are difficult to examine given degree of respiratory motion.ABDOMEN/PELVIS:Interval decrease of left adrenal lesion, now measuring 2.1 x 2.1 cm (previously 3 x 2.2 cm).Decreased right upper quadrant soft tissue nodules measuring up to 0.6 cm (previously 1 cm).\par \par 8/17/22\par she is here for follow up. She is due for cycle 29 of keyturda now every 3 weeks.  CBC with mild anemia only. New new complaints SOB is stable. Has pruritus on back but no rash recommended a trial of Aquaphor.  States oxycodone helps with pain and night and sleeps much better.\par \par 09/07/2022 accompanied by her son; reports no changes in her generalized dull pains. She uses home remedies to "clean up her pulmonary pathways".\par \par 9/28/22\par She returns for follow-up today for NSCLC, accompanied by son who helps with Fijian translation at patient request.  She is due for next cycle of immunotherapy today.  She continues to have the same chronic complaints that she always had for the last several years including unclear left-sided pain, shortness of breath, she seen multiple specialists and we are unable to exactly determine the cause of her pain.  She takes oxycodone 5 mg as needed for the L sided abd pain.  She still struggles with pruritus so we suggested to try gabapentin at a low dose and titrate up very slowly to see if it helps, however she did not see any improvement and did not like the way the gabapentin made her feel.  She has since stopped the gabapentin.  Reviewed most recent CBC which shows mild, normocytic anemia with hgb 11.3g/dL.  \par \par 10/19/2022\par She is here for frankie scott and is due for keytruda fo rher lung cancer. She had CT C/A/P done on 10/15/2022 but for some reason they were not compared. I reached out to radiologist and CT abd was comapred and tehre is no progression. I am waiting for the CT chest but amee report there is no progression that i can tell by comparing the CTs. She still has same symptoms, ABRAHAM, pain in scapula area, pruritis aslo only at mid back bettween scapula. She did not tolerate gabapentin. we decided not to hold treatment for the pruritis and contineu. No rash on exam at all. \par \par 11/09/2022 \par accompanied by her son; Reports being in pain (back) as she forgot to take her pain medications, no changes in chronic conditions or new complaints since the last visit.\par

## 2022-11-11 NOTE — ASSESSMENT
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 \par - resumed Keytruda on 12/29/2021.\par - 07/09/2022 CT C/A/P with IVC shows stable findings and continued treatment response.\par - 10/15/2022 CT C/A/P no progression  on CT A/P awaiting CT chest addendum but likely has not progressed based on review.\par \par # Dysphagia and decrease appetite - better now \par - c/w Jevity 1.2 hector BiD \par - she is followed by nutritionist, Tila Villanueva \par \par # SOB mainly when she tries to sleep; due to right nasal congestion maybe from Keytruda, Keytruda was stopped and SOB did not change\par - c/w Flonase , Rx refilled\par - On Albuterol + nebulizer PRN\par - She follows with Dr Lanza as indicated\par - Advised to use saline nasal spray as she has c/o dryness in her nose \par \par # Some insomnia \par - c/w Melatonin 5 mg at bedtime \par \par # Pruritus, back; may be side effect of Keytruda?\par - has hydrocortisone 1% or Benadryl PRN for symptom relief if happens again\par - she STOPPED gabapentin due to side effects.\par \par # Pain at T spine right at the level of scapula with tenderness maybe from T3 compression fracture \par - c/w oxycodone 5 mg every 8 hours as needed - Reviewed risks and benefits of narcotic consumption and prescribed dosing/frequency with patient.\par -will have her see neurosurgery Dr. Weaver. She may need MR T spine but no mass is reported on CT  \par \par PLAN:\par \par - continue Keytruda 200mg IV (every 3 week dosing as of 7/27/2022) - C33 GIVE TODAY.\par \par - continue oxycodone 5 mg every 8 hours as needed for pain with bowel regimen - GIVE 1 tab now with treatment.\par \par - F/U with Neurosuegery for management of T3 compression fracture.\par \par - Labwork today:  CBC, CMP, TSH, T3, FT4 today \par \par RTC in 3 weeks for OV and Keytruda treatment.

## 2022-11-11 NOTE — PHYSICAL EXAM
[Capable of only limited self care, confined to bed or chair more than 50% of waking hours] : Status 3- Capable of only limited self care, confined to bed or chair more than 50% of waking hours [Normal] : affect appropriate [de-identified] : seated in wheelchair, but does stand up by herself [de-identified] : Lungs sounded clear today  [de-identified] : L thigh lipoma [de-identified] : No rash

## 2022-11-11 NOTE — END OF VISIT
[FreeTextEntry3] : I was physically present for the key portions of the evaluation and management service provided.  I agree with the history and physical, and plan which I have reviewed and edited where appropriate.\par \par She returns for follow-up today.  It is possibly that her pain that she has been having was due to the compression fracture and she is planning to see Dr. Weaver  today.  She continues to have pruritus in the back area which may be from Keytruda she has creams does only use gabapentin I am concerned about stopping Keytruda at this point in time given that she had progression in the past but may possibly do so in the future.  For now continue with current pain meds and supportive measures proceed with Keytruda she will see us back in 3 weeks.  Most recent imaging was in October 15 and there was no progression plan to repeat again probably around sometime in January 2023

## 2022-11-14 ENCOUNTER — APPOINTMENT (OUTPATIENT)
Dept: PAIN MANAGEMENT | Facility: CLINIC | Age: 87
End: 2022-11-14
Payer: SELF-PAY

## 2022-11-14 VITALS — WEIGHT: 130 LBS | HEIGHT: 62 IN | BODY MASS INDEX: 23.92 KG/M2

## 2022-11-14 DIAGNOSIS — M54.9 DORSALGIA, UNSPECIFIED: ICD-10-CM

## 2022-11-14 PROCEDURE — 99204 OFFICE O/P NEW MOD 45 MIN: CPT

## 2022-11-14 PROCEDURE — 99214 OFFICE O/P EST MOD 30 MIN: CPT

## 2022-11-18 ENCOUNTER — APPOINTMENT (OUTPATIENT)
Dept: PAIN MANAGEMENT | Facility: CLINIC | Age: 87
End: 2022-11-18
Payer: MEDICAID

## 2022-11-18 PROCEDURE — 93770 DETERMINATION VENOUS PRESS: CPT

## 2022-11-18 PROCEDURE — 62321 NJX INTERLAMINAR CRV/THRC: CPT

## 2022-11-18 PROCEDURE — 94761 N-INVAS EAR/PLS OXIMETRY MLT: CPT

## 2022-11-18 PROCEDURE — 93040 RHYTHM ECG WITH REPORT: CPT | Mod: 79

## 2022-11-18 NOTE — PHYSICAL EXAM
[de-identified] : Gen: NAD, patient sitting in a wheelchair, leaning forward\par Head: NC/AT\par Eyes: no glasses, no scleral icterus\par ENT: patient wearing a mask\par CV: RRR, S1 S2, no mrg\par Lungs: CTAB, shallow breathing\par Abd: soft, NT/ND\par Ext: full ROM in all extremities, no peripheral edema\par Back: +focal TTP in the left upper thoracic facet region and over the spinous process; no other paraspinal tenderness\par Neuro: CN intact\par UEs\par +5 L +5 R shoulder abduction\par +5 L +5 R arm abduction\par +5 L +5 R forearm flexion\par +5 L +5 R forearm extension\par +5 L +5 R finger flexion\par +5 L +5 R  strength\par LEs\par +5 L +5 R hip flexion\par +5 L +5 R leg extension\par +5 L +5 R leg flexion\par +5 L +5 R foot dorsiflexion\par +5 L +5 R foot plantarflexion\par +5 L +5 R EHL extension\par Psych: normal affect\par Skin: no visible lesions\par

## 2022-11-18 NOTE — PROCEDURE
[FreeTextEntry1] : T3-T4 interlaminar epidural steroid injection [FreeTextEntry2] : Thoracic vertebral compression fracture [FreeTextEntry3] : Procedure Date: 11/18/2022\par \par Preoperative Diagnosis: thoracic vertebral compression fracture\par \par Procedure: T3-T4 epidural steroid injection under fluoroscopic guidance\par \par Anesthesia: local\par \par Complications: none\par \par EBL: none\par \par Procedure in detail:\par Patient was seen and examined. Risks, benefits, and alternatives for the procedure were discussed with the patient in detail. The patient expressed understanding, gave written and verbal consent, and placed themselves in a prone position on the procedure table. Skin overlying the upper thoracic spine was prepped with chloraprep and draped in the usual sterile fashion. Fluoroscopic images were obtained to identify the T3 and T4 vertebral body. Target was the interlaminar space between the T3 and T4 vertebral body. Skin overlying the target was marked and infiltrated with 1% lidocaine. Using an 18 gauge gauge, 9cm Tuohy needle, this was inserted and advanced under intermittent fluoroscopic guidance. When felt to be engaged in the ligamentum flavum, contralateral oblique view was used to confirm depth. Needle then advanced using loss of resistance to saline technique until loss was achieved. After negative aspiration for heme/csf, contrast was injected under live fluoroscopy, which showed contrast spread consistent with epidural flow. No evidence of intravascular or intrathecal uptake. At this point, a total of 3ml of injectate, which consisted of 1ml of 80mg/ml depomedrol and 2ml of 0.25% bupivacaine was injected. The needle was restyletted and withdrawn, a band aid was placed over the injection site. Patient tolerated the procedure well. The patient recovered uneventfully and was discharged home in stable condition.

## 2022-11-18 NOTE — HISTORY OF PRESENT ILLNESS
[FreeTextEntry1] : Patient is an 86 y/o woman presenting for a NPV for a history of pain in the upper back. The patient states that she initially had pain in the back in 2019 and was diagnosed with metastatic lung adenocarcinoma. The patient has been treated with keytruda for her cancer in 2019 and states that her symptoms improved. However, she developed new onset of pain in the left upper back in late 2019/early 2020 that has persisted since that time. Imaging performed during that time showed a T3 compression fracture, but the patient did not receive any specific treatment for this. She has been maintained on chronic opioid therapy since she was diagnosed with cancer and currently takes oxycodone 5mg 1-2x/day prn. At this point, the patient describes a sharp pain in the left upper back medial to the left scapula. The patient feels that this pain prevents her from taking deep breaths. No radiation of the pain to the lateral or anterior chest wall. No focal numbness or weakness in the trunk or upper or lower extremities.

## 2022-11-18 NOTE — DISCUSSION/SUMMARY
[de-identified] : Patient is an 88 y/o woman presenting for a NPV for a history of chronic left-sided upper back pain and a chronic T3 compression fracture. Patient has been maintained on chronic opioid therapy for this pain and cancer related pain since ~2019.\par 1) Schedule T2-T3 interlaminar epidural steroid injection angled to the left of midline\par 2) If no improvement, consider LEFT T2-T3, T3-T4 intra articular facet injections\par 3) Continue oxycodone 5mg BID prn through other provider\par 4) RTC post procedure

## 2022-11-18 NOTE — DATA REVIEWED
[CT Scan] : CT scan [Thoracic Spine] : thoracic spine [Report was reviewed and noted in the chart] : The report was reviewed and noted in the chart [I independently reviewed and interpreted images and report] : I independently reviewed and interpreted images and report [FreeTextEntry1] : CT Chest (10/15/2022)\par FINDINGS:\par \par TUBES/LINES: None.\par \par LUNGS, PLEURA, AND AIRWAYS: Spiculated 3.5 cm mass in the right apex contacting the pleural surface. Intraluminal nodularity in the right apical bronchiole more posterior to the mass. No lobar consolidation, mass, effusion or pneumothorax is present. Calcified granuloma in the left lower lobe. Groundglass opacities in the left lower lobe are noted as well. Right middle lobe atelectasis or scarring.\par \par MEDIASTINUM/LYMPH NODES: No mediastinal or axillary lymphadenopathy.\par \par HEART/GREAT VESSELS: No pericardial effusion. Heart size unremarkable. Coronary artery and thoracic aorta atherosclerotic calcifications. No aneurysmal dilation of the thoracic aorta.\par \par BONES/SOFT TISSUES: Age-indeterminate T3 moderate compression fracture. No definite lytic or blastic lesion is identified. Multilevel degenerative changes.\par \par VISUALIZED UPPER ABDOMEN: Please see concurrent abdominal and pelvic CT report.\par \par OTHER: Several subcentimeter nodules in the left thyroid pole.\par \par \par IMPRESSION:\par \par \par Right upper lobe spiculated mass contacting adjacent pleura.\par \par Bronchiolar nodularity posterior to the mass.\par \par No definite mediastinal lymphadenopathy\par \par Age-indeterminate T3 moderate compression fracture

## 2022-11-18 NOTE — REASON FOR VISIT
[Initial Consultation] : an initial pain management consultation [FreeTextEntry2] : LEFT SIDE SHOULDER AND BACK PAIN

## 2022-11-30 ENCOUNTER — APPOINTMENT (OUTPATIENT)
Dept: INFUSION THERAPY | Facility: CLINIC | Age: 87
End: 2022-11-30

## 2022-11-30 ENCOUNTER — APPOINTMENT (OUTPATIENT)
Dept: HEMATOLOGY ONCOLOGY | Facility: CLINIC | Age: 87
End: 2022-11-30

## 2022-11-30 VITALS
TEMPERATURE: 98.3 F | DIASTOLIC BLOOD PRESSURE: 65 MMHG | HEART RATE: 64 BPM | SYSTOLIC BLOOD PRESSURE: 139 MMHG | RESPIRATION RATE: 16 BRPM

## 2022-11-30 PROCEDURE — 99214 OFFICE O/P EST MOD 30 MIN: CPT

## 2022-11-30 RX ORDER — DOCUSATE SODIUM 100 MG/1
100 CAPSULE ORAL 3 TIMES DAILY
Qty: 90 | Refills: 5 | Status: ACTIVE | COMMUNITY
Start: 2022-11-30 | End: 1900-01-01

## 2022-11-30 RX ORDER — PEMBROLIZUMAB 25 MG/ML
200 INJECTION, SOLUTION INTRAVENOUS ONCE
Refills: 0 | Status: COMPLETED | OUTPATIENT
Start: 2022-11-30 | End: 2022-11-30

## 2022-11-30 RX ORDER — SENNOSIDES 8.6 MG TABLETS 8.6 MG/1
8.6 TABLET ORAL 3 TIMES DAILY
Qty: 90 | Refills: 5 | Status: ACTIVE | COMMUNITY
Start: 2022-11-30 | End: 1900-01-01

## 2022-11-30 RX ADMIN — PEMBROLIZUMAB 200 MILLIGRAM(S): 25 INJECTION, SOLUTION INTRAVENOUS at 12:54

## 2022-12-01 LAB
ALBUMIN SERPL ELPH-MCNC: 3.9 G/DL
ALP BLD-CCNC: 59 U/L
ALT SERPL-CCNC: 11 U/L
ANION GAP SERPL CALC-SCNC: 17 MMOL/L
AST SERPL-CCNC: 13 U/L
BASOPHILS # BLD AUTO: 0.01 K/UL
BASOPHILS NFR BLD AUTO: 0.1 %
BILIRUB SERPL-MCNC: 0.7 MG/DL
BUN SERPL-MCNC: 23 MG/DL
CALCIUM SERPL-MCNC: 9.1 MG/DL
CHLORIDE SERPL-SCNC: 106 MMOL/L
CO2 SERPL-SCNC: 17 MMOL/L
CREAT SERPL-MCNC: 0.9 MG/DL
EGFR: 62 ML/MIN/1.73M2
EOSINOPHIL # BLD AUTO: 0.07 K/UL
EOSINOPHIL NFR BLD AUTO: 0.9 %
GLUCOSE SERPL-MCNC: 116 MG/DL
HCT VFR BLD CALC: 34.1 %
HGB BLD-MCNC: 11.4 G/DL
IMM GRANULOCYTES NFR BLD AUTO: 0.6 %
LYMPHOCYTES # BLD AUTO: 1.26 K/UL
LYMPHOCYTES NFR BLD AUTO: 15.8 %
MAN DIFF?: NORMAL
MCHC RBC-ENTMCNC: 27.7 PG
MCHC RBC-ENTMCNC: 33.4 G/DL
MCV RBC AUTO: 82.8 FL
MONOCYTES # BLD AUTO: 0.63 K/UL
MONOCYTES NFR BLD AUTO: 7.9 %
NEUTROPHILS # BLD AUTO: 5.93 K/UL
NEUTROPHILS NFR BLD AUTO: 74.7 %
PLATELET # BLD AUTO: 104 K/UL
POTASSIUM SERPL-SCNC: 3.1 MMOL/L
PROT SERPL-MCNC: 5.8 G/DL
RBC # BLD: 4.12 M/UL
RBC # FLD: 14.3 %
SODIUM SERPL-SCNC: 140 MMOL/L
TSH SERPL-ACNC: 0.09 UIU/ML
WBC # FLD AUTO: 7.95 K/UL

## 2022-12-01 NOTE — ASSESSMENT
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 \par - stopped  Keytruda 400 mg every 6 weeks due to fatigue and dyspnea and her preferences\par - CT C/A/P from 3/49063 were stable and we stopped keytruda and was on observation with CT from 6/2021 showing stable findings\par - CT C/A/P from 12/2021 showed increase in RUL nodule, L adrenal nodule and new tubular opacity within the right upper lobe but otherwise stable. \par - resumed Keytruda on 12/29/2021.\par - 07/09/2022 CT C/A/P with IVC shows stable findings and continued treatment response.\par - 10/15/2022 CT C/A/P no progression  on CT A/P awaiting CT chest addendum but likely has not progressed based on review.\par \par # Dysphagia and decrease appetite - better now \par - c/w Jevity 1.2 hector BiD \par - she is followed by nutritionist, Tila Villanueva \par \par # SOB mainly when she tries to sleep; due to right nasal congestion maybe from Keytruda, Keytruda was stopped and SOB did not change\par - c/w Flonase, Rx refilled\par - On Albuterol + nebulizer PRN\par - She follows with Dr Lanza as indicated\par - Advised to use saline nasal spray as she has c/o dryness in her nose \par \par # Some insomnia \par - c/w Melatonin 5 mg at bedtime \par \par # Pruritus, back; may be side effect of Keytruda?\par - has hydrocortisone 1% or Benadryl PRN for symptom relief if happens again\par - she STOPPED gabapentin due to side effects.\par \par # Pain at T spine right at the level of scapula with tenderness maybe from T3 compression fracture \par - c/w oxycodone 5 mg every 8 hours as needed - Reviewed risks and benefits of narcotic consumption and prescribed dosing/frequency with patient.\par -will have her see neurosurgery Dr. Weaver. She may need MR T spine but no mass is reported on CT.\par \par # Constipation - chronic, reports BM every 5 days.\par \par PLAN:\par \par - continue Keytruda 200mg IV (every 3 week dosing as of 7/27/2022) - C34 GIVE TODAY.\par \par - continue oxycodone 5 mg every 8 hours as needed for pain with bowel regimen - GIVE Percocet 5/325 mg PO 1 tab now with treatment.\par \par - start Lactulose, Colace and Senna for bowel regiment.\par \par - proceed with DEXA scan.\par \par - repeat CT C/A/P with contrast - 01/2023.\par \par - F/U with pain management.\par - F/U with Neurosuegery for management of T3 compression fracture.\par - F/U with PCP for HTN management.\par \par - Labwork today:  CBC, CMP, TSH, T3, FT4 today.\par \par RTC in 3 weeks for MD visit and Keytruda treatment with CBC CMP and TSH.

## 2022-12-01 NOTE — REVIEW OF SYSTEMS
[Night Sweats] : night sweats [Fatigue] : fatigue [Shortness Of Breath] : shortness of breath [SOB on Exertion] : shortness of breath during exertion [Constipation] : constipation [Joint Pain] : joint pain [Muscle Weakness] : muscle weakness [Difficulty Walking] : difficulty walking [Negative] : Allergic/Immunologic [Recent Change In Weight] : ~T no recent weight change [Dysphagia] : no dysphagia [Muscle Pain] : no muscle pain [Skin Rash] : no skin rash [FreeTextEntry2] : seated in wheelchair [FreeTextEntry4] : chronic nasal congestion [FreeTextEntry6] : SOB for years - unchanged [FreeTextEntry7] : occasional hemorrhoidal bleeding  [FreeTextEntry9] : chronic L sided pain [de-identified] : pruritus at her back

## 2022-12-01 NOTE — PHYSICAL EXAM
[Capable of only limited self care, confined to bed or chair more than 50% of waking hours] : Status 3- Capable of only limited self care, confined to bed or chair more than 50% of waking hours [Normal] : affect appropriate [de-identified] : seated in wheelchair, but does stand up by herself [de-identified] : Lungs sounded clear today  [de-identified] : L thigh lipoma [de-identified] : No rash

## 2022-12-01 NOTE — HISTORY OF PRESENT ILLNESS
[Therapy: ___] : Therapy: [unfilled] [de-identified] : 08/08/2019 \par This is a 83 year old female who I initially met in [facility] on 3/28/19. She was initially admitted on 2/20/2019 for a right loculated pleural effusion approx. 900 CCs, no PE and right apical 3.1x3.0 cm mass with pleural nodules on CTA of chest. Pt returned to ED on 3/15/2019 for right pleural effusion, had a thoracentesis in ED and was sent home. She than came back for SOB  due to recurrent effusion and a Pleurx was placed. Pathology showed adenocarcinoma. \par \par She ha lost a few pounds about  4. She never smoked. She does have weakness.  From her Pleurx she  has 500 cc removed every other day. She has pain after.\par \par Work up:\par 02/20/2019 CTA chest:  Large right pleural effusion, maximal axial width of 7 cm correlating with an estimated volume of 900 cc. Associated near complete compressive atelectasis of the right lower lobe. Probable loculations of the effusion seen at the apex and at the right heart border (for example series 5 images 176, 52; series 9 image 69). 1 cm right upper lobe fissural nodule (series 5 image 139). Patent central airways. There is right apical 3.1 x 3.0 cm mass with irregular right apical pleural thickening (series 7, image 44), and multiple satellite pleural nodules including a more caudad 2.7 cm para-mediastinal nodule (series 7, image 175). Tissue sampling recommended.\par \par PATHOLOGY:\par 03/25/2019 Pleural fluid: Cell block material is hypocellular and shows a single minutecluster of malignant cells.Immunohistochemistry studies were performed at [facility] and the\par results are noncontributory\par 03/22/2019: Addendum\par Cell block material shows macrophages (positive ),mesothelial cells (positive calretinin) and a single very minute cluster of atypical suspicious cells is negative for Fidel-Ep4.Material is insufficient for further diagnostic studies (ie, immunohistochemistry).\par \par 03/15/2019: Pleural fluid: Addendum Immunohistochemical studies were performed on the cell block at [facility], [facility], [address] [place] [address], [facility] (see NOTE). The results are as follows:\par CK7-                     Rare mesothelial cells positive\par CK20-                   Negative\par CK 5/6-                 Rare mesothelial cells positive\par Calretinin-             Scattered mesothelial cells positive\par CD68-                   Numerous histiocytes positive\par BerEP-4-               Negative\par Napsin-A-             Negative\par TTF-1-                  Negative\par These results are non-specific, suggestive of mesothelialhyperplasia. The few markedly atypical cells seen in the smear are not evident in the cell block.\par The atypical cells seen in the current smear are not seen in the prior pleural fluid Thinprep smear or cell block (49-OX-).\par Few marked atypical cells, suspicious for mesothelioma versus adenocarcinoma, in a background of numerous mesothelial cells, histiocytes and small lymphocytes.\par 03/25/2019: Final Diagnosis - POSITIVE FOR MALIGNANT CELLS. Favor metastatic adenocarcinoma. Pending cell block.\par I spoke to Dr. Allison  and there are only a few cells thus further studies such as IHC, molecular PD-l1 is not possible.\par  [FreeTextEntry1] : 05/17/2019 Started Keytruda, 05/04/2020 changed to 6 week dosing starting; 01/13/2021 last treatment; 07/27/2022 the patient prefers every 3 week dosing. [de-identified] : 6/5/19\par She is here for follow up. Since last visit she started Keytruda. She had a PET/CT on April 17 that showed  Extensive FDG avid right-sided pleural mass/soft tissue thickening, \par with max SUV 21.5 within a right apical pleural soft tissue mass.\par 2.  Multiple FDG avid mediastinal lymph nodes, with max SUV 16.5 within a 2.6 x 2.1 cm subcarinal node.\par 3.  FDG avid lytic lesion within the left superior acetabulum (max SUV 14.8), suspicious for osseous metastasis.\par 4.  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 Used to be  500 ml  u 3 times a week. Takes 60 mg of oral morphine a day 20 mg 3 times a day but pain is still severe. Has not had a bowel movement in unknown amount of days does not take laxatives although she has laxatives. \par \par 6/26/19 She is here for follow up. She is due for cycle 3 of Keytruda. She has about 300 mg drained from her pleura twice a week. Has SOB but saturated 94% on room air and 94% on ambulation. Did not tolerate the fentanyl path had nausea. On Morphine 10 mg BID doing well. Constipation is better. \par \par 7/17/19\par She is here for follow-up of Stage IV lung cancer with son.  She is due for cycle 3 of Keytruda.  CBC is stable from today.  She only takes the liquid morphine at night now.  She started eating slightly more and has been feeling slightly better ,as per son.  He states that the pleurex drainage is less, it is checked every Friday + Monday.  On Friday there was about 225 cc drained, but yesterday no drainage.  We will arrange for hydration today since she has had poor oral intake lately and small appetite.  Son reports she can tolerate fluids but she has early satiety with food.  Patient also reports feeling short of breath at rest, but her O2 sat is 98% on R/a.  Right sided pain is better. \par 07/06/2019 CT C/A/P IMPRESSION: Since April 17, 2019: 1. No definite new sites of metastatic disease. 2. Right-sided pleural soft tissue mass/thickening, overall appearing decreased since April 17, 2019 with primarily residual right apical tumor. Tumor appears to infiltrate the mediastinum.3. No significant change in mediastinal lymphadenopathy including largest 2.6 x 2.1 cm subcarinal lymph node which remains unchanged. Retrocrural 2.5 x 2.3 cm metastasis or additional site of pleural tumor, unchanged.4. Irregularity of right anterior first rib, probably invaded by tumor, stable in retrospect. Left superior acetabular lucent lesion corresponding to FDG avid bone metastasis.5. Right pelvic indeterminate 7 cm mass; possible retroverted uterus with degenerating fibroid or less likely ovarian mass; previously non-FDG avid and probably benign. Further evaluation with pelvic ultrasound or MRI pelvis with and without IV contrast may be helpful.6. Status post right chest tube placement with decreased small loculated right pleural effusion with fluid within the major fissure. 7. New mild to moderate intrahepatic biliary dilation. Correlation with LFTs recommended. Consider further evaluation with ERCP or MRCP with and without IV contrast.\par \par 8/7/19\par She is here with her son. Overall she is doing much better. She is in wheelchair but now less pain, eating better and Pleurax is only draining about 25 cc twice a week, She does complain of SOB still. I explained this will improve as well but asked her to follow up with Dr. Lanza of pulmonology. \par \par 8/28/19\par She is here for follow up. They are to see Dr. Lanza in Early September. There is minimal output from Pleurax. Pain is much better and does not use narcotics anymore.  Overall better. She has right side nasal congestion and states makes it hard to breath.\par \par 9/18/19\par She is doing well. She is due for cycle 7. Son was not eliana to schedule CTs prior to the visit but she looks better and better every visit and thus most likely still responding. She saw Dr. Lanza who started her on Flonase. She is due to see ENT Dr. Adams.  Appetite is better. No pains. Dr. Lanza is considering to remover the Pleurx possibly in November. \par \par 10/09/2019\par Patient is here for a follow up visit. Due for cycle 8 today. CT chest/abd/pelvis 09/2019 showed improvement in disease. Also has seen Dr. Adams and agreed to continue with Flonase as she has hypertrophy of turbinates. \par She is tolerating Ketryuda well. Her only complaint at this time is trouble in falling asleep. \par \par 10/30/19\par Patient is here for a follow up visit of Stage IV lung cancer with family member.  She is feeling well with no new complaints.   She feels dyspneic when taking a deep breath but her pain has improved.  Most recent CBC is stable. 09/22/2019 CT C/A/P showed improvement in right apical lung mass and pleural effusion and drained 150cc. Due for cycle 9 today. \par \par 12/30/19\par Patient is here for a follow-up visit for  lung cancer with son.  Reviewed imaging results with patient and her family, which suggests worsening effusion but otherwise stable.  They drained 500 cc from pleurx yesterday, reportedly sanguinous drainage.  She is agreeable to continue with cycle 12 of Keytruda tomorrow. She reports persistent dyspnea.  We discussed that this tx regimen may be losing its responsiveness.\par 12/27/2019 CT C/A/P IMPRESSION: Worsening right-sided pleural effusion. Right apical spiculated nodule without difference. Unchanged appearance of the mediastinum. Unchanged appearance of the third thoracic vertebral body and right second rib. No interval change since prior examination. No change in hepatic hypodensity (likely fatty infiltration), splenic hypodensity (too small to characterize), left adrenal nodule or 1 cm retroperitoneal nodule on the right. No change in fat-containing mass in the anterior left thigh.\par \par 2/10/2020\par She is here for follow-up accompanied by son.  Since last visit she was admitted to hospital for pain and nausea. She had pleurx catheter removed during most recent hospitalization on 1/13/2020.  She complains of insomnia but generally feels well.  Most recent scan reviewed with patient.  She reports she had a rash after discharge from the hospital which has since resolved.  \par 01/11/2020 CT Chest IMPRESSION: Since CT scan performed on 12/27/2019; 1.  Right thoracostomy tube in stable position with slight interval decrease of right pleural effusion. 2.   Interval increase in right middle lobe consolidation/atelectasis. 3.  Stable spiculated nodule in the right lung apex, consistent with patient's known malignancy.\par \par She feels much better now that her Pleurx is gone. Does not have any pain. Feels dyspneic which is stable.\par \par 3/2/2020\par She is here for follow-up for lung cancer accompanied by son. Since last visit, she followed with Dr. Lanza pulmonology, and Dr. Hirsch from [facility] who agreed against placing additional drainage catheters based on most recent CT imaging. She still reports some SOB. She also reports taking a [place] medication named Volidol and Panangin for here breathing. Patient reports mild dizziness after taking Trazodone; we recommended halving dose.  \par \par 5/4/2020\par She is here for follow-up for lung cancer accompanied by son. Her last treatment as in March due to COVID she is doing well. She was on Room air. She states SOB is the same. She has paraspinal back pain/hip pain. \par \par 6/15/2020\par She is here for follow-up for lung cancer accompanied by son.  Patient denies fever, chills, vomiting, worsening cough, new rash, persistent diarrhea or bleeding.  Reviewed most recent imaging and had a discussion regarding continuing with Keytruda every 6 weeks for now as she is clinically stable.  She does complain of weakness and nasal congestion which improves using Fluticasone spray.  She uses oxygen intermittently at home.  \par 05/09/2020 CT C/A/P IMPRESSION: 1. Stable spiculated right upper lobe mass measuring up to 3.1 cm.2. New areas of ground glass and reticular opacities within the medial left lung, with new subcentimeter nodular opacities within the left lower lobe. Findings may be postinfectious or postinflammatory in etiology. CT of the chest in 2-3 months is recommended. 3. Decreased mediastinal and right hilar lymphadenopathy. 4. Resolved right pleural effusion. 1.  No findings of new or progressive metastatic disease in the abdomen or pelvis. 2.  Indeterminate left adrenal nodule and right retroperitoneal nodule, stable in size from prior CT. 3.  Partially visualized fat-containing mass in the anterior left thigh-considerations include benign and malignant neoplastic etiology. Recommend further evaluation with MRI if not already performed. 4.  Sclerotic appearance of metastatic left acetabular lesion, similar in appearance to prior CT, possibly representing healing- was previously lytic on PET/CT of 4/17/2019. 5.  Lobulated pelvic structure again seen, possibly uterus with fibroids. Further evaluation with pelvic ultrasound may be performed as warranted.\par \par 9/9/20\par Patient is here for follow up accompanied by her son who speaks English . She has chronic SOB which as per son has been slowly getting worse. She uses O2 at home intermittently. She also uses Flonase and Albuterol inhaler. Otherwise she is mostly wheel chair bound and can walk back and forth to the bathroom by herself. Her appetite is good and weight is stable. No new bone pain/abdominal pain/ rash. \par \par 10/21/2020\par She is here for follow-up for lung cancer, accompanied by son.  Patient denies fever, chills, vomiting, new cough, new rash, persistent diarrhea or bleeding.  She is still pending reimaging, they report they have not received authorization for scan yet.  She still complains of weakness and nasal congestion, which improves using Fluticasone spray.  She uses oxygen intermittently at home.  She still has SOB.   Her only new complaint is intermittent back pruritus.  She is due for cycle 20 of Keytruda today.\par \par 12/2/2020\par She is here for follow-up for lung cancer, accompanied by son.  Patient denies fever, chills, vomiting, new cough, new rash, persistent diarrhea or bleeding.  She still complains of weakness, SOB and nasal congestion, uses the nebulizer once daily with occasional relief of her SOB.   She is due for cycle 21 of Keytruda today.  She is using allegra and or hydrocortisone 1% cream for the pruritus.  Reviewed most recent imaging which shows good treatment response.  \par 11/14/2020 CT C/A/P IMPRESSION: Since May 9, 2020: 1. No evidence of new intrathoracic or intra-abdominal metastatic disease. 2. Unchanged 2.5 x 2 cm right upper lobe pulmonary nodule, stable with similar measurement technique. 3. Decreased subcarinal lymphadenopathy, now 1.2 cm short axis, previously 1.5 cm short axis on May 9, 2020 CT with similar measurement technique. No evidence of additional suspicious lymphadenopathy by size criteria. 4. Near complete interval resolution of left lower lobe and lingula patchy airspace opacities. Unchanged areas of groundglass and reticular opacities within the medial left lung. Findings likely postinfectious or inflammatory. 5. Unchanged sclerotic appearance of metastatic left acetabular lesion,  possibly representing healing of previously FDG avid lytic on PET/CT of 4/17/2019. 6. Unchanged, partially imaged indeterminate lipomatous mass within the anterior left thigh.\par \par 1/13/21\par She is here for follow-up for lung cancer, accompanied by son.  She still complains of weakness, SOB daily which is unchanged.  She is due for cycle 22 of Keytruda today.  She continues to use Allegra and or hydrocortisone 1% cream for the pruritus.  Patient denies fever, chills, vomiting, new cough, new rash, persistent diarrhea or bleeding.  She also reports musculoskeletal pain.  \par \par 2/24/21\par She is here with her son. She is doing well but still has same complaints. mainly fatigue and SOB as well as MSK pain behind the left scapula. We had a long talk and I explained that the fatigue and SOB maybe due to kaytruda so we decided to stop it and observe her. On exam no changes. \par \par \par 5/17/21\par She is here for followup with her son. Her CT C/A/P in March 2021  showed Since November 14, 2020. No significant interval change approximate 2.5 cm x 2 cm right upper lobe nodule (series 4/33). Stable reticular opacities medial right upper lobe. Stable ill-defined subcarinal lymph nodes (series 4/88). Stable bibasilar subsegmental discoid atelectasis. No pleural effusions. No new sites of metastatic disease in the abdomen and pelvis. Previously described sclerotic left acetabular lesion felt to represent a healed metastasis based on prior PET/CT is poorly visualized on the current study. Unchanged, partially imaged indeterminate lipomatous mass within the anterior left thigh.\par Given that she been on  Keytruda now for 2 years with no progression of disease and she continued to have shortness of breath we decided to give her a treatment break.\par Today even well-being of treatment she still continues to have the same except complains specifically short of breath for which she had an extensive workup. She also has rhinorrhea  so she was referred to ENT again as per their request and I refilled Flonase\par \par 7/26/21\par She is here for follow up. She had CT C/A/P on 6/12/21: IMPRESSION: No CT evidence of new sites of metastatic disease in the chest, abdomen and pelvis. Stable 2.5 cm right upper lobe nodule. Interval decrease in size of subcarinal lymph node. Slight interval increase in reticular and groundglass opacities left lower lobe, possibly postinfectious/postinflammatory. Unchanged, partially imaged indeterminate lipomatous mass within the anterior left thigh.\par CBC is normal today. She is here with her son. No new symptoms has chronic rhinorrhea and chronic SOB with non specific pain in left scapula with unclear cause that has been present for years. \par \par 11/29/21 Patient is here for follow up for stage 4 lung NSCLC. She was on keytruda and has been on break since Feb 2021. Her last imaging in June 2021 showed stable disease. She has chronic complaints of fatigue, dyspnea, pain in her left side back and insomnia. All these complaints are chronic with no new worsening of symptoms. \par \par 2/9/22\par She returns for follow-up today for NSCLC, accompanied by son who helps with translation at patient request.  She is due for next cycle of immunotherapy today.  She continues to have the same chronic complaints now that she always had for the last several years including unclear left-sided pain, shortness of breath, she seen multiple specialists and we are unable to exactly determine the cause of her pain but I explained that the shortness of breath as before is possibly related to her lung cancer may be resulting in decreased function from previous Pleurx catheter leading to fibrosis and decrease in lung capacity.  She takes oxycodone 5mg as needed for the L sided pain.  Reviewed most recent CT imaging which shows increase in RUL nodule, L adrenal nodule and new tubular opacity within the right upper lobe but otherwise stable.  \par 12/18/2021 CT C/A/P IMPRESSION: Compared to CT chest 6/12/2021,Interval increase in size of the right upper lobe nodule now measuring 2.8 x 2.0 x 2.4 cm previously 2.5 x 2.0 x 2.0 cm. New tubular opacity within the right upper lobe.Significant interval increase in size of left adrenal nodule, now measuring 4.6 x 4.3 cm, highly suggestive of metastasis/collision tumor in this patient with history of lung cancer.  Right upper quadrant peritoneal nodules without significant change from prior. Continued attention on follow-up imaging.Partially imaged indeterminate lipomatous mass in the left thigh redemonstrated. Recommend MR evaluation.\par \par 3/23/22\par She returns for follow-up today for NSCLC, accompanied by son who helps with translation at patient request.  She is due for next cycle of immunotherapy today.  She continues to have the same chronic complaints now that she always had for the last several years including unclear left-sided pain, shortness of breath, she seen multiple specialists and we are unable to exactly determine the cause of her pain but I explained that the shortness of breath as before is possibly related to her lung cancer may be resulting in decreased function from previous Pleurx catheter leading to fibrosis and decrease in lung capacity.  She takes oxycodone 5 mg as needed for the L sided pain She has chronic SOB that she believes is due to her heart. 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  She was started on antidepressant by PCP but she is not taking it.\par \par 6/15/22\par She is here for follow up. She is due for cycle 27 of Keyturda. CBC from today is  Normal. She has pain in entire left side of chest including shouldr. She takes over the counter meds son does not know then name. SOB is the same. Lungs sounded clear except for decrease sounds in RLL which is same. \par \par 7/27/22\par She returns for follow-up today for NSCLC, accompanied by son who helps with [employer] translation at patient request.  She is due for next cycle of immunotherapy today.  She continues to have the same chronic complaints that she always had for the last several years including unclear left-sided pain, shortness of breath, she seen multiple specialists and we are unable to exactly determine the cause of her pain.  She takes oxycodone 5 mg as needed for the L sided pain, requesting refill.  Reviewed most recent CT imaging which shows stable findings and continued treatment response.  Patient states she feels better when she receives the immunotherapy and therefore is requesting to go back to every 3 week frequency again.  \par 07/09/2022 CT C/A/P IMPRESSION:CHEST:Stable spiculated right apical lung mass, measuring maximally up to 3.2 cm.Previously seen right upper lobe 5 mm solid nodule is no longer visualized.Stable mediastinal prominent lymph nodes.Other previously noted pulmonary findings are difficult to examine given degree of respiratory motion.ABDOMEN/PELVIS:Interval decrease of left adrenal lesion, now measuring 2.1 x 2.1 cm (previously 3 x 2.2 cm).Decreased right upper quadrant soft tissue nodules measuring up to 0.6 cm (previously 1 cm).\par \par 8/17/22\par she is here for follow up. She is due for cycle 29 of keyturda now every 3 weeks.  CBC with mild anemia only. New new complaints SOB is stable. Has pruritus on back but no rash recommended a trial of Aquaphor.  States oxycodone helps with pain and night and sleeps much better.\par \par 09/07/2022 accompanied by her son; reports no changes in her generalized dull pains. She uses home remedies to "clean up her pulmonary pathways".\par \par 9/28/22\par She returns for follow-up today for NSCLC, accompanied by son who helps with [employer] translation at patient request.  She is due for next cycle of immunotherapy today.  She continues to have the same chronic complaints that she always had for the last several years including unclear left-sided pain, shortness of breath, she seen multiple specialists and we are unable to exactly determine the cause of her pain.  She takes oxycodone 5 mg as needed for the L sided abd pain.  She still struggles with pruritus so we suggested to try gabapentin at a low dose and titrate up very slowly to see if it helps, however she did not see any improvement and did not like the way the gabapentin made her feel.  She has since stopped the gabapentin.  Reviewed most recent CBC which shows mild, normocytic anemia with hgb 11.3g/dL.  \par \par 10/19/2022\par She is here for frankie wup and is due for keytruda fo rher lung cancer. She had CT C/A/P done on 10/15/2022 but for some reason they were not compared. I reached out to radiologist and CT abd was comapred and tehre is no progression. I am waiting for the CT chest but jourdandon report there is no progression that i can tell by comparing the CTs. She still has same symptoms, ABRAHAM, pain in scapula area, pruritis aslo only at mid back bettween scapula. She did not tolerate gabapentin. we decided not to hold treatment for the pruritis and contineu. No rash on exam at all. \par \par 11/09/2022 \par accompanied by her son; Reports being in pain (back) as she forgot to take her pain medications, no changes in chronic conditions or new complaints since the last visit.\par \par 11/30/2022\par accompanied by her son; Reports seen by pain management and had steroid injection with minimal pain response, has F/U in 12/2022. She forgot to take her pain Rx this morning. Recently had tooth extraction and now has no teeth. No other changes in chronic conditions or new complaints since the last visit.\par

## 2022-12-13 ENCOUNTER — RESULT REVIEW (OUTPATIENT)
Age: 87
End: 2022-12-13

## 2022-12-13 ENCOUNTER — OUTPATIENT (OUTPATIENT)
Dept: OUTPATIENT SERVICES | Facility: HOSPITAL | Age: 87
LOS: 1 days | Discharge: HOME | End: 2022-12-13

## 2022-12-13 DIAGNOSIS — Z90.710 ACQUIRED ABSENCE OF BOTH CERVIX AND UTERUS: Chronic | ICD-10-CM

## 2022-12-14 DIAGNOSIS — Z78.0 ASYMPTOMATIC MENOPAUSAL STATE: ICD-10-CM

## 2022-12-14 DIAGNOSIS — M89.9 DISORDER OF BONE, UNSPECIFIED: ICD-10-CM

## 2022-12-14 DIAGNOSIS — Z13.820 ENCOUNTER FOR SCREENING FOR OSTEOPOROSIS: ICD-10-CM

## 2022-12-19 ENCOUNTER — APPOINTMENT (OUTPATIENT)
Dept: PAIN MANAGEMENT | Facility: CLINIC | Age: 87
End: 2022-12-19
Payer: MEDICAID

## 2022-12-19 VITALS — HEIGHT: 62 IN | WEIGHT: 130 LBS | BODY MASS INDEX: 23.92 KG/M2

## 2022-12-19 DIAGNOSIS — M47.894 OTHER SPONDYLOSIS, THORACIC REGION: ICD-10-CM

## 2022-12-19 PROCEDURE — 99214 OFFICE O/P EST MOD 30 MIN: CPT

## 2022-12-19 PROCEDURE — 99072 ADDL SUPL MATRL&STAF TM PHE: CPT

## 2022-12-19 NOTE — HISTORY OF PRESENT ILLNESS
[FreeTextEntry1] : Patient is an 86 y/o woman presenting for a RPV for a history of chronic left-sided upper back pain and known T3 compression fracture. Since her last visit, the patient underwent a T3-T4 interlaminar epidural injection on 11/18/2022 without significant improvement of pain. She describes 1-2 days with slight relief but continues to have significant pain in the left upper back just medial to the scapula. Pain focal and is near-constant. No radiation of the pain to the upper or lower extremities. No focal numbness or weakness in the extremities, bowel or bladder incontinence or saddle anesthesia. The patient continues to take oxycodone 5mg prn for her pain. She states that this helps to some degree and that her constipation is well controlled with stool softeners. \par

## 2022-12-19 NOTE — DATA REVIEWED
[FreeTextEntry1] : CT Chest (10/15/2022)\par FINDINGS:\par \par TUBES/LINES: None.\par \par LUNGS, PLEURA, AND AIRWAYS: Spiculated 3.5 cm mass in the right apex contacting the pleural surface. Intraluminal nodularity in the right apical bronchiole more posterior to the mass. No lobar consolidation, mass, effusion or pneumothorax is present. Calcified granuloma in the left lower lobe. Groundglass opacities in the left lower lobe are noted as well. Right middle lobe atelectasis or scarring.\par \par MEDIASTINUM/LYMPH NODES: No mediastinal or axillary lymphadenopathy.\par \par HEART/GREAT VESSELS: No pericardial effusion. Heart size unremarkable. Coronary artery and thoracic aorta atherosclerotic calcifications. No aneurysmal dilation of the thoracic aorta.\par \par BONES/SOFT TISSUES: Age-indeterminate T3 moderate compression fracture. No definite lytic or blastic lesion is identified. Multilevel degenerative changes.\par \par VISUALIZED UPPER ABDOMEN: Please see concurrent abdominal and pelvic CT report.\par \par OTHER: Several subcentimeter nodules in the left thyroid pole.\par \par \par IMPRESSION:\par \par \par Right upper lobe spiculated mass contacting adjacent pleura.\par \par Bronchiolar nodularity posterior to the mass.\par \par No definite mediastinal lymphadenopathy\par \par Age-indeterminate T3 moderate compression fracture.

## 2022-12-19 NOTE — PHYSICAL EXAM
[de-identified] : Gen: NAD, patient is in a wheelchair\par Head: NC/AT\par Eyes: no glasses, no scleral icterus\par ENT: patient wearing a mask\par CV: no LE edema\par Lungs: nonlabored breathing\par Abd: soft, NT/ND\par Ext: full ROM in all extremities, no peripheral edema\par Back: +Focal TTP in the left upper back in the upper thoracic facet region\par Neuro: CN intact\par UEs\par +5 L +5 R shoulder abduction\par +5 L +5 R arm abduction\par +5 L +5 R forearm flexion\par +5 L +5 R forearm extension\par +5 L +5 R finger flexion\par +5 L +5 R  strength\par LEs\par +5 L +5 R hip flexion\par +5 L +5 R leg extension\par +5 L +5 R leg flexion\par +5 L +5 R foot dorsiflexion\par +5 L +5 R foot plantarflexion\par +5 L +5 R EHL extension\par Psych: normal affect\par Skin: no visible lesions\par

## 2022-12-19 NOTE — REASON FOR VISIT
[Follow-Up Visit] : a follow-up pain management visit [FreeTextEntry2] : LT SIDE SHOULDER AND BACK PAIN

## 2022-12-19 NOTE — DISCUSSION/SUMMARY
[de-identified] : Patient is an 86 y/o woman presenting for a RPV for a history of chronic upper back pain and T3 compression fracture. No significant improvement of pain with a T3-T4 interlaminar epidural injection. Patient has metastatic lung adenocarcinoma. \par 1) Schedule LEFT T2-T3, T3-T4 intra-articular facet injection\par 2) Continue oxycodone prn through other provider\par 3) Consider initiating NSAIDs or gabapentin; patient with comorbidities\par 4) RTC 4 weeks post procedure

## 2022-12-21 ENCOUNTER — APPOINTMENT (OUTPATIENT)
Dept: INFUSION THERAPY | Facility: CLINIC | Age: 87
End: 2022-12-21

## 2022-12-21 ENCOUNTER — APPOINTMENT (OUTPATIENT)
Dept: HEMATOLOGY ONCOLOGY | Facility: CLINIC | Age: 87
End: 2022-12-21

## 2022-12-21 VITALS
HEART RATE: 73 BPM | TEMPERATURE: 97.9 F | RESPIRATION RATE: 16 BRPM | WEIGHT: 132 LBS | SYSTOLIC BLOOD PRESSURE: 129 MMHG | HEIGHT: 62 IN | BODY MASS INDEX: 24.29 KG/M2 | DIASTOLIC BLOOD PRESSURE: 78 MMHG

## 2022-12-21 LAB
BASOPHILS # BLD AUTO: 0.03 K/UL
BASOPHILS NFR BLD AUTO: 0.6 %
EOSINOPHIL # BLD AUTO: 0.09 K/UL
EOSINOPHIL NFR BLD AUTO: 1.7 %
HCT VFR BLD CALC: 33.5 %
HGB BLD-MCNC: 11.3 G/DL
IMM GRANULOCYTES NFR BLD AUTO: 2.1 %
LYMPHOCYTES # BLD AUTO: 1.1 K/UL
LYMPHOCYTES NFR BLD AUTO: 20.7 %
MAN DIFF?: NORMAL
MCHC RBC-ENTMCNC: 27.8 PG
MCHC RBC-ENTMCNC: 33.7 G/DL
MCV RBC AUTO: 82.5 FL
MONOCYTES # BLD AUTO: 0.32 K/UL
MONOCYTES NFR BLD AUTO: 6 %
NEUTROPHILS # BLD AUTO: 3.66 K/UL
NEUTROPHILS NFR BLD AUTO: 68.9 %
PLATELET # BLD AUTO: 189 K/UL
RBC # BLD: 4.06 M/UL
RBC # FLD: 14.7 %
WBC # FLD AUTO: 5.31 K/UL

## 2022-12-21 PROCEDURE — 99214 OFFICE O/P EST MOD 30 MIN: CPT

## 2022-12-21 RX ORDER — PEMBROLIZUMAB 25 MG/ML
200 INJECTION, SOLUTION INTRAVENOUS ONCE
Refills: 0 | Status: COMPLETED | OUTPATIENT
Start: 2022-12-21 | End: 2022-12-21

## 2022-12-21 RX ADMIN — PEMBROLIZUMAB 200 MILLIGRAM(S): 25 INJECTION, SOLUTION INTRAVENOUS at 11:57

## 2022-12-21 RX ADMIN — PEMBROLIZUMAB 200 MILLIGRAM(S): 25 INJECTION, SOLUTION INTRAVENOUS at 11:27

## 2022-12-22 LAB
ALBUMIN SERPL ELPH-MCNC: 3.8 G/DL
ALP BLD-CCNC: 70 U/L
ALT SERPL-CCNC: 10 U/L
ANION GAP SERPL CALC-SCNC: 10 MMOL/L
AST SERPL-CCNC: 14 U/L
BILIRUB SERPL-MCNC: 0.3 MG/DL
BUN SERPL-MCNC: 14 MG/DL
CALCIUM SERPL-MCNC: 9.1 MG/DL
CHLORIDE SERPL-SCNC: 110 MMOL/L
CO2 SERPL-SCNC: 23 MMOL/L
CREAT SERPL-MCNC: 1 MG/DL
EGFR: 55 ML/MIN/1.73M2
GLUCOSE SERPL-MCNC: 99 MG/DL
POTASSIUM SERPL-SCNC: 3.5 MMOL/L
PROT SERPL-MCNC: 5.8 G/DL
SODIUM SERPL-SCNC: 143 MMOL/L
TSH SERPL-ACNC: 0.48 UIU/ML

## 2022-12-22 NOTE — HISTORY OF PRESENT ILLNESS
[Therapy: ___] : Therapy: [unfilled] [de-identified] : 08/08/2019 \par This is a 83 year old female who I initially met in [facility] on 3/28/19. She was initially admitted on 2/20/2019 for a right loculated pleural effusion approx. 900 CCs, no PE and right apical 3.1x3.0 cm mass with pleural nodules on CTA of chest. Pt returned to ED on 3/15/2019 for right pleural effusion, had a thoracentesis in ED and was sent home. She than came back for SOB  due to recurrent effusion and a Pleurx was placed. Pathology showed adenocarcinoma. \par \par She ha lost a few pounds about  4. She never smoked. She does have weakness.  From her Pleurx she  has 500 cc removed every other day. She has pain after.\par \par Work up:\par 02/20/2019 CTA chest:  Large right pleural effusion, maximal axial width of 7 cm correlating with an estimated volume of 900 cc. Associated near complete compressive atelectasis of the right lower lobe. Probable loculations of the effusion seen at the apex and at the right heart border (for example series 5 images 176, 52; series 9 image 69). 1 cm right upper lobe fissural nodule (series 5 image 139). Patent central airways. There is right apical 3.1 x 3.0 cm mass with irregular right apical pleural thickening (series 7, image 44), and multiple satellite pleural nodules including a more caudad 2.7 cm para-mediastinal nodule (series 7, image 175). Tissue sampling recommended.\par \par PATHOLOGY:\par 03/25/2019 Pleural fluid: Cell block material is hypocellular and shows a single minutecluster of malignant cells.Immunohistochemistry studies were performed at [facility] and the\par results are noncontributory\par 03/22/2019: Addendum\par Cell block material shows macrophages (positive ),mesothelial cells (positive calretinin) and a single very minute cluster of atypical suspicious cells is negative for Fidel-Ep4.Material is insufficient for further diagnostic studies (ie, immunohistochemistry).\par \par 03/15/2019: Pleural fluid: Addendum Immunohistochemical studies were performed on the cell block at [facility], [facility], [address] [place] [address], [facility] (see NOTE). The results are as follows:\par CK7-                     Rare mesothelial cells positive\par CK20-                   Negative\par CK 5/6-                 Rare mesothelial cells positive\par Calretinin-             Scattered mesothelial cells positive\par CD68-                   Numerous histiocytes positive\par BerEP-4-               Negative\par Napsin-A-             Negative\par TTF-1-                  Negative\par These results are non-specific, suggestive of mesothelialhyperplasia. The few markedly atypical cells seen in the smear are not evident in the cell block.\par The atypical cells seen in the current smear are not seen in the prior pleural fluid Thinprep smear or cell block (39-KT-).\par Few marked atypical cells, suspicious for mesothelioma versus adenocarcinoma, in a background of numerous mesothelial cells, histiocytes and small lymphocytes.\par 03/25/2019: Final Diagnosis - POSITIVE FOR MALIGNANT CELLS. Favor metastatic adenocarcinoma. Pending cell block.\par I spoke to Dr. Allison  and there are only a few cells thus further studies such as IHC, molecular PD-l1 is not possible.\par  [de-identified] : 6/5/19\par She is here for follow up. Since last visit she started Keytruda. She had a PET/CT on April 17 that showed  Extensive FDG avid right-sided pleural mass/soft tissue thickening, \par with max SUV 21.5 within a right apical pleural soft tissue mass.\par 2.  Multiple FDG avid mediastinal lymph nodes, with max SUV 16.5 within a 2.6 x 2.1 cm subcarinal node.\par 3.  FDG avid lytic lesion within the left superior acetabulum (max SUV 14.8), suspicious for osseous metastasis.\par 4.  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 Used to be  500 ml  u 3 times a week. Takes 60 mg of oral morphine a day 20 mg 3 times a day but pain is still severe. Has not had a bowel movement in unknown amount of days does not take laxatives although she has laxatives. \par \par 6/26/19 She is here for follow up. She is due for cycle 3 of Keytruda. She has about 300 mg drained from her pleura twice a week. Has SOB but saturated 94% on room air and 94% on ambulation. Did not tolerate the fentanyl path had nausea. On Morphine 10 mg BID doing well. Constipation is better. \par \par 7/17/19\par She is here for follow-up of Stage IV lung cancer with son.  She is due for cycle 3 of Keytruda.  CBC is stable from today.  She only takes the liquid morphine at night now.  She started eating slightly more and has been feeling slightly better ,as per son.  He states that the pleurex drainage is less, it is checked every Friday + Monday.  On Friday there was about 225 cc drained, but yesterday no drainage.  We will arrange for hydration today since she has had poor oral intake lately and small appetite.  Son reports she can tolerate fluids but she has early satiety with food.  Patient also reports feeling short of breath at rest, but her O2 sat is 98% on R/a.  Right sided pain is better. \par 07/06/2019 CT C/A/P IMPRESSION: Since April 17, 2019: 1. No definite new sites of metastatic disease. 2. Right-sided pleural soft tissue mass/thickening, overall appearing decreased since April 17, 2019 with primarily residual right apical tumor. Tumor appears to infiltrate the mediastinum.3. No significant change in mediastinal lymphadenopathy including largest 2.6 x 2.1 cm subcarinal lymph node which remains unchanged. Retrocrural 2.5 x 2.3 cm metastasis or additional site of pleural tumor, unchanged.4. Irregularity of right anterior first rib, probably invaded by tumor, stable in retrospect. Left superior acetabular lucent lesion corresponding to FDG avid bone metastasis.5. Right pelvic indeterminate 7 cm mass; possible retroverted uterus with degenerating fibroid or less likely ovarian mass; previously non-FDG avid and probably benign. Further evaluation with pelvic ultrasound or MRI pelvis with and without IV contrast may be helpful.6. Status post right chest tube placement with decreased small loculated right pleural effusion with fluid within the major fissure. 7. New mild to moderate intrahepatic biliary dilation. Correlation with LFTs recommended. Consider further evaluation with ERCP or MRCP with and without IV contrast.\par \par 8/7/19\par She is here with her son. Overall she is doing much better. She is in wheelchair but now less pain, eating better and Pleurax is only draining about 25 cc twice a week, She does complain of SOB still. I explained this will improve as well but asked her to follow up with Dr. Lanza of pulmonology. \par \par 8/28/19\par She is here for follow up. They are to see Dr. Lanza in Early September. There is minimal output from Pleurax. Pain is much better and does not use narcotics anymore.  Overall better. She has right side nasal congestion and states makes it hard to breath.\par \par 9/18/19\par She is doing well. She is due for cycle 7. Son was not eliana to schedule CTs prior to the visit but she looks better and better every visit and thus most likely still responding. She saw Dr. Lanza who started her on Flonase. She is due to see ENT Dr. Adams.  Appetite is better. No pains. Dr. Lanza is considering to remover the Pleurx possibly in November. \par \par 10/09/2019\par Patient is here for a follow up visit. Due for cycle 8 today. CT chest/abd/pelvis 09/2019 showed improvement in disease. Also has seen Dr. Adams and agreed to continue with Flonase as she has hypertrophy of turbinates. \par She is tolerating Ketryuda well. Her only complaint at this time is trouble in falling asleep. \par \par 10/30/19\par Patient is here for a follow up visit of Stage IV lung cancer with family member.  She is feeling well with no new complaints.   She feels dyspneic when taking a deep breath but her pain has improved.  Most recent CBC is stable. 09/22/2019 CT C/A/P showed improvement in right apical lung mass and pleural effusion and drained 150cc. Due for cycle 9 today. \par \par 12/30/19\par Patient is here for a follow-up visit for  lung cancer with son.  Reviewed imaging results with patient and her family, which suggests worsening effusion but otherwise stable.  They drained 500 cc from pleurx yesterday, reportedly sanguinous drainage.  She is agreeable to continue with cycle 12 of Keytruda tomorrow. She reports persistent dyspnea.  We discussed that this tx regimen may be losing its responsiveness.\par 12/27/2019 CT C/A/P IMPRESSION: Worsening right-sided pleural effusion. Right apical spiculated nodule without difference. Unchanged appearance of the mediastinum. Unchanged appearance of the third thoracic vertebral body and right second rib. No interval change since prior examination. No change in hepatic hypodensity (likely fatty infiltration), splenic hypodensity (too small to characterize), left adrenal nodule or 1 cm retroperitoneal nodule on the right. No change in fat-containing mass in the anterior left thigh.\par \par 2/10/2020\par She is here for follow-up accompanied by son.  Since last visit she was admitted to hospital for pain and nausea. She had pleurx catheter removed during most recent hospitalization on 1/13/2020.  She complains of insomnia but generally feels well.  Most recent scan reviewed with patient.  She reports she had a rash after discharge from the hospital which has since resolved.  \par 01/11/2020 CT Chest IMPRESSION: Since CT scan performed on 12/27/2019; 1.  Right thoracostomy tube in stable position with slight interval decrease of right pleural effusion. 2.   Interval increase in right middle lobe consolidation/atelectasis. 3.  Stable spiculated nodule in the right lung apex, consistent with patient's known malignancy.\par \par She feels much better now that her Pleurx is gone. Does not have any pain. Feels dyspneic which is stable.\par \par 3/2/2020\par She is here for follow-up for lung cancer accompanied by son. Since last visit, she followed with Dr. Lanza pulmonology, and Dr. Hirsch from [facility] who agreed against placing additional drainage catheters based on most recent CT imaging. She still reports some SOB. She also reports taking a [place] medication named Volidol and Panangin for here breathing. Patient reports mild dizziness after taking Trazodone; we recommended halving dose.  \par \par 5/4/2020\par She is here for follow-up for lung cancer accompanied by son. Her last treatment as in March due to COVID she is doing well. She was on Room air. She states SOB is the same. She has paraspinal back pain/hip pain. \par \par 6/15/2020\par She is here for follow-up for lung cancer accompanied by son.  Patient denies fever, chills, vomiting, worsening cough, new rash, persistent diarrhea or bleeding.  Reviewed most recent imaging and had a discussion regarding continuing with Keytruda every 6 weeks for now as she is clinically stable.  She does complain of weakness and nasal congestion which improves using Fluticasone spray.  She uses oxygen intermittently at home.  \par 05/09/2020 CT C/A/P IMPRESSION: 1. Stable spiculated right upper lobe mass measuring up to 3.1 cm.2. New areas of ground glass and reticular opacities within the medial left lung, with new subcentimeter nodular opacities within the left lower lobe. Findings may be postinfectious or postinflammatory in etiology. CT of the chest in 2-3 months is recommended. 3. Decreased mediastinal and right hilar lymphadenopathy. 4. Resolved right pleural effusion. 1.  No findings of new or progressive metastatic disease in the abdomen or pelvis. 2.  Indeterminate left adrenal nodule and right retroperitoneal nodule, stable in size from prior CT. 3.  Partially visualized fat-containing mass in the anterior left thigh-considerations include benign and malignant neoplastic etiology. Recommend further evaluation with MRI if not already performed. 4.  Sclerotic appearance of metastatic left acetabular lesion, similar in appearance to prior CT, possibly representing healing- was previously lytic on PET/CT of 4/17/2019. 5.  Lobulated pelvic structure again seen, possibly uterus with fibroids. Further evaluation with pelvic ultrasound may be performed as warranted.\par \par 9/9/20\par Patient is here for follow up accompanied by her son who speaks English . She has chronic SOB which as per son has been slowly getting worse. She uses O2 at home intermittently. She also uses Flonase and Albuterol inhaler. Otherwise she is mostly wheel chair bound and can walk back and forth to the bathroom by herself. Her appetite is good and weight is stable. No new bone pain/abdominal pain/ rash. \par \par 10/21/2020\par She is here for follow-up for lung cancer, accompanied by son.  Patient denies fever, chills, vomiting, new cough, new rash, persistent diarrhea or bleeding.  She is still pending reimaging, they report they have not received authorization for scan yet.  She still complains of weakness and nasal congestion, which improves using Fluticasone spray.  She uses oxygen intermittently at home.  She still has SOB.   Her only new complaint is intermittent back pruritus.  She is due for cycle 20 of Keytruda today.\par \par 12/2/2020\par She is here for follow-up for lung cancer, accompanied by son.  Patient denies fever, chills, vomiting, new cough, new rash, persistent diarrhea or bleeding.  She still complains of weakness, SOB and nasal congestion, uses the nebulizer once daily with occasional relief of her SOB.   She is due for cycle 21 of Keytruda today.  She is using allegra and or hydrocortisone 1% cream for the pruritus.  Reviewed most recent imaging which shows good treatment response.  \par 11/14/2020 CT C/A/P IMPRESSION: Since May 9, 2020: 1. No evidence of new intrathoracic or intra-abdominal metastatic disease. 2. Unchanged 2.5 x 2 cm right upper lobe pulmonary nodule, stable with similar measurement technique. 3. Decreased subcarinal lymphadenopathy, now 1.2 cm short axis, previously 1.5 cm short axis on May 9, 2020 CT with similar measurement technique. No evidence of additional suspicious lymphadenopathy by size criteria. 4. Near complete interval resolution of left lower lobe and lingula patchy airspace opacities. Unchanged areas of groundglass and reticular opacities within the medial left lung. Findings likely postinfectious or inflammatory. 5. Unchanged sclerotic appearance of metastatic left acetabular lesion,  possibly representing healing of previously FDG avid lytic on PET/CT of 4/17/2019. 6. Unchanged, partially imaged indeterminate lipomatous mass within the anterior left thigh.\par \par 1/13/21\par She is here for follow-up for lung cancer, accompanied by son.  She still complains of weakness, SOB daily which is unchanged.  She is due for cycle 22 of Keytruda today.  She continues to use Allegra and or hydrocortisone 1% cream for the pruritus.  Patient denies fever, chills, vomiting, new cough, new rash, persistent diarrhea or bleeding.  She also reports musculoskeletal pain.  \par \par 2/24/21\par She is here with her son. She is doing well but still has same complaints. mainly fatigue and SOB as well as MSK pain behind the left scapula. We had a long talk and I explained that the fatigue and SOB maybe due to kaytruda so we decided to stop it and observe her. On exam no changes. \par \par \par 5/17/21\par She is here for followup with her son. Her CT C/A/P in March 2021  showed Since November 14, 2020. No significant interval change approximate 2.5 cm x 2 cm right upper lobe nodule (series 4/33). Stable reticular opacities medial right upper lobe. Stable ill-defined subcarinal lymph nodes (series 4/88). Stable bibasilar subsegmental discoid atelectasis. No pleural effusions. No new sites of metastatic disease in the abdomen and pelvis. Previously described sclerotic left acetabular lesion felt to represent a healed metastasis based on prior PET/CT is poorly visualized on the current study. Unchanged, partially imaged indeterminate lipomatous mass within the anterior left thigh.\par Given that she been on  Keytruda now for 2 years with no progression of disease and she continued to have shortness of breath we decided to give her a treatment break.\par Today even well-being of treatment she still continues to have the same except complains specifically short of breath for which she had an extensive workup. She also has rhinorrhea  so she was referred to ENT again as per their request and I refilled Flonase\par \par 7/26/21\par She is here for follow up. She had CT C/A/P on 6/12/21: IMPRESSION: No CT evidence of new sites of metastatic disease in the chest, abdomen and pelvis. Stable 2.5 cm right upper lobe nodule. Interval decrease in size of subcarinal lymph node. Slight interval increase in reticular and groundglass opacities left lower lobe, possibly postinfectious/postinflammatory. Unchanged, partially imaged indeterminate lipomatous mass within the anterior left thigh.\par CBC is normal today. She is here with her son. No new symptoms has chronic rhinorrhea and chronic SOB with non specific pain in left scapula with unclear cause that has been present for years. \par \par 11/29/21 Patient is here for follow up for stage 4 lung NSCLC. She was on keytruda and has been on break since Feb 2021. Her last imaging in June 2021 showed stable disease. She has chronic complaints of fatigue, dyspnea, pain in her left side back and insomnia. All these complaints are chronic with no new worsening of symptoms. \par \par 2/9/22\par She returns for follow-up today for NSCLC, accompanied by son who helps with translation at patient request.  She is due for next cycle of immunotherapy today.  She continues to have the same chronic complaints now that she always had for the last several years including unclear left-sided pain, shortness of breath, she seen multiple specialists and we are unable to exactly determine the cause of her pain but I explained that the shortness of breath as before is possibly related to her lung cancer may be resulting in decreased function from previous Pleurx catheter leading to fibrosis and decrease in lung capacity.  She takes oxycodone 5mg as needed for the L sided pain.  Reviewed most recent CT imaging which shows increase in RUL nodule, L adrenal nodule and new tubular opacity within the right upper lobe but otherwise stable.  \par 12/18/2021 CT C/A/P IMPRESSION: Compared to CT chest 6/12/2021,Interval increase in size of the right upper lobe nodule now measuring 2.8 x 2.0 x 2.4 cm previously 2.5 x 2.0 x 2.0 cm. New tubular opacity within the right upper lobe.Significant interval increase in size of left adrenal nodule, now measuring 4.6 x 4.3 cm, highly suggestive of metastasis/collision tumor in this patient with history of lung cancer.  Right upper quadrant peritoneal nodules without significant change from prior. Continued attention on follow-up imaging.Partially imaged indeterminate lipomatous mass in the left thigh redemonstrated. Recommend MR evaluation.\par \par 3/23/22\par She returns for follow-up today for NSCLC, accompanied by son who helps with translation at patient request.  She is due for next cycle of immunotherapy today.  She continues to have the same chronic complaints now that she always had for the last several years including unclear left-sided pain, shortness of breath, she seen multiple specialists and we are unable to exactly determine the cause of her pain but I explained that the shortness of breath as before is possibly related to her lung cancer may be resulting in decreased function from previous Pleurx catheter leading to fibrosis and decrease in lung capacity.  She takes oxycodone 5 mg as needed for the L sided pain She has chronic SOB that she believes is due to her heart. 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  She was started on antidepressant by PCP but she is not taking it.\par \par 6/15/22\par She is here for follow up. She is due for cycle 27 of Keyturda. CBC from today is  Normal. She has pain in entire left side of chest including shouldr. She takes over the counter meds son does not know then name. SOB is the same. Lungs sounded clear except for decrease sounds in RLL which is same. \par \par 7/27/22\par She returns for follow-up today for NSCLC, accompanied by son who helps with Danish translation at patient request.  She is due for next cycle of immunotherapy today.  She continues to have the same chronic complaints that she always had for the last several years including unclear left-sided pain, shortness of breath, she seen multiple specialists and we are unable to exactly determine the cause of her pain.  She takes oxycodone 5 mg as needed for the L sided pain, requesting refill.  Reviewed most recent CT imaging which shows stable findings and continued treatment response.  Patient states she feels better when she receives the immunotherapy and therefore is requesting to go back to every 3 week frequency again.  \par 07/09/2022 CT C/A/P IMPRESSION:CHEST:Stable spiculated right apical lung mass, measuring maximally up to 3.2 cm.Previously seen right upper lobe 5 mm solid nodule is no longer visualized.Stable mediastinal prominent lymph nodes.Other previously noted pulmonary findings are difficult to examine given degree of respiratory motion.ABDOMEN/PELVIS:Interval decrease of left adrenal lesion, now measuring 2.1 x 2.1 cm (previously 3 x 2.2 cm).Decreased right upper quadrant soft tissue nodules measuring up to 0.6 cm (previously 1 cm).\par \par 8/17/22\par she is here for follow up. She is due for cycle 29 of keyturda now every 3 weeks.  CBC with mild anemia only. New new complaints SOB is stable. Has pruritus on back but no rash recommended a trial of Aquaphor.  States oxycodone helps with pain and night and sleeps much better.\par \par 09/07/2022 accompanied by her son; reports no changes in her generalized dull pains. She uses home remedies to "clean up her pulmonary pathways".\par \par 9/28/22\par She returns for follow-up today for NSCLC, accompanied by son who helps with Danish translation at patient request.  She is due for next cycle of immunotherapy today.  She continues to have the same chronic complaints that she always had for the last several years including unclear left-sided pain, shortness of breath, she seen multiple specialists and we are unable to exactly determine the cause of her pain.  She takes oxycodone 5 mg as needed for the L sided abd pain.  She still struggles with pruritus so we suggested to try gabapentin at a low dose and titrate up very slowly to see if it helps, however she did not see any improvement and did not like the way the gabapentin made her feel.  She has since stopped the gabapentin.  Reviewed most recent CBC which shows mild, normocytic anemia with hgb 11.3g/dL.  \par \par 10/19/2022\par She is here for frankie wup and is due for keytruda fo rher lung cancer. She had CT C/A/P done on 10/15/2022 but for some reason they were not compared. I reached out to radiologist and CT abd was comapred and tehre is no progression. I am waiting for the CT chest but amee report there is no progression that i can tell by comparing the CTs. She still has same symptoms, ABRAHAM, pain in scapula area, pruritis aslo only at mid back bettween scapula. She did not tolerate gabapentin. we decided not to hold treatment for the pruritis and contineu. No rash on exam at all. \par \par 11/09/2022 \par accompanied by her son; Reports being in pain (back) as she forgot to take her pain medications, no changes in chronic conditions or new complaints since the last visit.\par \par 11/30/2022\par accompanied by her son; Reports seen by pain management and had steroid injection with minimal pain response, has F/U in 12/2022. She forgot to take her pain Rx this morning. Recently had tooth extraction and now has no teeth. No other changes in chronic conditions or new complaints since the last visit.\par \par 12/21/2022\par accompanied by her son; Reports no changes in chronic conditions or new complaints since the last visit; scheduled for another pain management injection 12/23/2022.\par

## 2022-12-22 NOTE — END OF VISIT
[FreeTextEntry3] :   I agree with the history and physical, and plan which I have reviewed and edited where appropriate.\par \par She returns for follow-up today for metastatic lung cancer.  DEXA results noted. NO need for interventions with bisphosphonate. She will procede with keytruda. I maging will be done next month. RTC in 3 weeks. Labs done

## 2022-12-22 NOTE — REVIEW OF SYSTEMS
[Night Sweats] : night sweats [Fatigue] : fatigue [Shortness Of Breath] : shortness of breath [SOB on Exertion] : shortness of breath during exertion [Constipation] : constipation [Joint Pain] : joint pain [Muscle Weakness] : muscle weakness [Difficulty Walking] : difficulty walking [Negative] : Allergic/Immunologic [Recent Change In Weight] : ~T no recent weight change [Dysphagia] : no dysphagia [Muscle Pain] : no muscle pain [Skin Rash] : no skin rash [FreeTextEntry2] : seated in wheelchair [FreeTextEntry4] : chronic nasal congestion [FreeTextEntry6] : SOB for years - unchanged [FreeTextEntry7] : occasional hemorrhoidal bleeding  [FreeTextEntry9] : chronic L sided pain [de-identified] : pruritus at her back

## 2022-12-22 NOTE — ASSESSMENT
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  \par - 03/2021 CT C/A/P were stable and we stopped Keytruda and was on observation.\par - 06/2021 CT showing stable findings.\par - 12/2021 CT C/A/P showed increase in RUL nodule, L adrenal nodule and new tubular opacity within the right upper lobe but otherwise stable. \par - 12/29/2021 resumed Keytruda.\par - 07/09/2022 CT C/A/P with IVC shows stable findings and continued treatment response. \par - 07/27/2022 the patient prefers every 3 week dosing.\par - 10/15/2022 CT C/A/P no progression on CT A/P awaiting CT chest addendum but likely has not progressed based on review.\par - 12/13/2022 DEXA - Osteopenia: The patient has low bone mass, with the lowest measured bone density in the Left Femoral Neck. The patient has an estimated ten-year risk of hip fracture of 3.1% and an estimated ten-year risk of major fracture of 11%, based on the WHO FRAX algorithm.\par \par # Dysphagia and decrease appetite - better now.\par - on Jevity 1.2 hector BID.\par - followed by nutritionist, Tila Villanueva.\par \par # SOB mainly when she tries to sleep; due to right nasal congestion maybe from Keytruda, Keytruda was stopped and SOB did not change\par - c/w Flonase, Rx refilled\par - On Albuterol + nebulizer PRN\par - She follows with Dr Lanza as indicated\par - Advised to use saline nasal spray as she has c/o dryness in her nose \par \par # Some insomnia \par - on Melatonin 5 mg at bedtime.\par \par # Pruritus, back; may be side effect of Keytruda?\par - has hydrocortisone 1% or Benadryl PRN for symptom relief if happens again\par - she STOPPED gabapentin due to side effects.\par \par # Pain at T spine right at the level of scapula with tenderness maybe from T3 compression fracture \par - c/w oxycodone 5 mg every 8 hours as needed - Reviewed risks and benefits of narcotic consumption and prescribed dosing/frequency with patient.\par -will have her see neurosurgery Dr. Weaver. She may need MR T spine but no mass is reported on CT.\par \par # Constipation - chronic, reports BM every 5 days.\par \par PLAN:\par \par - continue Keytruda 200mg IV (every 3 week dosing as of 7/27/2022) - C35 GIVE TODAY.\par \par - continue oxycodone 5 mg every 8 hours as needed for pain with bowel regimen.\par \par - start Lactulose, Colace and Senna for bowel regiment.\par \par - repeat CT C/A/P with contrast - 01/2023.\par \par - F/U with pain management.\par - F/U with Neurosuegery for management of T3 compression fracture.\par - F/U with PCP for HTN management.\par \par - Labwork today:  CBC, CMP, TSH, T3, FT4 today.\par \par RTC in 3 weeks for MD visit and Keytruda treatment with CBC CMP and TSH.

## 2022-12-22 NOTE — PHYSICAL EXAM
[Capable of only limited self care, confined to bed or chair more than 50% of waking hours] : Status 3- Capable of only limited self care, confined to bed or chair more than 50% of waking hours [Normal] : affect appropriate [de-identified] : seated in wheelchair, but does stand up by herself [de-identified] : Lungs sounded clear today  [de-identified] : L thigh lipoma [de-identified] : No rash

## 2022-12-23 ENCOUNTER — APPOINTMENT (OUTPATIENT)
Dept: PAIN MANAGEMENT | Facility: CLINIC | Age: 87
End: 2022-12-23

## 2022-12-23 ENCOUNTER — RX RENEWAL (OUTPATIENT)
Age: 87
End: 2022-12-23

## 2022-12-23 PROCEDURE — 93770 DETERMINATION VENOUS PRESS: CPT

## 2022-12-23 PROCEDURE — 64490 INJ PARAVERT F JNT C/T 1 LEV: CPT | Mod: LT

## 2022-12-23 PROCEDURE — 93040 RHYTHM ECG WITH REPORT: CPT | Mod: 79

## 2022-12-23 PROCEDURE — 64491 INJ PARAVERT F JNT C/T 2 LEV: CPT | Mod: LT

## 2022-12-23 PROCEDURE — 94761 N-INVAS EAR/PLS OXIMETRY MLT: CPT

## 2022-12-23 NOTE — PROCEDURE
[FreeTextEntry1] : LEFT T2-T3, T3-T4 intra-articular facet injection [FreeTextEntry2] : thoracic facet arthropathy [FreeTextEntry3] : Procedure Date: 12/23/2022\par \par Preoperative Diagnosis: left thoracic facet arthropathy\par \par Procedure: Left T2-T3, T3-T4 intra-articular facet injection\par \par Anesthesia: local\par \par Complications: none\par \par EBL: none\par \par Procedure in detail:\par Patient was seen and examined. Risks, benefits, and alternatives for the procedure were discussed with the patient in detail. The patient expressed understanding, gave written and verbal consent, and placed themselves in a prone position on the procedure table. Skin overlying the lumbosacral spine was prepped with chloraprep and draped in the usual sterile fashion. Fluoroscopic images were obtained to identify the T2 and T3 vertebral bodies. Target was the 6 o'clock position under the left pedicle at T2 and T3. Skin overlying the target was marked and infiltrated with 1% lidocaine. Using two 22 gauge, 3.5 inch spinal needles, these were inserted and advanced under intermittent fluoroscopic guidance. When felt to be engaged within the facet joint, a contralateral oblique view was used to confirm depth. After negative aspiration for heme/csf, contrast was injected under live fluoroscopy, which showed contrast spread consistent with facet joint flow. No evidence of intravascular or intrathecal uptake. At this point, a total of 0.5ml of 0.25% bupivacaine and 0.5ml of 80mg/ml depomedrol was injected via each needle. The needle was restyletted and withdrawn, a band aid was placed over the injection site. Patient tolerated the procedure well. The patient recovered uneventfully and was discharged home in stable condition.\par

## 2022-12-27 ENCOUNTER — RX RENEWAL (OUTPATIENT)
Age: 87
End: 2022-12-27

## 2022-12-27 RX ORDER — ASPIRIN 81 MG/1
81 TABLET, COATED ORAL
Qty: 90 | Refills: 5 | Status: DISCONTINUED | COMMUNITY
Start: 2022-12-27 | End: 2022-12-27

## 2022-12-27 RX ORDER — ASPIRIN ENTERIC COATED TABLETS 81 MG 81 MG/1
81 TABLET, DELAYED RELEASE ORAL DAILY
Qty: 90 | Refills: 0 | Status: ACTIVE | COMMUNITY
Start: 2022-03-23 | End: 1900-01-01

## 2023-01-01 ENCOUNTER — OUTPATIENT (OUTPATIENT)
Dept: OUTPATIENT SERVICES | Facility: HOSPITAL | Age: 88
LOS: 1 days | End: 2023-01-01
Payer: MEDICAID

## 2023-01-01 ENCOUNTER — APPOINTMENT (OUTPATIENT)
Dept: HEMATOLOGY ONCOLOGY | Facility: CLINIC | Age: 88
End: 2023-01-01
Payer: MEDICAID

## 2023-01-01 ENCOUNTER — NON-APPOINTMENT (OUTPATIENT)
Age: 88
End: 2023-01-01

## 2023-01-01 ENCOUNTER — RESULT REVIEW (OUTPATIENT)
Age: 88
End: 2023-01-01

## 2023-01-01 ENCOUNTER — LABORATORY RESULT (OUTPATIENT)
Age: 88
End: 2023-01-01

## 2023-01-01 ENCOUNTER — EMERGENCY (EMERGENCY)
Facility: HOSPITAL | Age: 88
LOS: 0 days | Discharge: HOME | End: 2023-11-12
Attending: STUDENT IN AN ORGANIZED HEALTH CARE EDUCATION/TRAINING PROGRAM
Payer: MEDICAID

## 2023-01-01 ENCOUNTER — APPOINTMENT (OUTPATIENT)
Dept: INFUSION THERAPY | Facility: CLINIC | Age: 88
End: 2023-01-01
Payer: MEDICAID

## 2023-01-01 ENCOUNTER — APPOINTMENT (OUTPATIENT)
Dept: INFUSION THERAPY | Facility: CLINIC | Age: 88
End: 2023-01-01

## 2023-01-01 ENCOUNTER — RX RENEWAL (OUTPATIENT)
Age: 88
End: 2023-01-01

## 2023-01-01 ENCOUNTER — APPOINTMENT (OUTPATIENT)
Dept: RADIATION ONCOLOGY | Facility: HOSPITAL | Age: 88
End: 2023-01-01
Payer: MEDICAID

## 2023-01-01 ENCOUNTER — APPOINTMENT (OUTPATIENT)
Dept: RADIATION ONCOLOGY | Facility: HOSPITAL | Age: 88
End: 2023-01-01

## 2023-01-01 ENCOUNTER — APPOINTMENT (OUTPATIENT)
Dept: HEMATOLOGY ONCOLOGY | Facility: CLINIC | Age: 88
End: 2023-01-01

## 2023-01-01 ENCOUNTER — APPOINTMENT (OUTPATIENT)
Dept: OTOLARYNGOLOGY | Facility: CLINIC | Age: 88
End: 2023-01-01

## 2023-01-01 VITALS
DIASTOLIC BLOOD PRESSURE: 56 MMHG | OXYGEN SATURATION: 98 % | HEART RATE: 64 BPM | HEIGHT: 57 IN | RESPIRATION RATE: 18 BRPM | TEMPERATURE: 98.4 F | WEIGHT: 117 LBS | BODY MASS INDEX: 25.24 KG/M2 | SYSTOLIC BLOOD PRESSURE: 134 MMHG

## 2023-01-01 VITALS
HEART RATE: 79 BPM | SYSTOLIC BLOOD PRESSURE: 156 MMHG | DIASTOLIC BLOOD PRESSURE: 72 MMHG | OXYGEN SATURATION: 97 % | RESPIRATION RATE: 16 BRPM

## 2023-01-01 VITALS
RESPIRATION RATE: 18 BRPM | SYSTOLIC BLOOD PRESSURE: 126 MMHG | DIASTOLIC BLOOD PRESSURE: 70 MMHG | OXYGEN SATURATION: 98 % | HEART RATE: 78 BPM | TEMPERATURE: 99 F | WEIGHT: 119.93 LBS

## 2023-01-01 VITALS
RESPIRATION RATE: 16 BRPM | TEMPERATURE: 97.4 F | SYSTOLIC BLOOD PRESSURE: 121 MMHG | DIASTOLIC BLOOD PRESSURE: 66 MMHG | HEIGHT: 62 IN

## 2023-01-01 VITALS
DIASTOLIC BLOOD PRESSURE: 59 MMHG | RESPIRATION RATE: 18 BRPM | SYSTOLIC BLOOD PRESSURE: 125 MMHG | HEART RATE: 60 BPM | BODY MASS INDEX: 24.81 KG/M2 | TEMPERATURE: 97.6 F | HEIGHT: 57 IN | WEIGHT: 115 LBS

## 2023-01-01 VITALS
BODY MASS INDEX: 0.08 KG/M2 | SYSTOLIC BLOOD PRESSURE: 128 MMHG | HEIGHT: 62 IN | DIASTOLIC BLOOD PRESSURE: 61 MMHG | WEIGHT: 0.43 LBS | HEART RATE: 62 BPM | TEMPERATURE: 98.1 F | RESPIRATION RATE: 16 BRPM

## 2023-01-01 VITALS
DIASTOLIC BLOOD PRESSURE: 58 MMHG | HEART RATE: 60 BPM | RESPIRATION RATE: 16 BRPM | TEMPERATURE: 97.3 F | SYSTOLIC BLOOD PRESSURE: 135 MMHG

## 2023-01-01 VITALS — BODY MASS INDEX: 20.67 KG/M2 | WEIGHT: 113 LBS

## 2023-01-01 VITALS
WEIGHT: 115 LBS | HEART RATE: 74 BPM | BODY MASS INDEX: 24.81 KG/M2 | RESPIRATION RATE: 18 BRPM | SYSTOLIC BLOOD PRESSURE: 113 MMHG | DIASTOLIC BLOOD PRESSURE: 70 MMHG | HEIGHT: 57 IN | TEMPERATURE: 98.3 F

## 2023-01-01 DIAGNOSIS — C34.11 MALIGNANT NEOPLASM OF UPPER LOBE, RIGHT BRONCHUS OR LUNG: ICD-10-CM

## 2023-01-01 DIAGNOSIS — C34.91 MALIGNANT NEOPLASM OF UNSPECIFIED PART OF RIGHT BRONCHUS OR LUNG: ICD-10-CM

## 2023-01-01 DIAGNOSIS — Z78.9 OTHER SPECIFIED HEALTH STATUS: ICD-10-CM

## 2023-01-01 DIAGNOSIS — Z90.710 ACQUIRED ABSENCE OF BOTH CERVIX AND UTERUS: Chronic | ICD-10-CM

## 2023-01-01 DIAGNOSIS — Z00.8 ENCOUNTER FOR OTHER GENERAL EXAMINATION: ICD-10-CM

## 2023-01-01 DIAGNOSIS — C79.9 SECONDARY MALIGNANT NEOPLASM OF UNSPECIFIED SITE: ICD-10-CM

## 2023-01-01 DIAGNOSIS — Z74.2 NEED FOR ASSISTANCE AT HOME AND NO OTHER HOUSEHOLD MEMBER ABLE TO RENDER CARE: ICD-10-CM

## 2023-01-01 DIAGNOSIS — S22.030S: ICD-10-CM

## 2023-01-01 DIAGNOSIS — R21 RASH AND OTHER NONSPECIFIC SKIN ERUPTION: ICD-10-CM

## 2023-01-01 DIAGNOSIS — L29.9 PRURITUS, UNSPECIFIED: ICD-10-CM

## 2023-01-01 DIAGNOSIS — K83.8 OTHER SPECIFIED DISEASES OF BILIARY TRACT: ICD-10-CM
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
## 2023-01-01 DIAGNOSIS — Z92.21 PERSONAL HISTORY OF ANTINEOPLASTIC CHEMOTHERAPY: ICD-10-CM

## 2023-01-01 DIAGNOSIS — E87.6 HYPOKALEMIA: ICD-10-CM

## 2023-01-01 DIAGNOSIS — Z85.118 PERSONAL HISTORY OF OTHER MALIGNANT NEOPLASM OF BRONCHUS AND LUNG: ICD-10-CM

## 2023-01-01 DIAGNOSIS — Z63.4 DISAPPEARANCE AND DEATH OF FAMILY MEMBER: ICD-10-CM
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
## 2023-01-01 DIAGNOSIS — L50.9 URTICARIA, UNSPECIFIED: ICD-10-CM

## 2023-01-01 DIAGNOSIS — E87.1 HYPO-OSMOLALITY AND HYPONATREMIA: ICD-10-CM

## 2023-01-01 DIAGNOSIS — T78.40XA ALLERGY, UNSPECIFIED, INITIAL ENCOUNTER: ICD-10-CM

## 2023-01-01 DIAGNOSIS — R60.0 LOCALIZED EDEMA: ICD-10-CM

## 2023-01-01 LAB
ALBUMIN SERPL ELPH-MCNC: 3.6 G/DL
ALBUMIN SERPL ELPH-MCNC: 3.8 G/DL
ALBUMIN SERPL ELPH-MCNC: 3.9 G/DL
ALBUMIN SERPL ELPH-MCNC: 3.9 G/DL
ALP BLD-CCNC: 51 U/L
ALP BLD-CCNC: 59 U/L
ALP BLD-CCNC: 61 U/L
ALP BLD-CCNC: 62 U/L
ALT SERPL-CCNC: 11 U/L
ALT SERPL-CCNC: 8 U/L
ANION GAP SERPL CALC-SCNC: 10 MMOL/L
ANION GAP SERPL CALC-SCNC: 11 MMOL/L
ANION GAP SERPL CALC-SCNC: 14 MMOL/L
ANION GAP SERPL CALC-SCNC: 9 MMOL/L
AST SERPL-CCNC: 14 U/L
AST SERPL-CCNC: 16 U/L
BILIRUB SERPL-MCNC: 0.3 MG/DL
BUN SERPL-MCNC: 25 MG/DL
BUN SERPL-MCNC: 26 MG/DL
BUN SERPL-MCNC: 28 MG/DL
BUN SERPL-MCNC: 31 MG/DL
CALCIUM SERPL-MCNC: 8.9 MG/DL
CALCIUM SERPL-MCNC: 8.9 MG/DL
CALCIUM SERPL-MCNC: 9.2 MG/DL
CALCIUM SERPL-MCNC: 9.5 MG/DL
CHLORIDE SERPL-SCNC: 105 MMOL/L
CHLORIDE SERPL-SCNC: 105 MMOL/L
CHLORIDE SERPL-SCNC: 108 MMOL/L
CHLORIDE SERPL-SCNC: 110 MMOL/L
CO2 SERPL-SCNC: 19 MMOL/L
CO2 SERPL-SCNC: 23 MMOL/L
CO2 SERPL-SCNC: 24 MMOL/L
CO2 SERPL-SCNC: 25 MMOL/L
CREAT SERPL-MCNC: 1 MG/DL
CREAT SERPL-MCNC: 1.1 MG/DL
CREAT SERPL-MCNC: 1.1 MG/DL
CREAT SERPL-MCNC: 1.2 MG/DL
EGFR: 44 ML/MIN/1.73M2
EGFR: 48 ML/MIN/1.73M2
EGFR: 48 ML/MIN/1.73M2
EGFR: 54 ML/MIN/1.73M2
GLUCOSE SERPL-MCNC: 120 MG/DL
GLUCOSE SERPL-MCNC: 127 MG/DL
GLUCOSE SERPL-MCNC: 92 MG/DL
GLUCOSE SERPL-MCNC: 98 MG/DL
HCT VFR BLD CALC: 30.7 %
HCT VFR BLD CALC: 32.6 %
HCT VFR BLD CALC: 32.9 %
HCT VFR BLD CALC: 33.1 %
HGB BLD-MCNC: 10.4 G/DL
HGB BLD-MCNC: 10.7 G/DL
HGB BLD-MCNC: 11 G/DL
HGB BLD-MCNC: 11.1 G/DL
MCHC RBC-ENTMCNC: 27.6 PG
MCHC RBC-ENTMCNC: 27.7 PG
MCHC RBC-ENTMCNC: 28.2 PG
MCHC RBC-ENTMCNC: 28.7 PG
MCHC RBC-ENTMCNC: 32.8 G/DL
MCHC RBC-ENTMCNC: 33.4 G/DL
MCHC RBC-ENTMCNC: 33.5 G/DL
MCHC RBC-ENTMCNC: 33.9 G/DL
MCV RBC AUTO: 82.5 FL
MCV RBC AUTO: 82.5 FL
MCV RBC AUTO: 84.6 FL
MCV RBC AUTO: 85.8 FL
PLATELET # BLD AUTO: 155 K/UL
PLATELET # BLD AUTO: 157 K/UL
PLATELET # BLD AUTO: 165 K/UL
PLATELET # BLD AUTO: 183 K/UL
PMV BLD: 10.2 FL
PMV BLD: 10.5 FL
PMV BLD: 9.5 FL
PMV BLD: 9.8 FL
POTASSIUM SERPL-SCNC: 4 MMOL/L
POTASSIUM SERPL-SCNC: 4.4 MMOL/L
POTASSIUM SERPL-SCNC: 4.4 MMOL/L
POTASSIUM SERPL-SCNC: 4.7 MMOL/L
PROT SERPL-MCNC: 5.7 G/DL
PROT SERPL-MCNC: 5.9 G/DL
PROT SERPL-MCNC: 6 G/DL
PROT SERPL-MCNC: 6 G/DL
RBC # BLD: 3.63 M/UL
RBC # BLD: 3.8 M/UL
RBC # BLD: 3.99 M/UL
RBC # BLD: 4.01 M/UL
RBC # FLD: 14.1 %
RBC # FLD: 14.2 %
RBC # FLD: 14.3 %
RBC # FLD: 14.6 %
SODIUM SERPL-SCNC: 139 MMOL/L
SODIUM SERPL-SCNC: 140 MMOL/L
SODIUM SERPL-SCNC: 141 MMOL/L
SODIUM SERPL-SCNC: 143 MMOL/L
TSH SERPL-ACNC: 0.39 UIU/ML
TSH SERPL-ACNC: 0.4 UIU/ML
TSH SERPL-ACNC: 0.46 UIU/ML
TSH SERPL-ACNC: 0.84 UIU/ML
WBC # FLD AUTO: 4.12 K/UL
WBC # FLD AUTO: 5.31 K/UL
WBC # FLD AUTO: 5.41 K/UL
WBC # FLD AUTO: 5.63 K/UL

## 2023-01-01 PROCEDURE — A9579: CPT

## 2023-01-01 PROCEDURE — 36415 COLL VENOUS BLD VENIPUNCTURE: CPT

## 2023-01-01 PROCEDURE — 80053 COMPREHEN METABOLIC PANEL: CPT

## 2023-01-01 PROCEDURE — 77280 THER RAD SIMULAJ FIELD SMPL: CPT | Mod: 26

## 2023-01-01 PROCEDURE — 74183 MRI ABD W/O CNTR FLWD CNTR: CPT | Mod: 26

## 2023-01-01 PROCEDURE — 77334 RADIATION TREATMENT AID(S): CPT

## 2023-01-01 PROCEDURE — 99215 OFFICE O/P EST HI 40 MIN: CPT

## 2023-01-01 PROCEDURE — 99214 OFFICE O/P EST MOD 30 MIN: CPT

## 2023-01-01 PROCEDURE — 96413 CHEMO IV INFUSION 1 HR: CPT

## 2023-01-01 PROCEDURE — 77295 3-D RADIOTHERAPY PLAN: CPT | Mod: 26

## 2023-01-01 PROCEDURE — 77333 RADIATION TREATMENT AID(S): CPT | Mod: 26

## 2023-01-01 PROCEDURE — 84443 ASSAY THYROID STIM HORMONE: CPT

## 2023-01-01 PROCEDURE — 99204 OFFICE O/P NEW MOD 45 MIN: CPT

## 2023-01-01 PROCEDURE — 99024 POSTOP FOLLOW-UP VISIT: CPT

## 2023-01-01 PROCEDURE — 77412 RADIATION TX DELIVERY LVL 3: CPT

## 2023-01-01 PROCEDURE — 77290 THER RAD SIMULAJ FIELD CPLX: CPT | Mod: 26

## 2023-01-01 PROCEDURE — 99244 OFF/OP CNSLTJ NEW/EST MOD 40: CPT

## 2023-01-01 PROCEDURE — 77427 RADIATION TX MANAGEMENT X5: CPT

## 2023-01-01 PROCEDURE — 77263 THER RADIOLOGY TX PLNG CPLX: CPT

## 2023-01-01 PROCEDURE — 74183 MRI ABD W/O CNTR FLWD CNTR: CPT

## 2023-01-01 PROCEDURE — 74177 CT ABD & PELVIS W/CONTRAST: CPT | Mod: 26

## 2023-01-01 PROCEDURE — 77300 RADIATION THERAPY DOSE PLAN: CPT

## 2023-01-01 PROCEDURE — 77336 RADIATION PHYSICS CONSULT: CPT

## 2023-01-01 PROCEDURE — 71260 CT THORAX DX C+: CPT | Mod: 26

## 2023-01-01 PROCEDURE — 99284 EMERGENCY DEPT VISIT MOD MDM: CPT

## 2023-01-01 PROCEDURE — 71260 CT THORAX DX C+: CPT

## 2023-01-01 PROCEDURE — 77280 THER RAD SIMULAJ FIELD SMPL: CPT

## 2023-01-01 PROCEDURE — 77333 RADIATION TREATMENT AID(S): CPT

## 2023-01-01 PROCEDURE — 77417 THER RADIOLOGY PORT IMAGE(S): CPT

## 2023-01-01 PROCEDURE — 77295 3-D RADIOTHERAPY PLAN: CPT

## 2023-01-01 PROCEDURE — 85027 COMPLETE CBC AUTOMATED: CPT

## 2023-01-01 PROCEDURE — 74177 CT ABD & PELVIS W/CONTRAST: CPT

## 2023-01-01 PROCEDURE — 77334 RADIATION TREATMENT AID(S): CPT | Mod: 26

## 2023-01-01 PROCEDURE — 99283 EMERGENCY DEPT VISIT LOW MDM: CPT

## 2023-01-01 PROCEDURE — 77290 THER RAD SIMULAJ FIELD CPLX: CPT

## 2023-01-01 PROCEDURE — 77300 RADIATION THERAPY DOSE PLAN: CPT | Mod: 26

## 2023-01-01 RX ORDER — PEMBROLIZUMAB 25 MG/ML
400 INJECTION, SOLUTION INTRAVENOUS ONCE
Refills: 0 | Status: COMPLETED | OUTPATIENT
Start: 2023-01-01 | End: 2023-01-01

## 2023-01-01 RX ORDER — OXYCODONE HYDROCHLORIDE 10 MG/1
10 TABLET, EXTENDED RELEASE ORAL
Qty: 60 | Refills: 0 | Status: DISCONTINUED | COMMUNITY
Start: 2023-01-01 | End: 2023-01-01

## 2023-01-01 RX ORDER — FAMOTIDINE 10 MG/ML
1 INJECTION INTRAVENOUS
Qty: 20 | Refills: 0
Start: 2023-01-01 | End: 2023-01-01

## 2023-01-01 RX ORDER — DIPHENHYDRAMINE HCL 50 MG
50 CAPSULE ORAL ONCE
Refills: 0 | Status: COMPLETED | OUTPATIENT
Start: 2023-01-01 | End: 2023-01-01

## 2023-01-01 RX ORDER — POTASSIUM CHLORIDE 1500 MG/1
20 TABLET, FILM COATED, EXTENDED RELEASE ORAL
Qty: 30 | Refills: 5 | Status: DISCONTINUED | COMMUNITY
Start: 2022-12-01 | End: 2023-01-01

## 2023-01-01 RX ORDER — GABAPENTIN 100 MG/1
100 CAPSULE ORAL
Qty: 90 | Refills: 5 | Status: DISCONTINUED | COMMUNITY
Start: 2022-09-07 | End: 2023-01-01

## 2023-01-01 RX ORDER — ALBUTEROL SULFATE 2.5 MG/3ML
(2.5 MG/3ML) SOLUTION RESPIRATORY (INHALATION) 4 TIMES DAILY
Qty: 4 | Refills: 3 | Status: DISCONTINUED | COMMUNITY
Start: 2020-06-26 | End: 2023-01-01

## 2023-01-01 RX ORDER — LACTOSE-REDUCED FOOD/FIBER 0.06 G-1.2
LIQUID (ML) ORAL TWICE DAILY
Qty: 3 | Refills: 3 | Status: DISCONTINUED | COMMUNITY
Start: 2019-10-30 | End: 2023-01-01

## 2023-01-01 RX ORDER — DIPHENHYDRAMINE HCL 50 MG
2 CAPSULE ORAL
Qty: 42 | Refills: 0
Start: 2023-01-01 | End: 2023-01-01

## 2023-01-01 RX ORDER — TRAZODONE HYDROCHLORIDE 100 MG/1
100 TABLET ORAL
Qty: 30 | Refills: 1 | Status: DISCONTINUED | COMMUNITY
Start: 2020-02-10 | End: 2023-01-01

## 2023-01-01 RX ORDER — AMLODIPINE BESYLATE 10 MG/1
10 TABLET ORAL DAILY
Qty: 90 | Refills: 2 | Status: ACTIVE | COMMUNITY
Start: 2023-01-01 | End: 1900-01-01

## 2023-01-01 RX ORDER — FAMOTIDINE 10 MG/ML
20 INJECTION INTRAVENOUS ONCE
Refills: 0 | Status: COMPLETED | OUTPATIENT
Start: 2023-01-01 | End: 2023-01-01

## 2023-01-01 RX ADMIN — Medication 50 MILLIGRAM(S): at 20:08

## 2023-01-01 RX ADMIN — PEMBROLIZUMAB 400 MILLIGRAM(S): 25 INJECTION, SOLUTION INTRAVENOUS at 13:11

## 2023-01-01 RX ADMIN — FAMOTIDINE 20 MILLIGRAM(S): 10 INJECTION INTRAVENOUS at 20:08

## 2023-01-01 RX ADMIN — PEMBROLIZUMAB 400 MILLIGRAM(S): 25 INJECTION, SOLUTION INTRAVENOUS at 15:44

## 2023-01-01 RX ADMIN — PEMBROLIZUMAB 400 MILLIGRAM(S): 25 INJECTION, SOLUTION INTRAVENOUS at 16:11

## 2023-01-01 RX ADMIN — Medication 60 MILLIGRAM(S): at 20:08

## 2023-01-01 RX ADMIN — PEMBROLIZUMAB 400 MILLIGRAM(S): 25 INJECTION, SOLUTION INTRAVENOUS at 16:42

## 2023-01-01 RX ADMIN — PEMBROLIZUMAB 400 MILLIGRAM(S): 25 INJECTION, SOLUTION INTRAVENOUS at 12:40

## 2023-01-01 RX ADMIN — PEMBROLIZUMAB 400 MILLIGRAM(S): 25 INJECTION, SOLUTION INTRAVENOUS at 16:15

## 2023-01-01 RX ADMIN — PEMBROLIZUMAB 400 MILLIGRAM(S): 25 INJECTION, SOLUTION INTRAVENOUS at 14:34

## 2023-01-01 SDOH — SOCIAL STABILITY - SOCIAL INSECURITY: DISSAPEARANCE AND DEATH OF FAMILY MEMBER: Z63.4

## 2023-01-11 ENCOUNTER — LABORATORY RESULT (OUTPATIENT)
Age: 88
End: 2023-01-11

## 2023-01-11 ENCOUNTER — APPOINTMENT (OUTPATIENT)
Dept: INFUSION THERAPY | Facility: CLINIC | Age: 88
End: 2023-01-11
Payer: MEDICAID

## 2023-01-11 ENCOUNTER — APPOINTMENT (OUTPATIENT)
Dept: HEMATOLOGY ONCOLOGY | Facility: CLINIC | Age: 88
End: 2023-01-11
Payer: MEDICAID

## 2023-01-11 VITALS
HEART RATE: 80 BPM | DIASTOLIC BLOOD PRESSURE: 63 MMHG | RESPIRATION RATE: 16 BRPM | HEIGHT: 62 IN | TEMPERATURE: 98.2 F | SYSTOLIC BLOOD PRESSURE: 115 MMHG

## 2023-01-11 LAB
HCT VFR BLD CALC: 35.4 %
HGB BLD-MCNC: 11.7 G/DL
MCHC RBC-ENTMCNC: 26.8 PG
MCHC RBC-ENTMCNC: 33.1 G/DL
MCV RBC AUTO: 81 FL
PLATELET # BLD AUTO: 142 K/UL
PMV BLD: 10.3 FL
RBC # BLD: 4.37 M/UL
RBC # FLD: 15 %
WBC # FLD AUTO: 3.31 K/UL

## 2023-01-11 PROCEDURE — 99214 OFFICE O/P EST MOD 30 MIN: CPT

## 2023-01-11 RX ORDER — PEMBROLIZUMAB 25 MG/ML
200 INJECTION, SOLUTION INTRAVENOUS ONCE
Refills: 0 | Status: COMPLETED | OUTPATIENT
Start: 2023-01-11 | End: 2023-01-11

## 2023-01-11 RX ADMIN — PEMBROLIZUMAB 200 MILLIGRAM(S): 25 INJECTION, SOLUTION INTRAVENOUS at 14:40

## 2023-01-11 NOTE — HISTORY OF PRESENT ILLNESS
[de-identified] : 08/08/2019 \par This is a 83 year old female who I initially met in [facility] on 3/28/19. She was initially admitted on 2/20/2019 for a right loculated pleural effusion approx. 900 CCs, no PE and right apical 3.1x3.0 cm mass with pleural nodules on CTA of chest. Pt returned to ED on 3/15/2019 for right pleural effusion, had a thoracentesis in ED and was sent home. She than came back for SOB  due to recurrent effusion and a Pleurx was placed. Pathology showed adenocarcinoma. \par \par She ha lost a few pounds about  4. She never smoked. She does have weakness.  From her Pleurx she  has 500 cc removed every other day. She has pain after.\par \par Work up:\par 02/20/2019 CTA chest:  Large right pleural effusion, maximal axial width of 7 cm correlating with an estimated volume of 900 cc. Associated near complete compressive atelectasis of the right lower lobe. Probable loculations of the effusion seen at the apex and at the right heart border (for example series 5 images 176, 52; series 9 image 69). 1 cm right upper lobe fissural nodule (series 5 image 139). Patent central airways. There is right apical 3.1 x 3.0 cm mass with irregular right apical pleural thickening (series 7, image 44), and multiple satellite pleural nodules including a more caudad 2.7 cm para-mediastinal nodule (series 7, image 175). Tissue sampling recommended.\par \par PATHOLOGY:\par 03/25/2019 Pleural fluid: Cell block material is hypocellular and shows a single minutecluster of malignant cells.Immunohistochemistry studies were performed at [facility] and the\par results are noncontributory\par 03/22/2019: Addendum\par Cell block material shows macrophages (positive ),mesothelial cells (positive calretinin) and a single very minute cluster of atypical suspicious cells is negative for Fidel-Ep4.Material is insufficient for further diagnostic studies (ie, immunohistochemistry).\par \par 03/15/2019: Pleural fluid: Addendum Immunohistochemical studies were performed on the cell block at [facility], [facility], [address] [place] [address], [facility] (see NOTE). The results are as follows:\par CK7-                     Rare mesothelial cells positive\par CK20-                   Negative\par CK 5/6-                 Rare mesothelial cells positive\par Calretinin-             Scattered mesothelial cells positive\par CD68-                   Numerous histiocytes positive\par BerEP-4-               Negative\par Napsin-A-             Negative\par TTF-1-                  Negative\par These results are non-specific, suggestive of mesothelialhyperplasia. The few markedly atypical cells seen in the smear are not evident in the cell block.\par The atypical cells seen in the current smear are not seen in the prior pleural fluid Thinprep smear or cell block (16-RF-).\par Few marked atypical cells, suspicious for mesothelioma versus adenocarcinoma, in a background of numerous mesothelial cells, histiocytes and small lymphocytes.\par 03/25/2019: Final Diagnosis - POSITIVE FOR MALIGNANT CELLS. Favor metastatic adenocarcinoma. Pending cell block.\par I spoke to Dr. Allison  and there are only a few cells thus further studies such as IHC, molecular PD-l1 is not possible. [de-identified] : 6/5/19\par She is here for follow up. Since last visit she started Keytruda. She had a PET/CT on April 17 that showed  Extensive FDG avid right-sided pleural mass/soft tissue thickening, \par with max SUV 21.5 within a right apical pleural soft tissue mass.\par 2.  Multiple FDG avid mediastinal lymph nodes, with max SUV 16.5 within a 2.6 x 2.1 cm subcarinal node.\par 3.  FDG avid lytic lesion within the left superior acetabulum (max SUV 14.8), suspicious for osseous metastasis.\par 4.  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 Used to be  500 ml  u 3 times a week. Takes 60 mg of oral morphine a day 20 mg 3 times a day but pain is still severe. Has not had a bowel movement in unknown amount of days does not take laxatives although she has laxatives. \par \par 6/26/19 She is here for follow up. She is due for cycle 3 of Keytruda. She has about 300 mg drained from her pleura twice a week. Has SOB but saturated 94% on room air and 94% on ambulation. Did not tolerate the fentanyl path had nausea. On Morphine 10 mg BID doing well. Constipation is better. \par \par 7/17/19\par She is here for follow-up of Stage IV lung cancer with son.  She is due for cycle 3 of Keytruda.  CBC is stable from today.  She only takes the liquid morphine at night now.  She started eating slightly more and has been feeling slightly better ,as per son.  He states that the pleurex drainage is less, it is checked every Friday + Monday.  On Friday there was about 225 cc drained, but yesterday no drainage.  We will arrange for hydration today since she has had poor oral intake lately and small appetite.  Son reports she can tolerate fluids but she has early satiety with food.  Patient also reports feeling short of breath at rest, but her O2 sat is 98% on R/a.  Right sided pain is better. \par 07/06/2019 CT C/A/P IMPRESSION: Since April 17, 2019: 1. No definite new sites of metastatic disease. 2. Right-sided pleural soft tissue mass/thickening, overall appearing decreased since April 17, 2019 with primarily residual right apical tumor. Tumor appears to infiltrate the mediastinum.3. No significant change in mediastinal lymphadenopathy including largest 2.6 x 2.1 cm subcarinal lymph node which remains unchanged. Retrocrural 2.5 x 2.3 cm metastasis or additional site of pleural tumor, unchanged.4. Irregularity of right anterior first rib, probably invaded by tumor, stable in retrospect. Left superior acetabular lucent lesion corresponding to FDG avid bone metastasis.5. Right pelvic indeterminate 7 cm mass; possible retroverted uterus with degenerating fibroid or less likely ovarian mass; previously non-FDG avid and probably benign. Further evaluation with pelvic ultrasound or MRI pelvis with and without IV contrast may be helpful.6. Status post right chest tube placement with decreased small loculated right pleural effusion with fluid within the major fissure. 7. New mild to moderate intrahepatic biliary dilation. Correlation with LFTs recommended. Consider further evaluation with ERCP or MRCP with and without IV contrast.\par \par 8/7/19\par She is here with her son. Overall she is doing much better. She is in wheelchair but now less pain, eating better and Pleurax is only draining about 25 cc twice a week, She does complain of SOB still. I explained this will improve as well but asked her to follow up with Dr. Lanza of pulmonology. \par \par 8/28/19\par She is here for follow up. They are to see Dr. Lanza in Early September. There is minimal output from Pleurax. Pain is much better and does not use narcotics anymore.  Overall better. She has right side nasal congestion and states makes it hard to breath.\par \par 9/18/19\par She is doing well. She is due for cycle 7. Son was not eliana to schedule CTs prior to the visit but she looks better and better every visit and thus most likely still responding. She saw Dr. Lanza who started her on Flonase. She is due to see ENT Dr. Adams.  Appetite is better. No pains. Dr. Lanza is considering to remover the Pleurx possibly in November. \par \par 10/09/2019\par Patient is here for a follow up visit. Due for cycle 8 today. CT chest/abd/pelvis 09/2019 showed improvement in disease. Also has seen Dr. Adams and agreed to continue with Flonase as she has hypertrophy of turbinates. \par She is tolerating Ketryuda well. Her only complaint at this time is trouble in falling asleep. \par \par 10/30/19\par Patient is here for a follow up visit of Stage IV lung cancer with family member.  She is feeling well with no new complaints.   She feels dyspneic when taking a deep breath but her pain has improved.  Most recent CBC is stable. 09/22/2019 CT C/A/P showed improvement in right apical lung mass and pleural effusion and drained 150cc. Due for cycle 9 today. \par \par 12/30/19\par Patient is here for a follow-up visit for  lung cancer with son.  Reviewed imaging results with patient and her family, which suggests worsening effusion but otherwise stable.  They drained 500 cc from pleurx yesterday, reportedly sanguinous drainage.  She is agreeable to continue with cycle 12 of Keytruda tomorrow. She reports persistent dyspnea.  We discussed that this tx regimen may be losing its responsiveness.\par 12/27/2019 CT C/A/P IMPRESSION: Worsening right-sided pleural effusion. Right apical spiculated nodule without difference. Unchanged appearance of the mediastinum. Unchanged appearance of the third thoracic vertebral body and right second rib. No interval change since prior examination. No change in hepatic hypodensity (likely fatty infiltration), splenic hypodensity (too small to characterize), left adrenal nodule or 1 cm retroperitoneal nodule on the right. No change in fat-containing mass in the anterior left thigh.\par \par 2/10/2020\par She is here for follow-up accompanied by son.  Since last visit she was admitted to hospital for pain and nausea. She had pleurx catheter removed during most recent hospitalization on 1/13/2020.  She complains of insomnia but generally feels well.  Most recent scan reviewed with patient.  She reports she had a rash after discharge from the hospital which has since resolved.  \par 01/11/2020 CT Chest IMPRESSION: Since CT scan performed on 12/27/2019; 1.  Right thoracostomy tube in stable position with slight interval decrease of right pleural effusion. 2.   Interval increase in right middle lobe consolidation/atelectasis. 3.  Stable spiculated nodule in the right lung apex, consistent with patient's known malignancy.\par \par She feels much better now that her Pleurx is gone. Does not have any pain. Feels dyspneic which is stable.\par \par 3/2/2020\par She is here for follow-up for lung cancer accompanied by son. Since last visit, she followed with Dr. Lanza pulmonology, and Dr. Hirsch from [facility] who agreed against placing additional drainage catheters based on most recent CT imaging. She still reports some SOB. She also reports taking a [place] medication named Volidol and Panangin for here breathing. Patient reports mild dizziness after taking Trazodone; we recommended halving dose.  \par \par 5/4/2020\par She is here for follow-up for lung cancer accompanied by son. Her last treatment as in March due to COVID she is doing well. She was on Room air. She states SOB is the same. She has paraspinal back pain/hip pain. \par \par 6/15/2020\par She is here for follow-up for lung cancer accompanied by son.  Patient denies fever, chills, vomiting, worsening cough, new rash, persistent diarrhea or bleeding.  Reviewed most recent imaging and had a discussion regarding continuing with Keytruda every 6 weeks for now as she is clinically stable.  She does complain of weakness and nasal congestion which improves using Fluticasone spray.  She uses oxygen intermittently at home.  \par 05/09/2020 CT C/A/P IMPRESSION: 1. Stable spiculated right upper lobe mass measuring up to 3.1 cm.2. New areas of ground glass and reticular opacities within the medial left lung, with new subcentimeter nodular opacities within the left lower lobe. Findings may be postinfectious or postinflammatory in etiology. CT of the chest in 2-3 months is recommended. 3. Decreased mediastinal and right hilar lymphadenopathy. 4. Resolved right pleural effusion. 1.  No findings of new or progressive metastatic disease in the abdomen or pelvis. 2.  Indeterminate left adrenal nodule and right retroperitoneal nodule, stable in size from prior CT. 3.  Partially visualized fat-containing mass in the anterior left thigh-considerations include benign and malignant neoplastic etiology. Recommend further evaluation with MRI if not already performed. 4.  Sclerotic appearance of metastatic left acetabular lesion, similar in appearance to prior CT, possibly representing healing- was previously lytic on PET/CT of 4/17/2019. 5.  Lobulated pelvic structure again seen, possibly uterus with fibroids. Further evaluation with pelvic ultrasound may be performed as warranted.\par \par 9/9/20\par Patient is here for follow up accompanied by her son who speaks English . She has chronic SOB which as per son has been slowly getting worse. She uses O2 at home intermittently. She also uses Flonase and Albuterol inhaler. Otherwise she is mostly wheel chair bound and can walk back and forth to the bathroom by herself. Her appetite is good and weight is stable. No new bone pain/abdominal pain/ rash. \par \par 10/21/2020\par She is here for follow-up for lung cancer, accompanied by son.  Patient denies fever, chills, vomiting, new cough, new rash, persistent diarrhea or bleeding.  She is still pending reimaging, they report they have not received authorization for scan yet.  She still complains of weakness and nasal congestion, which improves using Fluticasone spray.  She uses oxygen intermittently at home.  She still has SOB.   Her only new complaint is intermittent back pruritus.  She is due for cycle 20 of Keytruda today.\par \par 12/2/2020\par She is here for follow-up for lung cancer, accompanied by son.  Patient denies fever, chills, vomiting, new cough, new rash, persistent diarrhea or bleeding.  She still complains of weakness, SOB and nasal congestion, uses the nebulizer once daily with occasional relief of her SOB.   She is due for cycle 21 of Keytruda today.  She is using allegra and or hydrocortisone 1% cream for the pruritus.  Reviewed most recent imaging which shows good treatment response.  \par 11/14/2020 CT C/A/P IMPRESSION: Since May 9, 2020: 1. No evidence of new intrathoracic or intra-abdominal metastatic disease. 2. Unchanged 2.5 x 2 cm right upper lobe pulmonary nodule, stable with similar measurement technique. 3. Decreased subcarinal lymphadenopathy, now 1.2 cm short axis, previously 1.5 cm short axis on May 9, 2020 CT with similar measurement technique. No evidence of additional suspicious lymphadenopathy by size criteria. 4. Near complete interval resolution of left lower lobe and lingula patchy airspace opacities. Unchanged areas of groundglass and reticular opacities within the medial left lung. Findings likely postinfectious or inflammatory. 5. Unchanged sclerotic appearance of metastatic left acetabular lesion,  possibly representing healing of previously FDG avid lytic on PET/CT of 4/17/2019. 6. Unchanged, partially imaged indeterminate lipomatous mass within the anterior left thigh.\par \par 1/13/21\par She is here for follow-up for lung cancer, accompanied by son.  She still complains of weakness, SOB daily which is unchanged.  She is due for cycle 22 of Keytruda today.  She continues to use Allegra and or hydrocortisone 1% cream for the pruritus.  Patient denies fever, chills, vomiting, new cough, new rash, persistent diarrhea or bleeding.  She also reports musculoskeletal pain.  \par \par 2/24/21\par She is here with her son. She is doing well but still has same complaints. mainly fatigue and SOB as well as MSK pain behind the left scapula. We had a long talk and I explained that the fatigue and SOB maybe due to kaytruda so we decided to stop it and observe her. On exam no changes. \par \par \par 5/17/21\par She is here for followup with her son. Her CT C/A/P in March 2021  showed Since November 14, 2020. No significant interval change approximate 2.5 cm x 2 cm right upper lobe nodule (series 4/33). Stable reticular opacities medial right upper lobe. Stable ill-defined subcarinal lymph nodes (series 4/88). Stable bibasilar subsegmental discoid atelectasis. No pleural effusions. No new sites of metastatic disease in the abdomen and pelvis. Previously described sclerotic left acetabular lesion felt to represent a healed metastasis based on prior PET/CT is poorly visualized on the current study. Unchanged, partially imaged indeterminate lipomatous mass within the anterior left thigh.\par Given that she been on  Keytruda now for 2 years with no progression of disease and she continued to have shortness of breath we decided to give her a treatment break.\par Today even well-being of treatment she still continues to have the same except complains specifically short of breath for which she had an extensive workup. She also has rhinorrhea  so she was referred to ENT again as per their request and I refilled Flonase\par \par 7/26/21\par She is here for follow up. She had CT C/A/P on 6/12/21: IMPRESSION: No CT evidence of new sites of metastatic disease in the chest, abdomen and pelvis. Stable 2.5 cm right upper lobe nodule. Interval decrease in size of subcarinal lymph node. Slight interval increase in reticular and groundglass opacities left lower lobe, possibly postinfectious/postinflammatory. Unchanged, partially imaged indeterminate lipomatous mass within the anterior left thigh.\par CBC is normal today. She is here with her son. No new symptoms has chronic rhinorrhea and chronic SOB with non specific pain in left scapula with unclear cause that has been present for years. \par \par 11/29/21 Patient is here for follow up for stage 4 lung NSCLC. She was on keytruda and has been on break since Feb 2021. Her last imaging in June 2021 showed stable disease. She has chronic complaints of fatigue, dyspnea, pain in her left side back and insomnia. All these complaints are chronic with no new worsening of symptoms. \par \par 2/9/22\par She returns for follow-up today for NSCLC, accompanied by son who helps with translation at patient request.  She is due for next cycle of immunotherapy today.  She continues to have the same chronic complaints now that she always had for the last several years including unclear left-sided pain, shortness of breath, she seen multiple specialists and we are unable to exactly determine the cause of her pain but I explained that the shortness of breath as before is possibly related to her lung cancer may be resulting in decreased function from previous Pleurx catheter leading to fibrosis and decrease in lung capacity.  She takes oxycodone 5mg as needed for the L sided pain.  Reviewed most recent CT imaging which shows increase in RUL nodule, L adrenal nodule and new tubular opacity within the right upper lobe but otherwise stable.  \par 12/18/2021 CT C/A/P IMPRESSION: Compared to CT chest 6/12/2021,Interval increase in size of the right upper lobe nodule now measuring 2.8 x 2.0 x 2.4 cm previously 2.5 x 2.0 x 2.0 cm. New tubular opacity within the right upper lobe.Significant interval increase in size of left adrenal nodule, now measuring 4.6 x 4.3 cm, highly suggestive of metastasis/collision tumor in this patient with history of lung cancer.  Right upper quadrant peritoneal nodules without significant change from prior. Continued attention on follow-up imaging.Partially imaged indeterminate lipomatous mass in the left thigh redemonstrated. Recommend MR evaluation.\par \par 3/23/22\par She returns for follow-up today for NSCLC, accompanied by son who helps with translation at patient request.  She is due for next cycle of immunotherapy today.  She continues to have the same chronic complaints now that she always had for the last several years including unclear left-sided pain, shortness of breath, she seen multiple specialists and we are unable to exactly determine the cause of her pain but I explained that the shortness of breath as before is possibly related to her lung cancer may be resulting in decreased function from previous Pleurx catheter leading to fibrosis and decrease in lung capacity.  She takes oxycodone 5 mg as needed for the L sided pain She has chronic SOB that she believes is due to her heart. 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  She was started on antidepressant by PCP but she is not taking it.\par \par 6/15/22\par She is here for follow up. She is due for cycle 27 of Keyturda. CBC from today is  Normal. She has pain in entire left side of chest including shouldr. She takes over the counter meds son does not know then name. SOB is the same. Lungs sounded clear except for decrease sounds in RLL which is same. \par \par 7/27/22\par She returns for follow-up today for NSCLC, accompanied by son who helps with Italian translation at patient request.  She is due for next cycle of immunotherapy today.  She continues to have the same chronic complaints that she always had for the last several years including unclear left-sided pain, shortness of breath, she seen multiple specialists and we are unable to exactly determine the cause of her pain.  She takes oxycodone 5 mg as needed for the L sided pain, requesting refill.  Reviewed most recent CT imaging which shows stable findings and continued treatment response.  Patient states she feels better when she receives the immunotherapy and therefore is requesting to go back to every 3 week frequency again.  \par 07/09/2022 CT C/A/P IMPRESSION:CHEST:Stable spiculated right apical lung mass, measuring maximally up to 3.2 cm.Previously seen right upper lobe 5 mm solid nodule is no longer visualized.Stable mediastinal prominent lymph nodes.Other previously noted pulmonary findings are difficult to examine given degree of respiratory motion.ABDOMEN/PELVIS:Interval decrease of left adrenal lesion, now measuring 2.1 x 2.1 cm (previously 3 x 2.2 cm).Decreased right upper quadrant soft tissue nodules measuring up to 0.6 cm (previously 1 cm).\par \par 8/17/22\par she is here for follow up. She is due for cycle 29 of keyturda now every 3 weeks.  CBC with mild anemia only. New new complaints SOB is stable. Has pruritus on back but no rash recommended a trial of Aquaphor.  States oxycodone helps with pain and night and sleeps much better.\par \par 09/07/2022 accompanied by her son; reports no changes in her generalized dull pains. She uses home remedies to "clean up her pulmonary pathways".\par \par 9/28/22\par She returns for follow-up today for NSCLC, accompanied by son who helps with Italian translation at patient request.  She is due for next cycle of immunotherapy today.  She continues to have the same chronic complaints that she always had for the last several years including unclear left-sided pain, shortness of breath, she seen multiple specialists and we are unable to exactly determine the cause of her pain.  She takes oxycodone 5 mg as needed for the L sided abd pain.  She still struggles with pruritus so we suggested to try gabapentin at a low dose and titrate up very slowly to see if it helps, however she did not see any improvement and did not like the way the gabapentin made her feel.  She has since stopped the gabapentin.  Reviewed most recent CBC which shows mild, normocytic anemia with hgb 11.3g/dL.  \par \par 10/19/2022\par She is here for follo wup and is due for keykori bassettr lung cancer. She had CT C/A/P done on 10/15/2022 but for some reason they were not compared. I reached out to radiologist and CT abd was comapred and tehre is no progression. I am waiting for the CT chest but amee report there is no progression that i can tell by comparing the CTs. She still has same symptoms, ABRAHAM, pain in scapula area, pruritis aslo only at mid back bettween scapula. She did not tolerate gabapentin. we decided not to hold treatment for the pruritis and contineu. No rash on exam at all. \par \par 11/09/2022 \par accompanied by her son; Reports being in pain (back) as she forgot to take her pain medications, no changes in chronic conditions or new complaints since the last visit.\par \par 11/30/2022\par accompanied by her son; Reports seen by pain management and had steroid injection with minimal pain response, has F/U in 12/2022. She forgot to take her pain Rx this morning. Recently had tooth extraction and now has no teeth. No other changes in chronic conditions or new complaints since the last visit.\par \par 12/21/2022\par accompanied by her son; Reports no changes in chronic conditions or new complaints since the last visit; scheduled for another pain management injection 12/23/2022.\par \par 01/11/2023 \par accompanied by her son; Reports feeling "the same"; chronic pains - unchanged, decided not to continue with pain management as it does not provide any relieve; no changes in chronic conditions or new complaints since the last visit.\par

## 2023-01-11 NOTE — ASSESSMENT
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  \par - 03/2021 CT C/A/P were stable and we stopped Keytruda and was on observation.\par - 06/2021 CT showing stable findings.\par - 12/2021 CT C/A/P showed increase in RUL nodule, L adrenal nodule and new tubular opacity within the right upper lobe but otherwise stable. \par - 12/29/2021 resumed Keytruda.\par - 07/09/2022 CT C/A/P with IVC shows stable findings and continued treatment response. \par - 07/27/2022 the patient prefers every 3 week dosing.\par - 10/15/2022 CT C/A/P no progression on CT A/P awaiting CT chest addendum but likely has not progressed based on review.\par - 12/13/2022 DEXA - Osteopenia: The patient has low bone mass, with the lowest measured bone density in the Left Femoral Neck. The patient has an estimated ten-year risk of hip fracture of 3.1% and an estimated ten-year risk of major fracture of 11%, based on the WHO FRAX algorithm.\par \par # Dysphagia and decrease appetite - better now.\par - on Jevity 1.2 hector BID.\par - followed by nutritionist, Tila Villanueva.\par \par # SOB mainly when she tries to sleep; due to right nasal congestion maybe from Keytruda, Keytruda was stopped and SOB did not change.\par - on Flonase.\par - On Albuterol + nebulizer PRN.\par - She follows with Dr Lanza as indicated\par - Advised to use saline nasal spray as she has c/o dryness in her nose.\par \par # Some insomnia \par - on Melatonin 5 mg at bedtime.\par \par # Pruritus, back; may be side effect of Keytruda?\par - has hydrocortisone 1% or Benadryl PRN for symptom relief if happens again\par - she STOPPED gabapentin due to side effects.\par \par # Pain at T spine right at the level of scapula with tenderness maybe from T3 compression fracture \par - c/w oxycodone 5 mg every 8 hours as needed - Reviewed risks and benefits of narcotic consumption and prescribed dosing/frequency with patient.\par -will have her see neurosurgery Dr. Weaver. She may need MR T spine but no mass is reported on CT.\par - 01/11/2023 decided against pain management by pain management team.\par \par # Constipation - chronic, reports BM every 5 days - now better with current management every other day.\par \par PLAN:\par \par - continue Keytruda 200 mg IV (every 3 week dosing as of 07/27/2022) - C36 GIVE TODAY.\par \par - continue oxycodone 5 mg every 8 hours as needed for pain with bowel regimen.\par \par - continue Lactulose, Colace and Senna for bowel regiment.\par \par - repeat CT C/A/P with contrast - 01/2023 - ordered.\par \par - F/U with Neurosuegery for management of T3 compression fracture.\par - F/U with PCP for HTN management.\par \par - Labwork today:  CBC, CMP, TSH.\par \par RTC in 3 weeks for MD visit and Keytruda treatment with CBC CMP and TSH.

## 2023-01-11 NOTE — END OF VISIT
[FreeTextEntry3] : I was physically present for the key portions of the evaluation and management service provided.  I agree with the history and physical, and plan which I have reviewed and edited where appropriate.\par \par \par She returns for follow-up today for metastatic lung cancer. She will procede with keytruda.  Iamging was orderdered. She does not wish to follow up with Dr. Lan and will ordered pain meds as needed. She states pruritis is better all other chronic issues are the same. RTC in 3 weeks psot scans

## 2023-01-11 NOTE — REVIEW OF SYSTEMS
[Night Sweats] : night sweats [Fatigue] : fatigue [Shortness Of Breath] : shortness of breath [SOB on Exertion] : shortness of breath during exertion [Constipation] : constipation [Joint Pain] : joint pain [Muscle Weakness] : muscle weakness [Difficulty Walking] : difficulty walking [Negative] : Allergic/Immunologic [Recent Change In Weight] : ~T no recent weight change [Dysphagia] : no dysphagia [Muscle Pain] : no muscle pain [Skin Rash] : no skin rash [FreeTextEntry2] : seated in wheelchair [FreeTextEntry4] : chronic nasal congestion [FreeTextEntry6] : SOB for years - unchanged [FreeTextEntry7] : occasional hemorrhoidal bleeding  [FreeTextEntry9] : chronic L sided pain [de-identified] : pruritus at her back

## 2023-01-11 NOTE — PHYSICAL EXAM
[Capable of only limited self care, confined to bed or chair more than 50% of waking hours] : Status 3- Capable of only limited self care, confined to bed or chair more than 50% of waking hours [Normal] : affect appropriate [de-identified] : seated in wheelchair, but does stand up by herself [de-identified] : Lungs sounded clear today  [de-identified] : L thigh lipoma [de-identified] : No rash

## 2023-01-12 LAB
ALBUMIN SERPL ELPH-MCNC: 3.9 G/DL
ALP BLD-CCNC: 53 U/L
ALT SERPL-CCNC: 11 U/L
ANION GAP SERPL CALC-SCNC: 11 MMOL/L
AST SERPL-CCNC: 20 U/L
BILIRUB SERPL-MCNC: 0.3 MG/DL
BUN SERPL-MCNC: 22 MG/DL
CALCIUM SERPL-MCNC: 8.6 MG/DL
CHLORIDE SERPL-SCNC: 107 MMOL/L
CO2 SERPL-SCNC: 22 MMOL/L
CREAT SERPL-MCNC: 1 MG/DL
EGFR: 55 ML/MIN/1.73M2
GLUCOSE SERPL-MCNC: 93 MG/DL
POTASSIUM SERPL-SCNC: 3.2 MMOL/L
PROT SERPL-MCNC: 5.7 G/DL
SODIUM SERPL-SCNC: 140 MMOL/L
TSH SERPL-ACNC: 0.21 UIU/ML

## 2023-01-20 ENCOUNTER — APPOINTMENT (OUTPATIENT)
Dept: PAIN MANAGEMENT | Facility: CLINIC | Age: 88
End: 2023-01-20

## 2023-02-01 ENCOUNTER — LABORATORY RESULT (OUTPATIENT)
Age: 88
End: 2023-02-01

## 2023-02-01 ENCOUNTER — APPOINTMENT (OUTPATIENT)
Dept: INFUSION THERAPY | Facility: CLINIC | Age: 88
End: 2023-02-01
Payer: MEDICAID

## 2023-02-01 ENCOUNTER — APPOINTMENT (OUTPATIENT)
Dept: HEMATOLOGY ONCOLOGY | Facility: CLINIC | Age: 88
End: 2023-02-01
Payer: MEDICAID

## 2023-02-01 ENCOUNTER — OUTPATIENT (OUTPATIENT)
Dept: OUTPATIENT SERVICES | Facility: HOSPITAL | Age: 88
LOS: 1 days | End: 2023-02-01

## 2023-02-01 VITALS
DIASTOLIC BLOOD PRESSURE: 68 MMHG | TEMPERATURE: 97.1 F | RESPIRATION RATE: 16 BRPM | SYSTOLIC BLOOD PRESSURE: 118 MMHG | HEART RATE: 64 BPM | HEIGHT: 62 IN

## 2023-02-01 DIAGNOSIS — C34.91 MALIGNANT NEOPLASM OF UNSPECIFIED PART OF RIGHT BRONCHUS OR LUNG: ICD-10-CM

## 2023-02-01 DIAGNOSIS — Z51.12 ENCOUNTER FOR ANTINEOPLASTIC IMMUNOTHERAPY: ICD-10-CM

## 2023-02-01 DIAGNOSIS — S22.030S: ICD-10-CM

## 2023-02-01 DIAGNOSIS — J91.0 MALIGNANT PLEURAL EFFUSION: ICD-10-CM

## 2023-02-01 DIAGNOSIS — Z90.710 ACQUIRED ABSENCE OF BOTH CERVIX AND UTERUS: Chronic | ICD-10-CM

## 2023-02-01 PROCEDURE — 99214 OFFICE O/P EST MOD 30 MIN: CPT

## 2023-02-01 RX ORDER — PEMBROLIZUMAB 25 MG/ML
400 INJECTION, SOLUTION INTRAVENOUS ONCE
Refills: 0 | Status: COMPLETED | OUTPATIENT
Start: 2023-02-01 | End: 2023-02-01

## 2023-02-01 RX ADMIN — PEMBROLIZUMAB 400 MILLIGRAM(S): 25 INJECTION, SOLUTION INTRAVENOUS at 15:45

## 2023-02-01 NOTE — ASSESSMENT
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  \par - 03/2021 CT C/A/P were stable and we stopped Keytruda and was on observation.\par - 06/2021 CT showing stable findings.\par - 12/2021 CT C/A/P showed increase in RUL nodule, L adrenal nodule and new tubular opacity within the right upper lobe but otherwise stable. \par - 12/29/2021 resumed Keytruda.\par - 07/09/2022 CT C/A/P with IVC shows stable findings and continued treatment response. \par - 07/27/2022 the patient prefers every 3 week dosing.\par - 10/15/2022 CT C/A/P no progression on CT A/P awaiting CT chest addendum but likely has not progressed based on review.\par - 12/13/2022 DEXA - Osteopenia: The patient has low bone mass, with the lowest measured bone density in the Left Femoral Neck. The patient has an estimated ten-year risk of hip fracture of 3.1% and an estimated ten-year risk of major fracture of 11%, based on the WHO FRAX algorithm.\par - 02/01/2023 the patient requests Keytruda IV frequency to be changed to Q6W therefore dose will be changed to 400 mg.\par \par # Dysphagia and decrease appetite - better now.\par - on Jevity 1.2 hector BID.\par - followed by nutritionist, Tila Villanueva.\par \par # SOB mainly when she tries to sleep; due to right nasal congestion maybe from Keytruda, Keytruda was stopped and SOB did not change.\par - on Flonase.\par - On Albuterol + nebulizer PRN.\par - She follows with Dr Lanza as indicated\par - Advised to use saline nasal spray as she has c/o dryness in her nose.\par \par # Some insomnia \par - on Melatonin 5 mg at bedtime.\par \par # Pruritus, back; may be side effect of Keytruda?\par - has hydrocortisone 1% or Benadryl PRN for symptom relief if happens again.\par - she STOPPED gabapentin due to side effects.\par \par # Pain at T spine right at the level of scapula with tenderness maybe from T3 compression fracture \par - c/w oxycodone 5 mg every 8 hours as needed - Reviewed risks and benefits of narcotic consumption and prescribed dosing/frequency with patient.\par -will have her see neurosurgery Dr. Weaver. She may need MR T spine but no mass is reported on CT.\par - 01/11/2023 decided against pain management by pain management team.\par \par # Constipation - chronic, reports BM every 5 days - now better with current management every other day.\par \par PLAN:\par \par - continue Keytruda 400 mg IV Q6W (starting today 02/01/2023) - C37 GIVE TODAY.\par \par - continue oxycodone 5 mg every 8 hours as needed for pain with bowel regimen.\par \par - continue Lactulose, Colace and Senna for bowel regiment.\par \par - repeat CT C/A/P with contrast - 02/04/2023.\par \par - F/U with Neurosurgery for management of T3 compression fracture.\par \par - F/U with PCP for HTN management.\par \par - Labwork today:  CBC, CMP, TSH.\par \par RTC in 3 weeks for MD visit and Keytruda treatment with CBC CMP and TSH.

## 2023-02-01 NOTE — PHYSICAL EXAM
[Capable of only limited self care, confined to bed or chair more than 50% of waking hours] : Status 3- Capable of only limited self care, confined to bed or chair more than 50% of waking hours [Normal] : affect appropriate [de-identified] : seated in wheelchair, but does stand up by herself [de-identified] : Lungs sounded clear today  [de-identified] : L thigh lipoma [de-identified] : No rash

## 2023-02-01 NOTE — REVIEW OF SYSTEMS
[Night Sweats] : night sweats [Fatigue] : fatigue [Shortness Of Breath] : shortness of breath [SOB on Exertion] : shortness of breath during exertion [Constipation] : constipation [Joint Pain] : joint pain [Muscle Weakness] : muscle weakness [Difficulty Walking] : difficulty walking [Negative] : Allergic/Immunologic [Recent Change In Weight] : ~T no recent weight change [Dysphagia] : no dysphagia [Muscle Pain] : no muscle pain [Skin Rash] : no skin rash [FreeTextEntry2] : seated in wheelchair [FreeTextEntry4] : chronic nasal congestion [FreeTextEntry6] : SOB for years - unchanged [FreeTextEntry7] : occasional hemorrhoidal bleeding  [FreeTextEntry9] : chronic L sided pain [de-identified] : pruritus at her back

## 2023-02-01 NOTE — HISTORY OF PRESENT ILLNESS
[de-identified] : 08/08/2019 \par This is a 83 year old female who I initially met in [facility] on 3/28/19. She was initially admitted on 2/20/2019 for a right loculated pleural effusion approx. 900 CCs, no PE and right apical 3.1x3.0 cm mass with pleural nodules on CTA of chest. Pt returned to ED on 3/15/2019 for right pleural effusion, had a thoracentesis in ED and was sent home. She than came back for SOB  due to recurrent effusion and a Pleurx was placed. Pathology showed adenocarcinoma. \par \par She ha lost a few pounds about  4. She never smoked. She does have weakness.  From her Pleurx she  has 500 cc removed every other day. She has pain after.\par \par Work up:\par 02/20/2019 CTA chest:  Large right pleural effusion, maximal axial width of 7 cm correlating with an estimated volume of 900 cc. Associated near complete compressive atelectasis of the right lower lobe. Probable loculations of the effusion seen at the apex and at the right heart border (for example series 5 images 176, 52; series 9 image 69). 1 cm right upper lobe fissural nodule (series 5 image 139). Patent central airways. There is right apical 3.1 x 3.0 cm mass with irregular right apical pleural thickening (series 7, image 44), and multiple satellite pleural nodules including a more caudad 2.7 cm para-mediastinal nodule (series 7, image 175). Tissue sampling recommended.\par \par PATHOLOGY:\par 03/25/2019 Pleural fluid: Cell block material is hypocellular and shows a single minutecluster of malignant cells.Immunohistochemistry studies were performed at [facility] and the\par results are noncontributory\par 03/22/2019: Addendum\par Cell block material shows macrophages (positive ),mesothelial cells (positive calretinin) and a single very minute cluster of atypical suspicious cells is negative for Fidel-Ep4.Material is insufficient for further diagnostic studies (ie, immunohistochemistry).\par \par 03/15/2019: Pleural fluid: Addendum Immunohistochemical studies were performed on the cell block at [facility], [facility], [address] [place] [address], [facility] (see NOTE). The results are as follows:\par CK7-                     Rare mesothelial cells positive\par CK20-                   Negative\par CK 5/6-                 Rare mesothelial cells positive\par Calretinin-             Scattered mesothelial cells positive\par CD68-                   Numerous histiocytes positive\par BerEP-4-               Negative\par Napsin-A-             Negative\par TTF-1-                  Negative\par These results are non-specific, suggestive of mesothelialhyperplasia. The few markedly atypical cells seen in the smear are not evident in the cell block.\par The atypical cells seen in the current smear are not seen in the prior pleural fluid Thinprep smear or cell block (30-HM-).\par Few marked atypical cells, suspicious for mesothelioma versus adenocarcinoma, in a background of numerous mesothelial cells, histiocytes and small lymphocytes.\par 03/25/2019: Final Diagnosis - POSITIVE FOR MALIGNANT CELLS. Favor metastatic adenocarcinoma. Pending cell block.\par I spoke to Dr. Allison  and there are only a few cells thus further studies such as IHC, molecular PD-l1 is not possible. [de-identified] : 6/5/19\par She is here for follow up. Since last visit she started Keytruda. She had a PET/CT on April 17 that showed  Extensive FDG avid right-sided pleural mass/soft tissue thickening, \par with max SUV 21.5 within a right apical pleural soft tissue mass.\par 2.  Multiple FDG avid mediastinal lymph nodes, with max SUV 16.5 within a 2.6 x 2.1 cm subcarinal node.\par 3.  FDG avid lytic lesion within the left superior acetabulum (max SUV 14.8), suspicious for osseous metastasis.\par 4.  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 Used to be  500 ml  u 3 times a week. Takes 60 mg of oral morphine a day 20 mg 3 times a day but pain is still severe. Has not had a bowel movement in unknown amount of days does not take laxatives although she has laxatives. \par \par 6/26/19 She is here for follow up. She is due for cycle 3 of Keytruda. She has about 300 mg drained from her pleura twice a week. Has SOB but saturated 94% on room air and 94% on ambulation. Did not tolerate the fentanyl path had nausea. On Morphine 10 mg BID doing well. Constipation is better. \par \par 7/17/19\par She is here for follow-up of Stage IV lung cancer with son.  She is due for cycle 3 of Keytruda.  CBC is stable from today.  She only takes the liquid morphine at night now.  She started eating slightly more and has been feeling slightly better ,as per son.  He states that the pleurex drainage is less, it is checked every Friday + Monday.  On Friday there was about 225 cc drained, but yesterday no drainage.  We will arrange for hydration today since she has had poor oral intake lately and small appetite.  Son reports she can tolerate fluids but she has early satiety with food.  Patient also reports feeling short of breath at rest, but her O2 sat is 98% on R/a.  Right sided pain is better. \par 07/06/2019 CT C/A/P IMPRESSION: Since April 17, 2019: 1. No definite new sites of metastatic disease. 2. Right-sided pleural soft tissue mass/thickening, overall appearing decreased since April 17, 2019 with primarily residual right apical tumor. Tumor appears to infiltrate the mediastinum.3. No significant change in mediastinal lymphadenopathy including largest 2.6 x 2.1 cm subcarinal lymph node which remains unchanged. Retrocrural 2.5 x 2.3 cm metastasis or additional site of pleural tumor, unchanged.4. Irregularity of right anterior first rib, probably invaded by tumor, stable in retrospect. Left superior acetabular lucent lesion corresponding to FDG avid bone metastasis.5. Right pelvic indeterminate 7 cm mass; possible retroverted uterus with degenerating fibroid or less likely ovarian mass; previously non-FDG avid and probably benign. Further evaluation with pelvic ultrasound or MRI pelvis with and without IV contrast may be helpful.6. Status post right chest tube placement with decreased small loculated right pleural effusion with fluid within the major fissure. 7. New mild to moderate intrahepatic biliary dilation. Correlation with LFTs recommended. Consider further evaluation with ERCP or MRCP with and without IV contrast.\par \par 8/7/19\par She is here with her son. Overall she is doing much better. She is in wheelchair but now less pain, eating better and Pleurax is only draining about 25 cc twice a week, She does complain of SOB still. I explained this will improve as well but asked her to follow up with Dr. Lanza of pulmonology. \par \par 8/28/19\par She is here for follow up. They are to see Dr. Lanza in Early September. There is minimal output from Pleurax. Pain is much better and does not use narcotics anymore.  Overall better. She has right side nasal congestion and states makes it hard to breath.\par \par 9/18/19\par She is doing well. She is due for cycle 7. Son was not eliana to schedule CTs prior to the visit but she looks better and better every visit and thus most likely still responding. She saw Dr. Lanza who started her on Flonase. She is due to see ENT Dr. Adams.  Appetite is better. No pains. Dr. Lanza is considering to remover the Pleurx possibly in November. \par \par 10/09/2019\par Patient is here for a follow up visit. Due for cycle 8 today. CT chest/abd/pelvis 09/2019 showed improvement in disease. Also has seen Dr. Adams and agreed to continue with Flonase as she has hypertrophy of turbinates. \par She is tolerating Ketryuda well. Her only complaint at this time is trouble in falling asleep. \par \par 10/30/19\par Patient is here for a follow up visit of Stage IV lung cancer with family member.  She is feeling well with no new complaints.   She feels dyspneic when taking a deep breath but her pain has improved.  Most recent CBC is stable. 09/22/2019 CT C/A/P showed improvement in right apical lung mass and pleural effusion and drained 150cc. Due for cycle 9 today. \par \par 12/30/19\par Patient is here for a follow-up visit for  lung cancer with son.  Reviewed imaging results with patient and her family, which suggests worsening effusion but otherwise stable.  They drained 500 cc from pleurx yesterday, reportedly sanguinous drainage.  She is agreeable to continue with cycle 12 of Keytruda tomorrow. She reports persistent dyspnea.  We discussed that this tx regimen may be losing its responsiveness.\par 12/27/2019 CT C/A/P IMPRESSION: Worsening right-sided pleural effusion. Right apical spiculated nodule without difference. Unchanged appearance of the mediastinum. Unchanged appearance of the third thoracic vertebral body and right second rib. No interval change since prior examination. No change in hepatic hypodensity (likely fatty infiltration), splenic hypodensity (too small to characterize), left adrenal nodule or 1 cm retroperitoneal nodule on the right. No change in fat-containing mass in the anterior left thigh.\par \par 2/10/2020\par She is here for follow-up accompanied by son.  Since last visit she was admitted to hospital for pain and nausea. She had pleurx catheter removed during most recent hospitalization on 1/13/2020.  She complains of insomnia but generally feels well.  Most recent scan reviewed with patient.  She reports she had a rash after discharge from the hospital which has since resolved.  \par 01/11/2020 CT Chest IMPRESSION: Since CT scan performed on 12/27/2019; 1.  Right thoracostomy tube in stable position with slight interval decrease of right pleural effusion. 2.   Interval increase in right middle lobe consolidation/atelectasis. 3.  Stable spiculated nodule in the right lung apex, consistent with patient's known malignancy.\par \par She feels much better now that her Pleurx is gone. Does not have any pain. Feels dyspneic which is stable.\par \par 3/2/2020\par She is here for follow-up for lung cancer accompanied by son. Since last visit, she followed with Dr. Lanza pulmonology, and Dr. Hirsch from [facility] who agreed against placing additional drainage catheters based on most recent CT imaging. She still reports some SOB. She also reports taking a [place] medication named Volidol and Panangin for here breathing. Patient reports mild dizziness after taking Trazodone; we recommended halving dose.  \par \par 5/4/2020\par She is here for follow-up for lung cancer accompanied by son. Her last treatment as in March due to COVID she is doing well. She was on Room air. She states SOB is the same. She has paraspinal back pain/hip pain. \par \par 6/15/2020\par She is here for follow-up for lung cancer accompanied by son.  Patient denies fever, chills, vomiting, worsening cough, new rash, persistent diarrhea or bleeding.  Reviewed most recent imaging and had a discussion regarding continuing with Keytruda every 6 weeks for now as she is clinically stable.  She does complain of weakness and nasal congestion which improves using Fluticasone spray.  She uses oxygen intermittently at home.  \par 05/09/2020 CT C/A/P IMPRESSION: 1. Stable spiculated right upper lobe mass measuring up to 3.1 cm.2. New areas of ground glass and reticular opacities within the medial left lung, with new subcentimeter nodular opacities within the left lower lobe. Findings may be postinfectious or postinflammatory in etiology. CT of the chest in 2-3 months is recommended. 3. Decreased mediastinal and right hilar lymphadenopathy. 4. Resolved right pleural effusion. 1.  No findings of new or progressive metastatic disease in the abdomen or pelvis. 2.  Indeterminate left adrenal nodule and right retroperitoneal nodule, stable in size from prior CT. 3.  Partially visualized fat-containing mass in the anterior left thigh-considerations include benign and malignant neoplastic etiology. Recommend further evaluation with MRI if not already performed. 4.  Sclerotic appearance of metastatic left acetabular lesion, similar in appearance to prior CT, possibly representing healing- was previously lytic on PET/CT of 4/17/2019. 5.  Lobulated pelvic structure again seen, possibly uterus with fibroids. Further evaluation with pelvic ultrasound may be performed as warranted.\par \par 9/9/20\par Patient is here for follow up accompanied by her son who speaks English . She has chronic SOB which as per son has been slowly getting worse. She uses O2 at home intermittently. She also uses Flonase and Albuterol inhaler. Otherwise she is mostly wheel chair bound and can walk back and forth to the bathroom by herself. Her appetite is good and weight is stable. No new bone pain/abdominal pain/ rash. \par \par 10/21/2020\par She is here for follow-up for lung cancer, accompanied by son.  Patient denies fever, chills, vomiting, new cough, new rash, persistent diarrhea or bleeding.  She is still pending reimaging, they report they have not received authorization for scan yet.  She still complains of weakness and nasal congestion, which improves using Fluticasone spray.  She uses oxygen intermittently at home.  She still has SOB.   Her only new complaint is intermittent back pruritus.  She is due for cycle 20 of Keytruda today.\par \par 12/2/2020\par She is here for follow-up for lung cancer, accompanied by son.  Patient denies fever, chills, vomiting, new cough, new rash, persistent diarrhea or bleeding.  She still complains of weakness, SOB and nasal congestion, uses the nebulizer once daily with occasional relief of her SOB.   She is due for cycle 21 of Keytruda today.  She is using allegra and or hydrocortisone 1% cream for the pruritus.  Reviewed most recent imaging which shows good treatment response.  \par 11/14/2020 CT C/A/P IMPRESSION: Since May 9, 2020: 1. No evidence of new intrathoracic or intra-abdominal metastatic disease. 2. Unchanged 2.5 x 2 cm right upper lobe pulmonary nodule, stable with similar measurement technique. 3. Decreased subcarinal lymphadenopathy, now 1.2 cm short axis, previously 1.5 cm short axis on May 9, 2020 CT with similar measurement technique. No evidence of additional suspicious lymphadenopathy by size criteria. 4. Near complete interval resolution of left lower lobe and lingula patchy airspace opacities. Unchanged areas of groundglass and reticular opacities within the medial left lung. Findings likely postinfectious or inflammatory. 5. Unchanged sclerotic appearance of metastatic left acetabular lesion,  possibly representing healing of previously FDG avid lytic on PET/CT of 4/17/2019. 6. Unchanged, partially imaged indeterminate lipomatous mass within the anterior left thigh.\par \par 1/13/21\par She is here for follow-up for lung cancer, accompanied by son.  She still complains of weakness, SOB daily which is unchanged.  She is due for cycle 22 of Keytruda today.  She continues to use Allegra and or hydrocortisone 1% cream for the pruritus.  Patient denies fever, chills, vomiting, new cough, new rash, persistent diarrhea or bleeding.  She also reports musculoskeletal pain.  \par \par 2/24/21\par She is here with her son. She is doing well but still has same complaints. mainly fatigue and SOB as well as MSK pain behind the left scapula. We had a long talk and I explained that the fatigue and SOB maybe due to kaytruda so we decided to stop it and observe her. On exam no changes. \par \par \par 5/17/21\par She is here for followup with her son. Her CT C/A/P in March 2021  showed Since November 14, 2020. No significant interval change approximate 2.5 cm x 2 cm right upper lobe nodule (series 4/33). Stable reticular opacities medial right upper lobe. Stable ill-defined subcarinal lymph nodes (series 4/88). Stable bibasilar subsegmental discoid atelectasis. No pleural effusions. No new sites of metastatic disease in the abdomen and pelvis. Previously described sclerotic left acetabular lesion felt to represent a healed metastasis based on prior PET/CT is poorly visualized on the current study. Unchanged, partially imaged indeterminate lipomatous mass within the anterior left thigh.\par Given that she been on  Keytruda now for 2 years with no progression of disease and she continued to have shortness of breath we decided to give her a treatment break.\par Today even well-being of treatment she still continues to have the same except complains specifically short of breath for which she had an extensive workup. She also has rhinorrhea  so she was referred to ENT again as per their request and I refilled Flonase\par \par 7/26/21\par She is here for follow up. She had CT C/A/P on 6/12/21: IMPRESSION: No CT evidence of new sites of metastatic disease in the chest, abdomen and pelvis. Stable 2.5 cm right upper lobe nodule. Interval decrease in size of subcarinal lymph node. Slight interval increase in reticular and groundglass opacities left lower lobe, possibly postinfectious/postinflammatory. Unchanged, partially imaged indeterminate lipomatous mass within the anterior left thigh.\par CBC is normal today. She is here with her son. No new symptoms has chronic rhinorrhea and chronic SOB with non specific pain in left scapula with unclear cause that has been present for years. \par \par 11/29/21 Patient is here for follow up for stage 4 lung NSCLC. She was on keytruda and has been on break since Feb 2021. Her last imaging in June 2021 showed stable disease. She has chronic complaints of fatigue, dyspnea, pain in her left side back and insomnia. All these complaints are chronic with no new worsening of symptoms. \par \par 2/9/22\par She returns for follow-up today for NSCLC, accompanied by son who helps with translation at patient request.  She is due for next cycle of immunotherapy today.  She continues to have the same chronic complaints now that she always had for the last several years including unclear left-sided pain, shortness of breath, she seen multiple specialists and we are unable to exactly determine the cause of her pain but I explained that the shortness of breath as before is possibly related to her lung cancer may be resulting in decreased function from previous Pleurx catheter leading to fibrosis and decrease in lung capacity.  She takes oxycodone 5mg as needed for the L sided pain.  Reviewed most recent CT imaging which shows increase in RUL nodule, L adrenal nodule and new tubular opacity within the right upper lobe but otherwise stable.  \par 12/18/2021 CT C/A/P IMPRESSION: Compared to CT chest 6/12/2021,Interval increase in size of the right upper lobe nodule now measuring 2.8 x 2.0 x 2.4 cm previously 2.5 x 2.0 x 2.0 cm. New tubular opacity within the right upper lobe.Significant interval increase in size of left adrenal nodule, now measuring 4.6 x 4.3 cm, highly suggestive of metastasis/collision tumor in this patient with history of lung cancer.  Right upper quadrant peritoneal nodules without significant change from prior. Continued attention on follow-up imaging.Partially imaged indeterminate lipomatous mass in the left thigh redemonstrated. Recommend MR evaluation.\par \par 3/23/22\par She returns for follow-up today for NSCLC, accompanied by son who helps with translation at patient request.  She is due for next cycle of immunotherapy today.  She continues to have the same chronic complaints now that she always had for the last several years including unclear left-sided pain, shortness of breath, she seen multiple specialists and we are unable to exactly determine the cause of her pain but I explained that the shortness of breath as before is possibly related to her lung cancer may be resulting in decreased function from previous Pleurx catheter leading to fibrosis and decrease in lung capacity.  She takes oxycodone 5 mg as needed for the L sided pain She has chronic SOB that she believes is due to her heart. 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  She was started on antidepressant by PCP but she is not taking it.\par \par 6/15/22\par She is here for follow up. She is due for cycle 27 of Keyturda. CBC from today is  Normal. She has pain in entire left side of chest including shouldr. She takes over the counter meds son does not know then name. SOB is the same. Lungs sounded clear except for decrease sounds in RLL which is same. \par \par 7/27/22\par She returns for follow-up today for NSCLC, accompanied by son who helps with [place] translation at patient request.  She is due for next cycle of immunotherapy today.  She continues to have the same chronic complaints that she always had for the last several years including unclear left-sided pain, shortness of breath, she seen multiple specialists and we are unable to exactly determine the cause of her pain.  She takes oxycodone 5 mg as needed for the L sided pain, requesting refill.  Reviewed most recent CT imaging which shows stable findings and continued treatment response.  Patient states she feels better when she receives the immunotherapy and therefore is requesting to go back to every 3 week frequency again.  \par 07/09/2022 CT C/A/P IMPRESSION:CHEST:Stable spiculated right apical lung mass, measuring maximally up to 3.2 cm.Previously seen right upper lobe 5 mm solid nodule is no longer visualized.Stable mediastinal prominent lymph nodes.Other previously noted pulmonary findings are difficult to examine given degree of respiratory motion.ABDOMEN/PELVIS:Interval decrease of left adrenal lesion, now measuring 2.1 x 2.1 cm (previously 3 x 2.2 cm).Decreased right upper quadrant soft tissue nodules measuring up to 0.6 cm (previously 1 cm).\par \par 8/17/22\par she is here for follow up. She is due for cycle 29 of keyturda now every 3 weeks.  CBC with mild anemia only. New new complaints SOB is stable. Has pruritus on back but no rash recommended a trial of Aquaphor.  States oxycodone helps with pain and night and sleeps much better.\par \par 09/07/2022 accompanied by her son; reports no changes in her generalized dull pains. She uses home remedies to "clean up her pulmonary pathways".\par \par 9/28/22\par She returns for follow-up today for NSCLC, accompanied by son who helps with [place] translation at patient request.  She is due for next cycle of immunotherapy today.  She continues to have the same chronic complaints that she always had for the last several years including unclear left-sided pain, shortness of breath, she seen multiple specialists and we are unable to exactly determine the cause of her pain.  She takes oxycodone 5 mg as needed for the L sided abd pain.  She still struggles with pruritus so we suggested to try gabapentin at a low dose and titrate up very slowly to see if it helps, however she did not see any improvement and did not like the way the gabapentin made her feel.  She has since stopped the gabapentin.  Reviewed most recent CBC which shows mild, normocytic anemia with hgb 11.3g/dL.  \par \par 10/19/2022\par She is here for jakubo wup and is due for keytruda fo rher lung cancer. She had CT C/A/P done on 10/15/2022 but for some reason they were not compared. I reached out to radiologist and CT abd was comapred and tehre is no progression. I am waiting for the CT chest but amee report there is no progression that i can tell by comparing the CTs. She still has same symptoms, ABRAHAM, pain in scapula area, pruritis aslo only at mid back bettween scapula. She did not tolerate gabapentin. we decided not to hold treatment for the pruritis and contineu. No rash on exam at all. \par \par 11/09/2022 \par accompanied by her son; Reports being in pain (back) as she forgot to take her pain medications, no changes in chronic conditions or new complaints since the last visit.\par \par 11/30/2022\par accompanied by her son; Reports seen by pain management and had steroid injection with minimal pain response, has F/U in 12/2022. She forgot to take her pain Rx this morning. Recently had tooth extraction and now has no teeth. No other changes in chronic conditions or new complaints since the last visit.\par \par 12/21/2022\par accompanied by her son; Reports no changes in chronic conditions or new complaints since the last visit; scheduled for another pain management injection 12/23/2022.\par \par 01/11/2023 \par accompanied by her son; Reports feeling "the same"; chronic pains - unchanged, decided not to continue with pain management as it does not provide any relieve; no changes in chronic conditions or new complaints since the last visit.\par \par 02/01/2023\par accompanied by her son; Reports no changes in her chronic conditions or new complaints since the last visit. Still with significant pruritus. Constipation improved - now has BM every other day. Still is unable to maintain sleep, but may sleep total of 6 hours during a day.\par

## 2023-02-02 ENCOUNTER — NON-APPOINTMENT (OUTPATIENT)
Age: 88
End: 2023-02-02

## 2023-02-04 ENCOUNTER — RESULT REVIEW (OUTPATIENT)
Age: 88
End: 2023-02-04

## 2023-02-04 ENCOUNTER — OUTPATIENT (OUTPATIENT)
Dept: OUTPATIENT SERVICES | Facility: HOSPITAL | Age: 88
LOS: 1 days | End: 2023-02-04
Payer: MEDICAID

## 2023-02-04 DIAGNOSIS — R10.9 UNSPECIFIED ABDOMINAL PAIN: ICD-10-CM

## 2023-02-04 DIAGNOSIS — R06.02 SHORTNESS OF BREATH: ICD-10-CM

## 2023-02-04 DIAGNOSIS — N20.0 CALCULUS OF KIDNEY: ICD-10-CM

## 2023-02-04 DIAGNOSIS — R91.8 OTHER NONSPECIFIC ABNORMAL FINDING OF LUNG FIELD: ICD-10-CM

## 2023-02-04 DIAGNOSIS — C34.91 MALIGNANT NEOPLASM OF UNSPECIFIED PART OF RIGHT BRONCHUS OR LUNG: ICD-10-CM

## 2023-02-04 DIAGNOSIS — Z90.710 ACQUIRED ABSENCE OF BOTH CERVIX AND UTERUS: Chronic | ICD-10-CM

## 2023-02-04 PROCEDURE — 71260 CT THORAX DX C+: CPT | Mod: 26

## 2023-02-04 PROCEDURE — 74177 CT ABD & PELVIS W/CONTRAST: CPT | Mod: 26

## 2023-02-05 LAB
HCT VFR BLD CALC: 32.9 %
HGB BLD-MCNC: 11 G/DL
MCHC RBC-ENTMCNC: 26.9 PG
MCHC RBC-ENTMCNC: 33.4 G/DL
MCV RBC AUTO: 80.4 FL
PLATELET # BLD AUTO: 199 K/UL
PMV BLD: 10.4 FL
RBC # BLD: 4.09 M/UL
RBC # FLD: 15.5 %
TSH SERPL-ACNC: 0.28 UIU/ML
WBC # FLD AUTO: 6.55 K/UL

## 2023-02-06 LAB
ALBUMIN SERPL ELPH-MCNC: 3.4 G/DL
ALP BLD-CCNC: 60 U/L
ALT SERPL-CCNC: 8 U/L
ANION GAP SERPL CALC-SCNC: 11 MMOL/L
AST SERPL-CCNC: 12 U/L
BILIRUB SERPL-MCNC: 0.4 MG/DL
BUN SERPL-MCNC: 20 MG/DL
CALCIUM SERPL-MCNC: 8.8 MG/DL
CHLORIDE SERPL-SCNC: 112 MMOL/L
CO2 SERPL-SCNC: 18 MMOL/L
CREAT SERPL-MCNC: 1 MG/DL
EGFR: 55 ML/MIN/1.73M2
GLUCOSE SERPL-MCNC: 118 MG/DL
POTASSIUM SERPL-SCNC: 3.3 MMOL/L
PROT SERPL-MCNC: 5.6 G/DL
SODIUM SERPL-SCNC: 141 MMOL/L

## 2023-03-07 ENCOUNTER — NON-APPOINTMENT (OUTPATIENT)
Age: 88
End: 2023-03-07

## 2023-03-15 ENCOUNTER — OUTPATIENT (OUTPATIENT)
Dept: OUTPATIENT SERVICES | Facility: HOSPITAL | Age: 88
LOS: 1 days | End: 2023-03-15
Payer: MEDICAID

## 2023-03-15 ENCOUNTER — APPOINTMENT (OUTPATIENT)
Dept: INFUSION THERAPY | Facility: CLINIC | Age: 88
End: 2023-03-15
Payer: MEDICAID

## 2023-03-15 ENCOUNTER — LABORATORY RESULT (OUTPATIENT)
Age: 88
End: 2023-03-15

## 2023-03-15 ENCOUNTER — APPOINTMENT (OUTPATIENT)
Dept: HEMATOLOGY ONCOLOGY | Facility: CLINIC | Age: 88
End: 2023-03-15
Payer: MEDICAID

## 2023-03-15 ENCOUNTER — APPOINTMENT (OUTPATIENT)
Dept: INFUSION THERAPY | Facility: CLINIC | Age: 88
End: 2023-03-15

## 2023-03-15 ENCOUNTER — APPOINTMENT (OUTPATIENT)
Dept: HEMATOLOGY ONCOLOGY | Facility: CLINIC | Age: 88
End: 2023-03-15

## 2023-03-15 VITALS
RESPIRATION RATE: 16 BRPM | HEIGHT: 62 IN | TEMPERATURE: 97.4 F | DIASTOLIC BLOOD PRESSURE: 66 MMHG | HEART RATE: 82 BPM | SYSTOLIC BLOOD PRESSURE: 148 MMHG

## 2023-03-15 DIAGNOSIS — C34.91 MALIGNANT NEOPLASM OF UNSPECIFIED PART OF RIGHT BRONCHUS OR LUNG: ICD-10-CM

## 2023-03-15 DIAGNOSIS — G47.00 INSOMNIA, UNSPECIFIED: ICD-10-CM

## 2023-03-15 DIAGNOSIS — Z90.710 ACQUIRED ABSENCE OF BOTH CERVIX AND UTERUS: Chronic | ICD-10-CM

## 2023-03-15 LAB
HCT VFR BLD CALC: 32.8 %
HGB BLD-MCNC: 10.7 G/DL
MCHC RBC-ENTMCNC: 27.4 PG
MCHC RBC-ENTMCNC: 32.6 G/DL
MCV RBC AUTO: 84.1 FL
PLATELET # BLD AUTO: 165 K/UL
PMV BLD: 10.6 FL
RBC # BLD: 3.9 M/UL
RBC # FLD: 15 %
WBC # FLD AUTO: 5.81 K/UL

## 2023-03-15 PROCEDURE — 99214 OFFICE O/P EST MOD 30 MIN: CPT

## 2023-03-15 PROCEDURE — 80053 COMPREHEN METABOLIC PANEL: CPT

## 2023-03-15 PROCEDURE — 84443 ASSAY THYROID STIM HORMONE: CPT

## 2023-03-15 PROCEDURE — 36415 COLL VENOUS BLD VENIPUNCTURE: CPT

## 2023-03-15 PROCEDURE — 85027 COMPLETE CBC AUTOMATED: CPT

## 2023-03-15 PROCEDURE — 96413 CHEMO IV INFUSION 1 HR: CPT

## 2023-03-15 RX ORDER — PEMBROLIZUMAB 25 MG/ML
400 INJECTION, SOLUTION INTRAVENOUS ONCE
Refills: 0 | Status: COMPLETED | OUTPATIENT
Start: 2023-03-15 | End: 2023-03-15

## 2023-03-15 RX ADMIN — PEMBROLIZUMAB 400 MILLIGRAM(S): 25 INJECTION, SOLUTION INTRAVENOUS at 16:15

## 2023-03-16 DIAGNOSIS — C34.91 MALIGNANT NEOPLASM OF UNSPECIFIED PART OF RIGHT BRONCHUS OR LUNG: ICD-10-CM

## 2023-03-16 PROBLEM — G47.00 INSOMNIA: Status: ACTIVE | Noted: 2019-08-28

## 2023-03-16 LAB
ALBUMIN SERPL ELPH-MCNC: 4 G/DL
ALP BLD-CCNC: 61 U/L
ALT SERPL-CCNC: 7 U/L
ANION GAP SERPL CALC-SCNC: 12 MMOL/L
AST SERPL-CCNC: 12 U/L
BILIRUB SERPL-MCNC: 0.4 MG/DL
BUN SERPL-MCNC: 20 MG/DL
CALCIUM SERPL-MCNC: 9.4 MG/DL
CHLORIDE SERPL-SCNC: 106 MMOL/L
CO2 SERPL-SCNC: 22 MMOL/L
CREAT SERPL-MCNC: 0.9 MG/DL
EGFR: 62 ML/MIN/1.73M2
GLUCOSE SERPL-MCNC: 93 MG/DL
POTASSIUM SERPL-SCNC: 4 MMOL/L
PROT SERPL-MCNC: 6 G/DL
SODIUM SERPL-SCNC: 140 MMOL/L
TSH SERPL-ACNC: 0.21 UIU/ML

## 2023-03-16 NOTE — REVIEW OF SYSTEMS
[Fatigue] : fatigue [Shortness Of Breath] : shortness of breath [SOB on Exertion] : shortness of breath during exertion [Constipation] : constipation [Joint Pain] : joint pain [Muscle Weakness] : muscle weakness [Difficulty Walking] : difficulty walking [Insomnia] : insomnia [Negative] : Heme/Lymph [Recent Change In Weight] : ~T no recent weight change [Dysphagia] : no dysphagia [Muscle Pain] : no muscle pain [Skin Rash] : no skin rash [FreeTextEntry2] : seated in wheelchair [FreeTextEntry4] : chronic nasal congestion [FreeTextEntry6] : SOB for years - unchanged [FreeTextEntry7] : occasional hemorrhoidal bleeding  [FreeTextEntry9] : chronic L sided pain [de-identified] : pruritus at her back - not today

## 2023-03-16 NOTE — PHYSICAL EXAM
[Capable of only limited self care, confined to bed or chair more than 50% of waking hours] : Status 3- Capable of only limited self care, confined to bed or chair more than 50% of waking hours [Normal] : affect appropriate [de-identified] : seated in wheelchair, but does stand up by herself [de-identified] : Lungs clear [de-identified] : L thigh lipoma [de-identified] : No rash

## 2023-03-16 NOTE — HISTORY OF PRESENT ILLNESS
[de-identified] : 08/08/2019 \par This is a 83 year old female who I initially met in [facility] on 3/28/19. She was initially admitted on 2/20/2019 for a right loculated pleural effusion approx. 900 CCs, no PE and right apical 3.1x3.0 cm mass with pleural nodules on CTA of chest. Pt returned to ED on 3/15/2019 for right pleural effusion, had a thoracentesis in ED and was sent home. She than came back for SOB  due to recurrent effusion and a Pleurx was placed. Pathology showed adenocarcinoma. \par \par She ha lost a few pounds about  4. She never smoked. She does have weakness.  From her Pleurx she  has 500 cc removed every other day. She has pain after.\par \par Work up:\par 02/20/2019 CTA chest:  Large right pleural effusion, maximal axial width of 7 cm correlating with an estimated volume of 900 cc. Associated near complete compressive atelectasis of the right lower lobe. Probable loculations of the effusion seen at the apex and at the right heart border (for example series 5 images 176, 52; series 9 image 69). 1 cm right upper lobe fissural nodule (series 5 image 139). Patent central airways. There is right apical 3.1 x 3.0 cm mass with irregular right apical pleural thickening (series 7, image 44), and multiple satellite pleural nodules including a more caudad 2.7 cm para-mediastinal nodule (series 7, image 175). Tissue sampling recommended.\par \par PATHOLOGY:\par 03/25/2019 Pleural fluid: Cell block material is hypocellular and shows a single minutecluster of malignant cells.Immunohistochemistry studies were performed at [facility] and the\par results are noncontributory\par 03/22/2019: Addendum\par Cell block material shows macrophages (positive ),mesothelial cells (positive calretinin) and a single very minute cluster of atypical suspicious cells is negative for Fidel-Ep4.Material is insufficient for further diagnostic studies (ie, immunohistochemistry).\par \par 03/15/2019: Pleural fluid: Addendum Immunohistochemical studies were performed on the cell block at [facility], [facility], [address] [place] [address], [facility] (see NOTE). The results are as follows:\par CK7-                     Rare mesothelial cells positive\par CK20-                   Negative\par CK 5/6-                 Rare mesothelial cells positive\par Calretinin-             Scattered mesothelial cells positive\par CD68-                   Numerous histiocytes positive\par BerEP-4-               Negative\par Napsin-A-             Negative\par TTF-1-                  Negative\par These results are non-specific, suggestive of mesothelialhyperplasia. The few markedly atypical cells seen in the smear are not evident in the cell block.\par The atypical cells seen in the current smear are not seen in the prior pleural fluid Thinprep smear or cell block (44-LI-).\par Few marked atypical cells, suspicious for mesothelioma versus adenocarcinoma, in a background of numerous mesothelial cells, histiocytes and small lymphocytes.\par 03/25/2019: Final Diagnosis - POSITIVE FOR MALIGNANT CELLS. Favor metastatic adenocarcinoma. Pending cell block.\par I spoke to Dr. Allison  and there are only a few cells thus further studies such as IHC, molecular PD-l1 is not possible. [de-identified] : 6/5/19\par She is here for follow up. Since last visit she started Keytruda. She had a PET/CT on April 17 that showed  Extensive FDG avid right-sided pleural mass/soft tissue thickening, \par with max SUV 21.5 within a right apical pleural soft tissue mass.\par 2.  Multiple FDG avid mediastinal lymph nodes, with max SUV 16.5 within a 2.6 x 2.1 cm subcarinal node.\par 3.  FDG avid lytic lesion within the left superior acetabulum (max SUV 14.8), suspicious for osseous metastasis.\par 4.  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 Used to be  500 ml  u 3 times a week. Takes 60 mg of oral morphine a day 20 mg 3 times a day but pain is still severe. Has not had a bowel movement in unknown amount of days does not take laxatives although she has laxatives. \par \par 6/26/19 She is here for follow up. She is due for cycle 3 of Keytruda. She has about 300 mg drained from her pleura twice a week. Has SOB but saturated 94% on room air and 94% on ambulation. Did not tolerate the fentanyl path had nausea. On Morphine 10 mg BID doing well. Constipation is better. \par \par 7/17/19\par She is here for follow-up of Stage IV lung cancer with son.  She is due for cycle 3 of Keytruda.  CBC is stable from today.  She only takes the liquid morphine at night now.  She started eating slightly more and has been feeling slightly better ,as per son.  He states that the pleurex drainage is less, it is checked every Friday + Monday.  On Friday there was about 225 cc drained, but yesterday no drainage.  We will arrange for hydration today since she has had poor oral intake lately and small appetite.  Son reports she can tolerate fluids but she has early satiety with food.  Patient also reports feeling short of breath at rest, but her O2 sat is 98% on R/a.  Right sided pain is better. \par 07/06/2019 CT C/A/P IMPRESSION: Since April 17, 2019: 1. No definite new sites of metastatic disease. 2. Right-sided pleural soft tissue mass/thickening, overall appearing decreased since April 17, 2019 with primarily residual right apical tumor. Tumor appears to infiltrate the mediastinum.3. No significant change in mediastinal lymphadenopathy including largest 2.6 x 2.1 cm subcarinal lymph node which remains unchanged. Retrocrural 2.5 x 2.3 cm metastasis or additional site of pleural tumor, unchanged.4. Irregularity of right anterior first rib, probably invaded by tumor, stable in retrospect. Left superior acetabular lucent lesion corresponding to FDG avid bone metastasis.5. Right pelvic indeterminate 7 cm mass; possible retroverted uterus with degenerating fibroid or less likely ovarian mass; previously non-FDG avid and probably benign. Further evaluation with pelvic ultrasound or MRI pelvis with and without IV contrast may be helpful.6. Status post right chest tube placement with decreased small loculated right pleural effusion with fluid within the major fissure. 7. New mild to moderate intrahepatic biliary dilation. Correlation with LFTs recommended. Consider further evaluation with ERCP or MRCP with and without IV contrast.\par \par 8/7/19\par She is here with her son. Overall she is doing much better. She is in wheelchair but now less pain, eating better and Pleurax is only draining about 25 cc twice a week, She does complain of SOB still. I explained this will improve as well but asked her to follow up with Dr. Lanza of pulmonology. \par \par 8/28/19\par She is here for follow up. They are to see Dr. Lanza in Early September. There is minimal output from Pleurax. Pain is much better and does not use narcotics anymore.  Overall better. She has right side nasal congestion and states makes it hard to breath.\par \par 9/18/19\par She is doing well. She is due for cycle 7. Son was not eliana to schedule CTs prior to the visit but she looks better and better every visit and thus most likely still responding. She saw Dr. Lanza who started her on Flonase. She is due to see ENT Dr. Adams.  Appetite is better. No pains. Dr. Lanza is considering to remover the Pleurx possibly in November. \par \par 10/09/2019\par Patient is here for a follow up visit. Due for cycle 8 today. CT chest/abd/pelvis 09/2019 showed improvement in disease. Also has seen Dr. Adams and agreed to continue with Flonase as she has hypertrophy of turbinates. \par She is tolerating Keytruda well. Her only complaint at this time is trouble in falling asleep. \par \par 10/30/19\par Patient is here for a follow up visit of Stage IV lung cancer with family member. She is feeling well with no new complaints. She feels dyspneic when taking a deep breath but her pain has improved. Most recent CBC is stable. 09/22/2019 CT C/A/P showed improvement in right apical lung mass and pleural effusion and drained 150cc. Due for cycle 9 today. \par \par 12/30/19\par Patient is here for a follow-up visit for  lung cancer with son. Reviewed imaging results with patient and her family, which suggests worsening effusion but otherwise stable.  They drained 500 cc from pleurx yesterday, reportedly sanguinous drainage. She is agreeable to continue with cycle 12 of Keytruda tomorrow. She reports persistent dyspnea.  We discussed that this tx regimen may be losing its responsiveness.\par 12/27/2019 CT C/A/P IMPRESSION: Worsening right-sided pleural effusion. Right apical spiculated nodule without difference. Unchanged appearance of the mediastinum. Unchanged appearance of the third thoracic vertebral body and right second rib. No interval change since prior examination. No change in hepatic hypodensity (likely fatty infiltration), splenic hypodensity (too small to characterize), left adrenal nodule or 1 cm retroperitoneal nodule on the right. No change in fat-containing mass in the anterior left thigh.\par \par 2/10/2020\par She is here for follow-up accompanied by son. Since last visit she was admitted to hospital for pain and nausea. She had pleurx catheter removed during most recent hospitalization on 1/13/2020. She complains of insomnia but generally feels well.  Most recent scan reviewed with patient. She reports she had a rash after discharge from the hospital which has since resolved.  \par 01/11/2020 CT Chest IMPRESSION: Since CT scan performed on 12/27/2019; 1. Right thoracostomy tube in stable position with slight interval decrease of right pleural effusion. 2.  Interval increase in right middle lobe consolidation/atelectasis. 3. Stable spiculated nodule in the right lung apex, consistent with patient's known malignancy.\par \par She feels much better now that her Pleurx is gone. Does not have any pain. Feels dyspneic which is stable.\par \par 3/2/2020\par She is here for follow-up for lung cancer accompanied by son. Since last visit, she followed with Dr. Lanza pulmonology, and Dr. Hirsch from [facility] who agreed against placing additional drainage catheters based on most recent CT imaging. She still reports some SOB. She also reports taking a [place] medication named Volidol and Panangin for here breathing. Patient reports mild dizziness after taking Trazodone; we recommended halving dose.  \par \par 5/4/2020\par She is here for follow-up for lung cancer accompanied by son. Her last treatment as in March due to COVID she is doing well. She was on Room air. She states SOB is the same. She has paraspinal back pain/hip pain. \par \par 6/15/2020\par She is here for follow-up for lung cancer accompanied by son. Patient denies fever, chills, vomiting, worsening cough, new rash, persistent diarrhea or bleeding. Reviewed most recent imaging and had a discussion regarding continuing with Keytruda every 6 weeks for now as she is clinically stable.  She does complain of weakness and nasal congestion which improves using Fluticasone spray.  She uses oxygen intermittently at home.  \par 05/09/2020 CT C/A/P IMPRESSION: 1. Stable spiculated right upper lobe mass measuring up to 3.1 cm.2. New areas of ground glass and reticular opacities within the medial left lung, with new subcentimeter nodular opacities within the left lower lobe. Findings may be postinfectious or postinflammatory in etiology. CT of the chest in 2-3 months is recommended. 3. Decreased mediastinal and right hilar lymphadenopathy. 4. Resolved right pleural effusion. 1. No findings of new or progressive metastatic disease in the abdomen or pelvis. 2. Indeterminate left adrenal nodule and right retroperitoneal nodule, stable in size from prior CT. 3. Partially visualized fat-containing mass in the anterior left thigh-considerations include benign and malignant neoplastic etiology. Recommend further evaluation with MRI if not already performed. 4. Sclerotic appearance of metastatic left acetabular lesion, similar in appearance to prior CT, possibly representing healing- was previously lytic on PET/CT of 4/17/2019. 5. Lobulated pelvic structure again seen, possibly uterus with fibroids. Further evaluation with pelvic ultrasound may be performed as warranted.\par \par 9/9/20\par Patient is here for follow up accompanied by her son who speaks English. She has chronic SOB which as per son has been slowly getting worse. She uses O2 at home intermittently. She also uses Flonase and Albuterol inhaler. Otherwise she is mostly wheel chair bound and can walk back and forth to the bathroom by herself. Her appetite is good and weight is stable. No new bone pain/abdominal pain/ rash. \par \par 10/21/2020\par She is here for follow-up for lung cancer, accompanied by son. Patient denies fever, chills, vomiting, new cough, new rash, persistent diarrhea or bleeding. She is still pending reimaging, they report they have not received authorization for scan yet. She still complains of weakness and nasal congestion, which improves using Fluticasone spray. She uses oxygen intermittently at home. She still has SOB. Her only new complaint is intermittent back pruritus. She is due for cycle 20 of Keytruda today.\par \par 12/2/2020\par She is here for follow-up for lung cancer, accompanied by son. Patient denies fever, chills, vomiting, new cough, new rash, persistent diarrhea or bleeding.  She still complains of weakness, SOB and nasal congestion, uses the nebulizer once daily with occasional relief of her SOB. She is due for cycle 21 of Keytruda today. She is using Allegra and or hydrocortisone 1% cream for the pruritus. Reviewed most recent imaging which shows good treatment response.  \par 11/14/2020 CT C/A/P IMPRESSION: Since May 9, 2020: 1. No evidence of new intrathoracic or intra-abdominal metastatic disease. 2. Unchanged 2.5 x 2 cm right upper lobe pulmonary nodule, stable with similar measurement technique. 3. Decreased subcarinal lymphadenopathy, now 1.2 cm short axis, previously 1.5 cm short axis on May 9, 2020 CT with similar measurement technique. No evidence of additional suspicious lymphadenopathy by size criteria. 4. Near complete interval resolution of left lower lobe and lingula patchy airspace opacities. Unchanged areas of groundglass and reticular opacities within the medial left lung. Findings likely postinfectious or inflammatory. 5. Unchanged sclerotic appearance of metastatic left acetabular lesion,  possibly representing healing of previously FDG avid lytic on PET/CT of 4/17/2019. 6. Unchanged, partially imaged indeterminate lipomatous mass within the anterior left thigh.\par \par 1/13/21\par She is here for follow-up for lung cancer, accompanied by son. She still complains of weakness, SOB daily which is unchanged. She is due for cycle 22 of Keytruda today. She continues to use Allegra and or hydrocortisone 1% cream for the pruritus. Patient denies fever, chills, vomiting, new cough, new rash, persistent diarrhea or bleeding. She also reports musculoskeletal pain.  \par \par 2/24/21\par She is here with her son. She is doing well but still has same complaints. mainly fatigue and SOB as well as MSK pain behind the left scapula. We had a long talk and I explained that the fatigue and SOB maybe due to Keytruda so we decided to stop it and observe her. On exam no changes. \par \par 5/17/21\par She is here for followup with her son. Her CT C/A/P in March 2021  showed Since November 14, 2020. No significant interval change approximate 2.5 cm x 2 cm right upper lobe nodule (series 4/33). Stable reticular opacities medial right upper lobe. Stable ill-defined subcarinal lymph nodes (series 4/88). Stable bibasilar subsegmental discoid atelectasis. No pleural effusions. No new sites of metastatic disease in the abdomen and pelvis. Previously described sclerotic left acetabular lesion felt to represent a healed metastasis based on prior PET/CT is poorly visualized on the current study. Unchanged, partially imaged indeterminate lipomatous mass within the anterior left thigh.\par Given that she been on  Keytruda now for 2 years with no progression of disease and she continued to have shortness of breath we decided to give her a treatment break.\par Today even well-being of treatment she still continues to have the same except complains specifically short of breath for which she had an extensive workup. She also has rhinorrhea  so she was referred to ENT again as per their request and I refilled Flonase\par \par 7/26/21\par She is here for follow up. She had CT C/A/P on 6/12/21: IMPRESSION: No CT evidence of new sites of metastatic disease in the chest, abdomen and pelvis. Stable 2.5 cm right upper lobe nodule. Interval decrease in size of subcarinal lymph node. Slight interval increase in reticular and groundglass opacities left lower lobe, possibly postinfectious / postinflammatory. Unchanged, partially imaged indeterminate lipomatous mass within the anterior left thigh.\par CBC is normal today. She is here with her son. No new symptoms has chronic rhinorrhea and chronic SOB with non specific pain in left scapula with unclear cause that has been present for years. \par \par 11/29/21\par Patient is here for follow up for stage 4 lung NSCLC. She was on Keytruda and has been on break since Feb 2021. Her last imaging in June 2021 showed stable disease. She has chronic complaints of fatigue, dyspnea, pain in her left side back and insomnia. All these complaints are chronic with no new worsening of symptoms. \par \par 2/9/22\par She returns for follow-up today for NSCLC, accompanied by son who helps with translation at patient request. She is due for next cycle of immunotherapy today. She continues to have the same chronic complaints now that she always had for the last several years including unclear left-sided pain, shortness of breath, she seen multiple specialists and we are unable to exactly determine the cause of her pain but I explained that the shortness of breath as before is possibly related to her lung cancer may be resulting in decreased function from previous Pleurx catheter leading to fibrosis and decrease in lung capacity. She takes oxycodone 5mg as needed for the L sided pain. Reviewed most recent CT imaging which shows increase in RUL nodule, L adrenal nodule and new tubular opacity within the right upper lobe but otherwise stable.  \par 12/18/2021 CT C/A/P IMPRESSION: Compared to CT chest 6/12/2021,Interval increase in size of the right upper lobe nodule now measuring 2.8 x 2.0 x 2.4 cm previously 2.5 x 2.0 x 2.0 cm. New tubular opacity within the right upper lobe.Significant interval increase in size of left adrenal nodule, now measuring 4.6 x 4.3 cm, highly suggestive of metastasis/collision tumor in this patient with history of lung cancer.  Right upper quadrant peritoneal nodules without significant change from prior. Continued attention on follow-up imaging.Partially imaged indeterminate lipomatous mass in the left thigh redemonstrated. Recommend MR evaluation.\par \par 3/23/22\par She returns for follow-up today for NSCLC, accompanied by son who helps with translation at patient request. She is due for next cycle of immunotherapy today. She continues to have the same chronic complaints now that she always had for the last several years including unclear left-sided pain, shortness of breath, she seen multiple specialists and we are unable to exactly determine the cause of her pain but I explained that the shortness of breath as before is possibly related to her lung cancer may be resulting in decreased function from previous Pleurx catheter leading to fibrosis and decrease in lung capacity. She takes oxycodone 5 mg as needed for the L sided pain She has chronic SOB that she believes is due to her heart. 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 She was started on antidepressant by PCP but she is not taking it.\par \par 6/15/22\par She is here for follow up. She is due for cycle 27 of Keyturda. CBC from today is  Normal. She has pain in entire left side of chest including shoulder. She takes over the counter meds son does not know then name. SOB is the same. Lungs sounded clear except for decrease sounds in RLL which is same. \par \par 7/27/22\par She returns for follow-up today for NSCLC, accompanied by son who helps with [employer] translation at patient request.  She is due for next cycle of immunotherapy today.  She continues to have the same chronic complaints that she always had for the last several years including unclear left-sided pain, shortness of breath, she seen multiple specialists and we are unable to exactly determine the cause of her pain. She takes oxycodone 5 mg as needed for the L sided pain, requesting refill. Reviewed most recent CT imaging which shows stable findings and continued treatment response.  Patient states she feels better when she receives the immunotherapy and therefore is requesting to go back to every 3 week frequency again.  \par 07/09/2022 CT C/A/P IMPRESSION: CHEST: Stable spiculated right apical lung mass, measuring maximally up to 3.2 cm.Previously seen right upper lobe 5 mm solid nodule is no longer visualized. Stable mediastinal prominent lymph nodes. Other previously noted pulmonary findings are difficult to examine given degree of respiratory motion. ABDOMEN/PELVIS: Interval decrease of left adrenal lesion, now measuring 2.1 x 2.1 cm (previously 3 x 2.2 cm). Decreased right upper quadrant soft tissue nodules measuring up to 0.6 cm (previously 1 cm).\par \par 8/17/22\par she is here for follow up. She is due for cycle 29 of Keytruda now every 3 weeks. CBC with mild anemia only. New new complaints SOB is stable. Has pruritus on back but no rash recommended a trial of Aquaphor. States oxycodone helps with pain and night and sleeps much better.\par \par 09/07/2022\par accompanied by her son; reports no changes in her generalized dull pains. She uses home remedies to "clean up her pulmonary pathways".\par \par 9/28/22\par She returns for follow-up today for NSCLC, accompanied by son who helps with [employer] translation at patient request. She is due for next cycle of immunotherapy today. She continues to have the same chronic complaints that she always had for the last several years including unclear left-sided pain, shortness of breath, she seen multiple specialists and we are unable to exactly determine the cause of her pain. She takes oxycodone 5 mg as needed for the L sided abd pain. She still struggles with pruritus so we suggested to try gabapentin at a low dose and titrate up very slowly to see if it helps, however she did not see any improvement and did not like the way the gabapentin made her feel. She has since stopped the gabapentin. Reviewed most recent CBC which shows mild, normocytic anemia with hgb 11.3g/dL.  \par \par 10/19/2022\par She is here for follow up and is due for Keytruda for her lung cancer. She had CT C/A/P done on 10/15/2022 but for some reason they were not compared. I reached out to radiologist and CT abd was compared and there is no progression. I am waiting for the CT chest but amee report there is no progression that I can tell by comparing the CTs. She still has same symptoms, ABRAHAM, pain in scapula area, pruritus also only at mid back between scapula. She did not tolerate gabapentin. we decided not to hold treatment for the pruritus and continue. No rash on exam at all. \par \par 11/09/2022 \par accompanied by her son; Reports being in pain (back) as she forgot to take her pain medications, no changes in chronic conditions or new complaints since the last visit.\par \par 11/30/2022\par accompanied by her son; Reports seen by pain management and had steroid injection with minimal pain response, has F/U in 12/2022. She forgot to take her pain Rx this morning. Recently had tooth extraction and now has no teeth. No other changes in chronic conditions or new complaints since the last visit.\par \par 12/21/2022\par accompanied by her son; Reports no changes in chronic conditions or new complaints since the last visit; scheduled for another pain management injection 12/23/2022.\par \par 01/11/2023 \par accompanied by her son; Reports feeling "the same"; chronic pains - unchanged, decided not to continue with pain management as it does not provide any relieve; no changes in chronic conditions or new complaints since the last visit.\par \par 02/01/2023\par accompanied by her son; Reports no changes in her chronic conditions or new complaints since the last visit. Still with significant pruritus. Constipation improved - now has BM every other day. Still is unable to maintain sleep, but may sleep total of 6 hours during a day.\par \par 03/15/2023\par accompanied by her son; she reports her night sweats resolved; did not mention pruritus this time; no other changes in chronic conditions or new complaints since the last visit.\par

## 2023-03-16 NOTE — END OF VISIT
[FreeTextEntry3] : I was physically present for the key portions of the evaluation and management service provided.  I agree with the history and physical, and plan which I have reviewed and edited where appropriate.\par \par She is here for follow-up with her son.  She has chronic complaints she monitors her blood pressure and pulse at home and has been bradycardic on examination my manual pulse rate was around 56 we ordered an EKG and she will continue with Keytruda for her stage IV lung cancer we will plan to repeat imaging around May.  Blood work was done as well and will address any abnormalities as indicated.

## 2023-03-16 NOTE — ASSESSMENT
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  \par - 03/2021 CT C/A/P were stable and we stopped Keytruda and was on observation.\par - 06/2021 CT showing stable findings.\par - 12/2021 CT C/A/P showed increase in RUL nodule, L adrenal nodule and new tubular opacity within the right upper lobe but otherwise stable. \par - 12/29/2021 resumed Keytruda.\par - 07/09/2022 CT C/A/P with IVC shows stable findings and continued treatment response. \par - 07/27/2022 the patient prefers every 3 week dosing.\par - 10/15/2022 CT C/A/P no progression on CT A/P awaiting CT chest addendum but likely has not progressed based on review.\par - 12/13/2022 DEXA - Osteopenia: The patient has low bone mass, with the lowest measured bone density in the Left Femoral Neck. The patient has an estimated ten-year risk of hip fracture of 3.1% and an estimated ten-year risk of major fracture of 11%, based on the WHO FRAX algorithm.\par - 02/01/2023 the patient requests Keytruda IV frequency to be changed to Q6W therefore dose will be changed to 400 mg.\par \par # Dysphagia and decrease appetite - better now.\par - on Jevity 1.2 hector BID.\par - followed by nutritionist, Tila Villanueva.\par \par # SOB mainly when she tries to sleep; due to right nasal congestion maybe from Keytruda, Keytruda was stopped and SOB did not change.\par - on Flonase.\par - On Albuterol + nebulizer PRN.\par - She follows with Dr Lanza as indicated\par - Advised to use saline nasal spray as she has c/o dryness in her nose.\par \par # Some insomnia \par - on Melatonin 5 mg at bedtime.\par \par # Pruritus, back; may be side effect of Keytruda? - resolved.\par - has hydrocortisone 1% or Benadryl PRN for symptom relief if happens again.\par - she STOPPED gabapentin due to side effects.\par \par # Pain at T spine right at the level of scapula with tenderness maybe from T3 compression fracture \par - c/w oxycodone 5 mg every 8 hours as needed - Reviewed risks and benefits of narcotic consumption and prescribed dosing/frequency with patient.\par -will have her see neurosurgery Dr. Weaver. She may need MR T spine but no mass is reported on CT.\par - 01/11/2023 decided against pain management by pain management team.\par \par # Constipation - chronic, reports BM every 5 days - now better with current management every other day.\par \par 03/15/2023 in general she feels better on Keytruda Q6W.\par \par PLAN:\par \par - continue Keytruda 400 mg IV Q6W (starting 02/01/2023) - C38 GIVE TODAY.\par \par - continue oxycodone 5 mg every 8 hours as needed for pain with bowel regimen.\par \par - continue Lactulose, Colace and Senna for bowel regiment.\par \par - repeat CT C/A/P with contrast - 05/2023.\par \par - F/U with Neurosurgery for management of T3 compression fracture.\par \par - F/U with PCP for HTN management.\par \par - Labs today:  CBC, CMP, TSH.\par \par RTC in 6 weeks for MD visit and Keytruda treatment with CBC CMP and TSH.

## 2023-05-08 ENCOUNTER — OUTPATIENT (OUTPATIENT)
Dept: OUTPATIENT SERVICES | Facility: HOSPITAL | Age: 88
LOS: 1 days | End: 2023-05-08
Payer: MEDICAID

## 2023-05-08 ENCOUNTER — LABORATORY RESULT (OUTPATIENT)
Age: 88
End: 2023-05-08

## 2023-05-08 ENCOUNTER — APPOINTMENT (OUTPATIENT)
Dept: INFUSION THERAPY | Facility: CLINIC | Age: 88
End: 2023-05-08
Payer: MEDICAID

## 2023-05-08 ENCOUNTER — APPOINTMENT (OUTPATIENT)
Dept: HEMATOLOGY ONCOLOGY | Facility: CLINIC | Age: 88
End: 2023-05-08
Payer: MEDICAID

## 2023-05-08 VITALS
HEIGHT: 62 IN | RESPIRATION RATE: 16 BRPM | TEMPERATURE: 98.1 F | HEART RATE: 59 BPM | SYSTOLIC BLOOD PRESSURE: 118 MMHG | DIASTOLIC BLOOD PRESSURE: 58 MMHG

## 2023-05-08 DIAGNOSIS — C34.91 MALIGNANT NEOPLASM OF UNSPECIFIED PART OF RIGHT BRONCHUS OR LUNG: ICD-10-CM

## 2023-05-08 DIAGNOSIS — Z90.710 ACQUIRED ABSENCE OF BOTH CERVIX AND UTERUS: Chronic | ICD-10-CM

## 2023-05-08 LAB
HCT VFR BLD CALC: 34.7 %
HGB BLD-MCNC: 10.8 G/DL
MCHC RBC-ENTMCNC: 27.1 PG
MCHC RBC-ENTMCNC: 31.1 G/DL
MCV RBC AUTO: 87 FL
PLATELET # BLD AUTO: 166 K/UL
PMV BLD: 10.7 FL
RBC # BLD: 3.99 M/UL
RBC # FLD: 14.7 %
WBC # FLD AUTO: 6.33 K/UL

## 2023-05-08 PROCEDURE — 99214 OFFICE O/P EST MOD 30 MIN: CPT

## 2023-05-08 PROCEDURE — 84443 ASSAY THYROID STIM HORMONE: CPT

## 2023-05-08 PROCEDURE — 85027 COMPLETE CBC AUTOMATED: CPT

## 2023-05-08 PROCEDURE — 80053 COMPREHEN METABOLIC PANEL: CPT

## 2023-05-08 PROCEDURE — 96413 CHEMO IV INFUSION 1 HR: CPT

## 2023-05-08 RX ORDER — PEMBROLIZUMAB 25 MG/ML
400 INJECTION, SOLUTION INTRAVENOUS ONCE
Refills: 0 | Status: COMPLETED | OUTPATIENT
Start: 2023-05-08 | End: 2023-05-08

## 2023-05-08 RX ADMIN — PEMBROLIZUMAB 400 MILLIGRAM(S): 25 INJECTION, SOLUTION INTRAVENOUS at 16:06

## 2023-05-08 RX ADMIN — PEMBROLIZUMAB 400 MILLIGRAM(S): 25 INJECTION, SOLUTION INTRAVENOUS at 16:37

## 2023-05-09 LAB
ALBUMIN SERPL ELPH-MCNC: 3.9 G/DL
ALP BLD-CCNC: 58 U/L
ALT SERPL-CCNC: 10 U/L
ANION GAP SERPL CALC-SCNC: 10 MMOL/L
AST SERPL-CCNC: 12 U/L
BILIRUB SERPL-MCNC: 0.3 MG/DL
BUN SERPL-MCNC: 22 MG/DL
CALCIUM SERPL-MCNC: 8.9 MG/DL
CHLORIDE SERPL-SCNC: 108 MMOL/L
CO2 SERPL-SCNC: 22 MMOL/L
CREAT SERPL-MCNC: 0.9 MG/DL
EGFR: 62 ML/MIN/1.73M2
GLUCOSE SERPL-MCNC: 113 MG/DL
POTASSIUM SERPL-SCNC: 4.3 MMOL/L
PROT SERPL-MCNC: 5.7 G/DL
SODIUM SERPL-SCNC: 140 MMOL/L
TSH SERPL-ACNC: 0.44 UIU/ML

## 2023-05-10 NOTE — REVIEW OF SYSTEMS
[Fatigue] : fatigue [Shortness Of Breath] : shortness of breath [SOB on Exertion] : shortness of breath during exertion [Constipation] : constipation [Joint Pain] : joint pain [Muscle Weakness] : muscle weakness [Difficulty Walking] : difficulty walking [Insomnia] : insomnia [Negative] : Allergic/Immunologic [Recent Change In Weight] : ~T no recent weight change [Dysphagia] : no dysphagia [Muscle Pain] : no muscle pain [Skin Rash] : no skin rash [FreeTextEntry2] : seated in wheelchair [FreeTextEntry4] : chronic nasal congestion [FreeTextEntry6] : SOB for years - unchanged [FreeTextEntry7] : occasional hemorrhoidal bleeding  [FreeTextEntry9] : chronic L sided pain [de-identified] : pruritus at her back - not today

## 2023-05-10 NOTE — HISTORY OF PRESENT ILLNESS
[de-identified] : 08/08/2019 \par This is a 83 year old female who I initially met in [facility] on 3/28/19. She was initially admitted on 2/20/2019 for a right loculated pleural effusion approx. 900 CCs, no PE and right apical 3.1x3.0 cm mass with pleural nodules on CTA of chest. Pt returned to ED on 3/15/2019 for right pleural effusion, had a thoracentesis in ED and was sent home. She than came back for SOB  due to recurrent effusion and a Pleurx was placed. Pathology showed adenocarcinoma. \par \par She ha lost a few pounds about  4. She never smoked. She does have weakness.  From her Pleurx she  has 500 cc removed every other day. She has pain after.\par \par Work up:\par 02/20/2019 CTA chest:  Large right pleural effusion, maximal axial width of 7 cm correlating with an estimated volume of 900 cc. Associated near complete compressive atelectasis of the right lower lobe. Probable loculations of the effusion seen at the apex and at the right heart border (for example series 5 images 176, 52; series 9 image 69). 1 cm right upper lobe fissural nodule (series 5 image 139). Patent central airways. There is right apical 3.1 x 3.0 cm mass with irregular right apical pleural thickening (series 7, image 44), and multiple satellite pleural nodules including a more caudad 2.7 cm para-mediastinal nodule (series 7, image 175). Tissue sampling recommended.\par \par PATHOLOGY:\par 03/25/2019 Pleural fluid: Cell block material is hypocellular and shows a single minutecluster of malignant cells.Immunohistochemistry studies were performed at [facility] and the\par results are noncontributory\par 03/22/2019: Addendum\par Cell block material shows macrophages (positive ),mesothelial cells (positive calretinin) and a single very minute cluster of atypical suspicious cells is negative for Fidel-Ep4.Material is insufficient for further diagnostic studies (ie, immunohistochemistry).\par \par 03/15/2019: Pleural fluid: Addendum Immunohistochemical studies were performed on the cell block at [facility], [facility], [address] [place] [address], [facility] (see NOTE). The results are as follows:\par CK7-                     Rare mesothelial cells positive\par CK20-                   Negative\par CK 5/6-                 Rare mesothelial cells positive\par Calretinin-             Scattered mesothelial cells positive\par CD68-                   Numerous histiocytes positive\par BerEP-4-               Negative\par Napsin-A-             Negative\par TTF-1-                  Negative\par These results are non-specific, suggestive of mesothelialhyperplasia. The few markedly atypical cells seen in the smear are not evident in the cell block.\par The atypical cells seen in the current smear are not seen in the prior pleural fluid Thinprep smear or cell block (32-MW-).\par Few marked atypical cells, suspicious for mesothelioma versus adenocarcinoma, in a background of numerous mesothelial cells, histiocytes and small lymphocytes.\par 03/25/2019: Final Diagnosis - POSITIVE FOR MALIGNANT CELLS. Favor metastatic adenocarcinoma. Pending cell block.\par I spoke to Dr. Allison  and there are only a few cells thus further studies such as IHC, molecular PD-l1 is not possible. [de-identified] : 6/5/19\par She is here for follow up. Since last visit she started Keytruda. She had a PET/CT on April 17 that showed  Extensive FDG avid right-sided pleural mass/soft tissue thickening, \par with max SUV 21.5 within a right apical pleural soft tissue mass.\par 2.  Multiple FDG avid mediastinal lymph nodes, with max SUV 16.5 within a 2.6 x 2.1 cm subcarinal node.\par 3.  FDG avid lytic lesion within the left superior acetabulum (max SUV 14.8), suspicious for osseous metastasis.\par 4.  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 Used to be  500 ml  u 3 times a week. Takes 60 mg of oral morphine a day 20 mg 3 times a day but pain is still severe. Has not had a bowel movement in unknown amount of days does not take laxatives although she has laxatives. \par \par 6/26/19 She is here for follow up. She is due for cycle 3 of Keytruda. She has about 300 mg drained from her pleura twice a week. Has SOB but saturated 94% on room air and 94% on ambulation. Did not tolerate the fentanyl path had nausea. On Morphine 10 mg BID doing well. Constipation is better. \par \par 7/17/19\par She is here for follow-up of Stage IV lung cancer with son.  She is due for cycle 3 of Keytruda.  CBC is stable from today.  She only takes the liquid morphine at night now.  She started eating slightly more and has been feeling slightly better ,as per son.  He states that the pleurex drainage is less, it is checked every Friday + Monday.  On Friday there was about 225 cc drained, but yesterday no drainage.  We will arrange for hydration today since she has had poor oral intake lately and small appetite.  Son reports she can tolerate fluids but she has early satiety with food.  Patient also reports feeling short of breath at rest, but her O2 sat is 98% on R/a.  Right sided pain is better. \par 07/06/2019 CT C/A/P IMPRESSION: Since April 17, 2019: 1. No definite new sites of metastatic disease. 2. Right-sided pleural soft tissue mass/thickening, overall appearing decreased since April 17, 2019 with primarily residual right apical tumor. Tumor appears to infiltrate the mediastinum.3. No significant change in mediastinal lymphadenopathy including largest 2.6 x 2.1 cm subcarinal lymph node which remains unchanged. Retrocrural 2.5 x 2.3 cm metastasis or additional site of pleural tumor, unchanged.4. Irregularity of right anterior first rib, probably invaded by tumor, stable in retrospect. Left superior acetabular lucent lesion corresponding to FDG avid bone metastasis.5. Right pelvic indeterminate 7 cm mass; possible retroverted uterus with degenerating fibroid or less likely ovarian mass; previously non-FDG avid and probably benign. Further evaluation with pelvic ultrasound or MRI pelvis with and without IV contrast may be helpful.6. Status post right chest tube placement with decreased small loculated right pleural effusion with fluid within the major fissure. 7. New mild to moderate intrahepatic biliary dilation. Correlation with LFTs recommended. Consider further evaluation with ERCP or MRCP with and without IV contrast.\par \par 8/7/19\par She is here with her son. Overall she is doing much better. She is in wheelchair but now less pain, eating better and Pleurax is only draining about 25 cc twice a week, She does complain of SOB still. I explained this will improve as well but asked her to follow up with Dr. Lanza of pulmonology. \par \par 8/28/19\par She is here for follow up. They are to see Dr. Lanza in Early September. There is minimal output from Pleurax. Pain is much better and does not use narcotics anymore.  Overall better. She has right side nasal congestion and states makes it hard to breath.\par \par 9/18/19\par She is doing well. She is due for cycle 7. Son was not eliana to schedule CTs prior to the visit but she looks better and better every visit and thus most likely still responding. She saw Dr. Lanza who started her on Flonase. She is due to see ENT Dr. Adams.  Appetite is better. No pains. Dr. Lanza is considering to remover the Pleurx possibly in November. \par \par 10/09/2019\par Patient is here for a follow up visit. Due for cycle 8 today. CT chest/abd/pelvis 09/2019 showed improvement in disease. Also has seen Dr. Adams and agreed to continue with Flonase as she has hypertrophy of turbinates. \par She is tolerating Keytruda well. Her only complaint at this time is trouble in falling asleep. \par \par 10/30/19\par Patient is here for a follow up visit of Stage IV lung cancer with family member. She is feeling well with no new complaints. She feels dyspneic when taking a deep breath but her pain has improved. Most recent CBC is stable. 09/22/2019 CT C/A/P showed improvement in right apical lung mass and pleural effusion and drained 150cc. Due for cycle 9 today. \par \par 12/30/19\par Patient is here for a follow-up visit for  lung cancer with son. Reviewed imaging results with patient and her family, which suggests worsening effusion but otherwise stable.  They drained 500 cc from pleurx yesterday, reportedly sanguinous drainage. She is agreeable to continue with cycle 12 of Keytruda tomorrow. She reports persistent dyspnea.  We discussed that this tx regimen may be losing its responsiveness.\par 12/27/2019 CT C/A/P IMPRESSION: Worsening right-sided pleural effusion. Right apical spiculated nodule without difference. Unchanged appearance of the mediastinum. Unchanged appearance of the third thoracic vertebral body and right second rib. No interval change since prior examination. No change in hepatic hypodensity (likely fatty infiltration), splenic hypodensity (too small to characterize), left adrenal nodule or 1 cm retroperitoneal nodule on the right. No change in fat-containing mass in the anterior left thigh.\par \par 2/10/2020\par She is here for follow-up accompanied by son. Since last visit she was admitted to hospital for pain and nausea. She had pleurx catheter removed during most recent hospitalization on 1/13/2020. She complains of insomnia but generally feels well.  Most recent scan reviewed with patient. She reports she had a rash after discharge from the hospital which has since resolved.  \par 01/11/2020 CT Chest IMPRESSION: Since CT scan performed on 12/27/2019; 1. Right thoracostomy tube in stable position with slight interval decrease of right pleural effusion. 2.  Interval increase in right middle lobe consolidation/atelectasis. 3. Stable spiculated nodule in the right lung apex, consistent with patient's known malignancy.\par \par She feels much better now that her Pleurx is gone. Does not have any pain. Feels dyspneic which is stable.\par \par 3/2/2020\par She is here for follow-up for lung cancer accompanied by son. Since last visit, she followed with Dr. Lanza pulmonology, and Dr. Hirsch from [facility] who agreed against placing additional drainage catheters based on most recent CT imaging. She still reports some SOB. She also reports taking a [place] medication named Volidol and Panangin for here breathing. Patient reports mild dizziness after taking Trazodone; we recommended halving dose.  \par \par 5/4/2020\par She is here for follow-up for lung cancer accompanied by son. Her last treatment as in March due to COVID she is doing well. She was on Room air. She states SOB is the same. She has paraspinal back pain/hip pain. \par \par 6/15/2020\par She is here for follow-up for lung cancer accompanied by son. Patient denies fever, chills, vomiting, worsening cough, new rash, persistent diarrhea or bleeding. Reviewed most recent imaging and had a discussion regarding continuing with Keytruda every 6 weeks for now as she is clinically stable.  She does complain of weakness and nasal congestion which improves using Fluticasone spray.  She uses oxygen intermittently at home.  \par 05/09/2020 CT C/A/P IMPRESSION: 1. Stable spiculated right upper lobe mass measuring up to 3.1 cm.2. New areas of ground glass and reticular opacities within the medial left lung, with new subcentimeter nodular opacities within the left lower lobe. Findings may be postinfectious or postinflammatory in etiology. CT of the chest in 2-3 months is recommended. 3. Decreased mediastinal and right hilar lymphadenopathy. 4. Resolved right pleural effusion. 1. No findings of new or progressive metastatic disease in the abdomen or pelvis. 2. Indeterminate left adrenal nodule and right retroperitoneal nodule, stable in size from prior CT. 3. Partially visualized fat-containing mass in the anterior left thigh-considerations include benign and malignant neoplastic etiology. Recommend further evaluation with MRI if not already performed. 4. Sclerotic appearance of metastatic left acetabular lesion, similar in appearance to prior CT, possibly representing healing- was previously lytic on PET/CT of 4/17/2019. 5. Lobulated pelvic structure again seen, possibly uterus with fibroids. Further evaluation with pelvic ultrasound may be performed as warranted.\par \par 9/9/20\par Patient is here for follow up accompanied by her son who speaks English. She has chronic SOB which as per son has been slowly getting worse. She uses O2 at home intermittently. She also uses Flonase and Albuterol inhaler. Otherwise she is mostly wheel chair bound and can walk back and forth to the bathroom by herself. Her appetite is good and weight is stable. No new bone pain/abdominal pain/ rash. \par \par 10/21/2020\par She is here for follow-up for lung cancer, accompanied by son. Patient denies fever, chills, vomiting, new cough, new rash, persistent diarrhea or bleeding. She is still pending reimaging, they report they have not received authorization for scan yet. She still complains of weakness and nasal congestion, which improves using Fluticasone spray. She uses oxygen intermittently at home. She still has SOB. Her only new complaint is intermittent back pruritus. She is due for cycle 20 of Keytruda today.\par \par 12/2/2020\par She is here for follow-up for lung cancer, accompanied by son. Patient denies fever, chills, vomiting, new cough, new rash, persistent diarrhea or bleeding.  She still complains of weakness, SOB and nasal congestion, uses the nebulizer once daily with occasional relief of her SOB. She is due for cycle 21 of Keytruda today. She is using Allegra and or hydrocortisone 1% cream for the pruritus. Reviewed most recent imaging which shows good treatment response.  \par 11/14/2020 CT C/A/P IMPRESSION: Since May 9, 2020: 1. No evidence of new intrathoracic or intra-abdominal metastatic disease. 2. Unchanged 2.5 x 2 cm right upper lobe pulmonary nodule, stable with similar measurement technique. 3. Decreased subcarinal lymphadenopathy, now 1.2 cm short axis, previously 1.5 cm short axis on May 9, 2020 CT with similar measurement technique. No evidence of additional suspicious lymphadenopathy by size criteria. 4. Near complete interval resolution of left lower lobe and lingula patchy airspace opacities. Unchanged areas of groundglass and reticular opacities within the medial left lung. Findings likely postinfectious or inflammatory. 5. Unchanged sclerotic appearance of metastatic left acetabular lesion,  possibly representing healing of previously FDG avid lytic on PET/CT of 4/17/2019. 6. Unchanged, partially imaged indeterminate lipomatous mass within the anterior left thigh.\par \par 1/13/21\par She is here for follow-up for lung cancer, accompanied by son. She still complains of weakness, SOB daily which is unchanged. She is due for cycle 22 of Keytruda today. She continues to use Allegra and or hydrocortisone 1% cream for the pruritus. Patient denies fever, chills, vomiting, new cough, new rash, persistent diarrhea or bleeding. She also reports musculoskeletal pain.  \par \par 2/24/21\par She is here with her son. She is doing well but still has same complaints. mainly fatigue and SOB as well as MSK pain behind the left scapula. We had a long talk and I explained that the fatigue and SOB maybe due to Keytruda so we decided to stop it and observe her. On exam no changes. \par \par 5/17/21\par She is here for followup with her son. Her CT C/A/P in March 2021  showed Since November 14, 2020. No significant interval change approximate 2.5 cm x 2 cm right upper lobe nodule (series 4/33). Stable reticular opacities medial right upper lobe. Stable ill-defined subcarinal lymph nodes (series 4/88). Stable bibasilar subsegmental discoid atelectasis. No pleural effusions. No new sites of metastatic disease in the abdomen and pelvis. Previously described sclerotic left acetabular lesion felt to represent a healed metastasis based on prior PET/CT is poorly visualized on the current study. Unchanged, partially imaged indeterminate lipomatous mass within the anterior left thigh.\par Given that she been on  Keytruda now for 2 years with no progression of disease and she continued to have shortness of breath we decided to give her a treatment break.\par Today even well-being of treatment she still continues to have the same except complains specifically short of breath for which she had an extensive workup. She also has rhinorrhea  so she was referred to ENT again as per their request and I refilled Flonase\par \par 7/26/21\par She is here for follow up. She had CT C/A/P on 6/12/21: IMPRESSION: No CT evidence of new sites of metastatic disease in the chest, abdomen and pelvis. Stable 2.5 cm right upper lobe nodule. Interval decrease in size of subcarinal lymph node. Slight interval increase in reticular and groundglass opacities left lower lobe, possibly postinfectious / postinflammatory. Unchanged, partially imaged indeterminate lipomatous mass within the anterior left thigh.\par CBC is normal today. She is here with her son. No new symptoms has chronic rhinorrhea and chronic SOB with non specific pain in left scapula with unclear cause that has been present for years. \par \par 11/29/21\par Patient is here for follow up for stage 4 lung NSCLC. She was on Keytruda and has been on break since Feb 2021. Her last imaging in June 2021 showed stable disease. She has chronic complaints of fatigue, dyspnea, pain in her left side back and insomnia. All these complaints are chronic with no new worsening of symptoms. \par \par 2/9/22\par She returns for follow-up today for NSCLC, accompanied by son who helps with translation at patient request. She is due for next cycle of immunotherapy today. She continues to have the same chronic complaints now that she always had for the last several years including unclear left-sided pain, shortness of breath, she seen multiple specialists and we are unable to exactly determine the cause of her pain but I explained that the shortness of breath as before is possibly related to her lung cancer may be resulting in decreased function from previous Pleurx catheter leading to fibrosis and decrease in lung capacity. She takes oxycodone 5mg as needed for the L sided pain. Reviewed most recent CT imaging which shows increase in RUL nodule, L adrenal nodule and new tubular opacity within the right upper lobe but otherwise stable.  \par 12/18/2021 CT C/A/P IMPRESSION: Compared to CT chest 6/12/2021,Interval increase in size of the right upper lobe nodule now measuring 2.8 x 2.0 x 2.4 cm previously 2.5 x 2.0 x 2.0 cm. New tubular opacity within the right upper lobe.Significant interval increase in size of left adrenal nodule, now measuring 4.6 x 4.3 cm, highly suggestive of metastasis/collision tumor in this patient with history of lung cancer.  Right upper quadrant peritoneal nodules without significant change from prior. Continued attention on follow-up imaging.Partially imaged indeterminate lipomatous mass in the left thigh redemonstrated. Recommend MR evaluation.\par \par 3/23/22\par She returns for follow-up today for NSCLC, accompanied by son who helps with translation at patient request. She is due for next cycle of immunotherapy today. She continues to have the same chronic complaints now that she always had for the last several years including unclear left-sided pain, shortness of breath, she seen multiple specialists and we are unable to exactly determine the cause of her pain but I explained that the shortness of breath as before is possibly related to her lung cancer may be resulting in decreased function from previous Pleurx catheter leading to fibrosis and decrease in lung capacity. She takes oxycodone 5 mg as needed for the L sided pain She has chronic SOB that she believes is due to her heart. 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 She was started on antidepressant by PCP but she is not taking it.\par \par 6/15/22\par She is here for follow up. She is due for cycle 27 of Keyturda. CBC from today is  Normal. She has pain in entire left side of chest including shoulder. She takes over the counter meds son does not know then name. SOB is the same. Lungs sounded clear except for decrease sounds in RLL which is same. \par \par 7/27/22\par She returns for follow-up today for NSCLC, accompanied by son who helps with [place] translation at patient request.  She is due for next cycle of immunotherapy today.  She continues to have the same chronic complaints that she always had for the last several years including unclear left-sided pain, shortness of breath, she seen multiple specialists and we are unable to exactly determine the cause of her pain. She takes oxycodone 5 mg as needed for the L sided pain, requesting refill. Reviewed most recent CT imaging which shows stable findings and continued treatment response.  Patient states she feels better when she receives the immunotherapy and therefore is requesting to go back to every 3 week frequency again.  \par 07/09/2022 CT C/A/P IMPRESSION: CHEST: Stable spiculated right apical lung mass, measuring maximally up to 3.2 cm.Previously seen right upper lobe 5 mm solid nodule is no longer visualized. Stable mediastinal prominent lymph nodes. Other previously noted pulmonary findings are difficult to examine given degree of respiratory motion. ABDOMEN/PELVIS: Interval decrease of left adrenal lesion, now measuring 2.1 x 2.1 cm (previously 3 x 2.2 cm). Decreased right upper quadrant soft tissue nodules measuring up to 0.6 cm (previously 1 cm).\par \par 8/17/22\par she is here for follow up. She is due for cycle 29 of Keytruda now every 3 weeks. CBC with mild anemia only. New new complaints SOB is stable. Has pruritus on back but no rash recommended a trial of Aquaphor. States oxycodone helps with pain and night and sleeps much better.\par \par 09/07/2022\par accompanied by her son; reports no changes in her generalized dull pains. She uses home remedies to "clean up her pulmonary pathways".\par \par 9/28/22\par She returns for follow-up today for NSCLC, accompanied by son who helps with [place] translation at patient request. She is due for next cycle of immunotherapy today. She continues to have the same chronic complaints that she always had for the last several years including unclear left-sided pain, shortness of breath, she seen multiple specialists and we are unable to exactly determine the cause of her pain. She takes oxycodone 5 mg as needed for the L sided abd pain. She still struggles with pruritus so we suggested to try gabapentin at a low dose and titrate up very slowly to see if it helps, however she did not see any improvement and did not like the way the gabapentin made her feel. She has since stopped the gabapentin. Reviewed most recent CBC which shows mild, normocytic anemia with hgb 11.3g/dL.  \par \par 10/19/2022\par She is here for follow up and is due for Keytruda for her lung cancer. She had CT C/A/P done on 10/15/2022 but for some reason they were not compared. I reached out to radiologist and CT abd was compared and there is no progression. I am waiting for the CT chest but amee report there is no progression that I can tell by comparing the CTs. She still has same symptoms, ABRAHAM, pain in scapula area, pruritus also only at mid back between scapula. She did not tolerate gabapentin. we decided not to hold treatment for the pruritus and continue. No rash on exam at all. \par \par 11/09/2022 \par accompanied by her son; Reports being in pain (back) as she forgot to take her pain medications, no changes in chronic conditions or new complaints since the last visit.\par \par 11/30/2022\par accompanied by her son; Reports seen by pain management and had steroid injection with minimal pain response, has F/U in 12/2022. She forgot to take her pain Rx this morning. Recently had tooth extraction and now has no teeth. No other changes in chronic conditions or new complaints since the last visit.\par \par 12/21/2022\par accompanied by her son; Reports no changes in chronic conditions or new complaints since the last visit; scheduled for another pain management injection 12/23/2022.\par \par 01/11/2023 \par accompanied by her son; Reports feeling "the same"; chronic pains - unchanged, decided not to continue with pain management as it does not provide any relieve; no changes in chronic conditions or new complaints since the last visit.\par \par 02/01/2023\par accompanied by her son; Reports no changes in her chronic conditions or new complaints since the last visit. Still with significant pruritus. Constipation improved - now has BM every other day. Still is unable to maintain sleep, but may sleep total of 6 hours during a day.\par \par 03/15/2023\par accompanied by her son; she reports her night sweats resolved; did not mention pruritus this time; no other changes in chronic conditions or new complaints since the last visit.\par \par 05/08/2023\par accompanied by her son; Reports chronic pains; no changes in chronic conditions or new complaints since the last visit.\par

## 2023-05-10 NOTE — END OF VISIT
[FreeTextEntry3] : I was physically present for the key portions of the evaluation and management service provided.  I agree with the history and physical, and plan which I have reviewed and edited where appropriate.\par \par She returns for follow-up.  She is here with her son as always.  Remains unchanged with some pruritus in her back shortness of breath and back pain.  He needs an oxycodone for her back pain this is secondary to compression fracture has seen neurosurgery and pain management.  We will proceed with Keytruda for her metastatic lung cancer.  She is due for repeat imaging which were ordered today.  She will see us back in 6 weeks blood work was also sent and will be reviewed and intervened on as indicated
.

## 2023-05-10 NOTE — PHYSICAL EXAM
[Capable of only limited self care, confined to bed or chair more than 50% of waking hours] : Status 3- Capable of only limited self care, confined to bed or chair more than 50% of waking hours [Normal] : affect appropriate [de-identified] : seated in wheelchair, but does stand up by herself [de-identified] : Lungs clear [de-identified] : L thigh lipoma [de-identified] : No rash

## 2023-05-10 NOTE — ASSESSMENT
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  \par - 03/2021 CT C/A/P were stable and we stopped Keytruda and was on observation.\par - 06/2021 CT showing stable findings.\par - 12/2021 CT C/A/P showed increase in RUL nodule, L adrenal nodule and new tubular opacity within the right upper lobe but otherwise stable. \par - 12/29/2021 resumed Keytruda.\par - 07/09/2022 CT C/A/P with IVC shows stable findings and continued treatment response. \par - 07/27/2022 the patient prefers every 3 week dosing.\par - 10/15/2022 CT C/A/P no progression on CT A/P awaiting CT chest addendum but likely has not progressed based on review.\par - 12/13/2022 DEXA - Osteopenia: The patient has low bone mass, with the lowest measured bone density in the Left Femoral Neck. The patient has an estimated ten-year risk of hip fracture of 3.1% and an estimated ten-year risk of major fracture of 11%, based on the WHO FRAX algorithm.\par - 02/01/2023 the patient requests Keytruda IV frequency to be changed to Q6W therefore dose will be changed to 400 mg.\par - 03/15/2023 in general she feels better on Keytruda Q6W.\par \par # Dysphagia and decrease appetite - better now.\par - on Jevity 1.2 hector BID.\par - followed by nutritionist, Tila Villanueva.\par \par # SOB mainly when she tries to sleep; due to right nasal congestion maybe from Keytruda, Keytruda was stopped and SOB did not change.\par - on Flonase.\par - On Albuterol + nebulizer PRN.\par - She follows with Dr Lanza as indicated\par - Advised to use saline nasal spray as she has c/o dryness in her nose.\par \par # Some insomnia \par - on Melatonin 5 mg at bedtime.\par \par # Pruritus, back; may be side effect of Keytruda? - resolved.\par - has hydrocortisone 1% or Benadryl PRN for symptom relief if happens again.\par - she STOPPED gabapentin due to side effects.\par \par # Pain at T spine right at the level of scapula with tenderness maybe from T3 compression fracture \par - c/w oxycodone 5 mg every 8 hours as needed - Reviewed risks and benefits of narcotic consumption and prescribed dosing/frequency with patient.\par -will have her see neurosurgery Dr. Weaver. She may need MR T spine but no mass is reported on CT.\par - 01/11/2023 decided against pain management by pain management team.\par \par # Constipation - chronic, reports BM every 5 days - now better with current management every other day.\par \par 05/08/2023 Labs reviewed and results discussed with the patient - remains clinically unchanged and stable to continue current management. All questions were answered to satisfaction.\par \par PLAN:\par \par - continue Keytruda 400 mg IV Q6W (starting 02/01/2023) - C39 GIVE TODAY.\par \par - continue oxycodone 5 mg every 8 hours as needed for pain with bowel regimen.\par \par - continue Lactulose, Colace and Senna for bowel regiment.\par \par - repeat CT C/A/P with contrast - 05/2023 - ordered.\par \par - F/U with Neurosurgery for management of T3 compression fracture.\par \par - F/U with PCP for HTN management.\par \par - Labs today: CBC, CMP, TSH.\par \par RTC in 6 weeks for MD visit and Keytruda treatment with CBC CMP and TSH.

## 2023-05-23 ENCOUNTER — RX RENEWAL (OUTPATIENT)
Age: 88
End: 2023-05-23

## 2023-05-23 RX ORDER — FLUTICASONE PROPIONATE 50 UG/1
50 SPRAY, METERED NASAL DAILY
Qty: 1 | Refills: 3 | Status: ACTIVE | COMMUNITY
Start: 2019-09-13 | End: 1900-01-01

## 2023-06-16 ENCOUNTER — OUTPATIENT (OUTPATIENT)
Dept: OUTPATIENT SERVICES | Facility: HOSPITAL | Age: 88
LOS: 1 days | End: 2023-06-16
Payer: MEDICAID

## 2023-06-16 ENCOUNTER — RESULT REVIEW (OUTPATIENT)
Age: 88
End: 2023-06-16

## 2023-06-16 DIAGNOSIS — Z00.8 ENCOUNTER FOR OTHER GENERAL EXAMINATION: ICD-10-CM

## 2023-06-16 DIAGNOSIS — C34.91 MALIGNANT NEOPLASM OF UNSPECIFIED PART OF RIGHT BRONCHUS OR LUNG: ICD-10-CM

## 2023-06-16 DIAGNOSIS — Z90.710 ACQUIRED ABSENCE OF BOTH CERVIX AND UTERUS: Chronic | ICD-10-CM

## 2023-06-16 PROCEDURE — 74177 CT ABD & PELVIS W/CONTRAST: CPT | Mod: 26

## 2023-06-16 PROCEDURE — 71260 CT THORAX DX C+: CPT

## 2023-06-16 PROCEDURE — 71260 CT THORAX DX C+: CPT | Mod: 26

## 2023-06-16 PROCEDURE — 74177 CT ABD & PELVIS W/CONTRAST: CPT

## 2023-06-17 DIAGNOSIS — C34.91 MALIGNANT NEOPLASM OF UNSPECIFIED PART OF RIGHT BRONCHUS OR LUNG: ICD-10-CM

## 2023-06-21 ENCOUNTER — LABORATORY RESULT (OUTPATIENT)
Age: 88
End: 2023-06-21

## 2023-06-21 ENCOUNTER — APPOINTMENT (OUTPATIENT)
Dept: HEMATOLOGY ONCOLOGY | Facility: CLINIC | Age: 88
End: 2023-06-21
Payer: MEDICAID

## 2023-06-21 ENCOUNTER — OUTPATIENT (OUTPATIENT)
Dept: OUTPATIENT SERVICES | Facility: HOSPITAL | Age: 88
LOS: 1 days | End: 2023-06-21
Payer: MEDICAID

## 2023-06-21 ENCOUNTER — APPOINTMENT (OUTPATIENT)
Dept: INFUSION THERAPY | Facility: CLINIC | Age: 88
End: 2023-06-21
Payer: MEDICAID

## 2023-06-21 DIAGNOSIS — C34.91 MALIGNANT NEOPLASM OF UNSPECIFIED PART OF RIGHT BRONCHUS OR LUNG: ICD-10-CM

## 2023-06-21 DIAGNOSIS — Z90.710 ACQUIRED ABSENCE OF BOTH CERVIX AND UTERUS: Chronic | ICD-10-CM

## 2023-06-21 LAB
HCT VFR BLD CALC: 33.1 %
HGB BLD-MCNC: 11 G/DL
MCHC RBC-ENTMCNC: 27.8 PG
MCHC RBC-ENTMCNC: 33.2 G/DL
MCV RBC AUTO: 83.8 FL
PLATELET # BLD AUTO: 175 K/UL
PMV BLD: 10.2 FL
RBC # BLD: 3.95 M/UL
RBC # FLD: 14.6 %
WBC # FLD AUTO: 5.33 K/UL

## 2023-06-21 PROCEDURE — 84443 ASSAY THYROID STIM HORMONE: CPT

## 2023-06-21 PROCEDURE — 99214 OFFICE O/P EST MOD 30 MIN: CPT

## 2023-06-21 PROCEDURE — 80053 COMPREHEN METABOLIC PANEL: CPT

## 2023-06-21 PROCEDURE — 96413 CHEMO IV INFUSION 1 HR: CPT

## 2023-06-21 PROCEDURE — 36415 COLL VENOUS BLD VENIPUNCTURE: CPT

## 2023-06-21 PROCEDURE — 85027 COMPLETE CBC AUTOMATED: CPT

## 2023-06-21 RX ORDER — PEMBROLIZUMAB 25 MG/ML
400 INJECTION, SOLUTION INTRAVENOUS ONCE
Refills: 0 | Status: COMPLETED | OUTPATIENT
Start: 2023-06-21 | End: 2023-06-21

## 2023-06-21 RX ADMIN — PEMBROLIZUMAB 400 MILLIGRAM(S): 25 INJECTION, SOLUTION INTRAVENOUS at 12:50

## 2023-06-21 RX ADMIN — PEMBROLIZUMAB 400 MILLIGRAM(S): 25 INJECTION, SOLUTION INTRAVENOUS at 12:20

## 2023-06-22 LAB
ALBUMIN SERPL ELPH-MCNC: 3.7 G/DL
ALP BLD-CCNC: 62 U/L
ALT SERPL-CCNC: 12 U/L
ANION GAP SERPL CALC-SCNC: 12 MMOL/L
AST SERPL-CCNC: 14 U/L
BILIRUB SERPL-MCNC: 0.4 MG/DL
BUN SERPL-MCNC: 26 MG/DL
CALCIUM SERPL-MCNC: 9 MG/DL
CHLORIDE SERPL-SCNC: 109 MMOL/L
CO2 SERPL-SCNC: 20 MMOL/L
CREAT SERPL-MCNC: 1 MG/DL
EGFR: 55 ML/MIN/1.73M2
GLUCOSE SERPL-MCNC: 94 MG/DL
POTASSIUM SERPL-SCNC: 4.3 MMOL/L
PROT SERPL-MCNC: 5.7 G/DL
SODIUM SERPL-SCNC: 141 MMOL/L
TSH SERPL-ACNC: 0.64 UIU/ML

## 2023-06-23 NOTE — ASSESSMENT
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  \par - 03/2021 CT C/A/P were stable and we stopped Keytruda and was on observation.\par - 06/2021 CT showing stable findings.\par - 12/2021 CT C/A/P showed increase in RUL nodule, L adrenal nodule and new tubular opacity within the right upper lobe but otherwise stable. \par - 12/29/2021 resumed Keytruda.\par - 07/09/2022 CT C/A/P with IVC shows stable findings and continued treatment response. \par - 07/27/2022 the patient prefers every 3 week dosing.\par - 10/15/2022 CT C/A/P no progression on CT A/P awaiting CT chest addendum but likely has not progressed based on review.\par - 12/13/2022 DEXA - Osteopenia: The patient has low bone mass, with the lowest measured bone density in the Left Femoral Neck. The patient has an estimated ten-year risk of hip fracture of 3.1% and an estimated ten-year risk of major fracture of 11%, based on the WHO FRAX algorithm.\par - 02/01/2023 the patient requests Keytruda IV frequency to be changed to Q6W therefore dose will be changed to 400 mg.\par - 03/15/2023 in general she feels better on Keytruda Q6W.\par - 06/16/2023 CT C/A/P - Since 2/4/2023 Stable spiculated right apical mass in contact with pleural surface (303/35). Stable adjacent nodular opacity right upper lobe (601/73). No pleural effusions or pneumothorax. Scattered areas of parenchymal banding/scarring is noted. Increasing biliary duct dilatation since prior examination in this patient with a gallbladder. Outpatient MRI of the biliary tree with and without gadolinium and MRCP imaging is recommended as well as correlation with laboratory findings.\par \par Apparent increase in size of partially imaged fat and soft tissue containing mass anterior to the left hip.\par \par Outpatient MRI with gadolinium is recommended for further evaluation.\par \par Stable left adrenal nodules\par \par # Dysphagia and decrease appetite - better now.\par - on Jevity 1.2 hector BID.\par - followed by nutritionist, Tila Villanueva.\par \par # SOB mainly when she tries to sleep; due to right nasal congestion maybe from Keytruda, Keytruda was stopped and SOB did not change.\par - on Flonase.\par - On Albuterol + nebulizer PRN.\par - She follows with Dr Lanza as indicated\par - Advised to use saline nasal spray as she has c/o dryness in her nose.\par \par # Some insomnia \par - on Melatonin 5 mg at bedtime.\par \par # Pruritus, back; may be side effect of Keytruda? - resolved.\par - has hydrocortisone 1% or Benadryl PRN for symptom relief if happens again.\par - she STOPPED gabapentin due to side effects.\par \par # Pain at T spine right at the level of scapula with tenderness maybe from T3 compression fracture \par - c/w oxycodone 5 mg every 8 hours as needed - Reviewed risks and benefits of narcotic consumption and prescribed dosing/frequency with patient.\par -will have her see neurosurgery Dr. Weaver. She may need MR T spine but no mass is reported on CT.\par - 01/11/2023 decided against pain management by pain management team.\par \par # Constipation - chronic, reports BM every 5 days - now better with current management every other day.\par \par 06/21/[address] chest abdomen pelvis with the patient and her son. Labs reviewed and results discussed as well - the patient remains clinically stable to continue current management. All questions were answered to satisfaction.\par \par PLAN:\par \par - continue Keytruda 400 mg IV Q6W (starting 02/01/2023) - C39 GIVE TODAY.\par \par - continue oxycodone 5 mg every 8 hours as needed for pain with bowel regimen.\par \par - continue Lactulose, Colace and Senna for bowel regiment.\par \par - proceed with MRI of the biliary tree with and without gadolinium as recommended by 06/2023 CT A/P. MRI of the biliary tree with and without gadolinium pending MRI results as the patient does not report any symptoms.\par \par - repeat CT C/A/P with contrast - 09/2023.\par \par - F/U with Neurosurgery for management of T3 compression fracture.\par \par - F/U with PCP for HTN management.\par \par - Labs today: CBC, CMP, TSH.\par \par RTC in 6 weeks for MD visit and Keytruda treatment with CBC CMP and TSH.

## 2023-06-23 NOTE — HISTORY OF PRESENT ILLNESS
Bite guard placed.    [de-identified] : 08/08/2019 \par This is a 83 year old female who I initially met in [facility] on 3/28/19. She was initially admitted on 2/20/2019 for a right loculated pleural effusion approx. 900 CCs, no PE and right apical 3.1x3.0 cm mass with pleural nodules on CTA of chest. Pt returned to ED on 3/15/2019 for right pleural effusion, had a thoracentesis in ED and was sent home. She than came back for SOB  due to recurrent effusion and a Pleurx was placed. Pathology showed adenocarcinoma. \par \par She ha lost a few pounds about  4. She never smoked. She does have weakness.  From her Pleurx she  has 500 cc removed every other day. She has pain after.\par \par Work up:\par 02/20/2019 CTA chest:  Large right pleural effusion, maximal axial width of 7 cm correlating with an estimated volume of 900 cc. Associated near complete compressive atelectasis of the right lower lobe. Probable loculations of the effusion seen at the apex and at the right heart border (for example series 5 images 176, 52; series 9 image 69). 1 cm right upper lobe fissural nodule (series 5 image 139). Patent central airways. There is right apical 3.1 x 3.0 cm mass with irregular right apical pleural thickening (series 7, image 44), and multiple satellite pleural nodules including a more caudad 2.7 cm para-mediastinal nodule (series 7, image 175). Tissue sampling recommended.\par \par PATHOLOGY:\par 03/25/2019 Pleural fluid: Cell block material is hypocellular and shows a single minutecluster of malignant cells.Immunohistochemistry studies were performed at [facility] and the\par results are noncontributory\par 03/22/2019: Addendum\par Cell block material shows macrophages (positive ),mesothelial cells (positive calretinin) and a single very minute cluster of atypical suspicious cells is negative for Fidel-Ep4.Material is insufficient for further diagnostic studies (ie, immunohistochemistry).\par \par 03/15/2019: Pleural fluid: Addendum Immunohistochemical studies were performed on the cell block at [facility], [facility], [address] [place] [address], [facility] (see NOTE). The results are as follows:\par CK7-                     Rare mesothelial cells positive\par CK20-                   Negative\par CK 5/6-                 Rare mesothelial cells positive\par Calretinin-             Scattered mesothelial cells positive\par CD68-                   Numerous histiocytes positive\par BerEP-4-               Negative\par Napsin-A-             Negative\par TTF-1-                  Negative\par These results are non-specific, suggestive of mesothelialhyperplasia. The few markedly atypical cells seen in the smear are not evident in the cell block.\par The atypical cells seen in the current smear are not seen in the prior pleural fluid Thinprep smear or cell block (32-JE-).\par Few marked atypical cells, suspicious for mesothelioma versus adenocarcinoma, in a background of numerous mesothelial cells, histiocytes and small lymphocytes.\par 03/25/2019: Final Diagnosis - POSITIVE FOR MALIGNANT CELLS. Favor metastatic adenocarcinoma. Pending cell block.\par I spoke to Dr. Allison  and there are only a few cells thus further studies such as IHC, molecular PD-l1 is not possible. [de-identified] : 6/5/19\par She is here for follow up. Since last visit she started Keytruda. She had a PET/CT on April 17 that showed  Extensive FDG avid right-sided pleural mass/soft tissue thickening, \par with max SUV 21.5 within a right apical pleural soft tissue mass.\par 2.  Multiple FDG avid mediastinal lymph nodes, with max SUV 16.5 within a 2.6 x 2.1 cm subcarinal node.\par 3.  FDG avid lytic lesion within the left superior acetabulum (max SUV 14.8), suspicious for osseous metastasis.\par 4.  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 Used to be  500 ml  u 3 times a week. Takes 60 mg of oral morphine a day 20 mg 3 times a day but pain is still severe. Has not had a bowel movement in unknown amount of days does not take laxatives although she has laxatives. \par \par 6/26/19 She is here for follow up. She is due for cycle 3 of Keytruda. She has about 300 mg drained from her pleura twice a week. Has SOB but saturated 94% on room air and 94% on ambulation. Did not tolerate the fentanyl path had nausea. On Morphine 10 mg BID doing well. Constipation is better. \par \par 7/17/19\par She is here for follow-up of Stage IV lung cancer with son.  She is due for cycle 3 of Keytruda.  CBC is stable from today.  She only takes the liquid morphine at night now.  She started eating slightly more and has been feeling slightly better ,as per son.  He states that the pleurex drainage is less, it is checked every Friday + Monday.  On Friday there was about 225 cc drained, but yesterday no drainage.  We will arrange for hydration today since she has had poor oral intake lately and small appetite.  Son reports she can tolerate fluids but she has early satiety with food.  Patient also reports feeling short of breath at rest, but her O2 sat is 98% on R/a.  Right sided pain is better. \par 07/06/2019 CT C/A/P IMPRESSION: Since April 17, 2019: 1. No definite new sites of metastatic disease. 2. Right-sided pleural soft tissue mass/thickening, overall appearing decreased since April 17, 2019 with primarily residual right apical tumor. Tumor appears to infiltrate the mediastinum.3. No significant change in mediastinal lymphadenopathy including largest 2.6 x 2.1 cm subcarinal lymph node which remains unchanged. Retrocrural 2.5 x 2.3 cm metastasis or additional site of pleural tumor, unchanged.4. Irregularity of right anterior first rib, probably invaded by tumor, stable in retrospect. Left superior acetabular lucent lesion corresponding to FDG avid bone metastasis.5. Right pelvic indeterminate 7 cm mass; possible retroverted uterus with degenerating fibroid or less likely ovarian mass; previously non-FDG avid and probably benign. Further evaluation with pelvic ultrasound or MRI pelvis with and without IV contrast may be helpful.6. Status post right chest tube placement with decreased small loculated right pleural effusion with fluid within the major fissure. 7. New mild to moderate intrahepatic biliary dilation. Correlation with LFTs recommended. Consider further evaluation with ERCP or MRCP with and without IV contrast.\par \par 8/7/19\par She is here with her son. Overall she is doing much better. She is in wheelchair but now less pain, eating better and Pleurax is only draining about 25 cc twice a week, She does complain of SOB still. I explained this will improve as well but asked her to follow up with Dr. Lanza of pulmonology. \par \par 8/28/19\par She is here for follow up. They are to see Dr. Lanza in Early September. There is minimal output from Pleurax. Pain is much better and does not use narcotics anymore.  Overall better. She has right side nasal congestion and states makes it hard to breath.\par \par 9/18/19\par She is doing well. She is due for cycle 7. Son was not eliana to schedule CTs prior to the visit but she looks better and better every visit and thus most likely still responding. She saw Dr. Lanza who started her on Flonase. She is due to see ENT Dr. Adams.  Appetite is better. No pains. Dr. Lanza is considering to remover the Pleurx possibly in November. \par \par 10/09/2019\par Patient is here for a follow up visit. Due for cycle 8 today. CT chest/abd/pelvis 09/2019 showed improvement in disease. Also has seen Dr. Adams and agreed to continue with Flonase as she has hypertrophy of turbinates. \par She is tolerating Keytruda well. Her only complaint at this time is trouble in falling asleep. \par \par 10/30/19\par Patient is here for a follow up visit of Stage IV lung cancer with family member. She is feeling well with no new complaints. She feels dyspneic when taking a deep breath but her pain has improved. Most recent CBC is stable. 09/22/2019 CT C/A/P showed improvement in right apical lung mass and pleural effusion and drained 150cc. Due for cycle 9 today. \par \par 12/30/19\par Patient is here for a follow-up visit for  lung cancer with son. Reviewed imaging results with patient and her family, which suggests worsening effusion but otherwise stable.  They drained 500 cc from pleurx yesterday, reportedly sanguinous drainage. She is agreeable to continue with cycle 12 of Keytruda tomorrow. She reports persistent dyspnea.  We discussed that this tx regimen may be losing its responsiveness.\par 12/27/2019 CT C/A/P IMPRESSION: Worsening right-sided pleural effusion. Right apical spiculated nodule without difference. Unchanged appearance of the mediastinum. Unchanged appearance of the third thoracic vertebral body and right second rib. No interval change since prior examination. No change in hepatic hypodensity (likely fatty infiltration), splenic hypodensity (too small to characterize), left adrenal nodule or 1 cm retroperitoneal nodule on the right. No change in fat-containing mass in the anterior left thigh.\par \par 2/10/2020\par She is here for follow-up accompanied by son. Since last visit she was admitted to hospital for pain and nausea. She had pleurx catheter removed during most recent hospitalization on 1/13/2020. She complains of insomnia but generally feels well.  Most recent scan reviewed with patient. She reports she had a rash after discharge from the hospital which has since resolved.  \par 01/11/2020 CT Chest IMPRESSION: Since CT scan performed on 12/27/2019; 1. Right thoracostomy tube in stable position with slight interval decrease of right pleural effusion. 2.  Interval increase in right middle lobe consolidation/atelectasis. 3. Stable spiculated nodule in the right lung apex, consistent with patient's known malignancy.\par \par She feels much better now that her Pleurx is gone. Does not have any pain. Feels dyspneic which is stable.\par \par 3/2/2020\par She is here for follow-up for lung cancer accompanied by son. Since last visit, she followed with Dr. Lanza pulmonology, and Dr. Hirsch from [facility] who agreed against placing additional drainage catheters based on most recent CT imaging. She still reports some SOB. She also reports taking a [place] medication named Volidol and Panangin for here breathing. Patient reports mild dizziness after taking Trazodone; we recommended halving dose.  \par \par 5/4/2020\par She is here for follow-up for lung cancer accompanied by son. Her last treatment as in March due to COVID she is doing well. She was on Room air. She states SOB is the same. She has paraspinal back pain/hip pain. \par \par 6/15/2020\par She is here for follow-up for lung cancer accompanied by son. Patient denies fever, chills, vomiting, worsening cough, new rash, persistent diarrhea or bleeding. Reviewed most recent imaging and had a discussion regarding continuing with Keytruda every 6 weeks for now as she is clinically stable.  She does complain of weakness and nasal congestion which improves using Fluticasone spray.  She uses oxygen intermittently at home.  \par 05/09/2020 CT C/A/P IMPRESSION: 1. Stable spiculated right upper lobe mass measuring up to 3.1 cm.2. New areas of ground glass and reticular opacities within the medial left lung, with new subcentimeter nodular opacities within the left lower lobe. Findings may be postinfectious or postinflammatory in etiology. CT of the chest in 2-3 months is recommended. 3. Decreased mediastinal and right hilar lymphadenopathy. 4. Resolved right pleural effusion. 1. No findings of new or progressive metastatic disease in the abdomen or pelvis. 2. Indeterminate left adrenal nodule and right retroperitoneal nodule, stable in size from prior CT. 3. Partially visualized fat-containing mass in the anterior left thigh-considerations include benign and malignant neoplastic etiology. Recommend further evaluation with MRI if not already performed. 4. Sclerotic appearance of metastatic left acetabular lesion, similar in appearance to prior CT, possibly representing healing- was previously lytic on PET/CT of 4/17/2019. 5. Lobulated pelvic structure again seen, possibly uterus with fibroids. Further evaluation with pelvic ultrasound may be performed as warranted.\par \par 9/9/20\par Patient is here for follow up accompanied by her son who speaks English. She has chronic SOB which as per son has been slowly getting worse. She uses O2 at home intermittently. She also uses Flonase and Albuterol inhaler. Otherwise she is mostly wheel chair bound and can walk back and forth to the bathroom by herself. Her appetite is good and weight is stable. No new bone pain/abdominal pain/ rash. \par \par 10/21/2020\par She is here for follow-up for lung cancer, accompanied by son. Patient denies fever, chills, vomiting, new cough, new rash, persistent diarrhea or bleeding. She is still pending reimaging, they report they have not received authorization for scan yet. She still complains of weakness and nasal congestion, which improves using Fluticasone spray. She uses oxygen intermittently at home. She still has SOB. Her only new complaint is intermittent back pruritus. She is due for cycle 20 of Keytruda today.\par \par 12/2/2020\par She is here for follow-up for lung cancer, accompanied by son. Patient denies fever, chills, vomiting, new cough, new rash, persistent diarrhea or bleeding.  She still complains of weakness, SOB and nasal congestion, uses the nebulizer once daily with occasional relief of her SOB. She is due for cycle 21 of Keytruda today. She is using Allegra and or hydrocortisone 1% cream for the pruritus. Reviewed most recent imaging which shows good treatment response.  \par 11/14/2020 CT C/A/P IMPRESSION: Since May 9, 2020: 1. No evidence of new intrathoracic or intra-abdominal metastatic disease. 2. Unchanged 2.5 x 2 cm right upper lobe pulmonary nodule, stable with similar measurement technique. 3. Decreased subcarinal lymphadenopathy, now 1.2 cm short axis, previously 1.5 cm short axis on May 9, 2020 CT with similar measurement technique. No evidence of additional suspicious lymphadenopathy by size criteria. 4. Near complete interval resolution of left lower lobe and lingula patchy airspace opacities. Unchanged areas of groundglass and reticular opacities within the medial left lung. Findings likely postinfectious or inflammatory. 5. Unchanged sclerotic appearance of metastatic left acetabular lesion,  possibly representing healing of previously FDG avid lytic on PET/CT of 4/17/2019. 6. Unchanged, partially imaged indeterminate lipomatous mass within the anterior left thigh.\par \par 1/13/21\par She is here for follow-up for lung cancer, accompanied by son. She still complains of weakness, SOB daily which is unchanged. She is due for cycle 22 of Keytruda today. She continues to use Allegra and or hydrocortisone 1% cream for the pruritus. Patient denies fever, chills, vomiting, new cough, new rash, persistent diarrhea or bleeding. She also reports musculoskeletal pain.  \par \par 2/24/21\par She is here with her son. She is doing well but still has same complaints. mainly fatigue and SOB as well as MSK pain behind the left scapula. We had a long talk and I explained that the fatigue and SOB maybe due to Keytruda so we decided to stop it and observe her. On exam no changes. \par \par 5/17/21\par She is here for followup with her son. Her CT C/A/P in March 2021  showed Since November 14, 2020. No significant interval change approximate 2.5 cm x 2 cm right upper lobe nodule (series 4/33). Stable reticular opacities medial right upper lobe. Stable ill-defined subcarinal lymph nodes (series 4/88). Stable bibasilar subsegmental discoid atelectasis. No pleural effusions. No new sites of metastatic disease in the abdomen and pelvis. Previously described sclerotic left acetabular lesion felt to represent a healed metastasis based on prior PET/CT is poorly visualized on the current study. Unchanged, partially imaged indeterminate lipomatous mass within the anterior left thigh.\par Given that she been on  Keytruda now for 2 years with no progression of disease and she continued to have shortness of breath we decided to give her a treatment break.\par Today even well-being of treatment she still continues to have the same except complains specifically short of breath for which she had an extensive workup. She also has rhinorrhea  so she was referred to ENT again as per their request and I refilled Flonase\par \par 7/26/21\par She is here for follow up. She had CT C/A/P on 6/12/21: IMPRESSION: No CT evidence of new sites of metastatic disease in the chest, abdomen and pelvis. Stable 2.5 cm right upper lobe nodule. Interval decrease in size of subcarinal lymph node. Slight interval increase in reticular and groundglass opacities left lower lobe, possibly postinfectious / postinflammatory. Unchanged, partially imaged indeterminate lipomatous mass within the anterior left thigh.\par CBC is normal today. She is here with her son. No new symptoms has chronic rhinorrhea and chronic SOB with non specific pain in left scapula with unclear cause that has been present for years. \par \par 11/29/21\par Patient is here for follow up for stage 4 lung NSCLC. She was on Keytruda and has been on break since Feb 2021. Her last imaging in June 2021 showed stable disease. She has chronic complaints of fatigue, dyspnea, pain in her left side back and insomnia. All these complaints are chronic with no new worsening of symptoms. \par \par 2/9/22\par She returns for follow-up today for NSCLC, accompanied by son who helps with translation at patient request. She is due for next cycle of immunotherapy today. She continues to have the same chronic complaints now that she always had for the last several years including unclear left-sided pain, shortness of breath, she seen multiple specialists and we are unable to exactly determine the cause of her pain but I explained that the shortness of breath as before is possibly related to her lung cancer may be resulting in decreased function from previous Pleurx catheter leading to fibrosis and decrease in lung capacity. She takes oxycodone 5mg as needed for the L sided pain. Reviewed most recent CT imaging which shows increase in RUL nodule, L adrenal nodule and new tubular opacity within the right upper lobe but otherwise stable.  \par 12/18/2021 CT C/A/P IMPRESSION: Compared to CT chest 6/12/2021,Interval increase in size of the right upper lobe nodule now measuring 2.8 x 2.0 x 2.4 cm previously 2.5 x 2.0 x 2.0 cm. New tubular opacity within the right upper lobe.Significant interval increase in size of left adrenal nodule, now measuring 4.6 x 4.3 cm, highly suggestive of metastasis/collision tumor in this patient with history of lung cancer.  Right upper quadrant peritoneal nodules without significant change from prior. Continued attention on follow-up imaging.Partially imaged indeterminate lipomatous mass in the left thigh redemonstrated. Recommend MR evaluation.\par \par 3/23/22\par She returns for follow-up today for NSCLC, accompanied by son who helps with translation at patient request. She is due for next cycle of immunotherapy today. She continues to have the same chronic complaints now that she always had for the last several years including unclear left-sided pain, shortness of breath, she seen multiple specialists and we are unable to exactly determine the cause of her pain but I explained that the shortness of breath as before is possibly related to her lung cancer may be resulting in decreased function from previous Pleurx catheter leading to fibrosis and decrease in lung capacity. She takes oxycodone 5 mg as needed for the L sided pain She has chronic SOB that she believes is due to her heart. 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 She was started on antidepressant by PCP but she is not taking it.\par \par 6/15/22\par She is here for follow up. She is due for cycle 27 of Keyturda. CBC from today is  Normal. She has pain in entire left side of chest including shoulder. She takes over the counter meds son does not know then name. SOB is the same. Lungs sounded clear except for decrease sounds in RLL which is same. \par \par 7/27/22\par She returns for follow-up today for NSCLC, accompanied by son who helps with [place] translation at patient request.  She is due for next cycle of immunotherapy today.  She continues to have the same chronic complaints that she always had for the last several years including unclear left-sided pain, shortness of breath, she seen multiple specialists and we are unable to exactly determine the cause of her pain. She takes oxycodone 5 mg as needed for the L sided pain, requesting refill. Reviewed most recent CT imaging which shows stable findings and continued treatment response.  Patient states she feels better when she receives the immunotherapy and therefore is requesting to go back to every 3 week frequency again.  \par 07/09/2022 CT C/A/P IMPRESSION: CHEST: Stable spiculated right apical lung mass, measuring maximally up to 3.2 cm.Previously seen right upper lobe 5 mm solid nodule is no longer visualized. Stable mediastinal prominent lymph nodes. Other previously noted pulmonary findings are difficult to examine given degree of respiratory motion. ABDOMEN/PELVIS: Interval decrease of left adrenal lesion, now measuring 2.1 x 2.1 cm (previously 3 x 2.2 cm). Decreased right upper quadrant soft tissue nodules measuring up to 0.6 cm (previously 1 cm).\par \par 8/17/22\par she is here for follow up. She is due for cycle 29 of Keytruda now every 3 weeks. CBC with mild anemia only. New new complaints SOB is stable. Has pruritus on back but no rash recommended a trial of Aquaphor. States oxycodone helps with pain and night and sleeps much better.\par \par 09/07/2022\par accompanied by her son; reports no changes in her generalized dull pains. She uses home remedies to "clean up her pulmonary pathways".\par \par 9/28/22\par She returns for follow-up today for NSCLC, accompanied by son who helps with [place] translation at patient request. She is due for next cycle of immunotherapy today. She continues to have the same chronic complaints that she always had for the last several years including unclear left-sided pain, shortness of breath, she seen multiple specialists and we are unable to exactly determine the cause of her pain. She takes oxycodone 5 mg as needed for the L sided abd pain. She still struggles with pruritus so we suggested to try gabapentin at a low dose and titrate up very slowly to see if it helps, however she did not see any improvement and did not like the way the gabapentin made her feel. She has since stopped the gabapentin. Reviewed most recent CBC which shows mild, normocytic anemia with hgb 11.3g/dL.  \par \par 10/19/2022\par She is here for follow up and is due for Keytruda for her lung cancer. She had CT C/A/P done on 10/15/2022 but for some reason they were not compared. I reached out to radiologist and CT abd was compared and there is no progression. I am waiting for the CT chest but amee report there is no progression that I can tell by comparing the CTs. She still has same symptoms, ABRAHAM, pain in scapula area, pruritus also only at mid back between scapula. She did not tolerate gabapentin. we decided not to hold treatment for the pruritus and continue. No rash on exam at all. \par \par 11/09/2022 \par accompanied by her son; Reports being in pain (back) as she forgot to take her pain medications, no changes in chronic conditions or new complaints since the last visit.\par \par 11/30/2022\par accompanied by her son; Reports seen by pain management and had steroid injection with minimal pain response, has F/U in 12/2022. She forgot to take her pain Rx this morning. Recently had tooth extraction and now has no teeth. No other changes in chronic conditions or new complaints since the last visit.\par \par 12/21/2022\par accompanied by her son; Reports no changes in chronic conditions or new complaints since the last visit; scheduled for another pain management injection 12/23/2022.\par \par 01/11/2023 \par accompanied by her son; Reports feeling "the same"; chronic pains - unchanged, decided not to continue with pain management as it does not provide any relieve; no changes in chronic conditions or new complaints since the last visit.\par \par 02/01/2023\par accompanied by her son; Reports no changes in her chronic conditions or new complaints since the last visit. Still with significant pruritus. Constipation improved - now has BM every other day. Still is unable to maintain sleep, but may sleep total of 6 hours during a day.\par \par 03/15/2023\par accompanied by her son; she reports her night sweats resolved; did not mention pruritus this time; no other changes in chronic conditions or new complaints since the last visit.\par \par 05/08/2023\par accompanied by her son; Reports chronic pains; no changes in chronic conditions or new complaints since the last visit.\par \par 06/21/2023\par accompanied by her son; Reports increasing BLE pedal dependent edema; chronic pains are stable and controlled on current pain management. She is here to discuss CT results. No other changes in her chronic issues.\par

## 2023-06-23 NOTE — END OF VISIT
[FreeTextEntry3] : I was physically present for the key portions of the evaluation and management service provided.  I agree with the history and physical, and plan which I have reviewed and edited where appropriate.\par \par She returns for follow-up.   Scans are stable. Will zari Signh we did speak about a treatment holiday, will check MRCP as recommended. rest of care as above. has chronnic complaitns on oxycodone for her back pain from compression fracture.

## 2023-06-23 NOTE — PHYSICAL EXAM
[Capable of only limited self care, confined to bed or chair more than 50% of waking hours] : Status 3- Capable of only limited self care, confined to bed or chair more than 50% of waking hours [Normal] : affect appropriate [de-identified] : seated in wheelchair, but does stand up by herself [de-identified] : Lungs clear [de-identified] : dependant BLE pedal pitting edema [de-identified] : L thigh lipoma [de-identified] : No rash

## 2023-06-23 NOTE — REVIEW OF SYSTEMS
[Fatigue] : fatigue [Shortness Of Breath] : shortness of breath [SOB on Exertion] : shortness of breath during exertion [Constipation] : constipation [Joint Pain] : joint pain [Muscle Weakness] : muscle weakness [Difficulty Walking] : difficulty walking [Insomnia] : insomnia [Negative] : Allergic/Immunologic [Lower Ext Edema] : lower extremity edema [Recent Change In Weight] : ~T no recent weight change [Dysphagia] : no dysphagia [Muscle Pain] : no muscle pain [Skin Rash] : no skin rash [FreeTextEntry2] : seated in wheelchair [FreeTextEntry4] : chronic nasal congestion [FreeTextEntry6] : SOB for years - unchanged [FreeTextEntry7] : occasional hemorrhoidal bleeding  [FreeTextEntry9] : chronic L sided pain [de-identified] : pruritus at her back - not today

## 2023-08-02 PROBLEM — E87.1 HYPONATREMIA: Status: ACTIVE | Noted: 2019-06-26

## 2023-08-02 PROBLEM — E87.6 HYPOKALEMIA: Status: ACTIVE | Noted: 2019-07-17

## 2023-08-02 PROBLEM — R60.0 EDEMA OF LEG: Status: ACTIVE | Noted: 2023-06-21

## 2023-08-02 PROBLEM — K83.8 DILATION OF BILIARY TRACT: Status: ACTIVE | Noted: 2023-06-21

## 2023-08-04 NOTE — END OF VISIT
[FreeTextEntry3] : I was physically present for the key portions of the evaluation and management service provided.  I agree with the history and physical, and plan which I have reviewed and edited where appropriate.  Wilian is here for follow-up.  She will proceed with Keytruda today and they decided on treatment break moving forward for her metastatic lung cancer.  We will obtain imaging in September and depending on results we will arrange follow-up.  We will monitor CAT scans every 3 to 4 months while on treatment holiday if there is progression or new symptoms we will restart treatment.  RTC will be arranged after her CAT scans in September blood work was done and reviewed

## 2023-08-04 NOTE — ASSESSMENT
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 - 05/17/2019 Started Keytruda. - 05/04/2020 Keytruda changed to 6 week dosing. - 01/13/2021 last treatment of Keytruda 400 mg every 6 weeks due to fatigue and dyspnea and her preferences.   - 03/2021 CT C/A/P were stable and we stopped Keytruda and was on observation. - 06/2021 CT showing stable findings. - 12/2021 CT C/A/P showed increase in RUL nodule, L adrenal nodule and new tubular opacity within the right upper lobe but otherwise stable.  - 12/29/2021 resumed Keytruda. - 07/09/2022 CT C/A/P with IVC shows stable findings and continued treatment response.  - 07/27/2022 the patient prefers every 3 week dosing. - 10/15/2022 CT C/A/P no progression on CT A/P awaiting CT chest addendum but likely has not progressed based on review. - 12/13/2022 DEXA - Osteopenia: The patient has low bone mass, with the lowest measured bone density in the Left Femoral Neck. The patient has an estimated ten-year risk of hip fracture of 3.1% and an estimated ten-year risk of major fracture of 11%, based on the WHO FRAX algorithm. - 02/01/2023 the patient requests Keytruda IV frequency to be changed to Q6W therefore dose will be changed to 400 mg. - 03/15/2023 in general she feels better on Keytruda Q6W. - 06/16/2023 CT C/A/P - Since 2/4/2023 Stable spiculated right apical mass in contact with pleural surface (303/35). Stable adjacent nodular opacity right upper lobe (601/73). No pleural effusions or pneumothorax. Scattered areas of parenchymal banding/scarring is noted. Increasing biliary duct dilatation since prior examination in this patient with a gallbladder. Outpatient MRI of the biliary tree with and without gadolinium and MRCP imaging is recommended as well as correlation with laboratory findings.  - 07/22/2023 MRCP - CBD distended up to 1 cm proximally, with tapering distally, without evidence of filling defect, biliary mass or stricture.  # Dysphagia and decrease appetite - better now. - on Jevity 1.2 hector BID. - followed by nutritionist, Tila Villanueva.  # SOB mainly when she tries to sleep; due to right nasal congestion maybe from Keytruda, Keytruda was stopped and SOB did not change. - on Flonase. - On Albuterol + nebulizer PRN. - She follows with Dr Lanza as indicated - Advised to use saline nasal spray as she has c/o dryness in her nose.  # Some insomnia  - on Melatonin 5 mg at bedtime.  # Pruritus, back; may be side effect of Keytruda? - recurrent. - has hydrocortisone 1% or Benadryl PRN for symptom relief if happens again. - she STOPPED gabapentin due to side effects.  # Pain at T spine right at the level of scapula with tenderness maybe from T3 compression fracture  - on oxycodone 5 mg every 8 hours as needed - Reviewed risks and benefits of narcotic consumption and prescribed dosing/frequency with patient. - will have her see neurosurgery Dr. Weaver. She may need MR T spine, but no mass is reported on CT. - 01/11/2023 decided against pain management by pain management team.  # Constipation - chronic. - reports BM every 5 days - now better with current management every other day.  08/02/2023 Labs reviewed and results discussed with the patient and her son - remains clinically stable to continue current management. The patient and her son wanted treatment holiday after this treatment.  They understand the risk of disease progression increases when the patient is given treatment holiday.  All questions were answered to satisfaction.  PLAN:  - continue Keytruda 400 mg IV Q6W (starting 02/01/2023) - C41 GIVE TODAY.  And patient will proceed with treatment holiday.  - continue oxycodone 5 mg every 8 hours as needed for pain with bowel regimen - I-STOP verified with last day filled on 06/22/2023 - refill sent to the preferred pharmacy.  - continue Lactulose, Colace and Senna for bowel regiment.  - repeat CT C/A/P with contrast - 09/2023 - orders created and given to the patient and her son.  They both found to be called with the results of the CAT scan.  - F/U with Neurosurgery for management of T3 compression fracture.  - F/U with PCP for HTN management.  - Labs today: CBC, CMP, TSH.  RTC in 6 months for MD visit and possible Keytruda treatment with CBC CMP and TSH.

## 2023-08-04 NOTE — REVIEW OF SYSTEMS
[Fatigue] : fatigue [Lower Ext Edema] : lower extremity edema [Shortness Of Breath] : shortness of breath [SOB on Exertion] : shortness of breath during exertion [Constipation] : constipation [Joint Pain] : joint pain [Muscle Weakness] : muscle weakness [Difficulty Walking] : difficulty walking [Insomnia] : insomnia [Negative] : Allergic/Immunologic [FreeTextEntry2] : seated in wheelchair [FreeTextEntry4] : chronic nasal congestion [FreeTextEntry6] : SOB for years - unchanged [FreeTextEntry7] : occasional hemorrhoidal bleeding  [FreeTextEntry9] : chronic L sided pain [de-identified] : pruritus at her back - recurrent

## 2023-08-04 NOTE — PHYSICAL EXAM
[Capable of only limited self care, confined to bed or chair more than 50% of waking hours] : Status 3- Capable of only limited self care, confined to bed or chair more than 50% of waking hours [Normal] : affect appropriate [de-identified] : seated in wheelchair, but does stand up by herself [de-identified] : Lungs clear [de-identified] : dependant BLE pedal pitting edema [de-identified] : L thigh lipoma [de-identified] : No rash

## 2023-08-04 NOTE — HISTORY OF PRESENT ILLNESS
[de-identified] : 08/08/2019  This is a 83 year old female who I initially met in [facility] on 3/28/19. She was initially admitted on 2/20/2019 for a right loculated pleural effusion approx. 900 CCs, no PE and right apical 3.1x3.0 cm mass with pleural nodules on CTA of chest. Pt returned to ED on 3/15/2019 for right pleural effusion, had a thoracentesis in ED and was sent home. She than came back for SOB  due to recurrent effusion and a Pleurx was placed. Pathology showed adenocarcinoma.   She ha lost a few pounds about  4. She never smoked. She does have weakness.  From her Pleurx she  has 500 cc removed every other day. She has pain after.  Work up: 02/20/2019 CTA chest:  Large right pleural effusion, maximal axial width of 7 cm correlating with an estimated volume of 900 cc. Associated near complete compressive atelectasis of the right lower lobe. Probable loculations of the effusion seen at the apex and at the right heart border (for example series 5 images 176, 52; series 9 image 69). 1 cm right upper lobe fissural nodule (series 5 image 139). Patent central airways. There is right apical 3.1 x 3.0 cm mass with irregular right apical pleural thickening (series 7, image 44), and multiple satellite pleural nodules including a more caudad 2.7 cm para-mediastinal nodule (series 7, image 175). Tissue sampling recommended.  PATHOLOGY: 03/25/2019 Pleural fluid: Cell block material is hypocellular and shows a single minutecluster of malignant cells.Immunohistochemistry studies were performed at [facility] and the results are noncontributory 03/22/2019: Addendum Cell block material shows macrophages (positive ),mesothelial cells (positive calretinin) and a single very minute cluster of atypical suspicious cells is negative for Fidel-Ep4.Material is insufficient for further diagnostic studies (ie, immunohistochemistry).  03/15/2019: Pleural fluid: Addendum Immunohistochemical studies were performed on the cell block at [facility], [address] [place] [address], [facility] [place][note 2[place] (see NOTE). The results are as follows: CK7-                     Rare mesothelial cells positive CK20-                   Negative CK 5/6-                 Rare mesothelial cells positive Calretinin-             Scattered mesothelial cells positive CD68-                   Numerous histiocytes positive BerEP-4-               Negative Napsin-A-             Negative TTF-1-                  Negative These results are non-specific, suggestive of mesothelialhyperplasia. The few markedly atypical cells seen in the smear are not evident in the cell block. The atypical cells seen in the current smear are not seen in the prior pleural fluid Thinprep smear or cell block ([id number]). Few marked atypical cells, suspicious for mesothelioma versus adenocarcinoma, in a background of numerous mesothelial cells, histiocytes and small lymphocytes. 03/25/2019: Final Diagnosis - POSITIVE FOR MALIGNANT CELLS. Favor metastatic adenocarcinoma. Pending cell block. I spoke to Dr. Allison  and there are only a few cells thus further studies such as IHC, molecular PD-l1 is not possible. [de-identified] : 6/5/19 She is here for follow up. Since last visit she started Keytruda. She had a PET/CT on April 17 that showed  Extensive FDG avid right-sided pleural mass/soft tissue thickening,  with max SUV 21.5 within a right apical pleural soft tissue mass. 2.  Multiple FDG avid mediastinal lymph nodes, with max SUV 16.5 within a 2.6 x 2.1 cm subcarinal node. 3.  FDG avid lytic lesion within the left superior acetabulum (max SUV 14.8), suspicious for osseous metastasis. 4.  A mildly FDG avid 12.0 cm lipomatous mass within the anterior left thigh musculature, possibly neoplastic; if clinically warranted, further evaluation may be obtained with dedicated contrast-enhanced MRI.  04/16/2019 MR brain: No mets She had CT Guided right upper lobe pleural-based nodularity 4/25: adenocarcinoma 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. Son states the amount of fluid is now less from Pleurx they drain it twice a week  some times 250 Ml sometimes less. Used to be  500 ml  u 3 times a week. Takes 60 mg of oral morphine a day 20 mg 3 times a day but pain is still severe. Has not had a bowel movement in unknown amount of days does not take laxatives although she has laxatives.   6/26/19 She is here for follow up. She is due for cycle 3 of Keytruda. She has about 300 mg drained from her pleura twice a week. Has SOB but saturated 94% on room air and 94% on ambulation. Did not tolerate the fentanyl path had nausea. On Morphine 10 mg BID doing well. Constipation is better.   7/17/19 She is here for follow-up of Stage IV lung cancer with son.  She is due for cycle 3 of Keytruda.  CBC is stable from today.  She only takes the liquid morphine at night now.  She started eating slightly more and has been feeling slightly better, as per son.  He states that the pleurex drainage is less, it is checked every Friday + Monday.  On Friday there was about 225 cc drained, but yesterday no drainage.  We will arrange for hydration today since she has had poor oral intake lately and small appetite.  Son reports she can tolerate fluids but she has early satiety with food.  Patient also reports feeling short of breath at rest, but her O2 sat is 98% on R/a.  Right sided pain is better.  07/06/2019 CT C/A/P IMPRESSION: Since April 17, 2019: 1. No definite new sites of metastatic disease. 2. Right-sided pleural soft tissue mass/thickening, overall appearing decreased since April 17, 2019 with primarily residual right apical tumor. Tumor appears to infiltrate the mediastinum.3. No significant change in mediastinal lymphadenopathy including largest 2.6 x 2.1 cm subcarinal lymph node which remains unchanged. Retrocrural 2.5 x 2.3 cm metastasis or additional site of pleural tumor, unchanged.4. Irregularity of right anterior first rib, probably invaded by tumor, stable in retrospect. Left superior acetabular lucent lesion corresponding to FDG avid bone metastasis.5. Right pelvic indeterminate 7 cm mass; possible retroverted uterus with degenerating fibroid or less likely ovarian mass; previously non-FDG avid and probably benign. Further evaluation with pelvic ultrasound or MRI pelvis with and without IV contrast may be helpful.6. Status post right chest tube placement with decreased small loculated right pleural effusion with fluid within the major fissure. 7. New mild to moderate intrahepatic biliary dilation. Correlation with LFTs recommended. Consider further evaluation with ERCP or MRCP with and without IV contrast.  8/7/19 She is here with her son. Overall she is doing much better. She is in wheelchair but now less pain, eating better and Pleurax is only draining about 25 cc twice a week, She does complain of SOB still. I explained this will improve as well but asked her to follow up with Dr. Lanza of pulmonology.   8/28/19 She is here for follow up. They are to see Dr. Lanza in Early September. There is minimal output from Pleurax. Pain is much better and does not use narcotics anymore.  Overall better. She has right side nasal congestion and states makes it hard to breath.  9/18/19 She is doing well. She is due for cycle 7. Son was not eliana to schedule CTs prior to the visit but she looks better and better every visit and thus most likely still responding. She saw Dr. Lanza who started her on Flonase. She is due to see ENT Dr. Adams.  Appetite is better. No pains. Dr. Lanza is considering to remover the Pleurx possibly in November.   10/09/2019 Patient is here for a follow up visit. Due for cycle 8 today. CT chest/abd/pelvis 09/2019 showed improvement in disease. Also has seen Dr. Adams and agreed to continue with Flonase as she has hypertrophy of turbinates.  She is tolerating Keytruda well. Her only complaint at this time is trouble in falling asleep.   10/30/19 Patient is here for a follow up visit of Stage IV lung cancer with family member. She is feeling well with no new complaints. She feels dyspneic when taking a deep breath but her pain has improved. Most recent CBC is stable. 09/22/2019 CT C/A/P showed improvement in right apical lung mass and pleural effusion and drained 150cc. Due for cycle 9 today.   12/30/19 Patient is here for a follow-up visit for  lung cancer with son. Reviewed imaging results with patient and her family, which suggests worsening effusion but otherwise stable.  They drained 500 cc from pleurx yesterday, reportedly sanguinous drainage. She is agreeable to continue with cycle 12 of Keytruda tomorrow. She reports persistent dyspnea.  We discussed that this tx regimen may be losing its responsiveness. 12/27/2019 CT C/A/P IMPRESSION: Worsening right-sided pleural effusion. Right apical spiculated nodule without difference. Unchanged appearance of the mediastinum. Unchanged appearance of the third thoracic vertebral body and right second rib. No interval change since prior examination. No change in hepatic hypodensity (likely fatty infiltration), splenic hypodensity (too small to characterize), left adrenal nodule or 1 cm retroperitoneal nodule on the right. No change in fat-containing mass in the anterior left thigh.  2/10/2020 She is here for follow-up accompanied by son. Since last visit she was admitted to hospital for pain and nausea. She had pleurx catheter removed during most recent hospitalization on 1/13/2020. She complains of insomnia but generally feels well.  Most recent scan reviewed with patient. She reports she had a rash after discharge from the hospital which has since resolved.   01/11/2020 CT Chest IMPRESSION: Since CT scan performed on 12/27/2019; 1. Right thoracostomy tube in stable position with slight interval decrease of right pleural effusion. 2.  Interval increase in right middle lobe consolidation/atelectasis. 3. Stable spiculated nodule in the right lung apex, consistent with patient's known malignancy.  She feels much better now that her Pleurx is gone. Does not have any pain. Feels dyspneic which is stable.  3/2/2020 She is here for follow-up for lung cancer accompanied by son. Since last visit, she followed with Dr. Lanza pulmonology, and Dr. Hirsch from [facility] who agreed against placing additional drainage catheters based on most recent CT imaging. She still reports some SOB. She also reports taking a [place] medication named Volidol and Panangin for here breathing. Patient reports mild dizziness after taking Trazodone; we recommended halving dose.    5/4/2020 She is here for follow-up for lung cancer accompanied by son. Her last treatment as in March due to COVID she is doing well. She was on Room air. She states SOB is the same. She has paraspinal back pain/hip pain.   6/15/2020 She is here for follow-up for lung cancer accompanied by son. Patient denies fever, chills, vomiting, worsening cough, new rash, persistent diarrhea or bleeding. Reviewed most recent imaging and had a discussion regarding continuing with Keytruda every 6 weeks for now as she is clinically stable.  She does complain of weakness and nasal congestion which improves using Fluticasone spray.  She uses oxygen intermittently at home.   05/09/2020 CT C/A/P IMPRESSION: 1. Stable spiculated right upper lobe mass measuring up to 3.1 cm.2. New areas of ground glass and reticular opacities within the medial left lung, with new subcentimeter nodular opacities within the left lower lobe. Findings may be postinfectious or postinflammatory in etiology. CT of the chest in 2-3 months is recommended. 3. Decreased mediastinal and right hilar lymphadenopathy. 4. Resolved right pleural effusion. 1. No findings of new or progressive metastatic disease in the abdomen or pelvis. 2. Indeterminate left adrenal nodule and right retroperitoneal nodule, stable in size from prior CT. 3. Partially visualized fat-containing mass in the anterior left thigh-considerations include benign and malignant neoplastic etiology. Recommend further evaluation with MRI if not already performed. 4. Sclerotic appearance of metastatic left acetabular lesion, similar in appearance to prior CT, possibly representing healing- was previously lytic on PET/CT of 4/17/2019. 5. Lobulated pelvic structure again seen, possibly uterus with fibroids. Further evaluation with pelvic ultrasound may be performed as warranted.  9/9/20 Patient is here for follow up accompanied by her son who speaks English. She has chronic SOB which as per son has been slowly getting worse. She uses O2 at home intermittently. She also uses Flonase and Albuterol inhaler. Otherwise she is mostly wheel chair bound and can walk back and forth to the bathroom by herself. Her appetite is good and weight is stable. No new bone pain/abdominal pain/ rash.   10/21/2020 She is here for follow-up for lung cancer, accompanied by son. Patient denies fever, chills, vomiting, new cough, new rash, persistent diarrhea or bleeding. She is still pending reimaging, they report they have not received authorization for scan yet. She still complains of weakness and nasal congestion, which improves using Fluticasone spray. She uses oxygen intermittently at home. She still has SOB. Her only new complaint is intermittent back pruritus. She is due for cycle 20 of Keytruda today.  12/2/2020 She is here for follow-up for lung cancer, accompanied by son. Patient denies fever, chills, vomiting, new cough, new rash, persistent diarrhea or bleeding.  She still complains of weakness, SOB and nasal congestion, uses the nebulizer once daily with occasional relief of her SOB. She is due for cycle 21 of Keytruda today. She is using Allegra and or hydrocortisone 1% cream for the pruritus. Reviewed most recent imaging which shows good treatment response.   11/14/2020 CT C/A/P IMPRESSION: Since May 9, 2020: 1. No evidence of new intrathoracic or intra-abdominal metastatic disease. 2. Unchanged 2.5 x 2 cm right upper lobe pulmonary nodule, stable with similar measurement technique. 3. Decreased subcarinal lymphadenopathy, now 1.2 cm short axis, previously 1.5 cm short axis on May 9, 2020 CT with similar measurement technique. No evidence of additional suspicious lymphadenopathy by size criteria. 4. Near complete interval resolution of left lower lobe and lingula patchy airspace opacities. Unchanged areas of groundglass and reticular opacities within the medial left lung. Findings likely postinfectious or inflammatory. 5. Unchanged sclerotic appearance of metastatic left acetabular lesion,  possibly representing healing of previously FDG avid lytic on PET/CT of 4/17/2019. 6. Unchanged, partially imaged indeterminate lipomatous mass within the anterior left thigh.  1/13/21 She is here for follow-up for lung cancer, accompanied by son. She still complains of weakness, SOB daily which is unchanged. She is due for cycle 22 of Keytruda today. She continues to use Allegra and or hydrocortisone 1% cream for the pruritus. Patient denies fever, chills, vomiting, new cough, new rash, persistent diarrhea or bleeding. She also reports musculoskeletal pain.    2/24/21 She is here with her son. She is doing well but still has same complaints. mainly fatigue and SOB as well as MSK pain behind the left scapula. We had a long talk and I explained that the fatigue and SOB maybe due to Keytruda so we decided to stop it and observe her. On exam no changes.   5/17/21 She is here for followup with her son. Her CT C/A/P in March 2021  showed Since November 14, 2020. No significant interval change approximate 2.5 cm x 2 cm right upper lobe nodule (series 4/33). Stable reticular opacities medial right upper lobe. Stable ill-defined subcarinal lymph nodes (series 4/88). Stable bibasilar subsegmental discoid atelectasis. No pleural effusions. No new sites of metastatic disease in the abdomen and pelvis. Previously described sclerotic left acetabular lesion felt to represent a healed metastasis based on prior PET/CT is poorly visualized on the current study. Unchanged, partially imaged indeterminate lipomatous mass within the anterior left thigh. Given that she been on  Keytruda now for 2 years with no progression of disease and she continued to have shortness of breath we decided to give her a treatment break. Today even well-being of treatment she still continues to have the same except complains specifically short of breath for which she had an extensive workup. She also has rhinorrhea  so she was referred to ENT again as per their request and I refilled Flonase  7/26/21 She is here for follow up. She had CT C/A/P on 6/12/21: IMPRESSION: No CT evidence of new sites of metastatic disease in the chest, abdomen and pelvis. Stable 2.5 cm right upper lobe nodule. Interval decrease in size of subcarinal lymph node. Slight interval increase in reticular and groundglass opacities left lower lobe, possibly postinfectious / postinflammatory. Unchanged, partially imaged indeterminate lipomatous mass within the anterior left thigh. CBC is normal today. She is here with her son. No new symptoms has chronic rhinorrhea and chronic SOB with non specific pain in left scapula with unclear cause that has been present for years.   11/29/21 Patient is here for follow up for stage 4 lung NSCLC. She was on Keytruda and has been on break since Feb 2021. Her last imaging in June 2021 showed stable disease. She has chronic complaints of fatigue, dyspnea, pain in her left side back and insomnia. All these complaints are chronic with no new worsening of symptoms.   2/9/22 She returns for follow-up today for NSCLC, accompanied by son who helps with translation at patient request. She is due for next cycle of immunotherapy today. She continues to have the same chronic complaints now that she always had for the last several years including unclear left-sided pain, shortness of breath, she seen multiple specialists and we are unable to exactly determine the cause of her pain but I explained that the shortness of breath as before is possibly related to her lung cancer may be resulting in decreased function from previous Pleurx catheter leading to fibrosis and decrease in lung capacity. She takes oxycodone 5mg as needed for the L sided pain. Reviewed most recent CT imaging which shows increase in RUL nodule, L adrenal nodule and new tubular opacity within the right upper lobe but otherwise stable.   12/18/2021 CT C/A/P IMPRESSION: Compared to CT chest 6/12/2021,Interval increase in size of the right upper lobe nodule now measuring 2.8 x 2.0 x 2.4 cm previously 2.5 x 2.0 x 2.0 cm. New tubular opacity within the right upper lobe.Significant interval increase in size of left adrenal nodule, now measuring 4.6 x 4.3 cm, highly suggestive of metastasis/collision tumor in this patient with history of lung cancer.  Right upper quadrant peritoneal nodules without significant change from prior. Continued attention on follow-up imaging.Partially imaged indeterminate lipomatous mass in the left thigh redemonstrated. Recommend MR evaluation.  3/23/22 She returns for follow-up today for NSCLC, accompanied by son who helps with translation at patient request. She is due for next cycle of immunotherapy today. She continues to have the same chronic complaints now that she always had for the last several years including unclear left-sided pain, shortness of breath, she seen multiple specialists and we are unable to exactly determine the cause of her pain but I explained that the shortness of breath as before is possibly related to her lung cancer may be resulting in decreased function from previous Pleurx catheter leading to fibrosis and decrease in lung capacity. She takes oxycodone 5 mg as needed for the L sided pain She has chronic SOB that she believes is due to her heart. 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 She was started on antidepressant by PCP but she is not taking it.  6/15/22 She is here for follow up. She is due for cycle 27 of Keyturda. CBC from today is  Normal. She has pain in entire left side of chest including shoulder. She takes over the counter meds son does not know then name. SOB is the same. Lungs sounded clear except for decrease sounds in RLL which is same.   7/27/22 She returns for follow-up today for NSCLC, accompanied by son who helps with Barb[place] translation at patient request.  She is due for next cycle of immunotherapy today.  She continues to have the same chronic complaints that she always had for the last several years including unclear left-sided pain, shortness of breath, she seen multiple specialists and we are unable to exactly determine the cause of her pain. She takes oxycodone 5 mg as needed for the L sided pain, requesting refill. Reviewed most recent CT imaging which shows stable findings and continued treatment response.  Patient states she feels better when she receives the immunotherapy and therefore is requesting to go back to every 3 week frequency again.   07/09/2022 CT C/A/P IMPRESSION: CHEST: Stable spiculated right apical lung mass, measuring maximally up to 3.2 cm.Previously seen right upper lobe 5 mm solid nodule is no longer visualized. Stable mediastinal prominent lymph nodes. Other previously noted pulmonary findings are difficult to examine given degree of respiratory motion. ABDOMEN/PELVIS: Interval decrease of left adrenal lesion, now measuring 2.1 x 2.1 cm (previously 3 x 2.2 cm). Decreased right upper quadrant soft tissue nodules measuring up to 0.6 cm (previously 1 cm).  8/17/22 she is here for follow up. She is due for cycle 29 of Keytruda now every 3 weeks. CBC with mild anemia only. New new complaints SOB is stable. Has pruritus on back but no rash recommended a trial of Aquaphor. States oxycodone helps with pain and night and sleeps much better.  09/07/2022 accompanied by her son; reports no changes in her generalized dull pains. She uses home remedies to "clean up her pulmonary pathways".  9/28/22 She returns for follow-up today for NSCLC, accompanied by son who helps with Barbadian translation at patient request. She is due for next cycle of immunotherapy today. She continues to have the same chronic complaints that she always had for the last several years including unclear left-sided pain, shortness of breath, she seen multiple specialists and we are unable to exactly determine the cause of her pain. She takes oxycodone 5 mg as needed for the L sided abd pain. She still struggles with pruritus so we suggested to try gabapentin at a low dose and titrate up very slowly to see if it helps, however she did not see any improvement and did not like the way the gabapentin made her feel. She has since stopped the gabapentin. Reviewed most recent CBC which shows mild, normocytic anemia with hgb 11.3g/dL.    10/19/2022 She is here for follow up and is due for Keytruda for her lung cancer. She had CT C/A/P done on 10/15/2022 but for some reason they were not compared. I reached out to radiologist and CT abd was compared and there is no progression. I am waiting for the CT chest but amee report there is no progression that I can tell by comparing the CTs. She still has same symptoms, ABRAHAM, pain in scapula area, pruritus also only at mid back between scapula. She did not tolerate gabapentin. we decided not to hold treatment for the pruritus and continue. No rash on exam at all.   11/09/2022  accompanied by her son; Reports being in pain (back) as she forgot to take her pain medications, no changes in chronic conditions or new complaints since the last visit.  11/30/2022 accompanied by her son; Reports seen by pain management and had steroid injection with minimal pain response, has F/U in 12/2022. She forgot to take her pain Rx this morning. Recently had tooth extraction and now has no teeth. No other changes in chronic conditions or new complaints since the last visit.  12/21/2022 accompanied by her son; Reports no changes in chronic conditions or new complaints since the last visit; scheduled for another pain management injection 12/23/2022.  01/11/2023  accompanied by her son; Reports feeling "the same"; chronic pains - unchanged, decided not to continue with pain management as it does not provide any relieve; no changes in chronic conditions or new complaints since the last visit.  02/01/2023 accompanied by her son; Reports no changes in her chronic conditions or new complaints since the last visit. Still with significant pruritus. Constipation improved - now has BM every other day. Still is unable to maintain sleep, but may sleep total of 6 hours during a day.  03/15/2023 accompanied by her son; she reports her night sweats resolved; did not mention pruritus this time; no other changes in chronic conditions or new complaints since the last visit.  05/08/2023 accompanied by her son; Reports chronic pains; no changes in chronic conditions or new complaints since the last visit.  06/21/2023 accompanied by her son; Reports increasing BLE pedal dependent edema; chronic pains are stable and controlled on current pain management. She is here to discuss CT results. No other changes in her chronic issues.  8/2/23 She is here for follow up. She had MRCRP and there were no lesions. CBD is 1 cm. This is incidental LFTS are normal and given her age would hold off any additional studies nor do I believe ERCP is needed at this time. CBD diameter up to 11 mm is normal given her age.  She complains of chronic pains in upper back.

## 2023-09-21 PROBLEM — Z63.4 WIDOWED: Status: ACTIVE | Noted: 2023-01-01

## 2023-09-21 PROBLEM — Z74.2 NEEDS ASSISTANCE WHILE AT HOME: Status: ACTIVE | Noted: 2023-01-01

## 2023-09-21 PROBLEM — L29.9 PRURITUS: Status: ACTIVE | Noted: 2020-10-25

## 2023-09-21 PROBLEM — Z78.9 NO FAMILY HISTORY OF CANCER: Status: ACTIVE | Noted: 2023-01-01

## 2023-10-25 PROBLEM — S22.030S: Status: ACTIVE | Noted: 2022-10-19

## 2023-11-12 NOTE — ED PROVIDER NOTE - PATIENT PORTAL LINK FT
You can access the FollowMyHealth Patient Portal offered by [facility] by registering at the following website: [email or web address]. By joining [facility]’s FollowMyHealth portal, you will also be able to view your health information using other applications (apps) compatible with our system.

## 2023-11-12 NOTE — ED PROVIDER NOTE - PHYSICAL EXAMINATION
CONST: Well appearing in NAD  EYES: PERRL, EOMI, Sclera and conjunctiva clear.   ENT: No nasal discharge. Oropharynx normal appearing  NECK: Non-tender, no meningeal signs. normal ROM. supple   CARD: Normal S1 S2; Normal rate and rhythm  RESP: Equal BS B/L, No wheezes, rhonchi or rales. No distress  GI: Soft, non-tender, non-distended. no cva tenderness. normal BS  MS: Normal ROM in all extremities. No midline spinal tenderness. pulses 2 +. no calf tenderness or swelling  SKIN: maculopapular rash to diffuse body, Warm, dry, no acute rashes. Good turgor  NEURO: A&Ox3, No focal deficits.

## 2023-11-12 NOTE — ED ADULT TRIAGE NOTE - CHIEF COMPLAINT QUOTE
PT presents to the ED c/o of allergic reaction to unknown substance. Pt has generalized hives throughout her body. Pt denies SOB, Throat itchiness, or vomiting. Pt went to PCP the other day which prescribed her benadryl. Pt states she feels hot to touch. Pt h/x of stage 4 lung CA.

## 2023-11-12 NOTE — ED PROVIDER NOTE - CLINICAL SUMMARY MEDICAL DECISION MAKING FREE TEXT BOX
88 year old female with pmhx of lung CA on chemotherapy last chemotherapy 2 weeks ago, presents to ed with itchy rash to entire body x 1 day. pt does not recall new medications/exposure. Pt denies sob, chest pain, nausea, vomiting, diarrhea, or swelling, normal phonation.     I agree with MLP exam, urticarial rash. air way intact phonating appropriately,    benadryl steroids, DC with allergy clinic

## 2023-11-12 NOTE — ED ADULT TRIAGE NOTE - MODE OF ARRIVAL
[facility] Suturegard Body: The suture ends were repeatedly re-tightened and re-clamped to achieve the desired tissue expansion.

## 2023-11-12 NOTE — ED PROVIDER NOTE - OBJECTIVE STATEMENT
88 year old female with pmhx of lung CA on chemotherapy, presents to ed with itchy rash to entire body x 1 day. pt does not recall trying anything new, or taking new medications. Pt denies sob, chest pain, nausea, vomiting, diarrhea, or swelling.

## 2023-11-12 NOTE — ED PROVIDER NOTE - NS ED ATTENDING STATEMENT MOD
This was a shared visit with the GEOVANNI. I reviewed and verified the documentation and independently performed the documented:

## 2023-11-29 PROBLEM — C79.9 METASTATIC ADENOCARCINOMA: Status: ACTIVE | Noted: 2023-01-01

## 2024-01-01 ENCOUNTER — APPOINTMENT (OUTPATIENT)
Age: 89
End: 2024-01-01

## 2024-01-01 ENCOUNTER — APPOINTMENT (OUTPATIENT)
Dept: HEMATOLOGY ONCOLOGY | Facility: CLINIC | Age: 89
End: 2024-01-01
Payer: MEDICAID

## 2024-01-01 ENCOUNTER — NON-APPOINTMENT (OUTPATIENT)
Age: 89
End: 2024-01-01

## 2024-01-01 ENCOUNTER — LABORATORY RESULT (OUTPATIENT)
Age: 89
End: 2024-01-01

## 2024-01-01 ENCOUNTER — APPOINTMENT (OUTPATIENT)
Age: 89
End: 2024-01-01
Payer: MEDICAID

## 2024-01-01 ENCOUNTER — OUTPATIENT (OUTPATIENT)
Dept: OUTPATIENT SERVICES | Facility: HOSPITAL | Age: 89
LOS: 1 days | End: 2024-01-01
Payer: MEDICAID

## 2024-01-01 ENCOUNTER — RESULT REVIEW (OUTPATIENT)
Age: 89
End: 2024-01-01

## 2024-01-01 ENCOUNTER — RX RENEWAL (OUTPATIENT)
Age: 89
End: 2024-01-01

## 2024-01-01 ENCOUNTER — OUTPATIENT (OUTPATIENT)
Dept: OUTPATIENT SERVICES | Facility: HOSPITAL | Age: 89
LOS: 1 days | End: 2024-01-01

## 2024-01-01 ENCOUNTER — APPOINTMENT (OUTPATIENT)
Dept: INFUSION THERAPY | Facility: CLINIC | Age: 89
End: 2024-01-01
Payer: MEDICAID

## 2024-01-01 ENCOUNTER — APPOINTMENT (OUTPATIENT)
Dept: GASTROENTEROLOGY | Facility: CLINIC | Age: 89
End: 2024-01-01

## 2024-01-01 VITALS — SYSTOLIC BLOOD PRESSURE: 127 MMHG | TEMPERATURE: 98 F | HEART RATE: 66 BPM | DIASTOLIC BLOOD PRESSURE: 50 MMHG

## 2024-01-01 VITALS
SYSTOLIC BLOOD PRESSURE: 103 MMHG | WEIGHT: 115 LBS | DIASTOLIC BLOOD PRESSURE: 50 MMHG | TEMPERATURE: 97.2 F | HEIGHT: 57 IN | BODY MASS INDEX: 24.81 KG/M2 | HEART RATE: 54 BPM | OXYGEN SATURATION: 99 %

## 2024-01-01 VITALS
DIASTOLIC BLOOD PRESSURE: 60 MMHG | HEART RATE: 52 BPM | TEMPERATURE: 97.9 F | SYSTOLIC BLOOD PRESSURE: 129 MMHG | RESPIRATION RATE: 16 BRPM

## 2024-01-01 VITALS
TEMPERATURE: 98.2 F | HEART RATE: 90 BPM | SYSTOLIC BLOOD PRESSURE: 126 MMHG | RESPIRATION RATE: 16 BRPM | DIASTOLIC BLOOD PRESSURE: 78 MMHG

## 2024-01-01 VITALS — SYSTOLIC BLOOD PRESSURE: 129 MMHG | DIASTOLIC BLOOD PRESSURE: 60 MMHG | TEMPERATURE: 98 F | HEART RATE: 52 BPM

## 2024-01-01 DIAGNOSIS — Z87.898 PERSONAL HISTORY OF OTHER SPECIFIED CONDITIONS: ICD-10-CM

## 2024-01-01 DIAGNOSIS — C34.91 MALIGNANT NEOPLASM OF UNSPECIFIED PART OF RIGHT BRONCHUS OR LUNG: ICD-10-CM

## 2024-01-01 DIAGNOSIS — J91.0 MALIGNANT PLEURAL EFFUSION: ICD-10-CM

## 2024-01-01 DIAGNOSIS — Z51.12 ENCOUNTER FOR ANTINEOPLASTIC IMMUNOTHERAPY: ICD-10-CM

## 2024-01-01 DIAGNOSIS — G89.3 NEOPLASM RELATED PAIN (ACUTE) (CHRONIC): ICD-10-CM

## 2024-01-01 DIAGNOSIS — Z00.00 ENCOUNTER FOR GENERAL ADULT MEDICAL EXAMINATION WITHOUT ABNORMAL FINDINGS: ICD-10-CM

## 2024-01-01 DIAGNOSIS — Z90.710 ACQUIRED ABSENCE OF BOTH CERVIX AND UTERUS: Chronic | ICD-10-CM

## 2024-01-01 DIAGNOSIS — K59.00 CONSTIPATION, UNSPECIFIED: ICD-10-CM

## 2024-01-01 DIAGNOSIS — Z00.8 ENCOUNTER FOR OTHER GENERAL EXAMINATION: ICD-10-CM

## 2024-01-01 LAB
ACTH SER-ACNC: 5.8 PG/ML
ALBUMIN SERPL ELPH-MCNC: 3 G/DL
ALBUMIN SERPL ELPH-MCNC: 3.7 G/DL
ALBUMIN SERPL ELPH-MCNC: 3.8 G/DL
ALBUMIN SERPL ELPH-MCNC: 3.9 G/DL
ALBUMIN SERPL ELPH-MCNC: 4.1 G/DL
ALP BLD-CCNC: 48 U/L
ALP BLD-CCNC: 52 U/L
ALP BLD-CCNC: 57 U/L
ALP BLD-CCNC: 60 U/L
ALP BLD-CCNC: 68 U/L
ALT SERPL-CCNC: 6 U/L
ALT SERPL-CCNC: 7 U/L
ALT SERPL-CCNC: 8 U/L
ALT SERPL-CCNC: 9 U/L
ALT SERPL-CCNC: <5 U/L
ANION GAP SERPL CALC-SCNC: 10 MMOL/L
ANION GAP SERPL CALC-SCNC: 13 MMOL/L
ANION GAP SERPL CALC-SCNC: 14 MMOL/L
ANION GAP SERPL CALC-SCNC: 7 MMOL/L
ANION GAP SERPL CALC-SCNC: 8 MMOL/L
AST SERPL-CCNC: 12 U/L
AST SERPL-CCNC: 13 U/L
AST SERPL-CCNC: 13 U/L
AST SERPL-CCNC: 15 U/L
AST SERPL-CCNC: 17 U/L
BACTERIA UR CULT: NORMAL
BASOPHILS # BLD AUTO: 0 K/UL
BASOPHILS NFR BLD AUTO: 0 %
BILIRUB SERPL-MCNC: 0.3 MG/DL
BILIRUB SERPL-MCNC: 0.4 MG/DL
BILIRUB SERPL-MCNC: 0.5 MG/DL
BUN SERPL-MCNC: 23 MG/DL
BUN SERPL-MCNC: 26 MG/DL
BUN SERPL-MCNC: 27 MG/DL
BUN SERPL-MCNC: 29 MG/DL
BUN SERPL-MCNC: 40 MG/DL
CALCIUM SERPL-MCNC: 7.6 MG/DL
CALCIUM SERPL-MCNC: 8.8 MG/DL
CALCIUM SERPL-MCNC: 9.1 MG/DL
CHLORIDE SERPL-SCNC: 107 MMOL/L
CHLORIDE SERPL-SCNC: 107 MMOL/L
CHLORIDE SERPL-SCNC: 110 MMOL/L
CHLORIDE SERPL-SCNC: 114 MMOL/L
CHLORIDE SERPL-SCNC: 114 MMOL/L
CO2 SERPL-SCNC: 13 MMOL/L
CO2 SERPL-SCNC: 17 MMOL/L
CO2 SERPL-SCNC: 23 MMOL/L
CO2 SERPL-SCNC: 25 MMOL/L
CO2 SERPL-SCNC: 26 MMOL/L
CORTICOSTEROID BIND GLOBULIN: 1.8 MG/DL
CORTIS SERPL-MCNC: 8.5 UG/DL
CORTISOL, FREE: 0.63 UG/DL
CREAT SERPL-MCNC: 1.1 MG/DL
CREAT SERPL-MCNC: 1.2 MG/DL
CREAT SERPL-MCNC: 1.2 MG/DL
CREAT SERPL-MCNC: 1.8 MG/DL
CREAT SERPL-MCNC: 1.9 MG/DL
EGFR: 25 ML/MIN/1.73M2
EGFR: 27 ML/MIN/1.73M2
EGFR: 44 ML/MIN/1.73M2
EGFR: 44 ML/MIN/1.73M2
EGFR: 48 ML/MIN/1.73M2
EOSINOPHIL # BLD AUTO: 0.16 K/UL
EOSINOPHIL NFR BLD AUTO: 3.7 %
GLUCOSE SERPL-MCNC: 109 MG/DL
GLUCOSE SERPL-MCNC: 75 MG/DL
GLUCOSE SERPL-MCNC: 95 MG/DL
GLUCOSE SERPL-MCNC: 98 MG/DL
GLUCOSE SERPL-MCNC: 99 MG/DL
HCT VFR BLD CALC: 31.6 %
HCT VFR BLD CALC: 31.6 %
HCT VFR BLD CALC: 31.7 %
HCT VFR BLD CALC: 32.6 %
HCT VFR BLD CALC: 32.8 %
HGB BLD-MCNC: 10.5 G/DL
HGB BLD-MCNC: 10.5 G/DL
HGB BLD-MCNC: 10.8 G/DL
HGB BLD-MCNC: 10.9 G/DL
HGB BLD-MCNC: 10.9 G/DL
IMM GRANULOCYTES NFR BLD AUTO: 0.5 %
LYMPHOCYTES # BLD AUTO: 1.01 K/UL
LYMPHOCYTES NFR BLD AUTO: 23.3 %
MAN DIFF?: NORMAL
MCHC RBC-ENTMCNC: 27.3 PG
MCHC RBC-ENTMCNC: 27.8 PG
MCHC RBC-ENTMCNC: 28.1 PG
MCHC RBC-ENTMCNC: 28.4 PG
MCHC RBC-ENTMCNC: 28.6 PG
MCHC RBC-ENTMCNC: 33.1 G/DL
MCHC RBC-ENTMCNC: 33.2 G/DL
MCHC RBC-ENTMCNC: 34.4 G/DL
MCV RBC AUTO: 79.4 FL
MCV RBC AUTO: 84 FL
MCV RBC AUTO: 84.5 FL
MCV RBC AUTO: 85.4 FL
MCV RBC AUTO: 86.1 FL
MONOCYTES # BLD AUTO: 0.36 K/UL
MONOCYTES NFR BLD AUTO: 8.3 %
NEUTROPHILS # BLD AUTO: 2.79 K/UL
NEUTROPHILS NFR BLD AUTO: 64.2 %
PFCX: 7.4 %
PLATELET # BLD AUTO: 129 K/UL
PLATELET # BLD AUTO: 137 K/UL
PLATELET # BLD AUTO: 160 K/UL
PLATELET # BLD AUTO: 165 K/UL
PLATELET # BLD AUTO: 172 K/UL
PMV BLD AUTO: 0 /100 WBCS
PMV BLD: 10.1 FL
PMV BLD: 10.2 FL
PMV BLD: 10.3 FL
PMV BLD: 10.3 FL
POTASSIUM SERPL-SCNC: 2.1 MMOL/L
POTASSIUM SERPL-SCNC: 2.9 MMOL/L
POTASSIUM SERPL-SCNC: 3.7 MMOL/L
POTASSIUM SERPL-SCNC: 3.8 MMOL/L
POTASSIUM SERPL-SCNC: 3.9 MMOL/L
PROT SERPL-MCNC: 4.8 G/DL
PROT SERPL-MCNC: 5.4 G/DL
PROT SERPL-MCNC: 5.9 G/DL
PROT SERPL-MCNC: 6 G/DL
PROT SERPL-MCNC: 6.3 G/DL
RBC # BLD: 3.67 M/UL
RBC # BLD: 3.7 M/UL
RBC # BLD: 3.88 M/UL
RBC # BLD: 3.88 M/UL
RBC # BLD: 3.99 M/UL
RBC # FLD: 13.3 %
RBC # FLD: 14 %
RBC # FLD: 14 %
RBC # FLD: 16 %
RBC # FLD: 18.3 %
SODIUM SERPL-SCNC: 140 MMOL/L
SODIUM SERPL-SCNC: 140 MMOL/L
SODIUM SERPL-SCNC: 141 MMOL/L
SODIUM SERPL-SCNC: 142 MMOL/L
SODIUM SERPL-SCNC: 145 MMOL/L
TSH SERPL-ACNC: 0.21 UIU/ML
TSH SERPL-ACNC: 0.22 UIU/ML
TSH SERPL-ACNC: 0.43 UIU/ML
TSH SERPL-ACNC: 0.82 UIU/ML
WBC # FLD AUTO: 3.32 K/UL
WBC # FLD AUTO: 3.74 K/UL
WBC # FLD AUTO: 3.95 K/UL
WBC # FLD AUTO: 4.34 K/UL
WBC # FLD AUTO: 4.78 K/UL

## 2024-01-01 PROCEDURE — 99214 OFFICE O/P EST MOD 30 MIN: CPT

## 2024-01-01 PROCEDURE — 96413 CHEMO IV INFUSION 1 HR: CPT

## 2024-01-01 PROCEDURE — G2211 COMPLEX E/M VISIT ADD ON: CPT | Mod: NC,1L

## 2024-01-01 PROCEDURE — 80053 COMPREHEN METABOLIC PANEL: CPT

## 2024-01-01 PROCEDURE — G2211 COMPLEX E/M VISIT ADD ON: CPT

## 2024-01-01 PROCEDURE — 71260 CT THORAX DX C+: CPT | Mod: 26

## 2024-01-01 PROCEDURE — 85027 COMPLETE CBC AUTOMATED: CPT

## 2024-01-01 PROCEDURE — 71260 CT THORAX DX C+: CPT

## 2024-01-01 PROCEDURE — 99204 OFFICE O/P NEW MOD 45 MIN: CPT

## 2024-01-01 PROCEDURE — 84443 ASSAY THYROID STIM HORMONE: CPT

## 2024-01-01 PROCEDURE — 36415 COLL VENOUS BLD VENIPUNCTURE: CPT

## 2024-01-01 PROCEDURE — 74177 CT ABD & PELVIS W/CONTRAST: CPT

## 2024-01-01 PROCEDURE — 74177 CT ABD & PELVIS W/CONTRAST: CPT | Mod: 26

## 2024-01-01 PROCEDURE — 96365 THER/PROPH/DIAG IV INF INIT: CPT

## 2024-01-01 PROCEDURE — 96366 THER/PROPH/DIAG IV INF ADDON: CPT

## 2024-01-01 RX ORDER — POLYETHYLENE GLYCOL 3350 17 G/17G
17 POWDER, FOR SOLUTION ORAL DAILY
Qty: 1 | Refills: 5 | Status: ACTIVE | COMMUNITY
Start: 2019-04-08 | End: 1900-01-01

## 2024-01-01 RX ORDER — NALOXEGOL OXALATE 12.5 MG/1
12.5 TABLET, FILM COATED ORAL DAILY
Qty: 30 | Refills: 0 | Status: ACTIVE | COMMUNITY
Start: 2024-01-01 | End: 1900-01-01

## 2024-01-01 RX ORDER — OXYCODONE HYDROCHLORIDE 20 MG/1
20 TABLET, FILM COATED, EXTENDED RELEASE ORAL
Qty: 60 | Refills: 0 | Status: ACTIVE | COMMUNITY
Start: 2023-01-01 | End: 1900-01-01

## 2024-01-01 RX ORDER — PEMBROLIZUMAB 25 MG/ML
400 INJECTION, SOLUTION INTRAVENOUS ONCE
Refills: 0 | Status: COMPLETED | OUTPATIENT
Start: 2024-01-01 | End: 2024-01-01

## 2024-01-01 RX ORDER — OXYCODONE 5 MG/1
5 TABLET ORAL EVERY 8 HOURS
Qty: 90 | Refills: 0 | Status: ACTIVE | COMMUNITY
Start: 2022-01-11 | End: 1900-01-01

## 2024-01-01 RX ORDER — FUROSEMIDE 20 MG/1
20 TABLET ORAL DAILY
Qty: 30 | Refills: 5 | Status: ACTIVE | COMMUNITY
Start: 2020-10-19 | End: 1900-01-01

## 2024-01-01 RX ORDER — LACTULOSE 10 G/15ML
10 SOLUTION ORAL 3 TIMES DAILY
Qty: 2 | Refills: 5 | Status: DISCONTINUED | COMMUNITY
Start: 2019-06-05 | End: 2024-01-01

## 2024-01-01 RX ORDER — GABAPENTIN 300 MG/1
300 CAPSULE ORAL 3 TIMES DAILY
Qty: 90 | Refills: 3 | Status: ACTIVE | COMMUNITY
Start: 2024-01-01 | End: 1900-01-01

## 2024-01-01 RX ORDER — POTASSIUM CHLORIDE 1500 MG/1
20 TABLET, EXTENDED RELEASE ORAL
Qty: 5 | Refills: 2 | Status: ACTIVE | COMMUNITY
Start: 2023-01-01 | End: 1900-01-01

## 2024-01-01 RX ORDER — CIPROFLOXACIN HYDROCHLORIDE 500 MG/1
500 TABLET, FILM COATED ORAL TWICE DAILY
Qty: 20 | Refills: 0 | Status: ACTIVE | COMMUNITY
Start: 2024-01-01 | End: 1900-01-01

## 2024-01-01 RX ADMIN — PEMBROLIZUMAB 400 MILLIGRAM(S): 25 INJECTION, SOLUTION INTRAVENOUS at 14:05

## 2024-01-01 RX ADMIN — PEMBROLIZUMAB 400 MILLIGRAM(S): 25 INJECTION, SOLUTION INTRAVENOUS at 15:15

## 2024-01-01 RX ADMIN — Medication 500 MILLILITER(S): at 13:46

## 2024-01-01 RX ADMIN — PEMBROLIZUMAB 400 MILLIGRAM(S): 25 INJECTION, SOLUTION INTRAVENOUS at 12:20

## 2024-01-01 RX ADMIN — PEMBROLIZUMAB 400 MILLIGRAM(S): 25 INJECTION, SOLUTION INTRAVENOUS at 13:34

## 2024-01-01 RX ADMIN — PEMBROLIZUMAB 400 MILLIGRAM(S): 25 INJECTION, SOLUTION INTRAVENOUS at 14:45

## 2024-01-01 RX ADMIN — PEMBROLIZUMAB 400 MILLIGRAM(S): 25 INJECTION, SOLUTION INTRAVENOUS at 12:50

## 2024-01-17 NOTE — ASSESSMENT
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 - 05/17/2019 Started Keytruda. - 05/04/2020 Keytruda changed to 6 week dosing. - 01/13/2021 last treatment of Keytruda 400 mg every 6 weeks due to fatigue and dyspnea and her preferences. - 03/2021 CT C/A/P were stable and we stopped Keytruda and was on observation. - 06/2021 CT showing stable findings. - 12/2021 CT C/A/P showed increase in RUL nodule, L adrenal nodule and new tubular opacity within the right upper lobe but otherwise stable. - 12/29/2021 resumed Keytruda. - 07/09/2022 CT C/A/P with IVC shows stable findings and continued treatment response. - 07/27/2022 the patient prefers every 3 week dosing. - 10/15/2022 CT C/A/P no progression on CT A/P awaiting CT chest addendum but likely has not progressed based on review. - 12/13/2022 DEXA - Osteopenia: The patient has low bone mass, with the lowest measured bone density in the Left Femoral Neck. The patient has an estimated ten-year risk of hip fracture of 3.1% and an estimated ten-year risk of major fracture of 11%, based on the WHO FRAX algorithm. - 02/01/2023 the patient requests Keytruda IV frequency to be changed to Q6W therefore dose will be changed to 400 mg. - 03/15/2023 in general she feels better on Keytruda Q6W. - 06/16/2023 CT C/A/P - Since 2/4/2023 Stable spiculated right apical mass in contact with pleural surface (303/35). Stable adjacent nodular opacity right upper lobe (601/73). No pleural effusions or pneumothorax. Scattered areas of parenchymal banding/scarring is noted. Increasing biliary duct dilatation since prior examination in this patient with a gallbladder. Outpatient MRI of the biliary tree with and without gadolinium and MRCP imaging is recommended as well as correlation with laboratory findings. - 07/22/2023 MRCP - CBD distended up to 1 cm proximally, with tapering distally, without evidence of filling defect, biliary mass or stricture. - 09/03/2023 Her CTs are showing progression in the apical tumor and a new 4 mm nodule in the right I went over options and favoring continuation of Keytruda and to proceed with palliative radiation for local control versus switching treatment to Tabrecta, given her meds giving mutations I do not really believe she will tolerate Tabrecta well and she would not tolerate chemotherapy and after discussion they agreed to continue Keytruda and they will see radiation oncology for local control, especially since this is oligometastatic progression. - 10/25/-30/2023 completed RT. -1/2024 CT C/A/P improvement post radiation, no progression   # Dysphagia and decrease appetite - better now. - on Jevity 1.2 hector BID. - followed by nutritionist, Tila Villanueva.  # SOB mainly when she tries to sleep; due to right nasal congestion maybe from Keytruda, Keytruda was stopped and SOB did not change. - on Flonase. - On Albuterol + nebulizer PRN. - She follows with Dr Lanza as indicated - Advised to use saline nasal spray as she has c/o dryness in her nose.  # Some insomnia - on Melatonin 5 mg at bedtime.  # Pruritus, back; may be side effect of Keytruda? - recurrent. - has hydrocortisone 1% or Benadryl PRN for symptom relief if happens again. - she STOPPED gabapentin due to side effects.  # Pain at T spine right at the level of scapula with tenderness maybe from T3 compression fracture - on oxycodone 5 mg every 8 hours as needed - Reviewed risks and benefits of narcotic consumption and prescribed dosing/frequency with patient. - will have her see neurosurgery Dr. Weaver. She may need MR T spine, but no mass is reported on CT. - 01/11/2023 decided against pain management by pain management team. - she had cortisol injections and they did not help.  # Constipation - chronic. - reports BM every 5 days - now better with current management every other day.    PLAN: - continue Keytruda 400 mg IV Q6W (starting 02/01/2023) - C45 GIVE TODAY.  - continue oxycodone 5 mg every 8 hours and on t Oxy ER 15 mg BID still does not help. Will start Gabapentin   300 mg to start once a day and to titrated up to 3 times a day this might help with pruritis.  - continue Lactulose, Colace and Senna for bowel regiment. - repeat CT C/A/P with contrast -  ~4/2024 - F/U with PCP for HTN and sore throat management - she requested ENT evaluation - referral is given in past does not complain today - Labs today: CBC, CMP, TSH. RTC in 6 weeks with CBC CMP TSH and treatment. [AdvancecareDate] : 12/06/2023

## 2024-01-17 NOTE — PHYSICAL EXAM
[Capable of only limited self care, confined to bed or chair more than 50% of waking hours] : Status 3- Capable of only limited self care, confined to bed or chair more than 50% of waking hours [Normal] : affect appropriate [de-identified] : seated in wheelchair, but does stand up by herself [de-identified] : dependant BLE pedal pitting edema [de-identified] : L thigh lipoma

## 2024-01-17 NOTE — HISTORY OF PRESENT ILLNESS
[de-identified] : 08/08/2019  This is a 83 year old female who I initially met in [facility] on 3/28/19. She was initially admitted on 2/20/2019 for a right loculated pleural effusion approx. 900 CCs, no PE and right apical 3.1x3.0 cm mass with pleural nodules on CTA of chest. Pt returned to ED on 3/15/2019 for right pleural effusion, had a thoracentesis in ED and was sent home. She than came back for SOB  due to recurrent effusion and a Pleurx was placed. Pathology showed adenocarcinoma.   She ha lost a few pounds about  4. She never smoked. She does have weakness.  From her Pleurx she  has 500 cc removed every other day. She has pain after.  Work up: 02/20/2019 CTA chest:  Large right pleural effusion, maximal axial width of 7 cm correlating with an estimated volume of 900 cc. Associated near complete compressive atelectasis of the right lower lobe. Probable loculations of the effusion seen at the apex and at the right heart border (for example series 5 images 176, 52; series 9 image 69). 1 cm right upper lobe fissural nodule (series 5 image 139). Patent central airways. There is right apical 3.1 x 3.0 cm mass with irregular right apical pleural thickening (series 7, image 44), and multiple satellite pleural nodules including a more caudad 2.7 cm para-mediastinal nodule (series 7, image 175). Tissue sampling recommended.  PATHOLOGY: 03/25/2019 Pleural fluid: Cell block material is hypocellular and shows a single minutecluster of malignant cells.Immunohistochemistry studies were performed at [facility] and the results are noncontributory 03/22/2019: Addendum Cell block material shows macrophages (positive ),mesothelial cells (positive calretinin) and a single very minute cluster of atypical suspicious cells is negative for Fidel-Ep4.Material is insufficient for further diagnostic studies (ie, immunohistochemistry).  03/15/2019: Pleural fluid: Addendum Immunohistochemical studies were performed on the cell block at [facility], [facility], [address] [place] [address], [facility] (see NOTE). The results are as follows: CK7-                     Rare mesothelial cells positive CK20-                   Negative CK 5/6-                 Rare mesothelial cells positive Calretinin-             Scattered mesothelial cells positive CD68-                   Numerous histiocytes positive BerEP-4-               Negative Napsin-A-             Negative TTF-1-                  Negative These results are non-specific, suggestive of mesothelialhyperplasia. The few markedly atypical cells seen in the smear are not evident in the cell block. The atypical cells seen in the current smear are not seen in the prior pleural fluid Thinprep smear or cell block (98-CC-). Few marked atypical cells, suspicious for mesothelioma versus adenocarcinoma, in a background of numerous mesothelial cells, histiocytes and small lymphocytes. 03/25/2019: Final Diagnosis - POSITIVE FOR MALIGNANT CELLS. Favor metastatic adenocarcinoma. Pending cell block. I spoke to Dr. Allison  and there are only a few cells thus further studies such as IHC, molecular PD-l1 is not possible. [de-identified] : 6/5/19 She is here for follow up. Since last visit she started Keytruda. She had a PET/CT on April 17 that showed  Extensive FDG avid right-sided pleural mass/soft tissue thickening,  with max SUV 21.5 within a right apical pleural soft tissue mass. 2.  Multiple FDG avid mediastinal lymph nodes, with max SUV 16.5 within a 2.6 x 2.1 cm subcarinal node. 3.  FDG avid lytic lesion within the left superior acetabulum (max SUV 14.8), suspicious for osseous metastasis. 4.  A mildly FDG avid 12.0 cm lipomatous mass within the anterior left thigh musculature, possibly neoplastic; if clinically warranted, further evaluation may be obtained with dedicated contrast-enhanced MRI. 04/16/2019 MR brain: No mets She had CT Guided right upper lobe pleural-based nodularity 4/25: adenocarcinoma 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. Son states the amount of fluid is now less from Pleurx they drain it twice a week  some times 250 Ml sometimes less. Used to be  500 ml  u 3 times a week. Takes 60 mg of oral morphine a day 20 mg 3 times a day but pain is still severe. Has not had a bowel movement in unknown amount of days does not take laxatives although she has laxatives.   6/26/19 She is here for follow up. She is due for cycle 3 of Keytruda. She has about 300 mg drained from her pleura twice a week. Has SOB but saturated 94% on room air and 94% on ambulation. Did not tolerate the fentanyl path had nausea. On Morphine 10 mg BID doing well. Constipation is better.   7/17/19 She is here for follow-up of Stage IV lung cancer with son.  She is due for cycle 3 of Keytruda.  CBC is stable from today.  She only takes the liquid morphine at night now.  She started eating slightly more and has been feeling slightly better, as per son.  He states that the pleurex drainage is less, it is checked every Friday + Monday.  On Friday there was about 225 cc drained, but yesterday no drainage.  We will arrange for hydration today since she has had poor oral intake lately and small appetite.  Son reports she can tolerate fluids but she has early satiety with food.  Patient also reports feeling short of breath at rest, but her O2 sat is 98% on R/a.  Right sided pain is better.  07/06/2019 CT C/A/P IMPRESSION: Since April 17, 2019: 1. No definite new sites of metastatic disease. 2. Right-sided pleural soft tissue mass/thickening, overall appearing decreased since April 17, 2019 with primarily residual right apical tumor. Tumor appears to infiltrate the mediastinum.3. No significant change in mediastinal lymphadenopathy including largest 2.6 x 2.1 cm subcarinal lymph node which remains unchanged. Retrocrural 2.5 x 2.3 cm metastasis or additional site of pleural tumor, unchanged.4. Irregularity of right anterior first rib, probably invaded by tumor, stable in retrospect. Left superior acetabular lucent lesion corresponding to FDG avid bone metastasis.5. Right pelvic indeterminate 7 cm mass; possible retroverted uterus with degenerating fibroid or less likely ovarian mass; previously non-FDG avid and probably benign. Further evaluation with pelvic ultrasound or MRI pelvis with and without IV contrast may be helpful.6. Status post right chest tube placement with decreased small loculated right pleural effusion with fluid within the major fissure. 7. New mild to moderate intrahepatic biliary dilation. Correlation with LFTs recommended. Consider further evaluation with ERCP or MRCP with and without IV contrast.  8/7/19 She is here with her son. Overall she is doing much better. She is in wheelchair but now less pain, eating better and Pleurax is only draining about 25 cc twice a week, She does complain of SOB still. I explained this will improve as well but asked her to follow up with Dr. Lanza of pulmonology.   8/28/19 She is here for follow up. They are to see Dr. Lanza in Early September. There is minimal output from Pleurax. Pain is much better and does not use narcotics anymore.  Overall better. She has right side nasal congestion and states makes it hard to breath.  9/18/19 She is doing well. She is due for cycle 7. Son was not eliana to schedule CTs prior to the visit but she looks better and better every visit and thus most likely still responding. She saw Dr. Lanza who started her on Flonase. She is due to see ENT Dr. Adams.  Appetite is better. No pains. Dr. Lanza is considering to remover the Pleurx possibly in November.   10/09/2019 Patient is here for a follow up visit. Due for cycle 8 today. CT chest/abd/pelvis 09/2019 showed improvement in disease. Also has seen Dr. Adams and agreed to continue with Flonase as she has hypertrophy of turbinates.  She is tolerating Keytruda well. Her only complaint at this time is trouble in falling asleep.   10/30/19 Patient is here for a follow up visit of Stage IV lung cancer with family member. She is feeling well with no new complaints. She feels dyspneic when taking a deep breath but her pain has improved. Most recent CBC is stable. 09/22/2019 CT C/A/P showed improvement in right apical lung mass and pleural effusion and drained 150cc. Due for cycle 9 today.   12/30/19 Patient is here for a follow-up visit for  lung cancer with son. Reviewed imaging results with patient and her family, which suggests worsening effusion but otherwise stable.  They drained 500 cc from pleurx yesterday, reportedly sanguinous drainage. She is agreeable to continue with cycle 12 of Keytruda tomorrow. She reports persistent dyspnea.  We discussed that this tx regimen may be losing its responsiveness. 12/27/2019 CT C/A/P IMPRESSION: Worsening right-sided pleural effusion. Right apical spiculated nodule without difference. Unchanged appearance of the mediastinum. Unchanged appearance of the third thoracic vertebral body and right second rib. No interval change since prior examination. No change in hepatic hypodensity (likely fatty infiltration), splenic hypodensity (too small to characterize), left adrenal nodule or 1 cm retroperitoneal nodule on the right. No change in fat-containing mass in the anterior left thigh.  2/10/2020 She is here for follow-up accompanied by son. Since last visit she was admitted to hospital for pain and nausea. She had pleurx catheter removed during most recent hospitalization on 1/13/2020. She complains of insomnia but generally feels well.  Most recent scan reviewed with patient. She reports she had a rash after discharge from the hospital which has since resolved.   01/11/2020 CT Chest IMPRESSION: Since CT scan performed on 12/27/2019; 1. Right thoracostomy tube in stable position with slight interval decrease of right pleural effusion. 2.  Interval increase in right middle lobe consolidation/atelectasis. 3. Stable spiculated nodule in the right lung apex, consistent with patient's known malignancy. She feels much better now that her Pleurx is gone. Does not have any pain. Feels dyspneic which is stable.  3/2/2020 She is here for follow-up for lung cancer accompanied by son. Since last visit, she followed with Dr. Lanza pulmonology, and Dr. Hirsch from [facility] who agreed against placing additional drainage catheters based on most recent CT imaging. She still reports some SOB. She also reports taking a [place] medication named Volidol and Panangin for here breathing. Patient reports mild dizziness after taking Trazodone; we recommended halving dose.    5/4/2020 She is here for follow-up for lung cancer accompanied by son. Her last treatment as in March due to COVID she is doing well. She was on Room air. She states SOB is the same. She has paraspinal back pain/hip pain.   6/15/2020 She is here for follow-up for lung cancer accompanied by son. Patient denies fever, chills, vomiting, worsening cough, new rash, persistent diarrhea or bleeding. Reviewed most recent imaging and had a discussion regarding continuing with Keytruda every 6 weeks for now as she is clinically stable.  She does complain of weakness and nasal congestion which improves using Fluticasone spray.  She uses oxygen intermittently at home.   05/09/2020 CT C/A/P IMPRESSION: 1. Stable spiculated right upper lobe mass measuring up to 3.1 cm.2. New areas of ground glass and reticular opacities within the medial left lung, with new subcentimeter nodular opacities within the left lower lobe. Findings may be postinfectious or postinflammatory in etiology. CT of the chest in 2-3 months is recommended. 3. Decreased mediastinal and right hilar lymphadenopathy. 4. Resolved right pleural effusion. 1. No findings of new or progressive metastatic disease in the abdomen or pelvis. 2. Indeterminate left adrenal nodule and right retroperitoneal nodule, stable in size from prior CT. 3. Partially visualized fat-containing mass in the anterior left thigh-considerations include benign and malignant neoplastic etiology. Recommend further evaluation with MRI if not already performed. 4. Sclerotic appearance of metastatic left acetabular lesion, similar in appearance to prior CT, possibly representing healing- was previously lytic on PET/CT of 4/17/2019. 5. Lobulated pelvic structure again seen, possibly uterus with fibroids. Further evaluation with pelvic ultrasound may be performed as warranted.  9/9/20 Patient is here for follow up accompanied by her son who speaks English. She has chronic SOB which as per son has been slowly getting worse. She uses O2 at home intermittently. She also uses Flonase and Albuterol inhaler. Otherwise she is mostly wheel chair bound and can walk back and forth to the bathroom by herself. Her appetite is good and weight is stable. No new bone pain/abdominal pain/ rash.   10/21/2020 She is here for follow-up for lung cancer, accompanied by son. Patient denies fever, chills, vomiting, new cough, new rash, persistent diarrhea or bleeding. She is still pending reimaging, they report they have not received authorization for scan yet. She still complains of weakness and nasal congestion, which improves using Fluticasone spray. She uses oxygen intermittently at home. She still has SOB. Her only new complaint is intermittent back pruritus. She is due for cycle 20 of Keytruda today.  12/2/2020 She is here for follow-up for lung cancer, accompanied by son. Patient denies fever, chills, vomiting, new cough, new rash, persistent diarrhea or bleeding.  She still complains of weakness, SOB and nasal congestion, uses the nebulizer once daily with occasional relief of her SOB. She is due for cycle 21 of Keytruda today. She is using Allegra and or hydrocortisone 1% cream for the pruritus. Reviewed most recent imaging which shows good treatment response.   11/14/2020 CT C/A/P IMPRESSION: Since May 9, 2020: 1. No evidence of new intrathoracic or intra-abdominal metastatic disease. 2. Unchanged 2.5 x 2 cm right upper lobe pulmonary nodule, stable with similar measurement technique. 3. Decreased subcarinal lymphadenopathy, now 1.2 cm short axis, previously 1.5 cm short axis on May 9, 2020 CT with similar measurement technique. No evidence of additional suspicious lymphadenopathy by size criteria. 4. Near complete interval resolution of left lower lobe and lingula patchy airspace opacities. Unchanged areas of groundglass and reticular opacities within the medial left lung. Findings likely postinfectious or inflammatory. 5. Unchanged sclerotic appearance of metastatic left acetabular lesion, possibly representing healing of previously FDG avid lytic on PET/CT of 4/17/2019. 6. Unchanged, partially imaged indeterminate lipomatous mass within the anterior left thigh.  1/13/21 She is here for follow-up for lung cancer, accompanied by son. She still complains of weakness, SOB daily which is unchanged. She is due for cycle 22 of Keytruda today. She continues to use Allegra and or hydrocortisone 1% cream for the pruritus. Patient denies fever, chills, vomiting, new cough, new rash, persistent diarrhea or bleeding. She also reports musculoskeletal pain.    2/24/21 She is here with her son. She is doing well but still has same complaints. mainly fatigue and SOB as well as MSK pain behind the left scapula. We had a long talk and I explained that the fatigue and SOB maybe due to Keytruda so we decided to stop it and observe her. On exam no changes.   5/17/21 She is here for followup with her son. Her CT C/A/P in March 2021 showed Since November 14, 2020. No significant interval change approximate 2.5 cm x 2 cm right upper lobe nodule (series 4/33). Stable reticular opacities medial right upper lobe. Stable ill-defined subcarinal lymph nodes (series 4/88). Stable bibasilar subsegmental discoid atelectasis. No pleural effusions. No new sites of metastatic disease in the abdomen and pelvis. Previously described sclerotic left acetabular lesion felt to represent a healed metastasis based on prior PET/CT is poorly visualized on the current study. Unchanged, partially imaged indeterminate lipomatous mass within the anterior left thigh. Given that she been on  Keytruda now for 2 years with no progression of disease and she continued to have shortness of breath we decided to give her a treatment break. Today even well-being of treatment she still continues to have the same except complains specifically short of breath for which she had an extensive workup. She also has rhinorrhea  so she was referred to ENT again as per their request and I refilled Flonase  7/26/21 She is here for follow up. She had CT C/A/P on 6/12/21: IMPRESSION: No CT evidence of new sites of metastatic disease in the chest, abdomen and pelvis. Stable 2.5 cm right upper lobe nodule. Interval decrease in size of subcarinal lymph node. Slight interval increase in reticular and groundglass opacities left lower lobe, possibly postinfectious / postinflammatory. Unchanged, partially imaged indeterminate lipomatous mass within the anterior left thigh. CBC is normal today. She is here with her son. No new symptoms has chronic rhinorrhea and chronic SOB with non specific pain in left scapula with unclear cause that has been present for years.   11/29/21 Patient is here for follow up for stage 4 lung NSCLC. She was on Keytruda and has been on break since Feb 2021. Her last imaging in June 2021 showed stable disease. She has chronic complaints of fatigue, dyspnea, pain in her left side back and insomnia. All these complaints are chronic with no new worsening of symptoms.   2/9/22 She returns for follow-up today for NSCLC, accompanied by son who helps with translation at patient request. She is due for next cycle of immunotherapy today. She continues to have the same chronic complaints now that she always had for the last several years including unclear left-sided pain, shortness of breath, she seen multiple specialists and we are unable to exactly determine the cause of her pain but I explained that the shortness of breath as before is possibly related to her lung cancer may be resulting in decreased function from previous Pleurx catheter leading to fibrosis and decrease in lung capacity. She takes oxycodone 5mg as needed for the L sided pain. Reviewed most recent CT imaging which shows increase in RUL nodule, L adrenal nodule and new tubular opacity within the right upper lobe but otherwise stable.   12/18/2021 CT C/A/P IMPRESSION: Compared to CT chest 6/12/2021,Interval increase in size of the right upper lobe nodule now measuring 2.8 x 2.0 x 2.4 cm previously 2.5 x 2.0 x 2.0 cm. New tubular opacity within the right upper lobe.Significant interval increase in size of left adrenal nodule, now measuring 4.6 x 4.3 cm, highly suggestive of metastasis/collision tumor in this patient with history of lung cancer.  Right upper quadrant peritoneal nodules without significant change from prior. Continued attention on follow-up imaging.Partially imaged indeterminate lipomatous mass in the left thigh redemonstrated. Recommend MR evaluation.  3/23/22 She returns for follow-up today for NSCLC, accompanied by son who helps with translation at patient request. She is due for next cycle of immunotherapy today. She continues to have the same chronic complaints now that she always had for the last several years including unclear left-sided pain, shortness of breath, she seen multiple specialists and we are unable to exactly determine the cause of her pain but I explained that the shortness of breath as before is possibly related to her lung cancer may be resulting in decreased function from previous Pleurx catheter leading to fibrosis and decrease in lung capacity. She takes oxycodone 5 mg as needed for the L sided pain She has chronic SOB that she believes is due to her heart. 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 She was started on antidepressant by PCP but she is not taking it.  6/15/22 She is here for follow up. She is due for cycle 27 of Keyturda. CBC from today is  Normal. She has pain in entire left side of chest including shoulder. She takes over the counter meds son does not know then name. SOB is the same. Lungs sounded clear except for decrease sounds in RLL which is same.   7/27/22 She returns for follow-up today for NSCLC, accompanied by son who helps with [place] translation at patient request.  She is due for next cycle of immunotherapy today.  She continues to have the same chronic complaints that she always had for the last several years including unclear left-sided pain, shortness of breath, she seen multiple specialists and we are unable to exactly determine the cause of her pain. She takes oxycodone 5 mg as needed for the L sided pain, requesting refill. Reviewed most recent CT imaging which shows stable findings and continued treatment response.  Patient states she feels better when she receives the immunotherapy and therefore is requesting to go back to every 3 week frequency again.   07/09/2022 CT C/A/P IMPRESSION: CHEST: Stable spiculated right apical lung mass, measuring maximally up to 3.2 cm.Previously seen right upper lobe 5 mm solid nodule is no longer visualized. Stable mediastinal prominent lymph nodes. Other previously noted pulmonary findings are difficult to examine given degree of respiratory motion. ABDOMEN/PELVIS: Interval decrease of left adrenal lesion, now measuring 2.1 x 2.1 cm (previously 3 x 2.2 cm). Decreased right upper quadrant soft tissue nodules measuring up to 0.6 cm (previously 1 cm).  8/17/22 she is here for follow up. She is due for cycle 29 of Keytruda now every 3 weeks. CBC with mild anemia only. New new complaints SOB is stable. Has pruritus on back but no rash recommended a trial of Aquaphor. States oxycodone helps with pain and night and sleeps much better.  09/07/2022 accompanied by her son; reports no changes in her generalized dull pains. She uses home remedies to "clean up her pulmonary pathways".  9/28/22 She returns for follow-up today for NSCLC, accompanied by son who helps with [place] translation at patient request. She is due for next cycle of immunotherapy today. She continues to have the same chronic complaints that she always had for the last several years including unclear left-sided pain, shortness of breath, she seen multiple specialists and we are unable to exactly determine the cause of her pain. She takes oxycodone 5 mg as needed for the L sided abd pain. She still struggles with pruritus so we suggested to try gabapentin at a low dose and titrate up very slowly to see if it helps, however she did not see any improvement and did not like the way the gabapentin made her feel. She has since stopped the gabapentin. Reviewed most recent CBC which shows mild, normocytic anemia with hgb 11.3g/dL.    10/19/2022 She is here for follow up and is due for Keytruda for her lung cancer. She had CT C/A/P done on 10/15/2022 but for some reason they were not compared. I reached out to radiologist and CT abd was compared and there is no progression. I am waiting for the CT chest but amee report there is no progression that I can tell by comparing the CTs. She still has same symptoms, ABRAHAM, pain in scapula area, pruritus also only at mid back between scapula. She did not tolerate gabapentin. we decided not to hold treatment for the pruritus and continue. No rash on exam at all.   11/09/2022  accompanied by her son; Reports being in pain (back) as she forgot to take her pain medications, no changes in chronic conditions or new complaints since the last visit.  11/30/2022 accompanied by her son; Reports seen by pain management and had steroid injection with minimal pain response, has F/U in 12/2022. She forgot to take her pain Rx this morning. Recently had tooth extraction and now has no teeth. No other changes in chronic conditions or new complaints since the last visit.  12/21/2022 accompanied by her son; Reports no changes in chronic conditions or new complaints since the last visit; scheduled for another pain management injection 12/23/2022.  01/11/2023  accompanied by her son; Reports feeling "the same"; chronic pains - unchanged, decided not to continue with pain management as it does not provide any relieve; no changes in chronic conditions or new complaints since the last visit.  02/01/2023 accompanied by her son; Reports no changes in her chronic conditions or new complaints since the last visit. Still with significant pruritus. Constipation improved - now has BM every other day. Still is unable to maintain sleep, but may sleep total of 6 hours during a day.  03/15/2023 accompanied by her son; she reports her night sweats resolved; did not mention pruritus this time; no other changes in chronic conditions or new complaints since the last visit.  05/08/2023 accompanied by her son; Reports chronic pains; no changes in chronic conditions or new complaints since the last visit.  06/21/2023 accompanied by her son; Reports increasing BLE pedal dependent edema; chronic pains are stable and controlled on current pain management. She is here to discuss CT results. No other changes in her chronic issues.  8/2/23 She is here for follow up. She had MRCRP and there were no lesions. CBD is 1 cm. This is incidental LFTS are normal and given her age would hold off any additional studies nor do I believe ERCP is needed at this time. CBD diameter up to 11 mm is normal given her age.  She complains of chronic pains in upper back.  9/13/23 She is here for follow up she is due for her next dose of Keyturda.  She had CT C/A/P that showed Since June 16, 2023; Interval increase in size spiculated right apical mass measuring 3.5 x 2.5 x 4.0 cm, previously 2.8 x 2.7 x 3.3 cm. New right medial upper lobe 0.4 cm solid nodule. Partially imaged anterior left hip fat-containing mass with soft tissue nodularity measuring 8.3 x 6.7 cm, unchanged. As usual she is here with her son her pruritus is better but she now has more pain in the left scapular which I could only attribute to her compression fracture they tell me oxycodone 5 mg only takes the pain off for a bit. Otherwise she is doing well.  10/25/2023 accompanied by her son; Reports her fatigue is unchanged; she is undergoing RT and today was D2 total of 5 days; She reports no need for opioids refill today; She has a sore throat and is currently on antibiotics and has F/U with PCP.  12/06/2023 accompanied by her son; Reports no changes in chronic conditions or new complaints since the last visit.  1/17/2024 She is here for follow up. She isdue for cycle 45 of Keytruda. she had CT C/A/P Jan 6 2024  IMPRESSION:  Since September 3, 2023;  Interval decrease in size right apical spiculated mass currently measuring 3.1 x 1.9 x 2.4 cm, previously 3.5 x 2.5 x 4.0 cm. Unchanged adjacent satellite nodules.  Right hilar 1.7 x 1.3 cm lymph node, stable in retrospect.  Partially imaged anterior left hip fat-containing mass with soft tissue nodularity, unchanged.  Unchanged diffuse biliary ductal dilatation.  --- End of Report --- IMPRESSION:  Since September 3, 2023;  Interval decrease in size right apical spiculated mass currently measuring 3.1 x 1.9 x 2.4 cm, previously 3.5 x 2.5 x 4.0 cm. Unchanged adjacent satellite nodules.  Right hilar 1.7 x 1.3 cm lymph node, stable in retrospect.  Partially imaged anterior left hip fat-containing mass with soft tissue nodularity, unchanged.  Unchanged diffuse biliary ductal dilatation.  IMPRESSION:  Since September 3, 2023;  Interval decrease in size right apical spiculated mass currently measuring 3.1 x 1.9 x 2.4 cm, previously 3.5 x 2.5 x 4.0 cm. Unchanged adjacent satellite nodules.  Right hilar 1.7 x 1.3 cm lymph node, stable in retrospect.  Partially imaged anterior left hip fat-containing mass with soft tissue nodularity, unchanged.  Unchanged diffuse biliary ductal dilatation.  She continues to have diffuse pain but more pain in her left scapular this does not correlate to anything anatomically despite long-acting pain medications and short acting pain medications.  She also has pruritus.  I decided to add on gabapentin 300 mg to start once a day and to titrated up to 3 times a day.  Otherwise she also has mild edema in her right ankle I do not recommend Lasix at this time recommend compression therapy and leg elevation

## 2024-01-17 NOTE — REVIEW OF SYSTEMS
[Fatigue] : fatigue [Lower Ext Edema] : lower extremity edema [Shortness Of Breath] : shortness of breath [SOB on Exertion] : shortness of breath during exertion [Constipation] : constipation [Joint Pain] : joint pain [Muscle Weakness] : muscle weakness [Difficulty Walking] : difficulty walking [Insomnia] : insomnia [Negative] : Allergic/Immunologic [FreeTextEntry2] : seated in wheelchair [FreeTextEntry4] : chronic nasal congestion and post-nasal drip [FreeTextEntry6] : SOB for years - unchanged [FreeTextEntry7] : occasional hemorrhoidal bleeding  [FreeTextEntry9] : chronic L sided pain [de-identified] : pruritus at her back - recurrent

## 2024-02-28 PROBLEM — J91.0 MALIGNANT PLEURAL EFFUSION: Status: ACTIVE | Noted: 2019-04-08

## 2024-02-28 NOTE — PHYSICAL EXAM
[Capable of only limited self care, confined to bed or chair more than 50% of waking hours] : Status 3- Capable of only limited self care, confined to bed or chair more than 50% of waking hours [Normal] : affect appropriate [de-identified] : seated in wheelchair, but does stand up by herself [de-identified] : dependant BLE pedal pitting edema [de-identified] : L thigh lipoma

## 2024-02-28 NOTE — REVIEW OF SYSTEMS
[Fatigue] : fatigue [Lower Ext Edema] : lower extremity edema [Shortness Of Breath] : shortness of breath [SOB on Exertion] : shortness of breath during exertion [Constipation] : constipation [Joint Pain] : joint pain [Muscle Weakness] : muscle weakness [Difficulty Walking] : difficulty walking [Insomnia] : insomnia [Negative] : Allergic/Immunologic [FreeTextEntry2] : seated in wheelchair [FreeTextEntry4] : chronic nasal congestion and post-nasal drip [FreeTextEntry6] : SOB for years - unchanged [FreeTextEntry7] : occasional hemorrhoidal bleeding  [FreeTextEntry9] : chronic L sided pain [de-identified] : pruritus at her back - recurrent

## 2024-02-28 NOTE — ASSESSMENT
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 - 05/17/2019 Started Keytruda. - 05/04/2020 Keytruda changed to 6 week dosing. - 01/13/2021 last treatment of Keytruda 400 mg every 6 weeks due to fatigue and dyspnea and her preferences. - 03/2021 CT C/A/P were stable and we stopped Keytruda and was on observation. - 06/2021 CT showing stable findings. - 12/2021 CT C/A/P showed increase in RUL nodule, L adrenal nodule and new tubular opacity within the right upper lobe but otherwise stable. - 12/29/2021 resumed Keytruda. - 07/09/2022 CT C/A/P with IVC shows stable findings and continued treatment response. - 07/27/2022 the patient prefers every 3 week dosing. - 10/15/2022 CT C/A/P no progression on CT A/P awaiting CT chest addendum but likely has not progressed based on review. - 12/13/2022 DEXA - Osteopenia: The patient has low bone mass, with the lowest measured bone density in the Left Femoral Neck. The patient has an estimated ten-year risk of hip fracture of 3.1% and an estimated ten-year risk of major fracture of 11%, based on the WHO FRAX algorithm. - 02/01/2023 the patient requests Keytruda IV frequency to be changed to Q6W therefore dose will be changed to 400 mg. - 03/15/2023 in general she feels better on Keytruda Q6W. - 06/16/2023 CT C/A/P - Since 2/4/2023 Stable spiculated right apical mass in contact with pleural surface (303/35). Stable adjacent nodular opacity right upper lobe (601/73). No pleural effusions or pneumothorax. Scattered areas of parenchymal banding/scarring is noted. Increasing biliary duct dilatation since prior examination in this patient with a gallbladder. Outpatient MRI of the biliary tree with and without gadolinium and MRCP imaging is recommended as well as correlation with laboratory findings. - 07/22/2023 MRCP - CBD distended up to 1 cm proximally, with tapering distally, without evidence of filling defect, biliary mass or stricture. - 09/03/2023 Her CTs are showing progression in the apical tumor and a new 4 mm nodule in the right I went over options and favoring continuation of Keytruda and to proceed with palliative radiation for local control versus switching treatment to Tabrecta, given her meds giving mutations I do not really believe she will tolerate Tabrecta well and she would not tolerate chemotherapy and after discussion they agreed to continue Keytruda and they will see radiation oncology for local control, especially since this is oligometastatic progression. - 10/25/-30/2023 completed RT. -1/2024 CT C/A/P improvement post radiation, no progression   # Dysphagia and decrease appetite - better now. - on Jevity 1.2 hector BID. - followed by nutritionist, Tila Villanueva.  # SOB mainly when she tries to sleep; due to right nasal congestion maybe from Keytruda, Keytruda was stopped and SOB did not change. - on Flonase. - On Albuterol + nebulizer PRN. - She follows with Dr Lanza as indicated - Advised to use saline nasal spray as she has c/o dryness in her nose.  # Some insomnia - on Melatonin 5 mg at bedtime.  # Pruritus, back; may be side effect of Keytruda? - recurrent. - has hydrocortisone 1% or Benadryl PRN for symptom relief if happens again. - she STOPPED gabapentin due to side effects.  # Pain at T spine right at the level of scapula with tenderness maybe from T3 compression fracture - on oxycodone 5 mg every 8 hours as needed - Reviewed risks and benefits of narcotic consumption and prescribed dosing/frequency with patient. - will have her see neurosurgery Dr. Weaver. She may need MR T spine, but no mass is reported on CT. - 01/11/2023 decided against pain management by pain management team. - she had cortisol injections and they did not help.  # Constipation - chronic. - reports BM every 5 days - now better with current management every other day.    PLAN: - continue Keytruda 400 mg IV Q6W (starting 02/01/2023) - C46 GIVE TODAY.  - continue oxycodone 5 mg every 8 hours and  increase Oxy ER  to 20 mg BID still does not help. Did not like Gabapentin   300 mg so she stopped  - continue Lactulose, Colace and Senna for bowel regiment. - repeat CT C/A/P with contrast -  ~4/2024 ordered - Labs today: CBC, CMP, TSH. RTC in 6 weeks with CBC CMP TSH and treatment and repeat imaging [AdvancecareDate] : 12/06/2023

## 2024-02-28 NOTE — HISTORY OF PRESENT ILLNESS
[de-identified] : 08/08/2019  This is a 83 year old female who I initially met in [facility] on 3/28/19. She was initially admitted on 2/20/2019 for a right loculated pleural effusion approx. 900 CCs, no PE and right apical 3.1x3.0 cm mass with pleural nodules on CTA of chest. Pt returned to ED on 3/15/2019 for right pleural effusion, had a thoracentesis in ED and was sent home. She than came back for SOB  due to recurrent effusion and a Pleurx was placed. Pathology showed adenocarcinoma.   She ha lost a few pounds about  4. She never smoked. She does have weakness.  From her Pleurx she  has 500 cc removed every other day. She has pain after.  Work up: 02/20/2019 CTA chest:  Large right pleural effusion, maximal axial width of 7 cm correlating with an estimated volume of 900 cc. Associated near complete compressive atelectasis of the right lower lobe. Probable loculations of the effusion seen at the apex and at the right heart border (for example series 5 images 176, 52; series 9 image 69). 1 cm right upper lobe fissural nodule (series 5 image 139). Patent central airways. There is right apical 3.1 x 3.0 cm mass with irregular right apical pleural thickening (series 7, image 44), and multiple satellite pleural nodules including a more caudad 2.7 cm para-mediastinal nodule (series 7, image 175). Tissue sampling recommended.  PATHOLOGY: 03/25/2019 Pleural fluid: Cell block material is hypocellular and shows a single minutecluster of malignant cells.Immunohistochemistry studies were performed at [facility] and the results are noncontributory 03/22/2019: Addendum Cell block material shows macrophages (positive ),mesothelial cells (positive calretinin) and a single very minute cluster of atypical suspicious cells is negative for Fidel-Ep4.Material is insufficient for further diagnostic studies (ie, immunohistochemistry).  03/15/2019: Pleural fluid: Addendum Immunohistochemical studies were performed on the cell block at [facility], [facility], [address] [place] [address], [facility] (see NOTE). The results are as follows: CK7-                     Rare mesothelial cells positive CK20-                   Negative CK 5/6-                 Rare mesothelial cells positive Calretinin-             Scattered mesothelial cells positive CD68-                   Numerous histiocytes positive BerEP-4-               Negative Napsin-A-             Negative TTF-1-                  Negative These results are non-specific, suggestive of mesothelialhyperplasia. The few markedly atypical cells seen in the smear are not evident in the cell block. The atypical cells seen in the current smear are not seen in the prior pleural fluid Thinprep smear or cell block (04-QT-). Few marked atypical cells, suspicious for mesothelioma versus adenocarcinoma, in a background of numerous mesothelial cells, histiocytes and small lymphocytes. 03/25/2019: Final Diagnosis - POSITIVE FOR MALIGNANT CELLS. Favor metastatic adenocarcinoma. Pending cell block. I spoke to Dr. Allison  and there are only a few cells thus further studies such as IHC, molecular PD-l1 is not possible. [de-identified] : 6/5/19 She is here for follow up. Since last visit she started Keytruda. She had a PET/CT on April 17 that showed  Extensive FDG avid right-sided pleural mass/soft tissue thickening,  with max SUV 21.5 within a right apical pleural soft tissue mass. 2.  Multiple FDG avid mediastinal lymph nodes, with max SUV 16.5 within a 2.6 x 2.1 cm subcarinal node. 3.  FDG avid lytic lesion within the left superior acetabulum (max SUV 14.8), suspicious for osseous metastasis. 4.  A mildly FDG avid 12.0 cm lipomatous mass within the anterior left thigh musculature, possibly neoplastic; if clinically warranted, further evaluation may be obtained with dedicated contrast-enhanced MRI. 04/16/2019 MR brain: No mets She had CT Guided right upper lobe pleural-based nodularity 4/25: adenocarcinoma 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. Son states the amount of fluid is now less from Pleurx they drain it twice a week  some times 250 Ml sometimes less. Used to be  500 ml  u 3 times a week. Takes 60 mg of oral morphine a day 20 mg 3 times a day but pain is still severe. Has not had a bowel movement in unknown amount of days does not take laxatives although she has laxatives.   6/26/19 She is here for follow up. She is due for cycle 3 of Keytruda. She has about 300 mg drained from her pleura twice a week. Has SOB but saturated 94% on room air and 94% on ambulation. Did not tolerate the fentanyl path had nausea. On Morphine 10 mg BID doing well. Constipation is better.   7/17/19 She is here for follow-up of Stage IV lung cancer with son.  She is due for cycle 3 of Keytruda.  CBC is stable from today.  She only takes the liquid morphine at night now.  She started eating slightly more and has been feeling slightly better, as per son.  He states that the pleurex drainage is less, it is checked every Friday + Monday.  On Friday there was about 225 cc drained, but yesterday no drainage.  We will arrange for hydration today since she has had poor oral intake lately and small appetite.  Son reports she can tolerate fluids but she has early satiety with food.  Patient also reports feeling short of breath at rest, but her O2 sat is 98% on R/a.  Right sided pain is better.  07/06/2019 CT C/A/P IMPRESSION: Since April 17, 2019: 1. No definite new sites of metastatic disease. 2. Right-sided pleural soft tissue mass/thickening, overall appearing decreased since April 17, 2019 with primarily residual right apical tumor. Tumor appears to infiltrate the mediastinum.3. No significant change in mediastinal lymphadenopathy including largest 2.6 x 2.1 cm subcarinal lymph node which remains unchanged. Retrocrural 2.5 x 2.3 cm metastasis or additional site of pleural tumor, unchanged.4. Irregularity of right anterior first rib, probably invaded by tumor, stable in retrospect. Left superior acetabular lucent lesion corresponding to FDG avid bone metastasis.5. Right pelvic indeterminate 7 cm mass; possible retroverted uterus with degenerating fibroid or less likely ovarian mass; previously non-FDG avid and probably benign. Further evaluation with pelvic ultrasound or MRI pelvis with and without IV contrast may be helpful.6. Status post right chest tube placement with decreased small loculated right pleural effusion with fluid within the major fissure. 7. New mild to moderate intrahepatic biliary dilation. Correlation with LFTs recommended. Consider further evaluation with ERCP or MRCP with and without IV contrast.  8/7/19 She is here with her son. Overall she is doing much better. She is in wheelchair but now less pain, eating better and Pleurax is only draining about 25 cc twice a week, She does complain of SOB still. I explained this will improve as well but asked her to follow up with Dr. Lanza of pulmonology.   8/28/19 She is here for follow up. They are to see Dr. Lanza in Early September. There is minimal output from Pleurax. Pain is much better and does not use narcotics anymore.  Overall better. She has right side nasal congestion and states makes it hard to breath.  9/18/19 She is doing well. She is due for cycle 7. Son was not eliana to schedule CTs prior to the visit but she looks better and better every visit and thus most likely still responding. She saw Dr. Lanza who started her on Flonase. She is due to see ENT Dr. Adams.  Appetite is better. No pains. Dr. Lanza is considering to remover the Pleurx possibly in November.   10/09/2019 Patient is here for a follow up visit. Due for cycle 8 today. CT chest/abd/pelvis 09/2019 showed improvement in disease. Also has seen Dr. Adams and agreed to continue with Flonase as she has hypertrophy of turbinates.  She is tolerating Keytruda well. Her only complaint at this time is trouble in falling asleep.   10/30/19 Patient is here for a follow up visit of Stage IV lung cancer with family member. She is feeling well with no new complaints. She feels dyspneic when taking a deep breath but her pain has improved. Most recent CBC is stable. 09/22/2019 CT C/A/P showed improvement in right apical lung mass and pleural effusion and drained 150cc. Due for cycle 9 today.   12/30/19 Patient is here for a follow-up visit for  lung cancer with son. Reviewed imaging results with patient and her family, which suggests worsening effusion but otherwise stable.  They drained 500 cc from pleurx yesterday, reportedly sanguinous drainage. She is agreeable to continue with cycle 12 of Keytruda tomorrow. She reports persistent dyspnea.  We discussed that this tx regimen may be losing its responsiveness. 12/27/2019 CT C/A/P IMPRESSION: Worsening right-sided pleural effusion. Right apical spiculated nodule without difference. Unchanged appearance of the mediastinum. Unchanged appearance of the third thoracic vertebral body and right second rib. No interval change since prior examination. No change in hepatic hypodensity (likely fatty infiltration), splenic hypodensity (too small to characterize), left adrenal nodule or 1 cm retroperitoneal nodule on the right. No change in fat-containing mass in the anterior left thigh.  2/10/2020 She is here for follow-up accompanied by son. Since last visit she was admitted to hospital for pain and nausea. She had pleurx catheter removed during most recent hospitalization on 1/13/2020. She complains of insomnia but generally feels well.  Most recent scan reviewed with patient. She reports she had a rash after discharge from the hospital which has since resolved.   01/11/2020 CT Chest IMPRESSION: Since CT scan performed on 12/27/2019; 1. Right thoracostomy tube in stable position with slight interval decrease of right pleural effusion. 2.  Interval increase in right middle lobe consolidation/atelectasis. 3. Stable spiculated nodule in the right lung apex, consistent with patient's known malignancy. She feels much better now that her Pleurx is gone. Does not have any pain. Feels dyspneic which is stable.  3/2/2020 She is here for follow-up for lung cancer accompanied by son. Since last visit, she followed with Dr. Lanza pulmonology, and Dr. Hirsch from [facility] who agreed against placing additional drainage catheters based on most recent CT imaging. She still reports some SOB. She also reports taking a [place] medication named Volidol and Panangin for here breathing. Patient reports mild dizziness after taking Trazodone; we recommended halving dose.    5/4/2020 She is here for follow-up for lung cancer accompanied by son. Her last treatment as in March due to COVID she is doing well. She was on Room air. She states SOB is the same. She has paraspinal back pain/hip pain.   6/15/2020 She is here for follow-up for lung cancer accompanied by son. Patient denies fever, chills, vomiting, worsening cough, new rash, persistent diarrhea or bleeding. Reviewed most recent imaging and had a discussion regarding continuing with Keytruda every 6 weeks for now as she is clinically stable.  She does complain of weakness and nasal congestion which improves using Fluticasone spray.  She uses oxygen intermittently at home.   05/09/2020 CT C/A/P IMPRESSION: 1. Stable spiculated right upper lobe mass measuring up to 3.1 cm.2. New areas of ground glass and reticular opacities within the medial left lung, with new subcentimeter nodular opacities within the left lower lobe. Findings may be postinfectious or postinflammatory in etiology. CT of the chest in 2-3 months is recommended. 3. Decreased mediastinal and right hilar lymphadenopathy. 4. Resolved right pleural effusion. 1. No findings of new or progressive metastatic disease in the abdomen or pelvis. 2. Indeterminate left adrenal nodule and right retroperitoneal nodule, stable in size from prior CT. 3. Partially visualized fat-containing mass in the anterior left thigh-considerations include benign and malignant neoplastic etiology. Recommend further evaluation with MRI if not already performed. 4. Sclerotic appearance of metastatic left acetabular lesion, similar in appearance to prior CT, possibly representing healing- was previously lytic on PET/CT of 4/17/2019. 5. Lobulated pelvic structure again seen, possibly uterus with fibroids. Further evaluation with pelvic ultrasound may be performed as warranted.  9/9/20 Patient is here for follow up accompanied by her son who speaks English. She has chronic SOB which as per son has been slowly getting worse. She uses O2 at home intermittently. She also uses Flonase and Albuterol inhaler. Otherwise she is mostly wheel chair bound and can walk back and forth to the bathroom by herself. Her appetite is good and weight is stable. No new bone pain/abdominal pain/ rash.   10/21/2020 She is here for follow-up for lung cancer, accompanied by son. Patient denies fever, chills, vomiting, new cough, new rash, persistent diarrhea or bleeding. She is still pending reimaging, they report they have not received authorization for scan yet. She still complains of weakness and nasal congestion, which improves using Fluticasone spray. She uses oxygen intermittently at home. She still has SOB. Her only new complaint is intermittent back pruritus. She is due for cycle 20 of Keytruda today.  12/2/2020 She is here for follow-up for lung cancer, accompanied by son. Patient denies fever, chills, vomiting, new cough, new rash, persistent diarrhea or bleeding.  She still complains of weakness, SOB and nasal congestion, uses the nebulizer once daily with occasional relief of her SOB. She is due for cycle 21 of Keytruda today. She is using Allegra and or hydrocortisone 1% cream for the pruritus. Reviewed most recent imaging which shows good treatment response.   11/14/2020 CT C/A/P IMPRESSION: Since May 9, 2020: 1. No evidence of new intrathoracic or intra-abdominal metastatic disease. 2. Unchanged 2.5 x 2 cm right upper lobe pulmonary nodule, stable with similar measurement technique. 3. Decreased subcarinal lymphadenopathy, now 1.2 cm short axis, previously 1.5 cm short axis on May 9, 2020 CT with similar measurement technique. No evidence of additional suspicious lymphadenopathy by size criteria. 4. Near complete interval resolution of left lower lobe and lingula patchy airspace opacities. Unchanged areas of groundglass and reticular opacities within the medial left lung. Findings likely postinfectious or inflammatory. 5. Unchanged sclerotic appearance of metastatic left acetabular lesion, possibly representing healing of previously FDG avid lytic on PET/CT of 4/17/2019. 6. Unchanged, partially imaged indeterminate lipomatous mass within the anterior left thigh.  1/13/21 She is here for follow-up for lung cancer, accompanied by son. She still complains of weakness, SOB daily which is unchanged. She is due for cycle 22 of Keytruda today. She continues to use Allegra and or hydrocortisone 1% cream for the pruritus. Patient denies fever, chills, vomiting, new cough, new rash, persistent diarrhea or bleeding. She also reports musculoskeletal pain.    2/24/21 She is here with her son. She is doing well but still has same complaints. mainly fatigue and SOB as well as MSK pain behind the left scapula. We had a long talk and I explained that the fatigue and SOB maybe due to Keytruda so we decided to stop it and observe her. On exam no changes.   5/17/21 She is here for followup with her son. Her CT C/A/P in March 2021 showed Since November 14, 2020. No significant interval change approximate 2.5 cm x 2 cm right upper lobe nodule (series 4/33). Stable reticular opacities medial right upper lobe. Stable ill-defined subcarinal lymph nodes (series 4/88). Stable bibasilar subsegmental discoid atelectasis. No pleural effusions. No new sites of metastatic disease in the abdomen and pelvis. Previously described sclerotic left acetabular lesion felt to represent a healed metastasis based on prior PET/CT is poorly visualized on the current study. Unchanged, partially imaged indeterminate lipomatous mass within the anterior left thigh. Given that she been on  Keytruda now for 2 years with no progression of disease and she continued to have shortness of breath we decided to give her a treatment break. Today even well-being of treatment she still continues to have the same except complains specifically short of breath for which she had an extensive workup. She also has rhinorrhea  so she was referred to ENT again as per their request and I refilled Flonase  7/26/21 She is here for follow up. She had CT C/A/P on 6/12/21: IMPRESSION: No CT evidence of new sites of metastatic disease in the chest, abdomen and pelvis. Stable 2.5 cm right upper lobe nodule. Interval decrease in size of subcarinal lymph node. Slight interval increase in reticular and groundglass opacities left lower lobe, possibly postinfectious / postinflammatory. Unchanged, partially imaged indeterminate lipomatous mass within the anterior left thigh. CBC is normal today. She is here with her son. No new symptoms has chronic rhinorrhea and chronic SOB with non specific pain in left scapula with unclear cause that has been present for years.   11/29/21 Patient is here for follow up for stage 4 lung NSCLC. She was on Keytruda and has been on break since Feb 2021. Her last imaging in June 2021 showed stable disease. She has chronic complaints of fatigue, dyspnea, pain in her left side back and insomnia. All these complaints are chronic with no new worsening of symptoms.   2/9/22 She returns for follow-up today for NSCLC, accompanied by son who helps with translation at patient request. She is due for next cycle of immunotherapy today. She continues to have the same chronic complaints now that she always had for the last several years including unclear left-sided pain, shortness of breath, she seen multiple specialists and we are unable to exactly determine the cause of her pain but I explained that the shortness of breath as before is possibly related to her lung cancer may be resulting in decreased function from previous Pleurx catheter leading to fibrosis and decrease in lung capacity. She takes oxycodone 5mg as needed for the L sided pain. Reviewed most recent CT imaging which shows increase in RUL nodule, L adrenal nodule and new tubular opacity within the right upper lobe but otherwise stable.   12/18/2021 CT C/A/P IMPRESSION: Compared to CT chest 6/12/2021,Interval increase in size of the right upper lobe nodule now measuring 2.8 x 2.0 x 2.4 cm previously 2.5 x 2.0 x 2.0 cm. New tubular opacity within the right upper lobe.Significant interval increase in size of left adrenal nodule, now measuring 4.6 x 4.3 cm, highly suggestive of metastasis/collision tumor in this patient with history of lung cancer.  Right upper quadrant peritoneal nodules without significant change from prior. Continued attention on follow-up imaging.Partially imaged indeterminate lipomatous mass in the left thigh redemonstrated. Recommend MR evaluation.  3/23/22 She returns for follow-up today for NSCLC, accompanied by son who helps with translation at patient request. She is due for next cycle of immunotherapy today. She continues to have the same chronic complaints now that she always had for the last several years including unclear left-sided pain, shortness of breath, she seen multiple specialists and we are unable to exactly determine the cause of her pain but I explained that the shortness of breath as before is possibly related to her lung cancer may be resulting in decreased function from previous Pleurx catheter leading to fibrosis and decrease in lung capacity. She takes oxycodone 5 mg as needed for the L sided pain She has chronic SOB that she believes is due to her heart. 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 She was started on antidepressant by PCP but she is not taking it.  6/15/22 She is here for follow up. She is due for cycle 27 of Keyturda. CBC from today is  Normal. She has pain in entire left side of chest including shoulder. She takes over the counter meds son does not know then name. SOB is the same. Lungs sounded clear except for decrease sounds in RLL which is same.   7/27/22 She returns for follow-up today for NSCLC, accompanied by son who helps with Malaysian translation at patient request.  She is due for next cycle of immunotherapy today.  She continues to have the same chronic complaints that she always had for the last several years including unclear left-sided pain, shortness of breath, she seen multiple specialists and we are unable to exactly determine the cause of her pain. She takes oxycodone 5 mg as needed for the L sided pain, requesting refill. Reviewed most recent CT imaging which shows stable findings and continued treatment response.  Patient states she feels better when she receives the immunotherapy and therefore is requesting to go back to every 3 week frequency again.   07/09/2022 CT C/A/P IMPRESSION: CHEST: Stable spiculated right apical lung mass, measuring maximally up to 3.2 cm.Previously seen right upper lobe 5 mm solid nodule is no longer visualized. Stable mediastinal prominent lymph nodes. Other previously noted pulmonary findings are difficult to examine given degree of respiratory motion. ABDOMEN/PELVIS: Interval decrease of left adrenal lesion, now measuring 2.1 x 2.1 cm (previously 3 x 2.2 cm). Decreased right upper quadrant soft tissue nodules measuring up to 0.6 cm (previously 1 cm).  8/17/22 she is here for follow up. She is due for cycle 29 of Keytruda now every 3 weeks. CBC with mild anemia only. New new complaints SOB is stable. Has pruritus on back but no rash recommended a trial of Aquaphor. States oxycodone helps with pain and night and sleeps much better.  09/07/2022 accompanied by her son; reports no changes in her generalized dull pains. She uses home remedies to "clean up her pulmonary pathways".  9/28/22 She returns for follow-up today for NSCLC, accompanied by son who helps with Malaysian translation at patient request. She is due for next cycle of immunotherapy today. She continues to have the same chronic complaints that she always had for the last several years including unclear left-sided pain, shortness of breath, she seen multiple specialists and we are unable to exactly determine the cause of her pain. She takes oxycodone 5 mg as needed for the L sided abd pain. She still struggles with pruritus so we suggested to try gabapentin at a low dose and titrate up very slowly to see if it helps, however she did not see any improvement and did not like the way the gabapentin made her feel. She has since stopped the gabapentin. Reviewed most recent CBC which shows mild, normocytic anemia with hgb 11.3g/dL.    10/19/2022 She is here for follow up and is due for Keytruda for her lung cancer. She had CT C/A/P done on 10/15/2022 but for some reason they were not compared. I reached out to radiologist and CT abd was compared and there is no progression. I am waiting for the CT chest but amee report there is no progression that I can tell by comparing the CTs. She still has same symptoms, ABRAHAM, pain in scapula area, pruritus also only at mid back between scapula. She did not tolerate gabapentin. we decided not to hold treatment for the pruritus and continue. No rash on exam at all.   11/09/2022  accompanied by her son; Reports being in pain (back) as she forgot to take her pain medications, no changes in chronic conditions or new complaints since the last visit.  11/30/2022 accompanied by her son; Reports seen by pain management and had steroid injection with minimal pain response, has F/U in 12/2022. She forgot to take her pain Rx this morning. Recently had tooth extraction and now has no teeth. No other changes in chronic conditions or new complaints since the last visit.  12/21/2022 accompanied by her son; Reports no changes in chronic conditions or new complaints since the last visit; scheduled for another pain management injection 12/23/2022.  01/11/2023  accompanied by her son; Reports feeling "the same"; chronic pains - unchanged, decided not to continue with pain management as it does not provide any relieve; no changes in chronic conditions or new complaints since the last visit.  02/01/2023 accompanied by her son; Reports no changes in her chronic conditions or new complaints since the last visit. Still with significant pruritus. Constipation improved - now has BM every other day. Still is unable to maintain sleep, but may sleep total of 6 hours during a day.  03/15/2023 accompanied by her son; she reports her night sweats resolved; did not mention pruritus this time; no other changes in chronic conditions or new complaints since the last visit.  05/08/2023 accompanied by her son; Reports chronic pains; no changes in chronic conditions or new complaints since the last visit.  06/21/2023 accompanied by her son; Reports increasing BLE pedal dependent edema; chronic pains are stable and controlled on current pain management. She is here to discuss CT results. No other changes in her chronic issues.  8/2/23 She is here for follow up. She had MRCRP and there were no lesions. CBD is 1 cm. This is incidental LFTS are normal and given her age would hold off any additional studies nor do I believe ERCP is needed at this time. CBD diameter up to 11 mm is normal given her age.  She complains of chronic pains in upper back.  9/13/23 She is here for follow up she is due for her next dose of Keyturda.  She had CT C/A/P that showed Since June 16, 2023; Interval increase in size spiculated right apical mass measuring 3.5 x 2.5 x 4.0 cm, previously 2.8 x 2.7 x 3.3 cm. New right medial upper lobe 0.4 cm solid nodule. Partially imaged anterior left hip fat-containing mass with soft tissue nodularity measuring 8.3 x 6.7 cm, unchanged. As usual she is here with her son her pruritus is better but she now has more pain in the left scapular which I could only attribute to her compression fracture they tell me oxycodone 5 mg only takes the pain off for a bit. Otherwise she is doing well.  10/25/2023 accompanied by her son; Reports her fatigue is unchanged; she is undergoing RT and today was D2 total of 5 days; She reports no need for opioids refill today; She has a sore throat and is currently on antibiotics and has F/U with PCP.  12/06/2023 accompanied by her son; Reports no changes in chronic conditions or new complaints since the last visit.  1/17/2024 She is here for follow up. She isdue for cycle 45 of Keytruda. she had CT C/A/P Jan 6 2024  IMPRESSION:  Since September 3, 2023;  Interval decrease in size right apical spiculated mass currently measuring 3.1 x 1.9 x 2.4 cm, previously 3.5 x 2.5 x 4.0 cm. Unchanged adjacent satellite nodules.  Right hilar 1.7 x 1.3 cm lymph node, stable in retrospect.  Partially imaged anterior left hip fat-containing mass with soft tissue nodularity, unchanged.  Unchanged diffuse biliary ductal dilatation.  --- End of Report --- IMPRESSION:  Since September 3, 2023;  Interval decrease in size right apical spiculated mass currently measuring 3.1 x 1.9 x 2.4 cm, previously 3.5 x 2.5 x 4.0 cm. Unchanged adjacent satellite nodules.  Right hilar 1.7 x 1.3 cm lymph node, stable in retrospect.  Partially imaged anterior left hip fat-containing mass with soft tissue nodularity, unchanged.  Unchanged diffuse biliary ductal dilatation.  IMPRESSION:  Since September 3, 2023;  Interval decrease in size right apical spiculated mass currently measuring 3.1 x 1.9 x 2.4 cm, previously 3.5 x 2.5 x 4.0 cm. Unchanged adjacent satellite nodules.  Right hilar 1.7 x 1.3 cm lymph node, stable in retrospect.  Partially imaged anterior left hip fat-containing mass with soft tissue nodularity, unchanged.  Unchanged diffuse biliary ductal dilatation.  She continues to have diffuse pain but more pain in her left scapular this does not correlate to anything anatomically despite long-acting pain medications and short acting pain medications.  She also has pruritus.  I decided to add on gabapentin 300 mg to start once a day and to titrated up to 3 times a day.  Otherwise she also has mild edema in her right ankle I do not recommend Lasix at this time recommend compression therapy and leg elevation  2/28/24 She is here for follow up. She is with her son continues to have the same complaint which is the pain between the scapula sometimes of the areas states that oxy Contin is not helping requesting a higher dose I had a seen already multiple specialist because still remains somewhat unclear no other complaints

## 2024-04-17 NOTE — REVIEW OF SYSTEMS
[Fatigue] : fatigue [Lower Ext Edema] : lower extremity edema [Shortness Of Breath] : shortness of breath [SOB on Exertion] : shortness of breath during exertion [Constipation] : constipation [Joint Pain] : joint pain [Muscle Weakness] : muscle weakness [Difficulty Walking] : difficulty walking [Insomnia] : insomnia [Negative] : Allergic/Immunologic [FreeTextEntry2] : seated in wheelchair [FreeTextEntry4] : chronic nasal congestion and post-nasal drip [FreeTextEntry6] : SOB for years - unchanged [FreeTextEntry7] : occasional hemorrhoidal bleeding  [FreeTextEntry9] : chronic L sided pain [de-identified] : pruritus at her back - recurrent

## 2024-04-17 NOTE — HISTORY OF PRESENT ILLNESS
[de-identified] : 08/08/2019  This is a 83 year old female who I initially met in [facility] on 3/28/19. She was initially admitted on 2/20/2019 for a right loculated pleural effusion approx. 900 CCs, no PE and right apical 3.1x3.0 cm mass with pleural nodules on CTA of chest. Pt returned to ED on 3/15/2019 for right pleural effusion, had a thoracentesis in ED and was sent home. She than came back for SOB  due to recurrent effusion and a Pleurx was placed. Pathology showed adenocarcinoma.   She ha lost a few pounds about  4. She never smoked. She does have weakness.  From her Pleurx she  has 500 cc removed every other day. She has pain after.  Work up: 02/20/2019 CTA chest:  Large right pleural effusion, maximal axial width of 7 cm correlating with an estimated volume of 900 cc. Associated near complete compressive atelectasis of the right lower lobe. Probable loculations of the effusion seen at the apex and at the right heart border (for example series 5 images 176, 52; series 9 image 69). 1 cm right upper lobe fissural nodule (series 5 image 139). Patent central airways. There is right apical 3.1 x 3.0 cm mass with irregular right apical pleural thickening (series 7, image 44), and multiple satellite pleural nodules including a more caudad 2.7 cm para-mediastinal nodule (series 7, image 175). Tissue sampling recommended.  PATHOLOGY: 03/25/2019 Pleural fluid: Cell block material is hypocellular and shows a single minutecluster of malignant cells.Immunohistochemistry studies were performed at [facility] and the results are noncontributory 03/22/2019: Addendum Cell block material shows macrophages (positive ),mesothelial cells (positive calretinin) and a single very minute cluster of atypical suspicious cells is negative for Fidel-Ep4.Material is insufficient for further diagnostic studies (ie, immunohistochemistry).  03/15/2019: Pleural fluid: Addendum Immunohistochemical studies were performed on the cell block at [facility], [facility], [address] [place] [address], [facility] (see NOTE). The results are as follows: CK7-                     Rare mesothelial cells positive CK20-                   Negative CK 5/6-                 Rare mesothelial cells positive Calretinin-             Scattered mesothelial cells positive CD68-                   Numerous histiocytes positive BerEP-4-               Negative Napsin-A-             Negative TTF-1-                  Negative These results are non-specific, suggestive of mesothelialhyperplasia. The few markedly atypical cells seen in the smear are not evident in the cell block. The atypical cells seen in the current smear are not seen in the prior pleural fluid Thinprep smear or cell block (35-UK-). Few marked atypical cells, suspicious for mesothelioma versus adenocarcinoma, in a background of numerous mesothelial cells, histiocytes and small lymphocytes. 03/25/2019: Final Diagnosis - POSITIVE FOR MALIGNANT CELLS. Favor metastatic adenocarcinoma. Pending cell block. I spoke to Dr. Allison  and there are only a few cells thus further studies such as IHC, molecular PD-l1 is not possible. [de-identified] : 6/5/19 She is here for follow up. Since last visit she started Keytruda. She had a PET/CT on April 17 that showed  Extensive FDG avid right-sided pleural mass/soft tissue thickening,  with max SUV 21.5 within a right apical pleural soft tissue mass. 2.  Multiple FDG avid mediastinal lymph nodes, with max SUV 16.5 within a 2.6 x 2.1 cm subcarinal node. 3.  FDG avid lytic lesion within the left superior acetabulum (max SUV 14.8), suspicious for osseous metastasis. 4.  A mildly FDG avid 12.0 cm lipomatous mass within the anterior left thigh musculature, possibly neoplastic; if clinically warranted, further evaluation may be obtained with dedicated contrast-enhanced MRI. 04/16/2019 MR brain: No mets She had CT Guided right upper lobe pleural-based nodularity 4/25: adenocarcinoma 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. Son states the amount of fluid is now less from Pleurx they drain it twice a week  some times 250 Ml sometimes less. Used to be  500 ml  u 3 times a week. Takes 60 mg of oral morphine a day 20 mg 3 times a day but pain is still severe. Has not had a bowel movement in unknown amount of days does not take laxatives although she has laxatives.   6/26/19 She is here for follow up. She is due for cycle 3 of Keytruda. She has about 300 mg drained from her pleura twice a week. Has SOB but saturated 94% on room air and 94% on ambulation. Did not tolerate the fentanyl path had nausea. On Morphine 10 mg BID doing well. Constipation is better.   7/17/19 She is here for follow-up of Stage IV lung cancer with son.  She is due for cycle 3 of Keytruda.  CBC is stable from today.  She only takes the liquid morphine at night now.  She started eating slightly more and has been feeling slightly better, as per son.  He states that the pleurex drainage is less, it is checked every Friday + Monday.  On Friday there was about 225 cc drained, but yesterday no drainage.  We will arrange for hydration today since she has had poor oral intake lately and small appetite.  Son reports she can tolerate fluids but she has early satiety with food.  Patient also reports feeling short of breath at rest, but her O2 sat is 98% on R/a.  Right sided pain is better.  07/06/2019 CT C/A/P IMPRESSION: Since April 17, 2019: 1. No definite new sites of metastatic disease. 2. Right-sided pleural soft tissue mass/thickening, overall appearing decreased since April 17, 2019 with primarily residual right apical tumor. Tumor appears to infiltrate the mediastinum.3. No significant change in mediastinal lymphadenopathy including largest 2.6 x 2.1 cm subcarinal lymph node which remains unchanged. Retrocrural 2.5 x 2.3 cm metastasis or additional site of pleural tumor, unchanged.4. Irregularity of right anterior first rib, probably invaded by tumor, stable in retrospect. Left superior acetabular lucent lesion corresponding to FDG avid bone metastasis.5. Right pelvic indeterminate 7 cm mass; possible retroverted uterus with degenerating fibroid or less likely ovarian mass; previously non-FDG avid and probably benign. Further evaluation with pelvic ultrasound or MRI pelvis with and without IV contrast may be helpful.6. Status post right chest tube placement with decreased small loculated right pleural effusion with fluid within the major fissure. 7. New mild to moderate intrahepatic biliary dilation. Correlation with LFTs recommended. Consider further evaluation with ERCP or MRCP with and without IV contrast.  8/7/19 She is here with her son. Overall she is doing much better. She is in wheelchair but now less pain, eating better and Pleurax is only draining about 25 cc twice a week, She does complain of SOB still. I explained this will improve as well but asked her to follow up with Dr. Lanza of pulmonology.   8/28/19 She is here for follow up. They are to see Dr. Lanza in Early September. There is minimal output from Pleurax. Pain is much better and does not use narcotics anymore.  Overall better. She has right side nasal congestion and states makes it hard to breath.  9/18/19 She is doing well. She is due for cycle 7. Son was not eliana to schedule CTs prior to the visit but she looks better and better every visit and thus most likely still responding. She saw Dr. Lanza who started her on Flonase. She is due to see ENT Dr. Adams.  Appetite is better. No pains. Dr. Lanza is considering to remover the Pleurx possibly in November.   10/09/2019 Patient is here for a follow up visit. Due for cycle 8 today. CT chest/abd/pelvis 09/2019 showed improvement in disease. Also has seen Dr. Adams and agreed to continue with Flonase as she has hypertrophy of turbinates.  She is tolerating Keytruda well. Her only complaint at this time is trouble in falling asleep.   10/30/19 Patient is here for a follow up visit of Stage IV lung cancer with family member. She is feeling well with no new complaints. She feels dyspneic when taking a deep breath but her pain has improved. Most recent CBC is stable. 09/22/2019 CT C/A/P showed improvement in right apical lung mass and pleural effusion and drained 150cc. Due for cycle 9 today.   12/30/19 Patient is here for a follow-up visit for  lung cancer with son. Reviewed imaging results with patient and her family, which suggests worsening effusion but otherwise stable.  They drained 500 cc from pleurx yesterday, reportedly sanguinous drainage. She is agreeable to continue with cycle 12 of Keytruda tomorrow. She reports persistent dyspnea.  We discussed that this tx regimen may be losing its responsiveness. 12/27/2019 CT C/A/P IMPRESSION: Worsening right-sided pleural effusion. Right apical spiculated nodule without difference. Unchanged appearance of the mediastinum. Unchanged appearance of the third thoracic vertebral body and right second rib. No interval change since prior examination. No change in hepatic hypodensity (likely fatty infiltration), splenic hypodensity (too small to characterize), left adrenal nodule or 1 cm retroperitoneal nodule on the right. No change in fat-containing mass in the anterior left thigh.  2/10/2020 She is here for follow-up accompanied by son. Since last visit she was admitted to hospital for pain and nausea. She had pleurx catheter removed during most recent hospitalization on 1/13/2020. She complains of insomnia but generally feels well.  Most recent scan reviewed with patient. She reports she had a rash after discharge from the hospital which has since resolved.   01/11/2020 CT Chest IMPRESSION: Since CT scan performed on 12/27/2019; 1. Right thoracostomy tube in stable position with slight interval decrease of right pleural effusion. 2.  Interval increase in right middle lobe consolidation/atelectasis. 3. Stable spiculated nodule in the right lung apex, consistent with patient's known malignancy. She feels much better now that her Pleurx is gone. Does not have any pain. Feels dyspneic which is stable.  3/2/2020 She is here for follow-up for lung cancer accompanied by son. Since last visit, she followed with Dr. Lanza pulmonology, and Dr. Hirsch from [facility] who agreed against placing additional drainage catheters based on most recent CT imaging. She still reports some SOB. She also reports taking a Russian medication named Volidol and Panangin for here breathing. Patient reports mild dizziness after taking Trazodone; we recommended halving dose.    5/4/2020 She is here for follow-up for lung cancer accompanied by son. Her last treatment as in March due to COVID she is doing well. She was on Room air. She states SOB is the same. She has paraspinal back pain/hip pain.   6/15/2020 She is here for follow-up for lung cancer accompanied by son. Patient denies fever, chills, vomiting, worsening cough, new rash, persistent diarrhea or bleeding. Reviewed most recent imaging and had a discussion regarding continuing with Keytruda every 6 weeks for now as she is clinically stable.  She does complain of weakness and nasal congestion which improves using Fluticasone spray.  She uses oxygen intermittently at home.   05/09/2020 CT C/A/P IMPRESSION: 1. Stable spiculated right upper lobe mass measuring up to 3.1 cm.2. New areas of ground glass and reticular opacities within the medial left lung, with new subcentimeter nodular opacities within the left lower lobe. Findings may be postinfectious or postinflammatory in etiology. CT of the chest in 2-3 months is recommended. 3. Decreased mediastinal and right hilar lymphadenopathy. 4. Resolved right pleural effusion. 1. No findings of new or progressive metastatic disease in the abdomen or pelvis. 2. Indeterminate left adrenal nodule and right retroperitoneal nodule, stable in size from prior CT. 3. Partially visualized fat-containing mass in the anterior left thigh-considerations include benign and malignant neoplastic etiology. Recommend further evaluation with MRI if not already performed. 4. Sclerotic appearance of metastatic left acetabular lesion, similar in appearance to prior CT, possibly representing healing- was previously lytic on PET/CT of 4/17/2019. 5. Lobulated pelvic structure again seen, possibly uterus with fibroids. Further evaluation with pelvic ultrasound may be performed as warranted.  9/9/20 Patient is here for follow up accompanied by her son who speaks English. She has chronic SOB which as per son has been slowly getting worse. She uses O2 at home intermittently. She also uses Flonase and Albuterol inhaler. Otherwise she is mostly wheel chair bound and can walk back and forth to the bathroom by herself. Her appetite is good and weight is stable. No new bone pain/abdominal pain/ rash.   10/21/2020 She is here for follow-up for lung cancer, accompanied by son. Patient denies fever, chills, vomiting, new cough, new rash, persistent diarrhea or bleeding. She is still pending reimaging, they report they have not received authorization for scan yet. She still complains of weakness and nasal congestion, which improves using Fluticasone spray. She uses oxygen intermittently at home. She still has SOB. Her only new complaint is intermittent back pruritus. She is due for cycle 20 of Keytruda today.  12/2/2020 She is here for follow-up for lung cancer, accompanied by son. Patient denies fever, chills, vomiting, new cough, new rash, persistent diarrhea or bleeding.  She still complains of weakness, SOB and nasal congestion, uses the nebulizer once daily with occasional relief of her SOB. She is due for cycle 21 of Keytruda today. She is using Allegra and or hydrocortisone 1% cream for the pruritus. Reviewed most recent imaging which shows good treatment response.   11/14/2020 CT C/A/P IMPRESSION: Since May 9, 2020: 1. No evidence of new intrathoracic or intra-abdominal metastatic disease. 2. Unchanged 2.5 x 2 cm right upper lobe pulmonary nodule, stable with similar measurement technique. 3. Decreased subcarinal lymphadenopathy, now 1.2 cm short axis, previously 1.5 cm short axis on May 9, 2020 CT with similar measurement technique. No evidence of additional suspicious lymphadenopathy by size criteria. 4. Near complete interval resolution of left lower lobe and lingula patchy airspace opacities. Unchanged areas of groundglass and reticular opacities within the medial left lung. Findings likely postinfectious or inflammatory. 5. Unchanged sclerotic appearance of metastatic left acetabular lesion, possibly representing healing of previously FDG avid lytic on PET/CT of 4/17/2019. 6. Unchanged, partially imaged indeterminate lipomatous mass within the anterior left thigh.  1/13/21 She is here for follow-up for lung cancer, accompanied by son. She still complains of weakness, SOB daily which is unchanged. She is due for cycle 22 of Keytruda today. She continues to use Allegra and or hydrocortisone 1% cream for the pruritus. Patient denies fever, chills, vomiting, new cough, new rash, persistent diarrhea or bleeding. She also reports musculoskeletal pain.    2/24/21 She is here with her son. She is doing well but still has same complaints. mainly fatigue and SOB as well as MSK pain behind the left scapula. We had a long talk and I explained that the fatigue and SOB maybe due to Keytruda so we decided to stop it and observe her. On exam no changes.   5/17/21 She is here for followup with her son. Her CT C/A/P in March 2021 showed Since November 14, 2020. No significant interval change approximate 2.5 cm x 2 cm right upper lobe nodule (series 4/33). Stable reticular opacities medial right upper lobe. Stable ill-defined subcarinal lymph nodes (series 4/88). Stable bibasilar subsegmental discoid atelectasis. No pleural effusions. No new sites of metastatic disease in the abdomen and pelvis. Previously described sclerotic left acetabular lesion felt to represent a healed metastasis based on prior PET/CT is poorly visualized on the current study. Unchanged, partially imaged indeterminate lipomatous mass within the anterior left thigh. Given that she been on  Keytruda now for 2 years with no progression of disease and she continued to have shortness of breath we decided to give her a treatment break. Today even well-being of treatment she still continues to have the same except complains specifically short of breath for which she had an extensive workup. She also has rhinorrhea  so she was referred to ENT again as per their request and I refilled Flonase  7/26/21 She is here for follow up. She had CT C/A/P on 6/12/21: IMPRESSION: No CT evidence of new sites of metastatic disease in the chest, abdomen and pelvis. Stable 2.5 cm right upper lobe nodule. Interval decrease in size of subcarinal lymph node. Slight interval increase in reticular and groundglass opacities left lower lobe, possibly postinfectious / postinflammatory. Unchanged, partially imaged indeterminate lipomatous mass within the anterior left thigh. CBC is normal today. She is here with her son. No new symptoms has chronic rhinorrhea and chronic SOB with non specific pain in left scapula with unclear cause that has been present for years.   11/29/21 Patient is here for follow up for stage 4 lung NSCLC. She was on Keytruda and has been on break since Feb 2021. Her last imaging in June 2021 showed stable disease. She has chronic complaints of fatigue, dyspnea, pain in her left side back and insomnia. All these complaints are chronic with no new worsening of symptoms.   2/9/22 She returns for follow-up today for NSCLC, accompanied by son who helps with translation at patient request. She is due for next cycle of immunotherapy today. She continues to have the same chronic complaints now that she always had for the last several years including unclear left-sided pain, shortness of breath, she seen multiple specialists and we are unable to exactly determine the cause of her pain but I explained that the shortness of breath as before is possibly related to her lung cancer may be resulting in decreased function from previous Pleurx catheter leading to fibrosis and decrease in lung capacity. She takes oxycodone 5mg as needed for the L sided pain. Reviewed most recent CT imaging which shows increase in RUL nodule, L adrenal nodule and new tubular opacity within the right upper lobe but otherwise stable.   12/18/2021 CT C/A/P IMPRESSION: Compared to CT chest 6/12/2021,Interval increase in size of the right upper lobe nodule now measuring 2.8 x 2.0 x 2.4 cm previously 2.5 x 2.0 x 2.0 cm. New tubular opacity within the right upper lobe.Significant interval increase in size of left adrenal nodule, now measuring 4.6 x 4.3 cm, highly suggestive of metastasis/collision tumor in this patient with history of lung cancer.  Right upper quadrant peritoneal nodules without significant change from prior. Continued attention on follow-up imaging.Partially imaged indeterminate lipomatous mass in the left thigh redemonstrated. Recommend MR evaluation.  3/23/22 She returns for follow-up today for NSCLC, accompanied by son who helps with translation at patient request. She is due for next cycle of immunotherapy today. She continues to have the same chronic complaints now that she always had for the last several years including unclear left-sided pain, shortness of breath, she seen multiple specialists and we are unable to exactly determine the cause of her pain but I explained that the shortness of breath as before is possibly related to her lung cancer may be resulting in decreased function from previous Pleurx catheter leading to fibrosis and decrease in lung capacity. She takes oxycodone 5 mg as needed for the L sided pain She has chronic SOB that she believes is due to her heart. 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 She was started on antidepressant by PCP but she is not taking it.  6/15/22 She is here for follow up. She is due for cycle 27 of Keyturda. CBC from today is  Normal. She has pain in entire left side of chest including shoulder. She takes over the counter meds son does not know then name. SOB is the same. Lungs sounded clear except for decrease sounds in RLL which is same.   7/27/22 She returns for follow-up today for NSCLC, accompanied by son who helps with American translation at patient request.  She is due for next cycle of immunotherapy today.  She continues to have the same chronic complaints that she always had for the last several years including unclear left-sided pain, shortness of breath, she seen multiple specialists and we are unable to exactly determine the cause of her pain. She takes oxycodone 5 mg as needed for the L sided pain, requesting refill. Reviewed most recent CT imaging which shows stable findings and continued treatment response.  Patient states she feels better when she receives the immunotherapy and therefore is requesting to go back to every 3 week frequency again.   07/09/2022 CT C/A/P IMPRESSION: CHEST: Stable spiculated right apical lung mass, measuring maximally up to 3.2 cm.Previously seen right upper lobe 5 mm solid nodule is no longer visualized. Stable mediastinal prominent lymph nodes. Other previously noted pulmonary findings are difficult to examine given degree of respiratory motion. ABDOMEN/PELVIS: Interval decrease of left adrenal lesion, now measuring 2.1 x 2.1 cm (previously 3 x 2.2 cm). Decreased right upper quadrant soft tissue nodules measuring up to 0.6 cm (previously 1 cm).  8/17/22 she is here for follow up. She is due for cycle 29 of Keytruda now every 3 weeks. CBC with mild anemia only. New new complaints SOB is stable. Has pruritus on back but no rash recommended a trial of Aquaphor. States oxycodone helps with pain and night and sleeps much better.  09/07/2022 accompanied by her son; reports no changes in her generalized dull pains. She uses home remedies to "clean up her pulmonary pathways".  9/28/22 She returns for follow-up today for NSCLC, accompanied by son who helps with American translation at patient request. She is due for next cycle of immunotherapy today. She continues to have the same chronic complaints that she always had for the last several years including unclear left-sided pain, shortness of breath, she seen multiple specialists and we are unable to exactly determine the cause of her pain. She takes oxycodone 5 mg as needed for the L sided abd pain. She still struggles with pruritus so we suggested to try gabapentin at a low dose and titrate up very slowly to see if it helps, however she did not see any improvement and did not like the way the gabapentin made her feel. She has since stopped the gabapentin. Reviewed most recent CBC which shows mild, normocytic anemia with hgb 11.3g/dL.    10/19/2022 She is here for follow up and is due for Keytruda for her lung cancer. She had CT C/A/P done on 10/15/2022 but for some reason they were not compared. I reached out to radiologist and CT abd was compared and there is no progression. I am waiting for the CT chest but amee report there is no progression that I can tell by comparing the CTs. She still has same symptoms, ABRAHAM, pain in scapula area, pruritus also only at mid back between scapula. She did not tolerate gabapentin. we decided not to hold treatment for the pruritus and continue. No rash on exam at all.   11/09/2022  accompanied by her son; Reports being in pain (back) as she forgot to take her pain medications, no changes in chronic conditions or new complaints since the last visit.  11/30/2022 accompanied by her son; Reports seen by pain management and had steroid injection with minimal pain response, has F/U in 12/2022. She forgot to take her pain Rx this morning. Recently had tooth extraction and now has no teeth. No other changes in chronic conditions or new complaints since the last visit.  12/21/2022 accompanied by her son; Reports no changes in chronic conditions or new complaints since the last visit; scheduled for another pain management injection 12/23/2022.  01/11/2023  accompanied by her son; Reports feeling "the same"; chronic pains - unchanged, decided not to continue with pain management as it does not provide any relieve; no changes in chronic conditions or new complaints since the last visit.  02/01/2023 accompanied by her son; Reports no changes in her chronic conditions or new complaints since the last visit. Still with significant pruritus. Constipation improved - now has BM every other day. Still is unable to maintain sleep, but may sleep total of 6 hours during a day.  03/15/2023 accompanied by her son; she reports her night sweats resolved; did not mention pruritus this time; no other changes in chronic conditions or new complaints since the last visit.  05/08/2023 accompanied by her son; Reports chronic pains; no changes in chronic conditions or new complaints since the last visit.  06/21/2023 accompanied by her son; Reports increasing BLE pedal dependent edema; chronic pains are stable and controlled on current pain management. She is here to discuss CT results. No other changes in her chronic issues.  8/2/23 She is here for follow up. She had MRCRP and there were no lesions. CBD is 1 cm. This is incidental LFTS are normal and given her age would hold off any additional studies nor do I believe ERCP is needed at this time. CBD diameter up to 11 mm is normal given her age.  She complains of chronic pains in upper back.  9/13/23 She is here for follow up she is due for her next dose of Keyturda.  She had CT C/A/P that showed Since June 16, 2023; Interval increase in size spiculated right apical mass measuring 3.5 x 2.5 x 4.0 cm, previously 2.8 x 2.7 x 3.3 cm. New right medial upper lobe 0.4 cm solid nodule. Partially imaged anterior left hip fat-containing mass with soft tissue nodularity measuring 8.3 x 6.7 cm, unchanged. As usual she is here with her son her pruritus is better but she now has more pain in the left scapular which I could only attribute to her compression fracture they tell me oxycodone 5 mg only takes the pain off for a bit. Otherwise she is doing well.  10/25/2023 accompanied by her son; Reports her fatigue is unchanged; she is undergoing RT and today was D2 total of 5 days; She reports no need for opioids refill today; She has a sore throat and is currently on antibiotics and has F/U with PCP.  12/06/2023 accompanied by her son; Reports no changes in chronic conditions or new complaints since the last visit.  1/17/2024 She is here for follow up. She isdue for cycle 45 of Keytruda. she had CT C/A/P Jan 6 2024  IMPRESSION:  Since September 3, 2023;  Interval decrease in size right apical spiculated mass currently measuring 3.1 x 1.9 x 2.4 cm, previously 3.5 x 2.5 x 4.0 cm. Unchanged adjacent satellite nodules.  Right hilar 1.7 x 1.3 cm lymph node, stable in retrospect.  Partially imaged anterior left hip fat-containing mass with soft tissue nodularity, unchanged.  Unchanged diffuse biliary ductal dilatation.  --- End of Report --- IMPRESSION:  Since September 3, 2023;  Interval decrease in size right apical spiculated mass currently measuring 3.1 x 1.9 x 2.4 cm, previously 3.5 x 2.5 x 4.0 cm. Unchanged adjacent satellite nodules.  Right hilar 1.7 x 1.3 cm lymph node, stable in retrospect.  Partially imaged anterior left hip fat-containing mass with soft tissue nodularity, unchanged.  Unchanged diffuse biliary ductal dilatation.  IMPRESSION:  Since September 3, 2023;  Interval decrease in size right apical spiculated mass currently measuring 3.1 x 1.9 x 2.4 cm, previously 3.5 x 2.5 x 4.0 cm. Unchanged adjacent satellite nodules.  Right hilar 1.7 x 1.3 cm lymph node, stable in retrospect.  Partially imaged anterior left hip fat-containing mass with soft tissue nodularity, unchanged.  Unchanged diffuse biliary ductal dilatation.  She continues to have diffuse pain but more pain in her left scapular this does not correlate to anything anatomically despite long-acting pain medications and short acting pain medications.  She also has pruritus.  I decided to add on gabapentin 300 mg to start once a day and to titrated up to 3 times a day.  Otherwise she also has mild edema in her right ankle I do not recommend Lasix at this time recommend compression therapy and leg elevation  2/28/24 She is here for follow up. She is with her son continues to have the same complaint which is the pain between the scapula sometimes of the areas states that oxy Contin is not helping requesting a higher dose I had a seen already multiple specialist because still remains somewhat unclear no other complaints.  04/17/2024 accompanied by her son; Reports upper back pain are increasing and

## 2024-04-17 NOTE — ASSESSMENT
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 - 05/17/2019 Started Keytruda. - 05/04/2020 Keytruda changed to 6 week dosing. - 01/13/2021 last treatment of Keytruda 400 mg every 6 weeks due to fatigue and dyspnea and her preferences. - 03/2021 CT C/A/P were stable and we stopped Keytruda and was on observation. - 06/2021 CT showing stable findings. - 12/2021 CT C/A/P showed increase in RUL nodule, L adrenal nodule and new tubular opacity within the right upper lobe but otherwise stable. - 12/29/2021 resumed Keytruda. - 07/09/2022 CT C/A/P with IVC shows stable findings and continued treatment response. - 07/27/2022 the patient prefers every 3 week dosing. - 10/15/2022 CT C/A/P no progression on CT A/P awaiting CT chest addendum but likely has not progressed based on review. - 12/13/2022 DEXA - Osteopenia: The patient has low bone mass, with the lowest measured bone density in the Left Femoral Neck. The patient has an estimated ten-year risk of hip fracture of 3.1% and an estimated ten-year risk of major fracture of 11%, based on the WHO FRAX algorithm. - 02/01/2023 the patient requests Keytruda IV frequency to be changed to Q6W therefore dose will be changed to 400 mg. - 03/15/2023 in general she feels better on Keytruda Q6W. - 06/16/2023 CT C/A/P - Since 2/4/2023 Stable spiculated right apical mass in contact with pleural surface (303/35). Stable adjacent nodular opacity right upper lobe (601/73). No pleural effusions or pneumothorax. Scattered areas of parenchymal banding/scarring is noted. Increasing biliary duct dilatation since prior examination in this patient with a gallbladder. Outpatient MRI of the biliary tree with and without gadolinium and MRCP imaging is recommended as well as correlation with laboratory findings. - 07/22/2023 MRCP - CBD distended up to 1 cm proximally, with tapering distally, without evidence of filling defect, biliary mass or stricture. - 09/03/2023 Her CTs are showing progression in the apical tumor and a new 4 mm nodule in the right I went over options and favoring continuation of Keytruda and to proceed with palliative radiation for local control versus switching treatment to Tabrecta, given her meds giving mutations I do not really believe she will tolerate Tabrecta well and she would not tolerate chemotherapy and after discussion they agreed to continue Keytruda and they will see radiation oncology for local control, especially since this is oligometastatic progression. - 10/25/-30/2023 completed RT. -1/2024 CT C/A/P improvement post radiation, no progression   # Dysphagia and decrease appetite - better now. - on Jevity 1.2 hector BID. - followed by nutritionist, Tila Villanueva.  # SOB mainly when she tries to sleep; due to right nasal congestion maybe from Keytruda, Keytruda was stopped and SOB did not change. - on Flonase. - On Albuterol + nebulizer PRN. - She follows with Dr Lanza as indicated - Advised to use saline nasal spray as she has c/o dryness in her nose.  # Some insomnia - on Melatonin 5 mg at bedtime.  # Pruritus, back; may be side effect of Keytruda? - recurrent. - has hydrocortisone 1% or Benadryl PRN for symptom relief if happens again. - she STOPPED gabapentin due to side effects.  # Pain at T spine right at the level of scapula with tenderness maybe from T3 compression fracture - on oxycodone 5 mg every 8 hours as needed - Reviewed risks and benefits of narcotic consumption and prescribed dosing/frequency with patient. - will have her see neurosurgery Dr. Weaver. She may need MR T spine, but no mass is reported on CT. - 01/11/2023 decided against pain management by pain management team. - she had cortisol injections and they did not help.  # Constipation - chronic. - reports BM every 5 days - now better with current management every other day.    PLAN: - continue Keytruda 400 mg IV Q6W (starting 02/01/2023) - C47 GIVE TODAY.  - continue Oxycodone 5 mg every 8 hours and increase Oxycodone ER 20 mg BID still does not help. Did not like Gabapentin 300 mg so she stopped. - continue Lactulose, Colace and Senna for bowel regiment. - repeat CT C/A/P with contrast - scheduled on 04/27/2024. - Labs today: CBC, CMP, TSH. RTC in 6 weeks with CBC CMP TSH and treatment. [Palliative] : Goals of care discussed with patient: Palliative [Patient/Caregiver not ready to engage] : Patient/Caregiver not ready to engage [AdvancecareDate] : 12/06/2023

## 2024-04-17 NOTE — PHYSICAL EXAM
[Capable of only limited self care, confined to bed or chair more than 50% of waking hours] : Status 3- Capable of only limited self care, confined to bed or chair more than 50% of waking hours [Normal] : affect appropriate [de-identified] : seated in wheelchair, but does stand up by herself [de-identified] : dependant BLE pedal pitting edema [de-identified] : L thigh lipoma

## 2024-04-18 PROBLEM — Z87.898 HISTORY OF NASAL CONGESTION: Status: RESOLVED | Noted: 2019-09-20 | Resolved: 2024-01-01

## 2024-04-18 PROBLEM — G89.3 PAIN, CANCER: Status: ACTIVE | Noted: 2022-11-09

## 2024-05-29 PROBLEM — Z51.12 ENCOUNTER FOR ANTINEOPLASTIC IMMUNOTHERAPY: Status: ACTIVE | Noted: 2019-07-19

## 2024-05-29 PROBLEM — C34.91 ADENOCARCINOMA OF RIGHT LUNG: Status: ACTIVE | Noted: 2019-06-27

## 2024-05-31 PROBLEM — K59.00 CONSTIPATION: Status: ACTIVE | Noted: 2022-11-30

## 2024-05-31 NOTE — ASSESSMENT
[FreeTextEntry1] : 87 yo F with PMHx Lung cancer (on chemo) was referred from her oncologist for worsening constipation.  #Constipation - worsening - Taking opioids for cancer-related pain - Takes Colace, senna, MiraLAX and lactulose - Last BM 3-4 weeks ago - CT abd/pelvis on 04/28 showed no evidence of bowel obstruction. - will try methylnaltrexone

## 2024-05-31 NOTE — PHYSICAL EXAM
[Alert] : alert [No Respiratory Distress] : no respiratory distress [No Acc Muscle Use] : no accessory muscle use [Auscultation Breath Sounds / Voice Sounds] : lungs were clear to auscultation bilaterally [Normal S1, S2] : normal S1 and S2 [Murmurs] : no murmurs [de-identified] : distended lower abdomen

## 2024-05-31 NOTE — HISTORY OF PRESENT ILLNESS
[FreeTextEntry1] : 89 yo F with PMHx Lung cancer (on chemo) was referred from her oncologist for worsening constipation. Patient is on opioids for cancer-related pain. She is Colace, senna, MiraLAX and lactulose for constipation. Last BM is 3-4 weeks ago. CT abd/pelvis on 04/28 showed no evidence of bowel obstruction.

## 2024-06-07 NOTE — HISTORY OF PRESENT ILLNESS
[de-identified] : 08/08/2019  This is a 83 year old female who I initially met in [facility] on 3/28/19. She was initially admitted on 2/20/2019 for a right loculated pleural effusion approx. 900 CCs, no PE and right apical 3.1x3.0 cm mass with pleural nodules on CTA of chest. Pt returned to ED on 3/15/2019 for right pleural effusion, had a thoracentesis in ED and was sent home. She than came back for SOB  due to recurrent effusion and a Pleurx was placed. Pathology showed adenocarcinoma.   She ha lost a few pounds about  4. She never smoked. She does have weakness.  From her Pleurx she  has 500 cc removed every other day. She has pain after.  Work up: 02/20/2019 CTA chest:  Large right pleural effusion, maximal axial width of 7 cm correlating with an estimated volume of 900 cc. Associated near complete compressive atelectasis of the right lower lobe. Probable loculations of the effusion seen at the apex and at the right heart border (for example series 5 images 176, 52; series 9 image 69). 1 cm right upper lobe fissural nodule (series 5 image 139). Patent central airways. There is right apical 3.1 x 3.0 cm mass with irregular right apical pleural thickening (series 7, image 44), and multiple satellite pleural nodules including a more caudad 2.7 cm para-mediastinal nodule (series 7, image 175). Tissue sampling recommended.  PATHOLOGY: 03/25/2019 Pleural fluid: Cell block material is hypocellular and shows a single minutecluster of malignant cells.Immunohistochemistry studies were performed at [facility] and the results are noncontributory 03/22/2019: Addendum Cell block material shows macrophages (positive ),mesothelial cells (positive calretinin) and a single very minute cluster of atypical suspicious cells is negative for Fidel-Ep4.Material is insufficient for further diagnostic studies (ie, immunohistochemistry).  03/15/2019: Pleural fluid: Addendum Immunohistochemical studies were performed on the cell block at [facility], [facility], [address] [place] [address], [facility] (see NOTE). The results are as follows: CK7-                     Rare mesothelial cells positive CK20-                   Negative CK 5/6-                 Rare mesothelial cells positive Calretinin-             Scattered mesothelial cells positive CD68-                   Numerous histiocytes positive BerEP-4-               Negative Napsin-A-             Negative TTF-1-                  Negative These results are non-specific, suggestive of mesothelialhyperplasia. The few markedly atypical cells seen in the smear are not evident in the cell block. The atypical cells seen in the current smear are not seen in the prior pleural fluid Thinprep smear or cell block (41-FN-). Few marked atypical cells, suspicious for mesothelioma versus adenocarcinoma, in a background of numerous mesothelial cells, histiocytes and small lymphocytes. 03/25/2019: Final Diagnosis - POSITIVE FOR MALIGNANT CELLS. Favor metastatic adenocarcinoma. Pending cell block. I spoke to Dr. Allison  and there are only a few cells thus further studies such as IHC, molecular PD-l1 is not possible. [de-identified] : 6/5/19 She is here for follow up. Since last visit she started Keytruda. She had a PET/CT on April 17 that showed  Extensive FDG avid right-sided pleural mass/soft tissue thickening,  with max SUV 21.5 within a right apical pleural soft tissue mass. 2.  Multiple FDG avid mediastinal lymph nodes, with max SUV 16.5 within a 2.6 x 2.1 cm subcarinal node. 3.  FDG avid lytic lesion within the left superior acetabulum (max SUV 14.8), suspicious for osseous metastasis. 4.  A mildly FDG avid 12.0 cm lipomatous mass within the anterior left thigh musculature, possibly neoplastic; if clinically warranted, further evaluation may be obtained with dedicated contrast-enhanced MRI. 04/16/2019 MR brain: No mets She had CT Guided right upper lobe pleural-based nodularity 4/25: adenocarcinoma 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. Son states the amount of fluid is now less from Pleurx they drain it twice a week  some times 250 Ml sometimes less. Used to be  500 ml  u 3 times a week. Takes 60 mg of oral morphine a day 20 mg 3 times a day but pain is still severe. Has not had a bowel movement in unknown amount of days does not take laxatives although she has laxatives.   6/26/19 She is here for follow up. She is due for cycle 3 of Keytruda. She has about 300 mg drained from her pleura twice a week. Has SOB but saturated 94% on room air and 94% on ambulation. Did not tolerate the fentanyl path had nausea. On Morphine 10 mg BID doing well. Constipation is better.   7/17/19 She is here for follow-up of Stage IV lung cancer with son.  She is due for cycle 3 of Keytruda.  CBC is stable from today.  She only takes the liquid morphine at night now.  She started eating slightly more and has been feeling slightly better, as per son.  He states that the pleurex drainage is less, it is checked every Friday + Monday.  On Friday there was about 225 cc drained, but yesterday no drainage.  We will arrange for hydration today since she has had poor oral intake lately and small appetite.  Son reports she can tolerate fluids but she has early satiety with food.  Patient also reports feeling short of breath at rest, but her O2 sat is 98% on R/a.  Right sided pain is better.  07/06/2019 CT C/A/P IMPRESSION: Since April 17, 2019: 1. No definite new sites of metastatic disease. 2. Right-sided pleural soft tissue mass/thickening, overall appearing decreased since April 17, 2019 with primarily residual right apical tumor. Tumor appears to infiltrate the mediastinum.3. No significant change in mediastinal lymphadenopathy including largest 2.6 x 2.1 cm subcarinal lymph node which remains unchanged. Retrocrural 2.5 x 2.3 cm metastasis or additional site of pleural tumor, unchanged.4. Irregularity of right anterior first rib, probably invaded by tumor, stable in retrospect. Left superior acetabular lucent lesion corresponding to FDG avid bone metastasis.5. Right pelvic indeterminate 7 cm mass; possible retroverted uterus with degenerating fibroid or less likely ovarian mass; previously non-FDG avid and probably benign. Further evaluation with pelvic ultrasound or MRI pelvis with and without IV contrast may be helpful.6. Status post right chest tube placement with decreased small loculated right pleural effusion with fluid within the major fissure. 7. New mild to moderate intrahepatic biliary dilation. Correlation with LFTs recommended. Consider further evaluation with ERCP or MRCP with and without IV contrast.  8/7/19 She is here with her son. Overall she is doing much better. She is in wheelchair but now less pain, eating better and Pleurax is only draining about 25 cc twice a week, She does complain of SOB still. I explained this will improve as well but asked her to follow up with Dr. Lanza of pulmonology.   8/28/19 She is here for follow up. They are to see Dr. Lanza in Early September. There is minimal output from Pleurax. Pain is much better and does not use narcotics anymore.  Overall better. She has right side nasal congestion and states makes it hard to breath.  9/18/19 She is doing well. She is due for cycle 7. Son was not eliana to schedule CTs prior to the visit but she looks better and better every visit and thus most likely still responding. She saw Dr. Lanza who started her on Flonase. She is due to see ENT Dr. Adams.  Appetite is better. No pains. Dr. Lanza is considering to remover the Pleurx possibly in November.   10/09/2019 Patient is here for a follow up visit. Due for cycle 8 today. CT chest/abd/pelvis 09/2019 showed improvement in disease. Also has seen Dr. Adams and agreed to continue with Flonase as she has hypertrophy of turbinates.  She is tolerating Keytruda well. Her only complaint at this time is trouble in falling asleep.   10/30/19 Patient is here for a follow up visit of Stage IV lung cancer with family member. She is feeling well with no new complaints. She feels dyspneic when taking a deep breath but her pain has improved. Most recent CBC is stable. 09/22/2019 CT C/A/P showed improvement in right apical lung mass and pleural effusion and drained 150cc. Due for cycle 9 today.   12/30/19 Patient is here for a follow-up visit for  lung cancer with son. Reviewed imaging results with patient and her family, which suggests worsening effusion but otherwise stable.  They drained 500 cc from pleurx yesterday, reportedly sanguinous drainage. She is agreeable to continue with cycle 12 of Keytruda tomorrow. She reports persistent dyspnea.  We discussed that this tx regimen may be losing its responsiveness. 12/27/2019 CT C/A/P IMPRESSION: Worsening right-sided pleural effusion. Right apical spiculated nodule without difference. Unchanged appearance of the mediastinum. Unchanged appearance of the third thoracic vertebral body and right second rib. No interval change since prior examination. No change in hepatic hypodensity (likely fatty infiltration), splenic hypodensity (too small to characterize), left adrenal nodule or 1 cm retroperitoneal nodule on the right. No change in fat-containing mass in the anterior left thigh.  2/10/2020 She is here for follow-up accompanied by son. Since last visit she was admitted to hospital for pain and nausea. She had pleurx catheter removed during most recent hospitalization on 1/13/2020. She complains of insomnia but generally feels well.  Most recent scan reviewed with patient. She reports she had a rash after discharge from the hospital which has since resolved.   01/11/2020 CT Chest IMPRESSION: Since CT scan performed on 12/27/2019; 1. Right thoracostomy tube in stable position with slight interval decrease of right pleural effusion. 2.  Interval increase in right middle lobe consolidation/atelectasis. 3. Stable spiculated nodule in the right lung apex, consistent with patient's known malignancy. She feels much better now that her Pleurx is gone. Does not have any pain. Feels dyspneic which is stable.  3/2/2020 She is here for follow-up for lung cancer accompanied by son. Since last visit, she followed with Dr. Lanza pulmonology, and Dr. Hirsch from [facility] who agreed against placing additional drainage catheters based on most recent CT imaging. She still reports some SOB. She also reports taking a [place] medication named Volidol and Panangin for here breathing. Patient reports mild dizziness after taking Trazodone; we recommended halving dose.    5/4/2020 She is here for follow-up for lung cancer accompanied by son. Her last treatment as in March due to COVID she is doing well. She was on Room air. She states SOB is the same. She has paraspinal back pain/hip pain.   6/15/2020 She is here for follow-up for lung cancer accompanied by son. Patient denies fever, chills, vomiting, worsening cough, new rash, persistent diarrhea or bleeding. Reviewed most recent imaging and had a discussion regarding continuing with Keytruda every 6 weeks for now as she is clinically stable.  She does complain of weakness and nasal congestion which improves using Fluticasone spray.  She uses oxygen intermittently at home.   05/09/2020 CT C/A/P IMPRESSION: 1. Stable spiculated right upper lobe mass measuring up to 3.1 cm.2. New areas of ground glass and reticular opacities within the medial left lung, with new subcentimeter nodular opacities within the left lower lobe. Findings may be postinfectious or postinflammatory in etiology. CT of the chest in 2-3 months is recommended. 3. Decreased mediastinal and right hilar lymphadenopathy. 4. Resolved right pleural effusion. 1. No findings of new or progressive metastatic disease in the abdomen or pelvis. 2. Indeterminate left adrenal nodule and right retroperitoneal nodule, stable in size from prior CT. 3. Partially visualized fat-containing mass in the anterior left thigh-considerations include benign and malignant neoplastic etiology. Recommend further evaluation with MRI if not already performed. 4. Sclerotic appearance of metastatic left acetabular lesion, similar in appearance to prior CT, possibly representing healing- was previously lytic on PET/CT of 4/17/2019. 5. Lobulated pelvic structure again seen, possibly uterus with fibroids. Further evaluation with pelvic ultrasound may be performed as warranted.  9/9/20 Patient is here for follow up accompanied by her son who speaks English. She has chronic SOB which as per son has been slowly getting worse. She uses O2 at home intermittently. She also uses Flonase and Albuterol inhaler. Otherwise she is mostly wheel chair bound and can walk back and forth to the bathroom by herself. Her appetite is good and weight is stable. No new bone pain/abdominal pain/ rash.   10/21/2020 She is here for follow-up for lung cancer, accompanied by son. Patient denies fever, chills, vomiting, new cough, new rash, persistent diarrhea or bleeding. She is still pending reimaging, they report they have not received authorization for scan yet. She still complains of weakness and nasal congestion, which improves using Fluticasone spray. She uses oxygen intermittently at home. She still has SOB. Her only new complaint is intermittent back pruritus. She is due for cycle 20 of Keytruda today.  12/2/2020 She is here for follow-up for lung cancer, accompanied by son. Patient denies fever, chills, vomiting, new cough, new rash, persistent diarrhea or bleeding.  She still complains of weakness, SOB and nasal congestion, uses the nebulizer once daily with occasional relief of her SOB. She is due for cycle 21 of Keytruda today. She is using Allegra and or hydrocortisone 1% cream for the pruritus. Reviewed most recent imaging which shows good treatment response.   11/14/2020 CT C/A/P IMPRESSION: Since May 9, 2020: 1. No evidence of new intrathoracic or intra-abdominal metastatic disease. 2. Unchanged 2.5 x 2 cm right upper lobe pulmonary nodule, stable with similar measurement technique. 3. Decreased subcarinal lymphadenopathy, now 1.2 cm short axis, previously 1.5 cm short axis on May 9, 2020 CT with similar measurement technique. No evidence of additional suspicious lymphadenopathy by size criteria. 4. Near complete interval resolution of left lower lobe and lingula patchy airspace opacities. Unchanged areas of groundglass and reticular opacities within the medial left lung. Findings likely postinfectious or inflammatory. 5. Unchanged sclerotic appearance of metastatic left acetabular lesion, possibly representing healing of previously FDG avid lytic on PET/CT of 4/17/2019. 6. Unchanged, partially imaged indeterminate lipomatous mass within the anterior left thigh.  1/13/21 She is here for follow-up for lung cancer, accompanied by son. She still complains of weakness, SOB daily which is unchanged. She is due for cycle 22 of Keytruda today. She continues to use Allegra and or hydrocortisone 1% cream for the pruritus. Patient denies fever, chills, vomiting, new cough, new rash, persistent diarrhea or bleeding. She also reports musculoskeletal pain.    2/24/21 She is here with her son. She is doing well but still has same complaints. mainly fatigue and SOB as well as MSK pain behind the left scapula. We had a long talk and I explained that the fatigue and SOB maybe due to Keytruda so we decided to stop it and observe her. On exam no changes.   5/17/21 She is here for followup with her son. Her CT C/A/P in March 2021 showed Since November 14, 2020. No significant interval change approximate 2.5 cm x 2 cm right upper lobe nodule (series 4/33). Stable reticular opacities medial right upper lobe. Stable ill-defined subcarinal lymph nodes (series 4/88). Stable bibasilar subsegmental discoid atelectasis. No pleural effusions. No new sites of metastatic disease in the abdomen and pelvis. Previously described sclerotic left acetabular lesion felt to represent a healed metastasis based on prior PET/CT is poorly visualized on the current study. Unchanged, partially imaged indeterminate lipomatous mass within the anterior left thigh. Given that she been on  Keytruda now for 2 years with no progression of disease and she continued to have shortness of breath we decided to give her a treatment break. Today even well-being of treatment she still continues to have the same except complains specifically short of breath for which she had an extensive workup. She also has rhinorrhea  so she was referred to ENT again as per their request and I refilled Flonase  7/26/21 She is here for follow up. She had CT C/A/P on 6/12/21: IMPRESSION: No CT evidence of new sites of metastatic disease in the chest, abdomen and pelvis. Stable 2.5 cm right upper lobe nodule. Interval decrease in size of subcarinal lymph node. Slight interval increase in reticular and groundglass opacities left lower lobe, possibly postinfectious / postinflammatory. Unchanged, partially imaged indeterminate lipomatous mass within the anterior left thigh. CBC is normal today. She is here with her son. No new symptoms has chronic rhinorrhea and chronic SOB with non specific pain in left scapula with unclear cause that has been present for years.   11/29/21 Patient is here for follow up for stage 4 lung NSCLC. She was on Keytruda and has been on break since Feb 2021. Her last imaging in June 2021 showed stable disease. She has chronic complaints of fatigue, dyspnea, pain in her left side back and insomnia. All these complaints are chronic with no new worsening of symptoms.   2/9/22 She returns for follow-up today for NSCLC, accompanied by son who helps with translation at patient request. She is due for next cycle of immunotherapy today. She continues to have the same chronic complaints now that she always had for the last several years including unclear left-sided pain, shortness of breath, she seen multiple specialists and we are unable to exactly determine the cause of her pain but I explained that the shortness of breath as before is possibly related to her lung cancer may be resulting in decreased function from previous Pleurx catheter leading to fibrosis and decrease in lung capacity. She takes oxycodone 5mg as needed for the L sided pain. Reviewed most recent CT imaging which shows increase in RUL nodule, L adrenal nodule and new tubular opacity within the right upper lobe but otherwise stable.   12/18/2021 CT C/A/P IMPRESSION: Compared to CT chest 6/12/2021,Interval increase in size of the right upper lobe nodule now measuring 2.8 x 2.0 x 2.4 cm previously 2.5 x 2.0 x 2.0 cm. New tubular opacity within the right upper lobe.Significant interval increase in size of left adrenal nodule, now measuring 4.6 x 4.3 cm, highly suggestive of metastasis/collision tumor in this patient with history of lung cancer.  Right upper quadrant peritoneal nodules without significant change from prior. Continued attention on follow-up imaging.Partially imaged indeterminate lipomatous mass in the left thigh redemonstrated. Recommend MR evaluation.  3/23/22 She returns for follow-up today for NSCLC, accompanied by son who helps with translation at patient request. She is due for next cycle of immunotherapy today. She continues to have the same chronic complaints now that she always had for the last several years including unclear left-sided pain, shortness of breath, she seen multiple specialists and we are unable to exactly determine the cause of her pain but I explained that the shortness of breath as before is possibly related to her lung cancer may be resulting in decreased function from previous Pleurx catheter leading to fibrosis and decrease in lung capacity. She takes oxycodone 5 mg as needed for the L sided pain She has chronic SOB that she believes is due to her heart. 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 She was started on antidepressant by PCP but she is not taking it.  6/15/22 She is here for follow up. She is due for cycle 27 of Keyturda. CBC from today is  Normal. She has pain in entire left side of chest including shoulder. She takes over the counter meds son does not know then name. SOB is the same. Lungs sounded clear except for decrease sounds in RLL which is same.   7/27/22 She returns for follow-up today for NSCLC, accompanied by son who helps with Peruvian translation at patient request.  She is due for next cycle of immunotherapy today.  She continues to have the same chronic complaints that she always had for the last several years including unclear left-sided pain, shortness of breath, she seen multiple specialists and we are unable to exactly determine the cause of her pain. She takes oxycodone 5 mg as needed for the L sided pain, requesting refill. Reviewed most recent CT imaging which shows stable findings and continued treatment response.  Patient states she feels better when she receives the immunotherapy and therefore is requesting to go back to every 3 week frequency again.   07/09/2022 CT C/A/P IMPRESSION: CHEST: Stable spiculated right apical lung mass, measuring maximally up to 3.2 cm.Previously seen right upper lobe 5 mm solid nodule is no longer visualized. Stable mediastinal prominent lymph nodes. Other previously noted pulmonary findings are difficult to examine given degree of respiratory motion. ABDOMEN/PELVIS: Interval decrease of left adrenal lesion, now measuring 2.1 x 2.1 cm (previously 3 x 2.2 cm). Decreased right upper quadrant soft tissue nodules measuring up to 0.6 cm (previously 1 cm).  8/17/22 she is here for follow up. She is due for cycle 29 of Keytruda now every 3 weeks. CBC with mild anemia only. New new complaints SOB is stable. Has pruritus on back but no rash recommended a trial of Aquaphor. States oxycodone helps with pain and night and sleeps much better.  09/07/2022 accompanied by her son; reports no changes in her generalized dull pains. She uses home remedies to "clean up her pulmonary pathways".  9/28/22 She returns for follow-up today for NSCLC, accompanied by son who helps with Peruvian translation at patient request. She is due for next cycle of immunotherapy today. She continues to have the same chronic complaints that she always had for the last several years including unclear left-sided pain, shortness of breath, she seen multiple specialists and we are unable to exactly determine the cause of her pain. She takes oxycodone 5 mg as needed for the L sided abd pain. She still struggles with pruritus so we suggested to try gabapentin at a low dose and titrate up very slowly to see if it helps, however she did not see any improvement and did not like the way the gabapentin made her feel. She has since stopped the gabapentin. Reviewed most recent CBC which shows mild, normocytic anemia with hgb 11.3g/dL.    10/19/2022 She is here for follow up and is due for Keytruda for her lung cancer. She had CT C/A/P done on 10/15/2022 but for some reason they were not compared. I reached out to radiologist and CT abd was compared and there is no progression. I am waiting for the CT chest but amee report there is no progression that I can tell by comparing the CTs. She still has same symptoms, ABRAHAM, pain in scapula area, pruritus also only at mid back between scapula. She did not tolerate gabapentin. we decided not to hold treatment for the pruritus and continue. No rash on exam at all.   11/09/2022  accompanied by her son; Reports being in pain (back) as she forgot to take her pain medications, no changes in chronic conditions or new complaints since the last visit.  11/30/2022 accompanied by her son; Reports seen by pain management and had steroid injection with minimal pain response, has F/U in 12/2022. She forgot to take her pain Rx this morning. Recently had tooth extraction and now has no teeth. No other changes in chronic conditions or new complaints since the last visit.  12/21/2022 accompanied by her son; Reports no changes in chronic conditions or new complaints since the last visit; scheduled for another pain management injection 12/23/2022.  01/11/2023  accompanied by her son; Reports feeling "the same"; chronic pains - unchanged, decided not to continue with pain management as it does not provide any relieve; no changes in chronic conditions or new complaints since the last visit.  02/01/2023 accompanied by her son; Reports no changes in her chronic conditions or new complaints since the last visit. Still with significant pruritus. Constipation improved - now has BM every other day. Still is unable to maintain sleep, but may sleep total of 6 hours during a day.  03/15/2023 accompanied by her son; she reports her night sweats resolved; did not mention pruritus this time; no other changes in chronic conditions or new complaints since the last visit.  05/08/2023 accompanied by her son; Reports chronic pains; no changes in chronic conditions or new complaints since the last visit.  06/21/2023 accompanied by her son; Reports increasing BLE pedal dependent edema; chronic pains are stable and controlled on current pain management. She is here to discuss CT results. No other changes in her chronic issues.  8/2/23 She is here for follow up. She had MRCRP and there were no lesions. CBD is 1 cm. This is incidental LFTS are normal and given her age would hold off any additional studies nor do I believe ERCP is needed at this time. CBD diameter up to 11 mm is normal given her age.  She complains of chronic pains in upper back.  9/13/23 She is here for follow up she is due for her next dose of Keyturda.  She had CT C/A/P that showed Since June 16, 2023; Interval increase in size spiculated right apical mass measuring 3.5 x 2.5 x 4.0 cm, previously 2.8 x 2.7 x 3.3 cm. New right medial upper lobe 0.4 cm solid nodule. Partially imaged anterior left hip fat-containing mass with soft tissue nodularity measuring 8.3 x 6.7 cm, unchanged. As usual she is here with her son her pruritus is better but she now has more pain in the left scapular which I could only attribute to her compression fracture they tell me oxycodone 5 mg only takes the pain off for a bit. Otherwise she is doing well.  10/25/2023 accompanied by her son; Reports her fatigue is unchanged; she is undergoing RT and today was D2 total of 5 days; She reports no need for opioids refill today; She has a sore throat and is currently on antibiotics and has F/U with PCP.  12/06/2023 accompanied by her son; Reports no changes in chronic conditions or new complaints since the last visit.  1/17/2024 She is here for follow up. She isdue for cycle 45 of Keytruda. she had CT C/A/P Jan 6 2024  IMPRESSION:  Since September 3, 2023;  Interval decrease in size right apical spiculated mass currently measuring 3.1 x 1.9 x 2.4 cm, previously 3.5 x 2.5 x 4.0 cm. Unchanged adjacent satellite nodules.  Right hilar 1.7 x 1.3 cm lymph node, stable in retrospect.  Partially imaged anterior left hip fat-containing mass with soft tissue nodularity, unchanged.  Unchanged diffuse biliary ductal dilatation.  --- End of Report --- IMPRESSION:  Since September 3, 2023;  Interval decrease in size right apical spiculated mass currently measuring 3.1 x 1.9 x 2.4 cm, previously 3.5 x 2.5 x 4.0 cm. Unchanged adjacent satellite nodules.  Right hilar 1.7 x 1.3 cm lymph node, stable in retrospect.  Partially imaged anterior left hip fat-containing mass with soft tissue nodularity, unchanged.  Unchanged diffuse biliary ductal dilatation.  IMPRESSION:  Since September 3, 2023;  Interval decrease in size right apical spiculated mass currently measuring 3.1 x 1.9 x 2.4 cm, previously 3.5 x 2.5 x 4.0 cm. Unchanged adjacent satellite nodules.  Right hilar 1.7 x 1.3 cm lymph node, stable in retrospect.  Partially imaged anterior left hip fat-containing mass with soft tissue nodularity, unchanged.  Unchanged diffuse biliary ductal dilatation.  She continues to have diffuse pain but more pain in her left scapular this does not correlate to anything anatomically despite long-acting pain medications and short acting pain medications.  She also has pruritus.  I decided to add on gabapentin 300 mg to start once a day and to titrated up to 3 times a day.  Otherwise she also has mild edema in her right ankle I do not recommend Lasix at this time recommend compression therapy and leg elevation  2/28/24 She is here for follow up. She is with her son continues to have the same complaint which is the pain between the scapula sometimes of the areas states that oxy Contin is not helping requesting a higher dose I had a seen already multiple specialist because still remains somewhat unclear no other complaints.  04/17/2024 accompanied by her son; Reports upper back pain are increasing - she only takes oxycodone ER and NOT IR. She also has significant constipation from pain Rx, but does not want to take anything for constipation management.  05/29/2024 Accompanied by her son; reports poor nutrition due to no appetite. She does not have BM regularly. She now c/o pain at her right torso which is new in addition to her chronic pains.

## 2024-06-07 NOTE — ASSESSMENT
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 - 05/17/2019 Started Keytruda. - 05/04/2020 Keytruda changed to 6 week dosing. - 01/13/2021 last treatment of Keytruda 400 mg every 6 weeks due to fatigue and dyspnea and her preferences. - 03/2021 CT C/A/P were stable and we stopped Keytruda and was on observation. - 06/2021 CT showing stable findings. - 12/2021 CT C/A/P showed increase in RUL nodule, L adrenal nodule and new tubular opacity within the right upper lobe but otherwise stable. - 12/29/2021 resumed Keytruda. - 07/09/2022 CT C/A/P with IVC shows stable findings and continued treatment response. - 07/27/2022 the patient prefers every 3 week dosing. - 10/15/2022 CT C/A/P no progression on CT A/P awaiting CT chest addendum but likely has not progressed based on review. - 12/13/2022 DEXA - Osteopenia: The patient has low bone mass, with the lowest measured bone density in the Left Femoral Neck. The patient has an estimated ten-year risk of hip fracture of 3.1% and an estimated ten-year risk of major fracture of 11%, based on the WHO FRAX algorithm. - 02/01/2023 the patient requests Keytruda IV frequency to be changed to Q6W therefore dose will be changed to 400 mg. - 03/15/2023 in general she feels better on Keytruda Q6W. - 06/16/2023 CT C/A/P - Since 2/4/2023 Stable spiculated right apical mass in contact with pleural surface (303/35). Stable adjacent nodular opacity right upper lobe (601/73). No pleural effusions or pneumothorax. Scattered areas of parenchymal banding/scarring is noted. Increasing biliary duct dilatation since prior examination in this patient with a gallbladder. Outpatient MRI of the biliary tree with and without gadolinium and MRCP imaging is recommended as well as correlation with laboratory findings. - 07/22/2023 MRCP - CBD distended up to 1 cm proximally, with tapering distally, without evidence of filling defect, biliary mass or stricture. - 09/03/2023 Her CTs are showing progression in the apical tumor and a new 4 mm nodule in the right I went over options and favoring continuation of Keytruda and to proceed with palliative radiation for local control versus switching treatment to Tabrecta, given her meds giving mutations I do not really believe she will tolerate Tabrecta well and she would not tolerate chemotherapy and after discussion they agreed to continue Keytruda and they will see radiation oncology for local control, especially since this is oligometastatic progression. - 10/25/-30/2023 completed RT. - 01/08/2024 CT C/A/P improvement post radiation, no progression.  # Dysphagia and decrease appetite - better now. - on Jevity1.2 hector BID. - followed by nutritionist, Tila Villanueva.  # SOB mainly when she tries to sleep; due to right nasal congestion maybe from Keytruda, Keytruda was stopped and SOB did not change. - on Flonase. - On Albuterol + nebulizer PRN. - She follows with Dr Lanza as indicated - Advised to use saline nasal spray as she has c/o dryness in her nose.  # Insomnia. - on Melatonin 5 mg at bedtime.  # Pruritus, back; may be side effect of Keytruda? - recurrent. - has hydrocortisone 1% or Benadryl PRN for symptom relief if happens again. - she STOPPED gabapentin due to side effects.  # Pain at T spine right at the level of scapula with tenderness maybe from T3 compression fracture - on oxycodone 5 mg every 8 hours as needed - Reviewed risks and benefits of narcotic consumption and prescribed dosing/frequency with patient. - will have her see neurosurgery Dr. Weaver. She may need MR T spine, but no mass is reported on CT. - 01/11/2023 decided against pain management by pain management team. - she had cortisol injections and they did not help. - Did not like Gabapentin 300 mg so she stopped.  # Constipation - chronic. - reports BM every 5 days even on enhanced BM regiment - refused GI evaluation in past.  05/29/2024 Labs reviewed and results discussed with the patient and her son; the patient's son brought the possibility of hospice and will discuss hospice services in depth. We went over the benefits of the hospice and offered the referral, but the patient and the patient's son wants to wait until the next treatment in 6 weeks to make a decision; the patient has tremendous problem with constipation and will try all possible regimens with minimal success.  I offered a referral to the emergency room to rule out impaction, but the patient and her son refused.  They prefer to be evaluated by GI outpatient and I send the emergency referral.  I explained to the patient and her son that delay in ER evaluation to rule out impaction may lead to acute abdominal pathology, he decided to wait and states that he will go to emergency room if the patient's condition gets worse.  Regarding the pain management, she was evaluated and advised on pain management by pain management team, but does not want to follow-up with them "because they do not help".  We recently readjusted her pain regiment and so far the patient or her son did not report that her pain is increasing. Referral to hospice will be made once the patient and her son will call us with the decision.  All questions were answered to satisfaction.  PLAN: - continue Keytruda 400 mg IV Q6W (starting 02/01/2023) - C48 GIVE TODAY.  - continue Oxycodone 5 mg every 8 hours and Oxycodone ER 20 mg BID - refilled. - continue Lactulose or MiraLax, Colace and Senna for bowel regiment. - referred to GI for constipation management and r/o impaction. - repeat CT C/A/P with contrast Q3M - 08/2024. - Labs today: CBC, CMP, TSH. RTC in 6 weeks with CBC CMP TSH and treatment on the same day. [AdvancecareDate] : 05/29/2024

## 2024-06-07 NOTE — END OF VISIT
[FreeTextEntry3] : I was physically present for the key portions of the evaluation and management service provided.  I agree with the history and physical, and plan which I have reviewed and edited where appropriate.  She is here for follow-up.  She has been doing well.  There is no progression on imaging but she is deteriorating she has been having chronic pain for years we could not figure out the cause may be from compression fracture she is seeing multiple subspecialist.  Also has both constipation.  She is not eating or drinking much anymore.  We thought about hospice but they are considering this so we decided to continue with Keytruda and will repeat imaging again in August but if she continues to miscarrying we will likely stop treatment and transition her to hospice.  She could see us back in 6 weeks with her next treatment son will call us if there is any new issues or worsening of her clinical status.  I do not believe this is immunotherapy mediated toxicity but to be will check cortisol  and  ACTH levels to rule out adrenal insufficiency as a cause of her fatigue.  Will also consider hydration intravenously for support

## 2024-06-07 NOTE — REVIEW OF SYSTEMS
[Fatigue] : fatigue [Lower Ext Edema] : lower extremity edema [Shortness Of Breath] : shortness of breath [SOB on Exertion] : shortness of breath during exertion [Constipation] : constipation [Joint Pain] : joint pain [Muscle Weakness] : muscle weakness [Difficulty Walking] : difficulty walking [Insomnia] : insomnia [Negative] : Allergic/Immunologic [FreeTextEntry2] : seated in wheelchair [FreeTextEntry4] : chronic nasal congestion and post-nasal drip [FreeTextEntry6] : SOB for years - unchanged - not recent [FreeTextEntry7] : occasional hemorrhoidal bleeding  [FreeTextEntry9] : chronic L sided pain [de-identified] : pruritus at her back - recurrent

## 2024-06-07 NOTE — PHYSICAL EXAM
[Capable of only limited self care, confined to bed or chair more than 50% of waking hours] : Status 3- Capable of only limited self care, confined to bed or chair more than 50% of waking hours [Normal] : affect appropriate [de-identified] : seated in wheelchair, but does stand up by herself [de-identified] : dependant BLE pedal pitting edema [de-identified] : L thigh lipoma

## 2024-07-01 ENCOUNTER — APPOINTMENT (OUTPATIENT)
Age: 89
End: 2024-07-01

## 2024-07-08 ENCOUNTER — APPOINTMENT (OUTPATIENT)
Age: 89
End: 2024-07-08

## 2024-07-10 ENCOUNTER — APPOINTMENT (OUTPATIENT)
Age: 89
End: 2024-07-10

## 2024-07-15 ENCOUNTER — APPOINTMENT (OUTPATIENT)
Age: 89
End: 2024-07-15

## 2024-12-30 NOTE — H&P ADULT - DOES THIS PATIENT HAVE A HISTORY OF OR HAS BEEN DX WITH HEART FAILURE?
PROVIDER:[TOKEN:[[id number]],FOLLOWUP:[1 week]],PROVIDER:[TOKEN:[[id number]],FOLLOWUP:[1 week]] no

## 2025-02-21 NOTE — ED PROVIDER NOTE - PHYSICAL EXAMINATION
Addended by: LUCILLE STARR on: 2/21/2025 02:19 PM     Modules accepted: Orders     PHYSICAL EXAM:    Constitutional: awake, alert, NAD  Eyes: EOMI, no conj injection  HENT: NC AT  Back: no c/t/l spine ttp  Respiratory: no respiratory distress, decreased breath sounds RLL, no wheezing  Cardiovascular: RRR nml S1S2  Gastrointestinal: soft, no masses, nontender, nondistended. No guarding or rebound.   Neurological: AAOx3, CN II-XII grossly intact, no focal numbness or weakness  Skin: no rash  Musculoskeletal: no gross deformity

## 2025-03-03 NOTE — END OF VISIT
Patient is a 86 year old female that presents to the ED via EMS arrives on CPAP for respiratory distress. Pt wears home O2 home but was out RA sats 85% per EMS. Pt arrives with a 20 G PIV to left hand and a nitro paste patch to the left chest wall.   
[FreeTextEntry3] : Patient seen and examined with Jorge Correia NP.\par